# Patient Record
Sex: FEMALE | Race: WHITE | NOT HISPANIC OR LATINO | Employment: FULL TIME | URBAN - METROPOLITAN AREA
[De-identification: names, ages, dates, MRNs, and addresses within clinical notes are randomized per-mention and may not be internally consistent; named-entity substitution may affect disease eponyms.]

---

## 2023-03-08 PROBLEM — J01.90 ACUTE SINUSITIS: Status: ACTIVE | Noted: 2023-03-08

## 2023-03-08 PROBLEM — K59.00 CONSTIPATION, ACUTE: Status: ACTIVE | Noted: 2023-03-08

## 2023-03-08 PROBLEM — R60.0 BILATERAL EDEMA OF LOWER EXTREMITY: Status: ACTIVE | Noted: 2023-03-08

## 2023-03-08 PROBLEM — I25.10 CAD (CORONARY ARTERY DISEASE): Status: ACTIVE | Noted: 2023-03-08

## 2023-03-08 PROBLEM — R06.02 SOB (SHORTNESS OF BREATH) ON EXERTION: Status: ACTIVE | Noted: 2023-03-08

## 2023-03-08 PROBLEM — R60.9 EDEMA: Status: ACTIVE | Noted: 2023-03-08

## 2023-03-08 PROBLEM — R10.84 ABDOMINAL PAIN, COLICKY: Status: ACTIVE | Noted: 2023-03-08

## 2023-03-08 PROBLEM — Z95.1 S/P CABG (CORONARY ARTERY BYPASS GRAFT): Status: ACTIVE | Noted: 2023-03-08

## 2023-03-08 PROBLEM — R00.0 TACHYCARDIA: Status: ACTIVE | Noted: 2023-03-08

## 2023-03-08 PROBLEM — J30.2 SEASONAL ALLERGIC RHINITIS: Status: ACTIVE | Noted: 2023-03-08

## 2023-03-08 PROBLEM — E78.00 HYPERCHOLESTEROLEMIA: Status: ACTIVE | Noted: 2023-03-08

## 2023-03-08 PROBLEM — R16.0 HEPATOMEGALY: Status: ACTIVE | Noted: 2023-03-08

## 2023-03-08 PROBLEM — I27.20 PULMONARY HTN (MULTI): Status: ACTIVE | Noted: 2023-03-08

## 2023-03-08 PROBLEM — Z95.2 S/P MITRAL VALVE REPLACEMENT: Status: ACTIVE | Noted: 2023-03-08

## 2023-03-08 PROBLEM — I42.9 CARDIOMYOPATHY (MULTI): Status: ACTIVE | Noted: 2023-03-08

## 2023-03-08 PROBLEM — I34.0 MITRAL REGURGITATION: Status: ACTIVE | Noted: 2023-03-08

## 2023-03-08 PROBLEM — I48.92 ATRIAL FLUTTER (MULTI): Status: ACTIVE | Noted: 2023-03-08

## 2023-03-08 PROBLEM — J90 PLEURAL EFFUSION: Status: ACTIVE | Noted: 2023-03-08

## 2023-03-08 PROBLEM — F51.01 PRIMARY INSOMNIA: Status: ACTIVE | Noted: 2023-03-08

## 2023-03-08 RX ORDER — LISINOPRIL 20 MG/1
1 TABLET ORAL DAILY
COMMUNITY
Start: 2020-10-01 | End: 2023-11-07 | Stop reason: SDUPTHER

## 2023-03-08 RX ORDER — CARVEDILOL 25 MG/1
1 TABLET ORAL 2 TIMES DAILY
COMMUNITY
Start: 2020-12-02 | End: 2024-05-07 | Stop reason: SDUPTHER

## 2023-03-08 RX ORDER — ATORVASTATIN CALCIUM 80 MG/1
1 TABLET, FILM COATED ORAL DAILY
COMMUNITY
Start: 2020-10-01 | End: 2024-04-09 | Stop reason: SDUPTHER

## 2023-03-08 RX ORDER — WARFARIN 2 MG/1
TABLET ORAL
COMMUNITY
End: 2023-10-05 | Stop reason: SDUPTHER

## 2023-03-08 RX ORDER — ASPIRIN 81 MG/1
1 TABLET ORAL DAILY
COMMUNITY
Start: 2020-08-06

## 2023-03-13 ENCOUNTER — OFFICE VISIT (OUTPATIENT)
Dept: PRIMARY CARE | Facility: CLINIC | Age: 53
End: 2023-03-13
Payer: COMMERCIAL

## 2023-03-13 VITALS
OXYGEN SATURATION: 99 % | HEIGHT: 63 IN | DIASTOLIC BLOOD PRESSURE: 78 MMHG | HEART RATE: 60 BPM | SYSTOLIC BLOOD PRESSURE: 120 MMHG | TEMPERATURE: 96 F | BODY MASS INDEX: 24.45 KG/M2 | WEIGHT: 138 LBS

## 2023-03-13 DIAGNOSIS — F32.0 CURRENT MILD EPISODE OF MAJOR DEPRESSIVE DISORDER WITHOUT PRIOR EPISODE (CMS-HCC): ICD-10-CM

## 2023-03-13 DIAGNOSIS — I48.3 TYPICAL ATRIAL FLUTTER (MULTI): ICD-10-CM

## 2023-03-13 DIAGNOSIS — Z00.00 ROUTINE GENERAL MEDICAL EXAMINATION AT A HEALTH CARE FACILITY: Primary | ICD-10-CM

## 2023-03-13 DIAGNOSIS — Z23 ENCOUNTER FOR IMMUNIZATION: ICD-10-CM

## 2023-03-13 DIAGNOSIS — I25.10 CORONARY ARTERY DISEASE INVOLVING NATIVE HEART WITHOUT ANGINA PECTORIS, UNSPECIFIED VESSEL OR LESION TYPE: ICD-10-CM

## 2023-03-13 DIAGNOSIS — Z12.11 ENCOUNTER FOR SCREENING FOR MALIGNANT NEOPLASM OF COLON: ICD-10-CM

## 2023-03-13 PROCEDURE — 90471 IMMUNIZATION ADMIN: CPT | Performed by: FAMILY MEDICINE

## 2023-03-13 PROCEDURE — 90677 PCV20 VACCINE IM: CPT | Performed by: FAMILY MEDICINE

## 2023-03-13 PROCEDURE — 90750 HZV VACC RECOMBINANT IM: CPT | Performed by: FAMILY MEDICINE

## 2023-03-13 PROCEDURE — 99396 PREV VISIT EST AGE 40-64: CPT | Performed by: FAMILY MEDICINE

## 2023-03-13 RX ORDER — PNEUMOCOCCAL 20-VALENT CONJUGATE VACCINE 2.2; 2.2; 2.2; 2.2; 2.2; 2.2; 2.2; 2.2; 2.2; 2.2; 2.2; 2.2; 2.2; 2.2; 2.2; 2.2; 4.4; 2.2; 2.2; 2.2 UG/.5ML; UG/.5ML; UG/.5ML; UG/.5ML; UG/.5ML; UG/.5ML; UG/.5ML; UG/.5ML; UG/.5ML; UG/.5ML; UG/.5ML; UG/.5ML; UG/.5ML; UG/.5ML; UG/.5ML; UG/.5ML; UG/.5ML; UG/.5ML; UG/.5ML; UG/.5ML
0.5 INJECTION, SUSPENSION INTRAMUSCULAR ONCE
Qty: 0.5 ML | Refills: 0 | Status: SHIPPED | OUTPATIENT
Start: 2023-03-13 | End: 2023-03-13

## 2023-03-13 RX ORDER — CEPHALEXIN 500 MG/1
CAPSULE ORAL
COMMUNITY
Start: 2023-03-09 | End: 2024-03-22 | Stop reason: ALTCHOICE

## 2023-03-13 RX ORDER — ESCITALOPRAM OXALATE 10 MG/1
10 TABLET ORAL DAILY
Qty: 90 TABLET | Refills: 3 | Status: SHIPPED | OUTPATIENT
Start: 2023-03-13 | End: 2023-05-08 | Stop reason: SDUPTHER

## 2023-03-13 ASSESSMENT — PAIN SCALES - GENERAL: PAINLEVEL: 0-NO PAIN

## 2023-03-13 NOTE — PROGRESS NOTES
Subjective   Patient ID: Kayley Lynch is a 52 y.o. female who presents for Annual Exam (Patient is here for CPE, Patient is not fasting. Patient would to discuss some depression. ).  HPI  52-year-old female presents for complete physical exam.  She would like to start medication for depression.  No homicidal or suicidal ideation.  Review of Systems  Constitutional: no chills, no fever and no night sweats.   Eyes: no blurred vision and no eyesight problems.   ENT: no hearing loss, no nasal congestion, no nasal discharge, no hoarseness and no sore throat.   Cardiovascular: no chest pain, no intermittent leg claudication, no lower extremity edema, no palpitations and no syncope.   Respiratory: no cough, no shortness of breath during exertion, no shortness of breath at rest and no wheezing.   Gastrointestinal: no abdominal pain, no blood in stools, no constipation, no diarrhea, no melena, no nausea, no rectal pain and no vomiting.   Genitourinary: no dysuria, no change in urinary frequency, no urinary hesitancy and no feelings of urinary urgency.   Neurological: no difficulty walking, no headache, no limb weakness, no numbness and no tingling.   Psychiatric: no anxiety,  depression, no anhedonia and no substance use disorders.   Endocrine: no recent weight gain and no recent weight loss.   Hematologic/Lymphatic: no tendency for easy bruising and no swollen glands.  Visit Vitals  /78 (BP Location: Left arm, Patient Position: Sitting, BP Cuff Size: Adult)   Pulse 60   Temp 35.6 °C (96 °F)        Objective   Physical Exam  Constitutional: Alert and in no acute distress. Well developed, well nourished.   Eyes: Pupils were equal in size, round, reactive to light (PERRL) with normal accommodation and extraocular movements intact (EOMI). Ophthalmoscopic examination: Normal.   Ears, Nose, Mouth, and Throat: External inspection of ears and nose: Normal. Otoscopic examination: Normal. Lips, teeth, and gums: Normal.  Oropharynx: Normal.   Neck: No neck mass was observed. Supple. Thyroid not enlarged and there were no palpable thyroid nodules.   Cardiovascular: Heart rate and rhythm were normal, normal S1 and S2, no gallops, no murmurs and no pericardial rub. Carotid pulses: Normal with no bruits. Abdominal aorta: Normal. Pedal pulses: Normal. No peripheral edema.   Pulmonary: No respiratory distress. Clear bilateral breath sounds.   Abdomen: Soft nontender; no abdominal mass palpated. Normal bowel sounds. No organomegaly.   Skin: Normal skin color and pigmentation, normal skin turgor, and no rash.   Psychiatric: Judgment and insight: Intact. Mood and affect: Normal.  Assessment/Plan   Problem List Items Addressed This Visit          Circulatory    Atrial flutter (CMS/HCC)    CAD (coronary artery disease)       Other    Routine general medical examination at a health care facility - Primary    Relevant Orders    Lipid Panel    Comprehensive Metabolic Panel    CBC and Auto Differential    Urinalysis with Reflex Microscopic    BI mammo bilateral screening tomosynthesis    Encounter for screening for malignant neoplasm of colon    Relevant Orders    Colonoscopy    Encounter for immunization     Other Visit Diagnoses       Current mild episode of major depressive disorder without prior episode (CMS/HCC)        Relevant Medications    escitalopram (Lexapro) 10 mg tablet

## 2023-03-20 ENCOUNTER — APPOINTMENT (OUTPATIENT)
Dept: PRIMARY CARE | Facility: CLINIC | Age: 53
End: 2023-03-20
Payer: COMMERCIAL

## 2023-05-08 ENCOUNTER — OFFICE VISIT (OUTPATIENT)
Dept: PRIMARY CARE | Facility: CLINIC | Age: 53
End: 2023-05-08
Payer: COMMERCIAL

## 2023-05-08 VITALS
BODY MASS INDEX: 24.09 KG/M2 | HEART RATE: 76 BPM | OXYGEN SATURATION: 96 % | SYSTOLIC BLOOD PRESSURE: 132 MMHG | WEIGHT: 136 LBS | DIASTOLIC BLOOD PRESSURE: 78 MMHG

## 2023-05-08 DIAGNOSIS — F32.0 CURRENT MILD EPISODE OF MAJOR DEPRESSIVE DISORDER WITHOUT PRIOR EPISODE (CMS-HCC): Primary | ICD-10-CM

## 2023-05-08 DIAGNOSIS — Z23 NEED FOR ZOSTER VACCINE: ICD-10-CM

## 2023-05-08 DIAGNOSIS — I27.20 PULMONARY HTN (MULTI): ICD-10-CM

## 2023-05-08 PROBLEM — R60.0 EDEMA, LOWER EXTREMITY: Status: ACTIVE | Noted: 2023-05-08

## 2023-05-08 PROCEDURE — 99214 OFFICE O/P EST MOD 30 MIN: CPT | Performed by: FAMILY MEDICINE

## 2023-05-08 PROCEDURE — 90471 IMMUNIZATION ADMIN: CPT | Performed by: FAMILY MEDICINE

## 2023-05-08 PROCEDURE — 90750 HZV VACC RECOMBINANT IM: CPT | Performed by: FAMILY MEDICINE

## 2023-05-08 RX ORDER — ESCITALOPRAM OXALATE 20 MG/1
20 TABLET ORAL DAILY
Qty: 90 TABLET | Refills: 3 | Status: SHIPPED | OUTPATIENT
Start: 2023-05-08 | End: 2024-05-13

## 2023-05-08 NOTE — PROGRESS NOTES
Subjective   Patient ID: Kayley Lynch is a 52 y.o. female who presents for discuss medication change (Patient would like to increase Escitalopram ).  HPI  52-year-old female with history of pulmonary hypertension presents for follow-up on depression.  She like to increase the dose of her medication if possible.  She is still having some breakthrough symptoms.  No side effects to medication.  Pulmonary hypertension is stable.  She is due for her second shingles shot.  Review of Systems  Constitutional: no chills, no fever and no night sweats.   Eyes: no blurred vision and no eyesight problems.   ENT: no hearing loss, no nasal congestion, no nasal discharge, no hoarseness and no sore throat.   Cardiovascular: no chest pain, no intermittent leg claudication, no lower extremity edema, no palpitations and no syncope.   Respiratory: no cough, no shortness of breath during exertion, no shortness of breath at rest and no wheezing.   Gastrointestinal: no abdominal pain, no blood in stools, no constipation, no diarrhea, no melena, no nausea, no rectal pain and no vomiting.   Genitourinary: no dysuria, no change in urinary frequency, no urinary hesitancy and no feelings of urinary urgency.   Neurological: no difficulty walking, no headache, no limb weakness, no numbness and no tingling.   Psychiatric: no anxiety, depression as per HPI, no anhedonia and no substance use disorders.   Endocrine: no recent weight gain and no recent weight loss.   Hematologic/Lymphatic: no tendency for easy bruising and no swollen glands.  Visit Vitals  /78 (BP Location: Left arm, Patient Position: Sitting)   Pulse 76        Objective   Physical Exam  Vitals reviewed.   Constitutional:       Appearance: Normal appearance.   HENT:      Right Ear: Tympanic membrane normal.      Left Ear: Tympanic membrane normal.   Cardiovascular:      Rate and Rhythm: Normal rate and regular rhythm.      Heart sounds: Normal heart sounds.   Pulmonary:       Breath sounds: Normal breath sounds.   Neurological:      Mental Status: She is alert.   Psychiatric:         Mood and Affect: Mood normal.         Behavior: Behavior normal.         Assessment/Plan   Problem List Items Addressed This Visit          Circulatory    Pulmonary HTN (CMS/HCC)       Other    Current mild episode of major depressive disorder without prior episode (CMS/HCC) - Primary    Relevant Medications    escitalopram (Lexapro) 20 mg tablet    Need for zoster vaccine

## 2023-08-30 RX ORDER — ACETAMINOPHEN AND CODEINE PHOSPHATE 300; 30 MG/1; MG/1
1 TABLET ORAL EVERY 6 HOURS
COMMUNITY
Start: 2023-02-22

## 2023-09-20 LAB
ALANINE AMINOTRANSFERASE (SGPT) (U/L) IN SER/PLAS: 11 U/L (ref 7–45)
ALBUMIN (G/DL) IN SER/PLAS: 4.6 G/DL (ref 3.4–5)
ALKALINE PHOSPHATASE (U/L) IN SER/PLAS: 167 U/L (ref 33–110)
ANION GAP IN SER/PLAS: 10 MMOL/L (ref 10–20)
ASPARTATE AMINOTRANSFERASE (SGOT) (U/L) IN SER/PLAS: 14 U/L (ref 9–39)
BILIRUBIN TOTAL (MG/DL) IN SER/PLAS: 0.5 MG/DL (ref 0–1.2)
CALCIUM (MG/DL) IN SER/PLAS: 9.5 MG/DL (ref 8.6–10.3)
CARBON DIOXIDE, TOTAL (MMOL/L) IN SER/PLAS: 31 MMOL/L (ref 21–32)
CHLORIDE (MMOL/L) IN SER/PLAS: 102 MMOL/L (ref 98–107)
CHOLESTEROL (MG/DL) IN SER/PLAS: 143 MG/DL (ref 0–199)
CHOLESTEROL IN HDL (MG/DL) IN SER/PLAS: 36.4 MG/DL
CHOLESTEROL/HDL RATIO: 3.9
CREATININE (MG/DL) IN SER/PLAS: 0.7 MG/DL (ref 0.5–1.05)
GFR FEMALE: >90 ML/MIN/1.73M2
GLUCOSE (MG/DL) IN SER/PLAS: 98 MG/DL (ref 74–99)
LDL: 82 MG/DL (ref 0–99)
POTASSIUM (MMOL/L) IN SER/PLAS: 4 MMOL/L (ref 3.5–5.3)
PROTEIN TOTAL: 7.5 G/DL (ref 6.4–8.2)
SODIUM (MMOL/L) IN SER/PLAS: 139 MMOL/L (ref 136–145)
TRIGLYCERIDE (MG/DL) IN SER/PLAS: 124 MG/DL (ref 0–149)
UREA NITROGEN (MG/DL) IN SER/PLAS: 12 MG/DL (ref 6–23)
VLDL: 25 MG/DL (ref 0–40)

## 2023-10-04 ENCOUNTER — ANTICOAGULATION - WARFARIN VISIT (OUTPATIENT)
Dept: PHARMACY | Facility: CLINIC | Age: 53
End: 2023-10-04
Payer: COMMERCIAL

## 2023-10-04 DIAGNOSIS — Z95.2 S/P MITRAL VALVE REPLACEMENT: ICD-10-CM

## 2023-10-04 DIAGNOSIS — I48.92 ATRIAL FLUTTER, UNSPECIFIED TYPE (MULTI): Primary | ICD-10-CM

## 2023-10-04 LAB
POC INR: 3.3
POC INR: 3.3
POC PROTHROMBIN TIME: NORMAL
POC PROTHROMBIN TIME: NORMAL

## 2023-10-04 PROCEDURE — 99212 OFFICE O/P EST SF 10 MIN: CPT

## 2023-10-04 PROCEDURE — 85610 PROTHROMBIN TIME: CPT

## 2023-10-04 NOTE — PROGRESS NOTES
Coumadin Clinic Visit Note    Patient verified warfarin dose  No missed doses  No unusual bruising or bleeding  No changes to medications - verified med list in epic  Consistent dietary green intake  No anticipated procedures at this time - may have some teeth pulled in future but only 2 at a time  INR Therapeutic today at 3.3  No changes to warfarin dose today  Next appointment 4 weeks    Deena Church, Pharm D

## 2023-11-01 ENCOUNTER — APPOINTMENT (OUTPATIENT)
Dept: PHARMACY | Facility: CLINIC | Age: 53
End: 2023-11-01
Payer: COMMERCIAL

## 2023-11-03 DIAGNOSIS — E78.00 HYPERCHOLESTEROLEMIA: Primary | ICD-10-CM

## 2023-11-03 DIAGNOSIS — E78.00 PURE HYPERCHOLESTEROLEMIA, UNSPECIFIED: ICD-10-CM

## 2023-11-04 RX ORDER — LISINOPRIL 20 MG/1
20 TABLET ORAL DAILY
Qty: 90 TABLET | Refills: 2 | OUTPATIENT
Start: 2023-11-04

## 2023-11-06 ENCOUNTER — ANTICOAGULATION - WARFARIN VISIT (OUTPATIENT)
Dept: PHARMACY | Facility: CLINIC | Age: 53
End: 2023-11-06
Payer: COMMERCIAL

## 2023-11-06 DIAGNOSIS — I48.92 ATRIAL FLUTTER, UNSPECIFIED TYPE (MULTI): Primary | ICD-10-CM

## 2023-11-06 DIAGNOSIS — Z95.2 S/P MITRAL VALVE REPLACEMENT: ICD-10-CM

## 2023-11-06 LAB
POC INR: 2.9
POC PROTHROMBIN TIME: NORMAL

## 2023-11-06 PROCEDURE — 99212 OFFICE O/P EST SF 10 MIN: CPT

## 2023-11-06 PROCEDURE — 85610 PROTHROMBIN TIME: CPT

## 2023-11-06 NOTE — PROGRESS NOTES
No bleeding or unusual bruising.  Medications and doses verified.  No scheduled procedures at this time.  INR=2.9   Plan: Continue same weekly dose.  Follow up INR check in 4 weeks.

## 2023-11-07 RX ORDER — LISINOPRIL 20 MG/1
20 TABLET ORAL DAILY
Qty: 90 TABLET | Refills: 3 | Status: SHIPPED | OUTPATIENT
Start: 2023-11-07

## 2023-12-04 ENCOUNTER — ANTICOAGULATION - WARFARIN VISIT (OUTPATIENT)
Dept: PHARMACY | Facility: CLINIC | Age: 53
End: 2023-12-04
Payer: COMMERCIAL

## 2023-12-04 DIAGNOSIS — Z95.2 S/P MITRAL VALVE REPLACEMENT: Primary | ICD-10-CM

## 2023-12-04 LAB
POC INR: 1.8
POC PROTHROMBIN TIME: NORMAL

## 2023-12-04 PROCEDURE — 99212 OFFICE O/P EST SF 10 MIN: CPT | Performed by: PHARMACIST

## 2023-12-04 PROCEDURE — 85610 PROTHROMBIN TIME: CPT | Mod: QW | Performed by: PHARMACIST

## 2023-12-04 NOTE — PROGRESS NOTES
Verified current dose with pt.    No new meds or med changes since last visit.  Pt denies unusual bleed/bruise.  No upcoming procedures.  Inr = 1.8 - pt says she thinks she missed 1 dose.    Pt made stuffed cabbage - so she had more greens than usual.   Boost 6 mg today (mon)  Continue same dose and check again in 4 weeks.

## 2024-01-05 ENCOUNTER — CLINICAL SUPPORT (OUTPATIENT)
Dept: PHARMACY | Facility: CLINIC | Age: 54
End: 2024-01-05
Payer: COMMERCIAL

## 2024-01-05 ENCOUNTER — ANTICOAGULATION - WARFARIN VISIT (OUTPATIENT)
Dept: PHARMACY | Facility: CLINIC | Age: 54
End: 2024-01-05
Payer: COMMERCIAL

## 2024-01-05 DIAGNOSIS — Z95.2 S/P MITRAL VALVE REPLACEMENT: ICD-10-CM

## 2024-01-05 DIAGNOSIS — I48.92 ATRIAL FLUTTER, UNSPECIFIED TYPE (MULTI): Primary | ICD-10-CM

## 2024-01-05 DIAGNOSIS — Z95.2 S/P MITRAL VALVE REPLACEMENT: Primary | ICD-10-CM

## 2024-01-05 DIAGNOSIS — I48.92 ATRIAL FLUTTER, UNSPECIFIED TYPE (MULTI): ICD-10-CM

## 2024-01-05 LAB
POC INR: 1.7
POC PROTHROMBIN TIME: NORMAL

## 2024-01-05 PROCEDURE — 85610 PROTHROMBIN TIME: CPT | Performed by: PHARMACIST

## 2024-01-05 PROCEDURE — 99212 OFFICE O/P EST SF 10 MIN: CPT | Performed by: PHARMACIST

## 2024-01-05 NOTE — PROGRESS NOTES
Kayley Lynch is a 53 y.o. female with history of atrial flutter & status post mitral valve replacement who presents today for anticoagulation monitoring and adjustment.  INR 1.7 is therapeutic for this patient (goal range 2.5-3.5) and is reflective of 14mg TWD -- per patient she only took 4mg x 1 dose date of last appt on 12/4/23 - our documentation instructed patient to take 4mg Mondays and 2mg all other days of the week for 16 mg TWD  Patient verifies current dosing regimen, patient able to verbally recall dose  Patient reports 0 missed doses since last INR  Last INR 1.8 on 12/4/23 (4 week interval)  Patient denies s/sx clotting and/or stroke  Patient denies hematuria, epistaxis, rectal bleeding  Patient denies changes in diet, alcohol, or tobacco use  Vegetable intake consistent from week to week  Reviewed medication list and drug allergies with patient, updated any medication additions or modifications accordingly  Acetaminophen intake: no changes   Patient also denies any pending medical or dental procedures scheduled at this time  Patient was instructed to boost with warfarin 6mg today only, then increase TWD to 18mg and RTC 2 weeks    Gaviota Cosme, LaurynD, BCPS   1/5/2024 2:14 PM

## 2024-01-19 ENCOUNTER — ANTICOAGULATION - WARFARIN VISIT (OUTPATIENT)
Dept: PHARMACY | Facility: CLINIC | Age: 54
End: 2024-01-19
Payer: COMMERCIAL

## 2024-01-19 ENCOUNTER — APPOINTMENT (OUTPATIENT)
Dept: PHARMACY | Facility: CLINIC | Age: 54
End: 2024-01-19
Payer: COMMERCIAL

## 2024-01-19 DIAGNOSIS — Z95.2 S/P MITRAL VALVE REPLACEMENT: ICD-10-CM

## 2024-01-19 DIAGNOSIS — I48.92 ATRIAL FLUTTER, UNSPECIFIED TYPE (MULTI): Primary | ICD-10-CM

## 2024-01-19 NOTE — PROGRESS NOTES
Anticoagulation Clinic appointment updated to 1/26/24 (from 1/19/24) -- updated POC INR tracker to reflect next appt date     Gaviota Cosme PharmD, BCPS   1/19/2024 3:33 PM

## 2024-01-26 ENCOUNTER — APPOINTMENT (OUTPATIENT)
Dept: PHARMACY | Facility: CLINIC | Age: 54
End: 2024-01-26
Payer: COMMERCIAL

## 2024-01-26 ENCOUNTER — ANTICOAGULATION - WARFARIN VISIT (OUTPATIENT)
Dept: PHARMACY | Facility: CLINIC | Age: 54
End: 2024-01-26
Payer: COMMERCIAL

## 2024-01-26 DIAGNOSIS — Z95.2 S/P MITRAL VALVE REPLACEMENT: ICD-10-CM

## 2024-01-26 DIAGNOSIS — I48.92 ATRIAL FLUTTER, UNSPECIFIED TYPE (MULTI): Primary | ICD-10-CM

## 2024-01-26 LAB
POC INR: 4.1
POC PROTHROMBIN TIME: NORMAL

## 2024-01-26 PROCEDURE — 85610 PROTHROMBIN TIME: CPT | Mod: QW | Performed by: PHARMACIST

## 2024-01-26 PROCEDURE — 99212 OFFICE O/P EST SF 10 MIN: CPT | Performed by: PHARMACIST

## 2024-01-26 NOTE — PROGRESS NOTES
Coumadin Clinic Visit Note    Patient verified warfarin dose  No missed doses  No unusual bruising or bleeding  No changes to medications  Barely ate any greens this month  compared to few broccoli every week , no sickeness , No MTV , no boost , no ensure ,no protein shakes ,no ylenol or pain pills , no supplements   No anticipated procedures at this time  INR Supratherapeutic today at 4.1  No changes to warfarin dose today  Next appointment  3weeks       Carmela Vail, PharmD

## 2024-02-06 DIAGNOSIS — Z95.2 S/P MITRAL VALVE REPLACEMENT: Primary | ICD-10-CM

## 2024-02-16 ENCOUNTER — ANTICOAGULATION - WARFARIN VISIT (OUTPATIENT)
Dept: PHARMACY | Facility: CLINIC | Age: 54
End: 2024-02-16
Payer: COMMERCIAL

## 2024-02-16 ENCOUNTER — CLINICAL SUPPORT (OUTPATIENT)
Dept: PHARMACY | Facility: CLINIC | Age: 54
End: 2024-02-16
Payer: COMMERCIAL

## 2024-02-16 DIAGNOSIS — I48.92 ATRIAL FLUTTER, UNSPECIFIED TYPE (MULTI): Primary | ICD-10-CM

## 2024-02-16 DIAGNOSIS — Z95.2 S/P MITRAL VALVE REPLACEMENT: ICD-10-CM

## 2024-02-16 LAB
POC INR: 2.8
POC PROTHROMBIN TIME: NORMAL

## 2024-02-16 PROCEDURE — 85610 PROTHROMBIN TIME: CPT | Mod: QW | Performed by: PHARMACIST

## 2024-02-16 PROCEDURE — 99212 OFFICE O/P EST SF 10 MIN: CPT | Performed by: PHARMACIST

## 2024-02-16 NOTE — PROGRESS NOTES
Kayley Lynch is a 53 y.o. female with history of atrial flutter and status post mitral valve replacement who presents today for anticoagulation monitoring and adjustment.  INR 2.8 is therapeutic for this patient (goal range 2.5-3.5) and is reflective of 18 mg TWD  Patient verifies current dosing regimen, patient able to verbally recall dose  Patient reports 0  missed doses since last INR- other than the 1 dose patient was instructed to hold at last visit for supra-therapeutic INR   Last INR 4.1 on 1/26/24 (3 week interval)  Patient denies s/sx clotting and/or stroke  Patient denies hematuria, epistaxis, rectal bleeding  Patient denies changes in diet, alcohol, or tobacco use  Vegetable intake consistent from week to week  Reviewed medication list and drug allergies with patient, updated any medication additions or modifications accordingly  Acetaminophen intake: no changes   Patient also denies any pending medical or dental procedures scheduled at this time  Patient was instructed to continue with current therapy of warfarin 18mg and RTC 4 weeks    Lauryn DelaneyD, BCPS   2/16/2024 1:01 PM

## 2024-03-15 ENCOUNTER — ANTICOAGULATION - WARFARIN VISIT (OUTPATIENT)
Dept: PHARMACY | Facility: CLINIC | Age: 54
End: 2024-03-15
Payer: COMMERCIAL

## 2024-03-15 DIAGNOSIS — I48.92 ATRIAL FLUTTER, UNSPECIFIED TYPE (MULTI): Primary | ICD-10-CM

## 2024-03-15 DIAGNOSIS — Z95.2 S/P MITRAL VALVE REPLACEMENT: ICD-10-CM

## 2024-03-15 LAB
POC INR: 5
POC PROTHROMBIN TIME: NORMAL

## 2024-03-15 PROCEDURE — 85610 PROTHROMBIN TIME: CPT | Mod: QW | Performed by: PHARMACIST

## 2024-03-15 PROCEDURE — 99212 OFFICE O/P EST SF 10 MIN: CPT | Performed by: PHARMACIST

## 2024-03-15 NOTE — PROGRESS NOTES
Kayley Lynch is a 53 y.o. female with history of atrial flutter/status post mitral valve replacement who presents today for anticoagulation monitoring and adjustment.  INR 5 is supra-therapeutic for this patient (goal range 2.5-3.5) and is reflective of 18 mg TWD  Patient verifies current dosing regimen, patient able to verbally recall dose  Patient reports 0 missed doses since last INR  Last INR 2.8 on 2/16/24 (4 week interval)  Patient denies s/sx clotting and/or stroke  Patient denies hematuria, epistaxis, rectal bleeding  Patient denies changes in diet, alcohol, or tobacco use  Vegetable intake has been inconsistent  from week to week- patient plans to eat [employer] over the weekend for the holiday   Reviewed medication list and drug allergies with patient, updated any medication additions or modifications accordingly  Acetaminophen intake: no changes   Patient also denies any pending medical or dental procedures scheduled at this time  Patient was instructed to HOLD warfarin today only, then decrease to 2mg tomorrow only, then continue with previous maintenance dose of warfarin 18mg TWD and RTC 1 weeks    Gaviota Cosme, LaurynD, BCPS   3/15/2024 1:01 PM

## 2024-03-18 PROBLEM — I27.20 PULMONARY HYPERTENSION (MULTI): Chronic | Status: ACTIVE | Noted: 2023-03-08

## 2024-03-18 PROBLEM — R06.09 DYSPNEA ON EXERTION: Status: ACTIVE | Noted: 2023-03-08

## 2024-03-18 PROBLEM — Z95.2 HISTORY OF MITRAL VALVE REPLACEMENT: Status: RESOLVED | Noted: 2023-08-14 | Resolved: 2024-03-18

## 2024-03-18 PROBLEM — Z95.1 S/P CABG (CORONARY ARTERY BYPASS GRAFT): Chronic | Status: ACTIVE | Noted: 2023-03-08

## 2024-03-18 PROBLEM — Z95.2 S/P MITRAL VALVE REPLACEMENT: Chronic | Status: ACTIVE | Noted: 2023-03-08

## 2024-03-18 PROBLEM — Z95.2 HISTORY OF MITRAL VALVE REPLACEMENT: Status: ACTIVE | Noted: 2023-08-14

## 2024-03-18 PROBLEM — R60.0 EDEMA OF LOWER EXTREMITY: Status: RESOLVED | Noted: 2023-05-08 | Resolved: 2024-03-18

## 2024-03-18 PROBLEM — I48.92 ATRIAL FLUTTER (MULTI): Chronic | Status: ACTIVE | Noted: 2023-03-08

## 2024-03-18 PROBLEM — E78.00 HYPERCHOLESTEROLEMIA: Chronic | Status: ACTIVE | Noted: 2023-03-08

## 2024-03-18 PROBLEM — I42.9 CARDIOMYOPATHY (MULTI): Chronic | Status: ACTIVE | Noted: 2023-03-08

## 2024-03-18 PROBLEM — R60.0 BILATERAL EDEMA OF LOWER EXTREMITY: Chronic | Status: ACTIVE | Noted: 2023-03-08

## 2024-03-18 PROBLEM — I25.10 ARTERIOSCLEROSIS OF CORONARY ARTERY: Status: ACTIVE | Noted: 2024-03-18

## 2024-03-18 PROBLEM — I34.0 MITRAL REGURGITATION: Chronic | Status: ACTIVE | Noted: 2023-03-08

## 2024-03-18 PROBLEM — R60.9 EDEMA: Status: RESOLVED | Noted: 2023-03-08 | Resolved: 2024-03-18

## 2024-03-18 PROBLEM — I25.10 CAD (CORONARY ARTERY DISEASE): Chronic | Status: ACTIVE | Noted: 2023-03-08

## 2024-03-21 ENCOUNTER — ANTICOAGULATION - WARFARIN VISIT (OUTPATIENT)
Dept: PHARMACY | Facility: CLINIC | Age: 54
End: 2024-03-21
Payer: COMMERCIAL

## 2024-03-21 DIAGNOSIS — Z95.2 S/P MITRAL VALVE REPLACEMENT: Primary | Chronic | ICD-10-CM

## 2024-03-21 PROBLEM — Z23 NEED FOR ZOSTER VACCINE: Status: RESOLVED | Noted: 2023-05-08 | Resolved: 2024-03-21

## 2024-03-21 PROBLEM — K59.00 CONSTIPATION, ACUTE: Status: RESOLVED | Noted: 2023-03-08 | Resolved: 2024-03-21

## 2024-03-21 PROBLEM — J01.90 ACUTE SINUSITIS: Status: RESOLVED | Noted: 2023-03-08 | Resolved: 2024-03-21

## 2024-03-21 PROBLEM — F51.01 PRIMARY INSOMNIA: Status: RESOLVED | Noted: 2023-03-08 | Resolved: 2024-03-21

## 2024-03-21 PROBLEM — I25.10 ARTERIOSCLEROSIS OF CORONARY ARTERY: Status: RESOLVED | Noted: 2024-03-18 | Resolved: 2024-03-21

## 2024-03-21 PROBLEM — Z00.00 ROUTINE GENERAL MEDICAL EXAMINATION AT A HEALTH CARE FACILITY: Status: RESOLVED | Noted: 2023-03-13 | Resolved: 2024-03-21

## 2024-03-21 PROBLEM — R10.84 ABDOMINAL PAIN, COLICKY: Status: RESOLVED | Noted: 2023-03-08 | Resolved: 2024-03-21

## 2024-03-21 PROBLEM — J30.2 SEASONAL ALLERGIC RHINITIS: Status: RESOLVED | Noted: 2023-03-08 | Resolved: 2024-03-21

## 2024-03-21 PROBLEM — Z12.11 ENCOUNTER FOR SCREENING FOR MALIGNANT NEOPLASM OF COLON: Status: RESOLVED | Noted: 2023-03-13 | Resolved: 2024-03-21

## 2024-03-21 PROBLEM — R16.0 HEPATOMEGALY: Status: RESOLVED | Noted: 2023-03-08 | Resolved: 2024-03-21

## 2024-03-21 PROBLEM — Z23 ENCOUNTER FOR IMMUNIZATION: Status: RESOLVED | Noted: 2023-03-13 | Resolved: 2024-03-21

## 2024-03-21 LAB
POC INR: 2.4
POC PROTHROMBIN TIME: NORMAL

## 2024-03-21 PROCEDURE — 85610 PROTHROMBIN TIME: CPT | Mod: QW | Performed by: PHARMACIST

## 2024-03-21 PROCEDURE — 99212 OFFICE O/P EST SF 10 MIN: CPT | Performed by: PHARMACIST

## 2024-03-21 NOTE — PROGRESS NOTES
Verified that pt held 1 dose at last visit and took only 2 mg on Sat, then verified her usual dose.    Inr = 2.4 - a touch under range  No new meds or med changes since last visit  Pt denies unusual bleed/bruise  No upcoming procedures.  Pt planning on having 1-2 teeth pulled at a time  Will keep dose at 18 mg/week  Check in 2 weeks.

## 2024-03-22 ENCOUNTER — OFFICE VISIT (OUTPATIENT)
Dept: CARDIOLOGY | Facility: CLINIC | Age: 54
End: 2024-03-22
Payer: COMMERCIAL

## 2024-03-22 VITALS
OXYGEN SATURATION: 98 % | SYSTOLIC BLOOD PRESSURE: 138 MMHG | HEART RATE: 76 BPM | DIASTOLIC BLOOD PRESSURE: 84 MMHG | BODY MASS INDEX: 24.8 KG/M2 | WEIGHT: 140 LBS

## 2024-03-22 DIAGNOSIS — E78.00 HYPERCHOLESTEROLEMIA: Chronic | ICD-10-CM

## 2024-03-22 DIAGNOSIS — R07.89 CHEST PRESSURE: Chronic | ICD-10-CM

## 2024-03-22 DIAGNOSIS — I25.5 ISCHEMIC CARDIOMYOPATHY: Chronic | ICD-10-CM

## 2024-03-22 DIAGNOSIS — I25.10 CORONARY ARTERY DISEASE INVOLVING NATIVE HEART WITHOUT ANGINA PECTORIS, UNSPECIFIED VESSEL OR LESION TYPE: Chronic | ICD-10-CM

## 2024-03-22 DIAGNOSIS — Z95.2 S/P MITRAL VALVE REPLACEMENT: Primary | Chronic | ICD-10-CM

## 2024-03-22 DIAGNOSIS — I48.4 ATYPICAL ATRIAL FLUTTER (MULTI): Chronic | ICD-10-CM

## 2024-03-22 DIAGNOSIS — I34.0 NONRHEUMATIC MITRAL VALVE REGURGITATION: Chronic | ICD-10-CM

## 2024-03-22 DIAGNOSIS — R06.09 DYSPNEA ON EXERTION: ICD-10-CM

## 2024-03-22 PROCEDURE — 93000 ELECTROCARDIOGRAM COMPLETE: CPT | Performed by: INTERNAL MEDICINE

## 2024-03-22 PROCEDURE — 99215 OFFICE O/P EST HI 40 MIN: CPT | Performed by: INTERNAL MEDICINE

## 2024-03-22 NOTE — PROGRESS NOTES
Referred by No ref. provider found    HPI Kayley is here for 6-month follow-up.  Over the course the last 6 months she has been noticing progressively more exertional chest discomfort.  Over the last month she has been having more shortness of breath.  Never at rest no lower extremity edema.  She is smoking and still smoking more now to half a pack a day.  Has not been very active.    Past Medical History:  Problem List Items Addressed This Visit    None       Past Medical History:   Diagnosis Date    Atrial flutter (CMS/[facility]) 03/08/2023    s/p flutter ablation October 2020    Bilateral edema of lower extremity 03/08/2023    New July 2020 ... Better on Lasix    CAD (coronary artery disease) 03/08/2023    50% LAD, 60% diag, T.O. Cx, subtotal RCA, elevated RH pressure  CABG 10/2020: LIMA to LAD, FRA to OM, SVG to RCA      Cardiomyopathy (CMS/[facility]) 03/08/2023    EF 35-45%, now 50%    Hypercholesterolemia 03/08/2023    Dr. Lomeli follows    Mitral regurgitation 03/08/2023    MVT, mild MS, severe MR   s/p MVR = 25/33 Kevin mechanical valve     Other conditions influencing health status     Patient denies significant medical history    Pulmonary hypertension (CMS/[facility]) 03/08/2023    Mod to severe    S/P CABG (coronary artery bypass graft) 03/08/2023    10/2020: LIMA to LAD, FRA to OM, SVG to RCA      S/P mitral valve replacement 03/08/2023    MVT, Mild MS, severe MR ... s/p MVR 25/33 Calhan Mechanical valve             Past Surgical History:  She has a past surgical history that includes Other surgical history (08/17/2021) and Other surgical history (08/06/2020).      Social History:  She reports that she has been smoking cigarettes. She has never used smokeless tobacco. She reports that she does not drink alcohol and does not use drugs.    Family History:  Family History   Problem Relation Name Age of Onset    Atrial fibrillation Father      Heart attack Father          Allergies:  Sulfa (sulfonamide antibiotics)    Outpatient  "Medications:  Current Outpatient Medications   Medication Instructions    acetaminophen-codeine (Tylenol w/ Codeine #3) 300-30 mg tablet 1 tablet, oral, Every 6 hours    aspirin 81 mg EC tablet 1 tablet, oral, Daily    atorvastatin (Lipitor) 80 mg tablet 1 tablet, oral, Daily    carvedilol (Coreg) 25 mg tablet 1 tablet, oral, 2 times daily    cephalexin (Keflex) 500 mg capsule take 1 capsule every 6 hours    escitalopram (LEXAPRO) 20 mg, oral, Daily    lisinopril 20 mg, oral, Daily    warfarin (Coumadin) 2 mg tablet Take 2 mg ( 1 tablet ) once daily except 4 mg ( 2 tablets ) on Monday        Last Recorded Vitals:  There were no vitals filed for this visit.    Physical Exam    Physical  Patient is alert and oriented x3.  HEENT is unremarkable mucous members are moist  Neck no JVP no bruits upstrokes are full no thyromegaly  Lungs are clear bilaterally.  No wheezing crackles or rales  Heart regular rhythm normal S1-S2 there is no S3 no murmurs are heard.  I do not hear crisp mitral valve click  Abdomen is soft vessels are positive nontender nondistended no organomegaly no pulsatile masses  Extremities have no edema.  Distal pulses present palpable.  Neuro is grossly nonfocal  Skin has no rashes     Last Labs:  CBC -  Lab Results   Component Value Date    WBC 9.1 10/12/2022    HGB 13.4 10/12/2022    HCT 41.8 10/12/2022    MCV 87 10/12/2022     10/12/2022       CMP -  Lab Results   Component Value Date    CALCIUM 9.5 09/20/2023    PHOS 4.3 10/26/2020    PROT 7.5 09/20/2023    ALBUMIN 4.6 09/20/2023    AST 14 09/20/2023    ALT 11 09/20/2023    ALKPHOS 167 (H) 09/20/2023    BILITOT 0.5 09/20/2023       LIPID PANEL -   Lab Results   Component Value Date    CHOL 143 09/20/2023    HDL 36.4 (A) 09/20/2023    CHHDL 3.9 09/20/2023    VLDL 25 09/20/2023    TRIG 124 09/20/2023       RENAL FUNCTION PANEL -   Lab Results   Component Value Date    K 4.0 09/20/2023    PHOS 4.3 10/26/2020       No results found for: \"BNP\", " "\"HGBA1C\"       Procedure  ECHO [09/21/2022]: Est EF 50%. Mild-mod hypok of posterolateral walls. Well seated but poorly vis'd mechanical MVR w/acceptable mean gradient of 4 mmHg.     ECHO [01/05/2021]: Est EF 40%. Well-seated [facility] MVR prosthesis w/mild transvalvular regurg and slightly elevated mean transmitral gradient of 10 mmHg. Global hypok.      ECHO [10/19/2020]: EF 40%. Poorly vis'd anatomical structures (subopt image quality). Abnormal septal motion c/w postop status. Pt appears to be in A-flutter w/some variation in block which may affect est of LVF and transvalvular flows. Global hypok.      CATH [09/17/2020, Dr. Brittney Lomeli]: Elevated right heart filling pressures moderately. Normal cardiac output. Severe two-vessel coronary disease with a totally occluded circumflex filling via left to left collaterals, and a totally occluded right coronary artery in the midportion. Moderate disease in the mid LAD which is a 40 to 50% stenosis. RECOMMENDATIONS: The patient is now a candidate for surgical correction of her mild to moderate mitral stenosis with severe mitral regurgitation. Revascularization of the circumflex will be required. Possible revascularization of the right coronary artery if it is deemed to be a suitable candidate intraoperatively.      EX NST [08/18/2020]: 4 min 38 sec (6.5 METs) = Abnormal â€“ ev/for ischemia involving the lateral wall, EF 37%     CT / SCORING [08/14/2020] = 150 (LM 0.6, LAD 32.2, LCx 97.8, RCA 19.5)     Assessment/Plan   1. Severe mitral regurgitation status post mitral valve replacement with a #25/33 On-X mechanical mitral valve October 2020. Not well-visualized on the echo. Transmitral gradient was down to 4 from a high of 10.  She remains on Coumadin and a baby aspirin.  I am concerned about the shortness of breath.  I also do not hear crisp mitral valve click.  An echocardiogram will be done to assess the valve     2. CAD. She had three-vessel bypass in 2020 with a LIMA to " the LAD free radial artery graft to the marginal and SVG to the RCA. Continue aspirin.  And carvedilol.  She is now having exertional chest discomfort which is progressively more prominent.  I will have her have an exercise nuclear stress test.  She will hold the carvedilol the morning of the stress test.  An EKG will be done today.     3. Atrial flutter. She had an ablation in October 2020. On exam she is regular. A repeat EKG will be done today.     4. Cardiomyopathy. Postoperatively her ejection fraction was down to 40% but on her echo in September 2022 her ejection fraction was 50%.  Reassess on the stress test and the echo     5. Hyperlipidemia. August 2023 LDL 82 HDL 34. Liver enzymes normal. Alk phos slightly elevated 167.  Recheck in the next 1 to 2 months     6. Hypertension. Blood pressures are currently controlled.     7. Tobacco abuse.  Smoking is increased to half a pack a day.  Lots of stress in her life.  She wants to try laser acupuncture in [place].  We discussed the importance of this.  I do not know some of her shortness of breath is just because of her smoking.- > 5 min spent    EKG today.  Exercise nuclear stress test holding her carvedilol along with an echo to assess the prosthetic mitral valve.  Return to see me 8 to 10 weeks.  She will call 1 week afterwards to discuss the results of the test.  Brittney Lomeli MD     Instructions and follow up

## 2024-03-22 NOTE — PATIENT INSTRUCTIONS
1. Severe mitral regurgitation status post mitral valve replacement with a #25/33 On-X mechanical mitral valve October 2020. Not well-visualized on the echo. Transmitral gradient was down to 4 from a high of 10.  She remains on Coumadin and a baby aspirin.  I am concerned about the shortness of breath.  I also do not hear crisp mitral valve click.  An echocardiogram will be done to assess the valve     2. CAD. She had three-vessel bypass in 2020 with a LIMA to the LAD free radial artery graft to the marginal and SVG to the RCA. Continue aspirin.  And carvedilol.  She is now having exertional chest discomfort which is progressively more prominent.  I will have her have an exercise nuclear stress test.  She will hold the carvedilol the morning of the stress test.  An EKG will be done today.     3. Atrial flutter. She had an ablation in October 2020. On exam she is regular. A repeat EKG will be done today.     4. Cardiomyopathy. Postoperatively her ejection fraction was down to 40% but on her echo in September 2022 her ejection fraction was 50%.  Reassess on the stress test and the echo     5. Hyperlipidemia. August 2023 LDL 82 HDL 34. Liver enzymes normal. Alk phos slightly elevated 167.  Recheck in the next 1 to 2 months     6. Hypertension. Blood pressures are currently controlled.     7. Tobacco abuse.  Smoking is increased to half a pack a day.  Lots of stress in her life.  She wants to try laser acupuncture in [place].  We discussed the importance of this.  I do not know some of her shortness of breath is just because of her smoking.- > 5 min spent    EKG today.  Exercise nuclear stress test holding her carvedilol along with an echo to assess the prosthetic mitral valve.  Return to see me 8 to 10 weeks.  She will call 1 week afterwards to discuss the results of the test.

## 2024-04-04 ENCOUNTER — ANTICOAGULATION - WARFARIN VISIT (OUTPATIENT)
Dept: PHARMACY | Facility: CLINIC | Age: 54
End: 2024-04-04
Payer: COMMERCIAL

## 2024-04-04 DIAGNOSIS — Z95.2 S/P MITRAL VALVE REPLACEMENT: Chronic | ICD-10-CM

## 2024-04-04 DIAGNOSIS — I48.4 ATYPICAL ATRIAL FLUTTER (MULTI): Primary | Chronic | ICD-10-CM

## 2024-04-04 LAB
POC INR: 3.3
POC PROTHROMBIN TIME: NORMAL

## 2024-04-04 PROCEDURE — 85610 PROTHROMBIN TIME: CPT | Mod: QW

## 2024-04-04 PROCEDURE — 99212 OFFICE O/P EST SF 10 MIN: CPT

## 2024-04-04 NOTE — PROGRESS NOTES
Coumadin Clinic Visit Note    Patient verified warfarin dose of 4 mg on Mon and  Sat, 2 mg all other days   No missed doses  No unusual bruising or bleeding  No changes to medications  No recent otc/pain medication use  Consistent dietary green intake although patient is not eating as much as in the past  No anticipated procedures at this time  INR range is 2.5 to 3.5  INR Therapeutic today at 3.3  No changes to warfarin dose today  Next appointment 4 weeks    Deena Church, Pharm D

## 2024-04-09 DIAGNOSIS — E78.00 HYPERCHOLESTEROLEMIA: ICD-10-CM

## 2024-04-09 RX ORDER — ATORVASTATIN CALCIUM 80 MG/1
80 TABLET, FILM COATED ORAL DAILY
Qty: 90 TABLET | Refills: 3 | Status: SHIPPED | OUTPATIENT
Start: 2024-04-09 | End: 2025-04-09

## 2024-04-09 NOTE — TELEPHONE ENCOUNTER
Kayley contacted our office asking for us to please send her atorvstatin 80mg every day to he local [facility] in [place].

## 2024-04-15 DIAGNOSIS — Z95.2 S/P MITRAL VALVE REPLACEMENT: ICD-10-CM

## 2024-04-16 ENCOUNTER — HOSPITAL ENCOUNTER (OUTPATIENT)
Dept: RADIOLOGY | Facility: CLINIC | Age: 54
Discharge: HOME | End: 2024-04-16
Payer: COMMERCIAL

## 2024-04-16 ENCOUNTER — HOSPITAL ENCOUNTER (OUTPATIENT)
Dept: CARDIOLOGY | Facility: CLINIC | Age: 54
Discharge: HOME | End: 2024-04-16
Payer: COMMERCIAL

## 2024-04-16 DIAGNOSIS — I25.10 CORONARY ARTERY DISEASE INVOLVING NATIVE HEART WITHOUT ANGINA PECTORIS, UNSPECIFIED VESSEL OR LESION TYPE: Chronic | ICD-10-CM

## 2024-04-16 DIAGNOSIS — I25.5 ISCHEMIC CARDIOMYOPATHY: Chronic | ICD-10-CM

## 2024-04-16 DIAGNOSIS — F32.0 CURRENT MILD EPISODE OF MAJOR DEPRESSIVE DISORDER WITHOUT PRIOR EPISODE (CMS-HCC): ICD-10-CM

## 2024-04-16 DIAGNOSIS — Z95.2 S/P MITRAL VALVE REPLACEMENT: Chronic | ICD-10-CM

## 2024-04-16 DIAGNOSIS — R06.09 DYSPNEA ON EXERTION: ICD-10-CM

## 2024-04-16 DIAGNOSIS — R07.89 CHEST PRESSURE: Chronic | ICD-10-CM

## 2024-04-16 DIAGNOSIS — R07.89 CHEST PRESSURE: Primary | Chronic | ICD-10-CM

## 2024-04-16 DIAGNOSIS — Z95.2 S/P MITRAL VALVE REPLACEMENT: ICD-10-CM

## 2024-04-16 PROCEDURE — 93306 TTE W/DOPPLER COMPLETE: CPT

## 2024-04-16 PROCEDURE — 78452 HT MUSCLE IMAGE SPECT MULT: CPT | Performed by: INTERNAL MEDICINE

## 2024-04-16 PROCEDURE — 93306 TTE W/DOPPLER COMPLETE: CPT | Performed by: INTERNAL MEDICINE

## 2024-04-16 PROCEDURE — 93017 CV STRESS TEST TRACING ONLY: CPT

## 2024-04-16 PROCEDURE — 78452 HT MUSCLE IMAGE SPECT MULT: CPT

## 2024-04-16 RX ORDER — WARFARIN 2 MG/1
TABLET ORAL
Qty: 120 TABLET | Refills: 1 | Status: SHIPPED | OUTPATIENT
Start: 2024-04-16 | End: 2024-04-16

## 2024-04-16 RX ORDER — WARFARIN 2 MG/1
TABLET ORAL
Qty: 120 TABLET | Refills: 1 | Status: SHIPPED | OUTPATIENT
Start: 2024-04-16

## 2024-04-19 LAB
AORTIC VALVE MEAN GRADIENT: 2.9 MMHG
AORTIC VALVE PEAK VELOCITY: 1.28 M/S
AV PEAK GRADIENT: 6.5 MMHG
AVA (PEAK VEL): 2.29 CM2
AVA (VTI): 2.09 CM2
EJECTION FRACTION APICAL 4 CHAMBER: 55.7
LEFT VENTRICLE INTERNAL DIMENSION DIASTOLE: 4.76 CM (ref 3.5–6)
LEFT VENTRICULAR OUTFLOW TRACT DIAMETER: 1.95 CM
LV EJECTION FRACTION BIPLANE: 59 %
RIGHT VENTRICLE FREE WALL PEAK S': 0.05 CM/S
RIGHT VENTRICLE PEAK SYSTOLIC PRESSURE: 35.6 MMHG
TRICUSPID ANNULAR PLANE SYSTOLIC EXCURSION: 1.5 CM

## 2024-05-02 ENCOUNTER — ANTICOAGULATION - WARFARIN VISIT (OUTPATIENT)
Dept: PHARMACY | Facility: CLINIC | Age: 54
End: 2024-05-02
Payer: COMMERCIAL

## 2024-05-02 DIAGNOSIS — I48.4 ATYPICAL ATRIAL FLUTTER (MULTI): Primary | Chronic | ICD-10-CM

## 2024-05-02 DIAGNOSIS — Z95.2 S/P MITRAL VALVE REPLACEMENT: Chronic | ICD-10-CM

## 2024-05-02 LAB
POC INR: 3.5
POC PROTHROMBIN TIME: NORMAL

## 2024-05-02 PROCEDURE — 85610 PROTHROMBIN TIME: CPT | Mod: QW

## 2024-05-02 PROCEDURE — 99212 OFFICE O/P EST SF 10 MIN: CPT

## 2024-05-02 NOTE — PROGRESS NOTES
No bleeding or unusual bruising.  Medications and doses verified.  No scheduled procedures at this time.  INR=3.5   Plan: Continue same weekly dose.  Follow up INR check in 4 weeks.

## 2024-05-07 DIAGNOSIS — I27.20 PULMONARY HYPERTENSION (MULTI): Chronic | ICD-10-CM

## 2024-05-08 RX ORDER — CARVEDILOL 25 MG/1
25 TABLET ORAL 2 TIMES DAILY
Qty: 180 TABLET | Refills: 3 | Status: SHIPPED | OUTPATIENT
Start: 2024-05-08 | End: 2025-05-08

## 2024-05-22 ENCOUNTER — LAB (OUTPATIENT)
Dept: LAB | Facility: LAB | Age: 54
End: 2024-05-22
Payer: COMMERCIAL

## 2024-05-22 DIAGNOSIS — R06.09 DYSPNEA ON EXERTION: ICD-10-CM

## 2024-05-22 DIAGNOSIS — Z95.2 S/P MITRAL VALVE REPLACEMENT: Chronic | ICD-10-CM

## 2024-05-22 DIAGNOSIS — R07.89 CHEST PRESSURE: Chronic | ICD-10-CM

## 2024-05-22 DIAGNOSIS — I25.5 ISCHEMIC CARDIOMYOPATHY: Chronic | ICD-10-CM

## 2024-05-22 DIAGNOSIS — I25.10 CORONARY ARTERY DISEASE INVOLVING NATIVE HEART WITHOUT ANGINA PECTORIS, UNSPECIFIED VESSEL OR LESION TYPE: Chronic | ICD-10-CM

## 2024-05-22 LAB
ALBUMIN SERPL BCP-MCNC: 4 G/DL (ref 3.4–5)
ALP SERPL-CCNC: 158 U/L (ref 33–110)
ALT SERPL W P-5'-P-CCNC: 9 U/L (ref 7–45)
ANION GAP SERPL CALC-SCNC: 10 MMOL/L (ref 10–20)
AST SERPL W P-5'-P-CCNC: 13 U/L (ref 9–39)
BILIRUB SERPL-MCNC: 0.8 MG/DL (ref 0–1.2)
BUN SERPL-MCNC: 6 MG/DL (ref 6–23)
CALCIUM SERPL-MCNC: 8.9 MG/DL (ref 8.6–10.3)
CHLORIDE SERPL-SCNC: 104 MMOL/L (ref 98–107)
CHOLEST SERPL-MCNC: 113 MG/DL (ref 0–199)
CHOLESTEROL/HDL RATIO: 3.3
CO2 SERPL-SCNC: 29 MMOL/L (ref 21–32)
CREAT SERPL-MCNC: 0.62 MG/DL (ref 0.5–1.05)
EGFRCR SERPLBLD CKD-EPI 2021: >90 ML/MIN/1.73M*2
GLUCOSE SERPL-MCNC: 112 MG/DL (ref 74–99)
HDLC SERPL-MCNC: 33.8 MG/DL
LDLC SERPL CALC-MCNC: 66 MG/DL
NON HDL CHOLESTEROL: 79 MG/DL (ref 0–149)
POTASSIUM SERPL-SCNC: 3.4 MMOL/L (ref 3.5–5.3)
PROT SERPL-MCNC: 6.4 G/DL (ref 6.4–8.2)
SODIUM SERPL-SCNC: 140 MMOL/L (ref 136–145)
TRIGL SERPL-MCNC: 66 MG/DL (ref 0–149)
VLDL: 13 MG/DL (ref 0–40)

## 2024-05-22 PROCEDURE — 80053 COMPREHEN METABOLIC PANEL: CPT

## 2024-05-22 PROCEDURE — 80061 LIPID PANEL: CPT

## 2024-05-22 PROCEDURE — 36415 COLL VENOUS BLD VENIPUNCTURE: CPT

## 2024-05-23 ENCOUNTER — OFFICE VISIT (OUTPATIENT)
Dept: CARDIOLOGY | Facility: CLINIC | Age: 54
End: 2024-05-23
Payer: COMMERCIAL

## 2024-05-23 VITALS
OXYGEN SATURATION: 98 % | DIASTOLIC BLOOD PRESSURE: 76 MMHG | WEIGHT: 139 LBS | BODY MASS INDEX: 24.62 KG/M2 | HEART RATE: 98 BPM | SYSTOLIC BLOOD PRESSURE: 124 MMHG

## 2024-05-23 DIAGNOSIS — I48.4 ATYPICAL ATRIAL FLUTTER (MULTI): Primary | Chronic | ICD-10-CM

## 2024-05-23 DIAGNOSIS — I25.5 ISCHEMIC CARDIOMYOPATHY: Chronic | ICD-10-CM

## 2024-05-23 DIAGNOSIS — Z95.2 S/P MITRAL VALVE REPLACEMENT: Chronic | ICD-10-CM

## 2024-05-23 DIAGNOSIS — E78.00 HYPERCHOLESTEROLEMIA: Chronic | ICD-10-CM

## 2024-05-23 DIAGNOSIS — I25.10 CORONARY ARTERY DISEASE INVOLVING NATIVE HEART WITHOUT ANGINA PECTORIS, UNSPECIFIED VESSEL OR LESION TYPE: Chronic | ICD-10-CM

## 2024-05-23 DIAGNOSIS — I34.0 NONRHEUMATIC MITRAL VALVE REGURGITATION: Chronic | ICD-10-CM

## 2024-05-23 DIAGNOSIS — R06.09 DYSPNEA ON EXERTION: ICD-10-CM

## 2024-05-23 DIAGNOSIS — I27.20 PULMONARY HYPERTENSION (MULTI): Chronic | ICD-10-CM

## 2024-05-23 PROCEDURE — 99214 OFFICE O/P EST MOD 30 MIN: CPT | Performed by: INTERNAL MEDICINE

## 2024-05-23 RX ORDER — ALBUTEROL SULFATE 90 UG/1
2 AEROSOL, METERED RESPIRATORY (INHALATION) ONCE
Status: SHIPPED | OUTPATIENT
Start: 2024-05-23

## 2024-05-23 NOTE — PATIENT INSTRUCTIONS
1. Severe mitral regurgitation status post mitral valve replacement with a #25/33 On-X mechanical mitral valve October 2020.  A repeat echo 4/2024 wit NL MVR fxn.  Transmitral valve gradient 3 mmHg.  On exam I still do not hear sharp click.  There is no evidence of valve dysfunction.    2. CAD. She had three-vessel bypass in 2020 with a LIMA to the LAD free radial artery graft to the marginal and SVG to the RCA. Continue aspirin.  And carvedilol.  She is now having exertional chest discomfort which is progressively more prominent. TST below avg cap, chrono incompetence, NL perfusion EF 51%.  I still suspect a lot of her symptoms are due to COPD.  She is after smoking more.  If needed we can do a coronary CTA to evaluate patency of the bypass grafts.  I will give her an albuterol inhaler to see if this helps.  I will refer her to pulmonary medicine.  Her lungs are diminished.  There is no wheezing.     3. Atrial flutter. She had an ablation in October 2020. On exam she is regular. A repeat EKG will be done today.     4. Cardiomyopathy. Postoperatively her ejection fraction was down to 40% but on her echo in September 2022 her ejection fraction was 50%.  Remains low NL on echo and TST     5. Hyperlipidemia. 5/22/24 LDL 66, HDL 34, Trig 66.  These results are excellent     6. Hypertension. Blood pressures are currently controlled.     7. Tobacco abuse.  She is actually smoking more now than she was.  She was going to try [facility] laser puncture in [place] but has not yet.  She herself feels that her shortness of breath her symptoms are coming from smoking.    Referral to pulmonary medicine.  I will give her an albuterol inhaler to see if she has improvement in her symptoms return to see me 4 months.  If symptoms are persistent proceed with a coronary CTA

## 2024-05-23 NOTE — PROGRESS NOTES
Referred by No ref. provider found    HPI    Patient is here for follow-up.  She continues to be short of breath.  This is with exertion.  Especially going up a hill.  She is actually smoking more than she was smoking before.  Occasional episodes of chest tightness.  No edema.    Past Medical History:  Problem List Items Addressed This Visit    None       Past Medical History:   Diagnosis Date    Atrial flutter (Multi) 03/08/2023    s/p flutter ablation October 2020    Bilateral edema of lower extremity 03/08/2023    New July 2020 ... Better on Lasix    CAD (coronary artery disease) 03/08/2023    50% LAD, 60% diag, T.O. Cx, subtotal RCA, elevated RH pressure  CABG 10/2020: LIMA to LAD, FRA to OM, SVG to RCA      Cardiomyopathy (Multi) 03/08/2023    EF 35-45%, now 50%    Chest pressure 03/22/2024    Hypercholesterolemia 03/08/2023    Dr. Lomeli follows    Mitral regurgitation 03/08/2023    MVT, mild MS, severe MR   s/p MVR = 25/33 [place] mechanical valve     Other conditions influencing health status     Patient denies significant medical history    Pulmonary hypertension (Multi) 03/08/2023    Mod to severe    S/P CABG (coronary artery bypass graft) 03/08/2023    10/2020: LIMA to LAD, FRA to OM, SVG to RCA      S/P mitral valve replacement 03/08/2023    MVT, Mild MS, severe MR ... s/p MVR 25/33 [place] Mechanical valve             Past Surgical History:  She has a past surgical history that includes Other surgical history (08/17/2021) and Other surgical history (08/06/2020).      Social History:  She reports that she has been smoking cigarettes. She has never used smokeless tobacco. She reports that she does not drink alcohol and does not use drugs.    Family History:  Family History   Problem Relation Name Age of Onset    Atrial fibrillation Father      Heart attack Father          Allergies:  Sulfa (sulfonamide antibiotics)    Outpatient Medications:  Current Outpatient Medications   Medication Instructions     "acetaminophen-codeine (Tylenol w/ Codeine #3) 300-30 mg tablet 1 tablet, oral, Every 6 hours    aspirin 81 mg EC tablet 1 tablet, oral, Daily    atorvastatin (LIPITOR) 80 mg, oral, Daily    carvedilol (COREG) 25 mg, oral, 2 times daily    escitalopram (LEXAPRO) 20 mg, oral, Daily    lisinopril 20 mg, oral, Daily    warfarin (Coumadin) 2 mg tablet Take 4 mg (2 tablets) on Mondays and Saturdays, 2 mg (1 tablet) all other days of week or as directed by anticoagulation clinic        Last Recorded Vitals:  There were no vitals filed for this visit.    Physical Exam    Physical  Patient is alert and oriented x3.  HEENT is unremarkable mucous members are moist  Neck no JVP no bruits upstrokes are full no thyromegaly  Lungs are reduced.  Heart regular rhythm normal S1-S2 there is no S3 no murmurs are heard.  I do not hear crisp mitral valve click  Abdomen is soft vessels are positive nontender nondistended no organomegaly no pulsatile masses  Extremities have no edema.  Distal pulses present palpable.  Neuro is grossly nonfocal  Skin has no rashes     Last Labs:  CBC -  Lab Results   Component Value Date    WBC 9.1 10/12/2022    HGB 13.4 10/12/2022    HCT 41.8 10/12/2022    MCV 87 10/12/2022     10/12/2022       CMP -  Lab Results   Component Value Date    CALCIUM 8.9 05/22/2024    PHOS 4.3 10/26/2020    PROT 6.4 05/22/2024    ALBUMIN 4.0 05/22/2024    AST 13 05/22/2024    ALT 9 05/22/2024    ALKPHOS 158 (H) 05/22/2024    BILITOT 0.8 05/22/2024       LIPID PANEL -   Lab Results   Component Value Date    CHOL 113 05/22/2024    HDL 33.8 05/22/2024    CHHDL 3.3 05/22/2024    VLDL 13 05/22/2024    TRIG 66 05/22/2024    NHDL 79 05/22/2024       RENAL FUNCTION PANEL -   Lab Results   Component Value Date    K 3.4 (L) 05/22/2024    PHOS 4.3 10/26/2020       No results found for: \"BNP\", \"HGBA1C\"       Procedure    EXT 4/16/24 below avg, chrono incomp, severe SOB, NL perfusion EF 51%    TTE 3/22/24 EF 50-55%, PWHK, Mech MVR " with mean gradient 3mmHg    ECHO [09/21/2022]: Est EF 50%. Mild-mod hypok of posterolateral walls. Well seated but poorly vis'd mechanical MVR w/acceptable mean gradient of 4 mmHg.     ECHO [01/05/2021]: Est EF 40%. Well-seated [facility] MVR prosthesis w/mild transvalvular regurg and slightly elevated mean transmitral gradient of 10 mmHg. Global hypok.      ECHO [10/19/2020]: EF 40%. Poorly vis'd anatomical structures (subopt image quality). Abnormal septal motion c/w postop status. Pt appears to be in A-flutter w/some variation in block which may affect est of LVF and transvalvular flows. Global hypok.      CATH [09/17/2020, Dr. Brittney Lomeli]: Elevated right heart filling pressures moderately. Normal cardiac output. Severe two-vessel coronary disease with a totally occluded circumflex filling via left to left collaterals, and a totally occluded right coronary artery in the midportion. Moderate disease in the mid LAD which is a 40 to 50% stenosis. RECOMMENDATIONS: The patient is now a candidate for surgical correction of her mild to moderate mitral stenosis with severe mitral regurgitation. Revascularization of the circumflex will be required. Possible revascularization of the right coronary artery if it is deemed to be a suitable candidate intraoperatively.      EX NST [08/18/2020]: 4 min 38 sec (6.5 METs) = Abnormal â€“ ev/for ischemia involving the lateral wall, EF 37%     CT / SCORING [08/14/2020] = 150 (LM 0.6, LAD 32.2, LCx 97.8, RCA 19.5)     Assessment/Plan   1. Severe mitral regurgitation status post mitral valve replacement with a #25/33 On-X mechanical mitral valve October 2020.  A repeat echo 4/2024 wit NL MVR fxn.  Transmitral valve gradient 3 mmHg.  On exam I still do not hear sharp click.  There is no evidence of valve dysfunction.    2. CAD. She had three-vessel bypass in 2020 with a LIMA to the LAD free radial artery graft to the marginal and SVG to the RCA. Continue aspirin.  And carvedilol.  She is now  having exertional chest discomfort which is progressively more prominent. TST below avg cap, chrono incompetence, NL perfusion EF 51%.  I still suspect a lot of her symptoms are due to COPD.  She is after smoking more.  If needed we can do a coronary CTA to evaluate patency of the bypass grafts.  I will give her an albuterol inhaler to see if this helps.  I will refer her to pulmonary medicine.  Her lungs are diminished.  There is no wheezing.     3. Atrial flutter. She had an ablation in October 2020. On exam she is regular. A repeat EKG will be done today.     4. Cardiomyopathy. Postoperatively her ejection fraction was down to 40% but on her echo in September 2022 her ejection fraction was 50%.  Remains low NL on echo and TST     5. Hyperlipidemia. 5/22/24 LDL 66, HDL 34, Trig 66.  These results are excellent     6. Hypertension. Blood pressures are currently controlled.     7. Tobacco abuse.  She is actually smoking more now than she was.  She was going to try Accu laser puncture in [place] but has not yet.  She herself feels that her shortness of breath her symptoms are coming from smoking.    Referral to pulmonary medicine.  I will give her an albuterol inhaler to see if she has improvement in her symptoms return to see me 4 months.  If symptoms are persistent proceed with a coronary CTA  Brittney Lomeli MD     Instructions and follow up

## 2024-05-30 ENCOUNTER — ANTICOAGULATION - WARFARIN VISIT (OUTPATIENT)
Dept: PHARMACY | Facility: CLINIC | Age: 54
End: 2024-05-30
Payer: COMMERCIAL

## 2024-05-30 DIAGNOSIS — I48.4 ATYPICAL ATRIAL FLUTTER (MULTI): Primary | Chronic | ICD-10-CM

## 2024-05-30 DIAGNOSIS — Z95.2 S/P MITRAL VALVE REPLACEMENT: Chronic | ICD-10-CM

## 2024-05-30 LAB
POC INR: 3.4
POC PROTHROMBIN TIME: NORMAL

## 2024-05-30 PROCEDURE — 85610 PROTHROMBIN TIME: CPT | Mod: QW | Performed by: PHARMACIST

## 2024-05-30 PROCEDURE — 99212 OFFICE O/P EST SF 10 MIN: CPT | Performed by: PHARMACIST

## 2024-05-30 NOTE — PROGRESS NOTES
Coumadin Clinic Visit Note    Patient verified warfarin dose  No missed doses  No unusual bruising or bleeding  No changes to medications  Consistent dietary green intake  No anticipated procedures at this time  INR Therapeutic today at 3.4  No changes to warfarin dose today  Next appointment 4 weeks       Carmela Vail, PharmD

## 2024-05-31 ENCOUNTER — OFFICE VISIT (OUTPATIENT)
Dept: PRIMARY CARE | Facility: CLINIC | Age: 54
End: 2024-05-31
Payer: COMMERCIAL

## 2024-05-31 VITALS
HEIGHT: 64 IN | WEIGHT: 136 LBS | HEART RATE: 67 BPM | OXYGEN SATURATION: 97 % | DIASTOLIC BLOOD PRESSURE: 86 MMHG | BODY MASS INDEX: 23.22 KG/M2 | TEMPERATURE: 97.3 F | SYSTOLIC BLOOD PRESSURE: 146 MMHG

## 2024-05-31 DIAGNOSIS — I48.4 ATYPICAL ATRIAL FLUTTER (MULTI): Chronic | ICD-10-CM

## 2024-05-31 DIAGNOSIS — F32.0 CURRENT MILD EPISODE OF MAJOR DEPRESSIVE DISORDER WITHOUT PRIOR EPISODE (CMS-HCC): Primary | ICD-10-CM

## 2024-05-31 DIAGNOSIS — E78.00 HYPERCHOLESTEROLEMIA: Chronic | ICD-10-CM

## 2024-05-31 PROCEDURE — 99214 OFFICE O/P EST MOD 30 MIN: CPT | Performed by: FAMILY MEDICINE

## 2024-05-31 RX ORDER — BUPROPION HYDROCHLORIDE 150 MG/1
150 TABLET ORAL EVERY MORNING
Qty: 90 TABLET | Refills: 3 | Status: SHIPPED | OUTPATIENT
Start: 2024-05-31 | End: 2025-05-31

## 2024-05-31 ASSESSMENT — ENCOUNTER SYMPTOMS
DECREASED CONCENTRATION: 1
NERVOUS/ANXIOUS: 1
DYSPHORIC MOOD: 1

## 2024-05-31 ASSESSMENT — PAIN SCALES - GENERAL: PAINLEVEL: 0-NO PAIN

## 2024-05-31 NOTE — PROGRESS NOTES
Subjective   Patient ID: Kayley Lynch is a 53 y.o. female who presents for medication discussion (Patient is present today to discuss medication.).  HPI  53-year-old female would like to add medication to her regimen for depression.  Lexapro is not helping as much.  No homicidal or suicidal ideation.  Blood pressures been stable.  She is compliant with medication.  She is compliant with follow-up with cardiology.  Compliant with her hyperlipidemia medication.  Review of Systems   Psychiatric/Behavioral:  Positive for decreased concentration and dysphoric mood. Negative for suicidal ideas. The patient is nervous/anxious.    All other systems reviewed and are negative.      Visit Vitals  /86 (BP Location: Left arm, Patient Position: Sitting, BP Cuff Size: Adult)   Pulse 67   Temp 36.3 °C (97.3 °F) (Temporal)        Objective   Physical Exam  Vitals reviewed.   Constitutional:       Appearance: Normal appearance.   Cardiovascular:      Rate and Rhythm: Normal rate and regular rhythm.      Heart sounds: Normal heart sounds.   Pulmonary:      Breath sounds: Normal breath sounds.   Neurological:      Mental Status: She is alert.   Psychiatric:         Mood and Affect: Mood normal.         Behavior: Behavior normal.         Assessment/Plan   Problem List Items Addressed This Visit             ICD-10-CM    Atrial flutter (Multi) (Chronic) I48.92    Hypercholesterolemia (Chronic) E78.00    Current mild episode of major depressive disorder without prior episode (CMS-HCC) - Primary F32.0    Relevant Medications    buPROPion XL (Wellbutrin XL) 150 mg 24 hr tablet

## 2024-06-27 ENCOUNTER — ANTICOAGULATION - WARFARIN VISIT (OUTPATIENT)
Dept: PHARMACY | Facility: CLINIC | Age: 54
End: 2024-06-27
Payer: COMMERCIAL

## 2024-06-27 DIAGNOSIS — Z95.2 S/P MITRAL VALVE REPLACEMENT: Chronic | ICD-10-CM

## 2024-06-27 DIAGNOSIS — I48.4 ATYPICAL ATRIAL FLUTTER (MULTI): Primary | Chronic | ICD-10-CM

## 2024-06-27 LAB
POC INR: 3.4
POC PROTHROMBIN TIME: NORMAL

## 2024-06-27 PROCEDURE — 85610 PROTHROMBIN TIME: CPT | Mod: QW

## 2024-06-27 PROCEDURE — 99212 OFFICE O/P EST SF 10 MIN: CPT

## 2024-06-27 NOTE — PROGRESS NOTES
Coumadin Clinic Visit Note    Patient verified warfarin dose of 4 mg on Mon and Sat, 2 mg all other days of week  No missed doses  No unusual bruising or bleeding  No changes to medications  No recent otc/pain meds  Consistent dietary green intake  No anticipated procedures at this time  INR Therapeutic today at 3.4  No changes to warfarin dose today  Next appointment 5 weeks    Deena Church, Pharm D

## 2024-07-24 ENCOUNTER — APPOINTMENT (OUTPATIENT)
Dept: PRIMARY CARE | Facility: CLINIC | Age: 54
End: 2024-07-24
Payer: COMMERCIAL

## 2024-07-24 ENCOUNTER — LAB (OUTPATIENT)
Dept: LAB | Facility: LAB | Age: 54
End: 2024-07-24
Payer: COMMERCIAL

## 2024-07-24 VITALS
HEIGHT: 64 IN | OXYGEN SATURATION: 97 % | DIASTOLIC BLOOD PRESSURE: 80 MMHG | HEART RATE: 69 BPM | SYSTOLIC BLOOD PRESSURE: 130 MMHG | WEIGHT: 134.4 LBS | BODY MASS INDEX: 22.94 KG/M2 | TEMPERATURE: 97.3 F

## 2024-07-24 DIAGNOSIS — I48.4 ATYPICAL ATRIAL FLUTTER (MULTI): Chronic | ICD-10-CM

## 2024-07-24 DIAGNOSIS — Z00.00 ROUTINE GENERAL MEDICAL EXAMINATION AT A HEALTH CARE FACILITY: Primary | ICD-10-CM

## 2024-07-24 DIAGNOSIS — I27.20 PULMONARY HYPERTENSION (MULTI): Chronic | ICD-10-CM

## 2024-07-24 DIAGNOSIS — Z12.11 ENCOUNTER FOR SCREENING FOR MALIGNANT NEOPLASM OF COLON: ICD-10-CM

## 2024-07-24 DIAGNOSIS — Z00.00 ROUTINE GENERAL MEDICAL EXAMINATION AT A HEALTH CARE FACILITY: ICD-10-CM

## 2024-07-24 DIAGNOSIS — E78.00 HYPERCHOLESTEROLEMIA: Chronic | ICD-10-CM

## 2024-07-24 DIAGNOSIS — I25.5 ISCHEMIC CARDIOMYOPATHY: Chronic | ICD-10-CM

## 2024-07-24 DIAGNOSIS — F32.0 CURRENT MILD EPISODE OF MAJOR DEPRESSIVE DISORDER WITHOUT PRIOR EPISODE (CMS-HCC): ICD-10-CM

## 2024-07-24 LAB
ALBUMIN SERPL BCP-MCNC: 4.1 G/DL (ref 3.4–5)
ALP SERPL-CCNC: 175 U/L (ref 33–110)
ALT SERPL W P-5'-P-CCNC: 10 U/L (ref 7–45)
ANION GAP SERPL CALC-SCNC: 14 MMOL/L (ref 10–20)
APPEARANCE UR: CLEAR
AST SERPL W P-5'-P-CCNC: 14 U/L (ref 9–39)
BASOPHILS # BLD AUTO: 0.03 X10*3/UL (ref 0–0.1)
BASOPHILS NFR BLD AUTO: 0.4 %
BILIRUB SERPL-MCNC: 0.8 MG/DL (ref 0–1.2)
BILIRUB UR STRIP.AUTO-MCNC: NEGATIVE MG/DL
BUN SERPL-MCNC: 9 MG/DL (ref 6–23)
CALCIUM SERPL-MCNC: 9.6 MG/DL (ref 8.6–10.6)
CHLORIDE SERPL-SCNC: 101 MMOL/L (ref 98–107)
CO2 SERPL-SCNC: 30 MMOL/L (ref 21–32)
COLOR UR: YELLOW
CREAT SERPL-MCNC: 0.57 MG/DL (ref 0.5–1.05)
EGFRCR SERPLBLD CKD-EPI 2021: >90 ML/MIN/1.73M*2
EOSINOPHIL # BLD AUTO: 0.1 X10*3/UL (ref 0–0.7)
EOSINOPHIL NFR BLD AUTO: 1.3 %
ERYTHROCYTE [DISTWIDTH] IN BLOOD BY AUTOMATED COUNT: 17 % (ref 11.5–14.5)
GLUCOSE SERPL-MCNC: 90 MG/DL (ref 74–99)
GLUCOSE UR STRIP.AUTO-MCNC: NORMAL MG/DL
HCT VFR BLD AUTO: 41.7 % (ref 36–46)
HGB BLD-MCNC: 13.2 G/DL (ref 12–16)
IMM GRANULOCYTES # BLD AUTO: 0.01 X10*3/UL (ref 0–0.7)
IMM GRANULOCYTES NFR BLD AUTO: 0.1 % (ref 0–0.9)
KETONES UR STRIP.AUTO-MCNC: NEGATIVE MG/DL
LEUKOCYTE ESTERASE UR QL STRIP.AUTO: NEGATIVE
LYMPHOCYTES # BLD AUTO: 1.88 X10*3/UL (ref 1.2–4.8)
LYMPHOCYTES NFR BLD AUTO: 25.4 %
MCH RBC QN AUTO: 27.2 PG (ref 26–34)
MCHC RBC AUTO-ENTMCNC: 31.7 G/DL (ref 32–36)
MCV RBC AUTO: 86 FL (ref 80–100)
MONOCYTES # BLD AUTO: 0.54 X10*3/UL (ref 0.1–1)
MONOCYTES NFR BLD AUTO: 7.3 %
MUCOUS THREADS #/AREA URNS AUTO: ABNORMAL /LPF
NEUTROPHILS # BLD AUTO: 4.85 X10*3/UL (ref 1.2–7.7)
NEUTROPHILS NFR BLD AUTO: 65.5 %
NITRITE UR QL STRIP.AUTO: NEGATIVE
NRBC BLD-RTO: 0 /100 WBCS (ref 0–0)
PH UR STRIP.AUTO: 6.5 [PH]
PLATELET # BLD AUTO: 251 X10*3/UL (ref 150–450)
POTASSIUM SERPL-SCNC: 3.8 MMOL/L (ref 3.5–5.3)
PROT SERPL-MCNC: 7.2 G/DL (ref 6.4–8.2)
PROT UR STRIP.AUTO-MCNC: ABNORMAL MG/DL
RBC # BLD AUTO: 4.85 X10*6/UL (ref 4–5.2)
RBC # UR STRIP.AUTO: ABNORMAL /UL
RBC #/AREA URNS AUTO: ABNORMAL /HPF
SODIUM SERPL-SCNC: 141 MMOL/L (ref 136–145)
SP GR UR STRIP.AUTO: 1.01
SQUAMOUS #/AREA URNS AUTO: ABNORMAL /HPF
UROBILINOGEN UR STRIP.AUTO-MCNC: NORMAL MG/DL
WBC # BLD AUTO: 7.4 X10*3/UL (ref 4.4–11.3)
WBC #/AREA URNS AUTO: ABNORMAL /HPF

## 2024-07-24 PROCEDURE — 81001 URINALYSIS AUTO W/SCOPE: CPT

## 2024-07-24 PROCEDURE — 85025 COMPLETE CBC W/AUTO DIFF WBC: CPT

## 2024-07-24 PROCEDURE — 3008F BODY MASS INDEX DOCD: CPT | Performed by: FAMILY MEDICINE

## 2024-07-24 PROCEDURE — 99396 PREV VISIT EST AGE 40-64: CPT | Performed by: FAMILY MEDICINE

## 2024-07-24 PROCEDURE — 36415 COLL VENOUS BLD VENIPUNCTURE: CPT

## 2024-07-24 PROCEDURE — 80053 COMPREHEN METABOLIC PANEL: CPT

## 2024-07-24 ASSESSMENT — PAIN SCALES - GENERAL: PAINLEVEL: 0-NO PAIN

## 2024-07-24 NOTE — PROGRESS NOTES
Subjective   Patient ID: Kayley Lynch is a 53 y.o. female who presents for Annual Exam (Patient is present today for annual physical exam, patient is fasting.).  HPI  53-year-old female for complete physical exam.  Active with cardiology.  Recent blood work was stable.  Slightly low potassium.  Will recheck today.  She is due for mammogram and colon cancer screening.  No other acute complaints today.  Depression is controlled on a combination of Wellbutrin and Lexapro.  No breakthrough symptoms.  She is tolerating medication well.  Review of Systems  Constitutional: no chills, no fever and no night sweats.   Eyes: no blurred vision and no eyesight problems.   ENT: no hearing loss, no nasal congestion, no nasal discharge, no hoarseness and no sore throat.   Cardiovascular: no chest pain, no intermittent leg claudication, no lower extremity edema, no palpitations and no syncope.   Respiratory: no cough, no shortness of breath during exertion, no shortness of breath at rest and no wheezing.   Gastrointestinal: no abdominal pain, no blood in stools, no constipation, no diarrhea, no melena, no nausea, no rectal pain and no vomiting.   Genitourinary: no dysuria, no change in urinary frequency, no urinary hesitancy and no feelings of urinary urgency.   Neurological: no difficulty walking, no headache, no limb weakness, no numbness and no tingling.   Psychiatric: no anxiety, no depression, no anhedonia and no substance use disorders.   Endocrine: no recent weight gain and no recent weight loss.   Hematologic/Lymphatic: no tendency for easy bruising and no swollen glands.  Visit Vitals  /80 (BP Location: Left arm, Patient Position: Sitting, BP Cuff Size: Adult)   Pulse 69   Temp 36.3 °C (97.3 °F) (Temporal)        Objective   Physical Exam  Constitutional: Alert and in no acute distress. Well developed, well nourished.   Eyes: Pupils were equal in size, round, reactive to light (PERRL) with normal accommodation and  extraocular movements intact (EOMI). Ophthalmoscopic examination: Normal.   Ears, Nose, Mouth, and Throat: External inspection of ears and nose: Normal. Otoscopic examination: Normal. Lips, teeth, and gums: Normal. Oropharynx: Normal.   Neck: No neck mass was observed. Supple. Thyroid not enlarged and there were no palpable thyroid nodules.   Cardiovascular: Heart rate and rhythm were normal, normal S1 and S2, no gallops, no murmurs and no pericardial rub. Carotid pulses: Normal with no bruits. Abdominal aorta: Normal. Pedal pulses: Normal. No peripheral edema.   Pulmonary: No respiratory distress. Clear bilateral breath sounds.   Abdomen: Soft nontender; no abdominal mass palpated. Normal bowel sounds. No organomegaly.   Skin: Normal skin color and pigmentation, normal skin turgor, and no rash.   Psychiatric: Judgment and insight: Intact. Mood and affect: Normal.  Assessment/Plan   Problem List Items Addressed This Visit             ICD-10-CM    Atrial flutter (Multi) (Chronic) I48.92    Cardiomyopathy (Multi) (Chronic) I42.9    Hypercholesterolemia (Chronic) E78.00    Pulmonary hypertension (Multi) (Chronic) I27.20    Routine general medical examination at a health care facility - Primary Z00.00    Relevant Orders    BI mammo bilateral screening tomosynthesis    Comprehensive metabolic panel    CBC and Auto Differential    Urinalysis with Reflex Microscopic    Encounter for screening for malignant neoplasm of colon Z12.11    Relevant Orders    Cologuard® colon cancer screening    Current mild episode of major depressive disorder without prior episode (CMS-HCC) F32.0

## 2024-08-01 ENCOUNTER — ANTICOAGULATION - WARFARIN VISIT (OUTPATIENT)
Dept: PHARMACY | Facility: CLINIC | Age: 54
End: 2024-08-01
Payer: COMMERCIAL

## 2024-08-01 DIAGNOSIS — I48.4 ATYPICAL ATRIAL FLUTTER (MULTI): Primary | Chronic | ICD-10-CM

## 2024-08-01 DIAGNOSIS — Z95.2 S/P MITRAL VALVE REPLACEMENT: Chronic | ICD-10-CM

## 2024-08-01 LAB
POC INR: 3.3
POC PROTHROMBIN TIME: NORMAL

## 2024-08-01 PROCEDURE — 85610 PROTHROMBIN TIME: CPT | Mod: QW

## 2024-08-01 PROCEDURE — 99212 OFFICE O/P EST SF 10 MIN: CPT

## 2024-08-01 NOTE — PROGRESS NOTES
Coumadin Clinic Visit Note    Patient verified warfarin dose  No missed doses  No unusual bruising or bleeding  No changes to medications  Consistent dietary green intake  No anticipated procedures at this time  INR Therapeutic today at 3.3  No changes to warfarin dose today  Next appointment 5 weeks      Gabriel Escalera, PharmD

## 2024-08-12 ENCOUNTER — HOSPITAL ENCOUNTER (OUTPATIENT)
Dept: RADIOLOGY | Facility: CLINIC | Age: 54
Discharge: HOME | End: 2024-08-12
Payer: COMMERCIAL

## 2024-08-12 VITALS — HEIGHT: 64 IN | WEIGHT: 130 LBS | BODY MASS INDEX: 22.2 KG/M2

## 2024-08-12 DIAGNOSIS — Z00.00 ROUTINE GENERAL MEDICAL EXAMINATION AT A HEALTH CARE FACILITY: ICD-10-CM

## 2024-08-12 PROCEDURE — 77067 SCR MAMMO BI INCL CAD: CPT

## 2024-08-20 ENCOUNTER — HOSPITAL ENCOUNTER (OUTPATIENT)
Dept: RADIOLOGY | Facility: EXTERNAL LOCATION | Age: 54
Discharge: HOME | End: 2024-08-20

## 2024-09-05 ENCOUNTER — ANTICOAGULATION - WARFARIN VISIT (OUTPATIENT)
Dept: PHARMACY | Facility: CLINIC | Age: 54
End: 2024-09-05
Payer: COMMERCIAL

## 2024-09-05 DIAGNOSIS — Z95.2 S/P MITRAL VALVE REPLACEMENT: Chronic | ICD-10-CM

## 2024-09-05 DIAGNOSIS — I48.4 ATYPICAL ATRIAL FLUTTER (MULTI): Primary | Chronic | ICD-10-CM

## 2024-09-05 LAB
POC INR: 3
POC PROTHROMBIN TIME: NORMAL

## 2024-09-05 PROCEDURE — 85610 PROTHROMBIN TIME: CPT | Mod: QW | Performed by: PHARMACIST

## 2024-09-05 PROCEDURE — 99212 OFFICE O/P EST SF 10 MIN: CPT | Performed by: PHARMACIST

## 2024-09-05 NOTE — PROGRESS NOTES
Coumadin Clinic Visit Note    Patient verified warfarin dose  No missed doses  No unusual bruising or bleeding  No changes to medications  Consistent dietary green intake  No anticipated procedures at this time, might need to pull 1 tooth or 2 , told her  to let us know when and how many days they needto stop if they are nervous and let us know ,   INR Therapeutic today at 3  No changes to warfarin dose today  Next appointment 5 weeks      Carmela Vail, PharmD

## 2024-09-27 ENCOUNTER — APPOINTMENT (OUTPATIENT)
Dept: CARDIOLOGY | Facility: CLINIC | Age: 54
End: 2024-09-27
Payer: COMMERCIAL

## 2024-09-27 VITALS
SYSTOLIC BLOOD PRESSURE: 134 MMHG | DIASTOLIC BLOOD PRESSURE: 82 MMHG | HEART RATE: 89 BPM | BODY MASS INDEX: 23 KG/M2 | WEIGHT: 134 LBS | OXYGEN SATURATION: 99 %

## 2024-09-27 DIAGNOSIS — R60.0 BILATERAL EDEMA OF LOWER EXTREMITY: Chronic | ICD-10-CM

## 2024-09-27 DIAGNOSIS — I48.4 ATYPICAL ATRIAL FLUTTER (MULTI): Primary | Chronic | ICD-10-CM

## 2024-09-27 DIAGNOSIS — Z95.2 S/P MITRAL VALVE REPLACEMENT: Chronic | ICD-10-CM

## 2024-09-27 DIAGNOSIS — E78.00 HYPERCHOLESTEROLEMIA: Chronic | ICD-10-CM

## 2024-09-27 DIAGNOSIS — I25.5 ISCHEMIC CARDIOMYOPATHY: Chronic | ICD-10-CM

## 2024-09-27 DIAGNOSIS — I25.10 CORONARY ARTERY DISEASE INVOLVING NATIVE HEART WITHOUT ANGINA PECTORIS, UNSPECIFIED VESSEL OR LESION TYPE: Chronic | ICD-10-CM

## 2024-09-27 DIAGNOSIS — R06.09 DYSPNEA ON EXERTION: ICD-10-CM

## 2024-09-27 PROBLEM — Z00.00 ROUTINE GENERAL MEDICAL EXAMINATION AT A HEALTH CARE FACILITY: Status: RESOLVED | Noted: 2023-03-13 | Resolved: 2024-09-27

## 2024-09-27 PROBLEM — Z12.11 ENCOUNTER FOR SCREENING FOR MALIGNANT NEOPLASM OF COLON: Status: RESOLVED | Noted: 2023-03-13 | Resolved: 2024-09-27

## 2024-09-27 PROCEDURE — 99214 OFFICE O/P EST MOD 30 MIN: CPT | Performed by: INTERNAL MEDICINE

## 2024-09-27 PROCEDURE — 99406 BEHAV CHNG SMOKING 3-10 MIN: CPT | Performed by: INTERNAL MEDICINE

## 2024-09-27 RX ORDER — VARENICLINE TARTRATE 1 MG/1
1 TABLET, FILM COATED ORAL 2 TIMES DAILY
Qty: 60 TABLET | Refills: 3 | Status: SHIPPED | OUTPATIENT
Start: 2024-09-27 | End: 2025-09-27

## 2024-09-27 RX ORDER — ALBUTEROL SULFATE 90 UG/1
2 INHALANT RESPIRATORY (INHALATION) EVERY 6 HOURS PRN
Qty: 18 G | Refills: 11 | Status: SHIPPED | OUTPATIENT
Start: 2024-09-27 | End: 2025-09-27

## 2024-09-27 NOTE — PATIENT INSTRUCTIONS
1. Severe mitral regurgitation status post mitral valve replacement with a #25/33 On-X mechanical mitral valve October 2020.  A repeat echo 4/2024 wit NL MVR fxn.  Transmitral valve gradient 3 mmHg.  On exam I still do not hear sharp click.  There is no evidence of valve dysfunction.    2. CAD. She had three-vessel bypass in 2020 with a LIMA to the LAD free radial artery graft to the marginal and SVG to the RCA. Continue aspirin.  And carvedilol.  Chest pain have resolved. TST below avg cap, chrono incompetence, NL perfusion EF 51%.  I still suspect a lot of her symptoms are due to COPD.  She is still smoking but less.     3. Atrial flutter. She had an ablation in October 2020. On exam she is regular. A repeat EKG will be done today.     4. Cardiomyopathy. Postoperatively her ejection fraction was down to 40% but on her echo in September 2022 her ejection fraction was 50%.  Remains low NL on echo and TST March 2024     5. Hyperlipidemia. 5/22/24 LDL 66, HDL 34, Trig 66.  These results are excellent     6. Hypertension. Blood pressures are currently controlled.     7. Tobacco abuse.  Fortunately she is smoking less than what she was    I will once again give her an albuterol inhaler to see if that helps with any residual shortness of breath that she might be having.  She will return to see me 6 months.  Sooner if any issues arise.

## 2024-09-27 NOTE — PROGRESS NOTES
Referred by No ref. provider found    HPI    Patient is here for follow-up.  Has been feeling fairly well.  Her breathing is better.  She is smoking less.  Down to half a pack.  Currently she has a cold.  No chest pains.    Past Medical History:  Problem List Items Addressed This Visit    None     Past Medical History:   Diagnosis Date    Atrial flutter (Multi) 03/08/2023    s/p flutter ablation October 2020    Bilateral edema of lower extremity 03/08/2023    New July 2020 ... Better on Lasix    CAD (coronary artery disease) 03/08/2023    50% LAD, 60% diag, T.O. Cx, subtotal RCA, elevated RH pressure  CABG 10/2020: LIMA to LAD, FRA to OM, SVG to RCA      Cardiomyopathy (Multi) 03/08/2023    EF 35-45%, now 50%    Chest pressure 03/22/2024    Hypercholesterolemia 03/08/2023    Dr. Lomeli follows    Mitral regurgitation 03/08/2023    MVT, mild MS, severe MR   s/p MVR = 25/33 [place] mechanical valve     Other conditions influencing health status     Patient denies significant medical history    Pulmonary hypertension (Multi) 03/08/2023    Mod to severe    S/P CABG (coronary artery bypass graft) 03/08/2023    10/2020: LIMA to LAD, FRA to OM, SVG to RCA      S/P mitral valve replacement 03/08/2023    MVT, Mild MS, severe MR ... s/p MVR 25/33 [place] Mechanical valve      Past Surgical History:  She has a past surgical history that includes Other surgical history (08/17/2021) and Other surgical history (08/06/2020).      Social History:  She reports that she has been smoking cigarettes. She has never used smokeless tobacco. She reports that she does not drink alcohol and does not use drugs.    Family History:  Family History   Problem Relation Name Age of Onset    Atrial fibrillation Father      Heart attack Father       Allergies:  Sulfa (sulfonamide antibiotics)    Outpatient Medications:  Current Outpatient Medications   Medication Instructions    acetaminophen-codeine (Tylenol w/ Codeine #3) 300-30 mg tablet 1 tablet, oral,  "Every 6 hours    aspirin 81 mg EC tablet 1 tablet, oral, Daily    atorvastatin (LIPITOR) 80 mg, oral, Daily    buPROPion XL (WELLBUTRIN XL) 150 mg, oral, Every morning, Do not crush, chew, or split.    carvedilol (COREG) 25 mg, oral, 2 times daily    escitalopram (LEXAPRO) 20 mg, oral, Daily    lisinopril 20 mg, oral, Daily    warfarin (Coumadin) 2 mg tablet Take 4 mg (2 tablets) on Mondays and Saturdays, 2 mg (1 tablet) all other days of week or as directed by anticoagulation clinic     Last Recorded Vitals:  There were no vitals filed for this visit.    Physical Exam  Patient is alert and oriented x3.  HEENT is unremarkable mucous members are moist  Neck no JVP no bruits upstrokes are full no thyromegaly  Lungs are reduced.  Heart regular rhythm normal S1-S2 there is no S3 no murmurs are heard.  I do not hear crisp mitral valve click  Abdomen is soft bs are positive nontender nondistended no organomegaly no pulsatile masses  Extremities have no edema.  Distal pulses present palpable.  Neuro is grossly nonfocal  Skin has no rashes     Last Labs:  CBC -  Lab Results   Component Value Date    WBC 7.4 07/24/2024    HGB 13.2 07/24/2024    HCT 41.7 07/24/2024    MCV 86 07/24/2024     07/24/2024     CMP -  Lab Results   Component Value Date    CALCIUM 9.6 07/24/2024    PHOS 4.3 10/26/2020    PROT 7.2 07/24/2024    ALBUMIN 4.1 07/24/2024    AST 14 07/24/2024    ALT 10 07/24/2024    ALKPHOS 175 (H) 07/24/2024    BILITOT 0.8 07/24/2024     LIPID PANEL -   Lab Results   Component Value Date    CHOL 113 05/22/2024    HDL 33.8 05/22/2024    CHHDL 3.3 05/22/2024    VLDL 13 05/22/2024    TRIG 66 05/22/2024    NHDL 79 05/22/2024     RENAL FUNCTION PANEL -   Lab Results   Component Value Date    K 3.8 07/24/2024    PHOS 4.3 10/26/2020     No results found for: \"BNP\", \"HGBA1C\"       Procedure    EXT 4/16/24 below avg, chrono incomp, severe SOB, NL perfusion EF 51%    TTE 3/22/24 EF 50-55%, PWHK, Mech MVR with mean gradient " 3mmHg    ECHO [09/21/2022]: Est EF 50%. Mild-mod hypok of posterolateral walls. Well seated but poorly vis'd mechanical MVR w/acceptable mean gradient of 4 mmHg.     ECHO [01/05/2021]: Est EF 40%. [facility] w/mild transvalvular regurg and slightly elevated mean transmitral gradient of 10 mmHg. Global hypok.      ECHO [10/19/2020]: EF 40%. Poorly vis'd anatomical structures (subopt image quality). Abnormal septal motion c/w postop status. Pt appears to be in A-flutter w/some variation in block which may affect est of LVF and transvalvular flows. Global hypok.      CATH [09/17/2020, Dr. Brittney Lomeli]: Elevated right heart filling pressures moderately. Normal cardiac output. Severe two-vessel coronary disease with a totally occluded circumflex filling via left to left collaterals, and a totally occluded right coronary artery in the midportion. Moderate disease in the mid LAD which is a 40 to 50% stenosis. RECOMMENDATIONS: The patient is now a candidate for surgical correction of her mild to moderate mitral stenosis with severe mitral regurgitation. Revascularization of the circumflex will be required. Possible revascularization of the right coronary artery if it is deemed to be a suitable candidate intraoperatively.      EX NST [08/18/2020]: 4 min 38 sec (6.5 METs) = Abnormal â€“ ev/for ischemia involving the lateral wall, EF 37%     CT / SCORING [08/14/2020] = 150 (LM 0.6, LAD 32.2, LCx 97.8, RCA 19.5)     Assessment/Plan   1. Severe mitral regurgitation status post mitral valve replacement with a #25/33 On-X mechanical mitral valve October 2020.  A repeat echo 4/2024 wit NL MVR fxn.  Transmitral valve gradient 3 mmHg.  On exam I still do not hear sharp click.  There is no evidence of valve dysfunction.    2. CAD. She had three-vessel bypass in 2020 with a LIMA to the LAD free radial artery graft to the marginal and SVG to the RCA. Continue aspirin.  And carvedilol.  Chest pain have resolved. TST  below avg cap, chrono incompetence, NL perfusion EF 51%.  I still suspect a lot of her symptoms are due to COPD.  She is still smoking but less.     3. Atrial flutter. She had an ablation in October 2020. On exam she is regular. A repeat EKG will be done today.     4. Cardiomyopathy. Postoperatively her ejection fraction was down to 40% but on her echo in September 2022 her ejection fraction was 50%.  Remains low NL on echo and TST March 2024     5. Hyperlipidemia. 5/22/24 LDL 66, HDL 34, Trig 66.  These results are excellent     6. Hypertension. Blood pressures are currently controlled.     7. Tobacco abuse.  Fortunately she is smoking less than what she was.  She is interested in trying Chantix because her sister has been using it.  I will write for this.  Greater than 5 minutes spent    I will once again give her an albuterol inhaler to see if that helps with any residual shortness of breath that she might be having.  She will return to see me 6 months.  Sooner if any issues arise.  Brittney Lomeli MD     Instructions and follow up

## 2024-10-03 ENCOUNTER — ANTICOAGULATION - WARFARIN VISIT (OUTPATIENT)
Dept: PHARMACY | Facility: CLINIC | Age: 54
End: 2024-10-03
Payer: COMMERCIAL

## 2024-10-03 DIAGNOSIS — I48.4 ATYPICAL ATRIAL FLUTTER (MULTI): Primary | Chronic | ICD-10-CM

## 2024-10-03 DIAGNOSIS — Z95.2 S/P MITRAL VALVE REPLACEMENT: Chronic | ICD-10-CM

## 2024-10-03 LAB
POC INR: 2.9
POC PROTHROMBIN TIME: NORMAL

## 2024-10-03 PROCEDURE — 99212 OFFICE O/P EST SF 10 MIN: CPT

## 2024-10-03 PROCEDURE — 85610 PROTHROMBIN TIME: CPT | Mod: QW

## 2024-10-03 NOTE — PROGRESS NOTES
Coumadin Clinic Visit Note    Patient verified warfarin dose  No missed doses  No unusual bruising or bleeding  No changes to medications  Consistent dietary green intake  No anticipated procedures at this time. Dentist will be calling to discuss teeth pulling, and potentially stopping warfarin.  INR Therapeutic today at 2.9  No changes to warfarin dose today  Next appointment 5 weeks      Gabriel Escalera, PharmD

## 2024-10-11 DIAGNOSIS — Z95.2 S/P MITRAL VALVE REPLACEMENT: ICD-10-CM

## 2024-10-11 RX ORDER — WARFARIN 2 MG/1
TABLET ORAL
Qty: 120 TABLET | Refills: 1 | Status: SHIPPED | OUTPATIENT
Start: 2024-10-11

## 2024-10-29 ENCOUNTER — TELEPHONE (OUTPATIENT)
Dept: PHARMACY | Facility: CLINIC | Age: 54
End: 2024-10-29
Payer: COMMERCIAL

## 2024-11-07 ENCOUNTER — ANTICOAGULATION - WARFARIN VISIT (OUTPATIENT)
Dept: PHARMACY | Facility: CLINIC | Age: 54
End: 2024-11-07
Payer: COMMERCIAL

## 2024-11-07 DIAGNOSIS — I48.4 ATYPICAL ATRIAL FLUTTER (MULTI): Primary | Chronic | ICD-10-CM

## 2024-11-07 DIAGNOSIS — Z95.2 S/P MITRAL VALVE REPLACEMENT: Chronic | ICD-10-CM

## 2024-11-07 LAB
POC INR: 3.2
POC PROTHROMBIN TIME: NORMAL

## 2024-11-07 PROCEDURE — 99212 OFFICE O/P EST SF 10 MIN: CPT | Performed by: PHARMACIST

## 2024-11-07 PROCEDURE — 85610 PROTHROMBIN TIME: CPT | Mod: QW | Performed by: PHARMACIST

## 2024-11-07 NOTE — PROGRESS NOTES
Coumadin Clinic Visit Note    Patient presents today for anticoagulation monitoring and adjustment.  Patient verified warfarin dosing schedule  Patient denies missing any doses  Patient denies unusual bruising or bleeding  Patient denies changes to medications, alcohol or tobacco use.  Consistent dietary green intake  There are no anticipated procedures at this time  INR Therapeutic today at 3.2  No changes to warfarin dose today  Next appointment 5 weeks      Sheila Pandey, PharmD

## 2024-11-21 DIAGNOSIS — E78.00 HYPERCHOLESTEROLEMIA: ICD-10-CM

## 2024-11-25 RX ORDER — LISINOPRIL 20 MG/1
20 TABLET ORAL DAILY
Qty: 90 TABLET | Refills: 3 | Status: SHIPPED | OUTPATIENT
Start: 2024-11-25

## 2024-12-19 ENCOUNTER — ANTICOAGULATION - WARFARIN VISIT (OUTPATIENT)
Dept: PHARMACY | Facility: CLINIC | Age: 54
End: 2024-12-19
Payer: COMMERCIAL

## 2024-12-19 DIAGNOSIS — Z95.2 S/P MITRAL VALVE REPLACEMENT: Primary | Chronic | ICD-10-CM

## 2024-12-19 LAB
POC INR: 3.1
POC PROTHROMBIN TIME: NORMAL

## 2024-12-19 PROCEDURE — 99212 OFFICE O/P EST SF 10 MIN: CPT | Performed by: PHARMACIST

## 2024-12-19 PROCEDURE — 85610 PROTHROMBIN TIME: CPT | Mod: QW | Performed by: PHARMACIST

## 2024-12-19 NOTE — PROGRESS NOTES
Verified current dose with pt.    No new meds or med changes since last visit.  Pt denies unusual bleed/bruise.  No upcoming procedures.  No missed doses  No diet changes  Inr = 3.1  Continue same dose and check again in 5 weeks.

## 2025-01-23 ENCOUNTER — ANTICOAGULATION - WARFARIN VISIT (OUTPATIENT)
Dept: PHARMACY | Facility: CLINIC | Age: 55
End: 2025-01-23
Payer: COMMERCIAL

## 2025-01-23 DIAGNOSIS — Z95.2 S/P MITRAL VALVE REPLACEMENT: Chronic | ICD-10-CM

## 2025-01-23 DIAGNOSIS — I48.4 ATYPICAL ATRIAL FLUTTER (MULTI): Primary | Chronic | ICD-10-CM

## 2025-01-23 LAB
POC INR: 2.1
POC PROTHROMBIN TIME: NORMAL

## 2025-01-23 PROCEDURE — 85610 PROTHROMBIN TIME: CPT | Mod: QW | Performed by: PHARMACIST

## 2025-01-23 PROCEDURE — 99212 OFFICE O/P EST SF 10 MIN: CPT | Performed by: PHARMACIST

## 2025-01-23 NOTE — PROGRESS NOTES
Coumadin Clinic Visit Note    Patient presents today for anticoagulation monitoring and adjustment.  Patient verified warfarin dosing schedule  Patient denies missing any doses  Patient denies unusual bruising or bleeding  Patient denies changes to medications, alcohol or tobacco use.  Pt had craving fo brocolli and had extra portions this week.  There are no anticipated procedures at this time  INR Subtherapeutic today at 2.1  Booster 3mg today  Next appointment 4 weeks      Sheila Pandey, PharmD

## 2025-02-20 ENCOUNTER — ANTICOAGULATION - WARFARIN VISIT (OUTPATIENT)
Dept: PHARMACY | Facility: CLINIC | Age: 55
End: 2025-02-20
Payer: COMMERCIAL

## 2025-02-20 DIAGNOSIS — I48.4 ATYPICAL ATRIAL FLUTTER (MULTI): Primary | Chronic | ICD-10-CM

## 2025-02-20 DIAGNOSIS — Z95.2 S/P MITRAL VALVE REPLACEMENT: Chronic | ICD-10-CM

## 2025-02-20 LAB
POC INR: 2.5
POC PROTHROMBIN TIME: NORMAL

## 2025-02-20 PROCEDURE — 85610 PROTHROMBIN TIME: CPT | Mod: QW

## 2025-02-20 PROCEDURE — 99212 OFFICE O/P EST SF 10 MIN: CPT

## 2025-02-20 NOTE — PROGRESS NOTES
Coumadin Clinic Visit Note    Patient verified warfarin dose  No missed doses  No unusual bruising or bleeding  No changes to medications  Consistent dietary green intake  She is going next week for pulling 2 teeth at a time , the dentist is ok with pulling them without stopping the coumadin , told her rto let us know if any change., I expianed that the risk of bldding is higher at a higher INR and also if she bleeds eaily or not and it depends on the dentist if they are comfortable or onto or if they need an INR before or not , she will call dentsist and check ans let us know   INR Therapeutic today at 2.5  No changes to warfarin dose today  Next appointment 5 weeks      Carmela Vail, LaurynD

## 2025-02-25 ENCOUNTER — HOSPITAL ENCOUNTER (INPATIENT)
Facility: HOSPITAL | Age: 55
End: 2025-02-25
Attending: EMERGENCY MEDICINE | Admitting: STUDENT IN AN ORGANIZED HEALTH CARE EDUCATION/TRAINING PROGRAM
Payer: COMMERCIAL

## 2025-02-25 DIAGNOSIS — R19.7 NAUSEA VOMITING AND DIARRHEA: ICD-10-CM

## 2025-02-25 DIAGNOSIS — N17.9 ACUTE KIDNEY INJURY (CMS-HCC): ICD-10-CM

## 2025-02-25 DIAGNOSIS — E86.0 DEHYDRATION: ICD-10-CM

## 2025-02-25 DIAGNOSIS — R57.9 SHOCK (MULTI): ICD-10-CM

## 2025-02-25 DIAGNOSIS — J09.X1 INFLUENZA A WITH PNEUMONIA: Primary | ICD-10-CM

## 2025-02-25 DIAGNOSIS — R11.2 NAUSEA VOMITING AND DIARRHEA: ICD-10-CM

## 2025-02-25 DIAGNOSIS — M79.A12 NONTRAUMATIC COMPARTMENT SYNDROME OF LEFT UPPER EXTREMITY: ICD-10-CM

## 2025-02-25 DIAGNOSIS — J10.1 INFLUENZA A: ICD-10-CM

## 2025-02-25 PROCEDURE — 99285 EMERGENCY DEPT VISIT HI MDM: CPT | Performed by: EMERGENCY MEDICINE

## 2025-02-26 ENCOUNTER — APPOINTMENT (OUTPATIENT)
Dept: RADIOLOGY | Facility: HOSPITAL | Age: 55
DRG: 853 | End: 2025-02-26
Payer: COMMERCIAL

## 2025-02-26 ENCOUNTER — APPOINTMENT (OUTPATIENT)
Dept: CARDIOLOGY | Facility: HOSPITAL | Age: 55
DRG: 853 | End: 2025-02-26
Payer: COMMERCIAL

## 2025-02-26 PROBLEM — R11.2 NAUSEA VOMITING AND DIARRHEA: Status: ACTIVE | Noted: 2025-02-26

## 2025-02-26 PROBLEM — J09.X1 INFLUENZA A WITH PNEUMONIA: Status: ACTIVE | Noted: 2025-02-26

## 2025-02-26 PROBLEM — R19.7 NAUSEA VOMITING AND DIARRHEA: Status: ACTIVE | Noted: 2025-02-26

## 2025-02-26 LAB
ALBUMIN SERPL BCP-MCNC: 3.2 G/DL (ref 3.4–5)
ALBUMIN SERPL BCP-MCNC: 3.5 G/DL (ref 3.4–5)
ALP SERPL-CCNC: 72 U/L (ref 33–110)
ALP SERPL-CCNC: 72 U/L (ref 33–110)
ALT SERPL W P-5'-P-CCNC: 21 U/L (ref 7–45)
ALT SERPL W P-5'-P-CCNC: 28 U/L (ref 7–45)
ANION GAP BLDA CALCULATED.4IONS-SCNC: 10 MMO/L (ref 10–25)
ANION GAP SERPL CALC-SCNC: 11 MMOL/L (ref 10–20)
ANION GAP SERPL CALC-SCNC: 14 MMOL/L (ref 10–20)
ANION GAP SERPL CALC-SCNC: 15 MMOL/L (ref 10–20)
APPARATUS: ABNORMAL
APPARATUS: ABNORMAL
APPEARANCE UR: CLEAR
ARTERIAL PATENCY WRIST A: POSITIVE
ARTERIAL PATENCY WRIST A: POSITIVE
AST SERPL W P-5'-P-CCNC: 45 U/L (ref 9–39)
AST SERPL W P-5'-P-CCNC: 65 U/L (ref 9–39)
BASE EXCESS BLDA CALC-SCNC: -10 MMOL/L (ref -2–3)
BASE EXCESS BLDA CALC-SCNC: -7.7 MMOL/L (ref -2–3)
BASE EXCESS BLDV CALC-SCNC: -4.5 MMOL/L (ref -2–3)
BASOPHILS # BLD AUTO: 0.01 X10*3/UL (ref 0–0.1)
BASOPHILS NFR BLD AUTO: 0.1 %
BILIRUB SERPL-MCNC: 0.3 MG/DL (ref 0–1.2)
BILIRUB SERPL-MCNC: 0.4 MG/DL (ref 0–1.2)
BILIRUB UR STRIP.AUTO-MCNC: NEGATIVE MG/DL
BODY TEMPERATURE: 37 DEGREES CELSIUS
BODY TEMPERATURE: 37 DEGREES CELSIUS
BODY TEMPERATURE: ABNORMAL
BUN SERPL-MCNC: 39 MG/DL (ref 6–23)
BUN SERPL-MCNC: 45 MG/DL (ref 6–23)
BUN SERPL-MCNC: 68 MG/DL (ref 6–23)
CA-I BLDA-SCNC: 1.11 MMOL/L (ref 1.1–1.33)
CALCIUM SERPL-MCNC: 4.1 MG/DL (ref 8.6–10.3)
CALCIUM SERPL-MCNC: 7.4 MG/DL (ref 8.6–10.3)
CALCIUM SERPL-MCNC: 7.8 MG/DL (ref 8.6–10.3)
CARDIAC TROPONIN I PNL SERPL HS: 22 NG/L (ref 0–13)
CHLORIDE BLDA-SCNC: 108 MMOL/L (ref 98–107)
CHLORIDE SERPL-SCNC: 107 MMOL/L (ref 98–107)
CHLORIDE SERPL-SCNC: 120 MMOL/L (ref 98–107)
CHLORIDE SERPL-SCNC: 98 MMOL/L (ref 98–107)
CHLORIDE UR-SCNC: 77 MMOL/L
CHLORIDE/CREATININE (MMOL/G) IN URINE: 154 MMOL/G CREAT (ref 38–318)
CO2 SERPL-SCNC: 12 MMOL/L (ref 21–32)
CO2 SERPL-SCNC: 21 MMOL/L (ref 21–32)
CO2 SERPL-SCNC: 23 MMOL/L (ref 21–32)
COLOR UR: ABNORMAL
CORTIS SERPL-MCNC: 29.1 UG/DL (ref 2.5–20)
CREAT SERPL-MCNC: 1.19 MG/DL (ref 0.5–1.05)
CREAT SERPL-MCNC: 1.4 MG/DL (ref 0.5–1.05)
CREAT SERPL-MCNC: 3.87 MG/DL (ref 0.5–1.05)
CREAT UR-MCNC: 50.1 MG/DL (ref 20–320)
CRITICAL CALL TIME: 1036
CRITICAL CALL TIME: 1407
CRITICAL CALL TIME: 1718
CRITICAL CALLED BY: ABNORMAL
CRITICAL CALLED TO: ABNORMAL
CRITICAL READ BACK: ABNORMAL
CRITICAL READ BACK: ABNORMAL
EGFRCR SERPLBLD CKD-EPI 2021: 13 ML/MIN/1.73M*2
EGFRCR SERPLBLD CKD-EPI 2021: 45 ML/MIN/1.73M*2
EGFRCR SERPLBLD CKD-EPI 2021: 54 ML/MIN/1.73M*2
EOSINOPHIL # BLD AUTO: 0 X10*3/UL (ref 0–0.7)
EOSINOPHIL NFR BLD AUTO: 0 %
EPAP CMH2O: 6 CM H2O
ERYTHROCYTE [DISTWIDTH] IN BLOOD BY AUTOMATED COUNT: 16.4 % (ref 11.5–14.5)
ERYTHROCYTE [DISTWIDTH] IN BLOOD BY AUTOMATED COUNT: 16.8 % (ref 11.5–14.5)
FLOW: 60 LPM
FLUAV RNA RESP QL NAA+PROBE: DETECTED
FLUBV RNA RESP QL NAA+PROBE: NOT DETECTED
FREQUENCY (BPM): 22 BPM
GLUCOSE BLD MANUAL STRIP-MCNC: 107 MG/DL (ref 74–99)
GLUCOSE BLD MANUAL STRIP-MCNC: 114 MG/DL (ref 74–99)
GLUCOSE BLD MANUAL STRIP-MCNC: 116 MG/DL (ref 74–99)
GLUCOSE BLD MANUAL STRIP-MCNC: 153 MG/DL (ref 74–99)
GLUCOSE BLDA-MCNC: 107 MG/DL (ref 74–99)
GLUCOSE SERPL-MCNC: 107 MG/DL (ref 74–99)
GLUCOSE SERPL-MCNC: 107 MG/DL (ref 74–99)
GLUCOSE SERPL-MCNC: 89 MG/DL (ref 74–99)
GLUCOSE UR STRIP.AUTO-MCNC: NORMAL MG/DL
HCO3 BLDA-SCNC: 19.3 MMOL/L (ref 22–26)
HCO3 BLDA-SCNC: 21.2 MMOL/L (ref 22–26)
HCO3 BLDV-SCNC: 23.9 MMOL/L (ref 22–26)
HCT VFR BLD AUTO: 29.6 % (ref 36–46)
HCT VFR BLD AUTO: 37.7 % (ref 36–46)
HCT VFR BLD EST: 34 % (ref 36–46)
HGB BLD-MCNC: 12.1 G/DL (ref 12–16)
HGB BLD-MCNC: 9 G/DL (ref 12–16)
HGB BLDA-MCNC: 11.2 G/DL (ref 12–16)
HOLD SPECIMEN: NORMAL
IMM GRANULOCYTES # BLD AUTO: 0.03 X10*3/UL (ref 0–0.7)
IMM GRANULOCYTES NFR BLD AUTO: 0.4 % (ref 0–0.9)
INHALED O2 CONCENTRATION: 36 %
INHALED O2 CONCENTRATION: 60 %
INHALED O2 CONCENTRATION: 60 %
INR PPP: 3.9 (ref 0.9–1.1)
IPAP CMH2O: 16 CM H2O
KETONES UR STRIP.AUTO-MCNC: NEGATIVE MG/DL
LACTATE BLDA-SCNC: 0.5 MMOL/L (ref 0.4–2)
LACTATE SERPL-SCNC: 0.5 MMOL/L (ref 0.4–2)
LACTATE SERPL-SCNC: 1.5 MMOL/L (ref 0.4–2)
LEGIONELLA AG UR QL: NEGATIVE
LEUKOCYTE ESTERASE UR QL STRIP.AUTO: NEGATIVE
LIPASE SERPL-CCNC: 50 U/L (ref 9–82)
LYMPHOCYTES # BLD AUTO: 1.22 X10*3/UL (ref 1.2–4.8)
LYMPHOCYTES NFR BLD AUTO: 18.2 %
MAGNESIUM SERPL-MCNC: 1.91 MG/DL (ref 1.6–2.4)
MCH RBC QN AUTO: 28.1 PG (ref 26–34)
MCH RBC QN AUTO: 28.4 PG (ref 26–34)
MCHC RBC AUTO-ENTMCNC: 30.4 G/DL (ref 32–36)
MCHC RBC AUTO-ENTMCNC: 32.1 G/DL (ref 32–36)
MCV RBC AUTO: 88 FL (ref 80–100)
MCV RBC AUTO: 93 FL (ref 80–100)
MONOCYTES # BLD AUTO: 0.77 X10*3/UL (ref 0.1–1)
MONOCYTES NFR BLD AUTO: 11.5 %
MUCOUS THREADS #/AREA URNS AUTO: NORMAL /LPF
NEUTROPHILS # BLD AUTO: 4.69 X10*3/UL (ref 1.2–7.7)
NEUTROPHILS NFR BLD AUTO: 69.8 %
NITRITE UR QL STRIP.AUTO: NEGATIVE
NRBC BLD-RTO: 0 /100 WBCS (ref 0–0)
NRBC BLD-RTO: 0 /100 WBCS (ref 0–0)
OXYHGB MFR BLDA: 83.3 % (ref 94–98)
OXYHGB MFR BLDA: 85.4 % (ref 94–98)
OXYHGB MFR BLDV: 92.5 % (ref 45–75)
PCO2 BLDA: 58 MM HG (ref 38–42)
PCO2 BLDA: 58 MM HG (ref 38–42)
PCO2 BLDV: 57 MM HG (ref 41–51)
PH BLDA: 7.13 PH (ref 7.38–7.42)
PH BLDA: 7.17 PH (ref 7.38–7.42)
PH BLDV: 7.23 PH (ref 7.33–7.43)
PH UR STRIP.AUTO: 5.5 [PH]
PLATELET # BLD AUTO: 132 X10*3/UL (ref 150–450)
PLATELET # BLD AUTO: 86 X10*3/UL (ref 150–450)
PO2 BLDA: 61 MM HG (ref 85–95)
PO2 BLDA: 63 MM HG (ref 85–95)
PO2 BLDV: 72 MM HG (ref 35–45)
POTASSIUM BLDA-SCNC: 4.4 MMOL/L (ref 3.5–5.3)
POTASSIUM SERPL-SCNC: 2.9 MMOL/L (ref 3.5–5.3)
POTASSIUM SERPL-SCNC: 4.4 MMOL/L (ref 3.5–5.3)
POTASSIUM SERPL-SCNC: 4.7 MMOL/L (ref 3.5–5.3)
POTASSIUM UR-SCNC: 13 MMOL/L
POTASSIUM/CREAT UR-RTO: 26 MMOL/G CREAT
PROCALCITONIN SERPL-MCNC: 0.5 NG/ML
PROT SERPL-MCNC: 5.7 G/DL (ref 6.4–8.2)
PROT SERPL-MCNC: 5.8 G/DL (ref 6.4–8.2)
PROT UR STRIP.AUTO-MCNC: ABNORMAL MG/DL
PROTHROMBIN TIME: 43.7 SECONDS (ref 9.8–12.4)
RBC # BLD AUTO: 3.17 X10*6/UL (ref 4–5.2)
RBC # BLD AUTO: 4.3 X10*6/UL (ref 4–5.2)
RBC # UR STRIP.AUTO: ABNORMAL MG/DL
RBC #/AREA URNS AUTO: NORMAL /HPF
RSV RNA RESP QL NAA+PROBE: NOT DETECTED
S PNEUM AG UR QL: NEGATIVE
SAO2 % BLDA: 85 % (ref 94–100)
SAO2 % BLDA: 88 % (ref 94–100)
SAO2 % BLDV: 95 % (ref 45–75)
SARS-COV-2 RNA RESP QL NAA+PROBE: NOT DETECTED
SODIUM BLDA-SCNC: 133 MMOL/L (ref 136–145)
SODIUM SERPL-SCNC: 132 MMOL/L (ref 136–145)
SODIUM SERPL-SCNC: 137 MMOL/L (ref 136–145)
SODIUM SERPL-SCNC: 140 MMOL/L (ref 136–145)
SODIUM UR-SCNC: 97 MMOL/L
SODIUM/CREAT UR-RTO: 194 MMOL/G CREAT
SP GR UR STRIP.AUTO: 1.01
SPECIMEN DRAWN FROM PATIENT: ABNORMAL
SPECIMEN DRAWN FROM PATIENT: ABNORMAL
SPONTANEOUS TIDAL VOLUME: 412 ML
SQUAMOUS #/AREA URNS AUTO: NORMAL /HPF
TOTAL MINUTE VOLUME: 9 LITER
TSH SERPL-ACNC: 0.4 MIU/L (ref 0.44–3.98)
UROBILINOGEN UR STRIP.AUTO-MCNC: NORMAL MG/DL
VENTILATOR MODE: ABNORMAL
VENTILATOR RATE: 18 BPM
WBC # BLD AUTO: 4.5 X10*3/UL (ref 4.4–11.3)
WBC # BLD AUTO: 6.7 X10*3/UL (ref 4.4–11.3)
WBC #/AREA URNS AUTO: NORMAL /HPF

## 2025-02-26 PROCEDURE — 85610 PROTHROMBIN TIME: CPT | Performed by: PHYSICIAN ASSISTANT

## 2025-02-26 PROCEDURE — 51798 US URINE CAPACITY MEASURE: CPT

## 2025-02-26 PROCEDURE — 2500000001 HC RX 250 WO HCPCS SELF ADMINISTERED DRUGS (ALT 637 FOR MEDICARE OP)

## 2025-02-26 PROCEDURE — 2500000004 HC RX 250 GENERAL PHARMACY W/ HCPCS (ALT 636 FOR OP/ED): Mod: JZ | Performed by: EMERGENCY MEDICINE

## 2025-02-26 PROCEDURE — 2500000005 HC RX 250 GENERAL PHARMACY W/O HCPCS

## 2025-02-26 PROCEDURE — 84484 ASSAY OF TROPONIN QUANT: CPT

## 2025-02-26 PROCEDURE — 80053 COMPREHEN METABOLIC PANEL: CPT | Performed by: STUDENT IN AN ORGANIZED HEALTH CARE EDUCATION/TRAINING PROGRAM

## 2025-02-26 PROCEDURE — 96375 TX/PRO/DX INJ NEW DRUG ADDON: CPT | Mod: 59

## 2025-02-26 PROCEDURE — 74176 CT ABD & PELVIS W/O CONTRAST: CPT | Performed by: RADIOLOGY

## 2025-02-26 PROCEDURE — 87040 BLOOD CULTURE FOR BACTERIA: CPT | Mod: PARLAB | Performed by: STUDENT IN AN ORGANIZED HEALTH CARE EDUCATION/TRAINING PROGRAM

## 2025-02-26 PROCEDURE — 2500000005 HC RX 250 GENERAL PHARMACY W/O HCPCS: Performed by: STUDENT IN AN ORGANIZED HEALTH CARE EDUCATION/TRAINING PROGRAM

## 2025-02-26 PROCEDURE — 2500000004 HC RX 250 GENERAL PHARMACY W/ HCPCS (ALT 636 FOR OP/ED)

## 2025-02-26 PROCEDURE — 71045 X-RAY EXAM CHEST 1 VIEW: CPT | Performed by: RADIOLOGY

## 2025-02-26 PROCEDURE — 87637 SARSCOV2&INF A&B&RSV AMP PRB: CPT | Performed by: PHYSICIAN ASSISTANT

## 2025-02-26 PROCEDURE — 84443 ASSAY THYROID STIM HORMONE: CPT

## 2025-02-26 PROCEDURE — 82810 BLOOD GASES O2 SAT ONLY: CPT

## 2025-02-26 PROCEDURE — 83735 ASSAY OF MAGNESIUM: CPT | Performed by: PHYSICIAN ASSISTANT

## 2025-02-26 PROCEDURE — 81001 URINALYSIS AUTO W/SCOPE: CPT | Performed by: PHYSICIAN ASSISTANT

## 2025-02-26 PROCEDURE — 51702 INSERT TEMP BLADDER CATH: CPT

## 2025-02-26 PROCEDURE — 2500000005 HC RX 250 GENERAL PHARMACY W/O HCPCS: Performed by: EMERGENCY MEDICINE

## 2025-02-26 PROCEDURE — 2500000004 HC RX 250 GENERAL PHARMACY W/ HCPCS (ALT 636 FOR OP/ED): Mod: JZ | Performed by: STUDENT IN AN ORGANIZED HEALTH CARE EDUCATION/TRAINING PROGRAM

## 2025-02-26 PROCEDURE — 71045 X-RAY EXAM CHEST 1 VIEW: CPT

## 2025-02-26 PROCEDURE — 36415 COLL VENOUS BLD VENIPUNCTURE: CPT | Performed by: PHYSICIAN ASSISTANT

## 2025-02-26 PROCEDURE — 83690 ASSAY OF LIPASE: CPT | Performed by: PHYSICIAN ASSISTANT

## 2025-02-26 PROCEDURE — 82533 TOTAL CORTISOL: CPT | Mod: PARLAB

## 2025-02-26 PROCEDURE — 87081 CULTURE SCREEN ONLY: CPT | Mod: PARLAB

## 2025-02-26 PROCEDURE — 76770 US EXAM ABDO BACK WALL COMP: CPT | Performed by: RADIOLOGY

## 2025-02-26 PROCEDURE — 80053 COMPREHEN METABOLIC PANEL: CPT | Performed by: PHYSICIAN ASSISTANT

## 2025-02-26 PROCEDURE — 85027 COMPLETE CBC AUTOMATED: CPT | Performed by: STUDENT IN AN ORGANIZED HEALTH CARE EDUCATION/TRAINING PROGRAM

## 2025-02-26 PROCEDURE — 2500000005 HC RX 250 GENERAL PHARMACY W/O HCPCS: Performed by: INTERNAL MEDICINE

## 2025-02-26 PROCEDURE — 96374 THER/PROPH/DIAG INJ IV PUSH: CPT | Mod: 59

## 2025-02-26 PROCEDURE — 93005 ELECTROCARDIOGRAM TRACING: CPT

## 2025-02-26 PROCEDURE — 2020000001 HC ICU ROOM DAILY

## 2025-02-26 PROCEDURE — 36600 WITHDRAWAL OF ARTERIAL BLOOD: CPT

## 2025-02-26 PROCEDURE — 84133 ASSAY OF URINE POTASSIUM: CPT

## 2025-02-26 PROCEDURE — 84132 ASSAY OF SERUM POTASSIUM: CPT

## 2025-02-26 PROCEDURE — 96361 HYDRATE IV INFUSION ADD-ON: CPT | Mod: 59

## 2025-02-26 PROCEDURE — 2500000001 HC RX 250 WO HCPCS SELF ADMINISTERED DRUGS (ALT 637 FOR MEDICARE OP): Performed by: STUDENT IN AN ORGANIZED HEALTH CARE EDUCATION/TRAINING PROGRAM

## 2025-02-26 PROCEDURE — 2500000004 HC RX 250 GENERAL PHARMACY W/ HCPCS (ALT 636 FOR OP/ED): Performed by: STUDENT IN AN ORGANIZED HEALTH CARE EDUCATION/TRAINING PROGRAM

## 2025-02-26 PROCEDURE — 74176 CT ABD & PELVIS W/O CONTRAST: CPT

## 2025-02-26 PROCEDURE — 2500000002 HC RX 250 W HCPCS SELF ADMINISTERED DRUGS (ALT 637 FOR MEDICARE OP, ALT 636 FOR OP/ED)

## 2025-02-26 PROCEDURE — 2500000004 HC RX 250 GENERAL PHARMACY W/ HCPCS (ALT 636 FOR OP/ED): Performed by: PHYSICIAN ASSISTANT

## 2025-02-26 PROCEDURE — 94640 AIRWAY INHALATION TREATMENT: CPT

## 2025-02-26 PROCEDURE — 82947 ASSAY GLUCOSE BLOOD QUANT: CPT

## 2025-02-26 PROCEDURE — 94660 CPAP INITIATION&MGMT: CPT

## 2025-02-26 PROCEDURE — 87324 CLOSTRIDIUM AG IA: CPT | Mod: PARLAB | Performed by: STUDENT IN AN ORGANIZED HEALTH CARE EDUCATION/TRAINING PROGRAM

## 2025-02-26 PROCEDURE — 36415 COLL VENOUS BLD VENIPUNCTURE: CPT | Performed by: STUDENT IN AN ORGANIZED HEALTH CARE EDUCATION/TRAINING PROGRAM

## 2025-02-26 PROCEDURE — 3E033XZ INTRODUCTION OF VASOPRESSOR INTO PERIPHERAL VEIN, PERCUTANEOUS APPROACH: ICD-10-PCS

## 2025-02-26 PROCEDURE — 86738 MYCOPLASMA ANTIBODY: CPT | Performed by: STUDENT IN AN ORGANIZED HEALTH CARE EDUCATION/TRAINING PROGRAM

## 2025-02-26 PROCEDURE — 84145 PROCALCITONIN (PCT): CPT | Mod: PARLAB | Performed by: STUDENT IN AN ORGANIZED HEALTH CARE EDUCATION/TRAINING PROGRAM

## 2025-02-26 PROCEDURE — 83605 ASSAY OF LACTIC ACID: CPT | Performed by: STUDENT IN AN ORGANIZED HEALTH CARE EDUCATION/TRAINING PROGRAM

## 2025-02-26 PROCEDURE — 99233 SBSQ HOSP IP/OBS HIGH 50: CPT | Performed by: STUDENT IN AN ORGANIZED HEALTH CARE EDUCATION/TRAINING PROGRAM

## 2025-02-26 PROCEDURE — 85025 COMPLETE CBC W/AUTO DIFF WBC: CPT | Performed by: PHYSICIAN ASSISTANT

## 2025-02-26 PROCEDURE — 99233 SBSQ HOSP IP/OBS HIGH 50: CPT

## 2025-02-26 PROCEDURE — 83605 ASSAY OF LACTIC ACID: CPT | Performed by: PHYSICIAN ASSISTANT

## 2025-02-26 PROCEDURE — 99291 CRITICAL CARE FIRST HOUR: CPT

## 2025-02-26 PROCEDURE — 82805 BLOOD GASES W/O2 SATURATION: CPT

## 2025-02-26 PROCEDURE — 5A0935A ASSISTANCE WITH RESPIRATORY VENTILATION, LESS THAN 24 CONSECUTIVE HOURS, HIGH NASAL FLOW/VELOCITY: ICD-10-PCS

## 2025-02-26 PROCEDURE — 76770 US EXAM ABDO BACK WALL COMP: CPT

## 2025-02-26 PROCEDURE — 87493 C DIFF AMPLIFIED PROBE: CPT | Mod: PARLAB | Performed by: STUDENT IN AN ORGANIZED HEALTH CARE EDUCATION/TRAINING PROGRAM

## 2025-02-26 PROCEDURE — 5A09357 ASSISTANCE WITH RESPIRATORY VENTILATION, LESS THAN 24 CONSECUTIVE HOURS, CONTINUOUS POSITIVE AIRWAY PRESSURE: ICD-10-PCS

## 2025-02-26 PROCEDURE — 87899 AGENT NOS ASSAY W/OPTIC: CPT | Mod: PARLAB | Performed by: STUDENT IN AN ORGANIZED HEALTH CARE EDUCATION/TRAINING PROGRAM

## 2025-02-26 PROCEDURE — P9045 ALBUMIN (HUMAN), 5%, 250 ML: HCPCS | Mod: JZ | Performed by: STUDENT IN AN ORGANIZED HEALTH CARE EDUCATION/TRAINING PROGRAM

## 2025-02-26 PROCEDURE — 84075 ASSAY ALKALINE PHOSPHATASE: CPT

## 2025-02-26 PROCEDURE — 2500000002 HC RX 250 W HCPCS SELF ADMINISTERED DRUGS (ALT 637 FOR MEDICARE OP, ALT 636 FOR OP/ED): Performed by: STUDENT IN AN ORGANIZED HEALTH CARE EDUCATION/TRAINING PROGRAM

## 2025-02-26 PROCEDURE — 87449 NOS EACH ORGANISM AG IA: CPT | Mod: PARLAB | Performed by: STUDENT IN AN ORGANIZED HEALTH CARE EDUCATION/TRAINING PROGRAM

## 2025-02-26 PROCEDURE — 2500000002 HC RX 250 W HCPCS SELF ADMINISTERED DRUGS (ALT 637 FOR MEDICARE OP, ALT 636 FOR OP/ED): Performed by: PHYSICIAN ASSISTANT

## 2025-02-26 RX ORDER — POTASSIUM CHLORIDE 14.9 MG/ML
20 INJECTION INTRAVENOUS
Status: DISCONTINUED | OUTPATIENT
Start: 2025-02-26 | End: 2025-02-26

## 2025-02-26 RX ORDER — METRONIDAZOLE 500 MG/100ML
500 INJECTION, SOLUTION INTRAVENOUS EVERY 8 HOURS
Status: DISCONTINUED | OUTPATIENT
Start: 2025-02-26 | End: 2025-02-28

## 2025-02-26 RX ORDER — PHENYLEPHRINE HYDROCHLORIDE 10 MG/ML
INJECTION INTRAVENOUS
Status: DISPENSED
Start: 2025-02-26 | End: 2025-02-26

## 2025-02-26 RX ORDER — DEXMEDETOMIDINE HYDROCHLORIDE 4 UG/ML
.1-1.5 INJECTION, SOLUTION INTRAVENOUS CONTINUOUS
Status: DISCONTINUED | OUTPATIENT
Start: 2025-02-26 | End: 2025-03-02

## 2025-02-26 RX ORDER — SODIUM BICARBONATE 1 MEQ/ML
50 SYRINGE (ML) INTRAVENOUS ONCE
Status: COMPLETED | OUTPATIENT
Start: 2025-02-26 | End: 2025-02-26

## 2025-02-26 RX ORDER — IPRATROPIUM BROMIDE AND ALBUTEROL SULFATE 2.5; .5 MG/3ML; MG/3ML
3 SOLUTION RESPIRATORY (INHALATION) EVERY 2 HOUR PRN
Status: DISCONTINUED | OUTPATIENT
Start: 2025-02-26 | End: 2025-02-26

## 2025-02-26 RX ORDER — VANCOMYCIN HCL 50 MG/ML
125 SOLUTION, RECONSTITUTED, ORAL ORAL 4 TIMES DAILY
Status: DISCONTINUED | OUTPATIENT
Start: 2025-02-26 | End: 2025-02-28

## 2025-02-26 RX ORDER — DEXMEDETOMIDINE HYDROCHLORIDE 4 UG/ML
INJECTION, SOLUTION INTRAVENOUS
Status: COMPLETED
Start: 2025-02-26 | End: 2025-02-26

## 2025-02-26 RX ORDER — PANTOPRAZOLE SODIUM 40 MG/10ML
40 INJECTION, POWDER, LYOPHILIZED, FOR SOLUTION INTRAVENOUS DAILY
Status: DISCONTINUED | OUTPATIENT
Start: 2025-02-27 | End: 2025-03-05

## 2025-02-26 RX ORDER — PHENYLEPHRINE HCL IN 0.9% NACL 1 MG/10 ML
200 SYRINGE (ML) INTRAVENOUS ONCE
Status: COMPLETED | OUTPATIENT
Start: 2025-02-26 | End: 2025-02-26

## 2025-02-26 RX ORDER — BUPROPION HYDROCHLORIDE 150 MG/1
150 TABLET ORAL EVERY MORNING
Status: DISCONTINUED | OUTPATIENT
Start: 2025-02-26 | End: 2025-03-18 | Stop reason: HOSPADM

## 2025-02-26 RX ORDER — PANTOPRAZOLE SODIUM 40 MG/1
40 TABLET, DELAYED RELEASE ORAL
Status: DISCONTINUED | OUTPATIENT
Start: 2025-02-26 | End: 2025-02-26

## 2025-02-26 RX ORDER — CIPROFLOXACIN 2 MG/ML
400 INJECTION, SOLUTION INTRAVENOUS EVERY 8 HOURS
Status: DISCONTINUED | OUTPATIENT
Start: 2025-02-26 | End: 2025-02-27

## 2025-02-26 RX ORDER — OSELTAMIVIR PHOSPHATE 30 MG/1
30 CAPSULE ORAL DAILY
Status: DISCONTINUED | OUTPATIENT
Start: 2025-02-26 | End: 2025-02-27

## 2025-02-26 RX ORDER — TALC
3 POWDER (GRAM) TOPICAL NIGHTLY PRN
Status: DISCONTINUED | OUTPATIENT
Start: 2025-02-26 | End: 2025-03-18 | Stop reason: HOSPADM

## 2025-02-26 RX ORDER — ALBUMIN HUMAN 50 G/1000ML
25 SOLUTION INTRAVENOUS ONCE
Status: COMPLETED | OUTPATIENT
Start: 2025-02-26 | End: 2025-02-26

## 2025-02-26 RX ORDER — NOREPINEPHRINE BITARTRATE 0.03 MG/ML
0-.5 INJECTION, SOLUTION INTRAVENOUS CONTINUOUS
Status: DISCONTINUED | OUTPATIENT
Start: 2025-02-26 | End: 2025-02-27

## 2025-02-26 RX ORDER — ACETAMINOPHEN 325 MG/1
650 TABLET ORAL EVERY 4 HOURS PRN
Status: DISCONTINUED | OUTPATIENT
Start: 2025-02-26 | End: 2025-03-18 | Stop reason: HOSPADM

## 2025-02-26 RX ORDER — ONDANSETRON HYDROCHLORIDE 2 MG/ML
4 INJECTION, SOLUTION INTRAVENOUS EVERY 6 HOURS PRN
Status: DISCONTINUED | OUTPATIENT
Start: 2025-02-26 | End: 2025-03-03

## 2025-02-26 RX ORDER — SODIUM BICARBONATE 1 MEQ/ML
SYRINGE (ML) INTRAVENOUS
Status: COMPLETED
Start: 2025-02-26 | End: 2025-02-26

## 2025-02-26 RX ORDER — ESCITALOPRAM OXALATE 10 MG/1
20 TABLET ORAL DAILY
Status: DISCONTINUED | OUTPATIENT
Start: 2025-02-26 | End: 2025-03-18 | Stop reason: HOSPADM

## 2025-02-26 RX ORDER — IPRATROPIUM BROMIDE AND ALBUTEROL SULFATE 2.5; .5 MG/3ML; MG/3ML
3 SOLUTION RESPIRATORY (INHALATION) EVERY 2 HOUR PRN
Status: DISCONTINUED | OUTPATIENT
Start: 2025-02-26 | End: 2025-03-18 | Stop reason: HOSPADM

## 2025-02-26 RX ORDER — SODIUM CHLORIDE 9 MG/ML
100 INJECTION, SOLUTION INTRAVENOUS CONTINUOUS
Status: DISCONTINUED | OUTPATIENT
Start: 2025-02-26 | End: 2025-02-26

## 2025-02-26 RX ORDER — ALBUTEROL SULFATE 0.83 MG/ML
2.5 SOLUTION RESPIRATORY (INHALATION) ONCE
Status: COMPLETED | OUTPATIENT
Start: 2025-02-26 | End: 2025-02-26

## 2025-02-26 RX ORDER — IPRATROPIUM BROMIDE AND ALBUTEROL SULFATE 2.5; .5 MG/3ML; MG/3ML
3 SOLUTION RESPIRATORY (INHALATION)
Status: DISCONTINUED | OUTPATIENT
Start: 2025-02-26 | End: 2025-03-08

## 2025-02-26 RX ORDER — ALBUTEROL SULFATE 0.83 MG/ML
2.5 SOLUTION RESPIRATORY (INHALATION) EVERY 2 HOUR PRN
Status: DISCONTINUED | OUTPATIENT
Start: 2025-02-26 | End: 2025-02-26

## 2025-02-26 RX ORDER — INSULIN LISPRO 100 [IU]/ML
0-5 INJECTION, SOLUTION INTRAVENOUS; SUBCUTANEOUS
Status: DISCONTINUED | OUTPATIENT
Start: 2025-02-26 | End: 2025-03-18 | Stop reason: HOSPADM

## 2025-02-26 RX ORDER — ATORVASTATIN CALCIUM 80 MG/1
80 TABLET, FILM COATED ORAL NIGHTLY
Status: DISCONTINUED | OUTPATIENT
Start: 2025-02-26 | End: 2025-03-18 | Stop reason: HOSPADM

## 2025-02-26 RX ORDER — NOREPINEPHRINE BITARTRATE 0.03 MG/ML
0-.2 INJECTION, SOLUTION INTRAVENOUS CONTINUOUS
Status: DISCONTINUED | OUTPATIENT
Start: 2025-02-26 | End: 2025-02-26

## 2025-02-26 RX ORDER — ONDANSETRON HYDROCHLORIDE 2 MG/ML
4 INJECTION, SOLUTION INTRAVENOUS ONCE
Status: COMPLETED | OUTPATIENT
Start: 2025-02-26 | End: 2025-02-26

## 2025-02-26 RX ORDER — ASPIRIN 81 MG/1
81 TABLET ORAL DAILY
Status: DISCONTINUED | OUTPATIENT
Start: 2025-02-26 | End: 2025-03-18 | Stop reason: HOSPADM

## 2025-02-26 RX ORDER — CEFTRIAXONE 1 G/50ML
1 INJECTION, SOLUTION INTRAVENOUS ONCE
Status: COMPLETED | OUTPATIENT
Start: 2025-02-26 | End: 2025-02-26

## 2025-02-26 RX ADMIN — IPRATROPIUM BROMIDE AND ALBUTEROL SULFATE 3 ML: .5; 3 SOLUTION RESPIRATORY (INHALATION) at 07:01

## 2025-02-26 RX ADMIN — ONDANSETRON 4 MG: 2 INJECTION INTRAMUSCULAR; INTRAVENOUS at 02:21

## 2025-02-26 RX ADMIN — CIPROFLOXACIN 400 MG: 400 INJECTION, SOLUTION INTRAVENOUS at 14:48

## 2025-02-26 RX ADMIN — ALBUMIN HUMAN 25 G: 0.05 INJECTION, SOLUTION INTRAVENOUS at 02:56

## 2025-02-26 RX ADMIN — VANCOMYCIN HYDROCHLORIDE 125 MG: KIT at 21:56

## 2025-02-26 RX ADMIN — INSULIN LISPRO 1 UNITS: 100 INJECTION, SOLUTION INTRAVENOUS; SUBCUTANEOUS at 16:52

## 2025-02-26 RX ADMIN — Medication 60 PERCENT: at 11:02

## 2025-02-26 RX ADMIN — IPRATROPIUM BROMIDE AND ALBUTEROL SULFATE 3 ML: .5; 3 SOLUTION RESPIRATORY (INHALATION) at 18:38

## 2025-02-26 RX ADMIN — ONDANSETRON 4 MG: 2 INJECTION INTRAMUSCULAR; INTRAVENOUS at 11:27

## 2025-02-26 RX ADMIN — DEXMEDETOMIDINE HYDROCHLORIDE 0.2 MCG/KG/HR: 400 INJECTION INTRAVENOUS at 14:28

## 2025-02-26 RX ADMIN — OSELTAMAVIR PHOSPHATE 30 MG: 30 CAPSULE ORAL at 08:35

## 2025-02-26 RX ADMIN — ASPIRIN 81 MG: 81 TABLET, COATED ORAL at 08:35

## 2025-02-26 RX ADMIN — NOREPINEPHRINE BITARTRATE 0.2 MCG/KG/MIN: 32 SOLUTION INTRAVENOUS at 08:08

## 2025-02-26 RX ADMIN — DEXMEDETOMIDINE HYDROCHLORIDE 0.2 MCG/KG/HR: 4 INJECTION, SOLUTION INTRAVENOUS at 14:28

## 2025-02-26 RX ADMIN — CIPROFLOXACIN 400 MG: 400 INJECTION, SOLUTION INTRAVENOUS at 21:19

## 2025-02-26 RX ADMIN — NOREPINEPHRINE BITARTRATE 0.28 MCG/KG/MIN: 32 SOLUTION INTRAVENOUS at 15:49

## 2025-02-26 RX ADMIN — ALBUTEROL SULFATE 2.5 MG: 2.5 SOLUTION RESPIRATORY (INHALATION) at 03:48

## 2025-02-26 RX ADMIN — HYDROCORTISONE SODIUM SUCCINATE 100 MG: 100 INJECTION, POWDER, FOR SOLUTION INTRAMUSCULAR; INTRAVENOUS at 13:15

## 2025-02-26 RX ADMIN — VANCOMYCIN HYDROCHLORIDE 125 MG: KIT at 12:45

## 2025-02-26 RX ADMIN — Medication 45 L/MIN: at 04:01

## 2025-02-26 RX ADMIN — ESCITALOPRAM OXALATE 20 MG: 10 TABLET ORAL at 08:35

## 2025-02-26 RX ADMIN — Medication 200 MCG: at 04:11

## 2025-02-26 RX ADMIN — Medication 50 PERCENT: at 20:00

## 2025-02-26 RX ADMIN — Medication 50 MEQ: at 14:25

## 2025-02-26 RX ADMIN — SODIUM CHLORIDE 2000 ML: 9 INJECTION, SOLUTION INTRAVENOUS at 02:39

## 2025-02-26 RX ADMIN — VANCOMYCIN HYDROCHLORIDE 125 MG: KIT at 18:11

## 2025-02-26 RX ADMIN — SODIUM CHLORIDE 1000 ML: 9 INJECTION, SOLUTION INTRAVENOUS at 09:09

## 2025-02-26 RX ADMIN — NOREPINEPHRINE BITARTRATE 0.1 MCG/KG/MIN: 32 SOLUTION INTRAVENOUS at 04:10

## 2025-02-26 RX ADMIN — GLUCAGON 5 MG: KIT at 02:27

## 2025-02-26 RX ADMIN — HYDROCORTISONE SODIUM SUCCINATE 50 MG: 100 INJECTION, POWDER, FOR SOLUTION INTRAMUSCULAR; INTRAVENOUS at 20:07

## 2025-02-26 RX ADMIN — IPRATROPIUM BROMIDE AND ALBUTEROL SULFATE 3 ML: .5; 3 SOLUTION RESPIRATORY (INHALATION) at 11:36

## 2025-02-26 RX ADMIN — DOXYCYCLINE 100 MG: 100 INJECTION, POWDER, LYOPHILIZED, FOR SOLUTION INTRAVENOUS at 06:00

## 2025-02-26 RX ADMIN — SODIUM BICARBONATE 50 MEQ: 84 INJECTION INTRAVENOUS at 12:43

## 2025-02-26 RX ADMIN — METRONIDAZOLE 500 MG: 500 INJECTION, SOLUTION INTRAVENOUS at 19:49

## 2025-02-26 RX ADMIN — SODIUM CHLORIDE 1000 ML: 9 INJECTION, SOLUTION INTRAVENOUS at 00:21

## 2025-02-26 RX ADMIN — Medication 45 L/MIN: at 08:43

## 2025-02-26 RX ADMIN — METRONIDAZOLE 500 MG: 500 INJECTION, SOLUTION INTRAVENOUS at 12:43

## 2025-02-26 RX ADMIN — PANTOPRAZOLE SODIUM 40 MG: 40 TABLET, DELAYED RELEASE ORAL at 08:35

## 2025-02-26 RX ADMIN — SODIUM BICARBONATE 50 MEQ: 84 INJECTION INTRAVENOUS at 14:25

## 2025-02-26 RX ADMIN — CEFTRIAXONE SODIUM 1 G: 1 INJECTION, SOLUTION INTRAVENOUS at 07:09

## 2025-02-26 RX ADMIN — BUPROPION HYDROCHLORIDE 150 MG: 150 TABLET, EXTENDED RELEASE ORAL at 08:35

## 2025-02-26 SDOH — SOCIAL STABILITY: SOCIAL INSECURITY: HAVE YOU HAD ANY THOUGHTS OF HARMING ANYONE ELSE?: UNABLE TO ASSESS

## 2025-02-26 SDOH — SOCIAL STABILITY: SOCIAL INSECURITY: HAS ANYONE EVER THREATENED TO HURT YOUR FAMILY OR YOUR PETS?: UNABLE TO ASSESS

## 2025-02-26 SDOH — SOCIAL STABILITY: SOCIAL INSECURITY: WERE YOU ABLE TO COMPLETE ALL THE BEHAVIORAL HEALTH SCREENINGS?: NO

## 2025-02-26 SDOH — SOCIAL STABILITY: SOCIAL INSECURITY: ARE YOU OR HAVE YOU BEEN THREATENED OR ABUSED PHYSICALLY, EMOTIONALLY, OR SEXUALLY BY ANYONE?: UNABLE TO ASSESS

## 2025-02-26 SDOH — ECONOMIC STABILITY: FOOD INSECURITY: HOW HARD IS IT FOR YOU TO PAY FOR THE VERY BASICS LIKE FOOD, HOUSING, MEDICAL CARE, AND HEATING?: PATIENT DECLINED

## 2025-02-26 SDOH — ECONOMIC STABILITY: FOOD INSECURITY: WITHIN THE PAST 12 MONTHS, THE FOOD YOU BOUGHT JUST DIDN'T LAST AND YOU DIDN'T HAVE MONEY TO GET MORE.: PATIENT DECLINED

## 2025-02-26 SDOH — ECONOMIC STABILITY: HOUSING INSECURITY: AT ANY TIME IN THE PAST 12 MONTHS, WERE YOU HOMELESS OR LIVING IN A SHELTER (INCLUDING NOW)?: PATIENT DECLINED

## 2025-02-26 SDOH — SOCIAL STABILITY: SOCIAL INSECURITY: HAVE YOU HAD THOUGHTS OF HARMING ANYONE ELSE?: NO

## 2025-02-26 SDOH — ECONOMIC STABILITY: TRANSPORTATION INSECURITY
IN THE PAST 12 MONTHS, HAS LACK OF TRANSPORTATION KEPT YOU FROM MEDICAL APPOINTMENTS OR FROM GETTING MEDICATIONS?: PATIENT DECLINED

## 2025-02-26 SDOH — SOCIAL STABILITY: SOCIAL INSECURITY: DO YOU FEEL ANYONE HAS EXPLOITED OR TAKEN ADVANTAGE OF YOU FINANCIALLY OR OF YOUR PERSONAL PROPERTY?: UNABLE TO ASSESS

## 2025-02-26 SDOH — ECONOMIC STABILITY: FOOD INSECURITY
WITHIN THE PAST 12 MONTHS, YOU WORRIED THAT YOUR FOOD WOULD RUN OUT BEFORE YOU GOT THE MONEY TO BUY MORE.: PATIENT DECLINED

## 2025-02-26 SDOH — SOCIAL STABILITY: SOCIAL INSECURITY: DO YOU FEEL UNSAFE GOING BACK TO THE PLACE WHERE YOU ARE LIVING?: UNABLE TO ASSESS

## 2025-02-26 SDOH — SOCIAL STABILITY: SOCIAL INSECURITY: ARE THERE ANY APPARENT SIGNS OF INJURIES/BEHAVIORS THAT COULD BE RELATED TO ABUSE/NEGLECT?: UNABLE TO ASSESS

## 2025-02-26 SDOH — SOCIAL STABILITY: SOCIAL INSECURITY: DOES ANYONE TRY TO KEEP YOU FROM HAVING/CONTACTING OTHER FRIENDS OR DOING THINGS OUTSIDE YOUR HOME?: UNABLE TO ASSESS

## 2025-02-26 SDOH — ECONOMIC STABILITY: HOUSING INSECURITY: IN THE LAST 12 MONTHS, WAS THERE A TIME WHEN YOU WERE NOT ABLE TO PAY THE MORTGAGE OR RENT ON TIME?: PATIENT DECLINED

## 2025-02-26 SDOH — SOCIAL STABILITY: SOCIAL INSECURITY: ABUSE: ADULT

## 2025-02-26 ASSESSMENT — COGNITIVE AND FUNCTIONAL STATUS - GENERAL
DRESSING REGULAR UPPER BODY CLOTHING: A LOT
WALKING IN HOSPITAL ROOM: A LOT
MOBILITY SCORE: 13
WALKING IN HOSPITAL ROOM: A LOT
MOBILITY SCORE: 13
EATING MEALS: A LITTLE
CLIMB 3 TO 5 STEPS WITH RAILING: A LOT
HELP NEEDED FOR BATHING: A LOT
EATING MEALS: A LOT
MOVING TO AND FROM BED TO CHAIR: A LOT
DAILY ACTIVITIY SCORE: 10
TURNING FROM BACK TO SIDE WHILE IN FLAT BAD: A LOT
MOVING FROM LYING ON BACK TO SITTING ON SIDE OF FLAT BED WITH BEDRAILS: A LITTLE
TOILETING: TOTAL
MOVING TO AND FROM BED TO CHAIR: A LOT
MOVING TO AND FROM BED TO CHAIR: A LOT
STANDING UP FROM CHAIR USING ARMS: A LOT
WALKING IN HOSPITAL ROOM: A LOT
CLIMB 3 TO 5 STEPS WITH RAILING: A LOT
DRESSING REGULAR LOWER BODY CLOTHING: A LOT
PERSONAL GROOMING: A LITTLE
EATING MEALS: TOTAL
DRESSING REGULAR LOWER BODY CLOTHING: A LOT
STANDING UP FROM CHAIR USING ARMS: A LOT
MOVING FROM LYING ON BACK TO SITTING ON SIDE OF FLAT BED WITH BEDRAILS: A LOT
PERSONAL GROOMING: A LOT
DRESSING REGULAR LOWER BODY CLOTHING: A LOT
MOVING FROM LYING ON BACK TO SITTING ON SIDE OF FLAT BED WITH BEDRAILS: A LITTLE
TURNING FROM BACK TO SIDE WHILE IN FLAT BAD: A LOT
HELP NEEDED FOR BATHING: A LOT
TURNING FROM BACK TO SIDE WHILE IN FLAT BAD: A LOT
DRESSING REGULAR UPPER BODY CLOTHING: A LOT
PERSONAL GROOMING: A LOT
DAILY ACTIVITIY SCORE: 14
TOILETING: A LOT
HELP NEEDED FOR BATHING: A LOT
CLIMB 3 TO 5 STEPS WITH RAILING: A LOT
TOILETING: A LOT
DAILY ACTIVITIY SCORE: 12
DRESSING REGULAR UPPER BODY CLOTHING: A LOT
STANDING UP FROM CHAIR USING ARMS: A LOT
MOBILITY SCORE: 12
PATIENT BASELINE BEDBOUND: NO

## 2025-02-26 ASSESSMENT — ENCOUNTER SYMPTOMS
PALPITATIONS: 0
ABDOMINAL PAIN: 1
DYSURIA: 0
FEVER: 1
HEMATURIA: 0
DIARRHEA: 1
VOMITING: 0
FREQUENCY: 0
COUGH: 1
NAUSEA: 1
CHILLS: 0
SHORTNESS OF BREATH: 1

## 2025-02-26 ASSESSMENT — ACTIVITIES OF DAILY LIVING (ADL)
FEEDING YOURSELF: INDEPENDENT
HEARING - RIGHT EAR: FUNCTIONAL
WALKS IN HOME: UNABLE TO ASSESS
HEARING - LEFT EAR: FUNCTIONAL
DRESSING YOURSELF: NEEDS ASSISTANCE
LACK_OF_TRANSPORTATION: PATIENT UNABLE TO ANSWER
GROOMING: NEEDS ASSISTANCE
BATHING: NEEDS ASSISTANCE
JUDGMENT_ADEQUATE_SAFELY_COMPLETE_DAILY_ACTIVITIES: YES
TOILETING: NEEDS ASSISTANCE
LACK_OF_TRANSPORTATION: PATIENT DECLINED
ADEQUATE_TO_COMPLETE_ADL: YES
PATIENT'S MEMORY ADEQUATE TO SAFELY COMPLETE DAILY ACTIVITIES?: YES

## 2025-02-26 ASSESSMENT — PATIENT HEALTH QUESTIONNAIRE - PHQ9
1. LITTLE INTEREST OR PLEASURE IN DOING THINGS: NOT AT ALL
2. FEELING DOWN, DEPRESSED OR HOPELESS: NOT AT ALL
SUM OF ALL RESPONSES TO PHQ9 QUESTIONS 1 & 2: 0

## 2025-02-26 ASSESSMENT — LIFESTYLE VARIABLES
HOW OFTEN DO YOU HAVE 6 OR MORE DRINKS ON ONE OCCASION: NEVER
HAVE YOU EVER FELT YOU SHOULD CUT DOWN ON YOUR DRINKING: NO
HAVE PEOPLE ANNOYED YOU BY CRITICIZING YOUR DRINKING: NO
EVER FELT BAD OR GUILTY ABOUT YOUR DRINKING: NO
EVER HAD A DRINK FIRST THING IN THE MORNING TO STEADY YOUR NERVES TO GET RID OF A HANGOVER: NO
SKIP TO QUESTIONS 9-10: 1
HOW OFTEN DO YOU HAVE A DRINK CONTAINING ALCOHOL: NEVER
AUDIT-C TOTAL SCORE: 0
AUDIT-C TOTAL SCORE: 0
TOTAL SCORE: 0
HOW MANY STANDARD DRINKS CONTAINING ALCOHOL DO YOU HAVE ON A TYPICAL DAY: PATIENT DOES NOT DRINK

## 2025-02-26 ASSESSMENT — PAIN SCALES - GENERAL
PAINLEVEL_OUTOF10: 0 - NO PAIN
PAINLEVEL_OUTOF10: 0 - NO PAIN

## 2025-02-26 ASSESSMENT — PAIN - FUNCTIONAL ASSESSMENT
PAIN_FUNCTIONAL_ASSESSMENT: 0-10
PAIN_FUNCTIONAL_ASSESSMENT: CPOT (CRITICAL CARE PAIN OBSERVATION TOOL)
PAIN_FUNCTIONAL_ASSESSMENT: 0-10

## 2025-02-26 NOTE — PROGRESS NOTES
This is a 54-year-old female who presented to [facility] on 2/25/25 with generalized weakness and poor PO intake x2 days.  She has had nausea without emesis and 1 episode of diarrhea.  She smokes 10 cigarettes/day (down from 1 PPD over the last few years) and has had increased productive cough from her baseline.  She otherwise denies any chest pain, SOB, or abdominal pain.  Upon arrival, she was found to be hypotensive with BP 56/30.  This is in the setting of influenza A with L mid-lung opacity noted on CXR.  She has an HILLARY with SCr 3.87 (vs baseline ~0.6).  Initial lactate negative.  She received 3L IVF boluses with improvement of her BPs up to 90/60s.  She had been evaluated by ICU and deemed stable for SDU admission.    Physical Exam:   Constitutional: awake, alert, no acute distress  ENMT: mucous membranes dry, conjunctivae clear  Head/Neck: normocephalic, atraumatic; supple, trachea midline  Respiratory/Thorax: diminished breath sounds, scattered expiratory wheezes and rales  Cardiovascular: RRR, no murmur  Gastrointestinal: soft, nondistended, non-tender, bowel sounds appreciated  Extremities: palpable peripheral pulses, no edema  Neurological: AO x3, no focal deficits  Psychological: appropriate mood and behavior  Skin: warm and dry    Assessment/Plan:  Concern for septic shock  Influenza A with concern for superimposed bacterial PNA  Acute COPD exacerbation, suspected (no prior PFTs)  Acute hypoxic respiratory failure  HILLARY, suspect ischemic ATN  Supratherapeutic INR  Nicotine use disorder    Aflutter s/p ablation in 10/2020  CAD s/p CABG x3 in 2020  Severe MR s/p mechanical MV replacement in 10/2020, on Coumadin  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 Her O2 requirements have since escalated from 4L NC to Airvo 45/50. She was thus given 200mcg Cristobal and then started on Levophed  ggt. Case discussed again with on-call intensivist who has accepted the pt for higher level of care.  In the interim, start renally dosed Tamiflu. Will empirically cover for concomitant bacterial infection given L midlung opacity and respiratory failure. Repeat lactate pending. Check BCx (has not been administered abx yet), renal US. Check UAg, Mycoplasma IgM, procal. Did consider steroids for CAP and AECOPD; however, will defer to ICU as they will be taking over her care.  Smokes 10 cigarettes/day x40+ yrs, down from 1 PPD since her CABG in 2020. Does have chronic cough, no prior PFTs.  On Coumadin for mechanical MV. Hold home Coumadin given supratherapeutic INR. Trend INR.

## 2025-02-26 NOTE — ED PROVIDER NOTES
HPI   No chief complaint on file.      54-year-old female with a significant past medical/surgical history of atrial flutter s/p cardiac ablation currently anticoagulated on Coumadin, CAD s/p CABG, pulmonary hypertension, hypercholesterolemia and s/p mitral valve replacement presents via EMS for abdominal pain along with nausea and vomiting.  Patient states symptoms for the past 2 days.  She states no known sick contacts to her knowledge.  Denies any recent travel outside the [place].  No reports of recent antibiotic use.  No reports of chest pain or shortness of breath. She denies fever. Denies UTI like symptoms.  Patient denies any melanotic stool.  No reports of hematochezia              Patient History   Past Medical History:   Diagnosis Date    Atrial flutter (Multi) 03/08/2023    s/p flutter ablation October 2020    Bilateral edema of lower extremity 03/08/2023    New July 2020 ... Better on Lasix    CAD (coronary artery disease) 03/08/2023    50% LAD, 60% diag, T.O. Cx, subtotal RCA, elevated RH pressure  CABG 10/2020: LIMA to LAD, FRA to OM, SVG to RCA      Cardiomyopathy (Multi) 03/08/2023    EF 35-45%, now 50%    Chest pressure 03/22/2024    Hypercholesterolemia 03/08/2023    Dr. Lomeli follows    Mitral regurgitation 03/08/2023    MVT, mild MS, severe MR   s/p MVR = 25/33 [place] mechanical valve     Other conditions influencing health status     Patient denies significant medical history    Pulmonary hypertension (Multi) 03/08/2023    Mod to severe    S/P CABG (coronary artery bypass graft) 03/08/2023    10/2020: LIMA to LAD, FRA to OM, SVG to RCA      S/P mitral valve replacement 03/08/2023    MVT, Mild MS, severe MR ... s/p MVR 25/33 [place] Mechanical valve     Past Surgical History:   Procedure Laterality Date    OTHER SURGICAL HISTORY  08/17/2021    Cardiac catheterization    OTHER SURGICAL HISTORY  08/06/2020    Tonsillectomy with adenoidectomy     Family History   Problem Relation Name Age of Onset    Atrial  fibrillation Father      Heart attack Father       Social History     Tobacco Use    Smoking status: Every Day     Current packs/day: 0.50     Types: Cigarettes    Smokeless tobacco: Never   Vaping Use    Vaping status: Every Day   Substance Use Topics    Alcohol use: Never    Drug use: Never       Physical Exam   ED Triage Vitals [02/26/25 0000]   Temp Heart Rate Resp BP   -- 64 -- (!) 56/30      Pulse Ox Temp src Heart Rate Source Patient Position   (!) 78 % -- -- --      BP Location FiO2 (%)     -- --       Physical Exam  Vitals and nursing note reviewed.   Constitutional:       General: She is not in acute distress.     Appearance: Normal appearance. She is normal weight. She is not ill-appearing, toxic-appearing or diaphoretic.   HENT:      Head: Normocephalic.      Nose: Nose normal.      Mouth/Throat:      Mouth: Mucous membranes are dry.   Eyes:      Extraocular Movements: Extraocular movements intact.      Conjunctiva/sclera: Conjunctivae normal.   Cardiovascular:      Rate and Rhythm: Normal rate and regular rhythm.      Pulses: Normal pulses.   Pulmonary:      Effort: Pulmonary effort is normal. No respiratory distress.      Breath sounds: Normal breath sounds.   Abdominal:      General: Abdomen is flat. There is no distension.      Palpations: Abdomen is soft.      Tenderness: There is no abdominal tenderness. There is no guarding or rebound.   Musculoskeletal:         General: Normal range of motion.      Cervical back: Normal range of motion and neck supple.   Skin:     General: Skin is warm and dry.      Capillary Refill: Capillary refill takes less than 2 seconds.   Neurological:      General: No focal deficit present.      Mental Status: She is alert and oriented to person, place, and time.   Psychiatric:         Mood and Affect: Mood normal.         Behavior: Behavior normal.         Thought Content: Thought content normal.         Judgment: Judgment normal.           ED Course & MDM   ED Course as  of 25 0301   Tue  ECHO 24  CONCLUSIONS:   1. Left ventricular systolic function is normal with a 50-55% estimated ejection fraction.   2. Moderate hypokinesis of the mid posterior wall.   3. The left atrium is moderately dilated.   4. There is a well-seated mechanical bileaflet valve in the mitral position with normal closing volumes and a mean transvalvular gradient of 3 mmHg.   5. Mildly elevated RVSP.   [DS]   2356 Nuclear stress test 2024  IMPRESSION:  1. Normal stress myocardial perfusion imaging.  2. Well-maintained left ventricular function.  51%     [DS]   Wed 8 WBC: 6.7 [DS]   0048 HEMOGLOBIN: 12.1 [DS]   0048 MAGNESIUM: 1.91 [DS]   0048 GLUCOSE(!): 107 [DS]   0048 SODIUM(!): 132 [DS]   0048 POTASSIUM: 4.4 [DS]   0048 Creatinine(!): 3.87 [DS]   0048 Lactate: 1.5 [DS]   0048 LIPASE: 50 [DS]   0100 IMPRESSION:  Mild opacity in the left mid lung may correspond to atelectasis or  infiltrate.   [DS]      ED Course User Index  [DS] Kimo Reyes PA-C         Diagnoses as of 25   Nausea vomiting and diarrhea   Acute kidney injury (CMS-HCC)   Dehydration   Influenza A                 No data recorded                                 Medical Decision Making    Medical Decision Making & ED Course  Medical Decision Makin-year-old female seen and evaluated for nausea vomiting and diarrhea.  I spoke with the patient along with the patient's  who reported symptoms for the past few days.  On arrival the patient reported no abdominal discomfort.  She does report continued nausea.  There were no reports of melanotic stool or medic easier.  Patient is currently on Coumadin.  On arrival the patient was found to be hypotensive.  2 IVs were established and the patient was given IV hydration.  Extensive lab work was obtained.  The patient was found to have no evidence of leukocytosis or concerning anemia.  Lactate 1.5.  Creatinine markedly elevated at  3.87 representing an acute kidney injury.  Magnesium and potassium within normal limits.  Patient's viral testing was found to be positive for influenza A.  Urinalysis was ordered pending result.  Chest x-ray was found to be nonspecific.  During the patient's stay her blood pressure continued to improve.  I did speak with Dr. Saucedo ICU attending who personally evaluated the patient.  At this time the patient has a MAP of 75 and is not tachycardic.  Patient will be started on albumin.  All parties agree that the patient is overtly dry which is likely causing the hypotension.  At this point the patient will be admitted to stepdown.  Case was discussed with the hospitalist who accepted the patient for admission.  --  Scoring Tools Utilized: None    Differential diagnoses considered include but are not limited to: Dehydration, GI bleed, sepsis      Social Determinants of Health which Significantly Impact Care: None identified The following actions were taken to address these social determinants: None    Independent Result Review and Interpretation: Relevant laboratory and radiographic results were reviewed and independently interpreted by myself.  As necessary, they are commented on in the ED Course.    Chronic conditions affecting the patient's care: As documented above in MDM    The patient was discussed with the following consultants/services: Dr. Olivera ICU attending     Care Considerations: Considered the following dispositions for the patient: Considered ICU admission however the patient has a normal lactate level is afebrile and has a MAP of 75 currently    ED Course:  ED Course as of 02/26/25 0106  ------------------------------------------------------------  Time: 02/25 8883  Comment: ECHO 4/16/24CONCLUSIONS: 1. Left ventricular systolic function is normal with a 50-55% estimated ejection fraction. 2. Moderate hypokinesis of the mid posterior wall. 3. The left atrium is moderately dilated. 4. There is a  well-seated mechanical bileaflet valve in the mitral position with normal closing volumes and a mean transvalvular gradient of 3 mmHg. 5. Mildly elevated RVSP.  By: Kimo Reyes PA-C  ------------------------------------------------------------  Time: 02/25 2356  Comment: Nuclear stress test 4/16/2024IMPRESSION:1. Normal stress myocardial perfusion imaging.2. Well-maintained left ventricular function.  51%  By: Kimo Reyes PA-C  ------------------------------------------------------------  Time: 02/26 0048  Value: WBC: 6.7  Comment: (Reviewed)  By: Kimo Reyes PA-C  ------------------------------------------------------------  Time: 02/26 0048  Value: HEMOGLOBIN: 12.1  Comment: (Reviewed)  By: Kimo Reyes PA-C  ------------------------------------------------------------  Time: 02/26 0048  Value: MAGNESIUM: 1.91  Comment: (Reviewed)  By: Kimo Reyes PA-C  ------------------------------------------------------------  Time: 02/26 0048  Value: GLUCOSE(!): 107  Comment: (Reviewed)  By: Kimo Reyes, PA-C  ------------------------------------------------------------  Time: 02/26 0048  Value: SODIUM(!): 132  Comment: (Reviewed)  By: GRAHAM Vaca-C  ------------------------------------------------------------  Time: 02/26 0048  Value: POTASSIUM: 4.4  Comment: (Reviewed)  By: GRAHAM Vaca-C  ------------------------------------------------------------  Time: 02/26 0048  Value: Creatinine(!): 3.87  Comment: (Reviewed)  By: GRAHAM Vaca-C  ------------------------------------------------------------  Time: 02/26 0048  Value: Lactate: 1.5  Comment: (Reviewed)  By: GRAHAM Vaca-C  ------------------------------------------------------------  Time: 02/26 0048  Value: LIPASE: 50  Comment: (Reviewed)  By: GRAHAM Vaca-C  ------------------------------------------------------------  Time: 02/26 0100  Comment: IMPRESSION:Mild opacity in the left mid lung may correspond to atelectasis  orinfiltrate.  By: Kimo Reyes PA-C    ------------------------------------------------------------  Diagnoses as of 02/26/25 0106  Nausea vomiting and diarrhea  Acute kidney injury (CMS-HCC)  Dehydration     Disposition  As a result of their workup, the patient will require admission to the hospital.  The patient was informed of her diagnosis.  The patient was given the opportunity to ask questions and I answered them. The patient agreed to be admitted to the hospital.      This was a shared visit with an ED attending.  The patient was seen and discussed with the ED attending    Kimo Reyes PA-C  Emergency Medicine            Procedure  Procedures     Kimo Reyes PA-C  02/26/25 0305

## 2025-02-26 NOTE — PROGRESS NOTES
Subjective:  Patient seen lying in bed. She is weak-appearing and endorses nausea.    Physical Exam:  GENERAL: Alert, ill-appearing.  HEAD:  Normocephalic, atraumatic.  EYES:  Round and reactive.  ENT:  No nasal discharge, mucous membranes moist and pink.  NECK:  Atraumatic, no meningismus.  CARDIOVASCULAR:  Regular rate and rhythm, no murmur.  RESPIRATORY: Bilateral wheezing on auscultation  ABDOMEN:  Soft, no tenderness, nondistended.  EXTREMITIES:  No peripheral edema, no calf tenderness.  SKIN:  Warm and dry.  NEUROLOGICAL:  Nonfocal grossly.    Remainder of objective data including imaging and laboratory findings were all reviewed.    Admission history:  2/26: Patient remains on levophed, maximum infusion rate increased. ABG shows hyperchloremic metabolic acidosis, bicarbonate administered. Attempting BiPAP as tolerated, patient remains on Airvo at this time. Continuing treatment with solu-cortef, antibiotics switched to ciprofloxacin, metronidazole, and PO vancomycin pending C.diff PCR.    Assessment and plan:  Kayley Lynch is a 54-year-old female who presented on 2/26/2025 for feelings of malaise. She is being treated for shock in the setting of Influenza A with suspected superimposed bacterial pneumonia, COPD exacerbation, and suspected infectious colitis.       Neurological System:  #Suspected metabolic encephalopathy  -Patient is confused and not oriented to place and time  -Baseline mentation AO x3, will reassess daily  -Avoid excessive caths/ lines, monitor for ICU delirium    Cardiovascular System:  #Septic shock  #Pulmonary hypertension  #Aflutter s/p cardiac ablation  #CAD s/p CABG  #HLD  - 1 L NS bolus ordered  - Continues to require Levophed at this time, maximum infusion rate increased to 0.5 mcg/kg/min  -Solu-cortef 100mg one time, continue 50mg q6h.  -Last echo 4/2024, LVEF 50-55%, moderate mid posterior hypokinesis.  Repeat echo pending.  -Monitor hemodynamics closely to maintain MAP  >65  -Continue atorvastatin    Respiratory System:  #COPD exacerbation  #Acute hypoxic hypercapnic respiratory failure  #Influenza A positive, c/f superimposed bacterial pneumonia  -Oxygen as needed, currently on Airvo 60L/60% FiO2  - BiPAP attempted, poorly tolerated at this time.   -Ceftriaxone and doxycycline discontinued, switched to ciprofloxacin and metronidazole in the setting of suspected colitis  -Continue Duo-neb q6h  -Repeat ABG shows hypercapnia, BiPAP started and bicarbonate administered. Precedex drip started for agitation.    Gastrointestinal System:  #Diarrhea, c/f colitis  -C.diff PCR ordered, PO vancomycin started preemptively   -Continuing ciprofloxacin and metronidazole  -Continue Protonix, switched from PO to IV    Endocrine System:  -No acute issues, SSI 1 lispro ordered  -Accu-checks     Renal System:  #HILLARY, resolving  #Hypokalemia  #Hyperchloremic metabolic acidosis  -Cl 120, ABG shows pH 7.13. 1 ampoule bicarb 50mEq administered, repeat ABG ordered.  -Cr 1.19 down from 3.87  -Trend BMP, monitor UOP, optimize electrolytes     Hematological System:  #Acute anemia  #Thrombocytopenia  -Hgb 9.0 down from 12.1, PLT 86 down from 132  -Holding coumadin at this time, recheck INR  -monitor CBC    Infectious Disease:  #Influenza A  #Suspected superimposed bacterial pneumonia  #C/f infectious colitis  -Ceftriaxone and doxycycline discontinued, ciprofloxacin and metronidazole ordered  -C diff PCR pending, PO vancomycin ordered    Skin/MSK:  -No acute issues    Psych:  -No acute issues    ICU CHECK LIST:   Antimicrobials: Ciprofloxacin, metronidazole, PO vancomycin  Oxygen: Airvo 60L/60%  Drips: Levophed, precedex  Fluids: 1L NS bolus  Feeding: Cardiac  Sedation/Analgesia: -  Prophylaxis: Protonix  Glycemic control: SSI 1 lispro  Bowel care: PRN   Lines/Tubes: PIV, Jasso  Restraints: -  Dispo: ICU     Code Status: Full       Neto Clark MD  PGY-1

## 2025-02-26 NOTE — ED TRIAGE NOTES
Arrived via EMS. Pt c/o NVD x 2 days. Pt denies any SOB, CP, abdominal pain. No blood in the diarrhea. Pt has had no oral intake over last 2 days. BP was 74/45 en route, 56/30 upon arrival. 2nd IV line placed and NS bolus initiated. BP set to cycle every 5 minutes.

## 2025-02-26 NOTE — PROGRESS NOTES
MSW attempts bedside visit with the pt who is having blood drawn, presents very ill and not engaging. MSW to return at later time.

## 2025-02-26 NOTE — CARE PLAN
The patient's goals for the shift include      The clinical goals for the shift include stable hemodynamic status and resp status    Over the shift, the patient did not make progress toward the following goals. Barriers to progression include acuity of illness. Recommendations to address these barriers include follow plan of care

## 2025-02-26 NOTE — H&P
ICU HPI Note          PATIENT NAME: Kayley Lynch  MRN: [id number]    SERVICE DATE:  2/26/2025  SERVICE TIME:  4:29 AM    Kayley Lynch is a 54 y.o. female on day 0 of admission presenting with Nausea vomiting and diarrhea.      HPI  Patient is a 53 yo F with PMHx significant for COPD, nicotine use disorder, CAD s/p CABG, pulmonary HTN, hypercholesterolemia, cardiomyopathy, atrial flutter s/p ablation, and s/p mitral valve replacement, presenting to the ED for 3 days of malaise. Patient was complaining of cough without sputum production, some mild SOB, subjective fevers, abdominal pain, nausea, and an episode of diarrhea that occurred prior to her arrival to the ED. No chest pain, palpitations, dysuria, hematuria, or urinary frequency.     ED Course:   Influenza A positive, was placed on HFNC. Initially was going to be admitted to a [facility], however, she was fluid resuscitated with 3L crystalloids and albumin and continues to be hypotensive. Is on levophed.     She is admitted to the ICU for undifferentiated shock and acute hypoxic respiratory failure.     Past Medical History: as above per HPI  Surgical History: CABG, aflutter s/p ablation, and mechanical mitral valve replacement   Social History: current cigarette smoker, estimates 10 cigarettes/day, began at age 11; no cigars, chewing tobacco, vaping; no EtOH use; no illicit drug use  Family History: noncontributory     Review of Systems   Constitutional:  Positive for fever. Negative for chills.   Respiratory:  Positive for cough and shortness of breath.    Cardiovascular:  Negative for chest pain and palpitations.   Gastrointestinal:  Positive for abdominal pain, diarrhea (1 episode prior to arrival to ed) and nausea. Negative for vomiting.        (+) Retching   Genitourinary:  Negative for dysuria, frequency and hematuria.        OBJECTIVE    Vitals:    02/26/25 0401 02/26/25 0405 02/26/25 0410 02/26/25  0415   BP:       Pulse:  63 62 61   Resp:  (!) 22 18 17   Temp:       SpO2: 96% 94% 94% 97%   Weight:       Height:          Results from last 7 days   Lab Units 02/26/25  0018   WBC AUTO x10*3/uL 6.7   HEMOGLOBIN g/dL 12.1   HEMATOCRIT % 37.7   PLATELETS AUTO x10*3/uL 132*   NEUTROS PCT AUTO % 69.8   LYMPHS PCT AUTO % 18.2   MONOS PCT AUTO % 11.5   EOS PCT AUTO % 0.0     Results from last 7 days   Lab Units 02/26/25  0018   SODIUM mmol/L 132*   POTASSIUM mmol/L 4.4   CHLORIDE mmol/L 98   CO2 mmol/L 23   BUN mg/dL 68*   CREATININE mg/dL 3.87*   CALCIUM mg/dL 7.8*   PROTEIN TOTAL g/dL 5.7*   BILIRUBIN TOTAL mg/dL 0.4   ALK PHOS U/L 72   ALT U/L 21   AST U/L 45*   GLUCOSE mg/dL 107*       Scheduled Medications  aspirin, 81 mg, oral, Daily  atorvastatin, 80 mg, oral, Nightly  buPROPion XL, 150 mg, oral, q AM  cefTRIAXone, 1 g, intravenous, Once  [START ON 2/27/2025] cefTRIAXone, 1 g, intravenous, q24h  doxycycline, 100 mg, intravenous, q12h  escitalopram, 20 mg, oral, Daily  ipratropium-albuteroL, 3 mL, nebulization, q6h  oseltamivir, 30 mg, oral, Daily  oxygen, , inhalation, Continuous - Inhalation  phenylephrine, , ,   phenylephrine, , ,   phenylephrine, , ,            Physical Exam  Constitutional:       General: She is not in acute distress.     Appearance: She is not diaphoretic.   HENT:      Head: Normocephalic and atraumatic.      Mouth/Throat:      Mouth: Mucous membranes are moist.   Cardiovascular:      Rate and Rhythm: Normal rate and regular rhythm.      Comments: Radial and DP pulses 1+ bilaterally   Pulmonary:      Comments: On HFNC   Scattered wheezing and rhonchi  Abdominal:      General: Bowel sounds are normal. There is no distension.      Palpations: Abdomen is soft.      Tenderness: There is no abdominal tenderness.   Musculoskeletal:      Right lower leg: No edema.      Left lower leg: No edema.      Comments: No hip edema    Skin:     General: Skin is warm and dry.      Capillary Refill: Capillary  refill takes less than 2 seconds.   Neurological:      General: No focal deficit present.      Mental Status: She is alert and oriented to person, place, and time. Mental status is at baseline.      Cranial Nerves: No cranial nerve deficit.          ASSESSMENT & PLAN  Kayley Lynch is a 54 y.o. year old female patient with PMHx significant for COPD, nicotine use disorder, CAD s/p CABG, pulmonary HTN, hypercholesterolemia, cardiomyopathy, atrial flutter s/p ablation, and s/p mitral valve replacement presenting to the ICU for undifferentiated shock (septic vs cardiogenic), acute hypoxic respiratory failure (on HFNC), and requiring levophed.     Daily Progress:  2/26: admitted to ICU for undifferentiated shock and acute hypoxic respiratory failure     Neuro:  No acute concerns  -A&Ox3  -ICU delirium precautions     CV:  #Shock, undifferentiated - septic vs cardiogenic   #c/f myocarditis   #Pulmonary HTN   #Hypercholesterolemia  -BP cuff is adequately sized for patient at time of evaluation. BP on both arms range from systolic BP high 80s-low 90s and 50s diastolic   -levophed at 0.5  -last echo on 4/19/24: EF50-55%, moderate hypokinesis of mid posterior wall, left atrium moderately dilated  -repeat echo ordered  -positive for influenza A and work up for myocarditis, obtain troponin and EKG       Respiratory:  FiO2 (%):  [50 %] 50 %  #Acute hypoxic respiratory failure   #COPD - not on O2 at home  #PNA   #Nicotine Use Disorder  -initial CXR showed opacity in the L middle lung field, that could be atelectasis vs infiltrate   -obtain repeat CXR   -currently on HFNC, 45L/min, 50%  -duonebs Q6H   -antibiotics below in ID section     GI:  No acute concerns  -no abdominal pain, nausea, vomiting, or diarrhea   -diet: cardiac   -ppx: protonix PO    Endo:  #Adrenal insufficiency, suspected   -will obtain random cortisol level   -after which, solucortef 100 mg will be given followed by solucortef 50 mg Q6H  -SSI     Renal:  #HILLARY  "  -baseline creatinine 0.4-0.7  -creatinine today 3.87  -davidson placement ordered   -measure I&Os   -urine electrolytes ordered    -pending renal US     Hematological:  #s/p mechanical mitral valve requiring chronic anticoagulation   -Hold one dose of coumadin for supratherapeutic INR at 3.9;; target inr 2.5   -WBC 6.7  -Hgb 12.1   -daily cbc     MSK:  No acute concerns     Vascular:  No acute concerns     ID:  #Influenza A  #Suspected superimposed CAP   -tamiflu for influenza  -empiric abx for CAP, vancomycin, zosyn   -MRSA nares ordered  -procalcitonin ordered  -blood cultures ordered  -urinalysis with reflex culture ordered     Ethics/Code Status:  -DNR/DNI   -Code status discussion was complete with patient, , and son at bedside. Patient was agreeable to discussion of her health with her family present. Initially, she hesitated when asked about chest compressions. Details of CPR/ACLS were discussed with her in detail and she states she has been \"brought back 3 times prior\" and that should her heart stop this time that she would not like to be brought back. She also does not want to be intubated and dependent on a machine in the long term.       ICU Checklist:  Vent/O2: HFNC, 45L/min, FiO2 50%  Lines/Devices: peripheral IVs  Indwelling Catheters: - davidson order placed   Drips: levophed   ATBx: vancomycin, zosyn   Fluids: none, was fluid resuscitated with 3L crystalloids and albumin  Diet: cardiac  Glycemic Control: SSI  PPX: protonix   DVT PPX: none - currently supra therapeutic on coumadin   Restraints: no   Code status: DNR/DNI   Dispo: ICU    Critical Care Time: 60 minutes spent in preparing to see patient (I.e. review of medical records), evaluation of diagnostics (I.e. labs, imaging, etc.), documentation, discussing plan of care with patient/ family/ caregiver, and/ or coordination of care with multidisciplinary team. Time does not include completion of procedure time.     Discussed plan with attending " physician, Dr. Jarod Torres PA-C  February 26, 2025 4:29 AM

## 2025-02-27 ENCOUNTER — APPOINTMENT (OUTPATIENT)
Dept: CARDIOLOGY | Facility: HOSPITAL | Age: 55
End: 2025-02-27
Payer: COMMERCIAL

## 2025-02-27 LAB
ALBUMIN SERPL BCP-MCNC: 3.4 G/DL (ref 3.4–5)
ALP SERPL-CCNC: 69 U/L (ref 33–110)
ALT SERPL W P-5'-P-CCNC: 24 U/L (ref 7–45)
AMMONIA PLAS-SCNC: 35 UMOL/L (ref 16–53)
AMPHETAMINES UR QL SCN: ABNORMAL
ANION GAP SERPL CALC-SCNC: 12 MMOL/L (ref 10–20)
AORTIC VALVE MEAN GRADIENT: 4 MMHG
AORTIC VALVE PEAK VELOCITY: 1.4 M/S
AST SERPL W P-5'-P-CCNC: 59 U/L (ref 9–39)
ATRIAL RATE: 60 BPM
AV PEAK GRADIENT: 8 MMHG
AVA (PEAK VEL): 2.03 CM2
AVA (VTI): 2.02 CM2
BARBITURATES UR QL SCN: ABNORMAL
BASE EXCESS BLDA CALC-SCNC: 1.4 MMOL/L (ref -2–3)
BENZODIAZ UR QL SCN: ABNORMAL
BILIRUB SERPL-MCNC: 0.3 MG/DL (ref 0–1.2)
BODY TEMPERATURE: 37 DEGREES CELSIUS
BUN SERPL-MCNC: 26 MG/DL (ref 6–23)
BZE UR QL SCN: ABNORMAL
C DIF TOX TCDA+TCDB STL QL NAA+PROBE: DETECTED
C DIFF TOX A+B STL QL IA: NEGATIVE
CALCIUM SERPL-MCNC: 8 MG/DL (ref 8.6–10.3)
CANNABINOIDS UR QL SCN: ABNORMAL
CHLORIDE SERPL-SCNC: 106 MMOL/L (ref 98–107)
CO2 SERPL-SCNC: 25 MMOL/L (ref 21–32)
CREAT SERPL-MCNC: 0.87 MG/DL (ref 0.5–1.05)
EGFRCR SERPLBLD CKD-EPI 2021: 79 ML/MIN/1.73M*2
EJECTION FRACTION APICAL 4 CHAMBER: 60
EJECTION FRACTION: 58 %
EPAP CMH2O: ABNORMAL
ERYTHROCYTE [DISTWIDTH] IN BLOOD BY AUTOMATED COUNT: 17.1 % (ref 11.5–14.5)
FENTANYL+NORFENTANYL UR QL SCN: ABNORMAL
FREQUENCY (BPM): 19 BPM
GLUCOSE BLD MANUAL STRIP-MCNC: 106 MG/DL (ref 74–99)
GLUCOSE BLD MANUAL STRIP-MCNC: 125 MG/DL (ref 74–99)
GLUCOSE SERPL-MCNC: 152 MG/DL (ref 74–99)
HCO3 BLDA-SCNC: 28.1 MMOL/L (ref 22–26)
HCT VFR BLD AUTO: 37 % (ref 36–46)
HGB BLD-MCNC: 11.5 G/DL (ref 12–16)
INHALED O2 CONCENTRATION: 40 %
INR PPP: ABNORMAL
INR PPP: ABNORMAL
INSPIRATORY TIME: 1
IPAP CMH2O: ABNORMAL
LEFT VENTRICLE INTERNAL DIMENSION DIASTOLE: 4.7 CM (ref 3.5–6)
LEFT VENTRICULAR OUTFLOW TRACT DIAMETER: 2.1 CM
M PNEUMO IGM SER IA-ACNC: 0.04 U/L
MAGNESIUM SERPL-MCNC: 1.65 MG/DL (ref 1.6–2.4)
MCH RBC QN AUTO: 27.7 PG (ref 26–34)
MCHC RBC AUTO-ENTMCNC: 31.1 G/DL (ref 32–36)
MCV RBC AUTO: 89 FL (ref 80–100)
METHADONE UR QL SCN: ABNORMAL
MITRAL VALVE E/A RATIO: 1.43
NRBC BLD-RTO: 0 /100 WBCS (ref 0–0)
OPIATES UR QL SCN: ABNORMAL
OXYCODONE+OXYMORPHONE UR QL SCN: ABNORMAL
OXYHGB MFR BLDA: 93.3 % (ref 94–98)
P AXIS: 69 DEGREES
PCO2 BLDA: 52 MM HG (ref 38–42)
PCP UR QL SCN: ABNORMAL
PEEP CMH2O: 9 CM H2O
PH BLDA: 7.34 PH (ref 7.38–7.42)
PLATELET # BLD AUTO: 112 X10*3/UL (ref 150–450)
PO2 BLDA: 73 MM HG (ref 85–95)
POTASSIUM SERPL-SCNC: 4.1 MMOL/L (ref 3.5–5.3)
PR INTERVAL: 249 MS
PROT SERPL-MCNC: 5.6 G/DL (ref 6.4–8.2)
PROTHROMBIN TIME: >100 SECONDS (ref 9.8–12.4)
PROTHROMBIN TIME: >100 SECONDS (ref 9.8–12.4)
Q ONSET: 249 MS
QRS COUNT: 9 BEATS
QRS DURATION: 95 MS
QT INTERVAL: 459 MS
QTC CALCULATION(BAZETT): 459 MS
QTC FREDERICIA: 459 MS
R AXIS: 82 DEGREES
RBC # BLD AUTO: 4.15 X10*6/UL (ref 4–5.2)
SAO2 % BLDA: 96 % (ref 94–100)
SODIUM SERPL-SCNC: 139 MMOL/L (ref 136–145)
SPECIMEN DRAWN FROM PATIENT: ABNORMAL
SPONTANEOUS TIDAL VOLUME: 556 ML
STAPHYLOCOCCUS SPEC CULT: NORMAL
T AXIS: 8 DEGREES
T OFFSET: 479 MS
TOTAL MINUTE VOLUME: 7.1 LITER
VENTILATOR MODE: ABNORMAL
VENTILATOR RATE: 18 BPM
VENTRICULAR RATE: 60 BPM
WBC # BLD AUTO: 4.5 X10*3/UL (ref 4.4–11.3)

## 2025-02-27 PROCEDURE — 2500000005 HC RX 250 GENERAL PHARMACY W/O HCPCS

## 2025-02-27 PROCEDURE — 2500000002 HC RX 250 W HCPCS SELF ADMINISTERED DRUGS (ALT 637 FOR MEDICARE OP, ALT 636 FOR OP/ED)

## 2025-02-27 PROCEDURE — 80053 COMPREHEN METABOLIC PANEL: CPT

## 2025-02-27 PROCEDURE — 82140 ASSAY OF AMMONIA: CPT

## 2025-02-27 PROCEDURE — 82805 BLOOD GASES W/O2 SATURATION: CPT

## 2025-02-27 PROCEDURE — 36600 WITHDRAWAL OF ARTERIAL BLOOD: CPT

## 2025-02-27 PROCEDURE — 85610 PROTHROMBIN TIME: CPT

## 2025-02-27 PROCEDURE — 2500000002 HC RX 250 W HCPCS SELF ADMINISTERED DRUGS (ALT 637 FOR MEDICARE OP, ALT 636 FOR OP/ED): Performed by: INTERNAL MEDICINE

## 2025-02-27 PROCEDURE — 99291 CRITICAL CARE FIRST HOUR: CPT | Performed by: INTERNAL MEDICINE

## 2025-02-27 PROCEDURE — 2500000004 HC RX 250 GENERAL PHARMACY W/ HCPCS (ALT 636 FOR OP/ED)

## 2025-02-27 PROCEDURE — 80307 DRUG TEST PRSMV CHEM ANLYZR: CPT

## 2025-02-27 PROCEDURE — 82947 ASSAY GLUCOSE BLOOD QUANT: CPT

## 2025-02-27 PROCEDURE — 94660 CPAP INITIATION&MGMT: CPT

## 2025-02-27 PROCEDURE — 93306 TTE W/DOPPLER COMPLETE: CPT

## 2025-02-27 PROCEDURE — 2500000004 HC RX 250 GENERAL PHARMACY W/ HCPCS (ALT 636 FOR OP/ED): Mod: JZ | Performed by: INTERNAL MEDICINE

## 2025-02-27 PROCEDURE — 36415 COLL VENOUS BLD VENIPUNCTURE: CPT

## 2025-02-27 PROCEDURE — 83735 ASSAY OF MAGNESIUM: CPT

## 2025-02-27 PROCEDURE — 2500000005 HC RX 250 GENERAL PHARMACY W/O HCPCS: Performed by: INTERNAL MEDICINE

## 2025-02-27 PROCEDURE — 2020000001 HC ICU ROOM DAILY

## 2025-02-27 PROCEDURE — 85027 COMPLETE CBC AUTOMATED: CPT

## 2025-02-27 PROCEDURE — 93306 TTE W/DOPPLER COMPLETE: CPT | Performed by: STUDENT IN AN ORGANIZED HEALTH CARE EDUCATION/TRAINING PROGRAM

## 2025-02-27 PROCEDURE — 2500000001 HC RX 250 WO HCPCS SELF ADMINISTERED DRUGS (ALT 637 FOR MEDICARE OP): Performed by: STUDENT IN AN ORGANIZED HEALTH CARE EDUCATION/TRAINING PROGRAM

## 2025-02-27 PROCEDURE — 94640 AIRWAY INHALATION TREATMENT: CPT

## 2025-02-27 RX ORDER — PHYTONADIONE 5 MG/1
2.5 TABLET ORAL ONCE
Status: COMPLETED | OUTPATIENT
Start: 2025-02-27 | End: 2025-02-27

## 2025-02-27 RX ORDER — OSELTAMIVIR PHOSPHATE 75 MG/1
75 CAPSULE ORAL 2 TIMES DAILY
Status: DISCONTINUED | OUTPATIENT
Start: 2025-02-27 | End: 2025-03-02

## 2025-02-27 RX ORDER — CEFEPIME 1 G/50ML
2 INJECTION, SOLUTION INTRAVENOUS EVERY 8 HOURS
Status: DISCONTINUED | OUTPATIENT
Start: 2025-02-27 | End: 2025-02-28

## 2025-02-27 RX ADMIN — IPRATROPIUM BROMIDE AND ALBUTEROL SULFATE 3 ML: .5; 3 SOLUTION RESPIRATORY (INHALATION) at 01:55

## 2025-02-27 RX ADMIN — VANCOMYCIN HYDROCHLORIDE 125 MG: KIT at 12:24

## 2025-02-27 RX ADMIN — HYDROCORTISONE SODIUM SUCCINATE 50 MG: 100 INJECTION, POWDER, FOR SOLUTION INTRAMUSCULAR; INTRAVENOUS at 09:30

## 2025-02-27 RX ADMIN — ASPIRIN 81 MG: 81 TABLET, COATED ORAL at 09:30

## 2025-02-27 RX ADMIN — IPRATROPIUM BROMIDE AND ALBUTEROL SULFATE 3 ML: .5; 3 SOLUTION RESPIRATORY (INHALATION) at 12:16

## 2025-02-27 RX ADMIN — VANCOMYCIN HYDROCHLORIDE 125 MG: KIT at 06:30

## 2025-02-27 RX ADMIN — OSELTAMIVIR PHOSPHATE 75 MG: 75 CAPSULE ORAL at 12:23

## 2025-02-27 RX ADMIN — METRONIDAZOLE 500 MG: 500 INJECTION, SOLUTION INTRAVENOUS at 12:22

## 2025-02-27 RX ADMIN — Medication 40 PERCENT: at 20:00

## 2025-02-27 RX ADMIN — IPRATROPIUM BROMIDE AND ALBUTEROL SULFATE 3 ML: .5; 3 SOLUTION RESPIRATORY (INHALATION) at 08:33

## 2025-02-27 RX ADMIN — METHYLPREDNISOLONE SODIUM SUCCINATE 40 MG: 40 INJECTION, POWDER, FOR SOLUTION INTRAMUSCULAR; INTRAVENOUS at 18:11

## 2025-02-27 RX ADMIN — PHYTONADIONE 2.5 MG: 5 TABLET ORAL at 15:24

## 2025-02-27 RX ADMIN — HYDROCORTISONE SODIUM SUCCINATE 50 MG: 100 INJECTION, POWDER, FOR SOLUTION INTRAMUSCULAR; INTRAVENOUS at 02:00

## 2025-02-27 RX ADMIN — DOXYCYCLINE 100 MG: 100 INJECTION, POWDER, LYOPHILIZED, FOR SOLUTION INTRAVENOUS at 11:01

## 2025-02-27 RX ADMIN — Medication 40 PERCENT: at 08:00

## 2025-02-27 RX ADMIN — DOXYCYCLINE 100 MG: 100 INJECTION, POWDER, LYOPHILIZED, FOR SOLUTION INTRAVENOUS at 23:09

## 2025-02-27 RX ADMIN — BUPROPION HYDROCHLORIDE 150 MG: 150 TABLET, EXTENDED RELEASE ORAL at 09:30

## 2025-02-27 RX ADMIN — ATORVASTATIN CALCIUM 80 MG: 80 TABLET, FILM COATED ORAL at 20:24

## 2025-02-27 RX ADMIN — IPRATROPIUM BROMIDE AND ALBUTEROL SULFATE 3 ML: .5; 3 SOLUTION RESPIRATORY (INHALATION) at 18:49

## 2025-02-27 RX ADMIN — METRONIDAZOLE 500 MG: 500 INJECTION, SOLUTION INTRAVENOUS at 20:17

## 2025-02-27 RX ADMIN — METRONIDAZOLE 500 MG: 500 INJECTION, SOLUTION INTRAVENOUS at 03:53

## 2025-02-27 RX ADMIN — CIPROFLOXACIN 400 MG: 400 INJECTION, SOLUTION INTRAVENOUS at 05:33

## 2025-02-27 RX ADMIN — VANCOMYCIN HYDROCHLORIDE 125 MG: KIT at 21:55

## 2025-02-27 RX ADMIN — ESCITALOPRAM OXALATE 20 MG: 10 TABLET ORAL at 09:30

## 2025-02-27 RX ADMIN — OSELTAMIVIR PHOSPHATE 75 MG: 75 CAPSULE ORAL at 20:25

## 2025-02-27 RX ADMIN — CEFEPIME 2 G: 1 INJECTION, SOLUTION INTRAVENOUS at 12:22

## 2025-02-27 RX ADMIN — CEFEPIME 2 G: 1 INJECTION, SOLUTION INTRAVENOUS at 18:12

## 2025-02-27 RX ADMIN — NOREPINEPHRINE BITARTRATE 0.11 MCG/KG/MIN: 32 SOLUTION INTRAVENOUS at 02:00

## 2025-02-27 RX ADMIN — PANTOPRAZOLE SODIUM 40 MG: 40 INJECTION, POWDER, FOR SOLUTION INTRAVENOUS at 09:30

## 2025-02-27 ASSESSMENT — PAIN - FUNCTIONAL ASSESSMENT
PAIN_FUNCTIONAL_ASSESSMENT: 0-10

## 2025-02-27 ASSESSMENT — PAIN SCALES - GENERAL
PAINLEVEL_OUTOF10: 0 - NO PAIN

## 2025-02-27 ASSESSMENT — COGNITIVE AND FUNCTIONAL STATUS - GENERAL
MOBILITY SCORE: 13
MOVING TO AND FROM BED TO CHAIR: A LOT
TURNING FROM BACK TO SIDE WHILE IN FLAT BAD: A LOT
HELP NEEDED FOR BATHING: A LOT
DRESSING REGULAR LOWER BODY CLOTHING: A LOT
MOVING FROM LYING ON BACK TO SITTING ON SIDE OF FLAT BED WITH BEDRAILS: A LITTLE
EATING MEALS: A LOT
DAILY ACTIVITIY SCORE: 12
PERSONAL GROOMING: A LOT
STANDING UP FROM CHAIR USING ARMS: A LOT
WALKING IN HOSPITAL ROOM: A LOT
TOILETING: A LOT
DRESSING REGULAR UPPER BODY CLOTHING: A LOT
CLIMB 3 TO 5 STEPS WITH RAILING: A LOT

## 2025-02-27 NOTE — CARE PLAN
The clinical goals for the shift include Pt will maintain MAP greater than 65 and spO2 greater than 90% throughout the shift    Problem: Respiratory  Goal: No signs of respiratory distress (eg. Use of accessory muscles. Peds grunting)  Outcome: Not Progressing    Problem: Pain - Adult  Goal: Verbalizes/displays adequate comfort level or baseline comfort level  2/27/2025 0752 by Gia Hayden RN  Outcome: Progressing  2/27/2025 0615 by Gia Hayden RN  Flowsheets (Taken 2/27/2025 0556)  Verbalizes/displays adequate comfort level or baseline comfort level:   Encourage patient to monitor pain and request assistance   Assess pain using appropriate pain scale     Problem: Safety - Adult  Goal: Free from fall injury  Outcome: Progressing     Problem: Discharge Planning  Goal: Discharge to home or other facility with appropriate resources  Outcome: Progressing     Problem: Chronic Conditions and Co-morbidities  Goal: Patient's chronic conditions and co-morbidity symptoms are monitored and maintained or improved  2/27/2025 0752 by Gia Hayden RN  Outcome: Progressing  2/27/2025 0615 by Gia Hayden RN  Flowsheets (Taken 2/27/2025 0556)  Care Plan - Patient's Chronic Conditions and Co-Morbidity Symptoms are Monitored and Maintained or Improved: Monitor and assess patient's chronic conditions and comorbid symptoms for stability, deterioration, or improvement     Problem: Nutrition  Goal: Nutrient intake appropriate for maintaining nutritional needs  Outcome: Progressing     Problem: Fall/Injury  Goal: Not fall by end of shift  Outcome: Progressing  Goal: Be free from injury by end of the shift  Outcome: Progressing  Goal: Verbalize understanding of personal risk factors for fall in the hospital  Outcome: Progressing  Goal: Verbalize understanding of risk factor reduction measures to prevent injury from fall in the home  Outcome: Progressing  Goal: Use assistive devices by end of the  shift  Outcome: Progressing  Goal: Pace activities to prevent fatigue by end of the shift  Outcome: Progressing     Problem: Respiratory  Goal: Clear secretions with interventions this shift  Outcome: Progressing     Problem: Respiratory  Goal: Minimize anxiety/maximize coping throughout shift  2/27/2025 0752 by Gia Hayden RN  Outcome: Progressing  2/27/2025 0615 by Gia Hayden RN  Flowsheets (Taken 2/27/2025 0556)  Minimize anxiety/maximize coping throughout shift:   Med administration/monitoring of effect   Monitor pain/anxiety level     Problem: Respiratory  Goal: Minimal/no exertional discomfort or dyspnea this shift  2/27/2025 0752 by Gia Hayden RN  Outcome: Progressing  2/27/2025 0615 by Gia Hayden RN  Flowsheets (Taken 2/27/2025 0556)  Minimal/no exertional discomfort or dyspnea this shift: Positioning to promote ventilation/comfort     Problem: Respiratory  Goal: Patent airway maintained this shift  Outcome: Progressing     Problem: Respiratory  Goal: Verbalize decreased shortness of breath this shift  Outcome: Progressing     Problem: Respiratory  Goal: Wean oxygen to maintain O2 saturation per order/standard this shift  Outcome: Progressing     Problem: Respiratory  Goal: Increase self care and/or family involvement in next 24 hours  Outcome: Progressing     Problem: Skin  Goal: Participates in plan/prevention/treatment measures  2/27/2025 0752 by Gia Hayden RN  Outcome: Progressing  2/27/2025 0615 by Gia Hayden RN  Flowsheets (Taken 2/27/2025 0556)  Participates in plan/prevention/treatment measures: Elevate heels  Goal: Prevent/manage excess moisture  2/27/2025 0752 by Gai Hayden RN  Outcome: Progressing  2/27/2025 0615 by Gia Hayden RN  Flowsheets (Taken 2/27/2025 0556)  Prevent/manage excess moisture:   Cleanse incontinence/protect with barrier cream   Moisturize dry skin   Monitor for/manage infection if present  Goal:  Prevent/minimize sheer/friction injuries  2/27/2025 0752 by Gia Hayden RN  Outcome: Progressing  2/27/2025 0615 by Gia Hayden RN  Flowsheets (Taken 2/27/2025 0556)  Prevent/minimize sheer/friction injuries:   Use pull sheet   Turn/reposition every 2 hours/use positioning/transfer devices   HOB 30 degrees or less  Goal: Promote/optimize nutrition  2/27/2025 0752 by Gia Hayden RN  Outcome: Progressing  2/27/2025 0615 by Gia Hayden RN  Flowsheets (Taken 2/27/2025 0556)  Promote/optimize nutrition:   Assist with feeding   Monitor/record intake including meals   Offer water/supplements/favorite foods  Goal: Promote skin healing  2/27/2025 0752 by Gia Hayden RN  Outcome: Progressing  2/27/2025 0615 by Gia Hayden RN  Flowsheets (Taken 2/27/2025 0556)  Promote skin healing:   Assess skin/pad under line(s)/device(s)   Turn/reposition every 2 hours/use positioning/transfer devices   Ensure correct size (line/device) and apply per  instructions   Rotate device position/do not position patient on device

## 2025-02-27 NOTE — PROGRESS NOTES
02/27/25 1116   Discharge Planning   Living Arrangements Spouse/significant other   Support Systems Spouse/significant other   Type of Residence Private residence     Care transitions assessment completed via bedside with Spouse and Son both named Ethan. Pt sleeping with bi-pap. Demographics verified. Introductions of self and role. Pt from Home with Spouse. Was independent with dressing and bathing, does not use walker, WC or cane. Drives to her appointments. Denies SW needs. Plan is to return home when medically cleared. Son states she was speaking to him earlier this am. Insurance; [employer]. PCP: Ambrose Carrasco DO. Dispo: home pending hospital course, No Therapies ordered at this time. Care transitions will continue to follow.

## 2025-02-27 NOTE — PROGRESS NOTES
Subjective:  Patient seen lying in bed. She is weak-appearing and endorses nausea.  --She remains hypoxemic and hypercapnic and requires BPAP  --This morning she was awake and alert    Physical Exam:  GENERAL: Awake/alert, cooperative.  HEAD:  Normocephalic, atraumatic.  EYES:  Round and reactive.  ENT:  No nasal discharge, mucous membranes moist and pink.  NECK:  Atraumatic, no meningismus.  CARDIOVASCULAR:  Regular rate and rhythm, no murmur.  RESPIRATORY: Bilateral wheezing on auscultation  ABDOMEN:  Soft, no tenderness, nondistended.  EXTREMITIES:  No peripheral edema, no calf tenderness.  SKIN:  Warm and dry.  NEUROLOGICAL:  Nonfocal grossly.    Remainder of objective data including imaging and laboratory findings were all reviewed.    Admission history:  2/26: Patient remains on levophed, maximum infusion rate increased. ABG shows hyperchloremic metabolic acidosis, bicarbonate administered. Attempting BiPAP as tolerated, patient remains on Airvo at this time. Continuing treatment with solu-cortef, antibiotics switched to ciprofloxacin, metronidazole, and PO vancomycin pending C.diff PCR.    2/27: Acidosis improving, continuing BiPAP today and reassessing. Monitoring protime-INR in the setting of holding home coumadin. C.diff PCR pending, continuing PO vancomycin. Ciprofloxacin discontinued, switched to cefepime and doxycycline, continuing metronidazole.     Assessment and plan:  Kayley Lynch is a 54-year-old female who presented on 2/26/2025 for feelings of malaise. She is being treated for shock in the setting of Influenza A with suspected superimposed bacterial pneumonia, COPD exacerbation, and suspected infectious colitis.       Neurological System:  #Acute metabolic encephalopathy, resolving  -Patient is more oriented following BiPAP, continuing and weaning precedex as tolerated  -Baseline mentation AO x3, will reassess daily  -Avoid excessive caths/ lines, monitor for ICU delirium    Cardiovascular  System:  #Septic shock  #Pulmonary hypertension  #Aflutter s/p cardiac ablation  #MR s/p valve replacement  #CAD s/p CABG  #HLD  - Patient not requiring pressors at this time. Solu-cortef discontinued, solu-medrol 40mg q8h ordered in the setting of COPD  -TTE from 2/27/2025 shows LVEF 55-60%, grade I LV diastolic filling, normal RV systolic function, and well-seatead mechanical mitral valve  -Monitor hemodynamics closely to maintain MAP >65  -Continue aspirin and atorvastatin    Respiratory System:  #COPD exacerbation  #Acute hypoxic and hypercapnic respiratory failure  #Influenza A positive, c/f superimposed bacterial pneumonia  -Continuing BiPAP at this time  -Ceftriaxone and doxycycline discontinued, switched to ciprofloxacin and metronidazole in the setting of suspected colitis  -Continue Duo-neb q6h  -Solu-medrol 40mg q8h ordered  -Repeat ABG shows pH 7.34, pCO2 52, pO2 73, HCO3 28.1.    Gastrointestinal System:  #Diarrhea, c/f colitis  -C.diff PCR pending, continuing PO vancomycin at this time  -Continuing metronidazole, cefepime and doxycycline ordered, ciprofloxacin discontinued  -Continue Protonix, switched from PO to IV    Endocrine System:  -No acute issues, continue SSI 1 lispro  -Accu-checks     Renal System:  #HILLARY, resolving  #Hyperchloremic metabolic acidosis, resolving  -Cl 106, pH 7.34 on most recent ABG. Total 2 doses sodium bicarbonate 50mEq administered  -Cr 0.87 down from 1.40  -Trend BMP, monitor UOP, optimize electrolytes     Hematological System:  #Acute anemia  #Thrombocytopenia, improving  -Hgb 11.5 up from 9.0,  up from 86  -Holding coumadin at this time, repeat INR pending  -monitor CBC    Infectious Disease:  #Influenza A  #Suspected superimposed bacterial pneumonia  #C/f infectious colitis  -Ciprofloxacin discontinued, cefepime and doxycycline ordered. Continuing metronidazole.  -C diff PCR pending, continuing PO vancomycin.  -Continuing Tamiflu, increased to standard dose  following HILLARY improvement    Skin/MSK:  -No acute issues    Psych:  -No acute issues    ICU CHECK LIST:   Antimicrobials: Cefepime, doxycycline, metronidazole, PO vancomycin  Oxygen: BiPAP/AVAPS   Drips: -  Fluids: -  Feeding: Cardiac  Sedation/Analgesia: Precedex  Prophylaxis: Protonix  Glycemic control: SSI 1 lispro  Bowel care: PRN   Lines/Tubes: PIV, Jasso  Restraints: -  Dispo: ICU     Code Status: Full       Neto Clark MD  PGY-1      CRITICAL CARE MEDICINE ATTENDING NOTE    I saw and evaluated the patient. I personally obtained the key and critical portions of the history and physical examination. I reviewed the resident's documentation and discussed the patient with the resident. I agree with the resident's medical decision making as documented in the resident's note.    The patient has high probability of sudden, clinically significant deterioration, which requires urgent interventions. I managed/supervised life supporting interventions that required frequent physician assessment. I spent 40 critical care minutes of my full attention on this patient's management and direct patient care. Time I spent with family or surrogate(s) is included if the patient was incapable of providing information or participating in medical decision making. Time devoted to teaching and to any procedures I billed separately is not included. Medical issues requiring critical care management include:    Gabby العلي MD  Pulmonary and Critical Care Medicine

## 2025-02-27 NOTE — CARE PLAN
Problem: Pain - Adult  Goal: Verbalizes/displays adequate comfort level or baseline comfort level  Outcome: Progressing     Problem: Safety - Adult  Goal: Free from fall injury  Outcome: Progressing     Problem: Discharge Planning  Goal: Discharge to home or other facility with appropriate resources  Outcome: Progressing     Problem: Chronic Conditions and Co-morbidities  Goal: Patient's chronic conditions and co-morbidity symptoms are monitored and maintained or improved  Outcome: Progressing     Problem: Nutrition  Goal: Nutrient intake appropriate for maintaining nutritional needs  Outcome: Progressing     Problem: Nutrition  Goal: Nutrient intake appropriate for maintaining nutritional needs  Outcome: Progressing     Problem: Fall/Injury  Goal: Not fall by end of shift  Outcome: Progressing  Goal: Be free from injury by end of the shift  Outcome: Progressing  Goal: Verbalize understanding of personal risk factors for fall in the hospital  Outcome: Progressing  Goal: Verbalize understanding of risk factor reduction measures to prevent injury from fall in the home  Outcome: Progressing  Goal: Use assistive devices by end of the shift  Outcome: Progressing  Goal: Pace activities to prevent fatigue by end of the shift  Outcome: Progressing     Problem: Fall/Injury  Goal: Not fall by end of shift  Outcome: Progressing  Goal: Be free from injury by end of the shift  Outcome: Progressing  Goal: Verbalize understanding of personal risk factors for fall in the hospital  Outcome: Progressing  Goal: Verbalize understanding of risk factor reduction measures to prevent injury from fall in the home  Outcome: Progressing  Goal: Use assistive devices by end of the shift  Outcome: Progressing  Goal: Pace activities to prevent fatigue by end of the shift  Outcome: Progressing     Problem: Respiratory  Goal: Clear secretions with interventions this shift  Outcome: Progressing  Goal: Minimize anxiety/maximize coping throughout  shift  Outcome: Progressing  Goal: Minimal/no exertional discomfort or dyspnea this shift  Outcome: Progressing  Goal: No signs of respiratory distress (eg. Use of accessory muscles. Peds grunting)  Outcome: Progressing  Goal: Patent airway maintained this shift  Outcome: Progressing  Goal: Verbalize decreased shortness of breath this shift  Outcome: Progressing  Goal: Wean oxygen to maintain O2 saturation per order/standard this shift  Outcome: Progressing  Goal: Increase self care and/or family involvement in next 24 hours  Outcome: Progressing     Problem: Skin  Goal: Participates in plan/prevention/treatment measures  Outcome: Progressing  Goal: Prevent/manage excess moisture  Outcome: Progressing  Goal: Prevent/minimize sheer/friction injuries  Outcome: Progressing  Goal: Promote/optimize nutrition  Outcome: Progressing  Goal: Promote skin healing  Outcome: Progressing     Problem: Skin  Goal: Participates in plan/prevention/treatment measures  Outcome: Progressing  Goal: Prevent/manage excess moisture  Outcome: Progressing  Goal: Prevent/minimize sheer/friction injuries  Outcome: Progressing  Goal: Promote/optimize nutrition  Outcome: Progressing  Goal: Promote skin healing  Outcome: Progressing   The patient's goals for the shift include      The clinical goals for the shift include Pt will rmeain hemodynamically stable during thsi shift    Over the shift, the patient did not make progress toward the following goals. Barriers to progression include acuity of illness. Recommendations to address these barriers include follow plan of care.

## 2025-02-28 ENCOUNTER — APPOINTMENT (OUTPATIENT)
Dept: RADIOLOGY | Facility: HOSPITAL | Age: 55
End: 2025-02-28
Payer: COMMERCIAL

## 2025-02-28 LAB
ALBUMIN SERPL BCP-MCNC: 3.5 G/DL (ref 3.4–5)
ALP SERPL-CCNC: 64 U/L (ref 33–110)
ALT SERPL W P-5'-P-CCNC: 21 U/L (ref 7–45)
ANION GAP BLDA CALCULATED.4IONS-SCNC: 9 MMO/L (ref 10–25)
ANION GAP BLDA CALCULATED.4IONS-SCNC: 9 MMO/L (ref 10–25)
ANION GAP SERPL CALC-SCNC: 9 MMOL/L (ref 10–20)
APPARATUS: ABNORMAL
APPARATUS: ABNORMAL
AST SERPL W P-5'-P-CCNC: 53 U/L (ref 9–39)
ATRIAL RATE: 71 BPM
BASE EXCESS BLDA CALC-SCNC: 3 MMOL/L (ref -2–3)
BASE EXCESS BLDA CALC-SCNC: 3.1 MMOL/L (ref -2–3)
BASE EXCESS BLDA CALC-SCNC: 3.7 MMOL/L (ref -2–3)
BILIRUB SERPL-MCNC: 0.4 MG/DL (ref 0–1.2)
BODY TEMPERATURE: 37 DEGREES CELSIUS
BUN SERPL-MCNC: 21 MG/DL (ref 6–23)
CA-I BLDA-SCNC: 1.26 MMOL/L (ref 1.1–1.33)
CA-I BLDA-SCNC: 1.28 MMOL/L (ref 1.1–1.33)
CALCIUM SERPL-MCNC: 8.7 MG/DL (ref 8.6–10.3)
CHLORIDE BLDA-SCNC: 104 MMOL/L (ref 98–107)
CHLORIDE BLDA-SCNC: 104 MMOL/L (ref 98–107)
CHLORIDE SERPL-SCNC: 105 MMOL/L (ref 98–107)
CO2 SERPL-SCNC: 29 MMOL/L (ref 21–32)
CREAT SERPL-MCNC: 0.8 MG/DL (ref 0.5–1.05)
EGFRCR SERPLBLD CKD-EPI 2021: 87 ML/MIN/1.73M*2
EPAP CMH2O: 9 CM H2O
ERYTHROCYTE [DISTWIDTH] IN BLOOD BY AUTOMATED COUNT: 17 % (ref 11.5–14.5)
FREQUENCY (BPM): 20 BPM
GLUCOSE BLD MANUAL STRIP-MCNC: 136 MG/DL (ref 74–99)
GLUCOSE BLD MANUAL STRIP-MCNC: 140 MG/DL (ref 74–99)
GLUCOSE BLD MANUAL STRIP-MCNC: 154 MG/DL (ref 74–99)
GLUCOSE BLD MANUAL STRIP-MCNC: 187 MG/DL (ref 74–99)
GLUCOSE BLDA-MCNC: 127 MG/DL (ref 74–99)
GLUCOSE BLDA-MCNC: 154 MG/DL (ref 74–99)
GLUCOSE SERPL-MCNC: 135 MG/DL (ref 74–99)
HCO3 BLDA-SCNC: 29.4 MMOL/L (ref 22–26)
HCO3 BLDA-SCNC: 29.5 MMOL/L (ref 22–26)
HCO3 BLDA-SCNC: 30.4 MMOL/L (ref 22–26)
HCT VFR BLD AUTO: 35 % (ref 36–46)
HCT VFR BLD EST: 35 % (ref 36–46)
HCT VFR BLD EST: 37 % (ref 36–46)
HGB BLD-MCNC: 11 G/DL (ref 12–16)
HGB BLDA-MCNC: 11.5 G/DL (ref 12–16)
HGB BLDA-MCNC: 12.2 G/DL (ref 12–16)
HOLD SPECIMEN: NORMAL
INHALED O2 CONCENTRATION: 36 %
INHALED O2 CONCENTRATION: 36 %
INHALED O2 CONCENTRATION: 40 %
INR PPP: 1.9 (ref 0.9–1.1)
IPAP CMH2O: ABNORMAL
LACTATE BLDA-SCNC: 0.7 MMOL/L (ref 0.4–2)
LACTATE BLDA-SCNC: 0.9 MMOL/L (ref 0.4–2)
MAGNESIUM SERPL-MCNC: 1.89 MG/DL (ref 1.6–2.4)
MCH RBC QN AUTO: 27.7 PG (ref 26–34)
MCHC RBC AUTO-ENTMCNC: 31.4 G/DL (ref 32–36)
MCV RBC AUTO: 88 FL (ref 80–100)
NRBC BLD-RTO: 0 /100 WBCS (ref 0–0)
OXYHGB MFR BLDA: 91 % (ref 94–98)
OXYHGB MFR BLDA: 93.2 % (ref 94–98)
OXYHGB MFR BLDA: 96.2 % (ref 94–98)
P AXIS: 103 DEGREES
PCO2 BLDA: 51 MM HG (ref 38–42)
PCO2 BLDA: 52 MM HG (ref 38–42)
PCO2 BLDA: 55 MM HG (ref 38–42)
PEAK PRESSURE: 10.2 CM H2O
PH BLDA: 7.35 PH (ref 7.38–7.42)
PH BLDA: 7.36 PH (ref 7.38–7.42)
PH BLDA: 7.37 PH (ref 7.38–7.42)
PLATELET # BLD AUTO: 115 X10*3/UL (ref 150–450)
PO2 BLDA: 68 MM HG (ref 85–95)
PO2 BLDA: 77 MM HG (ref 85–95)
PO2 BLDA: 97 MM HG (ref 85–95)
POTASSIUM BLDA-SCNC: 3.9 MMOL/L (ref 3.5–5.3)
POTASSIUM BLDA-SCNC: 4 MMOL/L (ref 3.5–5.3)
POTASSIUM SERPL-SCNC: 4.1 MMOL/L (ref 3.5–5.3)
PR INTERVAL: 238 MS
PROT SERPL-MCNC: 5.8 G/DL (ref 6.4–8.2)
PROTHROMBIN TIME: 21.2 SECONDS (ref 9.8–12.4)
Q ONSET: 249 MS
QRS COUNT: 11 BEATS
QRS DURATION: 100 MS
QT INTERVAL: 399 MS
QTC CALCULATION(BAZETT): 434 MS
QTC FREDERICIA: 422 MS
R AXIS: 102 DEGREES
RBC # BLD AUTO: 3.97 X10*6/UL (ref 4–5.2)
SAO2 % BLDA: 94 % (ref 94–100)
SAO2 % BLDA: 96 % (ref 94–100)
SAO2 % BLDA: 98 % (ref 94–100)
SODIUM BLDA-SCNC: 138 MMOL/L (ref 136–145)
SODIUM BLDA-SCNC: 139 MMOL/L (ref 136–145)
SODIUM SERPL-SCNC: 139 MMOL/L (ref 136–145)
SPECIMEN DRAWN FROM PATIENT: ABNORMAL
SPONTANEOUS TIDAL VOLUME: 349 ML
T AXIS: 175 DEGREES
T OFFSET: 449 MS
TIDAL VOLUME: 400 ML
TOTAL MINUTE VOLUME: 6.3 LITER
VENTILATOR MODE: ABNORMAL
VENTILATOR RATE: 18 BPM
VENTRICULAR RATE: 71 BPM
WBC # BLD AUTO: 4 X10*3/UL (ref 4.4–11.3)

## 2025-02-28 PROCEDURE — 2500000005 HC RX 250 GENERAL PHARMACY W/O HCPCS: Performed by: INTERNAL MEDICINE

## 2025-02-28 PROCEDURE — 70450 CT HEAD/BRAIN W/O DYE: CPT | Performed by: RADIOLOGY

## 2025-02-28 PROCEDURE — 2500000002 HC RX 250 W HCPCS SELF ADMINISTERED DRUGS (ALT 637 FOR MEDICARE OP, ALT 636 FOR OP/ED)

## 2025-02-28 PROCEDURE — 2500000001 HC RX 250 WO HCPCS SELF ADMINISTERED DRUGS (ALT 637 FOR MEDICARE OP): Performed by: INTERNAL MEDICINE

## 2025-02-28 PROCEDURE — 5A09457 ASSISTANCE WITH RESPIRATORY VENTILATION, 24-96 CONSECUTIVE HOURS, CONTINUOUS POSITIVE AIRWAY PRESSURE: ICD-10-PCS

## 2025-02-28 PROCEDURE — 2500000004 HC RX 250 GENERAL PHARMACY W/ HCPCS (ALT 636 FOR OP/ED)

## 2025-02-28 PROCEDURE — 99291 CRITICAL CARE FIRST HOUR: CPT | Performed by: INTERNAL MEDICINE

## 2025-02-28 PROCEDURE — 36600 WITHDRAWAL OF ARTERIAL BLOOD: CPT

## 2025-02-28 PROCEDURE — 80053 COMPREHEN METABOLIC PANEL: CPT

## 2025-02-28 PROCEDURE — 94640 AIRWAY INHALATION TREATMENT: CPT

## 2025-02-28 PROCEDURE — 84132 ASSAY OF SERUM POTASSIUM: CPT | Performed by: INTERNAL MEDICINE

## 2025-02-28 PROCEDURE — 82947 ASSAY GLUCOSE BLOOD QUANT: CPT

## 2025-02-28 PROCEDURE — 36415 COLL VENOUS BLD VENIPUNCTURE: CPT | Performed by: INTERNAL MEDICINE

## 2025-02-28 PROCEDURE — 85027 COMPLETE CBC AUTOMATED: CPT

## 2025-02-28 PROCEDURE — 2060000001 HC INTERMEDIATE ICU ROOM DAILY

## 2025-02-28 PROCEDURE — 70450 CT HEAD/BRAIN W/O DYE: CPT

## 2025-02-28 PROCEDURE — 2500000004 HC RX 250 GENERAL PHARMACY W/ HCPCS (ALT 636 FOR OP/ED): Performed by: INTERNAL MEDICINE

## 2025-02-28 PROCEDURE — 94660 CPAP INITIATION&MGMT: CPT

## 2025-02-28 PROCEDURE — 82805 BLOOD GASES W/O2 SATURATION: CPT

## 2025-02-28 PROCEDURE — 85610 PROTHROMBIN TIME: CPT | Performed by: INTERNAL MEDICINE

## 2025-02-28 PROCEDURE — 2500000005 HC RX 250 GENERAL PHARMACY W/O HCPCS

## 2025-02-28 PROCEDURE — 36415 COLL VENOUS BLD VENIPUNCTURE: CPT

## 2025-02-28 PROCEDURE — 83735 ASSAY OF MAGNESIUM: CPT

## 2025-02-28 PROCEDURE — 2500000001 HC RX 250 WO HCPCS SELF ADMINISTERED DRUGS (ALT 637 FOR MEDICARE OP): Performed by: STUDENT IN AN ORGANIZED HEALTH CARE EDUCATION/TRAINING PROGRAM

## 2025-02-28 RX ORDER — WARFARIN 2 MG/1
2 TABLET ORAL DAILY
Status: DISCONTINUED | OUTPATIENT
Start: 2025-02-28 | End: 2025-03-10

## 2025-02-28 RX ORDER — VANCOMYCIN HYDROCHLORIDE 125 MG/1
125 CAPSULE ORAL 4 TIMES DAILY
Status: DISCONTINUED | OUTPATIENT
Start: 2025-02-28 | End: 2025-03-02

## 2025-02-28 RX ORDER — CEFTRIAXONE 1 G/50ML
1 INJECTION, SOLUTION INTRAVENOUS EVERY 24 HOURS
Status: COMPLETED | OUTPATIENT
Start: 2025-02-28 | End: 2025-03-01

## 2025-02-28 RX ORDER — ENOXAPARIN SODIUM 100 MG/ML
1 INJECTION SUBCUTANEOUS EVERY 12 HOURS
Status: DISCONTINUED | OUTPATIENT
Start: 2025-02-28 | End: 2025-03-02

## 2025-02-28 RX ADMIN — CEFTRIAXONE SODIUM 1 G: 1 INJECTION, SOLUTION INTRAVENOUS at 12:35

## 2025-02-28 RX ADMIN — WARFARIN SODIUM 2 MG: 2 TABLET ORAL at 17:04

## 2025-02-28 RX ADMIN — PANTOPRAZOLE SODIUM 40 MG: 40 INJECTION, POWDER, FOR SOLUTION INTRAVENOUS at 08:18

## 2025-02-28 RX ADMIN — DOXYCYCLINE 100 MG: 100 INJECTION, POWDER, LYOPHILIZED, FOR SOLUTION INTRAVENOUS at 13:35

## 2025-02-28 RX ADMIN — ENOXAPARIN SODIUM 70 MG: 80 INJECTION SUBCUTANEOUS at 22:55

## 2025-02-28 RX ADMIN — METHYLPREDNISOLONE SODIUM SUCCINATE 40 MG: 40 INJECTION, POWDER, FOR SOLUTION INTRAMUSCULAR; INTRAVENOUS at 02:15

## 2025-02-28 RX ADMIN — ATORVASTATIN CALCIUM 80 MG: 80 TABLET, FILM COATED ORAL at 20:49

## 2025-02-28 RX ADMIN — ESCITALOPRAM OXALATE 20 MG: 10 TABLET ORAL at 08:18

## 2025-02-28 RX ADMIN — VANCOMYCIN HYDROCHLORIDE 125 MG: 125 CAPSULE ORAL at 17:04

## 2025-02-28 RX ADMIN — METRONIDAZOLE 500 MG: 500 INJECTION, SOLUTION INTRAVENOUS at 04:13

## 2025-02-28 RX ADMIN — OSELTAMIVIR PHOSPHATE 75 MG: 75 CAPSULE ORAL at 21:18

## 2025-02-28 RX ADMIN — CEFEPIME 2 G: 1 INJECTION, SOLUTION INTRAVENOUS at 02:15

## 2025-02-28 RX ADMIN — IPRATROPIUM BROMIDE AND ALBUTEROL SULFATE 3 ML: .5; 3 SOLUTION RESPIRATORY (INHALATION) at 13:02

## 2025-02-28 RX ADMIN — Medication 40 PERCENT: at 20:23

## 2025-02-28 RX ADMIN — IPRATROPIUM BROMIDE AND ALBUTEROL SULFATE 3 ML: .5; 3 SOLUTION RESPIRATORY (INHALATION) at 20:23

## 2025-02-28 RX ADMIN — METHYLPREDNISOLONE SODIUM SUCCINATE 40 MG: 40 INJECTION, POWDER, FOR SOLUTION INTRAMUSCULAR; INTRAVENOUS at 18:39

## 2025-02-28 RX ADMIN — VANCOMYCIN HYDROCHLORIDE 125 MG: 125 CAPSULE ORAL at 20:49

## 2025-02-28 RX ADMIN — IPRATROPIUM BROMIDE AND ALBUTEROL SULFATE 3 ML: .5; 3 SOLUTION RESPIRATORY (INHALATION) at 04:08

## 2025-02-28 RX ADMIN — OSELTAMIVIR PHOSPHATE 75 MG: 75 CAPSULE ORAL at 08:18

## 2025-02-28 RX ADMIN — BUPROPION HYDROCHLORIDE 150 MG: 150 TABLET, EXTENDED RELEASE ORAL at 09:34

## 2025-02-28 RX ADMIN — VANCOMYCIN HYDROCHLORIDE 125 MG: KIT at 06:58

## 2025-02-28 RX ADMIN — DOXYCYCLINE 100 MG: 100 INJECTION, POWDER, LYOPHILIZED, FOR SOLUTION INTRAVENOUS at 22:55

## 2025-02-28 RX ADMIN — ENOXAPARIN SODIUM 70 MG: 80 INJECTION SUBCUTANEOUS at 11:30

## 2025-02-28 RX ADMIN — Medication 4 L/MIN: at 13:02

## 2025-02-28 RX ADMIN — Medication 4 L/MIN: at 08:00

## 2025-02-28 RX ADMIN — ASPIRIN 81 MG: 81 TABLET, COATED ORAL at 08:18

## 2025-02-28 RX ADMIN — VANCOMYCIN HYDROCHLORIDE 125 MG: 125 CAPSULE ORAL at 13:05

## 2025-02-28 RX ADMIN — Medication 3 MG: at 20:49

## 2025-02-28 RX ADMIN — METHYLPREDNISOLONE SODIUM SUCCINATE 40 MG: 40 INJECTION, POWDER, FOR SOLUTION INTRAMUSCULAR; INTRAVENOUS at 11:29

## 2025-02-28 RX ADMIN — IPRATROPIUM BROMIDE AND ALBUTEROL SULFATE 3 ML: .5; 3 SOLUTION RESPIRATORY (INHALATION) at 06:19

## 2025-02-28 SDOH — ECONOMIC STABILITY: INCOME INSECURITY: IN THE PAST 12 MONTHS HAS THE ELECTRIC, GAS, OIL, OR WATER COMPANY THREATENED TO SHUT OFF SERVICES IN YOUR HOME?: NO

## 2025-02-28 SDOH — SOCIAL STABILITY: SOCIAL INSECURITY
WITHIN THE LAST YEAR, HAVE YOU BEEN KICKED, HIT, SLAPPED, OR OTHERWISE PHYSICALLY HURT BY YOUR PARTNER OR EX-PARTNER?: NO

## 2025-02-28 SDOH — SOCIAL STABILITY: SOCIAL INSECURITY: WITHIN THE LAST YEAR, HAVE YOU BEEN AFRAID OF YOUR PARTNER OR EX-PARTNER?: NO

## 2025-02-28 SDOH — SOCIAL STABILITY: SOCIAL INSECURITY: WITHIN THE LAST YEAR, HAVE YOU BEEN HUMILIATED OR EMOTIONALLY ABUSED IN OTHER WAYS BY YOUR PARTNER OR EX-PARTNER?: NO

## 2025-02-28 SDOH — ECONOMIC STABILITY: HOUSING INSECURITY: IN THE PAST 12 MONTHS, HOW MANY TIMES HAVE YOU MOVED WHERE YOU WERE LIVING?: 0

## 2025-02-28 SDOH — SOCIAL STABILITY: SOCIAL INSECURITY
WITHIN THE LAST YEAR, HAVE YOU BEEN RAPED OR FORCED TO HAVE ANY KIND OF SEXUAL ACTIVITY BY YOUR PARTNER OR EX-PARTNER?: NO

## 2025-02-28 ASSESSMENT — PAIN - FUNCTIONAL ASSESSMENT
PAIN_FUNCTIONAL_ASSESSMENT: 0-10
PAIN_FUNCTIONAL_ASSESSMENT: CPOT (CRITICAL CARE PAIN OBSERVATION TOOL)
PAIN_FUNCTIONAL_ASSESSMENT: CPOT (CRITICAL CARE PAIN OBSERVATION TOOL)

## 2025-02-28 ASSESSMENT — PAIN SCALES - GENERAL
PAINLEVEL_OUTOF10: 0 - NO PAIN
PAINLEVEL_OUTOF10: 0 - NO PAIN

## 2025-02-28 NOTE — CARE PLAN
The clinical goals for the shift include Pt will maintain VS WNL and no signs of respiratory distress throughout the shift    Problem: Pain - Adult  Goal: Verbalizes/displays adequate comfort level or baseline comfort level  Outcome: Progressing     Problem: Safety - Adult  Goal: Free from fall injury  Outcome: Progressing     Problem: Discharge Planning  Goal: Discharge to home or other facility with appropriate resources  Outcome: Progressing     Problem: Chronic Conditions and Co-morbidities  Goal: Patient's chronic conditions and co-morbidity symptoms are monitored and maintained or improved  Outcome: Progressing     Problem: Nutrition  Goal: Nutrient intake appropriate for maintaining nutritional needs  Outcome: Progressing     Problem: Fall/Injury  Goal: Not fall by end of shift  Outcome: Progressing  Goal: Be free from injury by end of the shift  Outcome: Progressing  Goal: Verbalize understanding of personal risk factors for fall in the hospital  Outcome: Progressing  Goal: Verbalize understanding of risk factor reduction measures to prevent injury from fall in the home  Outcome: Progressing  Goal: Use assistive devices by end of the shift  Outcome: Progressing  Goal: Pace activities to prevent fatigue by end of the shift  Outcome: Progressing     Problem: Respiratory  Goal: Clear secretions with interventions this shift  Outcome: Progressing  Goal: Minimize anxiety/maximize coping throughout shift  Outcome: Progressing  Goal: Minimal/no exertional discomfort or dyspnea this shift  Outcome: Progressing  Goal: No signs of respiratory distress (eg. Use of accessory muscles. Peds grunting)  Outcome: Progressing  Goal: Patent airway maintained this shift  Outcome: Progressing  Goal: Verbalize decreased shortness of breath this shift  Outcome: Progressing  Goal: Wean oxygen to maintain O2 saturation per order/standard this shift  Outcome: Progressing  Goal: Increase self care and/or family involvement in next 24  hours  Outcome: Progressing     Problem: Skin  Goal: Participates in plan/prevention/treatment measures  2/28/2025 0738 by Gia Hayden RN  Outcome: Progressing  2/28/2025 0213 by Gia Hayden RN  Flowsheets (Taken 2/27/2025 2226)  Participates in plan/prevention/treatment measures: Elevate heels  Goal: Prevent/manage excess moisture  2/28/2025 0738 by Gia Hayden RN  Outcome: Progressing  2/28/2025 0213 by Gia Hayden RN  Flowsheets (Taken 2/27/2025 2226)  Prevent/manage excess moisture:   Cleanse incontinence/protect with barrier cream   Monitor for/manage infection if present   Moisturize dry skin  Goal: Prevent/minimize sheer/friction injuries  2/28/2025 0738 by Gia Hayden RN  Outcome: Progressing  2/28/2025 0213 by Gia Hayden RN  Flowsheets (Taken 2/27/2025 2226)  Prevent/minimize sheer/friction injuries:   Use pull sheet   HOB 30 degrees or less   Turn/reposition every 2 hours/use positioning/transfer devices  Goal: Promote/optimize nutrition  2/28/2025 0738 by Gia Hayden RN  Outcome: Progressing  2/28/2025 0213 by Gia Hayden RN  Flowsheets (Taken 2/27/2025 2226)  Promote/optimize nutrition:   Monitor/record intake including meals   Assist with feeding   Offer water/supplements/favorite foods  Goal: Promote skin healing  2/28/2025 0738 by Gia Hayden RN  Outcome: Progressing  2/28/2025 0213 by Gia Hayden RN  Flowsheets (Taken 2/27/2025 2226)  Promote skin healing:   Assess skin/pad under line(s)/device(s)   Turn/reposition every 2 hours/use positioning/transfer devices   Ensure correct size (line/device) and apply per  instructions   Rotate device position/do not position patient on device

## 2025-02-28 NOTE — PROGRESS NOTES
Subjective:  Patient seen lying in bed. She is alert and oriented in the morning. On examination in the afternoon, she reports hallucinations.      Physical Exam:  GENERAL: Awake/alert, cooperative.  HEAD:  Normocephalic, atraumatic.  EYES:  Round and reactive.  ENT:  No nasal discharge, mucous membranes moist and pink.  NECK:  Atraumatic, no meningismus.  CARDIOVASCULAR:  Regular rate and rhythm, no murmur.  RESPIRATORY: Bilateral wheezing on auscultation  ABDOMEN:  Soft, no tenderness, nondistended.  EXTREMITIES:  No peripheral edema, no calf tenderness.  SKIN:  Warm and dry.  NEUROLOGICAL:  Nonfocal grossly.    Remainder of objective data including imaging and laboratory findings were all reviewed.    Admission history:  2/26: Patient remains on levophed, maximum infusion rate increased. ABG shows hyperchloremic metabolic acidosis, bicarbonate administered. Attempting BiPAP as tolerated, patient remains on Airvo at this time. Continuing treatment with solu-cortef, antibiotics switched to ciprofloxacin, metronidazole, and PO vancomycin pending C.diff PCR.    2/27: Acidosis improving, continuing BiPAP today and reassessing. Monitoring protime-INR in the setting of holding home coumadin. C.diff PCR pending, continuing PO vancomycin. Ciprofloxacin discontinued, switched to cefepime and doxycycline, continuing metronidazole.     2/28: Patient's acidosis and mentation are notably improved. BiPAP removed, restarted on coumadin. Continuing doxycycline, metronidazole discontinued, cefepime de-escalated to ceftriaxone. C.diff PCR positive, continuing oral vancomycin. Family notes hallucinations in the afternoon, prompting CT head wo contrast and repeat ABG.    Assessment and plan:  Kayley Lynch is a 54-year-old female who presented on 2/26/2025 for feelings of malaise. She is being treated for shock in the setting of Influenza A with suspected superimposed bacterial pneumonia, COPD exacerbation, and suspected infectious  colitis.       Neurological System:  #Acute metabolic encephalopathy  #Hallucinations  -CT head wo contrast ordered in the setting of hallucinations after restarting anticoagulation, repeat ABG ordered  -Patient's mentation is improved, she is alert and oriented on exam.   -Baseline mentation AO x3, will reassess daily  -Avoid excessive caths/ lines, monitor for ICU delirium    Cardiovascular System:  #Septic shock  #Pulmonary hypertension  #Aflutter s/p cardiac ablation  #MR s/p valve replacement  #CAD s/p CABG  #HLD  - Patient not requiring pressors at this time. Continuing solu-medrol 40mg q8h  -INR 1.9, warfarin resumed. Lovenox 1 mg/kg ordered to bridge until therapeutic INR is reached  -Monitor hemodynamics closely to maintain MAP >65  -Continue aspirin and atorvastatin    Respiratory System:  #COPD exacerbation  #Acute hypoxic and hypercapnic respiratory failure  #Influenza A positive, c/f superimposed bacterial pneumonia  -BiPap removed 2/28 morning, repeat ABG pH 7.36, pCO2 52, pO2 68, HCO3 29.4, lactate 0.7  -Ceftriaxone and doxycycline discontinued, switched to ciprofloxacin and metronidazole in the setting of suspected colitis  -Continue Duo-neb q6h  -Solu-medrol 40mg q8h ordered  -Currently on 4L NC, resume BiPAP as needed in the event of worsening hypercapnia    Gastrointestinal System:  #Diarrhea, c/f colitis  -C.diff PCR positive, continuing PO vancomycin  -Metronidazole discontinued, cefepime de-escalated to ceftriaxone, continuing doxycycline  -Continue Protonix    Endocrine System:  -No acute issues, continue SSI 1 lispro  -Accu-checks     Renal System:  #HILLARY, resolved  #Hyperchloremic metabolic acidosis, resolved  -Cl 105, pH 7.36 on most recent ABG.   -Cr 0.80, stable  -Trend BMP, monitor UOP, optimize electrolytes     Hematological System:  #Acute anemia  #Thrombocytopenia, improving  -Hgb 11.0, , stable  -INR 1.9, Coumadin resumed with Lovenox bridge  -monitor CBC    Infectious  Disease:  #Influenza A  #Suspected superimposed bacterial pneumonia  #C/f infectious colitis  -Continuing ceftriaxone and doxycycline  -C diff PCR positive, continuing PO vancomycin.  -Continuing Tamiflu    Skin/MSK:  -No acute issues    Psych:  -No acute issues    ICU CHECK LIST:   Antimicrobials: Ceftriaxone, doxycycline, PO vancomycin  Oxygen: 4L NC, BiPap as needed  Drips: -  Fluids: -  Feeding: Cardiac  Sedation/Analgesia: Precedex  Prophylaxis: Protonix  Glycemic control: SSI 1 lispro  Bowel care: PRN   Lines/Tubes: PIV, Jasso  Restraints: -  Dispo: ICU     Code Status: Full       Neto Clark MD  PGY-1    CRITICAL CARE MEDICINE ATTENDING NOTE    I saw and evaluated the patient. I personally obtained the key and critical portions of the history and physical examination. I reviewed the resident's documentation and discussed the patient with the resident. I agree with the resident's medical decision making as documented in the resident's note.    The patient has high probability of sudden, clinically significant deterioration, which requires urgent interventions. I managed/supervised life supporting interventions that required frequent physician assessment. I spent 40 critical care minutes of my full attention on this patient's management and direct patient care. Time I spent with family or surrogate(s) is included if the patient was incapable of providing information or participating in medical decision making. Time devoted to teaching and to any procedures I billed separately is not included. Medical issues requiring critical care management include:    Gabby اللعي MD  Pulmonary and Critical Care Medicine

## 2025-02-28 NOTE — H&P (VIEW-ONLY)
Subjective:  Patient seen lying in bed. She is alert and oriented in the morning. On examination in the afternoon, she reports hallucinations.      Physical Exam:  GENERAL: Awake/alert, cooperative.  HEAD:  Normocephalic, atraumatic.  EYES:  Round and reactive.  ENT:  No nasal discharge, mucous membranes moist and pink.  NECK:  Atraumatic, no meningismus.  CARDIOVASCULAR:  Regular rate and rhythm, no murmur.  RESPIRATORY: Bilateral wheezing on auscultation  ABDOMEN:  Soft, no tenderness, nondistended.  EXTREMITIES:  No peripheral edema, no calf tenderness.  SKIN:  Warm and dry.  NEUROLOGICAL:  Nonfocal grossly.    Remainder of objective data including imaging and laboratory findings were all reviewed.    Admission history:  2/26: Patient remains on levophed, maximum infusion rate increased. ABG shows hyperchloremic metabolic acidosis, bicarbonate administered. Attempting BiPAP as tolerated, patient remains on Airvo at this time. Continuing treatment with solu-cortef, antibiotics switched to ciprofloxacin, metronidazole, and PO vancomycin pending C.diff PCR.    2/27: Acidosis improving, continuing BiPAP today and reassessing. Monitoring protime-INR in the setting of holding home coumadin. C.diff PCR pending, continuing PO vancomycin. Ciprofloxacin discontinued, switched to cefepime and doxycycline, continuing metronidazole.     2/28: Patient's acidosis and mentation are notably improved. BiPAP removed, restarted on coumadin. Continuing doxycycline, metronidazole discontinued, cefepime de-escalated to ceftriaxone. C.diff PCR positive, continuing oral vancomycin. Family notes hallucinations in the afternoon, prompting CT head wo contrast and repeat ABG.    Assessment and plan:  Kayley Lynhc is a 54-year-old female who presented on 2/26/2025 for feelings of malaise. She is being treated for shock in the setting of Influenza A with suspected superimposed bacterial pneumonia, COPD exacerbation, and suspected infectious  colitis.       Neurological System:  #Acute metabolic encephalopathy  #Hallucinations  -CT head wo contrast ordered in the setting of hallucinations after restarting anticoagulation, repeat ABG ordered  -Patient's mentation is improved, she is alert and oriented on exam.   -Baseline mentation AO x3, will reassess daily  -Avoid excessive caths/ lines, monitor for ICU delirium    Cardiovascular System:  #Septic shock  #Pulmonary hypertension  #Aflutter s/p cardiac ablation  #MR s/p valve replacement  #CAD s/p CABG  #HLD  - Patient not requiring pressors at this time. Continuing solu-medrol 40mg q8h  -INR 1.9, warfarin resumed. Lovenox 1 mg/kg ordered to bridge until therapeutic INR is reached  -Monitor hemodynamics closely to maintain MAP >65  -Continue aspirin and atorvastatin    Respiratory System:  #COPD exacerbation  #Acute hypoxic and hypercapnic respiratory failure  #Influenza A positive, c/f superimposed bacterial pneumonia  -BiPap removed 2/28 morning, repeat ABG pH 7.36, pCO2 52, pO2 68, HCO3 29.4, lactate 0.7  -Ceftriaxone and doxycycline discontinued, switched to ciprofloxacin and metronidazole in the setting of suspected colitis  -Continue Duo-neb q6h  -Solu-medrol 40mg q8h ordered  -Currently on 4L NC, resume BiPAP as needed in the event of worsening hypercapnia    Gastrointestinal System:  #Diarrhea, c/f colitis  -C.diff PCR positive, continuing PO vancomycin  -Metronidazole discontinued, cefepime de-escalated to ceftriaxone, continuing doxycycline  -Continue Protonix    Endocrine System:  -No acute issues, continue SSI 1 lispro  -Accu-checks     Renal System:  #HILLARY, resolved  #Hyperchloremic metabolic acidosis, resolved  -Cl 105, pH 7.36 on most recent ABG.   -Cr 0.80, stable  -Trend BMP, monitor UOP, optimize electrolytes     Hematological System:  #Acute anemia  #Thrombocytopenia, improving  -Hgb 11.0, , stable  -INR 1.9, Coumadin resumed with Lovenox bridge  -monitor CBC    Infectious  Disease:  #Influenza A  #Suspected superimposed bacterial pneumonia  #C/f infectious colitis  -Continuing ceftriaxone and doxycycline  -C diff PCR positive, continuing PO vancomycin.  -Continuing Tamiflu    Skin/MSK:  -No acute issues    Psych:  -No acute issues    ICU CHECK LIST:   Antimicrobials: Ceftriaxone, doxycycline, PO vancomycin  Oxygen: 4L NC, BiPap as needed  Drips: -  Fluids: -  Feeding: Cardiac  Sedation/Analgesia: Precedex  Prophylaxis: Protonix  Glycemic control: SSI 1 lispro  Bowel care: PRN   Lines/Tubes: PIV, Jasso  Restraints: -  Dispo: ICU     Code Status: Full       Neto Clark MD  PGY-1    CRITICAL CARE MEDICINE ATTENDING NOTE    I saw and evaluated the patient. I personally obtained the key and critical portions of the history and physical examination. I reviewed the resident's documentation and discussed the patient with the resident. I agree with the resident's medical decision making as documented in the resident's note.    The patient has high probability of sudden, clinically significant deterioration, which requires urgent interventions. I managed/supervised life supporting interventions that required frequent physician assessment. I spent 40 critical care minutes of my full attention on this patient's management and direct patient care. Time I spent with family or surrogate(s) is included if the patient was incapable of providing information or participating in medical decision making. Time devoted to teaching and to any procedures I billed separately is not included. Medical issues requiring critical care management include:    Gabby العلي MD  Pulmonary and Critical Care Medicine

## 2025-02-28 NOTE — CARE PLAN
The patient's goals for the shift include      The clinical goals for the shift include Pt will maintain VS WNL and no signs of respiratory distress throughout the shift    Over the shift, the patient did not make progress toward the following goals. Barriers to progression include . Recommendations to address these barriers include .

## 2025-02-28 NOTE — PROGRESS NOTES
Kayley Lynch is a 54 y.o. female on day 2 of admission presenting with Nausea vomiting and diarrhea.      Subjective   Patient fully evaluated on February 28.  Patient came in February 26 with pneumonia with septic shock and positive for C. difficile.  At that time patient was found to have a hypercapnic respiratory failure which is significantly improved and patient presently on 5 L nasal cannula.       Objective     Last Recorded Vitals  /79   Pulse 71   Temp 36.8 °C (98.2 °F) (Temporal)   Resp 18   Wt 66.6 kg (146 lb 13.2 oz)   SpO2 96%   Intake/Output last 3 Shifts:    Intake/Output Summary (Last 24 hours) at 2/28/2025 1728  Last data filed at 2/28/2025 1400  Gross per 24 hour   Intake 550 ml   Output 1122 ml   Net -572 ml       Admission Weight  Weight: 59 kg (130 lb) (02/26/25 0159)    Daily Weight  02/28/25 : 66.6 kg (146 lb 13.2 oz)    Image Results  CT head wo IV contrast  Narrative: Interpreted By:  Estevan Abarca,   STUDY:  CT HEAD WO IV CONTRAST;  2/28/2025 3:52 pm      INDICATION:  Signs/Symptoms:Confusion and hallucinations following resuming  anticoagulation.      COMPARISON:  None.      ACCESSION NUMBER(S):  [id number]      ORDERING CLINICIAN:  SHAW ALCALA      TECHNIQUE:  Routine axial images were obtained from the skull base through the  vertex. Sagittal and coronal reconstruction images were generated.  Brain, subdural, and bone windows were reviewed. N/A   N/A      FINDINGS:  INTRACRANIAL:  The ventricles, sulci and basal cisterns were within normal limits.  No acute intracranial bleed, midline shift, or focal mass effect.  No destructive bone lesion. No depressed skull fracture.  Skullbase arterial calcifications in the carotid siphons .      EXTRACRANIAL:  Visualized paranasal sinuses were clear.  Visualized mastoid air cells were clear.      Impression: Bilateral carotid siphon skull base arterial calcifications.      No acute intracranial bleed or focal mass effect.       MACRO:  None      Signed by: Estevan Abarca 2/28/2025 4:43 PM  Dictation workstation:   [id number]  ECG 12 lead  Right and left arm electrode reversal, interpretation assumes no reversal  Sinus rhythm  Prolonged NY interval  Probable lateral infarct, age indeterminate    See ED provider note for full interpretation and clinical correlation  Confirmed by Gege Mclean ([id number]) on 2/28/2025 11:53:41 AM      Physical Exam  Vitals and nursing note reviewed.   Constitutional:       Appearance: Normal appearance.   HENT:      Head: Normocephalic and atraumatic.      Right Ear: Tympanic membrane, ear canal and external ear normal.      Left Ear: Tympanic membrane, ear canal and external ear normal.      Nose: Nose normal.      Mouth/Throat:      Mouth: Mucous membranes are moist.      Pharynx: Oropharynx is clear.   Eyes:      Extraocular Movements: Extraocular movements intact.      Conjunctiva/sclera: Conjunctivae normal.      Pupils: Pupils are equal, round, and reactive to light.   Cardiovascular:      Rate and Rhythm: Regular rhythm. Tachycardia present.      Pulses: Normal pulses.      Heart sounds: Normal heart sounds.   Pulmonary:      Effort: Pulmonary effort is normal.      Breath sounds: Rales present.   Abdominal:      General: Abdomen is flat. Bowel sounds are normal.      Palpations: Abdomen is soft.   Genitourinary:     General: Normal vulva.      Rectum: Normal.   Musculoskeletal:         General: Normal range of motion.   Skin:     General: Skin is warm and dry.      Capillary Refill: Capillary refill takes less than 2 seconds.   Neurological:      General: No focal deficit present.      Mental Status: She is alert and oriented to person, place, and time. Mental status is at baseline.   Psychiatric:         Mood and Affect: Mood normal.         Behavior: Behavior normal.         Thought Content: Thought content normal.         Judgment: Judgment normal.         Relevant Results                Assessment/Plan   This patient currently has cardiac telemetry ordered; if you would like to modify or discontinue the telemetry order, click here to go to the orders activity to modify/discontinue the order.      This patient has a urinary catheter   Reason for the urinary catheter remaining today?           Assessment & Plan  Nausea vomiting and diarrhea    Influenza A with pneumonia      Septic shock, dehydration, hypercapnic respiratory failure, and dehydration.    Patient fully evaluated on February 28.  Pulmonary consultation obtained.  Aggressive pulmonary hygiene.  Recheck labs in AM.            Maurizio Read MD

## 2025-02-28 NOTE — CONSULTS
"Nutrition Initial Assessment:   Nutrition Assessment    Reason for Assessment: Admission nursing screening    Patient is a 54 y.o. female presenting with nausea; vomiting; diarrhea; abd pain; SOB      Nutrition History:  Food and Nutrient History: Pt is on clear liquids only.  Breathing has been an issue and she has been on bi pap most of the day.       Anthropometrics:  Height: 160 cm (5' 2.99\")   Weight: 59 kg (130 lb 1.1 oz)   BMI (Calculated): 23.05  IBW/kg (Dietitian Calculated): 52 kg  Percent of IBW: 113 %       Weight History:   Wt Readings from Last 10 Encounters:   02/26/25 59 kg (130 lb 1.1 oz)   09/27/24 60.8 kg (134 lb)   08/12/24 59 kg (130 lb)   07/24/24 61 kg (134 lb 6.4 oz)   05/31/24 61.7 kg (136 lb)   05/23/24 63 kg (139 lb)   03/22/24 63.5 kg (140 lb)   09/22/23 62.1 kg (137 lb)   05/08/23 61.7 kg (136 lb)   03/13/23 62.6 kg (138 lb)         Weight Change %:  Weight History / % Weight Change: Pt's wt has been erratic but she was the same wt 6 months ago  Significant Weight Loss: No    Nutrition Focused Physical Exam Findings:    Subcutaneous Fat Loss:   Defer Subcutaneous Fat Loss Assessment: Defer all  Defer All Reason: not a good time  Muscle Wasting:  Defer Muscle Wasting Assessment: Defer all  Defer All Reason: not a good time  Edema:  Edema Location: some edema  Physical Findings:       Nutrition Significant Labs:  BMP Trend:   Results from last 7 days   Lab Units 02/27/25  0618 02/26/25  1426 02/26/25  0826 02/26/25  0018   GLUCOSE mg/dL 152* 107* 89 107*   CALCIUM mg/dL 8.0* 7.4* 4.1* 7.8*   SODIUM mmol/L 139 137 140 132*   POTASSIUM mmol/L 4.1 4.7 2.9* 4.4   CO2 mmol/L 25 21 12* 23   CHLORIDE mmol/L 106 107 120* 98   BUN mg/dL 26* 45* 39* 68*   CREATININE mg/dL 0.87 1.40* 1.19* 3.87*        Nutrition Specific Medications:  Lipitor; solu cortef; protonix; zofran    I/O:   Last BM Date: 02/26/25; Stool Appearance: Unable to assess (02/27/25 0400)    Dietary Orders (From admission, onward) "       Start     Ordered    02/27/25 1030  Adult diet Clear Liquid  Diet effective now        Question:  Diet type  Answer:  Clear Liquid    02/27/25 1029    02/26/25 1213  May Participate in Room Service  ( ROOM SERVICE MAY PARTICIPATE)  Once        Question:  .  Answer:  Yes    02/26/25 1212                     Estimated Needs:      Method for Estimating Needs: 1479-3143  26-30 génesis kg of IBW     Method for Estimating 24 Hour Protein Needs: 52-68  1-1.3 gm kg of IBW     Method for Estimating 24 Hour Fluid Needs: 5230-6664  20-30 ml kg of IBW as medically indicated  Patient on Order Fluid Restriction: No        Nutrition Diagnosis   Malnutrition Diagnosis  Patient has Malnutrition Diagnosis: No    Nutrition Diagnosis  Patient has Nutrition Diagnosis: Yes  Diagnosis Status (1): New  Nutrition Diagnosis 1: Inadequate oral intake  Related to (1): decreased ability to consume sufficient energy  As Evidenced by (1): pt is on bi pap and she has a clear liquid diet order.  If she was able to come off of her bi pap she could take liquids       Nutrition Interventions/Recommendations   Nutrition prescription for oral nutrition    Nutrition Recommendations:  Individualized Nutrition Prescription Provided for : will follow clinical course for any nutrition intervention needs   sending ensure clear to help meet nutritional needs    Nutrition Interventions/Goals:   Interventions: Meals and snacks, Medical food supplement  Goal: to come off bi pap so she  can take PO  Medical Food Supplement: Commercial beverage medical food supplement therapy  Goal: >75% of supplement  Coordination of Care with Providers: Nursing, Provider      Education Documentation  No documentation found.     Not at this time       Nutrition Monitoring and Evaluation   Food/Nutrient Related History Monitoring  Monitoring and Evaluation Plan: Fluid intake  Fluid Intake:  (adequate fluid intake without fluid overload)    Anthropometric  Measurements  Monitoring and Evaluation Plan: Body weight    Biochemical Data, Medical Tests and Procedures  Monitoring and Evaluation Plan: Electrolyte/renal panel, Glucose/endocrine profile  Criteria: stable BMP  Criteria: glucose within desired range              Time Spent (min): 45 minutes

## 2025-03-01 LAB
ALBUMIN SERPL BCP-MCNC: 3.4 G/DL (ref 3.4–5)
ALP SERPL-CCNC: 60 U/L (ref 33–110)
ALT SERPL W P-5'-P-CCNC: 19 U/L (ref 7–45)
ANION GAP SERPL CALC-SCNC: 11 MMOL/L (ref 10–20)
AST SERPL W P-5'-P-CCNC: 43 U/L (ref 9–39)
BILIRUB SERPL-MCNC: 0.4 MG/DL (ref 0–1.2)
BUN SERPL-MCNC: 21 MG/DL (ref 6–23)
CALCIUM SERPL-MCNC: 8.6 MG/DL (ref 8.6–10.3)
CHLORIDE SERPL-SCNC: 105 MMOL/L (ref 98–107)
CO2 SERPL-SCNC: 30 MMOL/L (ref 21–32)
CREAT SERPL-MCNC: 0.73 MG/DL (ref 0.5–1.05)
EGFRCR SERPLBLD CKD-EPI 2021: >90 ML/MIN/1.73M*2
ERYTHROCYTE [DISTWIDTH] IN BLOOD BY AUTOMATED COUNT: 17.2 % (ref 11.5–14.5)
GLUCOSE BLD MANUAL STRIP-MCNC: 127 MG/DL (ref 74–99)
GLUCOSE BLD MANUAL STRIP-MCNC: 138 MG/DL (ref 74–99)
GLUCOSE BLD MANUAL STRIP-MCNC: 233 MG/DL (ref 74–99)
GLUCOSE SERPL-MCNC: 122 MG/DL (ref 74–99)
HCT VFR BLD AUTO: 33.4 % (ref 36–46)
HGB BLD-MCNC: 10.6 G/DL (ref 12–16)
INR PPP: 2.2 (ref 0.9–1.1)
MAGNESIUM SERPL-MCNC: 1.9 MG/DL (ref 1.6–2.4)
MCH RBC QN AUTO: 27.8 PG (ref 26–34)
MCHC RBC AUTO-ENTMCNC: 31.7 G/DL (ref 32–36)
MCV RBC AUTO: 88 FL (ref 80–100)
NRBC BLD-RTO: 0 /100 WBCS (ref 0–0)
PLATELET # BLD AUTO: 110 X10*3/UL (ref 150–450)
POTASSIUM SERPL-SCNC: 3.6 MMOL/L (ref 3.5–5.3)
PROT SERPL-MCNC: 5.5 G/DL (ref 6.4–8.2)
PROTHROMBIN TIME: 24 SECONDS (ref 9.8–12.4)
RBC # BLD AUTO: 3.81 X10*6/UL (ref 4–5.2)
SODIUM SERPL-SCNC: 142 MMOL/L (ref 136–145)
WBC # BLD AUTO: 4.5 X10*3/UL (ref 4.4–11.3)

## 2025-03-01 PROCEDURE — 85610 PROTHROMBIN TIME: CPT | Performed by: INTERNAL MEDICINE

## 2025-03-01 PROCEDURE — 2500000002 HC RX 250 W HCPCS SELF ADMINISTERED DRUGS (ALT 637 FOR MEDICARE OP, ALT 636 FOR OP/ED)

## 2025-03-01 PROCEDURE — 94640 AIRWAY INHALATION TREATMENT: CPT

## 2025-03-01 PROCEDURE — 83735 ASSAY OF MAGNESIUM: CPT

## 2025-03-01 PROCEDURE — 80053 COMPREHEN METABOLIC PANEL: CPT

## 2025-03-01 PROCEDURE — 2500000005 HC RX 250 GENERAL PHARMACY W/O HCPCS: Performed by: INTERNAL MEDICINE

## 2025-03-01 PROCEDURE — 2500000004 HC RX 250 GENERAL PHARMACY W/ HCPCS (ALT 636 FOR OP/ED): Performed by: INTERNAL MEDICINE

## 2025-03-01 PROCEDURE — 2500000001 HC RX 250 WO HCPCS SELF ADMINISTERED DRUGS (ALT 637 FOR MEDICARE OP): Performed by: INTERNAL MEDICINE

## 2025-03-01 PROCEDURE — 2500000004 HC RX 250 GENERAL PHARMACY W/ HCPCS (ALT 636 FOR OP/ED)

## 2025-03-01 PROCEDURE — 82947 ASSAY GLUCOSE BLOOD QUANT: CPT

## 2025-03-01 PROCEDURE — 85027 COMPLETE CBC AUTOMATED: CPT

## 2025-03-01 PROCEDURE — 2500000001 HC RX 250 WO HCPCS SELF ADMINISTERED DRUGS (ALT 637 FOR MEDICARE OP): Performed by: STUDENT IN AN ORGANIZED HEALTH CARE EDUCATION/TRAINING PROGRAM

## 2025-03-01 PROCEDURE — 36415 COLL VENOUS BLD VENIPUNCTURE: CPT

## 2025-03-01 PROCEDURE — 2060000001 HC INTERMEDIATE ICU ROOM DAILY

## 2025-03-01 PROCEDURE — 94660 CPAP INITIATION&MGMT: CPT

## 2025-03-01 RX ADMIN — OSELTAMIVIR PHOSPHATE 75 MG: 75 CAPSULE ORAL at 20:46

## 2025-03-01 RX ADMIN — INSULIN LISPRO 2 UNITS: 100 INJECTION, SOLUTION INTRAVENOUS; SUBCUTANEOUS at 11:50

## 2025-03-01 RX ADMIN — ENOXAPARIN SODIUM 70 MG: 80 INJECTION SUBCUTANEOUS at 10:03

## 2025-03-01 RX ADMIN — IPRATROPIUM BROMIDE AND ALBUTEROL SULFATE 3 ML: .5; 3 SOLUTION RESPIRATORY (INHALATION) at 02:08

## 2025-03-01 RX ADMIN — Medication 4 L/MIN: at 20:46

## 2025-03-01 RX ADMIN — VANCOMYCIN HYDROCHLORIDE 125 MG: 125 CAPSULE ORAL at 08:16

## 2025-03-01 RX ADMIN — Medication 4 L/MIN: at 20:45

## 2025-03-01 RX ADMIN — PANTOPRAZOLE SODIUM 40 MG: 40 INJECTION, POWDER, FOR SOLUTION INTRAVENOUS at 08:16

## 2025-03-01 RX ADMIN — Medication 3 L/MIN: at 08:20

## 2025-03-01 RX ADMIN — ESCITALOPRAM OXALATE 20 MG: 10 TABLET ORAL at 08:16

## 2025-03-01 RX ADMIN — VANCOMYCIN HYDROCHLORIDE 125 MG: 125 CAPSULE ORAL at 12:55

## 2025-03-01 RX ADMIN — VANCOMYCIN HYDROCHLORIDE 125 MG: 125 CAPSULE ORAL at 20:46

## 2025-03-01 RX ADMIN — WARFARIN SODIUM 2 MG: 2 TABLET ORAL at 17:32

## 2025-03-01 RX ADMIN — OSELTAMIVIR PHOSPHATE 75 MG: 75 CAPSULE ORAL at 10:03

## 2025-03-01 RX ADMIN — ATORVASTATIN CALCIUM 80 MG: 80 TABLET, FILM COATED ORAL at 20:46

## 2025-03-01 RX ADMIN — ASPIRIN 81 MG: 81 TABLET, COATED ORAL at 08:16

## 2025-03-01 RX ADMIN — ENOXAPARIN SODIUM 70 MG: 80 INJECTION SUBCUTANEOUS at 22:23

## 2025-03-01 RX ADMIN — IPRATROPIUM BROMIDE AND ALBUTEROL SULFATE 3 ML: .5; 3 SOLUTION RESPIRATORY (INHALATION) at 07:39

## 2025-03-01 RX ADMIN — CEFTRIAXONE SODIUM 1 G: 1 INJECTION, SOLUTION INTRAVENOUS at 10:04

## 2025-03-01 RX ADMIN — METHYLPREDNISOLONE SODIUM SUCCINATE 40 MG: 40 INJECTION, POWDER, FOR SOLUTION INTRAMUSCULAR; INTRAVENOUS at 11:25

## 2025-03-01 RX ADMIN — DOXYCYCLINE 100 MG: 100 INJECTION, POWDER, LYOPHILIZED, FOR SOLUTION INTRAVENOUS at 22:23

## 2025-03-01 RX ADMIN — BUPROPION HYDROCHLORIDE 150 MG: 150 TABLET, EXTENDED RELEASE ORAL at 10:03

## 2025-03-01 RX ADMIN — DOXYCYCLINE 100 MG: 100 INJECTION, POWDER, LYOPHILIZED, FOR SOLUTION INTRAVENOUS at 11:26

## 2025-03-01 RX ADMIN — IPRATROPIUM BROMIDE AND ALBUTEROL SULFATE 3 ML: .5; 3 SOLUTION RESPIRATORY (INHALATION) at 13:35

## 2025-03-01 RX ADMIN — Medication 3 L/MIN: at 08:34

## 2025-03-01 RX ADMIN — METHYLPREDNISOLONE SODIUM SUCCINATE 40 MG: 40 INJECTION, POWDER, FOR SOLUTION INTRAMUSCULAR; INTRAVENOUS at 03:07

## 2025-03-01 RX ADMIN — METHYLPREDNISOLONE SODIUM SUCCINATE 40 MG: 40 INJECTION, POWDER, FOR SOLUTION INTRAMUSCULAR; INTRAVENOUS at 18:20

## 2025-03-01 RX ADMIN — VANCOMYCIN HYDROCHLORIDE 125 MG: 125 CAPSULE ORAL at 17:32

## 2025-03-01 RX ADMIN — Medication 40 PERCENT: at 23:24

## 2025-03-01 RX ADMIN — IPRATROPIUM BROMIDE AND ALBUTEROL SULFATE 3 ML: .5; 3 SOLUTION RESPIRATORY (INHALATION) at 18:30

## 2025-03-01 ASSESSMENT — PAIN - FUNCTIONAL ASSESSMENT: PAIN_FUNCTIONAL_ASSESSMENT: 0-10

## 2025-03-01 ASSESSMENT — PAIN SCALES - GENERAL
PAINLEVEL_OUTOF10: 0 - NO PAIN
PAINLEVEL_OUTOF10: 0 - NO PAIN

## 2025-03-01 NOTE — CARE PLAN
The patient's goals for the shift include      The clinical goals for the shift include safety and comfort      Problem: Pain - Adult  Goal: Verbalizes/displays adequate comfort level or baseline comfort level  3/1/2025 0928 by Jenifer Daly RN  Outcome: Progressing  3/1/2025 0927 by Jenifer Daly RN  Outcome: Progressing     Problem: Safety - Adult  Goal: Free from fall injury  3/1/2025 0928 by Jenifer Daly RN  Outcome: Progressing  3/1/2025 0927 by Jenifer Daly RN  Outcome: Progressing     Problem: Discharge Planning  Goal: Discharge to home or other facility with appropriate resources  3/1/2025 0928 by Jenifer Daly RN  Outcome: Progressing  3/1/2025 0927 by Jenifer Daly RN  Outcome: Progressing     Problem: Chronic Conditions and Co-morbidities  Goal: Patient's chronic conditions and co-morbidity symptoms are monitored and maintained or improved  3/1/2025 0928 by Jenifer Daly RN  Outcome: Progressing  3/1/2025 0927 by Jenifer Daly RN  Outcome: Progressing     Problem: Nutrition  Goal: Nutrient intake appropriate for maintaining nutritional needs  3/1/2025 0928 by Jenifer Daly RN  Outcome: Progressing  3/1/2025 0927 by Jenifer Daly RN  Outcome: Progressing     Problem: Fall/Injury  Goal: Not fall by end of shift  3/1/2025 0928 by Jenifer Daly RN  Outcome: Progressing  3/1/2025 0927 by Jenifer Daly RN  Outcome: Progressing  Goal: Be free from injury by end of the shift  3/1/2025 0928 by Jenifer Daly RN  Outcome: Progressing  3/1/2025 0927 by Jenifer Daly RN  Outcome: Progressing  Goal: Verbalize understanding of personal risk factors for fall in the hospital  3/1/2025 0928 by Jenifer Daly RN  Outcome: Progressing  3/1/2025 0927 by Jenifer Daly RN  Outcome: Progressing  Goal: Verbalize understanding of risk factor reduction measures to prevent injury from fall in the home  3/1/2025 0928 by Jenifer Daly RN  Outcome: Progressing  3/1/2025 0927 by Jenifer Daly RN  Outcome:  Progressing  Goal: Use assistive devices by end of the shift  3/1/2025 0928 by Jenifer Daly RN  Outcome: Progressing  3/1/2025 0927 by Jenifer Daly RN  Outcome: Progressing  Goal: Pace activities to prevent fatigue by end of the shift  3/1/2025 0928 by Jenifer Daly RN  Outcome: Progressing  3/1/2025 0927 by Jenifer Daly RN  Outcome: Progressing     Problem: Respiratory  Goal: Clear secretions with interventions this shift  3/1/2025 0928 by Jenifer Daly RN  Outcome: Progressing  3/1/2025 0927 by Jenifer Daly RN  Outcome: Progressing  Goal: Minimize anxiety/maximize coping throughout shift  3/1/2025 0928 by Jenifer Daly RN  Outcome: Progressing  3/1/2025 0927 by Jenifer Daly RN  Outcome: Progressing  Goal: Minimal/no exertional discomfort or dyspnea this shift  3/1/2025 0928 by Jenifer Daly RN  Outcome: Progressing  3/1/2025 0927 by Jenifer Daly RN  Outcome: Progressing  Goal: No signs of respiratory distress (eg. Use of accessory muscles. Peds grunting)  3/1/2025 0928 by Jenifer Daly RN  Outcome: Progressing  3/1/2025 0927 by Jenifer Daly RN  Outcome: Progressing  Goal: Patent airway maintained this shift  3/1/2025 0928 by Jenifer Daly RN  Outcome: Progressing  3/1/2025 0927 by Jenifer Daly RN  Outcome: Progressing  Goal: Verbalize decreased shortness of breath this shift  3/1/2025 0928 by Jenifer Daly RN  Outcome: Progressing  3/1/2025 0927 by Jenifer Daly RN  Outcome: Progressing  Goal: Wean oxygen to maintain O2 saturation per order/standard this shift  3/1/2025 0928 by Jenifer Daly RN  Outcome: Progressing  3/1/2025 0927 by Jenifer Daly RN  Outcome: Progressing  Goal: Increase self care and/or family involvement in next 24 hours  3/1/2025 0928 by Jenifer Daly RN  Outcome: Progressing  3/1/2025 0927 by Jenifer Daly RN  Outcome: Progressing     Problem: Skin  Goal: Participates in plan/prevention/treatment measures  3/1/2025 0928 by Jenifer Daly RN  Outcome:  Progressing  3/1/2025 0927 by Jenifer Daly RN  Outcome: Progressing  Goal: Prevent/manage excess moisture  3/1/2025 0928 by Jenifer Daly RN  Outcome: Progressing  3/1/2025 0927 by Jenifer Daly RN  Outcome: Progressing  Goal: Prevent/minimize sheer/friction injuries  3/1/2025 0928 by Jenifer Daly RN  Outcome: Progressing  3/1/2025 0927 by Jenifer Daly RN  Outcome: Progressing  Goal: Promote/optimize nutrition  3/1/2025 0928 by Jenifer Daly RN  Outcome: Progressing  3/1/2025 0927 by Jeinfer Daly RN  Outcome: Progressing  Goal: Promote skin healing  3/1/2025 0928 by Jenifer Daly RN  Outcome: Progressing  3/1/2025 0927 by Jenifer Daly RN  Outcome: Progressing

## 2025-03-01 NOTE — PROGRESS NOTES
Kayley Lynch is a 54 y.o. female on day 3 of admission presenting with Nausea vomiting and diarrhea.      Subjective   No events overnight.  Patient seen and examined at bedside with son present.  Son inquires about patient's INR today, information given.  Patient herself with no complaints.       Objective     Last Recorded Vitals  /72 (Patient Position: Lying)   Pulse 75   Temp 36 °C (96.8 °F) (Temporal)   Resp 21   Wt 66.6 kg (146 lb 13.2 oz)   SpO2 93%   Intake/Output last 3 Shifts:    Intake/Output Summary (Last 24 hours) at 3/1/2025 1435  Last data filed at 3/1/2025 1200  Gross per 24 hour   Intake 150 ml   Output 925 ml   Net -775 ml       Admission Weight  Weight: 59 kg (130 lb) (02/26/25 0159)    Daily Weight  02/28/25 : 66.6 kg (146 lb 13.2 oz)    Image Results  CT head wo IV contrast  Narrative: Interpreted By:  Estevan Abarca,   STUDY:  CT HEAD WO IV CONTRAST;  2/28/2025 3:52 pm      INDICATION:  Signs/Symptoms:Confusion and hallucinations following resuming  anticoagulation.      COMPARISON:  None.      ACCESSION NUMBER(S):  [id number]      ORDERING CLINICIAN:  SHAW ALCALA      TECHNIQUE:  Routine axial images were obtained from the skull base through the  vertex. Sagittal and coronal reconstruction images were generated.  Brain, subdural, and bone windows were reviewed. N/A   N/A      FINDINGS:  INTRACRANIAL:  The ventricles, sulci and basal cisterns were within normal limits.  No acute intracranial bleed, midline shift, or focal mass effect.  No destructive bone lesion. No depressed skull fracture.  Skullbase arterial calcifications in the carotid siphons .      EXTRACRANIAL:  Visualized paranasal sinuses were clear.  Visualized mastoid air cells were clear.      Impression: Bilateral carotid siphon skull base arterial calcifications.      No acute intracranial bleed or focal mass effect.      MACRO:  None      Signed by: Estevan Abarca 2/28/2025 4:43 PM  Dictation workstation:    [id number]  ECG 12 lead  Right and left arm electrode reversal, interpretation assumes no reversal  Sinus rhythm  Prolonged VT interval  Probable lateral infarct, age indeterminate    See ED provider note for full interpretation and clinical correlation  Confirmed by Gege Mclean ([id number]) on 2/28/2025 11:53:41 AM      Physical Exam  HENT:      Head: Normocephalic and atraumatic.      Nose: Nose normal.      Mouth/Throat:      Mouth: Mucous membranes are moist.      Pharynx: Oropharynx is clear.   Eyes:      Extraocular Movements: Extraocular movements intact.      Pupils: Pupils are equal, round, and reactive to light.   Cardiovascular:      Rate and Rhythm: Normal rate and regular rhythm.   Pulmonary:      Effort: No respiratory distress.      Breath sounds: Wheezing present. No rhonchi or rales.   Abdominal:      General: Bowel sounds are normal. There is no distension.      Palpations: Abdomen is soft.      Tenderness: There is no abdominal tenderness. There is no guarding.   Musculoskeletal:      Right lower leg: No edema.      Left lower leg: No edema.   Skin:     General: Skin is warm and dry.   Neurological:      Mental Status: She is alert. Mental status is at baseline.         Relevant Results               Assessment/Plan   This patient currently has cardiac telemetry ordered; if you would like to modify or discontinue the telemetry order, click here to go to the orders activity to modify/discontinue the order.      This patient has a urinary catheter   Reason for the urinary catheter remaining today? critically ill patient who need accurate urinary output measurements          Assessment & Plan  Nausea vomiting and diarrhea    Influenza A with pneumonia      Septic shock, dehydration, hypercapnic respiratory failure, and dehydration.    Patient is a 54-year-old female with past medical history of COPD, CAD status post CABG, pulmonary hypertension, hyperlipidemia, cardiomyopathy, a flutter status post  ablation, and mitral valve replacement who presented with malaise.  She was found to be in septic shock and admitted to the ICU.    Septic shock  Hypercapnic respiratory failure  COPD exacerbation  Influenza A  Pneumonia  Acute metabolic encephalopathy  C. difficile colitis  Dehydration  HILLARY    -Continue supplemental oxygen  -Continue antibiotics, currently on doxycycline  -Continue nebulizers  -Continue steroids  -Continue Tamiflu  -Continue p.o. vancomycin  -Continue home meds  -Monitor INR            Modesto Steven DO

## 2025-03-02 ENCOUNTER — PREP FOR PROCEDURE (OUTPATIENT)
Dept: PREOP | Facility: HOSPITAL | Age: 55
End: 2025-03-02

## 2025-03-02 ENCOUNTER — APPOINTMENT (OUTPATIENT)
Dept: RADIOLOGY | Facility: HOSPITAL | Age: 55
DRG: 853 | End: 2025-03-02
Payer: COMMERCIAL

## 2025-03-02 ENCOUNTER — ANESTHESIA EVENT (OUTPATIENT)
Dept: OPERATING ROOM | Facility: HOSPITAL | Age: 55
End: 2025-03-02
Payer: COMMERCIAL

## 2025-03-02 ENCOUNTER — SURGERY (OUTPATIENT)
Age: 55
End: 2025-03-02
Payer: COMMERCIAL

## 2025-03-02 ENCOUNTER — APPOINTMENT (OUTPATIENT)
Dept: CARDIOLOGY | Facility: HOSPITAL | Age: 55
DRG: 853 | End: 2025-03-02
Payer: COMMERCIAL

## 2025-03-02 ENCOUNTER — ANESTHESIA (OUTPATIENT)
Dept: OPERATING ROOM | Facility: HOSPITAL | Age: 55
End: 2025-03-02
Payer: COMMERCIAL

## 2025-03-02 PROBLEM — M79.A12 NONTRAUMATIC COMPARTMENT SYNDROME OF LEFT UPPER EXTREMITY: Status: ACTIVE | Noted: 2025-02-25

## 2025-03-02 LAB
ABO GROUP (TYPE) IN BLOOD: NORMAL
ABO GROUP (TYPE) IN BLOOD: NORMAL
ALBUMIN SERPL BCP-MCNC: 3 G/DL (ref 3.4–5)
ALP SERPL-CCNC: 47 U/L (ref 33–110)
ALT SERPL W P-5'-P-CCNC: 17 U/L (ref 7–45)
ANION GAP BLDA CALCULATED.4IONS-SCNC: 9 MMO/L (ref 10–25)
ANION GAP SERPL CALC-SCNC: 15 MMOL/L (ref 10–20)
ANION GAP SERPL CALC-SCNC: 18 MMOL/L (ref 10–20)
ANTIBODY SCREEN: NORMAL
APPARATUS: ABNORMAL
ARTERIAL PATENCY WRIST A: NEGATIVE
AST SERPL W P-5'-P-CCNC: 40 U/L (ref 9–39)
BACTERIA BLD CULT: NORMAL
BACTERIA BLD CULT: NORMAL
BASE EXCESS BLDA CALC-SCNC: 2.4 MMOL/L (ref -2–3)
BASOPHILS # BLD MANUAL: 0 X10*3/UL (ref 0–0.1)
BASOPHILS NFR BLD MANUAL: 0 %
BILIRUB SERPL-MCNC: 0.6 MG/DL (ref 0–1.2)
BLOOD EXPIRATION DATE: NORMAL
BODY TEMPERATURE: 37 DEGREES CELSIUS
BUN SERPL-MCNC: 35 MG/DL (ref 6–23)
BUN SERPL-MCNC: 40 MG/DL (ref 6–23)
BURR CELLS BLD QL SMEAR: ABNORMAL
CA-I BLDA-SCNC: 1.17 MMOL/L (ref 1.1–1.33)
CALCIUM SERPL-MCNC: 8 MG/DL (ref 8.6–10.3)
CALCIUM SERPL-MCNC: 8.3 MG/DL (ref 8.6–10.3)
CARDIAC TROPONIN I PNL SERPL HS: 123 NG/L (ref 0–13)
CHLORIDE BLDA-SCNC: 103 MMOL/L (ref 98–107)
CHLORIDE SERPL-SCNC: 100 MMOL/L (ref 98–107)
CHLORIDE SERPL-SCNC: 100 MMOL/L (ref 98–107)
CK SERPL-CCNC: 535 U/L (ref 0–215)
CK SERPL-CCNC: 736 U/L (ref 0–215)
CK SERPL-CCNC: 739 U/L (ref 0–215)
CO2 SERPL-SCNC: 24 MMOL/L (ref 21–32)
CO2 SERPL-SCNC: 27 MMOL/L (ref 21–32)
CREAT SERPL-MCNC: 1.51 MG/DL (ref 0.5–1.05)
CREAT SERPL-MCNC: 1.63 MG/DL (ref 0.5–1.05)
DISPENSE STATUS: NORMAL
EGFRCR SERPLBLD CKD-EPI 2021: 37 ML/MIN/1.73M*2
EGFRCR SERPLBLD CKD-EPI 2021: 41 ML/MIN/1.73M*2
EOSINOPHIL # BLD MANUAL: 0.45 X10*3/UL (ref 0–0.7)
EOSINOPHIL NFR BLD MANUAL: 3 %
ERYTHROCYTE [DISTWIDTH] IN BLOOD BY AUTOMATED COUNT: 14.8 % (ref 11.5–14.5)
ERYTHROCYTE [DISTWIDTH] IN BLOOD BY AUTOMATED COUNT: 16.9 % (ref 11.5–14.5)
ERYTHROCYTE [DISTWIDTH] IN BLOOD BY AUTOMATED COUNT: 17.3 % (ref 11.5–14.5)
GLUCOSE BLD MANUAL STRIP-MCNC: 133 MG/DL (ref 74–99)
GLUCOSE BLD MANUAL STRIP-MCNC: 138 MG/DL (ref 74–99)
GLUCOSE BLD MANUAL STRIP-MCNC: 160 MG/DL (ref 74–99)
GLUCOSE BLDA-MCNC: 165 MG/DL (ref 74–99)
GLUCOSE SERPL-MCNC: 157 MG/DL (ref 74–99)
GLUCOSE SERPL-MCNC: 181 MG/DL (ref 74–99)
HCO3 BLDA-SCNC: 26.2 MMOL/L (ref 22–26)
HCT VFR BLD AUTO: 24.3 % (ref 36–46)
HCT VFR BLD AUTO: 26.5 % (ref 36–46)
HCT VFR BLD AUTO: 26.9 % (ref 36–46)
HCT VFR BLD AUTO: 27.7 % (ref 36–46)
HCT VFR BLD EST: 25 % (ref 36–46)
HGB BLD-MCNC: 7.4 G/DL (ref 12–16)
HGB BLD-MCNC: 8.1 G/DL (ref 12–16)
HGB BLD-MCNC: 8.7 G/DL (ref 12–16)
HGB BLD-MCNC: 8.9 G/DL (ref 12–16)
HGB BLDA-MCNC: 8.2 G/DL (ref 12–16)
HOLD SPECIMEN: NORMAL
HYPOCHROMIA BLD QL SMEAR: ABNORMAL
IMM GRANULOCYTES # BLD AUTO: 0.18 X10*3/UL (ref 0–0.7)
IMM GRANULOCYTES NFR BLD AUTO: 1.2 % (ref 0–0.9)
INHALED O2 CONCENTRATION: 36 %
INR PPP: 7.9 (ref 0.9–1.1)
INR PPP: 8.7 (ref 0.9–1.1)
INR PPP: ABNORMAL
LACTATE BLDA-SCNC: 2.5 MMOL/L (ref 0.4–2)
LACTATE SERPL-SCNC: 2.8 MMOL/L (ref 0.4–2)
LACTATE SERPL-SCNC: 3.3 MMOL/L (ref 0.4–2)
LACTATE SERPL-SCNC: 5.9 MMOL/L (ref 0.4–2)
LYMPHOCYTES # BLD MANUAL: 1.21 X10*3/UL (ref 1.2–4.8)
LYMPHOCYTES NFR BLD MANUAL: 8 %
MCH RBC QN AUTO: 27.6 PG (ref 26–34)
MCH RBC QN AUTO: 28.4 PG (ref 26–34)
MCH RBC QN AUTO: 28.8 PG (ref 26–34)
MCHC RBC AUTO-ENTMCNC: 30.5 G/DL (ref 32–36)
MCHC RBC AUTO-ENTMCNC: 30.6 G/DL (ref 32–36)
MCHC RBC AUTO-ENTMCNC: 32.3 G/DL (ref 32–36)
MCV RBC AUTO: 89 FL (ref 80–100)
MCV RBC AUTO: 90 FL (ref 80–100)
MCV RBC AUTO: 93 FL (ref 80–100)
MONOCYTES # BLD MANUAL: 0.45 X10*3/UL (ref 0.1–1)
MONOCYTES NFR BLD MANUAL: 3 %
MYELOCYTES # BLD MANUAL: 0.3 X10*3/UL
MYELOCYTES NFR BLD MANUAL: 2 %
NEUTROPHILS # BLD MANUAL: 12.39 X10*3/UL (ref 1.2–7.7)
NEUTS BAND # BLD MANUAL: 2.42 X10*3/UL (ref 0–0.7)
NEUTS BAND NFR BLD MANUAL: 16 %
NEUTS SEG # BLD MANUAL: 9.97 X10*3/UL (ref 1.2–7)
NEUTS SEG NFR BLD MANUAL: 66 %
NRBC BLD-RTO: 0.2 /100 WBCS (ref 0–0)
NRBC BLD-RTO: 0.2 /100 WBCS (ref 0–0)
NRBC BLD-RTO: 0.4 /100 WBCS (ref 0–0)
OVALOCYTES BLD QL SMEAR: ABNORMAL
OXYHGB MFR BLDA: 96.3 % (ref 94–98)
PCO2 BLDA: 36 MM HG (ref 38–42)
PH BLDA: 7.47 PH (ref 7.38–7.42)
PLATELET # BLD AUTO: 111 X10*3/UL (ref 150–450)
PLATELET # BLD AUTO: 116 X10*3/UL (ref 150–450)
PLATELET # BLD AUTO: 125 X10*3/UL (ref 150–450)
PO2 BLDA: 83 MM HG (ref 85–95)
POTASSIUM BLDA-SCNC: 3.9 MMOL/L (ref 3.5–5.3)
POTASSIUM SERPL-SCNC: 3.6 MMOL/L (ref 3.5–5.3)
POTASSIUM SERPL-SCNC: 3.9 MMOL/L (ref 3.5–5.3)
PRODUCT BLOOD TYPE: 6200
PRODUCT CODE: NORMAL
PROT SERPL-MCNC: 4.8 G/DL (ref 6.4–8.2)
PROTHROMBIN TIME: 87.7 SECONDS (ref 9.8–12.4)
PROTHROMBIN TIME: 96.9 SECONDS (ref 9.8–12.4)
PROTHROMBIN TIME: >100 SECONDS (ref 9.8–12.4)
RBC # BLD AUTO: 2.61 X10*6/UL (ref 4–5.2)
RBC # BLD AUTO: 2.94 X10*6/UL (ref 4–5.2)
RBC # BLD AUTO: 3.02 X10*6/UL (ref 4–5.2)
RBC MORPH BLD: ABNORMAL
RH FACTOR (ANTIGEN D): NORMAL
RH FACTOR (ANTIGEN D): NORMAL
SAO2 % BLDA: 98 % (ref 94–100)
SCHISTOCYTES BLD QL SMEAR: ABNORMAL
SODIUM BLDA-SCNC: 134 MMOL/L (ref 136–145)
SODIUM SERPL-SCNC: 138 MMOL/L (ref 136–145)
SODIUM SERPL-SCNC: 138 MMOL/L (ref 136–145)
SPECIMEN DRAWN FROM PATIENT: ABNORMAL
TOTAL CELLS COUNTED BLD: 100
UNIT ABO: NORMAL
UNIT NUMBER: NORMAL
UNIT RH: NORMAL
UNIT VOLUME: 350
VARIANT LYMPHS # BLD MANUAL: 0.3 X10*3/UL (ref 0–0.5)
VARIANT LYMPHS NFR BLD: 2 %
WBC # BLD AUTO: 12.8 X10*3/UL (ref 4.4–11.3)
WBC # BLD AUTO: 13.2 X10*3/UL (ref 4.4–11.3)
WBC # BLD AUTO: 15.1 X10*3/UL (ref 4.4–11.3)
XM INTEP: NORMAL

## 2025-03-02 PROCEDURE — 2500000004 HC RX 250 GENERAL PHARMACY W/ HCPCS (ALT 636 FOR OP/ED)

## 2025-03-02 PROCEDURE — 83605 ASSAY OF LACTIC ACID: CPT | Performed by: INTERNAL MEDICINE

## 2025-03-02 PROCEDURE — 2500000005 HC RX 250 GENERAL PHARMACY W/O HCPCS: Performed by: INTERNAL MEDICINE

## 2025-03-02 PROCEDURE — A25023 PR DECOMPRESS FOREARM,EXCIS MUSC/NERV: Performed by: ANESTHESIOLOGIST ASSISTANT

## 2025-03-02 PROCEDURE — 2500000004 HC RX 250 GENERAL PHARMACY W/ HCPCS (ALT 636 FOR OP/ED): Performed by: ANESTHESIOLOGIST ASSISTANT

## 2025-03-02 PROCEDURE — 2500000002 HC RX 250 W HCPCS SELF ADMINISTERED DRUGS (ALT 637 FOR MEDICARE OP, ALT 636 FOR OP/ED): Performed by: INTERNAL MEDICINE

## 2025-03-02 PROCEDURE — 82040 ASSAY OF SERUM ALBUMIN: CPT

## 2025-03-02 PROCEDURE — P9016 RBC LEUKOCYTES REDUCED: HCPCS

## 2025-03-02 PROCEDURE — 84132 ASSAY OF SERUM POTASSIUM: CPT | Performed by: INTERNAL MEDICINE

## 2025-03-02 PROCEDURE — 2500000002 HC RX 250 W HCPCS SELF ADMINISTERED DRUGS (ALT 637 FOR MEDICARE OP, ALT 636 FOR OP/ED)

## 2025-03-02 PROCEDURE — 2500000004 HC RX 250 GENERAL PHARMACY W/ HCPCS (ALT 636 FOR OP/ED): Performed by: INTERNAL MEDICINE

## 2025-03-02 PROCEDURE — 99140 ANES COMP EMERGENCY COND: CPT | Performed by: ANESTHESIOLOGY

## 2025-03-02 PROCEDURE — 2720000007 HC OR 272 NO HCPCS: Performed by: ORTHOPAEDIC SURGERY

## 2025-03-02 PROCEDURE — 99223 1ST HOSP IP/OBS HIGH 75: CPT | Performed by: INTERNAL MEDICINE

## 2025-03-02 PROCEDURE — 73206 CT ANGIO UPR EXTRM W/O&W/DYE: CPT | Mod: LEFT SIDE | Performed by: RADIOLOGY

## 2025-03-02 PROCEDURE — 85027 COMPLETE CBC AUTOMATED: CPT | Performed by: INTERNAL MEDICINE

## 2025-03-02 PROCEDURE — 85610 PROTHROMBIN TIME: CPT | Performed by: INTERNAL MEDICINE

## 2025-03-02 PROCEDURE — 2500000001 HC RX 250 WO HCPCS SELF ADMINISTERED DRUGS (ALT 637 FOR MEDICARE OP): Performed by: STUDENT IN AN ORGANIZED HEALTH CARE EDUCATION/TRAINING PROGRAM

## 2025-03-02 PROCEDURE — 99291 CRITICAL CARE FIRST HOUR: CPT

## 2025-03-02 PROCEDURE — 93971 EXTREMITY STUDY: CPT | Performed by: RADIOLOGY

## 2025-03-02 PROCEDURE — 93971 EXTREMITY STUDY: CPT

## 2025-03-02 PROCEDURE — 85007 BL SMEAR W/DIFF WBC COUNT: CPT

## 2025-03-02 PROCEDURE — 36415 COLL VENOUS BLD VENIPUNCTURE: CPT

## 2025-03-02 PROCEDURE — 73206 CT ANGIO UPR EXTRM W/O&W/DYE: CPT | Mod: LT

## 2025-03-02 PROCEDURE — 3600000007 HC OR TIME - EACH INCREMENTAL 1 MINUTE - PROCEDURE LEVEL TWO: Performed by: ORTHOPAEDIC SURGERY

## 2025-03-02 PROCEDURE — 86923 COMPATIBILITY TEST ELECTRIC: CPT

## 2025-03-02 PROCEDURE — 2500000005 HC RX 250 GENERAL PHARMACY W/O HCPCS: Performed by: ANESTHESIOLOGIST ASSISTANT

## 2025-03-02 PROCEDURE — 2020000001 HC ICU ROOM DAILY

## 2025-03-02 PROCEDURE — 0KN80ZZ RELEASE LEFT UPPER ARM MUSCLE, OPEN APPROACH: ICD-10-PCS | Performed by: ORTHOPAEDIC SURGERY

## 2025-03-02 PROCEDURE — 70450 CT HEAD/BRAIN W/O DYE: CPT | Performed by: RADIOLOGY

## 2025-03-02 PROCEDURE — 36430 TRANSFUSION BLD/BLD COMPNT: CPT | Performed by: ANESTHESIOLOGIST ASSISTANT

## 2025-03-02 PROCEDURE — 82550 ASSAY OF CK (CPK): CPT

## 2025-03-02 PROCEDURE — 84484 ASSAY OF TROPONIN QUANT: CPT | Performed by: INTERNAL MEDICINE

## 2025-03-02 PROCEDURE — 3700000001 HC GENERAL ANESTHESIA TIME - INITIAL BASE CHARGE: Performed by: ORTHOPAEDIC SURGERY

## 2025-03-02 PROCEDURE — 83605 ASSAY OF LACTIC ACID: CPT

## 2025-03-02 PROCEDURE — 94660 CPAP INITIATION&MGMT: CPT

## 2025-03-02 PROCEDURE — 3600000002 HC OR TIME - INITIAL BASE CHARGE - PROCEDURE LEVEL TWO: Performed by: ORTHOPAEDIC SURGERY

## 2025-03-02 PROCEDURE — 94640 AIRWAY INHALATION TREATMENT: CPT

## 2025-03-02 PROCEDURE — 70450 CT HEAD/BRAIN W/O DYE: CPT

## 2025-03-02 PROCEDURE — 3700000002 HC GENERAL ANESTHESIA TIME - EACH INCREMENTAL 1 MINUTE: Performed by: ORTHOPAEDIC SURGERY

## 2025-03-02 PROCEDURE — 85027 COMPLETE CBC AUTOMATED: CPT | Performed by: NURSE PRACTITIONER

## 2025-03-02 PROCEDURE — 85610 PROTHROMBIN TIME: CPT

## 2025-03-02 PROCEDURE — 82947 ASSAY GLUCOSE BLOOD QUANT: CPT

## 2025-03-02 PROCEDURE — 99222 1ST HOSP IP/OBS MODERATE 55: CPT | Performed by: SURGERY

## 2025-03-02 PROCEDURE — 93010 ELECTROCARDIOGRAM REPORT: CPT | Performed by: STUDENT IN AN ORGANIZED HEALTH CARE EDUCATION/TRAINING PROGRAM

## 2025-03-02 PROCEDURE — 2500000005 HC RX 250 GENERAL PHARMACY W/O HCPCS

## 2025-03-02 PROCEDURE — 36600 WITHDRAWAL OF ARTERIAL BLOOD: CPT

## 2025-03-02 PROCEDURE — 85014 HEMATOCRIT: CPT

## 2025-03-02 PROCEDURE — 82550 ASSAY OF CK (CPK): CPT | Performed by: INTERNAL MEDICINE

## 2025-03-02 PROCEDURE — 93005 ELECTROCARDIOGRAM TRACING: CPT

## 2025-03-02 PROCEDURE — A25023 PR DECOMPRESS FOREARM,EXCIS MUSC/NERV: Performed by: ANESTHESIOLOGY

## 2025-03-02 PROCEDURE — 36415 COLL VENOUS BLD VENIPUNCTURE: CPT | Performed by: NURSE PRACTITIONER

## 2025-03-02 PROCEDURE — 2550000001 HC RX 255 CONTRASTS: Performed by: INTERNAL MEDICINE

## 2025-03-02 PROCEDURE — 86901 BLOOD TYPING SEROLOGIC RH(D): CPT

## 2025-03-02 PROCEDURE — 2500000004 HC RX 250 GENERAL PHARMACY W/ HCPCS (ALT 636 FOR OP/ED): Performed by: STUDENT IN AN ORGANIZED HEALTH CARE EDUCATION/TRAINING PROGRAM

## 2025-03-02 PROCEDURE — 85027 COMPLETE CBC AUTOMATED: CPT

## 2025-03-02 RX ORDER — CEFAZOLIN 1 G/1
INJECTION, POWDER, FOR SOLUTION INTRAVENOUS AS NEEDED
Status: DISCONTINUED | OUTPATIENT
Start: 2025-03-02 | End: 2025-03-02

## 2025-03-02 RX ORDER — PHYTONADIONE 5 MG/1
5 TABLET ORAL ONCE
Status: COMPLETED | OUTPATIENT
Start: 2025-03-02 | End: 2025-03-02

## 2025-03-02 RX ORDER — ETOMIDATE 2 MG/ML
INJECTION INTRAVENOUS AS NEEDED
Status: DISCONTINUED | OUTPATIENT
Start: 2025-03-02 | End: 2025-03-02

## 2025-03-02 RX ORDER — SODIUM CHLORIDE, SODIUM LACTATE, POTASSIUM CHLORIDE, CALCIUM CHLORIDE 600; 310; 30; 20 MG/100ML; MG/100ML; MG/100ML; MG/100ML
100 INJECTION, SOLUTION INTRAVENOUS CONTINUOUS
Status: DISCONTINUED | OUTPATIENT
Start: 2025-03-02 | End: 2025-03-05

## 2025-03-02 RX ORDER — VANCOMYCIN HYDROCHLORIDE 250 MG/1
250 CAPSULE ORAL 4 TIMES DAILY
Status: DISCONTINUED | OUTPATIENT
Start: 2025-03-02 | End: 2025-03-03

## 2025-03-02 RX ORDER — PROPOFOL 10 MG/ML
INJECTION, EMULSION INTRAVENOUS CONTINUOUS PRN
Status: DISCONTINUED | OUTPATIENT
Start: 2025-03-02 | End: 2025-03-02

## 2025-03-02 RX ORDER — DIGOXIN 0.25 MG/ML
500 INJECTION INTRAMUSCULAR; INTRAVENOUS ONCE
Status: COMPLETED | OUTPATIENT
Start: 2025-03-02 | End: 2025-03-02

## 2025-03-02 RX ORDER — ONDANSETRON HYDROCHLORIDE 2 MG/ML
INJECTION, SOLUTION INTRAVENOUS AS NEEDED
Status: DISCONTINUED | OUTPATIENT
Start: 2025-03-02 | End: 2025-03-02

## 2025-03-02 RX ORDER — ESMOLOL HYDROCHLORIDE 10 MG/ML
INJECTION INTRAVENOUS AS NEEDED
Status: DISCONTINUED | OUTPATIENT
Start: 2025-03-02 | End: 2025-03-02

## 2025-03-02 RX ORDER — METOPROLOL TARTRATE 1 MG/ML
5 INJECTION, SOLUTION INTRAVENOUS EVERY 6 HOURS PRN
Status: DISCONTINUED | OUTPATIENT
Start: 2025-03-02 | End: 2025-03-06

## 2025-03-02 RX ORDER — PHENYLEPHRINE HCL IN 0.9% NACL 1 MG/10 ML
SYRINGE (ML) INTRAVENOUS AS NEEDED
Status: DISCONTINUED | OUTPATIENT
Start: 2025-03-02 | End: 2025-03-02

## 2025-03-02 RX ORDER — FENTANYL CITRATE 50 UG/ML
INJECTION, SOLUTION INTRAMUSCULAR; INTRAVENOUS AS NEEDED
Status: DISCONTINUED | OUTPATIENT
Start: 2025-03-02 | End: 2025-03-02

## 2025-03-02 RX ORDER — MIDAZOLAM HYDROCHLORIDE 1 MG/ML
INJECTION, SOLUTION INTRAMUSCULAR; INTRAVENOUS CONTINUOUS PRN
Status: DISCONTINUED | OUTPATIENT
Start: 2025-03-02 | End: 2025-03-02

## 2025-03-02 RX ORDER — BUDESONIDE 0.5 MG/2ML
0.5 INHALANT ORAL
Status: DISCONTINUED | OUTPATIENT
Start: 2025-03-02 | End: 2025-03-18 | Stop reason: HOSPADM

## 2025-03-02 RX ORDER — LIDOCAINE HCL/PF 100 MG/5ML
SYRINGE (ML) INTRAVENOUS AS NEEDED
Status: DISCONTINUED | OUTPATIENT
Start: 2025-03-02 | End: 2025-03-02

## 2025-03-02 RX ORDER — ROCURONIUM BROMIDE 10 MG/ML
INJECTION, SOLUTION INTRAVENOUS AS NEEDED
Status: DISCONTINUED | OUTPATIENT
Start: 2025-03-02 | End: 2025-03-02

## 2025-03-02 RX ORDER — SUCCINYLCHOLINE/SOD CL,ISO/PF 100 MG/5ML
SYRINGE (ML) INTRAVENOUS AS NEEDED
Status: DISCONTINUED | OUTPATIENT
Start: 2025-03-02 | End: 2025-03-02

## 2025-03-02 RX ORDER — SODIUM CHLORIDE 9 MG/ML
INJECTION, SOLUTION INTRAVENOUS CONTINUOUS PRN
Status: DISCONTINUED | OUTPATIENT
Start: 2025-03-02 | End: 2025-03-02

## 2025-03-02 RX ADMIN — Medication 5 L/MIN: at 08:50

## 2025-03-02 RX ADMIN — Medication 40 PERCENT: at 19:13

## 2025-03-02 RX ADMIN — BUDESONIDE 0.5 MG: 0.5 INHALANT ORAL at 19:12

## 2025-03-02 RX ADMIN — SODIUM CHLORIDE, POTASSIUM CHLORIDE, SODIUM LACTATE AND CALCIUM CHLORIDE 100 ML/HR: 600; 310; 30; 20 INJECTION, SOLUTION INTRAVENOUS at 16:48

## 2025-03-02 RX ADMIN — VANCOMYCIN HYDROCHLORIDE 250 MG: 250 CAPSULE ORAL at 21:46

## 2025-03-02 RX ADMIN — ROCURONIUM BROMIDE 40 MG: 50 INJECTION, SOLUTION INTRAVENOUS at 12:18

## 2025-03-02 RX ADMIN — DOXYCYCLINE 100 MG: 100 INJECTION, POWDER, LYOPHILIZED, FOR SOLUTION INTRAVENOUS at 11:06

## 2025-03-02 RX ADMIN — PHYTONADIONE 5 MG: 10 INJECTION, EMULSION INTRAMUSCULAR; INTRAVENOUS; SUBCUTANEOUS at 22:04

## 2025-03-02 RX ADMIN — DOXYCYCLINE 100 MG: 100 INJECTION, POWDER, LYOPHILIZED, FOR SOLUTION INTRAVENOUS at 23:19

## 2025-03-02 RX ADMIN — ROCURONIUM BROMIDE 10 MG: 50 INJECTION, SOLUTION INTRAVENOUS at 12:04

## 2025-03-02 RX ADMIN — CEFAZOLIN 2 G: 1 INJECTION, POWDER, FOR SOLUTION INTRAMUSCULAR; INTRAVENOUS at 12:10

## 2025-03-02 RX ADMIN — SODIUM CHLORIDE: 900 INJECTION, SOLUTION INTRAVENOUS at 12:47

## 2025-03-02 RX ADMIN — Medication 200 MG: at 12:04

## 2025-03-02 RX ADMIN — IPRATROPIUM BROMIDE AND ALBUTEROL SULFATE 3 ML: .5; 3 SOLUTION RESPIRATORY (INHALATION) at 06:48

## 2025-03-02 RX ADMIN — IPRATROPIUM BROMIDE AND ALBUTEROL SULFATE 3 ML: .5; 3 SOLUTION RESPIRATORY (INHALATION) at 19:12

## 2025-03-02 RX ADMIN — SODIUM CHLORIDE, POTASSIUM CHLORIDE, SODIUM LACTATE AND CALCIUM CHLORIDE 100 ML/HR: 600; 310; 30; 20 INJECTION, SOLUTION INTRAVENOUS at 10:53

## 2025-03-02 RX ADMIN — ACETAMINOPHEN 650 MG: 325 TABLET, FILM COATED ORAL at 11:05

## 2025-03-02 RX ADMIN — IOHEXOL 75 ML: 350 INJECTION, SOLUTION INTRAVENOUS at 15:12

## 2025-03-02 RX ADMIN — ASPIRIN 81 MG: 81 TABLET, COATED ORAL at 08:47

## 2025-03-02 RX ADMIN — VANCOMYCIN HYDROCHLORIDE 125 MG: 125 CAPSULE ORAL at 06:06

## 2025-03-02 RX ADMIN — ONDANSETRON 4 MG: 2 INJECTION INTRAMUSCULAR; INTRAVENOUS at 13:24

## 2025-03-02 RX ADMIN — Medication 5 L/MIN: at 08:00

## 2025-03-02 RX ADMIN — DIGOXIN 500 MCG: 0.25 INJECTION INTRAMUSCULAR; INTRAVENOUS at 10:01

## 2025-03-02 RX ADMIN — VANCOMYCIN HYDROCHLORIDE 250 MG: 250 CAPSULE ORAL at 16:41

## 2025-03-02 RX ADMIN — ESMOLOL HYDROCHLORIDE 50 MG: 100 INJECTION, SOLUTION INTRAVENOUS at 12:01

## 2025-03-02 RX ADMIN — Medication 200 MCG: at 12:21

## 2025-03-02 RX ADMIN — Medication 100 MCG: at 12:29

## 2025-03-02 RX ADMIN — ONDANSETRON 4 MG: 2 INJECTION INTRAMUSCULAR; INTRAVENOUS at 02:37

## 2025-03-02 RX ADMIN — Medication 200 MCG: at 12:01

## 2025-03-02 RX ADMIN — BUPROPION HYDROCHLORIDE 150 MG: 150 TABLET, EXTENDED RELEASE ORAL at 08:47

## 2025-03-02 RX ADMIN — ONDANSETRON 4 MG: 2 INJECTION INTRAMUSCULAR; INTRAVENOUS at 09:02

## 2025-03-02 RX ADMIN — ETOMIDATE 14 MG: 2 INJECTION INTRAVENOUS at 12:04

## 2025-03-02 RX ADMIN — INSULIN LISPRO 2 UNITS: 100 INJECTION, SOLUTION INTRAVENOUS; SUBCUTANEOUS at 08:47

## 2025-03-02 RX ADMIN — LIDOCAINE HYDROCHLORIDE 60 MG: 20 INJECTION, SOLUTION INTRAVENOUS at 12:04

## 2025-03-02 RX ADMIN — FENTANYL CITRATE 25 MCG: 50 INJECTION, SOLUTION INTRAMUSCULAR; INTRAVENOUS at 12:20

## 2025-03-02 RX ADMIN — METOPROLOL TARTRATE 5 MG: 5 INJECTION INTRAVENOUS at 19:58

## 2025-03-02 RX ADMIN — ESCITALOPRAM OXALATE 20 MG: 10 TABLET ORAL at 08:47

## 2025-03-02 RX ADMIN — ONDANSETRON 4 MG: 2 INJECTION INTRAMUSCULAR; INTRAVENOUS at 20:18

## 2025-03-02 RX ADMIN — FENTANYL CITRATE 25 MCG: 50 INJECTION, SOLUTION INTRAMUSCULAR; INTRAVENOUS at 13:14

## 2025-03-02 RX ADMIN — PHYTONADIONE 5 MG: 5 TABLET ORAL at 17:10

## 2025-03-02 RX ADMIN — SUGAMMADEX 200 MG: 100 INJECTION, SOLUTION INTRAVENOUS at 13:27

## 2025-03-02 RX ADMIN — OSELTAMIVIR PHOSPHATE 75 MG: 75 CAPSULE ORAL at 08:46

## 2025-03-02 RX ADMIN — PANTOPRAZOLE SODIUM 40 MG: 40 INJECTION, POWDER, FOR SOLUTION INTRAVENOUS at 08:50

## 2025-03-02 RX ADMIN — DEXAMETHASONE SODIUM PHOSPHATE 4 MG: 4 INJECTION, SOLUTION INTRAMUSCULAR; INTRAVENOUS at 13:24

## 2025-03-02 RX ADMIN — IPRATROPIUM BROMIDE AND ALBUTEROL SULFATE 3 ML: .5; 3 SOLUTION RESPIRATORY (INHALATION) at 00:52

## 2025-03-02 RX ADMIN — METHYLPREDNISOLONE SODIUM SUCCINATE 40 MG: 40 INJECTION, POWDER, FOR SOLUTION INTRAMUSCULAR; INTRAVENOUS at 11:07

## 2025-03-02 RX ADMIN — Medication 200 MCG: at 12:09

## 2025-03-02 RX ADMIN — METHYLPREDNISOLONE SODIUM SUCCINATE 40 MG: 40 INJECTION, POWDER, FOR SOLUTION INTRAMUSCULAR; INTRAVENOUS at 02:14

## 2025-03-02 SDOH — HEALTH STABILITY: MENTAL HEALTH: CURRENT SMOKER: 0

## 2025-03-02 ASSESSMENT — PAIN SCALES - GENERAL
PAINLEVEL_OUTOF10: 0 - NO PAIN
PAIN_LEVEL: 0
PAINLEVEL_OUTOF10: 0 - NO PAIN
PAINLEVEL_OUTOF10: 0 - NO PAIN
PAINLEVEL_OUTOF10: 6

## 2025-03-02 ASSESSMENT — ENCOUNTER SYMPTOMS: COLOR CHANGE: 1

## 2025-03-02 ASSESSMENT — PAIN - FUNCTIONAL ASSESSMENT
PAIN_FUNCTIONAL_ASSESSMENT: 0-10
PAIN_FUNCTIONAL_ASSESSMENT: 0-10

## 2025-03-02 NOTE — CONSULTS
Inpatient consult to Vascular Surgery  Consult performed by: Rosalie Clark MD  Consult ordered by: NADYA Duff-CNP  Reason for consult: left upper extremity injury 2/2 infiltrated IV with Levophed          Reason For Consult  left upper extremity injury 2/2 infiltrated IV with Levophed     History Of Present Illness  Kayley Lynch is a 54 y.o. female admitted to ICU for hypotension.  She had a peripheral IV and an arterial line in the left upper extremity.  Both of these have been removed.  However she was noted last night to have new onset of left arm swelling and bruising.  Per nursing staff the onset was sudden and has progressed significantly in a short amount of time.  Patient denies any injury to the arm.     Past Medical History  She has a past medical history of Atrial flutter (Multi) (03/08/2023), Bilateral edema of lower extremity (03/08/2023), CAD (coronary artery disease) (03/08/2023), Cardiomyopathy (Multi) (03/08/2023), Chest pressure (03/22/2024), Hypercholesterolemia (03/08/2023), Mitral regurgitation (03/08/2023), Other conditions influencing health status, Pulmonary hypertension (Multi) (03/08/2023), S/P CABG (coronary artery bypass graft) (03/08/2023), and S/P mitral valve replacement (03/08/2023).    Surgical History  She has a past surgical history that includes Other surgical history (08/17/2021) and Other surgical history (08/06/2020).     Social History  She reports that she has been smoking cigarettes. She has never used smokeless tobacco. She reports that she does not drink alcohol and does not use drugs.    Family History  Family History   Problem Relation Name Age of Onset    Atrial fibrillation Father      Heart attack Father          Allergies  Sulfa (sulfonamide antibiotics)    Review of Systems   Musculoskeletal:         Arm swelling  Arm pain   Skin:  Positive for color change.        Physical Exam  Constitutional:       General: She is not in acute distress.      "Appearance: Normal appearance. She is normal weight.   HENT:      Head: Normocephalic and atraumatic.   Eyes:      Extraocular Movements: Extraocular movements intact.      Conjunctiva/sclera: Conjunctivae normal.   Cardiovascular:      Rate and Rhythm: Normal rate and regular rhythm.      Pulses: Normal pulses.           Radial pulses are 2+ on the right side.        Femoral pulses are 2+ on the right side and 2+ on the left side.     Heart sounds: Normal heart sounds.      Comments: Multiphasic left ulnar and palmar arch doppler signals; difficult to obtain left radial doppler signal  Pulmonary:      Effort: Pulmonary effort is normal.      Breath sounds: Normal breath sounds.   Abdominal:      Palpations: Abdomen is soft.   Musculoskeletal:      Cervical back: Normal range of motion.      Comments: Edema of left arm and forearm with significant ecchymosis.  Mild edema of left hand and wrist.  Patient is able to bend left elbow and squeeze fingers with the left hand.   Skin:     General: Skin is warm and dry.   Neurological:      General: No focal deficit present.      Mental Status: She is alert and oriented to person, place, and time.      Cranial Nerves: No cranial nerve deficit.      Sensory: No sensory deficit.      Motor: No weakness.   Psychiatric:         Mood and Affect: Mood normal.         Behavior: Behavior normal.          Last Recorded Vitals  Blood pressure 118/57, pulse (!) 128, temperature 35.6 °C (96.1 °F), temperature source Temporal, resp. rate 20, height 1.6 m (5' 2.99\"), weight 66.6 kg (146 lb 13.2 oz), SpO2 90%.    Relevant Results    Current Facility-Administered Medications:     acetaminophen (Tylenol) tablet 650 mg, 650 mg, oral, q4h PRN, Aniket Yoder DO, 650 mg at 03/02/25 1105    aspirin EC tablet 81 mg, 81 mg, oral, Daily, Aniket Yoder DO, 81 mg at 03/02/25 0847    atorvastatin (Lipitor) tablet 80 mg, 80 mg, oral, Nightly, Aniket Yoder, DO, 80 mg at 03/01/25 2046    buPROPion XL " (Wellbutrin XL) 24 hr tablet 150 mg, 150 mg, oral, q AM, Aniket Yoder DO, 150 mg at 03/02/25 0847    doxycycline (Vibramycin) 100 mg in dextrose 5%  mL, 100 mg, intravenous, q12h, Gabby Perez MD, Last Rate: 100 mL/hr at 03/02/25 1106, 100 mg at 03/02/25 1106    [Held by provider] enoxaparin (Lovenox) syringe 70 mg, 1 mg/kg, subcutaneous, q12h, Gabby Perez MD, 70 mg at 03/01/25 2223    escitalopram (Lexapro) tablet 20 mg, 20 mg, oral, Daily, Aniket Yoder DO, 20 mg at 03/02/25 0847    insulin lispro injection 0-5 Units, 0-5 Units, subcutaneous, TID AC, Neto Clark MD, 2 Units at 03/02/25 0847    ipratropium-albuteroL (Duo-Neb) 0.5-2.5 mg/3 mL nebulizer solution 3 mL, 3 mL, nebulization, q6h, Neto Clark MD, 3 mL at 03/02/25 0648    ipratropium-albuteroL (Duo-Neb) 0.5-2.5 mg/3 mL nebulizer solution 3 mL, 3 mL, nebulization, q2h PRN, Neto Clark MD    lactated Ringer's infusion, 100 mL/hr, intravenous, Continuous, Melina Ritter MD, Last Rate: 100 mL/hr at 03/02/25 1053, 100 mL/hr at 03/02/25 1053    melatonin tablet 3 mg, 3 mg, oral, Nightly PRN, Neto Clark MD, 3 mg at 02/28/25 2049    methylPREDNISolone sod succinate (SOLU-Medrol) 40 mg/mL injection 40 mg, 40 mg, intravenous, q8h, Gabby Perez MD, 40 mg at 03/02/25 1107    metoprolol tartrate (Lopressor) injection 5 mg, 5 mg, intravenous, q6h PRN, Hung Brady DO    ondansetron (Zofran) injection 4 mg, 4 mg, intravenous, q6h PRN, Aniket oYder DO, 4 mg at 03/02/25 0902    oseltamivir (Tamiflu) capsule 75 mg, 75 mg, oral, BID, Neto Clark MD, 75 mg at 03/02/25 0846    oxygen (O2) therapy, , inhalation, Continuous - Inhalation, Alejandro Goss MD, Last Rate: 300,000 mL/hr at 03/02/25 0800, 5 L/min at 03/02/25 0800    oxygen (O2) therapy, , inhalation, Continuous - Inhalation, Alejandro Goss MD, Last Rate: 300,000 mL/hr at 03/02/25 0850, 5 L/min at 03/02/25 0850    pantoprazole (ProtoNix)  injection 40 mg, 40 mg, intravenous, Daily, Neto Clark MD, 40 mg at 03/02/25 0850    perflutren lipid microspheres (Definity) injection 0.5-10 mL of dilution, 0.5-10 mL of dilution, intravenous, Once in imaging, Angelica Torres PA-C    perflutren protein A microsphere (Optison) injection 0.5 mL, 0.5 mL, intravenous, Once in imaging, Angelica Torres PA-C    sulfur hexafluoride microsphr (Lumason) injection 24.28 mg, 2 mL, intravenous, Once in imaging, Angelica Torres PA-C    vancomycin (Vancocin) capsule 125 mg, 125 mg, oral, 4x daily, Ora Choudhary DO, 125 mg at 03/02/25 0606    [Held by provider] warfarin (Coumadin) tablet 2 mg, 2 mg, oral, Daily, Gabby Perez MD, 2 mg at 03/01/25 1732       Results for orders placed or performed during the hospital encounter of 02/25/25 (from the past 24 hours)   POCT GLUCOSE   Result Value Ref Range    POCT Glucose 233 (H) 74 - 99 mg/dL   POCT GLUCOSE   Result Value Ref Range    POCT Glucose 138 (H) 74 - 99 mg/dL   CBC   Result Value Ref Range    WBC 12.8 (H) 4.4 - 11.3 x10*3/uL    nRBC 0.2 (H) 0.0 - 0.0 /100 WBCs    RBC 2.94 (L) 4.00 - 5.20 x10*6/uL    Hemoglobin 8.1 (L) 12.0 - 16.0 g/dL    Hematocrit 26.5 (L) 36.0 - 46.0 %    MCV 90 80 - 100 fL    MCH 27.6 26.0 - 34.0 pg    MCHC 30.6 (L) 32.0 - 36.0 g/dL    RDW 16.9 (H) 11.5 - 14.5 %    Platelets 116 (L) 150 - 450 x10*3/uL   POCT GLUCOSE   Result Value Ref Range    POCT Glucose 160 (H) 74 - 99 mg/dL   Creatine Kinase   Result Value Ref Range    Creatine Kinase 535 (H) 0 - 215 U/L   Comprehensive Metabolic Panel   Result Value Ref Range    Glucose 181 (H) 74 - 99 mg/dL    Sodium 138 136 - 145 mmol/L    Potassium 3.6 3.5 - 5.3 mmol/L    Chloride 100 98 - 107 mmol/L    Bicarbonate 24 21 - 32 mmol/L    Anion Gap 18 10 - 20 mmol/L    Urea Nitrogen 35 (H) 6 - 23 mg/dL    Creatinine 1.63 (H) 0.50 - 1.05 mg/dL    eGFR 37 (L) >60 mL/min/1.73m*2    Calcium 8.3 (L) 8.6 - 10.3 mg/dL    Albumin 3.0 (L) 3.4 - 5.0 g/dL     Alkaline Phosphatase 47 33 - 110 U/L    Total Protein 4.8 (L) 6.4 - 8.2 g/dL    AST 40 (H) 9 - 39 U/L    Bilirubin, Total 0.6 0.0 - 1.2 mg/dL    ALT 17 7 - 45 U/L   CBC and Auto Differential   Result Value Ref Range    WBC 15.1 (H) 4.4 - 11.3 x10*3/uL    nRBC 0.2 (H) 0.0 - 0.0 /100 WBCs    RBC 2.61 (L) 4.00 - 5.20 x10*6/uL    Hemoglobin 7.4 (L) 12.0 - 16.0 g/dL    Hematocrit 24.3 (L) 36.0 - 46.0 %    MCV 93 80 - 100 fL    MCH 28.4 26.0 - 34.0 pg    MCHC 30.5 (L) 32.0 - 36.0 g/dL    RDW 17.3 (H) 11.5 - 14.5 %    Platelets 111 (L) 150 - 450 x10*3/uL    Neutrophils %      Immature Granulocytes %, Automated      Lymphocytes %      Monocytes %      Eosinophils %      Basophils %      Neutrophils Absolute      Lymphocytes Absolute      Monocytes Absolute      Eosinophils Absolute      Basophils Absolute     Type and screen   Result Value Ref Range    ABO TYPE A     Rh TYPE POS     ANTIBODY SCREEN NEG    Lactate   Result Value Ref Range    Lactate 5.9 (HH) 0.4 - 2.0 mmol/L   VERIFY ABO/Rh Group Test   Result Value Ref Range    ABO TYPE A     Rh TYPE POS         Vascular US Upper Extremity Venous Duplex Left    Result Date: 3/2/2025  Interpreted By:  Christopher Casey, STUDY: [facility] US UPPER EXTREMITY VENOUS DUPLEX LEFT  3/2/2025 2:50 am   INDICATION: 53 y/o   F with  Signs/Symptoms:IV infiltrated yesterday with Levophed; r/o DVT.     COMPARISON: None.   ACCESSION NUMBER(S): [id number]   ORDERING CLINICIAN: BINH CRESPO   TECHNIQUE: Routine ultrasound of the  left upper extremity was performed with duplex Doppler (color and spectral) evaluation.   Static images were obtained for remote interpretation.   FINDINGS: The examination is limited secondary to significant swelling and edema in the soft tissues. The left internal jugular vein, innominate vein and subclavian vein are visualized and patent. The left axillary, brachial, cephalic and basilic veins are however not visualized secondary to the extensive soft tissue edema.        The left internal jugular vein, innominate vein and subclavian vein are visualized and patent. The left axillary, brachial, cephalic and basilic veins are however not visualized secondary to the extensive soft tissue edema and patency cannot be assessed.   MACRO: None   Signed by: Christopher Casey 3/2/2025 3:22 AM Dictation workstation:   [id number]    CT head wo IV contrast    Result Date: 2/28/2025  Interpreted By:  Estevan Abarca, STUDY: CT HEAD WO IV CONTRAST;  2/28/2025 3:52 pm   INDICATION: Signs/Symptoms:Confusion and hallucinations following resuming anticoagulation.   COMPARISON: None.   ACCESSION NUMBER(S): [id number]   ORDERING CLINICIAN: SHAW ALCALA   TECHNIQUE: Routine axial images were obtained from the skull base through the vertex. Sagittal and coronal reconstruction images were generated. Brain, subdural, and bone windows were reviewed. N/A   N/A   FINDINGS: INTRACRANIAL: The ventricles, sulci and basal cisterns were within normal limits. No acute intracranial bleed, midline shift, or focal mass effect. No destructive bone lesion. No depressed skull fracture. Skullbase arterial calcifications in the carotid siphons .   EXTRACRANIAL: Visualized paranasal sinuses were clear. Visualized mastoid air cells were clear.       Bilateral carotid siphon skull base arterial calcifications.   No acute intracranial bleed or focal mass effect.   MACRO: None   Signed by: Estevan Abarca 2/28/2025 4:43 PM Dictation workstation:   [id number]         Assessment/Plan     55yo female with extensive left upper extremity edema and ecchymosis.  Vascular exam appears to be intact, therefore compartment syndrome unlikely at this time.  There appears to be a spontaneous hematoma.  She did have a supratherapeutic INR secondary to Coumadin.  This has improved, but most recent INR from yesterday morning is still 2.2.  Recommend to normalize INR to reduce the risk of further bleeding.  Also keep the extremity elevated to  help reduce swelling.  Continue frequent monitoring of pulse exam.  Will follow.    I spent 65 minutes in the professional and overall care of this patient.

## 2025-03-02 NOTE — NURSING NOTE
Nurse practitioner ordered vascular consult Dr. Clark.  Dr. Clark received pictures of patient's arm and thought it was an IV infiltrate but would see the patient in the a.m.   Patient's arm was elevated and has no IV in it.

## 2025-03-02 NOTE — NURSING NOTE
Patient's left arm is very swollen, bruised and hard.  Patient is complaining of pain when she moves it or it is touched.  A stat ultra sound was ordered and completed.  Waiting on results.

## 2025-03-02 NOTE — CONSULTS
Assessment and Plan  Patient is 54 y.o. female  with the following medical Problems:  Acute on chronic hypoxic hypercapnic respiratory failure  COPD with acute exacerbation  Secondary pulmonary hypertension  Severe sepsis with septic shock requiring Levophed (resolved)  S/P Left upper extremity fasciotomy February 2, 2025 for nontraumatic compartment syndrome of left upper extremity  Influenza A.  Coronary artery disease (S/P CABG)  Valvular heart disease with mitral regurgitation (S/P mitral valve replacement)  Chronic atrial flutter on Coumadin (S/P ablation)  Dyslipidemia  C. difficile colitis  Acute kidney injury  Lactic acidosis  Hemorrhagic shock    Plan of Care:  Supplemental oxygen to keep sat 88 to 94%.  Continue with Solu-Medrol, DuoNeb, Pulmicort  Patient is off anticoagulation due to left upper extremity compartment syndrome suspected bleeding.  Continue with p.o. vancomycin for C. difficile colitis.  Increase p.o. dose to 250 mg every 6 hours.  Incentive spirometer.  BiPAP while sleeping, napping, and as needed.  Serum lactate.  Patient completed treatment with Tamiflu.  Continue with maintenance IV fluid for acute kidney injury.  CK level.      History of Present Illness:   Patient is a 54 year old female with CAD (s/p CABG), pHTN, COPD, nicotine use disorder, HLD, atrial flutter (s/p ablation), s/p mitral valve replacement who was initially admitted to [facility] MICU on February 26, 2025 for septic shock requiring vasopressors, influenza A, and Cdiff colitis. She received antibiotic therapy for suspected superimposed bacterial pneumonia, and PO vancomycin for Cdiff. She was transferred out of ICU on 2/28. Overnight 3/1 - 3/2 patient was noted to have increasing swelling of the LUE. Vascular Surgery reportedly evaluated the patient and was able to doppler pulses.      Rapid response was called at 0915 to evaluate the patient in bed 138 who was tachycardic and hypotensive. Nursing staff reported patient  became tachycardic in 160s with BP in 80s/50s. Patient denied any symptoms upon arrival and was mentating at her baseline. Nursing staff reported at least four watery bowel movements since shift change. LUE swelling also noted by nursing to have increased, stated that on evaluation this morning the arm, while swollen, was still soft to palpation. It has now become firm and began to extend into the forearm and hand.   Patient underwent Left upper extremity fasciotomy February 2, 2025 for nontraumatic compartment syndrome of left upper extremity.  Postoperatively patient's hemodynamic improved and she continues to be on supplemental oxygen.    Past Medical/Surgical History:   Past Medical History:   Diagnosis Date    Atrial flutter (Multi) 03/08/2023    s/p flutter ablation October 2020    Bilateral edema of lower extremity 03/08/2023    New July 2020 ... Better on Lasix    CAD (coronary artery disease) 03/08/2023    50% LAD, 60% diag, T.O. Cx, subtotal RCA, elevated RH pressure  CABG 10/2020: LIMA to LAD, FRA to OM, SVG to RCA      Cardiomyopathy 03/08/2023    EF 35-45%, now 50%    Chest pressure 03/22/2024    Hypercholesterolemia 03/08/2023    Dr. Lomeli follows    Mitral regurgitation 03/08/2023    MVT, mild MS, severe MR   s/p MVR = 25/33 [place] mechanical valve     Other conditions influencing health status     Patient denies significant medical history    Pulmonary hypertension (Multi) 03/08/2023    Mod to severe    S/P CABG (coronary artery bypass graft) 03/08/2023    10/2020: LIMA to LAD, FRA to OM, SVG to RCA      S/P mitral valve replacement 03/08/2023    MVT, Mild MS, severe MR ... s/p MVR 25/33 [place] Mechanical valve     Past Surgical History:   Procedure Laterality Date    OTHER SURGICAL HISTORY  08/17/2021    Cardiac catheterization    OTHER SURGICAL HISTORY  08/06/2020    Tonsillectomy with adenoidectomy       Family History:   No relevant family history has been documented for this patient.    Allergies:      Allergies   Allergen Reactions    Sulfa (Sulfonamide Antibiotics) Rash         Social history:   reports that she has been smoking cigarettes. She has never used smokeless tobacco. She reports that she does not drink alcohol and does not use drugs.    Current Medications:   No recently discontinued medications to reconcile    Review of Systems:   General: denies weight gain, denies loss of appetite, fever, chills, night sweats.  HEENT: denies headaches, dizziness, head trauma, visual changes, eye pain, tinnitus, nosebleeds, hoarseness or throat pain    Respiratory: denies chest pain, dyspnea, cough and hemoptysis  Cardiovascular: denies orthopnea, paroxysmal nocturnal dyspnea, leg swelling, and previous heart attack.    Gastrointestinal: denies pain, nausea vomiting, diarrhea, constipation, melena or bleeding.  Genitourinary: denies hematuria, frequency, urgency or dysuria  Neurology: denies syncope, seizures, paralysis, paraesthesia   Endocrine: denies polyuria, polydipsia, skin or hair changes, and heat or cold intolerance  Musculoskeletal: +ve lower extremities pain.  Hematologic: denies bleeding, adenopathy and easy bruising  Skin: denies rashes and skin discoloration  Psychiatry: denies depression    Physical Exam:   Vital Signs:   Vitals:    03/02/25 1600   BP: 144/58   Pulse: 105   Resp: 26   Temp:    SpO2:        Input/Output:    Intake/Output Summary (Last 24 hours) at 3/2/2025 1619  Last data filed at 3/2/2025 1326  Gross per 24 hour   Intake 2650 ml   Output 570 ml   Net 2080 ml       Oxygen requirements:    Ventilator Information:  FiO2 (%):  [22 %-40 %] 36 %  S RR:  [18] 18  MAP (cm H2O):  [10] 10          General appearance: ill Looking, not in pain or distress, in no respiratory distress    HEENT: Atraumatic/normocephalic, EOMI, ELENA, pharynx clear, moist mucosa, redness of the uvula appreciated,   Neck: Supple, no jugular venous distension, lymphadenopathy, thyromegaly or carotid bruits  Chest:  Decreased breath sounds, +ve wheezing, no crackles and no tenderness over ribs   Cardiovascular: Normal S1, S2, regular rate and rhythm, no murmur, rub or gallop  Abdomen: Normal sounds present, soft, lax with no tenderness, no hepatosplenomegaly, and no masses  Extremities: No edema.  Surgically wrapped left upper extremity  Skin: intact, no rashes   Neurologic: Alert and oriented x 3, No focal deficit     Investigations:  Labs, radiological imaging and cardiac work up were personally reviewed      ICU STAFF PHYSICIAN NOTE OF PERSONAL INVOLVEMENT IN CARE  As the attending physician, I certify that I personally reviewed the patient's history and personally examined the patient to confirm the physical findings described above, and that I reviewed the relevant imaging studies and available reports.  I also discussed the differential diagnosis and all of the proposed management plans with the patient and individuals accompanying the patient to this visit.  They had the opportunity to ask questions about the proposed management plans and to have those questions answered.    This patient has a high probability of sudden, clinically significant deterioration, which requires the highest level of physician preparedness to intervene urgently.  I managed/supervised life or organ supporting interventions that required frequent physician assessment.  I devoted my full attention to the direct care of this patient for the amount of time indicated below.  Time I spent with family or surrogate(s) is included only if the patient was incapable of providing the necessary information or participating in medical decision-making.  Time devoted to teaching and to any procedures I billed separately is not included.  Critical Care Time: 31 minutes

## 2025-03-02 NOTE — CONSULTS
Reason For Consult  Left upper extremity pain, swelling, ecchymosis with possible compartment syndrome    History Of Present Illness  Kayley Lynch is a 54 y.o. female admitted on February 26 with 2-day history of nausea vomiting and diarrhea.  Since admission the patient has been diagnosed with influenza and C. difficile.  She had a period where she was hypotensive and was on Levophed via an arterial line the left upper extremity.  The patient developed worsening left upper arm pain, swelling and ecchymosis on February 28.  The patient reports of worsening upper arm pain with difficulty flexing and extending the elbow.  She denies any numbness or tingling of the hand.  I did speak to Dr. Clark from vascular surgery who states that there were dopplerable radial and ulnar pulses earlier this morning.  The patient is scheduled to have a CT angiogram.     Past Medical History  She has a past medical history of Atrial flutter (Multi) (03/08/2023), Bilateral edema of lower extremity (03/08/2023), CAD (coronary artery disease) (03/08/2023), Cardiomyopathy (Multi) (03/08/2023), Chest pressure (03/22/2024), Hypercholesterolemia (03/08/2023), Mitral regurgitation (03/08/2023), Other conditions influencing health status, Pulmonary hypertension (Multi) (03/08/2023), S/P CABG (coronary artery bypass graft) (03/08/2023), and S/P mitral valve replacement (03/08/2023).    Surgical History  She has a past surgical history that includes Other surgical history (08/17/2021) and Other surgical history (08/06/2020).     Social History  She reports that she has been smoking cigarettes. She has never used smokeless tobacco. She reports that she does not drink alcohol and does not use drugs.    Family History  Family History   Problem Relation Name Age of Onset    Atrial fibrillation Father      Heart attack Father          Allergies  Sulfa (sulfonamide antibiotics)    Review of Systems  Negative except for the above complaint    "  Physical Exam  The patient is alert and oriented.  Family is present bedside.  Inspection left upper extremity reveals diffuse swelling of the entire arm.  There is more significant swelling and ecchymosis in the in the antecubital area and volar proximal forearm.  The upper arm is firm with tenderness to palpation.  There is incomplete elbow range of motion with pain with passive elbow extension.  The distal forearm is soft and compressible.  The hand is soft and compressible.  She is able to fully flex and extend the fingers and thumb.  Sensation to light touch is grossly intact along the median, radial and ulnar distributions.  The hand is cool to touch.  There are no palpable pulses.  The radial and ulnar arteries are not dopplerable with ultrasound.  Last Recorded Vitals  Blood pressure 118/57, pulse (!) 128, temperature 35.6 °C (96.1 °F), temperature source Temporal, resp. rate 20, height 1.6 m (5' 2.99\"), weight 66.6 kg (146 lb 13.2 oz), SpO2 90%.    Relevant Results  Results for orders placed or performed during the hospital encounter of 02/25/25 (from the past 24 hours)   POCT GLUCOSE   Result Value Ref Range    POCT Glucose 233 (H) 74 - 99 mg/dL   POCT GLUCOSE   Result Value Ref Range    POCT Glucose 138 (H) 74 - 99 mg/dL   CBC   Result Value Ref Range    WBC 12.8 (H) 4.4 - 11.3 x10*3/uL    nRBC 0.2 (H) 0.0 - 0.0 /100 WBCs    RBC 2.94 (L) 4.00 - 5.20 x10*6/uL    Hemoglobin 8.1 (L) 12.0 - 16.0 g/dL    Hematocrit 26.5 (L) 36.0 - 46.0 %    MCV 90 80 - 100 fL    MCH 27.6 26.0 - 34.0 pg    MCHC 30.6 (L) 32.0 - 36.0 g/dL    RDW 16.9 (H) 11.5 - 14.5 %    Platelets 116 (L) 150 - 450 x10*3/uL   POCT GLUCOSE   Result Value Ref Range    POCT Glucose 160 (H) 74 - 99 mg/dL   Creatine Kinase   Result Value Ref Range    Creatine Kinase 535 (H) 0 - 215 U/L   Comprehensive Metabolic Panel   Result Value Ref Range    Glucose 181 (H) 74 - 99 mg/dL    Sodium 138 136 - 145 mmol/L    Potassium 3.6 3.5 - 5.3 mmol/L    Chloride " 100 98 - 107 mmol/L    Bicarbonate 24 21 - 32 mmol/L    Anion Gap 18 10 - 20 mmol/L    Urea Nitrogen 35 (H) 6 - 23 mg/dL    Creatinine 1.63 (H) 0.50 - 1.05 mg/dL    eGFR 37 (L) >60 mL/min/1.73m*2    Calcium 8.3 (L) 8.6 - 10.3 mg/dL    Albumin 3.0 (L) 3.4 - 5.0 g/dL    Alkaline Phosphatase 47 33 - 110 U/L    Total Protein 4.8 (L) 6.4 - 8.2 g/dL    AST 40 (H) 9 - 39 U/L    Bilirubin, Total 0.6 0.0 - 1.2 mg/dL    ALT 17 7 - 45 U/L   CBC and Auto Differential   Result Value Ref Range    WBC 15.1 (H) 4.4 - 11.3 x10*3/uL    nRBC 0.2 (H) 0.0 - 0.0 /100 WBCs    RBC 2.61 (L) 4.00 - 5.20 x10*6/uL    Hemoglobin 7.4 (L) 12.0 - 16.0 g/dL    Hematocrit 24.3 (L) 36.0 - 46.0 %    MCV 93 80 - 100 fL    MCH 28.4 26.0 - 34.0 pg    MCHC 30.5 (L) 32.0 - 36.0 g/dL    RDW 17.3 (H) 11.5 - 14.5 %    Platelets 111 (L) 150 - 450 x10*3/uL    Neutrophils %      Immature Granulocytes %, Automated      Lymphocytes %      Monocytes %      Eosinophils %      Basophils %      Neutrophils Absolute      Lymphocytes Absolute      Monocytes Absolute      Eosinophils Absolute      Basophils Absolute     Type and screen   Result Value Ref Range    ABO TYPE A     Rh TYPE POS    Lactate   Result Value Ref Range    Lactate 5.9 (HH) 0.4 - 2.0 mmol/L   VERIFY ABO/Rh Group Test   Result Value Ref Range    ABO TYPE A     Rh TYPE POS          Assessment/Plan   The patient has a likely large hematoma with developing compartment syndrome of the left upper arm and forearm.  I have recommended urgent fasciotomies.  We discussed the risks and benefits of the surgery.  The risks include but not limited to: Blood loss, infection, damage to surrounding structures, incomplete relief of symptoms, loss, of limb, the need for repeat operative interventions and the risks of anesthesia.  The patient and family understands these risks.  Plan for urgent fasciotomies of the upper extremity and forearm within the next hour.          Elijah Licea MD

## 2025-03-02 NOTE — CONSULTS
Critical Care Consult    Patient Name: Kayley Lynch   YOB: 1970    Subjective:  Kayley Lynch is a 54 y.o. female with past medical history of CAD (s/p CABG), pHTN, COPD, nicotine use disorder, HLD, atrial flutter (s/p ablation), s/p mitral valve replacement who was initially admitted to [facility] MICU for septic shock requiring vasopressors, influenza A, and Cdiff colitis. She received antibiotic therapy for suspected superimposed bacterial pneumonia, and PO vancomycin for Cdiff. She was transferred out of ICU on 2/28. Overnight 3/1 - 3/2 patient was noted to have increasing swelling of the LUE. Vascular Surgery reportedly evaluated the patient and was able to doppler pulses.    Rapid response was called at 0915 to evaluate the patient in bed 138 who was tachycardic and hypotensive. Nursing staff reported patient became tachycardic in 160s with BP in 80s/50s. Patient denied any symptoms upon arrival and was mentating at her baseline. Nursing staff reported at least four watery bowel movements since shift change. LUE swelling also noted by nursing to have increased, stated that on evaluation this morning the arm, while swollen, was still soft to palpation. It has now become firm and began to extend into the forearm and hand.     Patient was evaluated by Orthopedic surgery who assessed that the patient likely has a large hematoma with developing compartment syndrome of the LUE. Patient was taken for urgent fasciotomy 3/2/25. CTA following procedure revealed new left retroperitoneal hemorrhage with numerous foci of contrast staining indicative of underlying coagulopathy. PT/INR was also noted to be elevated to point of unmeasured. Critical care team was consulted for management of compartment syndrome, new retroperitoneal bleed in setting of likely coagulapathy.    On evaluation today, vitals post-op notable for Afib with , -140s, SpO2 98% on NC. Patient has been having adequate urine output  "for the past couple day prior. Labs notable for lactic acidosis to 5.9, Hgb 8.9 (around baseline), WBC 15, Cr 1.63 (baseline 0.8). Patient is currently receiving PO vancomycin for Cdif colitis and doxy for CAP treatment. Patient is also on Solu-medrol for possible COPD exacerbation.     A 10 point ROS was completed and is negative expect as stated in HPI.     Past Medical History: As stated above,     Past Surgical History: As stated above,     Social History: Current cigarette smoker, estimates 10 cigarettes/day, began at age 11; no cigars, chewing tobacco, vaping; no EtOH use; no illicit drug use     Family History: Noncontributory    Objective:    /68   Pulse 110   Temp 35.6 °C (96.1 °F) (Temporal)   Resp 17   Ht 1.6 m (5' 2.99\")   Wt 66.6 kg (146 lb 13.2 oz)   SpO2 98%   BMI 26.02 kg/m²     Physical Exam:  GEN: conversant, lethargic  HEENT: PERRL, EOMI, MMM OP clear, TMs clear bilaterally  NECK: supple, no cervical LAD, no carotid bruits  CV: afib, tachy  PULM: CTAB  ABD: soft, NT, ND, BS+  EXT: LUE bandaged  NEURO: AOx2  PSYCH: appropriate affect       Assessment/Plan:  Kayley Lynch is a 54 y.o. female with past medical history of CAD (s/p CABG), pHTN, COPD, nicotine use disorder, HLD, atrial flutter (s/p ablation), s/p mitral valve replacement was initially admitted to [facility] MICU for septic shock requiring vasopressors, influenza A, and Cdiff colitis.     Neuro:  #Acute metabolic encephalopathy following fasciotomy 3/2  - Repeat CTH showed no acute bleed  - BIPAP HS, will check ABG  - Continue home wellbutrin 150mg daily    Cardiovascular  #Afib with intermittent RVR  #Elevated Trop, demand ischemia vs 2/2 compartment syndrome/rhabdo  #Hx of Mechanical Mitral Valve Replacement  #CAD s/p CABG  - Maintain MAPs>65  - Given digoxen load 3/2, currently mildly tachy to 110  - Anticoagulation on hold given compartment syndrome and supratherapeutic INR  - PRN IV lopressor  - Will consult Cardiology for " further recommendations on anticoagulation. Patient sees Dr. Lomeli outpatient.    Pulmonary:  #Acute on chronic hypoxic hypercapnic respiratory failure  #Influenza A  #COPD exacerbation  #Secondary PHTN  - Continue with Doxy for concurrent CAP coverage  - Discontinue tamiflu in setting of possible INR prolonging effects  - Continue Pulmicort and Duonebs  - Solu-medrol 40mg daily    GI:  #Diarrhea, c/f colitis  - Continue PO Vanc  - Continue PPI    Renal:   #Lactic Acidosis  #HILLARY, suspect pre-renal in setting of shock vs pigment nephropathy from compartment syndrome  - Continue with IVF  - Monitor urine output  - Trend lactate    Endocrine:  - No acute issues    Heme/Onc:  #Supratherapeutic INR  #Suspect Coagulopathy  #Retroperitoneal Hemorrhage  #Leukocytosis in setting of steroid use  - Hematology consulted, appreciate recommendations of anticoagulation. Patient will need long-term anticoagulation as she has mechanical mitral valve replacement  - Trend CBCs and INR Q4  - Patient was notably on many different antibiotics during course including flagyl with possible INR prolonging properties  - Consider Surgery consult if signs of worsening hemorrhage for retroperitoneal bleed     ID:  #CAP  #Cdif colitis  #Influenza A  - Continue Doxy  - Continue PO vanc    Skin/MSK:  #Left Upper Extremity Compartment Syndrome 2/p fasciotomy 3/2  - Vascular and Orthopedic surgery following    ICU CHECK LIST:   Antimicrobials: PO vanc, Doxy  Oxygen:  4L NC  Feeding: NPO  Fluids:  ml/hr  Analgesia: Tylenol  Sedation: None  Thromboprophylaxis: Held in setting of hemorrhage   Ulcer prophylaxis: PPI  Glycemic control: None  Bowel care: PRN  Indwelling catheters: Jasso  Lines: 2xPIV    Code Status: Full Code      Jaydon Bustillo MD  Critical Care         CRITICAL CARE MEDICINE ATTENDING NOTE    I saw and evaluated the patient. I personally obtained the key and critical portions of the history and physical examination. I reviewed the  resident's documentation and discussed the patient with the resident. I agree with the resident's medical decision making as documented in the resident's note.    The patient has high probability of sudden, clinically significant deterioration, which requires urgent interventions. I managed/supervised life supporting interventions that required frequent physician assessment. I spent 40 critical care minutes of my full attention on this patient's management and direct patient care. Time I spent with family or surrogate(s) is included if the patient was incapable of providing information or participating in medical decision making. Time devoted to teaching and to any procedures I billed separately is not included. Medical issues requiring critical care management include:    Gabby العلي MD  Pulmonary and Critical Care Medicine

## 2025-03-02 NOTE — ANESTHESIA PROCEDURE NOTES
Airway  Date/Time: 3/2/2025 12:06 PM  Urgency: elective    Airway not difficult    Staffing  Performed: SANIYA   Authorized by: Liat Vogt MD    Performed by: SANIYA Del Rosario  Patient location during procedure: OR    Indications and Patient Condition  Indications for airway management: anesthesia  Spontaneous Ventilation: absent  Sedation level: deep  Preoxygenated: yes  Patient position: sniffing  MILS maintained throughout  Mask difficulty assessment: 0 - not attempted  Planned trial extubation    Final Airway Details  Final airway type: endotracheal airway      Successful airway: ETT  Cuffed: yes   Successful intubation technique: video laryngoscopy  Facilitating devices/methods: intubating stylet and cricoid pressure  Endotracheal tube insertion site: oral  Blade: Tito  ETT size (mm): 7.0  Cormack-Lehane Classification: grade I - full view of glottis  Placement verified by: chest auscultation and capnometry   Cuff volume (mL): 10  Measured from: teeth  ETT to teeth (cm): 20  Number of attempts at approach: 1  Ventilation between attempts: none  Number of other approaches attempted: 0    Additional Comments  RSI with cricoid pressure

## 2025-03-02 NOTE — CONSULTS
CRITICAL CARE CONSULT    Reason For Consult  Compartment syndrome s/p fasciotomy of left arm, retroperitoneal hematoma, supratherapeutic anticoagulation, valvular heart disease with mechanical mitral valve.      History Of Present Illness  Kayley Lynch is a 54 y.o. female with PMH of COPD, pulmonary hypertension, atrial fibrillation/flutter, hyperlipidemia, mitral valve regurgitation s/p MV replacement, CAD, initially admitted to ICU on 2/26/2025 for management of acute on chronic hypoxemic and hypercapnic respiratory failure, influenza A pneumonia, C. Diff colitis, septic shock, lactic acidosis.   The patient was managed with BPAP, norepinephrine infusion for septic shock support, ciprofloxacin, metronidazole, oral vancomycin, and oseltamivir for management of both pneumonia and colitis.   On 2/27 the patient had PT that was >100 and unmeasurable INR on two occasions, her warfarin was on hold and she received 2.5 mg of oral vitamin K as there was no evidence of bleeding.   The reason for this abnormality was thought to be due to interaction with ciprofloxacin and metronidazole, therefore ciprofloxacin was stopped, started on ceftriaxone and doxycycline.    After confirmation of C. Diff and due to presence of bowel thickening she was continued only with oral vancomycin.     Then on 2/28 her INR was 1.9 therefore she was started on warfarin and therapeutic enoxaparin as bridge until INR became therapeutic.   The patient was transitioned to the Medicine team as her critical care needs were resolved.   Yesterday 3/1 her INR was 2.2, she continued with enoxaparin and warfarin.    Today the patient's nurse alerted the clinical team of left arm edema and hardness with loss of peripheral pulses.   There was a rapid response called due to atrial fibrillation with RVR and hypotension that improved with administration of IV fluids.      The patient was evaluated by vascular surgery and orthopedics service, she was diagnosed  with compartment syndrome and taken to OR for fasciotomy.     Of note, the patient had a CT angiogram scan of the arm that could not identify the left radial artery, with diffuse arterial atherosclerosis and incidentally demonstrated a left retroperitoneal hematoma with extension to the left psoas and iliacus muscle, there are several foci of contrast staining suggestive of coagulopathy.   The patient has a prolonged PT of >100 and has been treated with 5 mg of oral vitamin K.     We were consulted today for management of supratherapeutic anticoagulation in the setting of retroperitoneal hemorrhage and the presence of mechanical mitral valve.   Currently we are waiting for repeat PT/INR.     Due to altered mentation I ordered CT head to ensure there is no intracerebral hemorrhage, per my personal interpretation there is no hemorrhage.  Pending radiologist reading.      Past Medical History  She has a past medical history of Atrial flutter (Multi) (03/08/2023), Bilateral edema of lower extremity (03/08/2023), CAD (coronary artery disease) (03/08/2023), Cardiomyopathy (03/08/2023), Chest pressure (03/22/2024), Hypercholesterolemia (03/08/2023), Mitral regurgitation (03/08/2023), Other conditions influencing health status, Pulmonary hypertension (Multi) (03/08/2023), S/P CABG (coronary artery bypass graft) (03/08/2023), and S/P mitral valve replacement (03/08/2023).    Surgical History  She has a past surgical history that includes Other surgical history (08/17/2021) and Other surgical history (08/06/2020).     Social History  She reports that she has been smoking cigarettes. She has never used smokeless tobacco. She reports that she does not drink alcohol and does not use drugs.    Family History  Family History   Problem Relation Name Age of Onset    Atrial fibrillation Father      Heart attack Father          Allergies  Sulfa (sulfonamide antibiotics)    Review of Systems  The patient states she feels well but is  "confused, does not have jose e pain, dyspnea, abdominal pain, nausea or vomiting, her left arm is non tender.      Physical Exam  /59   Pulse (!) 120   Temp 35.6 °C (96.1 °F) (Temporal)   Resp 24   Ht 1.6 m (5' 2.99\")   Wt 66.6 kg (146 lb 13.2 oz)   SpO2 98%   BMI 26.02 kg/m²   General: Appears chronically ill, disoriented to place (I am in [place]) and time   HEENT: ELENA, EOMI, poor dentition, there is crusted blood around her lips but I do not see blood inside her mouth  Neuro: Alert, no CN abnormality, muscular strength 5/5 in 4 limbs, gait not evaluated  Lungs: Decreased breath sounds bilaterally  CV: Tachycardic and regular, I do not appreciate a murmur  Abdomen is non tender, no rebound.  Left arm is wrapped, the fingers are pink and with capillary refill of less than 2 seconds; right arm and legs with good pulses  Skin: Multiple echymosis on right arm       Assessment/Plan   1) Acute on chronic hypoxemic and hypercapnic respiratory failure due to influenza A pneumonia and COPD exacerbation  --Continue respiratory support with  BPAP   --The patient has completed 5 days of oseltamivir   --Continue ceftriaxone and doxycycline for total 7 days    2) Supratherapeutic INR and retroperitoneal hematoma in a patient with mitral valve regurgitation s/p MV replacement  --Will keep monitoring hemoglobin q4h and transfuse as needed  --Pending INR, will continue reversal and if hemoglobin remains stable, then will consider heparin infusion     3) Left arm compartment syndrome s/p fasciotomy  --Continue neurovascular checks   --Vascular and orthopedic surgery consulting    4) Atrial fibrillation/flutter, hyperlipidemia  --Management with metoprolol and statin    5) C. Diff colitis  --Continue oral vancomycin     6) Acute kidney injury   --Avoid nephrotoxins and dose medications according GFR  --CK is at 739 which is not at the threshold to cause HILLARY    7) Lactic acidosis due to compartment syndrome  --Improving    8) " Resolved septic shock     I have transferred care to Dr. Hall to follow up on pending INR and Hb and final head CT interpretation.     Gabby العلي MD  Pulmonary and Critical Care Medicine

## 2025-03-02 NOTE — PROGRESS NOTES
Subjective:    Briefly, patient is a 54 year old female with PMH significant for CAD (s/p CABG), pHTN, COPD, nicotine use disorder, HLD, atrial flutter (s/p ablation), s/p mitral valve replacement who was initially admitted to [facility] MICU for septic shock requiring vasopressors, influenza A, and Cdiff colitis. She received antibiotic therapy for suspected superimposed bacterial pneumonia, and PO vancomycin for Cdiff. She was transferred out of ICU on 2/28. Overnight 3/1 - 3/2 patient was noted to have increasing swelling of the LUE. Vascular Surgery reportedly evaluated the patient and was able to doppler pulses.     Rapid response was called at 0915 to evaluate the patient in bed 138 who was tachycardic and hypotensive. Nursing staff reported patient became tachycardic in 160s with BP in 80s/50s. Patient denied any symptoms upon arrival and was mentating at her baseline. Nursing staff reported at least four watery bowel movements since shift change. LUE swelling also noted by nursing to have increased, stated that on evaluation this morning the arm, while swollen, was still soft to palpation. It has now become firm and began to extend into the forearm and hand.     Objective:    Initial vitals: BP 86/53, , RR 22, SpO2 95% on room air, temp 96.1 deg F    Constitutional: Well developed, A&Ox3, no acute distress, alert and cooperative  Eyes: EOMI, clear sclera  Respiratory/Thorax: CTAB, good chest expansion  Cardiovascular: irregularly irregular rhythm, tachycardic  Extremities: Significant edema and ecchymosis of LUE which per nursing has increased in size. Firm to palpation. No sensory loss of LUE noted. Unable to palpate radial pulse on LUE. Increasing cyanosis of the hands noted.   Skin: Warm and dry    Assessment & Plan:    #Atrial fibrillation with rapid ventricular response  -Likely triggered by hypovolemia given diarrhea and LUE hematoma formation  -BP soft on arrival on right arm, checked manual and  unable to auscultate pressure. With cuff switched to left leg BP was in 120s/80s.   PLAN:  -Patient was given 1L bolus with decrease in HR into 110s. Will continue maintenance fluids 100cc/hr for 10 hours  -Also provided 500mcg digoxin for further rate control  -Ordered metoprolol tartrate 5mg q6hrs PRN for sustained HR >140, hold for SBP <100.     #LUE hematoma, concern for developing compartment syndrome  #Acute blood loss anemia secondary to above  #Elevated lactate  #Elevated PT  -Per nursing, swelling has been rapidly increasing. Increased firmness of LUE, extension into the forearm, and cyanosis of the hands noted  PLAN:  -Obtained repeat labs, which showed increase in Cr to 1.63 from BL 0.7; CK elevated at 535; lactate 5.9; increase in leukocytosis to 15.1; drop in hemoglobin to 7.4 (10.6 yesterday); PT >100  -Obtained type and screen  -Repeat hemoglobin ordered for 1400  -Holding warfarin and therepeutic LMWH given expanding hematoma  -CT angio LUE previously ordered changed to life/limb threatening  -Discussed with Orthopedic Surgery who agreed to evaluate patient. Consult placed.     Hung Brady DO, PGY-1, Internal Medicine

## 2025-03-02 NOTE — OP NOTE
FASCIOTOMY, UPPER EXTREMITY (L) Operative Note     Date: 3/2/2025  OR Location: [facility] OR    Name: Kayley Lycnh, : 1970, Age: 54 y.o., MRN: [id number], Sex: female    Diagnosis  Pre-op Diagnosis      * Nontraumatic compartment syndrome of left upper extremity [M79.A12] Post-op Diagnosis     * Nontraumatic compartment syndrome of left upper extremity [M79.A12]     Procedures  FASCIOTOMY, UPPER EXTREMITY  15561 - ND DCMPRN FASCT F/ARM&/WRST FLXR/XTNSR W/DBRDMT      Surgeons      * Elijah Licea - Primary    Resident/Fellow/Other Assistant:  Surgeons and Role:  * No surgeons found with a matching role *    Staff:   Circulator: Carmela Sousa Person: Milly  Surgical Assistant: Becca Muse Scrub: Jayla    Anesthesia Staff: Anesthesiologist: Liat Vogt MD  C-AA: SANIYA Del Rosario    Procedure Summary  Anesthesia: General  ASA: III  Estimated Blood Loss: 50 mL  Intra-op Medications: * Intraprocedure medication information is unavailable because the case start and end events have not been set *           Anesthesia Record               Intraprocedure I/O Totals          Intake    PRBC 350.00 mL    NaCl 0.9 % infusion 500.00 mL    lactated Ringer's infusion 600.00 mL    Total Intake 1450 mL       Output    Urine 20 mL    Est. Blood Loss 50 mL    Total Output 70 mL       Net    Net Volume 1380 mL          Specimen: No specimens collected              Drains and/or Catheters:   Fecal Management Rectal tube with balloon (Active)   Cuff/Balloon Volume 35 mL 25 1106       Urethral Catheter Double-lumen 16 Fr. (Active)   Site Assessment Clean;Skin intact 25 0800   Collection Container Standard drainage bag 25 0800   Securement Method Securing device (Describe) 25 0800   Reason for Continuing Urinary Catheterization acute urinary retention 25 0800   Output (mL) 250 mL 25 0400       Tourniquet Times:         Implants:     Findings: Intramuscular hematoma of the biceps and  brachialis and antecubital area    Indications: Kayley Lynch is an 54 y.o. female who is having surgery for Nontraumatic compartment syndrome of left upper extremity [M79.A12].  The patient developed a left antecubital hematoma.  There is no history of trauma, but the patient did have an arterial line in left upper extremity and was received pressors for hypotension.  She had worsening left upper extremity pain and swelling starting on February 28.  I was consulted this morning for concerns for compartment syndrome.  On exam left the anterior upper arm and proximal volar forearm were tense concerning for compartment syndrome.  In addition, there was no palpable or dopplerable distal pulses which per report were present earlier in the morning.  Therefore, decision made to take her to the operating room urgently for a fasciotomy.    The patient was seen in the preoperative area. The risks, benefits, complications, treatment options, non-operative alternatives, expected recovery and outcomes were discussed with the patient. The possibilities of reaction to medication, pulmonary aspiration, injury to surrounding structures, bleeding, recurrent infection, the need for additional procedures, failure to diagnose a condition, and creating a complication requiring transfusion or operation were discussed with the patient. The patient concurred with the proposed plan, giving informed consent.  The site of surgery was properly noted/marked if necessary per policy. The patient has been actively warmed in preoperative area. Preoperative antibiotics have been ordered and given within 1 hours of incision. Venous thrombosis prophylaxis have been ordered including bilateral sequential compression devices    Procedure Details: The patient was seen in the ICU where the consent was reviewed and the operative site marked.  The risks and benefits were reviewed with the family and patient.  The risks include but limited to: Blood loss,  damage to surrounding structures including blood vessels nerves, incomplete relief of symptoms, the need for repeat operative mentions, loss of limb, and the risks of anesthesia.  The patient and family understood and accept his risk.  Patient was then taken back to surgical suite and transferred onto the OR table right all bony prominences well-padded.  The left upper extremity was placed in the hand table.  General anesthesia was then induced via endotracheal tube.  The left upper extremity was then prepped and draped sterile fashion.  Once draping is completed surgical pause was performed reviewing patient's name, indicate procedure and correct operative site.  Patient received 2 g of Ancef prior to make incision.  Using a 15 blade scalpel a curvilinear incision was made from the volar wrist extending obliquely across the antecubital area into the biceps region.  Dissection was then carried down to level of fascia.  The patient had significant hematoma within the subcutaneous tissues and intramuscular hematoma of the biceps and brachialis muscles.  Nonviable muscle and hematoma was then debrided.  The dissection was carried distally into the forearm.  The volar forearm musculature was viable.  The volar superficial and deep compartments were released.  The mobile wad dorsal compartment were also decompressed through the same incision.    During the case I was able to get a Doppler signal on the brachial artery, but not distally along the ulnar artery or palmar arch.  The patient is scheduled for a CT angiogram and will discuss with vascular surgery.  The wound was copiously irrigated with sterile saline.  The wound was then covered with saline soaked 4 x 4s then covered with Kerlix, Webril and a posterior Ortho-Glass splint was applied.  The drapes removed.  The patient was extubated taken to the PACU in stable condition.  Complications:   Disposition: PACU - hemodynamically stable.  Condition: stable          Task Performed by RNFA or Surgical Assistant:  Prepping, draping and splint application          Additional Details:     Attending Attestation: I was present and scrubbed for the entire procedure.    Elijah Licea  Phone Number: [phone]

## 2025-03-02 NOTE — ANESTHESIA POSTPROCEDURE EVALUATION
Patient: Kayley Lynch    Procedure Summary       Date: 03/02/25 Room / Location: [facility] OR 02 / [facility] OR    Anesthesia Start: 1155 Anesthesia Stop: 1348    Procedure: FASCIOTOMY, UPPER EXTREMITY (Left: Hand) Diagnosis:       Nontraumatic compartment syndrome of left upper extremity      (Nontraumatic compartment syndrome of left upper extremity [M79.A12])    Surgeons: Elijah Licea MD Responsible Provider: Liat Vogt MD    Anesthesia Type: general ASA Status: 3 - Emergent            Anesthesia Type: general    Vitals Value Taken Time   /62 03/02/25 1347   Temp 36 °C (96.8 °F) 03/02/25 1347   Pulse 90 03/02/25 1347   Resp 18 03/02/25 1347   SpO2 98 % 03/02/25 1347       Anesthesia Post Evaluation    Patient location during evaluation: ICU  Patient participation: complete - patient participated  Level of consciousness: awake and alert  Pain score: 0  Pain management: adequate  Airway patency: patent  Cardiovascular status: acceptable and hemodynamically stable  Respiratory status: acceptable, spontaneous ventilation and nasal cannula  Hydration status: acceptable  Postoperative Nausea and Vomiting: none        No notable events documented.     Statement Selected

## 2025-03-02 NOTE — SIGNIFICANT EVENT
"Was contacted by RN Jeannine Saint Clair in the ICU to come take a look at the patient Kayley Lynch in bed 138.  Patient reportedly had an IV in the left antecubital and was receiving Levophed 2 days ago when it infiltrated.  Patient has severe ecchymosis and edema to the left upper extremity.  Patient states 10 out of 10 pain and tenderness with palpation.  Patient with good radial pulse.  Patient is unable to lift her arm off the bed without pain.  Concern for a DVT.  Stat ultrasound of the left upper extremity ordered.    NADYA Cárdenas, CNP    /59 (BP Location: Right arm, Patient Position: Lying)   Pulse 75   Temp 36.2 °C (97.2 °F) (Temporal)   Resp 22   Ht 1.6 m (5' 2.99\")   Wt 66.6 kg (146 lb 13.2 oz)   SpO2 94%   BMI 26.02 kg/m²     "

## 2025-03-02 NOTE — ANESTHESIA PREPROCEDURE EVALUATION
Patient: Kayley Lynch    Procedure Information       Date/Time: 03/02/25 1140    Procedure: FASCIOTOMY, UPPER EXTREMITY (Left: Hand)    Location: [facility] OR 02 / [facility]    Surgeons: Elijah Licea MD            Relevant Problems   Anesthesia (within normal limits)      Cardiac   (+) Atrial flutter (Multi)   (+) CAD (coronary artery disease)   (+) Hypercholesterolemia   (+) Mitral regurgitation      Pulmonary   (+) Dyspnea on exertion   (+) Pulmonary hypertension (Multi)      Neuro   (+) Current mild episode of major depressive disorder without prior episode ([facility])      ID   (+) Influenza A with pneumonia       Clinical information reviewed:    Allergies  Meds  Problems              NPO Detail:  No data recorded     Physical Exam    Airway  Mallampati: II  TM distance: >3 FB  Neck ROM: full     Cardiovascular - normal exam     Dental - normal exam     Pulmonary - normal exam     Abdominal - normal exam             Anesthesia Plan    History of general anesthesia?: yes  History of complications of general anesthesia?: no    ASA 3 - emergent     general     The patient is not a current smoker.    intravenous induction   Postoperative administration of opioids is intended.  Trial extubation is planned.  Anesthetic plan and risks discussed with patient.  Use of blood products discussed with patient who consented to blood products.    Plan discussed with CAA.

## 2025-03-02 NOTE — CARE PLAN
The patient's goals for the shift include      The clinical goals for the shift include patient will remain safe and free from injury for entire shift.      Problem: Pain - Adult  Goal: Verbalizes/displays adequate comfort level or baseline comfort level  Outcome: Progressing     Problem: Safety - Adult  Goal: Free from fall injury  Outcome: Progressing     Problem: Discharge Planning  Goal: Discharge to home or other facility with appropriate resources  Outcome: Progressing     Problem: Chronic Conditions and Co-morbidities  Goal: Patient's chronic conditions and co-morbidity symptoms are monitored and maintained or improved  Outcome: Progressing     Problem: Nutrition  Goal: Nutrient intake appropriate for maintaining nutritional needs  Outcome: Progressing     Problem: Fall/Injury  Goal: Not fall by end of shift  Outcome: Progressing  Goal: Be free from injury by end of the shift  Outcome: Progressing  Goal: Verbalize understanding of personal risk factors for fall in the hospital  Outcome: Progressing  Goal: Verbalize understanding of risk factor reduction measures to prevent injury from fall in the home  Outcome: Progressing  Goal: Use assistive devices by end of the shift  Outcome: Progressing  Goal: Pace activities to prevent fatigue by end of the shift  Outcome: Progressing     Problem: Respiratory  Goal: Clear secretions with interventions this shift  Outcome: Progressing  Goal: Minimize anxiety/maximize coping throughout shift  Outcome: Progressing  Goal: Minimal/no exertional discomfort or dyspnea this shift  Outcome: Progressing  Goal: No signs of respiratory distress (eg. Use of accessory muscles. Peds grunting)  Outcome: Progressing  Goal: Patent airway maintained this shift  Outcome: Progressing  Goal: Verbalize decreased shortness of breath this shift  Outcome: Progressing  Goal: Wean oxygen to maintain O2 saturation per order/standard this shift  Outcome: Progressing  Goal: Increase self care and/or  family involvement in next 24 hours  Outcome: Progressing     Problem: Skin  Goal: Participates in plan/prevention/treatment measures  Outcome: Progressing  Goal: Prevent/manage excess moisture  Outcome: Progressing  Goal: Prevent/minimize sheer/friction injuries  Outcome: Progressing  Goal: Promote/optimize nutrition  Outcome: Progressing  Goal: Promote skin healing  Outcome: Progressing

## 2025-03-03 ENCOUNTER — APPOINTMENT (OUTPATIENT)
Dept: RADIOLOGY | Facility: HOSPITAL | Age: 55
End: 2025-03-03
Payer: COMMERCIAL

## 2025-03-03 VITALS
TEMPERATURE: 96.1 F | HEART RATE: 110 BPM | DIASTOLIC BLOOD PRESSURE: 69 MMHG | SYSTOLIC BLOOD PRESSURE: 112 MMHG | BODY MASS INDEX: 26.02 KG/M2 | OXYGEN SATURATION: 95 % | RESPIRATION RATE: 27 BRPM | WEIGHT: 146.83 LBS | HEIGHT: 63 IN

## 2025-03-03 LAB
ALBUMIN SERPL BCP-MCNC: 2.6 G/DL (ref 3.4–5)
ANION GAP BLDV CALCULATED.4IONS-SCNC: 17 MMOL/L (ref 10–25)
ANION GAP BLDV CALCULATED.4IONS-SCNC: 7 MMOL/L (ref 10–25)
ANION GAP SERPL CALC-SCNC: 8 MMOL/L (ref 10–20)
APTT PPP: 31 SECONDS (ref 26–36)
BASE EXCESS BLDV CALC-SCNC: -9.5 MMOL/L (ref -2–3)
BASE EXCESS BLDV CALC-SCNC: 3.1 MMOL/L (ref -2–3)
BODY TEMPERATURE: 37 DEGREES CELSIUS
BODY TEMPERATURE: 37 DEGREES CELSIUS
BUN SERPL-MCNC: 47 MG/DL (ref 6–23)
CA-I BLDV-SCNC: 0.67 MMOL/L (ref 1.1–1.33)
CA-I BLDV-SCNC: 1.15 MMOL/L (ref 1.1–1.33)
CALCIUM SERPL-MCNC: 7.8 MG/DL (ref 8.6–10.3)
CHLORIDE BLDV-SCNC: 105 MMOL/L (ref 98–107)
CHLORIDE BLDV-SCNC: 97 MMOL/L (ref 98–107)
CHLORIDE SERPL-SCNC: 102 MMOL/L (ref 98–107)
CK SERPL-CCNC: 545 U/L (ref 0–215)
CK SERPL-CCNC: 737 U/L (ref 0–215)
CO2 SERPL-SCNC: 31 MMOL/L (ref 21–32)
CREAT SERPL-MCNC: 1.65 MG/DL (ref 0.5–1.05)
CRITICAL CALL TIME: 1600
CRITICAL CALLED BY: ABNORMAL
CRITICAL CALLED TO: ABNORMAL
CRITICAL READ BACK: ABNORMAL
EGFRCR SERPLBLD CKD-EPI 2021: 37 ML/MIN/1.73M*2
ERYTHROCYTE [DISTWIDTH] IN BLOOD BY AUTOMATED COUNT: 14.9 % (ref 11.5–14.5)
ERYTHROCYTE [DISTWIDTH] IN BLOOD BY AUTOMATED COUNT: 16.8 % (ref 11.5–14.5)
ERYTHROCYTE [DISTWIDTH] IN BLOOD BY AUTOMATED COUNT: 17.1 % (ref 11.5–14.5)
FIBRINOGEN PPP-MCNC: 174 MG/DL (ref 200–400)
FIBRINOGEN PPP-MCNC: 84 MG/DL (ref 200–400)
GLUCOSE BLD MANUAL STRIP-MCNC: 110 MG/DL (ref 74–99)
GLUCOSE BLD MANUAL STRIP-MCNC: 129 MG/DL (ref 74–99)
GLUCOSE BLD MANUAL STRIP-MCNC: 135 MG/DL (ref 74–99)
GLUCOSE BLD MANUAL STRIP-MCNC: 148 MG/DL (ref 74–99)
GLUCOSE BLDV-MCNC: 135 MG/DL (ref 74–99)
GLUCOSE BLDV-MCNC: 426 MG/DL (ref 74–99)
GLUCOSE SERPL-MCNC: 134 MG/DL (ref 74–99)
HCO3 BLDV-SCNC: 16.4 MMOL/L (ref 22–26)
HCO3 BLDV-SCNC: 28.5 MMOL/L (ref 22–26)
HCT VFR BLD AUTO: 23 % (ref 36–46)
HCT VFR BLD AUTO: 23.3 % (ref 36–46)
HCT VFR BLD AUTO: 25.5 % (ref 36–46)
HCT VFR BLD EST: 14 % (ref 36–46)
HCT VFR BLD EST: 23 % (ref 36–46)
HGB BLD-MCNC: 7 G/DL (ref 12–16)
HGB BLD-MCNC: 7.9 G/DL (ref 12–16)
HGB BLD-MCNC: 8.5 G/DL (ref 12–16)
HGB BLDV-MCNC: 4.8 G/DL (ref 12–16)
HGB BLDV-MCNC: 7.8 G/DL (ref 12–16)
INHALED O2 CONCENTRATION: 40 %
INHALED O2 CONCENTRATION: 40 %
INR PPP: 1.1 (ref 0.9–1.1)
INR PPP: 1.2 (ref 0.9–1.1)
LACTATE BLDV-SCNC: 1.6 MMOL/L (ref 0.4–2)
LACTATE BLDV-SCNC: 9 MMOL/L (ref 0.4–2)
MCH RBC QN AUTO: 28.4 PG (ref 26–34)
MCH RBC QN AUTO: 28.5 PG (ref 26–34)
MCH RBC QN AUTO: 29 PG (ref 26–34)
MCHC RBC AUTO-ENTMCNC: 30.4 G/DL (ref 32–36)
MCHC RBC AUTO-ENTMCNC: 33.3 G/DL (ref 32–36)
MCHC RBC AUTO-ENTMCNC: 33.9 G/DL (ref 32–36)
MCV RBC AUTO: 84 FL (ref 80–100)
MCV RBC AUTO: 85 FL (ref 80–100)
MCV RBC AUTO: 95 FL (ref 80–100)
NRBC BLD-RTO: 0.5 /100 WBCS (ref 0–0)
NRBC BLD-RTO: 1.2 /100 WBCS (ref 0–0)
NRBC BLD-RTO: 2.1 /100 WBCS (ref 0–0)
OXYHGB MFR BLDV: 68.1 % (ref 45–75)
OXYHGB MFR BLDV: 70.6 % (ref 45–75)
PCO2 BLDV: 35 MM HG (ref 41–51)
PCO2 BLDV: 47 MM HG (ref 41–51)
PH BLDV: 7.28 PH (ref 7.33–7.43)
PH BLDV: 7.39 PH (ref 7.33–7.43)
PHOSPHATE SERPL-MCNC: 3.7 MG/DL (ref 2.5–4.9)
PLATELET # BLD AUTO: 126 X10*3/UL (ref 150–450)
PLATELET # BLD AUTO: 135 X10*3/UL (ref 150–450)
PLATELET # BLD AUTO: 157 X10*3/UL (ref 150–450)
PO2 BLDV: 44 MM HG (ref 35–45)
PO2 BLDV: 45 MM HG (ref 35–45)
POTASSIUM BLDV-SCNC: 3.5 MMOL/L (ref 3.5–5.3)
POTASSIUM BLDV-SCNC: 3.8 MMOL/L (ref 3.5–5.3)
POTASSIUM SERPL-SCNC: 4.3 MMOL/L (ref 3.5–5.3)
PROTHROMBIN TIME: 12 SECONDS (ref 9.8–12.4)
PROTHROMBIN TIME: 13.5 SECONDS (ref 9.8–12.4)
RBC # BLD AUTO: 2.41 X10*6/UL (ref 4–5.2)
RBC # BLD AUTO: 2.77 X10*6/UL (ref 4–5.2)
RBC # BLD AUTO: 2.99 X10*6/UL (ref 4–5.2)
SAO2 % BLDV: 70 % (ref 45–75)
SAO2 % BLDV: 72 % (ref 45–75)
SODIUM BLDV-SCNC: 127 MMOL/L (ref 136–145)
SODIUM BLDV-SCNC: 137 MMOL/L (ref 136–145)
SODIUM SERPL-SCNC: 137 MMOL/L (ref 136–145)
WBC # BLD AUTO: 15.3 X10*3/UL (ref 4.4–11.3)
WBC # BLD AUTO: 15.4 X10*3/UL (ref 4.4–11.3)
WBC # BLD AUTO: 15.9 X10*3/UL (ref 4.4–11.3)

## 2025-03-03 PROCEDURE — 2500000004 HC RX 250 GENERAL PHARMACY W/ HCPCS (ALT 636 FOR OP/ED): Mod: JZ

## 2025-03-03 PROCEDURE — 2500000004 HC RX 250 GENERAL PHARMACY W/ HCPCS (ALT 636 FOR OP/ED): Performed by: INTERNAL MEDICINE

## 2025-03-03 PROCEDURE — 36415 COLL VENOUS BLD VENIPUNCTURE: CPT | Performed by: INTERNAL MEDICINE

## 2025-03-03 PROCEDURE — 85027 COMPLETE CBC AUTOMATED: CPT | Performed by: INTERNAL MEDICINE

## 2025-03-03 PROCEDURE — 99291 CRITICAL CARE FIRST HOUR: CPT

## 2025-03-03 PROCEDURE — 71250 CT THORAX DX C-: CPT | Performed by: RADIOLOGY

## 2025-03-03 PROCEDURE — 85027 COMPLETE CBC AUTOMATED: CPT

## 2025-03-03 PROCEDURE — 84132 ASSAY OF SERUM POTASSIUM: CPT

## 2025-03-03 PROCEDURE — 2500000002 HC RX 250 W HCPCS SELF ADMINISTERED DRUGS (ALT 637 FOR MEDICARE OP, ALT 636 FOR OP/ED)

## 2025-03-03 PROCEDURE — 82550 ASSAY OF CK (CPK): CPT | Performed by: INTERNAL MEDICINE

## 2025-03-03 PROCEDURE — 99223 1ST HOSP IP/OBS HIGH 75: CPT

## 2025-03-03 PROCEDURE — 2020000001 HC ICU ROOM DAILY

## 2025-03-03 PROCEDURE — 85730 THROMBOPLASTIN TIME PARTIAL: CPT

## 2025-03-03 PROCEDURE — 99232 SBSQ HOSP IP/OBS MODERATE 35: CPT | Performed by: SURGERY

## 2025-03-03 PROCEDURE — 94640 AIRWAY INHALATION TREATMENT: CPT

## 2025-03-03 PROCEDURE — 2500000001 HC RX 250 WO HCPCS SELF ADMINISTERED DRUGS (ALT 637 FOR MEDICARE OP)

## 2025-03-03 PROCEDURE — 85384 FIBRINOGEN ACTIVITY: CPT

## 2025-03-03 PROCEDURE — 2500000005 HC RX 250 GENERAL PHARMACY W/O HCPCS

## 2025-03-03 PROCEDURE — 2500000004 HC RX 250 GENERAL PHARMACY W/ HCPCS (ALT 636 FOR OP/ED)

## 2025-03-03 PROCEDURE — 82947 ASSAY GLUCOSE BLOOD QUANT: CPT

## 2025-03-03 PROCEDURE — 36430 TRANSFUSION BLD/BLD COMPNT: CPT

## 2025-03-03 PROCEDURE — 85384 FIBRINOGEN ACTIVITY: CPT | Performed by: INTERNAL MEDICINE

## 2025-03-03 PROCEDURE — 94660 CPAP INITIATION&MGMT: CPT

## 2025-03-03 PROCEDURE — 74176 CT ABD & PELVIS W/O CONTRAST: CPT | Performed by: RADIOLOGY

## 2025-03-03 PROCEDURE — 2500000004 HC RX 250 GENERAL PHARMACY W/ HCPCS (ALT 636 FOR OP/ED): Performed by: HOSPITALIST

## 2025-03-03 PROCEDURE — P9016 RBC LEUKOCYTES REDUCED: HCPCS

## 2025-03-03 PROCEDURE — 85610 PROTHROMBIN TIME: CPT

## 2025-03-03 PROCEDURE — 76937 US GUIDE VASCULAR ACCESS: CPT

## 2025-03-03 PROCEDURE — 74176 CT ABD & PELVIS W/O CONTRAST: CPT

## 2025-03-03 RX ORDER — MORPHINE SULFATE 2 MG/ML
2 INJECTION, SOLUTION INTRAMUSCULAR; INTRAVENOUS EVERY 2 HOUR PRN
Status: DISCONTINUED | OUTPATIENT
Start: 2025-03-03 | End: 2025-03-18 | Stop reason: HOSPADM

## 2025-03-03 RX ORDER — VANCOMYCIN HCL 50 MG/ML
250 SOLUTION, RECONSTITUTED, ORAL ORAL 4 TIMES DAILY
Status: DISPENSED | OUTPATIENT
Start: 2025-03-03 | End: 2025-03-08

## 2025-03-03 RX ORDER — HEPARIN SODIUM 10000 [USP'U]/100ML
0-4000 INJECTION, SOLUTION INTRAVENOUS CONTINUOUS
Status: DISCONTINUED | OUTPATIENT
Start: 2025-03-03 | End: 2025-03-03

## 2025-03-03 RX ORDER — CLINDAMYCIN PHOSPHATE 900 MG/50ML
900 INJECTION, SOLUTION INTRAVENOUS EVERY 8 HOURS
Status: DISCONTINUED | OUTPATIENT
Start: 2025-03-03 | End: 2025-03-04

## 2025-03-03 RX ORDER — METOCLOPRAMIDE HYDROCHLORIDE 5 MG/ML
10 INJECTION INTRAMUSCULAR; INTRAVENOUS ONCE
Status: COMPLETED | OUTPATIENT
Start: 2025-03-03 | End: 2025-03-03

## 2025-03-03 RX ORDER — MORPHINE SULFATE 2 MG/ML
2 INJECTION, SOLUTION INTRAMUSCULAR; INTRAVENOUS ONCE
Status: COMPLETED | OUTPATIENT
Start: 2025-03-03 | End: 2025-03-03

## 2025-03-03 RX ORDER — MORPHINE SULFATE 2 MG/ML
2 INJECTION, SOLUTION INTRAMUSCULAR; INTRAVENOUS EVERY 4 HOURS PRN
Status: DISCONTINUED | OUTPATIENT
Start: 2025-03-03 | End: 2025-03-03

## 2025-03-03 RX ORDER — ONDANSETRON HYDROCHLORIDE 2 MG/ML
4 INJECTION, SOLUTION INTRAVENOUS EVERY 4 HOURS PRN
Status: DISCONTINUED | OUTPATIENT
Start: 2025-03-03 | End: 2025-03-18 | Stop reason: HOSPADM

## 2025-03-03 RX ORDER — METOPROLOL TARTRATE 25 MG/1
25 TABLET, FILM COATED ORAL 2 TIMES DAILY
Status: DISCONTINUED | OUTPATIENT
Start: 2025-03-03 | End: 2025-03-04

## 2025-03-03 RX ADMIN — SODIUM CHLORIDE, POTASSIUM CHLORIDE, SODIUM LACTATE AND CALCIUM CHLORIDE 100 ML/HR: 600; 310; 30; 20 INJECTION, SOLUTION INTRAVENOUS at 03:25

## 2025-03-03 RX ADMIN — VANCOMYCIN HYDROCHLORIDE 250 MG: 250 CAPSULE ORAL at 18:00

## 2025-03-03 RX ADMIN — SODIUM CHLORIDE, POTASSIUM CHLORIDE, SODIUM LACTATE AND CALCIUM CHLORIDE 100 ML/HR: 600; 310; 30; 20 INJECTION, SOLUTION INTRAVENOUS at 15:34

## 2025-03-03 RX ADMIN — BUDESONIDE 0.5 MG: 0.5 INHALANT ORAL at 07:47

## 2025-03-03 RX ADMIN — IPRATROPIUM BROMIDE AND ALBUTEROL SULFATE 3 ML: .5; 3 SOLUTION RESPIRATORY (INHALATION) at 01:41

## 2025-03-03 RX ADMIN — PROMETHAZINE HYDROCHLORIDE 25 MG: 25 INJECTION INTRAMUSCULAR; INTRAVENOUS at 02:30

## 2025-03-03 RX ADMIN — DOXYCYCLINE 100 MG: 100 INJECTION, POWDER, LYOPHILIZED, FOR SOLUTION INTRAVENOUS at 22:56

## 2025-03-03 RX ADMIN — VANCOMYCIN HYDROCHLORIDE 250 MG: 250 CAPSULE ORAL at 14:32

## 2025-03-03 RX ADMIN — METOPROLOL TARTRATE 25 MG: 25 TABLET, FILM COATED ORAL at 14:32

## 2025-03-03 RX ADMIN — DOXYCYCLINE 100 MG: 100 INJECTION, POWDER, LYOPHILIZED, FOR SOLUTION INTRAVENOUS at 11:41

## 2025-03-03 RX ADMIN — IPRATROPIUM BROMIDE AND ALBUTEROL SULFATE 3 ML: .5; 3 SOLUTION RESPIRATORY (INHALATION) at 19:41

## 2025-03-03 RX ADMIN — Medication 40 PERCENT: at 07:48

## 2025-03-03 RX ADMIN — BUDESONIDE 0.5 MG: 0.5 INHALANT ORAL at 19:41

## 2025-03-03 RX ADMIN — METOPROLOL TARTRATE 25 MG: 25 TABLET, FILM COATED ORAL at 21:18

## 2025-03-03 RX ADMIN — MORPHINE SULFATE 2 MG: 2 INJECTION, SOLUTION INTRAMUSCULAR; INTRAVENOUS at 00:51

## 2025-03-03 RX ADMIN — ATORVASTATIN CALCIUM 80 MG: 80 TABLET, FILM COATED ORAL at 21:18

## 2025-03-03 RX ADMIN — METOCLOPRAMIDE HYDROCHLORIDE 10 MG: 5 INJECTION INTRAMUSCULAR; INTRAVENOUS at 02:43

## 2025-03-03 RX ADMIN — MORPHINE SULFATE 2 MG: 2 INJECTION, SOLUTION INTRAMUSCULAR; INTRAVENOUS at 02:34

## 2025-03-03 RX ADMIN — Medication 40 PERCENT: at 19:41

## 2025-03-03 RX ADMIN — IPRATROPIUM BROMIDE AND ALBUTEROL SULFATE 3 ML: .5; 3 SOLUTION RESPIRATORY (INHALATION) at 07:47

## 2025-03-03 RX ADMIN — HEPARIN SODIUM 800 UNITS/HR: 10000 INJECTION, SOLUTION INTRAVENOUS at 13:50

## 2025-03-03 RX ADMIN — IPRATROPIUM BROMIDE AND ALBUTEROL SULFATE 3 ML: .5; 3 SOLUTION RESPIRATORY (INHALATION) at 12:49

## 2025-03-03 RX ADMIN — CLINDAMYCIN PHOSPHATE 900 MG: 900 INJECTION, SOLUTION INTRAVENOUS at 18:00

## 2025-03-03 RX ADMIN — METHYLPREDNISOLONE SODIUM SUCCINATE 40 MG: 40 INJECTION, POWDER, FOR SOLUTION INTRAMUSCULAR; INTRAVENOUS at 11:40

## 2025-03-03 RX ADMIN — ONDANSETRON 4 MG: 2 INJECTION, SOLUTION INTRAMUSCULAR; INTRAVENOUS at 00:48

## 2025-03-03 RX ADMIN — SODIUM CHLORIDE, POTASSIUM CHLORIDE, SODIUM LACTATE AND CALCIUM CHLORIDE 1000 ML: 600; 310; 30; 20 INJECTION, SOLUTION INTRAVENOUS at 04:31

## 2025-03-03 RX ADMIN — VANCOMYCIN HYDROCHLORIDE 250 MG: KIT at 22:56

## 2025-03-03 RX ADMIN — PANTOPRAZOLE SODIUM 40 MG: 40 INJECTION, POWDER, FOR SOLUTION INTRAVENOUS at 09:16

## 2025-03-03 ASSESSMENT — PAIN - FUNCTIONAL ASSESSMENT
PAIN_FUNCTIONAL_ASSESSMENT: 0-10

## 2025-03-03 ASSESSMENT — PAIN SCALES - GENERAL
PAINLEVEL_OUTOF10: 0 - NO PAIN
PAINLEVEL_OUTOF10: 7
PAINLEVEL_OUTOF10: 0 - NO PAIN
PAINLEVEL_OUTOF10: 9
PAINLEVEL_OUTOF10: 0 - NO PAIN
PAINLEVEL_OUTOF10: 2
PAINLEVEL_OUTOF10: 3

## 2025-03-03 NOTE — NURSING NOTE
ACE bandage on underside of LUE saturated in blood and moderate amount noted on green pad that was placed under LUE. Dr. Garcia assessed at bedside. No intervention at this time. Replaced green pad under LUE to monitor bleeding.

## 2025-03-03 NOTE — PROGRESS NOTES
"Kayley Lynch is a 54 y.o. female on day 5 of admission presenting with Nausea vomiting and diarrhea.    Subjective   Patient hematoma continue to expand yesterday causing compartment syndrome.  She was taken to the OR for fasciotomy by hand surgery (Dr. Licea).  Postoperative CT angiogram revealed a patent ulnar artery down into the palmar arch.       Objective     Physical Exam  Vitals reviewed.   Constitutional:       General: She is sleeping. She is not in acute distress.     Appearance: Normal appearance. She is normal weight.   HENT:      Head: Normocephalic and atraumatic.   Eyes:      Extraocular Movements: Extraocular movements intact.      Conjunctiva/sclera: Conjunctivae normal.   Cardiovascular:      Rate and Rhythm: Normal rate and regular rhythm.      Pulses: Normal pulses.      Heart sounds: Normal heart sounds.      Comments: Left palmar arch doppler signal presnt  Pulmonary:      Effort: Pulmonary effort is normal.      Breath sounds: Normal breath sounds.   Abdominal:      Palpations: Abdomen is soft.   Musculoskeletal:      Comments: Left hand warm and pink  Left upper extremity wrapped in surgical dressing status post fasciotomy   Skin:     General: Skin is warm and dry.   Neurological:      General: No focal deficit present.      Mental Status: She is easily aroused.   Psychiatric:         Mood and Affect: Mood normal.         Behavior: Behavior normal.         Last Recorded Vitals  Blood pressure 110/59, pulse 110, temperature 36.2 °C (97.2 °F), temperature source Temporal, resp. rate 14, height 1.6 m (5' 2.99\"), weight 66.6 kg (146 lb 13.2 oz), SpO2 100%.  Intake/Output last 3 Shifts:  I/O last 3 completed shifts:  In: 6148.9 (92.3 mL/kg) [I.V.:4500 (67.6 mL/kg); Blood:350; IV Piggyback:1298.9]  Out: 798 (12 mL/kg) [Urine:748 (0.3 mL/kg/hr); Blood:50]  Weight: 66.6 kg     Relevant Results      Current Facility-Administered Medications:     acetaminophen (Tylenol) tablet 650 mg, 650 mg, oral, " q4h PRN, Jaydon Bustillo MD, 650 mg at 03/02/25 1105    [Held by provider] aspirin EC tablet 81 mg, 81 mg, oral, Daily, Neto Clark MD, 81 mg at 03/02/25 0847    atorvastatin (Lipitor) tablet 80 mg, 80 mg, oral, Nightly, Jaydon Bustillo MD, 80 mg at 03/01/25 2046    budesonide (Pulmicort) 0.5 mg/2 mL nebulizer solution 0.5 mg, 0.5 mg, nebulization, BID, Jaydon Bustillo MD, 0.5 mg at 03/03/25 0747    buPROPion XL (Wellbutrin XL) 24 hr tablet 150 mg, 150 mg, oral, q AM, Jaydon Bustillo MD, 150 mg at 03/02/25 0847    doxycycline (Vibramycin) 100 mg in dextrose 5%  mL, 100 mg, intravenous, q12h, Jaydon Bustillo MD, Stopped at 03/03/25 0022    escitalopram (Lexapro) tablet 20 mg, 20 mg, oral, Daily, Jaydon Bustillo MD, 20 mg at 03/02/25 0847    insulin lispro injection 0-5 Units, 0-5 Units, subcutaneous, TID AC, Jaydon Bustillo MD, 2 Units at 03/02/25 0847    ipratropium-albuteroL (Duo-Neb) 0.5-2.5 mg/3 mL nebulizer solution 3 mL, 3 mL, nebulization, q6h, Jaydon Bustillo MD, 3 mL at 03/03/25 0747    ipratropium-albuteroL (Duo-Neb) 0.5-2.5 mg/3 mL nebulizer solution 3 mL, 3 mL, nebulization, q2h PRN, Jaydon Bustillo MD    lactated Ringer's infusion, 100 mL/hr, intravenous, Continuous, Jaydon Bustillo MD, Last Rate: 100 mL/hr at 03/03/25 0325, 100 mL/hr at 03/03/25 0325    melatonin tablet 3 mg, 3 mg, oral, Nightly PRN, Jaydon Bustillo MD, 3 mg at 02/28/25 2049    methylPREDNISolone sod succinate (SOLU-Medrol) 40 mg/mL injection 40 mg, 40 mg, intravenous, q24h, Jaydon Bustillo MD    metoprolol tartrate (Lopressor) injection 5 mg, 5 mg, intravenous, q6h PRN, Jaydon Bustillo MD, 5 mg at 03/02/25 1958    morphine injection 2 mg, 2 mg, intravenous, q2h PRN, Escobar Hall MD    ondansetron (Zofran) injection 4 mg, 4 mg, intravenous, q4h PRN, Escobar Hall MD, 4 mg at 03/03/25 0048    oxygen (O2) therapy, , inhalation, Continuous - Inhalation, Jaydon Bustillo MD, Last Rate: 300,000 mL/hr at 03/02/25 0850, 40 percent at 03/03/25 0748    pantoprazole  (ProtoNix) injection 40 mg, 40 mg, intravenous, Daily, Jaydon Bustillo MD, 40 mg at 03/03/25 0916    perflutren lipid microspheres (Definity) injection 0.5-10 mL of dilution, 0.5-10 mL of dilution, intravenous, Once in imaging, Jaydon Bustillo MD    perflutren protein A microsphere (Optison) injection 0.5 mL, 0.5 mL, intravenous, Once in imaging, Jaydon Bustillo MD    promethazine (Phenergan) 25 mg in sodium chloride 0.9% 50 mL IV, 25 mg, intravenous, q4h PRN, Escobar Hall MD, Stopped at 03/03/25 0245    sulfur hexafluoride microsphr (Lumason) injection 24.28 mg, 2 mL, intravenous, Once in imaging, Jaydon Bustillo MD    vancomycin (Vancocin) capsule 250 mg, 250 mg, oral, 4x daily, Jaydon Bustillo MD, 250 mg at 03/02/25 2146    [Held by provider] warfarin (Coumadin) tablet 2 mg, 2 mg, oral, Daily, Gabby Perez MD, 2 mg at 03/01/25 1732       Results for orders placed or performed during the hospital encounter of 02/25/25 (from the past 24 hours)   SST TOP   Result Value Ref Range    Extra Tube Hold for add-ons.    Creatine Kinase   Result Value Ref Range    Creatine Kinase 535 (H) 0 - 215 U/L   Comprehensive Metabolic Panel   Result Value Ref Range    Glucose 181 (H) 74 - 99 mg/dL    Sodium 138 136 - 145 mmol/L    Potassium 3.6 3.5 - 5.3 mmol/L    Chloride 100 98 - 107 mmol/L    Bicarbonate 24 21 - 32 mmol/L    Anion Gap 18 10 - 20 mmol/L    Urea Nitrogen 35 (H) 6 - 23 mg/dL    Creatinine 1.63 (H) 0.50 - 1.05 mg/dL    eGFR 37 (L) >60 mL/min/1.73m*2    Calcium 8.3 (L) 8.6 - 10.3 mg/dL    Albumin 3.0 (L) 3.4 - 5.0 g/dL    Alkaline Phosphatase 47 33 - 110 U/L    Total Protein 4.8 (L) 6.4 - 8.2 g/dL    AST 40 (H) 9 - 39 U/L    Bilirubin, Total 0.6 0.0 - 1.2 mg/dL    ALT 17 7 - 45 U/L   CBC and Auto Differential   Result Value Ref Range    WBC 15.1 (H) 4.4 - 11.3 x10*3/uL    nRBC 0.2 (H) 0.0 - 0.0 /100 WBCs    RBC 2.61 (L) 4.00 - 5.20 x10*6/uL    Hemoglobin 7.4 (L) 12.0 - 16.0 g/dL    Hematocrit 24.3 (L) 36.0 - 46.0 %     MCV 93 80 - 100 fL    MCH 28.4 26.0 - 34.0 pg    MCHC 30.5 (L) 32.0 - 36.0 g/dL    RDW 17.3 (H) 11.5 - 14.5 %    Platelets 111 (L) 150 - 450 x10*3/uL    Immature Granulocytes %, Automated 1.2 (H) 0.0 - 0.9 %    Immature Granulocytes Absolute, Automated 0.18 0.00 - 0.70 x10*3/uL   Manual Differential   Result Value Ref Range    Neutrophils %, Manual 66.0 40.0 - 80.0 %    Bands %, Manual 16.0 0.0 - 5.0 %    Lymphocytes %, Manual 8.0 13.0 - 44.0 %    Monocytes %, Manual 3.0 2.0 - 10.0 %    Eosinophils %, Manual 3.0 0.0 - 6.0 %    Basophils %, Manual 0.0 0.0 - 2.0 %    Atypical Lymphocytes %, Manual 2.0 0.0 - 2.0 %    Myelocytes %, Manual 2.0 0.0 - 0.0 %    Seg Neutrophils Absolute, Manual 9.97 (H) 1.20 - 7.00 x10*3/uL    Bands Absolute, Manual 2.42 (H) 0.00 - 0.70 x10*3/uL    Lymphocytes Absolute, Manual 1.21 1.20 - 4.80 x10*3/uL    Monocytes Absolute, Manual 0.45 0.10 - 1.00 x10*3/uL    Eosinophils Absolute, Manual 0.45 0.00 - 0.70 x10*3/uL    Basophils Absolute, Manual 0.00 0.00 - 0.10 x10*3/uL    Atypical Lymphs Absolute, Manual 0.30 0.00 - 0.50 x10*3/uL    Myelocytes Absolute, Manual 0.30 0.00 - 0.00 x10*3/uL    Total Cells Counted 100     Neutrophils Absolute, Manual 12.39 (H) 1.20 - 7.70 x10*3/uL    RBC Morphology See Below     Hypochromia Mild     RBC Fragments Few     Ovalocytes Few     Koki Cells Few    Type and screen   Result Value Ref Range    ABO TYPE A     Rh TYPE POS     ANTIBODY SCREEN NEG    Lactate   Result Value Ref Range    Lactate 5.9 (HH) 0.4 - 2.0 mmol/L   VERIFY ABO/Rh Group Test   Result Value Ref Range    ABO TYPE A     Rh TYPE POS    POCT GLUCOSE   Result Value Ref Range    POCT Glucose 138 (H) 74 - 99 mg/dL   Protime-INR   Result Value Ref Range    Protime >100.0 (HH) 9.8 - 12.4 seconds    INR     Prepare RBC: 1 Units   Result Value Ref Range    PRODUCT CODE S6479J36     Unit Number [id number]     Unit ABO A     Unit RH POS     XM INTEP COMP     Dispense Status TR     Blood Expiration  Date 3/24/2025 11:59:00 PM EDT     PRODUCT BLOOD TYPE 6200     UNIT VOLUME 350    POCT GLUCOSE   Result Value Ref Range    POCT Glucose 133 (H) 74 - 99 mg/dL   Hemoglobin and hematocrit, blood   Result Value Ref Range    Hemoglobin 8.9 (L) 12.0 - 16.0 g/dL    Hematocrit 27.7 (L) 36.0 - 46.0 %   SST TOP   Result Value Ref Range    Extra Tube Hold for add-ons.    Creatine Kinase   Result Value Ref Range    Creatine Kinase 739 (H) 0 - 215 U/L   Lactate   Result Value Ref Range    Lactate 2.8 (H) 0.4 - 2.0 mmol/L   Troponin I, High Sensitivity   Result Value Ref Range    Troponin I, High Sensitivity 123 (HH) 0 - 13 ng/L   Protime-INR   Result Value Ref Range    Protime 87.7 (HH) 9.8 - 12.4 seconds    INR 7.9 (HH) 0.9 - 1.1   SST TOP   Result Value Ref Range    Extra Tube Hold for add-ons.    Lactate   Result Value Ref Range    Lactate 3.3 (H) 0.4 - 2.0 mmol/L   CBC   Result Value Ref Range    WBC 13.2 (H) 4.4 - 11.3 x10*3/uL    nRBC 0.4 (H) 0.0 - 0.0 /100 WBCs    RBC 3.02 (L) 4.00 - 5.20 x10*6/uL    Hemoglobin 8.7 (L) 12.0 - 16.0 g/dL    Hematocrit 26.9 (L) 36.0 - 46.0 %    MCV 89 80 - 100 fL    MCH 28.8 26.0 - 34.0 pg    MCHC 32.3 32.0 - 36.0 g/dL    RDW 14.8 (H) 11.5 - 14.5 %    Platelets 125 (L) 150 - 450 x10*3/uL   Protime-INR   Result Value Ref Range    Protime 96.9 (HH) 9.8 - 12.4 seconds    INR 8.7 (HH) 0.9 - 1.1   Creatine Kinase   Result Value Ref Range    Creatine Kinase 736 (H) 0 - 215 U/L   Basic Metabolic Panel   Result Value Ref Range    Glucose 157 (H) 74 - 99 mg/dL    Sodium 138 136 - 145 mmol/L    Potassium 3.9 3.5 - 5.3 mmol/L    Chloride 100 98 - 107 mmol/L    Bicarbonate 27 21 - 32 mmol/L    Anion Gap 15 10 - 20 mmol/L    Urea Nitrogen 40 (H) 6 - 23 mg/dL    Creatinine 1.51 (H) 0.50 - 1.05 mg/dL    eGFR 41 (L) >60 mL/min/1.73m*2    Calcium 8.0 (L) 8.6 - 10.3 mg/dL   Blood Gas Arterial Full Panel   Result Value Ref Range    POCT pH, Arterial 7.47 (H) 7.38 - 7.42 pH    POCT pCO2, Arterial 36 (L) 38 -  42 mm Hg    POCT pO2, Arterial 83 (L) 85 - 95 mm Hg    POCT SO2, Arterial 98 94 - 100 %    POCT Oxy Hemoglobin, Arterial 96.3 94.0 - 98.0 %    POCT Hematocrit Calculated, Arterial 25.0 (L) 36.0 - 46.0 %    POCT Sodium, Arterial 134 (L) 136 - 145 mmol/L    POCT Potassium, Arterial 3.9 3.5 - 5.3 mmol/L    POCT Chloride, Arterial 103 98 - 107 mmol/L    POCT Ionized Calcium, Arterial 1.17 1.10 - 1.33 mmol/L    POCT Glucose, Arterial 165 (H) 74 - 99 mg/dL    POCT Lactate, Arterial 2.5 (H) 0.4 - 2.0 mmol/L    POCT Base Excess, Arterial 2.4 -2.0 - 3.0 mmol/L    POCT HCO3 Calculated, Arterial 26.2 (H) 22.0 - 26.0 mmol/L    POCT Hemoglobin, Arterial 8.2 (L) 12.0 - 16.0 g/dL    POCT Anion Gap, Arterial 9 (L) 10 - 25 mmo/L    Patient Temperature 37.0 degrees Celsius    FiO2 36 %    Apparatus CANNULA     Site of Arterial Puncture Brachial Right     Alexei's Test Negative    SST TOP   Result Value Ref Range    Extra Tube Hold for add-ons.    CBC   Result Value Ref Range    WBC 15.4 (H) 4.4 - 11.3 x10*3/uL    nRBC 0.5 (H) 0.0 - 0.0 /100 WBCs    RBC 2.41 (L) 4.00 - 5.20 x10*6/uL    Hemoglobin 7.0 (L) 12.0 - 16.0 g/dL    Hematocrit 23.0 (L) 36.0 - 46.0 %    MCV 95 80 - 100 fL    MCH 29.0 26.0 - 34.0 pg    MCHC 30.4 (L) 32.0 - 36.0 g/dL    RDW 14.9 (H) 11.5 - 14.5 %    Platelets 135 (L) 150 - 450 x10*3/uL   Prepare RBC: 1 Units   Result Value Ref Range    PRODUCT CODE K4816U51     Unit Number [id number]     Unit ABO A     Unit RH POS     XM INTEP COMP     Dispense Status IS     Blood Expiration Date 3/24/2025 11:59:00 PM EDT     PRODUCT BLOOD TYPE 6200     UNIT VOLUME 350    Creatine Kinase   Result Value Ref Range    Creatine Kinase 737 (H) 0 - 215 U/L   CBC   Result Value Ref Range    WBC 15.3 (H) 4.4 - 11.3 x10*3/uL    nRBC 1.2 (H) 0.0 - 0.0 /100 WBCs    RBC 2.99 (L) 4.00 - 5.20 x10*6/uL    Hemoglobin 8.5 (L) 12.0 - 16.0 g/dL    Hematocrit 25.5 (L) 36.0 - 46.0 %    MCV 85 80 - 100 fL    MCH 28.4 26.0 - 34.0 pg    MCHC 33.3  32.0 - 36.0 g/dL    RDW 16.8 (H) 11.5 - 14.5 %    Platelets 126 (L) 150 - 450 x10*3/uL   Coagulation Screen   Result Value Ref Range    Protime 13.5 (H) 9.8 - 12.4 seconds    INR 1.2 (H) 0.9 - 1.1    aPTT 31 26 - 36 seconds   Renal function panel   Result Value Ref Range    Glucose 134 (H) 74 - 99 mg/dL    Sodium 137 136 - 145 mmol/L    Potassium 4.3 3.5 - 5.3 mmol/L    Chloride 102 98 - 107 mmol/L    Bicarbonate 31 21 - 32 mmol/L    Anion Gap 8 (L) 10 - 20 mmol/L    Urea Nitrogen 47 (H) 6 - 23 mg/dL    Creatinine 1.65 (H) 0.50 - 1.05 mg/dL    eGFR 37 (L) >60 mL/min/1.73m*2    Calcium 7.8 (L) 8.6 - 10.3 mg/dL    Phosphorus 3.7 2.5 - 4.9 mg/dL    Albumin 2.6 (L) 3.4 - 5.0 g/dL     *Note: Due to a large number of results and/or encounters for the requested time period, some results have not been displayed. A complete set of results can be found in Results Review.        CT head wo IV contrast    Result Date: 3/2/2025  Interpreted By:  Christopher Casey, STUDY: CT HEAD WO IV CONTRAST;  3/2/2025 6:33 pm   INDICATION: Signs/Symptoms:Decreased mentation, prolonged INR, evaluate for head bleed.   COMPARISON: 2/28/2025   ACCESSION NUMBER(S): [id number]   ORDERING CLINICIAN: ASA TORRES   TECHNIQUE: Contiguous axial images of the head were obtained without intravenous contrast.   FINDINGS: BRAIN PARENCHYMA:   The gray white matter differentiation is preserved.  No mass effect or midline shift.   HEMORRHAGE:  No evidence of acute intracranial hemorrhage. VENTRICLES AND EXTRA-AXIAL SPACES:  The ventricles are within normal limits in size for brain volume.  No evidence of abnormal extraaxial fluid collection. EXTRACRANIAL SOFT TISSUES:  Within normal limits. PARANASAL SINUSES/MASTOIDS:  The visualized paranasal sinuses and mastoid air cells are clear and well pneumatized. CALVARIUM:  No evidence of depressed calvarial fracture.   OTHER FINDINGS:  None       No evidence of acute intracranial hemorrhage or mass effect.        MACRO: None   Signed by: Christopher Casey 3/2/2025 6:56 PM Dictation workstation:   [id number]    CT angio upper extremity left w and or wo IV contrast    Result Date: 3/2/2025  Interpreted By:  Jasiel Darden, STUDY: CT ANGIO UPPER EXTREMITY LEFT W AND OR WO IV CONTRAST;  3/2/2025 3:12 pm   INDICATION: Signs/Symptoms:large  hematoma decreased pulses.   COMPARISON: 02/26/2025.   ACCESSION NUMBER(S): [id number]   ORDERING CLINICIAN: GRACIE BYRNES   TECHNIQUE: High-resolution contrast-enhanced helical CT of the left upper extremity was performed, without contrast, timed to arterial phase, and timed to venous phase (three phases).  3-D processing was performed on an independent work station, with MIP and volume-rendering techniques. Total of 75 ML of Omnipaque 350 was injected intravenously during the examination.  The study was performed without oral contrast. The patient tolerated the injection without complications.   FINDINGS: VASCULAR:   Thoracic Aorta: Non-contrast images show no evidence of acute intramural hematoma. No thoracic aortic aneurysm or dissection. No significant atherosclerosis. Anatomic pattern of great vessels off the arch is normal.   Left upper extremity: Normal origin of the left subclavian artery from the aortic arch. The left subclavian and axillary arteries are widely patent. The left brachial artery is small in caliber but patent throughout its course. The left radial artery is not identified, except for possibly its origin (series 501, image 331 the ulnar artery is identified and patent throughout its course into the palmar arch. Due to the inherent limitations of the exam entirety of the palmar is not well evaluated, however, portions of the deep and superficial palmar arch are seen. The interosseous artery is patent.   Images of the abdominal aorta demonstrate diffuse atherosclerotic change without significant focal stenosis or aneurysm.   Celiac artery demonstrates no significant  focal stenosis.   Superior mesenteric artery demonstrates no significant focal stenosis.   Inferior mesenteric artery demonstrates no significant focal stenosis.   There is a single right renal artery.  Right renal artery demonstrates  no significant focal stenosis.   There  is a single left renal artery.  Left renal artery demonstrates no significant focal stenosis.   Left common iliac artery  is widely patent with no significant stenosis. Left external iliac artery  is widely patent with no significant stenosis. Left internal iliac artery  is widely patent with no significant stenosis.   NONVASCULAR FINDINGS: This study is an arterial directed study of the left arm. Incidentally, a portion of the left body is included in the exam. This portion of the exam is limited as the field-of-view is incomplete, there is significant motion and active breathing. Below is with respect to the portions of the organs which are included in the study.   LUNG/PLEURA/LARGE AIRWAYS: There is severe apical predominant emphysema. Airspace opacities are seen in the left lower lobe with a small left pleural effusion. These likely represent atelectasis   HEART: Normal size. No pericardial effusion   LIVER: Not sufficiently evaluated for comment   PANCREAS: No focal mass lesion or ductal dilation in the occluded segments.   SPLEEN: Unremarkable   ADRENAL GLANDS: The left adrenal gland is unremarkable.   KIDNEYS: Normal size and cortical thickness of the left kidney. The right kidney is not seen.   BLADDER: Decompressed by Jasso catheter.   REPRODUCTIVE ORGANS: Nondiagnostic   BOWEL: Incomplete evaluation.   PERITONEUM/RETROPERITONEUM/LYMPH NODES: New from the 02/26/2025 CT of the abdomen, a left retroperitoneal hematoma with expansion of the left psoas and iliacus muscles. There are numerous foci of contrast staining on both the arterial and venous phase images, a finding associated with coagulopathy.   LEFT ARM: There is no fracture of the  bones of the left arm. There is a large soft tissue defect of the left arm centered on the antecubital fossa extending from the mid biceps to the volar wrist. This is consistent with a fasciotomy performed earlier of the same day. Expected changes of subcutaneous emphysema and soft tissue stranding are identified. There is no large hematoma or fluid collection. There is no contrast extravasation       1. The ascending aorta, left subclavian and axillary arteries are widely patent. The brachial artery patent throughout its course, although slightly small in caliber. The left radial artery is not identified. The left interosseous artery is patent to the wrist. The left ulnar artery is patent into the palmar arch. Due to the inherent limitations of the exam entirety of the palmar arch is not well evaluated, however, portions of the deep and superficial palmar arch are opacified. 2. Large soft tissue defect of the left arm as described above consistent with same day fasciotomy. No contrast extravasation, large hematoma, or discrete fluid collection is identified. 3. New left retroperitoneal hemorrhage with numerous foci of contrast staining indicative of underlying coagulopathy. 4. Limited evaluation of the left chest and abdomen with the limitations and findings described above. This includes left lung opacification most likely atelectasis although a superimposed infiltrate can not be excluded by imaging. There is a small pleural effusion.   Jasiel Darden discussed the significance and urgency of this critical finding by EPIC CHAT with  GRACIE BYRNES on 3/2/2025 at 4:48 pm.  (**-RCF-**) Findings:  See findings.   Signed by: Jasiel Darden 3/2/2025 4:49 PM Dictation workstation:   [id number]    Vascular US Upper Extremity Venous Duplex Left    Result Date: 3/2/2025  Interpreted By:  Christopher Casey, STUDY: [facility] US UPPER EXTREMITY VENOUS DUPLEX LEFT  3/2/2025 2:50 am   INDICATION: 53 y/o   F with  Signs/Symptoms:IV  infiltrated yesterday with Levophed; r/o DVT.     COMPARISON: None.   ACCESSION NUMBER(S): [id number]   ORDERING CLINICIAN: BINH CRESPO   TECHNIQUE: Routine ultrasound of the  left upper extremity was performed with duplex Doppler (color and spectral) evaluation.   Static images were obtained for remote interpretation.   FINDINGS: The examination is limited secondary to significant swelling and edema in the soft tissues. The left internal jugular vein, innominate vein and subclavian vein are visualized and patent. The left axillary, brachial, cephalic and basilic veins are however not visualized secondary to the extensive soft tissue edema.       The left internal jugular vein, innominate vein and subclavian vein are visualized and patent. The left axillary, brachial, cephalic and basilic veins are however not visualized secondary to the extensive soft tissue edema and patency cannot be assessed.   MACRO: None   Signed by: Christopher Casey 3/2/2025 3:22 AM Dictation workstation:   [id number]               Assessment/Plan   Assessment & Plan  Nausea vomiting and diarrhea    Influenza A with pneumonia    Nontraumatic compartment syndrome of left upper extremity    53yo female who developed an expanding hematoma of the left upper extremity resulting in compartment syndrome.  She underwent fasciotomy of the hand surgery yesterday.  Currently on exam her hand is warm and pink.  She does have a Doppler signal in the palmar arch.  CT angiogram does reveal a patent ulnar artery with flow into the palmar arch.  No vascular intervention at this time.  But will continue to monitor vascular exam.     (This note was generated with voice recognition software and may contain errors including spelling, grammar, syntax and missed recognition of what was dictated, of which may not have been fully corrected)      Rosalie Clark MD

## 2025-03-03 NOTE — PROGRESS NOTES
Subjective:  Patient lying in bed on exam. She is more awake and responsive today.    Physical Exam:  GENERAL: Awake/ alert, cooperative.   HEAD:  Normocephalic, atraumatic.  EYES:  Round and reactive.  ENT:  No nasal discharge, mucous membranes moist and pink.  NECK:  Atraumatic, no meningismus.  CARDIOVASCULAR:  Regular rate and rhythm, no murmur.  RESPIRATORY:  CTAB, no active work of breathing.   ABDOMEN:  Soft, no tenderness, nondistended.  EXTREMITIES:  No peripheral edema, no calf tenderness.  SKIN:  Warm and dry.  NEUROLOGICAL:  Nonfocal grossly.    Remainder of objective data including imaging and laboratory findings were all reviewed.    Admission history:  3/3: Patient is awake and responsive on exam. Trial CPAP removal, CT chest abdomen pelvis ordered. Heme-onc following, fibrinogen level ordered. Heparin drip and lopressor 25mg BID ordered.    Assessment and plan:  Kayley Lynch is a 54-year-old female who presented on 2/26/2025 for feelings of malaise. She was treated for shock in the setting of Influenza A with suspected superimposed bacterial pneumonia, COPD exacerbation, and suspected infectious colitis. She was transferred out of the ICU on 2/28. LUE swelling was noted overnight 3/1-3/2, patient later had rapid response called for tachycardia and hypotension. Orthopedic surgery was consulted and she underwent emergent fasciotomy for compartment syndrome of the LUE. Patient is now under ICU care, vascular and orthopedics following.      Neurological System:  #Acute metabolic encephalopathy following fasciotomy 3/2  - Repeat CTH showed no acute bleed  - Trial removal of CPAP  - Continue home wellbutrin 150mg daily    Cardiovascular System:  #Afib with intermittent RVR  #Elevated Trop, demand ischemia vs 2/2 compartment syndrome/rhabdo  #Hx of Mechanical Mitral Valve Replacement  #CAD s/p CABG  - Maintain MAPs>65  - Given digoxin load 3/2, lopressor 25mg BID ordered  - Warfarin held, low intensity  heparin drip ordered in the setting of Afib  - PRN IV lopressor 5mg      Respiratory System:  #Acute on chronic hypoxic hypercapnic respiratory failure  #Influenza A  #COPD exacerbation  #Secondary PHTN  - Continue with Doxy for concurrent CAP coverage, total of 7 days  - tamiflu discontinued in setting of possible INR prolonging effects  - CT chest abdomen pelvis wo contrast ordered  - Continue Pulmicort and Duonebs  - Solu-medrol 40mg daily    Gastrointestinal System:  #Diarrhea, c/f colitis  - Continue PO vancomycin, day 6  - Continue PPI    Endocrine System:  -Accu-checks     Renal System:  #HILLARY, suspect pre-renal in setting of shock vs pigment nephropathy from compartment syndrome  -Cr 1.65 up from 1.51  - Continue with IVF  - Monitor urine output  - Trend lactate    Hematological System:  #Supratherapeutic INR  #Suspected coagulopathy  #Retroperitoneal Hemorrhage   #Anemia  #Leukocytosis in setting of steroid use  - Per hematology, fibrinogen and peripheral smear ordered  -Low intensity heparin drip started  -Hgb 8.5 up from 7.0, monitor CBC  - Consider Surgery consult if signs of worsening hemorrhage for retroperitoneal bleed     Infectious Disease:  #CAP  #C. diff colitis  #Influenza A  - Continue Doxy  - Continue PO vanc    Skin/MSK:  #Left Upper Extremity Compartment Syndrome 2/p fasciotomy 3/2  - Vascular and Orthopedic surgery following. Per ortho, planning for wound irrigation and debridement in 24-48 hours    Psych:  -No acute issues    ICU CHECK LIST:   Antimicrobials: PO vanc, doxy  Oxygen: 4L NC  Drips: -  Fluids: -  Feeding: NPO  Sedation/Analgesia: Tylenol  Prophylaxis: Protonix  Glycemic control: -  Bowel care: PRN   Lines/Tubes: PIV, Jasso  Restraints: -  Dispo: ICU     Code Status: Full       Neto Clark MD  PGY-1

## 2025-03-03 NOTE — CONSULTS
Consults    Reason For Consult  Coagulopathy    History Of Present Illness  Kayley Lynch is a 54 y.o. female with past medical history of COPD, pulmonary hypertension, A-fib, AF hyperlipidemia, mitral valve agitation s/p MV replacement, CAD, was initially in ICU on 2/26/2025 for management of acute on chronic hypoxemic hypercapnic respiratory failure and influenza A pneumonia along with C. difficile colitis and development of septic shock.  She is initially managed with BiPAP and was on vasopressors for septic shock support.  She is an IV antibiotics as well.  Patient had CT angiogram scan of the arm done recently that showed a left retroperitoneal hematoma and extension of the left psoas iliac us muscle.  There are several foci of contrast staining suggestive coagulopathy.  She had a prolonged PTT greater than 100 and was treated with 5 mg oral vitamin K.  She underwent fasciotomy for left arm compartment syndrome as well.     Past Medical History  She has a past medical history of Atrial flutter (Multi) (03/08/2023), Bilateral edema of lower extremity (03/08/2023), CAD (coronary artery disease) (03/08/2023), Cardiomyopathy (03/08/2023), Chest pressure (03/22/2024), Hypercholesterolemia (03/08/2023), Mitral regurgitation (03/08/2023), Other conditions influencing health status, Pulmonary hypertension (Multi) (03/08/2023), S/P CABG (coronary artery bypass graft) (03/08/2023), and S/P mitral valve replacement (03/08/2023).    Surgical History  She has a past surgical history that includes Other surgical history (08/17/2021) and Other surgical history (08/06/2020).     Social History  She reports that she has been smoking cigarettes. She has never used smokeless tobacco. She reports that she does not drink alcohol and does not use drugs.    Family History  Family History   Problem Relation Name Age of Onset    Atrial fibrillation Father      Heart attack Father          Allergies  Sulfa (sulfonamide  antibiotics)    Review of Systems  A 12 point review of systems was performed and otherwise negative except as stated in the HPI.      Physical Exam  Physical Exam:  General:  Pleasant and cooperative. No apparent distress.  HEENT:  Normocephalic, atraumatic, mucus membranes moist.   Neck:  Trachea midline.  No JVD.    Chest:  Clear to auscultation bilaterally. No wheezes, rales, or rhonchi.  CV:  Regular rate and rhythm.  Positive S1/S2.   Abdomen: Bowel sounds present in all four quadrants, abdomen is soft, non-tender, non-distended.  Extremities:  No lower extremity edema or cyanosis.   Neurological:  AAOx3. No focal deficits.  Skin:  Warm and dry.        Last Recorded Vitals  /60   Pulse (!) 122   Temp 36.2 °C (97.2 °F) (Temporal)   Resp 14   Wt 66.6 kg (146 lb 13.2 oz)   SpO2 95%     Relevant Results  All labs and imaging were reviewed by myself.     Assessment/Plan     # Supratherapeutic INR  # Influenza A pneumonia  # Retroperitoneal hematoma  - Yesterday the INR was 8.7 with a PT of 97.  This is significantly elevated.  After fasciotomy procedure and rechecking PT/INR he has come down to 1.2.  PT is now 13.5.  Could be suspicion of DIC as well.  Will obtain fibrinogen.  Will also obtain peripheral smear.  We advised to continue treatment of infection.     Kathy Guardado MD  PGY 2 hematology/oncology

## 2025-03-03 NOTE — PROGRESS NOTES
Kayley Lynch is a 54 y.o. female on day 4 of admission presenting with Nausea vomiting and diarrhea.      Subjective   Patient developed ecchymosis and edema of the left upper extremity overnight. Patient reportedly had an IV in the left antecubital and was receiving Levophed 2 days ago when it infiltrated.  A duplex ultrasound was ordered that was negative for DVT. She was evaluated by Vascular Surgery and Orthopedic Surgery. She was gound to have a larger hematoma and developing compartment syndrome. She was taken for urgent fasciotomies.        Objective     Last Recorded Vitals  /68   Pulse 102   Temp 35.6 °C (96.1 °F) (Temporal)   Resp 18   Wt 66.6 kg (146 lb 13.2 oz)   SpO2 98%   Intake/Output last 3 Shifts:    Intake/Output Summary (Last 24 hours) at 3/2/2025 2211  Last data filed at 3/2/2025 2000  Gross per 24 hour   Intake 2650 ml   Output 588 ml   Net 2062 ml       Admission Weight  Weight: 59 kg (130 lb) (02/26/25 0159)    Daily Weight  03/01/25 : 66.6 kg (146 lb 13.2 oz)    Image Results  CT head wo IV contrast  Narrative: Interpreted By:  Christopher Casey,   STUDY:  CT HEAD WO IV CONTRAST;  3/2/2025 6:33 pm      INDICATION:  Signs/Symptoms:Decreased mentation, prolonged INR, evaluate for head  bleed.      COMPARISON:  2/28/2025      ACCESSION NUMBER(S):  [id number]      ORDERING CLINICIAN:  ASA TORRES      TECHNIQUE:  Contiguous axial images of the head were obtained without intravenous  contrast.      FINDINGS:  BRAIN PARENCHYMA:   The gray white matter differentiation is  preserved.  No mass effect or midline shift.      HEMORRHAGE:  No evidence of acute intracranial hemorrhage.  VENTRICLES AND EXTRA-AXIAL SPACES:  The ventricles are within normal  limits in size for brain volume.  No evidence of abnormal extraaxial  fluid collection. EXTRACRANIAL SOFT TISSUES:  Within normal limits.  PARANASAL SINUSES/MASTOIDS:  The visualized paranasal sinuses and  mastoid air cells are clear  and well pneumatized. CALVARIUM:  No  evidence of depressed calvarial fracture.      OTHER FINDINGS:  None      Impression: No evidence of acute intracranial hemorrhage or mass effect.              MACRO:  None      Signed by: Christopher Casey 3/2/2025 6:56 PM  Dictation workstation:   [id number]  CT angio upper extremity left w and or wo IV contrast  Narrative: Interpreted By:  Jasiel Darden,   STUDY:  CT ANGIO UPPER EXTREMITY LEFT W AND OR WO IV CONTRAST;  3/2/2025 3:12  pm      INDICATION:  Signs/Symptoms:large  hematoma decreased pulses.      COMPARISON:  02/26/2025.      ACCESSION NUMBER(S):  [id number]      ORDERING CLINICIAN:  GRACIE BYRNES      TECHNIQUE:  High-resolution contrast-enhanced helical CT of the left upper  extremity was performed, without contrast, timed to arterial phase,  and timed to venous phase (three phases).  3-D processing was  performed on an independent work station, with MIP and  volume-rendering techniques. Total of 75 ML of Omnipaque 350 was  injected intravenously during the examination.  The study was  performed without oral contrast. The patient tolerated the injection  without complications.      FINDINGS:  VASCULAR:      Thoracic Aorta: Non-contrast images show no evidence of acute  intramural hematoma. No thoracic aortic aneurysm or dissection. No  significant atherosclerosis. Anatomic pattern of great vessels off  the arch is normal.      Left upper extremity: Normal origin of the left subclavian artery  from the aortic arch. The left subclavian and axillary arteries are  widely patent. The left brachial artery is small in caliber but  patent throughout its course. The left radial artery is not  identified, except for possibly its origin (series 501, image 331 the  ulnar artery is identified and patent throughout its course into the  palmar arch. Due to the inherent limitations of the exam entirety of  the palmar is not well evaluated, however, portions of the deep  and  superficial palmar arch are seen. The interosseous artery is patent.      Images of the abdominal aorta demonstrate diffuse atherosclerotic  change without significant focal stenosis or aneurysm.      Celiac artery demonstrates no significant focal stenosis.      Superior mesenteric artery demonstrates no significant focal stenosis.      Inferior mesenteric artery demonstrates no significant focal stenosis.      There is a single right renal artery.  Right renal artery  demonstrates  no significant focal stenosis.      There  is a single left renal artery.  Left renal artery demonstrates  no significant focal stenosis.      Left common iliac artery  is widely patent with no significant  stenosis. Left external iliac artery  is widely patent with no  significant stenosis. Left internal iliac artery  is widely patent  with no significant stenosis.      NONVASCULAR FINDINGS:  This study is an arterial directed study of the left arm.  Incidentally, a portion of the left body is included in the exam.  This portion of the exam is limited as the field-of-view is  incomplete, there is significant motion and active breathing. Below  is with respect to the portions of the organs which are included in  the study.      LUNG/PLEURA/LARGE AIRWAYS:  There is severe apical predominant emphysema. Airspace opacities are  seen in the left lower lobe with a small left pleural effusion. These  likely represent atelectasis      HEART:  Normal size. No pericardial effusion      LIVER:  Not sufficiently evaluated for comment      PANCREAS:  No focal mass lesion or ductal dilation in the occluded segments.      SPLEEN:  Unremarkable      ADRENAL GLANDS:  The left adrenal gland is unremarkable.      KIDNEYS:  Normal size and cortical thickness of the left kidney. The right  kidney is not seen.      BLADDER:  Decompressed by Jasso catheter.      REPRODUCTIVE ORGANS:  Nondiagnostic      BOWEL:  Incomplete evaluation.       PERITONEUM/RETROPERITONEUM/LYMPH NODES:  New from the 02/26/2025 CT of the abdomen, a left retroperitoneal  hematoma with expansion of the left psoas and iliacus muscles. There  are numerous foci of contrast staining on both the arterial and  venous phase images, a finding associated with coagulopathy.      LEFT ARM:  There is no fracture of the bones of the left arm. There is a large  soft tissue defect of the left arm centered on the antecubital fossa  extending from the mid biceps to the volar wrist. This is consistent  with a fasciotomy performed earlier of the same day. Expected changes  of subcutaneous emphysema and soft tissue stranding are identified.  There is no large hematoma or fluid collection. There is no contrast  extravasation      Impression: 1. The ascending aorta, left subclavian and axillary arteries are  widely patent. The brachial artery patent throughout its course,  although slightly small in caliber. The left radial artery is not  identified. The left interosseous artery is patent to the wrist. The  left ulnar artery is patent into the palmar arch. Due to the inherent  limitations of the exam entirety of the palmar arch is not well  evaluated, however, portions of the deep and superficial palmar arch  are opacified.  2. Large soft tissue defect of the left arm as described above  consistent with same day fasciotomy. No contrast extravasation, large  hematoma, or discrete fluid collection is identified.  3. New left retroperitoneal hemorrhage with numerous foci of contrast  staining indicative of underlying coagulopathy.  4. Limited evaluation of the left chest and abdomen with the  limitations and findings described above. This includes left lung  opacification most likely atelectasis although a superimposed  infiltrate can not be excluded by imaging. There is a small pleural  effusion.      Jasiel Darden discussed the significance and urgency of this  critical finding by EPIC CHAT with   GRACIE BYRNES on 3/2/2025 at 4:48  pm.  (**-RCF-**) Findings:  See findings.      Signed by: Jasiel Darden 3/2/2025 4:49 PM  Dictation workstation:   [id number]  Vascular US Upper Extremity Venous Duplex Left  Narrative: Interpreted By:  Christopher Casey,   STUDY:  [facility] US UPPER EXTREMITY VENOUS DUPLEX LEFT  3/2/2025 2:50 am      INDICATION:  53 y/o   F with  Signs/Symptoms:IV infiltrated yesterday with  Levophed; r/o DVT.          COMPARISON:  None.      ACCESSION NUMBER(S):  [id number]      ORDERING CLINICIAN:  BINH CRESPO      TECHNIQUE:  Routine ultrasound of the  left upper extremity was performed with  duplex Doppler (color and spectral) evaluation.   Static images were  obtained for remote interpretation.      FINDINGS:  The examination is limited secondary to significant swelling and  edema in the soft tissues. The left internal jugular vein, innominate  vein and subclavian vein are visualized and patent. The left  axillary, brachial, cephalic and basilic veins are however not  visualized secondary to the extensive soft tissue edema.      Impression: The left internal jugular vein, innominate vein and subclavian vein  are visualized and patent. The left axillary, brachial, cephalic and  basilic veins are however not visualized secondary to the extensive  soft tissue edema and patency cannot be assessed.      MACRO:  None      Signed by: Christopher Casey 3/2/2025 3:22 AM  Dictation workstation:   [id number]      Physical Exam  HENT:      Head: Normocephalic and atraumatic.      Nose: Nose normal.      Mouth/Throat:      Mouth: Mucous membranes are moist.      Pharynx: Oropharynx is clear.   Eyes:      Extraocular Movements: Extraocular movements intact.      Pupils: Pupils are equal, round, and reactive to light.   Cardiovascular:      Rate and Rhythm: Normal rate and regular rhythm.   Pulmonary:      Effort: No respiratory distress.      Breath sounds: Wheezing present. No rhonchi or rales.    Abdominal:      General: Bowel sounds are normal. There is no distension.      Palpations: Abdomen is soft.      Tenderness: There is no abdominal tenderness. There is no guarding.   Musculoskeletal:      Right lower leg: No edema.      Left lower leg: No edema.   Skin:     General: Skin is warm and dry.   Neurological:      Mental Status: She is alert. Mental status is at baseline.       Relevant Results               Assessment/Plan   This patient currently has cardiac telemetry ordered; if you would like to modify or discontinue the telemetry order, click here to go to the orders activity to modify/discontinue the order.      This patient has a urinary catheter   Reason for the urinary catheter remaining today? critically ill patient who need accurate urinary output measurements          Assessment & Plan  Nausea vomiting and diarrhea    Influenza A with pneumonia      Septic shock, dehydration, hypercapnic respiratory failure, and dehydration.  Nontraumatic compartment syndrome of left upper extremity      Patient is a 54-year-old female with past medical history of COPD, CAD status post CABG, pulmonary hypertension, hyperlipidemia, cardiomyopathy, a flutter status post ablation, and mitral valve replacement who presented with malaise.  She was found to be in septic shock and admitted to the ICU.    Septic shock  Hypercapnic respiratory failure  COPD exacerbation  Influenza A  Pneumonia  Acute metabolic encephalopathy  C. difficile colitis  Dehydration  HILLARY    -Continue supplemental oxygen  -Continue antibiotics, currently on doxycycline  -Continue nebulizers  -Continue steroids  -Continue Tamiflu  -Continue p.o. vancomycin  -Continue home meds  -Monitor INR    3/2: Patient developed a large hematoma in the left arm leading to compartment syndrome. She was taken for urgent fasciotomies with Orthopedic Surgery. She is also being followed by Vascular Surgery. Vitamin K ordered for supratherapeutic INR. Her INR  was 2.2 yesterday with goal being 2.5-3.5 for her A-flutter s/p mitral valve replacement. Critical Care reconsulted. Hematology consulted, appreciate recs. Hold coumadin and lovenox.              [place] Steven, DO

## 2025-03-03 NOTE — PROGRESS NOTES
"Kayley Lynch is a 54 y.o. female on day 5 of admission presenting with Nausea vomiting and diarrhea.    Subjective   The patient was sleeping and awakened.  Pain controlled.       Objective     Physical Exam  There is bloody drainage along the medial aspect of the left upper extremity splint.  The hand appears to be well-perfused.  She is able to flex and extend the fingers and thumb.  Sensation to light touch is intact along the radial, median and ulnar nerve distributions.  Last Recorded Vitals  Blood pressure 105/56, pulse 98, temperature 35.3 °C (95.5 °F), temperature source Temporal, resp. rate 13, height 1.6 m (5' 2.99\"), weight 66.6 kg (146 lb 13.2 oz), SpO2 94%.  Intake/Output last 3 Shifts:  I/O last 3 completed shifts:  In: 6148.9 (92.3 mL/kg) [I.V.:4500 (67.6 mL/kg); Blood:350; IV Piggyback:1298.9]  Out: 798 (12 mL/kg) [Urine:748 (0.3 mL/kg/hr); Blood:50]  Weight: 66.6 kg     Relevant Results       CT head wo IV contrast    Result Date: 3/2/2025  Interpreted By:  Christopher Casey, STUDY: CT HEAD WO IV CONTRAST;  3/2/2025 6:33 pm   INDICATION: Signs/Symptoms:Decreased mentation, prolonged INR, evaluate for head bleed.   COMPARISON: 2/28/2025   ACCESSION NUMBER(S): [id number]   ORDERING CLINICIAN: ASA TORRES   TECHNIQUE: Contiguous axial images of the head were obtained without intravenous contrast.   FINDINGS: BRAIN PARENCHYMA:   The gray white matter differentiation is preserved.  No mass effect or midline shift.   HEMORRHAGE:  No evidence of acute intracranial hemorrhage. VENTRICLES AND EXTRA-AXIAL SPACES:  The ventricles are within normal limits in size for brain volume.  No evidence of abnormal extraaxial fluid collection. EXTRACRANIAL SOFT TISSUES:  Within normal limits. PARANASAL SINUSES/MASTOIDS:  The visualized paranasal sinuses and mastoid air cells are clear and well pneumatized. CALVARIUM:  No evidence of depressed calvarial fracture.   OTHER FINDINGS:  None       No evidence of acute " intracranial hemorrhage or mass effect.       MACRO: None   Signed by: Christopher Casey 3/2/2025 6:56 PM Dictation workstation:   [id number]    CT angio upper extremity left w and or wo IV contrast    Result Date: 3/2/2025  Interpreted By:  Jasiel Darden, STUDY: CT ANGIO UPPER EXTREMITY LEFT W AND OR WO IV CONTRAST;  3/2/2025 3:12 pm   INDICATION: Signs/Symptoms:large  hematoma decreased pulses.   COMPARISON: 02/26/2025.   ACCESSION NUMBER(S): [id number]   ORDERING CLINICIAN: GRACIE BYRNES   TECHNIQUE: High-resolution contrast-enhanced helical CT of the left upper extremity was performed, without contrast, timed to arterial phase, and timed to venous phase (three phases).  3-D processing was performed on an independent work station, with MIP and volume-rendering techniques. Total of 75 ML of Omnipaque 350 was injected intravenously during the examination.  The study was performed without oral contrast. The patient tolerated the injection without complications.   FINDINGS: VASCULAR:   Thoracic Aorta: Non-contrast images show no evidence of acute intramural hematoma. No thoracic aortic aneurysm or dissection. No significant atherosclerosis. Anatomic pattern of great vessels off the arch is normal.   Left upper extremity: Normal origin of the left subclavian artery from the aortic arch. The left subclavian and axillary arteries are widely patent. The left brachial artery is small in caliber but patent throughout its course. The left radial artery is not identified, except for possibly its origin (series 501, image 331 the ulnar artery is identified and patent throughout its course into the palmar arch. Due to the inherent limitations of the exam entirety of the palmar is not well evaluated, however, portions of the deep and superficial palmar arch are seen. The interosseous artery is patent.   Images of the abdominal aorta demonstrate diffuse atherosclerotic change without significant focal stenosis or aneurysm.    Celiac artery demonstrates no significant focal stenosis.   Superior mesenteric artery demonstrates no significant focal stenosis.   Inferior mesenteric artery demonstrates no significant focal stenosis.   There is a single right renal artery.  Right renal artery demonstrates  no significant focal stenosis.   There  is a single left renal artery.  Left renal artery demonstrates no significant focal stenosis.   Left common iliac artery  is widely patent with no significant stenosis. Left external iliac artery  is widely patent with no significant stenosis. Left internal iliac artery  is widely patent with no significant stenosis.   NONVASCULAR FINDINGS: This study is an arterial directed study of the left arm. Incidentally, a portion of the left body is included in the exam. This portion of the exam is limited as the field-of-view is incomplete, there is significant motion and active breathing. Below is with respect to the portions of the organs which are included in the study.   LUNG/PLEURA/LARGE AIRWAYS: There is severe apical predominant emphysema. Airspace opacities are seen in the left lower lobe with a small left pleural effusion. These likely represent atelectasis   HEART: Normal size. No pericardial effusion   LIVER: Not sufficiently evaluated for comment   PANCREAS: No focal mass lesion or ductal dilation in the occluded segments.   SPLEEN: Unremarkable   ADRENAL GLANDS: The left adrenal gland is unremarkable.   KIDNEYS: Normal size and cortical thickness of the left kidney. The right kidney is not seen.   BLADDER: Decompressed by Jasso catheter.   REPRODUCTIVE ORGANS: Nondiagnostic   BOWEL: Incomplete evaluation.   PERITONEUM/RETROPERITONEUM/LYMPH NODES: New from the 02/26/2025 CT of the abdomen, a left retroperitoneal hematoma with expansion of the left psoas and iliacus muscles. There are numerous foci of contrast staining on both the arterial and venous phase images, a finding associated with  coagulopathy.   LEFT ARM: There is no fracture of the bones of the left arm. There is a large soft tissue defect of the left arm centered on the antecubital fossa extending from the mid biceps to the volar wrist. This is consistent with a fasciotomy performed earlier of the same day. Expected changes of subcutaneous emphysema and soft tissue stranding are identified. There is no large hematoma or fluid collection. There is no contrast extravasation       1. The ascending aorta, left subclavian and axillary arteries are widely patent. The brachial artery patent throughout its course, although slightly small in caliber. The left radial artery is not identified. The left interosseous artery is patent to the wrist. The left ulnar artery is patent into the palmar arch. Due to the inherent limitations of the exam entirety of the palmar arch is not well evaluated, however, portions of the deep and superficial palmar arch are opacified. 2. Large soft tissue defect of the left arm as described above consistent with same day fasciotomy. No contrast extravasation, large hematoma, or discrete fluid collection is identified. 3. New left retroperitoneal hemorrhage with numerous foci of contrast staining indicative of underlying coagulopathy. 4. Limited evaluation of the left chest and abdomen with the limitations and findings described above. This includes left lung opacification most likely atelectasis although a superimposed infiltrate can not be excluded by imaging. There is a small pleural effusion.   Jasiel Darden discussed the significance and urgency of this critical finding by EPIC CHAT with  GRACIE BYRNES on 3/2/2025 at 4:48 pm.  (**-RCF-**) Findings:  See findings.   Signed by: Jasiel Darden 3/2/2025 4:49 PM Dictation workstation:   [id number]    Vascular US Upper Extremity Venous Duplex Left    Result Date: 3/2/2025  Interpreted By:  Christopher Casey, STUDY: VASC US UPPER EXTREMITY VENOUS DUPLEX LEFT  3/2/2025  2:50 am   INDICATION: 55 y/o   F with  Signs/Symptoms:IV infiltrated yesterday with Levophed; r/o DVT.     COMPARISON: None.   ACCESSION NUMBER(S): [id number]   ORDERING CLINICIAN: BINH CRESPO   TECHNIQUE: Routine ultrasound of the  left upper extremity was performed with duplex Doppler (color and spectral) evaluation.   Static images were obtained for remote interpretation.   FINDINGS: The examination is limited secondary to significant swelling and edema in the soft tissues. The left internal jugular vein, innominate vein and subclavian vein are visualized and patent. The left axillary, brachial, cephalic and basilic veins are however not visualized secondary to the extensive soft tissue edema.       The left internal jugular vein, innominate vein and subclavian vein are visualized and patent. The left axillary, brachial, cephalic and basilic veins are however not visualized secondary to the extensive soft tissue edema and patency cannot be assessed.   MACRO: None   Signed by: Christopher Casey 3/2/2025 3:22 AM Dictation workstation:   [id number]       This patient currently has cardiac telemetry ordered; if you would like to modify or discontinue the telemetry order, click here to go to the orders activity to modify/discontinue the order.             Results for orders placed or performed during the hospital encounter of 02/25/25 (from the past 24 hours)   POCT GLUCOSE   Result Value Ref Range    POCT Glucose 160 (H) 74 - 99 mg/dL   SST TOP   Result Value Ref Range    Extra Tube Hold for add-ons.    Creatine Kinase   Result Value Ref Range    Creatine Kinase 535 (H) 0 - 215 U/L   Comprehensive Metabolic Panel   Result Value Ref Range    Glucose 181 (H) 74 - 99 mg/dL    Sodium 138 136 - 145 mmol/L    Potassium 3.6 3.5 - 5.3 mmol/L    Chloride 100 98 - 107 mmol/L    Bicarbonate 24 21 - 32 mmol/L    Anion Gap 18 10 - 20 mmol/L    Urea Nitrogen 35 (H) 6 - 23 mg/dL    Creatinine 1.63 (H) 0.50 - 1.05 mg/dL    eGFR  37 (L) >60 mL/min/1.73m*2    Calcium 8.3 (L) 8.6 - 10.3 mg/dL    Albumin 3.0 (L) 3.4 - 5.0 g/dL    Alkaline Phosphatase 47 33 - 110 U/L    Total Protein 4.8 (L) 6.4 - 8.2 g/dL    AST 40 (H) 9 - 39 U/L    Bilirubin, Total 0.6 0.0 - 1.2 mg/dL    ALT 17 7 - 45 U/L   CBC and Auto Differential   Result Value Ref Range    WBC 15.1 (H) 4.4 - 11.3 x10*3/uL    nRBC 0.2 (H) 0.0 - 0.0 /100 WBCs    RBC 2.61 (L) 4.00 - 5.20 x10*6/uL    Hemoglobin 7.4 (L) 12.0 - 16.0 g/dL    Hematocrit 24.3 (L) 36.0 - 46.0 %    MCV 93 80 - 100 fL    MCH 28.4 26.0 - 34.0 pg    MCHC 30.5 (L) 32.0 - 36.0 g/dL    RDW 17.3 (H) 11.5 - 14.5 %    Platelets 111 (L) 150 - 450 x10*3/uL    Immature Granulocytes %, Automated 1.2 (H) 0.0 - 0.9 %    Immature Granulocytes Absolute, Automated 0.18 0.00 - 0.70 x10*3/uL   Manual Differential   Result Value Ref Range    Neutrophils %, Manual 66.0 40.0 - 80.0 %    Bands %, Manual 16.0 0.0 - 5.0 %    Lymphocytes %, Manual 8.0 13.0 - 44.0 %    Monocytes %, Manual 3.0 2.0 - 10.0 %    Eosinophils %, Manual 3.0 0.0 - 6.0 %    Basophils %, Manual 0.0 0.0 - 2.0 %    Atypical Lymphocytes %, Manual 2.0 0.0 - 2.0 %    Myelocytes %, Manual 2.0 0.0 - 0.0 %    Seg Neutrophils Absolute, Manual 9.97 (H) 1.20 - 7.00 x10*3/uL    Bands Absolute, Manual 2.42 (H) 0.00 - 0.70 x10*3/uL    Lymphocytes Absolute, Manual 1.21 1.20 - 4.80 x10*3/uL    Monocytes Absolute, Manual 0.45 0.10 - 1.00 x10*3/uL    Eosinophils Absolute, Manual 0.45 0.00 - 0.70 x10*3/uL    Basophils Absolute, Manual 0.00 0.00 - 0.10 x10*3/uL    Atypical Lymphs Absolute, Manual 0.30 0.00 - 0.50 x10*3/uL    Myelocytes Absolute, Manual 0.30 0.00 - 0.00 x10*3/uL    Total Cells Counted 100     Neutrophils Absolute, Manual 12.39 (H) 1.20 - 7.70 x10*3/uL    RBC Morphology See Below     Hypochromia Mild     RBC Fragments Few     Ovalocytes Few     Stanton Cells Few    Type and screen   Result Value Ref Range    ABO TYPE A     Rh TYPE POS     ANTIBODY SCREEN NEG    Lactate   Result  Value Ref Range    Lactate 5.9 (HH) 0.4 - 2.0 mmol/L   VERIFY ABO/Rh Group Test   Result Value Ref Range    ABO TYPE A     Rh TYPE POS    POCT GLUCOSE   Result Value Ref Range    POCT Glucose 138 (H) 74 - 99 mg/dL   Protime-INR   Result Value Ref Range    Protime >100.0 (HH) 9.8 - 12.4 seconds    INR     Prepare RBC: 1 Units   Result Value Ref Range    PRODUCT CODE S4674E21     Unit Number [id number]     Unit ABO A     Unit RH POS     XM INTEP COMP     Dispense Status TR     Blood Expiration Date 3/24/2025 11:59:00 PM EDT     PRODUCT BLOOD TYPE 6200     UNIT VOLUME 350    POCT GLUCOSE   Result Value Ref Range    POCT Glucose 133 (H) 74 - 99 mg/dL   Hemoglobin and hematocrit, blood   Result Value Ref Range    Hemoglobin 8.9 (L) 12.0 - 16.0 g/dL    Hematocrit 27.7 (L) 36.0 - 46.0 %   SST TOP   Result Value Ref Range    Extra Tube Hold for add-ons.    Creatine Kinase   Result Value Ref Range    Creatine Kinase 739 (H) 0 - 215 U/L   Lactate   Result Value Ref Range    Lactate 2.8 (H) 0.4 - 2.0 mmol/L   Troponin I, High Sensitivity   Result Value Ref Range    Troponin I, High Sensitivity 123 (HH) 0 - 13 ng/L   Protime-INR   Result Value Ref Range    Protime 87.7 (HH) 9.8 - 12.4 seconds    INR 7.9 (HH) 0.9 - 1.1   SST TOP   Result Value Ref Range    Extra Tube Hold for add-ons.    Lactate   Result Value Ref Range    Lactate 3.3 (H) 0.4 - 2.0 mmol/L   CBC   Result Value Ref Range    WBC 13.2 (H) 4.4 - 11.3 x10*3/uL    nRBC 0.4 (H) 0.0 - 0.0 /100 WBCs    RBC 3.02 (L) 4.00 - 5.20 x10*6/uL    Hemoglobin 8.7 (L) 12.0 - 16.0 g/dL    Hematocrit 26.9 (L) 36.0 - 46.0 %    MCV 89 80 - 100 fL    MCH 28.8 26.0 - 34.0 pg    MCHC 32.3 32.0 - 36.0 g/dL    RDW 14.8 (H) 11.5 - 14.5 %    Platelets 125 (L) 150 - 450 x10*3/uL   Protime-INR   Result Value Ref Range    Protime 96.9 (HH) 9.8 - 12.4 seconds    INR 8.7 (HH) 0.9 - 1.1   Creatine Kinase   Result Value Ref Range    Creatine Kinase 736 (H) 0 - 215 U/L   Basic Metabolic Panel    Result Value Ref Range    Glucose 157 (H) 74 - 99 mg/dL    Sodium 138 136 - 145 mmol/L    Potassium 3.9 3.5 - 5.3 mmol/L    Chloride 100 98 - 107 mmol/L    Bicarbonate 27 21 - 32 mmol/L    Anion Gap 15 10 - 20 mmol/L    Urea Nitrogen 40 (H) 6 - 23 mg/dL    Creatinine 1.51 (H) 0.50 - 1.05 mg/dL    eGFR 41 (L) >60 mL/min/1.73m*2    Calcium 8.0 (L) 8.6 - 10.3 mg/dL   Blood Gas Arterial Full Panel   Result Value Ref Range    POCT pH, Arterial 7.47 (H) 7.38 - 7.42 pH    POCT pCO2, Arterial 36 (L) 38 - 42 mm Hg    POCT pO2, Arterial 83 (L) 85 - 95 mm Hg    POCT SO2, Arterial 98 94 - 100 %    POCT Oxy Hemoglobin, Arterial 96.3 94.0 - 98.0 %    POCT Hematocrit Calculated, Arterial 25.0 (L) 36.0 - 46.0 %    POCT Sodium, Arterial 134 (L) 136 - 145 mmol/L    POCT Potassium, Arterial 3.9 3.5 - 5.3 mmol/L    POCT Chloride, Arterial 103 98 - 107 mmol/L    POCT Ionized Calcium, Arterial 1.17 1.10 - 1.33 mmol/L    POCT Glucose, Arterial 165 (H) 74 - 99 mg/dL    POCT Lactate, Arterial 2.5 (H) 0.4 - 2.0 mmol/L    POCT Base Excess, Arterial 2.4 -2.0 - 3.0 mmol/L    POCT HCO3 Calculated, Arterial 26.2 (H) 22.0 - 26.0 mmol/L    POCT Hemoglobin, Arterial 8.2 (L) 12.0 - 16.0 g/dL    POCT Anion Gap, Arterial 9 (L) 10 - 25 mmo/L    Patient Temperature 37.0 degrees Celsius    FiO2 36 %    Apparatus CANNULA     Site of Arterial Puncture Brachial Right     Alexei's Test Negative    SST TOP   Result Value Ref Range    Extra Tube Hold for add-ons.    CBC   Result Value Ref Range    WBC 15.4 (H) 4.4 - 11.3 x10*3/uL    nRBC 0.5 (H) 0.0 - 0.0 /100 WBCs    RBC 2.41 (L) 4.00 - 5.20 x10*6/uL    Hemoglobin 7.0 (L) 12.0 - 16.0 g/dL    Hematocrit 23.0 (L) 36.0 - 46.0 %    MCV 95 80 - 100 fL    MCH 29.0 26.0 - 34.0 pg    MCHC 30.4 (L) 32.0 - 36.0 g/dL    RDW 14.9 (H) 11.5 - 14.5 %    Platelets 135 (L) 150 - 450 x10*3/uL   Prepare RBC: 1 Units   Result Value Ref Range    PRODUCT CODE I0778J59     Unit Number [id number]     Unit ABO A     Unit RH POS      XM INTEP COMP     Dispense Status IS     Blood Expiration Date 3/24/2025 11:59:00 PM EDT     PRODUCT BLOOD TYPE 6200     UNIT VOLUME 350    Creatine Kinase   Result Value Ref Range    Creatine Kinase 737 (H) 0 - 215 U/L   Coagulation Screen   Result Value Ref Range    Protime 13.5 (H) 9.8 - 12.4 seconds    INR 1.2 (H) 0.9 - 1.1    aPTT 31 26 - 36 seconds   Renal function panel   Result Value Ref Range    Glucose 134 (H) 74 - 99 mg/dL    Sodium 137 136 - 145 mmol/L    Potassium 4.3 3.5 - 5.3 mmol/L    Chloride 102 98 - 107 mmol/L    Bicarbonate 31 21 - 32 mmol/L    Anion Gap 8 (L) 10 - 20 mmol/L    Urea Nitrogen 47 (H) 6 - 23 mg/dL    Creatinine 1.65 (H) 0.50 - 1.05 mg/dL    eGFR 37 (L) >60 mL/min/1.73m*2    Calcium 7.8 (L) 8.6 - 10.3 mg/dL    Phosphorus 3.7 2.5 - 4.9 mg/dL    Albumin 2.6 (L) 3.4 - 5.0 g/dL          Assessment/Plan   Assessment & Plan  Nausea vomiting and diarrhea    Influenza A with pneumonia    Nontraumatic compartment syndrome of left upper extremity    The patient is postoperative day 1 status post left upper extremity fasciotomy and hematoma debridement.  Overall, the arm appears to be stable.  There is within normal limits bloody drainage on the splint for the size of the fasciotomy.  The CTA shows patent brachial and ulnar arteries extending to the palmar arch.  The patient is anemic and would benefit from a transfusion of packed red blood cells.  Plan for wound irrigation and debridement within the next 24 to 48 hours.  Please reinforce the dressing.             Elijah Licea MD

## 2025-03-04 ENCOUNTER — APPOINTMENT (OUTPATIENT)
Dept: RADIOLOGY | Facility: HOSPITAL | Age: 55
DRG: 853 | End: 2025-03-04
Payer: COMMERCIAL

## 2025-03-04 ENCOUNTER — ANESTHESIA EVENT (OUTPATIENT)
Dept: OPERATING ROOM | Facility: HOSPITAL | Age: 55
End: 2025-03-04
Payer: COMMERCIAL

## 2025-03-04 ENCOUNTER — ANESTHESIA (OUTPATIENT)
Dept: OPERATING ROOM | Facility: HOSPITAL | Age: 55
End: 2025-03-04
Payer: COMMERCIAL

## 2025-03-04 LAB
ACANTHOCYTES BLD QL SMEAR: NORMAL
ALBUMIN SERPL BCP-MCNC: 2.7 G/DL (ref 3.4–5)
ALP SERPL-CCNC: 33 U/L (ref 33–110)
ALT SERPL W P-5'-P-CCNC: 18 U/L (ref 7–45)
ANION GAP BLDV CALCULATED.4IONS-SCNC: 21 MMOL/L (ref 10–25)
ANION GAP BLDV CALCULATED.4IONS-SCNC: ABNORMAL MMOL/L
ANION GAP SERPL CALC-SCNC: 9 MMOL/L (ref 10–20)
APPARATUS: ABNORMAL
ARTERIAL PATENCY WRIST A: POSITIVE
AST SERPL W P-5'-P-CCNC: 39 U/L (ref 9–39)
BASE EXCESS BLDA CALC-SCNC: 3.6 MMOL/L (ref -2–3)
BASE EXCESS BLDV CALC-SCNC: -16 MMOL/L (ref -2–3)
BASE EXCESS BLDV CALC-SCNC: ABNORMAL MMOL/L
BASOPHILS # BLD AUTO: NORMAL 10*3/UL
BASOPHILS # BLD MANUAL: 0 X10*3/UL (ref 0–0.1)
BASOPHILS NFR BLD AUTO: NORMAL %
BASOPHILS NFR BLD MANUAL: 0 %
BILIRUB SERPL-MCNC: 0.6 MG/DL (ref 0–1.2)
BLOOD EXPIRATION DATE: NORMAL
BODY TEMPERATURE: 37 DEGREES CELSIUS
BODY TEMPERATURE: 37 DEGREES CELSIUS
BODY TEMPERATURE: ABNORMAL
BUN SERPL-MCNC: 46 MG/DL (ref 6–23)
BURR CELLS BLD QL SMEAR: ABNORMAL
BURR CELLS BLD QL SMEAR: NORMAL
CA-I BLDV-SCNC: 0.51 MMOL/L (ref 1.1–1.33)
CA-I BLDV-SCNC: ABNORMAL MMOL/L
CALCIUM SERPL-MCNC: 7.8 MG/DL (ref 8.6–10.3)
CHLORIDE BLDV-SCNC: 94 MMOL/L (ref 98–107)
CHLORIDE BLDV-SCNC: ABNORMAL MMOL/L
CHLORIDE SERPL-SCNC: 106 MMOL/L (ref 98–107)
CO2 SERPL-SCNC: 30 MMOL/L (ref 21–32)
CREAT SERPL-MCNC: 1.02 MG/DL (ref 0.5–1.05)
CRITICAL CALL TIME: 1542
CRITICAL CALLED BY: ABNORMAL
CRITICAL CALLED TO: ABNORMAL
CRITICAL READ BACK: ABNORMAL
DISPENSE STATUS: NORMAL
EGFRCR SERPLBLD CKD-EPI 2021: 65 ML/MIN/1.73M*2
EOSINOPHIL # BLD AUTO: NORMAL 10*3/UL
EOSINOPHIL # BLD MANUAL: 0 X10*3/UL (ref 0–0.7)
EOSINOPHIL NFR BLD AUTO: NORMAL %
EOSINOPHIL NFR BLD MANUAL: 0 %
ERYTHROCYTE [DISTWIDTH] IN BLOOD BY AUTOMATED COUNT: 17.3 % (ref 11.5–14.5)
ERYTHROCYTE [DISTWIDTH] IN BLOOD BY AUTOMATED COUNT: 17.7 % (ref 11.5–14.5)
GIANT PLATELETS BLD QL SMEAR: NORMAL
GLUCOSE BLD MANUAL STRIP-MCNC: 111 MG/DL (ref 74–99)
GLUCOSE BLD MANUAL STRIP-MCNC: 112 MG/DL (ref 74–99)
GLUCOSE BLD MANUAL STRIP-MCNC: 132 MG/DL (ref 74–99)
GLUCOSE BLD MANUAL STRIP-MCNC: 146 MG/DL (ref 74–99)
GLUCOSE BLDV-MCNC: 513 MG/DL (ref 74–99)
GLUCOSE BLDV-MCNC: ABNORMAL MG/DL
GLUCOSE SERPL-MCNC: 116 MG/DL (ref 74–99)
HCO3 BLDA-SCNC: 31.1 MMOL/L (ref 22–26)
HCO3 BLDV-SCNC: 10.4 MMOL/L (ref 22–26)
HCO3 BLDV-SCNC: 28.5 MMOL/L (ref 22–26)
HCT VFR BLD AUTO: 19.9 % (ref 36–46)
HCT VFR BLD AUTO: 21.7 % (ref 36–46)
HCT VFR BLD EST: 12 % (ref 36–46)
HCT VFR BLD EST: ABNORMAL %
HGB BLD-MCNC: 6.4 G/DL (ref 12–16)
HGB BLD-MCNC: 7.1 G/DL (ref 12–16)
HGB BLDV-MCNC: 4.1 G/DL (ref 12–16)
HGB BLDV-MCNC: ABNORMAL G/DL
HOLD SPECIMEN: NORMAL
HOLD SPECIMEN: NORMAL
IMM GRANULOCYTES # BLD AUTO: NORMAL 10*3/UL
IMM GRANULOCYTES NFR BLD AUTO: NORMAL %
INHALED O2 CONCENTRATION: 100 %
INHALED O2 CONCENTRATION: 40 %
INHALED O2 CONCENTRATION: ABNORMAL %
INR PPP: 1 (ref 0.9–1.1)
LACTATE BLDV-SCNC: 1.6 MMOL/L (ref 0.4–2)
LACTATE BLDV-SCNC: 12.7 MMOL/L (ref 0.4–2)
LYMPHOCYTES # BLD AUTO: NORMAL 10*3/UL
LYMPHOCYTES # BLD MANUAL: 1.1 X10*3/UL (ref 1.2–4.8)
LYMPHOCYTES NFR BLD AUTO: NORMAL %
LYMPHOCYTES NFR BLD MANUAL: 8 %
MAGNESIUM SERPL-MCNC: 1.93 MG/DL (ref 1.6–2.4)
MCH RBC QN AUTO: 27.8 PG (ref 26–34)
MCH RBC QN AUTO: 28 PG (ref 26–34)
MCHC RBC AUTO-ENTMCNC: 32.2 G/DL (ref 32–36)
MCHC RBC AUTO-ENTMCNC: 32.7 G/DL (ref 32–36)
MCV RBC AUTO: 85 FL (ref 80–100)
MCV RBC AUTO: 87 FL (ref 80–100)
MONOCYTES # BLD AUTO: NORMAL 10*3/UL
MONOCYTES # BLD MANUAL: 0.97 X10*3/UL (ref 0.1–1)
MONOCYTES NFR BLD AUTO: NORMAL %
MONOCYTES NFR BLD MANUAL: 7 %
NEUTROPHILS # BLD AUTO: NORMAL 10*3/UL
NEUTROPHILS NFR BLD AUTO: NORMAL %
NEUTS SEG # BLD MANUAL: 11.73 X10*3/UL (ref 1.2–7)
NEUTS SEG NFR BLD MANUAL: 85 %
NRBC BLD MANUAL-RTO: 4 % (ref 0–0)
NRBC BLD-RTO: 3.4 /100 WBCS (ref 0–0)
NRBC BLD-RTO: 3.5 /100 WBCS (ref 0–0)
OVALOCYTES BLD QL SMEAR: ABNORMAL
OVALOCYTES BLD QL SMEAR: NORMAL
OXYHGB MFR BLDA: 96.4 % (ref 94–98)
OXYHGB MFR BLDV: 88 % (ref 45–75)
OXYHGB MFR BLDV: ABNORMAL %
PATH REVIEW-CBC DIFFERENTIAL: NORMAL
PCO2 BLDA: 59 MM HG (ref 38–42)
PCO2 BLDV: 26 MM HG (ref 41–51)
PCO2 BLDV: ABNORMAL MM[HG]
PH BLDA: 7.33 PH (ref 7.38–7.42)
PH BLDV: 7.21 PH (ref 7.33–7.43)
PH BLDV: ABNORMAL [PH]
PLATELET # BLD AUTO: 161 X10*3/UL (ref 150–450)
PLATELET # BLD AUTO: 175 X10*3/UL (ref 150–450)
PO2 BLDA: 100 MM HG (ref 85–95)
PO2 BLDV: 61 MM HG (ref 35–45)
PO2 BLDV: ABNORMAL MM[HG]
POLYCHROMASIA BLD QL SMEAR: ABNORMAL
POLYCHROMASIA BLD QL SMEAR: NORMAL
POTASSIUM BLDV-SCNC: 3.4 MMOL/L (ref 3.5–5.3)
POTASSIUM BLDV-SCNC: 3.8 MMOL/L (ref 3.5–5.3)
POTASSIUM SERPL-SCNC: 4 MMOL/L (ref 3.5–5.3)
PRODUCT BLOOD TYPE: 6200
PRODUCT CODE: NORMAL
PROT SERPL-MCNC: 4.4 G/DL (ref 6.4–8.2)
PROTHROMBIN TIME: 11.4 SECONDS (ref 9.8–12.4)
RBC # BLD AUTO: 2.3 X10*6/UL (ref 4–5.2)
RBC # BLD AUTO: 2.54 X10*6/UL (ref 4–5.2)
RBC MORPH BLD: ABNORMAL
RBC MORPH BLD: NORMAL
SAO2 % BLDA: 100 % (ref 94–100)
SAO2 % BLDV: 90 % (ref 45–75)
SAO2 % BLDV: ABNORMAL %
SODIUM BLDV-SCNC: 122 MMOL/L (ref 136–145)
SODIUM BLDV-SCNC: ABNORMAL MMOL/L
SODIUM SERPL-SCNC: 141 MMOL/L (ref 136–145)
SPECIMEN DRAWN FROM PATIENT: ABNORMAL
TEST COMMENT: ABNORMAL
TOTAL CELLS COUNTED BLD: 100
UNIT ABO: NORMAL
UNIT NUMBER: NORMAL
UNIT RH: NORMAL
UNIT VOLUME: 350
WBC # BLD AUTO: 13.5 X10*3/UL (ref 4.4–11.3)
WBC # BLD AUTO: 13.8 X10*3/UL (ref 4.4–11.3)
XM INTEP: NORMAL

## 2025-03-04 PROCEDURE — 2500000005 HC RX 250 GENERAL PHARMACY W/O HCPCS

## 2025-03-04 PROCEDURE — 85007 BL SMEAR W/DIFF WBC COUNT: CPT

## 2025-03-04 PROCEDURE — 2500000004 HC RX 250 GENERAL PHARMACY W/ HCPCS (ALT 636 FOR OP/ED)

## 2025-03-04 PROCEDURE — 3700000002 HC GENERAL ANESTHESIA TIME - EACH INCREMENTAL 1 MINUTE: Performed by: ORTHOPAEDIC SURGERY

## 2025-03-04 PROCEDURE — 0J9F0ZZ DRAINAGE OF LEFT UPPER ARM SUBCUTANEOUS TISSUE AND FASCIA, OPEN APPROACH: ICD-10-PCS | Performed by: ORTHOPAEDIC SURGERY

## 2025-03-04 PROCEDURE — 2500000004 HC RX 250 GENERAL PHARMACY W/ HCPCS (ALT 636 FOR OP/ED): Performed by: ORTHOPAEDIC SURGERY

## 2025-03-04 PROCEDURE — 36415 COLL VENOUS BLD VENIPUNCTURE: CPT

## 2025-03-04 PROCEDURE — 85027 COMPLETE CBC AUTOMATED: CPT

## 2025-03-04 PROCEDURE — 2500000002 HC RX 250 W HCPCS SELF ADMINISTERED DRUGS (ALT 637 FOR MEDICARE OP, ALT 636 FOR OP/ED)

## 2025-03-04 PROCEDURE — 74174 CTA ABD&PLVS W/CONTRAST: CPT

## 2025-03-04 PROCEDURE — 99223 1ST HOSP IP/OBS HIGH 75: CPT | Performed by: INTERNAL MEDICINE

## 2025-03-04 PROCEDURE — 85610 PROTHROMBIN TIME: CPT

## 2025-03-04 PROCEDURE — 2500000004 HC RX 250 GENERAL PHARMACY W/ HCPCS (ALT 636 FOR OP/ED): Performed by: INTERNAL MEDICINE

## 2025-03-04 PROCEDURE — 36556 INSERT NON-TUNNEL CV CATH: CPT | Performed by: ANESTHESIOLOGY

## 2025-03-04 PROCEDURE — A25023 PR DECOMPRESS FOREARM,EXCIS MUSC/NERV: Performed by: ANESTHESIOLOGY

## 2025-03-04 PROCEDURE — 3600000007 HC OR TIME - EACH INCREMENTAL 1 MINUTE - PROCEDURE LEVEL TWO: Performed by: ORTHOPAEDIC SURGERY

## 2025-03-04 PROCEDURE — A25023 PR DECOMPRESS FOREARM,EXCIS MUSC/NERV: Performed by: NURSE ANESTHETIST, CERTIFIED REGISTERED

## 2025-03-04 PROCEDURE — 94640 AIRWAY INHALATION TREATMENT: CPT

## 2025-03-04 PROCEDURE — 71275 CT ANGIOGRAPHY CHEST: CPT | Performed by: RADIOLOGY

## 2025-03-04 PROCEDURE — 82810 BLOOD GASES O2 SAT ONLY: CPT

## 2025-03-04 PROCEDURE — 2500000004 HC RX 250 GENERAL PHARMACY W/ HCPCS (ALT 636 FOR OP/ED): Mod: JZ

## 2025-03-04 PROCEDURE — 2500000004 HC RX 250 GENERAL PHARMACY W/ HCPCS (ALT 636 FOR OP/ED): Mod: JZ | Performed by: NURSE ANESTHETIST, CERTIFIED REGISTERED

## 2025-03-04 PROCEDURE — 74174 CTA ABD&PLVS W/CONTRAST: CPT | Performed by: RADIOLOGY

## 2025-03-04 PROCEDURE — 2020000001 HC ICU ROOM DAILY

## 2025-03-04 PROCEDURE — 36600 WITHDRAWAL OF ARTERIAL BLOOD: CPT

## 2025-03-04 PROCEDURE — 71045 X-RAY EXAM CHEST 1 VIEW: CPT | Performed by: STUDENT IN AN ORGANIZED HEALTH CARE EDUCATION/TRAINING PROGRAM

## 2025-03-04 PROCEDURE — 2500000001 HC RX 250 WO HCPCS SELF ADMINISTERED DRUGS (ALT 637 FOR MEDICARE OP)

## 2025-03-04 PROCEDURE — 36430 TRANSFUSION BLD/BLD COMPNT: CPT

## 2025-03-04 PROCEDURE — 2720000007 HC OR 272 NO HCPCS: Performed by: ORTHOPAEDIC SURGERY

## 2025-03-04 PROCEDURE — 3600000002 HC OR TIME - INITIAL BASE CHARGE - PROCEDURE LEVEL TWO: Performed by: ORTHOPAEDIC SURGERY

## 2025-03-04 PROCEDURE — 2500000005 HC RX 250 GENERAL PHARMACY W/O HCPCS: Performed by: ORTHOPAEDIC SURGERY

## 2025-03-04 PROCEDURE — 83735 ASSAY OF MAGNESIUM: CPT

## 2025-03-04 PROCEDURE — 82947 ASSAY GLUCOSE BLOOD QUANT: CPT

## 2025-03-04 PROCEDURE — 85060 BLOOD SMEAR INTERPRETATION: CPT | Performed by: PATHOLOGY

## 2025-03-04 PROCEDURE — 80053 COMPREHEN METABOLIC PANEL: CPT

## 2025-03-04 PROCEDURE — 2500000004 HC RX 250 GENERAL PHARMACY W/ HCPCS (ALT 636 FOR OP/ED): Performed by: HOSPITALIST

## 2025-03-04 PROCEDURE — 2500000004 HC RX 250 GENERAL PHARMACY W/ HCPCS (ALT 636 FOR OP/ED): Performed by: NURSE ANESTHETIST, CERTIFIED REGISTERED

## 2025-03-04 PROCEDURE — 3700000001 HC GENERAL ANESTHESIA TIME - INITIAL BASE CHARGE: Performed by: ORTHOPAEDIC SURGERY

## 2025-03-04 PROCEDURE — 99232 SBSQ HOSP IP/OBS MODERATE 35: CPT | Performed by: NURSE PRACTITIONER

## 2025-03-04 PROCEDURE — 99291 CRITICAL CARE FIRST HOUR: CPT

## 2025-03-04 PROCEDURE — 2500000005 HC RX 250 GENERAL PHARMACY W/O HCPCS: Performed by: NURSE ANESTHETIST, CERTIFIED REGISTERED

## 2025-03-04 PROCEDURE — P9016 RBC LEUKOCYTES REDUCED: HCPCS

## 2025-03-04 PROCEDURE — 71045 X-RAY EXAM CHEST 1 VIEW: CPT

## 2025-03-04 PROCEDURE — 2550000001 HC RX 255 CONTRASTS: Performed by: HOSPITALIST

## 2025-03-04 RX ORDER — LABETALOL HYDROCHLORIDE 5 MG/ML
10 INJECTION, SOLUTION INTRAVENOUS ONCE
Status: COMPLETED | OUTPATIENT
Start: 2025-03-04 | End: 2025-03-04

## 2025-03-04 RX ORDER — METOPROLOL TARTRATE 1 MG/ML
10 INJECTION, SOLUTION INTRAVENOUS ONCE
Status: DISCONTINUED | OUTPATIENT
Start: 2025-03-04 | End: 2025-03-05

## 2025-03-04 RX ORDER — SODIUM CHLORIDE 0.9 G/100ML
INJECTION, SOLUTION IRRIGATION AS NEEDED
Status: DISCONTINUED | OUTPATIENT
Start: 2025-03-04 | End: 2025-03-04 | Stop reason: HOSPADM

## 2025-03-04 RX ORDER — LIDOCAINE HCL/PF 100 MG/5ML
SYRINGE (ML) INTRAVENOUS AS NEEDED
Status: DISCONTINUED | OUTPATIENT
Start: 2025-03-04 | End: 2025-03-04

## 2025-03-04 RX ORDER — CEFAZOLIN 1 G/1
INJECTION, POWDER, FOR SOLUTION INTRAVENOUS AS NEEDED
Status: DISCONTINUED | OUTPATIENT
Start: 2025-03-04 | End: 2025-03-04

## 2025-03-04 RX ORDER — VANCOMYCIN HYDROCHLORIDE 1 G/20ML
INJECTION, POWDER, LYOPHILIZED, FOR SOLUTION INTRAVENOUS AS NEEDED
Status: DISCONTINUED | OUTPATIENT
Start: 2025-03-04 | End: 2025-03-04 | Stop reason: HOSPADM

## 2025-03-04 RX ORDER — LABETALOL HYDROCHLORIDE 5 MG/ML
10 INJECTION, SOLUTION INTRAVENOUS ONCE
Status: DISCONTINUED | OUTPATIENT
Start: 2025-03-04 | End: 2025-03-04

## 2025-03-04 RX ORDER — METOPROLOL TARTRATE 25 MG/1
25 TABLET, FILM COATED ORAL 3 TIMES DAILY
Status: DISCONTINUED | OUTPATIENT
Start: 2025-03-04 | End: 2025-03-06

## 2025-03-04 RX ORDER — HYDROMORPHONE HYDROCHLORIDE 1 MG/ML
INJECTION, SOLUTION INTRAMUSCULAR; INTRAVENOUS; SUBCUTANEOUS AS NEEDED
Status: DISCONTINUED | OUTPATIENT
Start: 2025-03-04 | End: 2025-03-04

## 2025-03-04 RX ORDER — ONDANSETRON HYDROCHLORIDE 2 MG/ML
INJECTION, SOLUTION INTRAVENOUS AS NEEDED
Status: DISCONTINUED | OUTPATIENT
Start: 2025-03-04 | End: 2025-03-04

## 2025-03-04 RX ORDER — FENTANYL CITRATE 50 UG/ML
INJECTION, SOLUTION INTRAMUSCULAR; INTRAVENOUS AS NEEDED
Status: DISCONTINUED | OUTPATIENT
Start: 2025-03-04 | End: 2025-03-04

## 2025-03-04 RX ORDER — ETOMIDATE 2 MG/ML
INJECTION INTRAVENOUS AS NEEDED
Status: DISCONTINUED | OUTPATIENT
Start: 2025-03-04 | End: 2025-03-04

## 2025-03-04 RX ORDER — LABETALOL HYDROCHLORIDE 5 MG/ML
INJECTION, SOLUTION INTRAVENOUS
Status: DISPENSED
Start: 2025-03-04 | End: 2025-03-05

## 2025-03-04 RX ORDER — ROCURONIUM BROMIDE 10 MG/ML
INJECTION, SOLUTION INTRAVENOUS AS NEEDED
Status: DISCONTINUED | OUTPATIENT
Start: 2025-03-04 | End: 2025-03-04

## 2025-03-04 RX ADMIN — ETOMIDATE 10 MG: 20 INJECTION, SOLUTION INTRAVENOUS at 13:38

## 2025-03-04 RX ADMIN — IOHEXOL 100 ML: 350 INJECTION, SOLUTION INTRAVENOUS at 13:23

## 2025-03-04 RX ADMIN — HYDROMORPHONE HYDROCHLORIDE 1 MG: 1 INJECTION, SOLUTION INTRAMUSCULAR; INTRAVENOUS; SUBCUTANEOUS at 15:01

## 2025-03-04 RX ADMIN — SODIUM CHLORIDE, POTASSIUM CHLORIDE, SODIUM LACTATE AND CALCIUM CHLORIDE 100 ML/HR: 600; 310; 30; 20 INJECTION, SOLUTION INTRAVENOUS at 21:52

## 2025-03-04 RX ADMIN — CLINDAMYCIN PHOSPHATE 900 MG: 900 INJECTION, SOLUTION INTRAVENOUS at 09:22

## 2025-03-04 RX ADMIN — ROCURONIUM BROMIDE 50 MG: 10 INJECTION, SOLUTION INTRAVENOUS at 13:39

## 2025-03-04 RX ADMIN — DOXYCYCLINE 100 MG: 100 INJECTION, POWDER, LYOPHILIZED, FOR SOLUTION INTRAVENOUS at 22:45

## 2025-03-04 RX ADMIN — IPRATROPIUM BROMIDE AND ALBUTEROL SULFATE 3 ML: .5; 3 SOLUTION RESPIRATORY (INHALATION) at 12:23

## 2025-03-04 RX ADMIN — CEFAZOLIN 2 G: 1 INJECTION, POWDER, FOR SOLUTION INTRAMUSCULAR; INTRAVENOUS at 13:47

## 2025-03-04 RX ADMIN — ONDANSETRON 4 MG: 2 INJECTION INTRAMUSCULAR; INTRAVENOUS at 14:54

## 2025-03-04 RX ADMIN — VANCOMYCIN HYDROCHLORIDE 250 MG: KIT at 21:02

## 2025-03-04 RX ADMIN — BUDESONIDE 0.5 MG: 0.5 INHALANT ORAL at 07:59

## 2025-03-04 RX ADMIN — BUDESONIDE 0.5 MG: 0.5 INHALANT ORAL at 18:52

## 2025-03-04 RX ADMIN — SUGAMMADEX 100 MG: 100 INJECTION, SOLUTION INTRAVENOUS at 15:00

## 2025-03-04 RX ADMIN — IPRATROPIUM BROMIDE AND ALBUTEROL SULFATE 3 ML: .5; 3 SOLUTION RESPIRATORY (INHALATION) at 18:51

## 2025-03-04 RX ADMIN — ESCITALOPRAM OXALATE 20 MG: 10 TABLET ORAL at 09:23

## 2025-03-04 RX ADMIN — LABETALOL HYDROCHLORIDE 10 MG: 5 INJECTION, SOLUTION INTRAVENOUS at 18:53

## 2025-03-04 RX ADMIN — SUGAMMADEX 100 MG: 100 INJECTION, SOLUTION INTRAVENOUS at 14:53

## 2025-03-04 RX ADMIN — Medication 60 PERCENT: at 20:00

## 2025-03-04 RX ADMIN — DOXYCYCLINE 100 MG: 100 INJECTION, POWDER, LYOPHILIZED, FOR SOLUTION INTRAVENOUS at 13:43

## 2025-03-04 RX ADMIN — CLINDAMYCIN PHOSPHATE 900 MG: 900 INJECTION, SOLUTION INTRAVENOUS at 01:29

## 2025-03-04 RX ADMIN — LIDOCAINE HYDROCHLORIDE 60 MG: 20 INJECTION INTRAVENOUS at 13:38

## 2025-03-04 RX ADMIN — IPRATROPIUM BROMIDE AND ALBUTEROL SULFATE 3 ML: .5; 3 SOLUTION RESPIRATORY (INHALATION) at 07:59

## 2025-03-04 RX ADMIN — DOXYCYCLINE 100 MG: 100 INJECTION, POWDER, LYOPHILIZED, FOR SOLUTION INTRAVENOUS at 12:49

## 2025-03-04 RX ADMIN — METOPROLOL TARTRATE 25 MG: 25 TABLET, FILM COATED ORAL at 09:23

## 2025-03-04 RX ADMIN — SODIUM CHLORIDE, SODIUM LACTATE, POTASSIUM CHLORIDE, AND CALCIUM CHLORIDE: .6; .31; .03; .02 INJECTION, SOLUTION INTRAVENOUS at 13:29

## 2025-03-04 RX ADMIN — BUPROPION HYDROCHLORIDE 150 MG: 150 TABLET, EXTENDED RELEASE ORAL at 09:23

## 2025-03-04 RX ADMIN — VANCOMYCIN HYDROCHLORIDE 250 MG: KIT at 09:23

## 2025-03-04 RX ADMIN — METHYLPREDNISOLONE SODIUM SUCCINATE 40 MG: 40 INJECTION, POWDER, FOR SOLUTION INTRAMUSCULAR; INTRAVENOUS at 12:48

## 2025-03-04 RX ADMIN — IPRATROPIUM BROMIDE AND ALBUTEROL SULFATE 3 ML: .5; 3 SOLUTION RESPIRATORY (INHALATION) at 01:15

## 2025-03-04 RX ADMIN — PANTOPRAZOLE SODIUM 40 MG: 40 INJECTION, POWDER, FOR SOLUTION INTRAVENOUS at 09:23

## 2025-03-04 RX ADMIN — SODIUM CHLORIDE, POTASSIUM CHLORIDE, SODIUM LACTATE AND CALCIUM CHLORIDE 100 ML/HR: 600; 310; 30; 20 INJECTION, SOLUTION INTRAVENOUS at 05:59

## 2025-03-04 RX ADMIN — MORPHINE SULFATE 2 MG: 2 INJECTION, SOLUTION INTRAMUSCULAR; INTRAVENOUS at 01:38

## 2025-03-04 RX ADMIN — FENTANYL CITRATE 100 MCG: 50 INJECTION, SOLUTION INTRAMUSCULAR; INTRAVENOUS at 13:38

## 2025-03-04 SDOH — HEALTH STABILITY: MENTAL HEALTH: CURRENT SMOKER: 1

## 2025-03-04 ASSESSMENT — COGNITIVE AND FUNCTIONAL STATUS - GENERAL
DRESSING REGULAR UPPER BODY CLOTHING: A LOT
CLIMB 3 TO 5 STEPS WITH RAILING: A LOT
MOVING TO AND FROM BED TO CHAIR: A LOT
PERSONAL GROOMING: A LOT
DAILY ACTIVITIY SCORE: 13
HELP NEEDED FOR BATHING: A LOT
MOVING FROM LYING ON BACK TO SITTING ON SIDE OF FLAT BED WITH BEDRAILS: A LOT
EATING MEALS: A LITTLE
STANDING UP FROM CHAIR USING ARMS: A LOT
TURNING FROM BACK TO SIDE WHILE IN FLAT BAD: A LOT
TOILETING: A LOT
WALKING IN HOSPITAL ROOM: A LOT
MOBILITY SCORE: 12
DRESSING REGULAR LOWER BODY CLOTHING: A LOT

## 2025-03-04 ASSESSMENT — PAIN SCALES - GENERAL
PAINLEVEL_OUTOF10: 0 - NO PAIN
PAINLEVEL_OUTOF10: 0 - NO PAIN
PAINLEVEL_OUTOF10: 9
PAINLEVEL_OUTOF10: 0 - NO PAIN
PAIN_LEVEL: 0

## 2025-03-04 ASSESSMENT — PAIN - FUNCTIONAL ASSESSMENT
PAIN_FUNCTIONAL_ASSESSMENT: 0-10
PAIN_FUNCTIONAL_ASSESSMENT: 0-10
PAIN_FUNCTIONAL_ASSESSMENT: CPOT (CRITICAL CARE PAIN OBSERVATION TOOL)
PAIN_FUNCTIONAL_ASSESSMENT: 0-10
PAIN_FUNCTIONAL_ASSESSMENT: CPOT (CRITICAL CARE PAIN OBSERVATION TOOL)
PAIN_FUNCTIONAL_ASSESSMENT: 0-10
PAIN_FUNCTIONAL_ASSESSMENT: 0-10

## 2025-03-04 NOTE — PROGRESS NOTES
Kayley Lynch is a 54 y.o. female on day 6 of admission presenting with Nausea vomiting and diarrhea.  Influenza A, r/o C diff.  Record reviewed.  Hematology, Cardiology consults.  Vascular and Orthopedic surgery following.  Patient is 2 days post Lt UE fasciotomy for compartment syndrome.  Rounded with ICU team.  Patient is planned for return to OR this afternoon, for lavage, wound debridement and possible closure.  Per prior Care Transitions notes, patient is from home with spouse/sig other, was independent and driving PTA.  Dispo is home, pending hospital course.  Anticipate will need PT/OT to assist with discharge planning.  Provider updated for orders.  Care Transitions will continue to follow.

## 2025-03-04 NOTE — ANESTHESIA POSTPROCEDURE EVALUATION
Patient: Kayley Lynch    Procedure Summary       Date: 03/04/25 Room / Location: [facility] OR 06 / [facility] OR    Anesthesia Start: 1329 Anesthesia Stop: 1543    Procedure: LEFT UPPER ARM FASCIOTOMY, DEBRIDEMENT AND CLOSURE (Left: Arm Upper) Diagnosis:       Nontraumatic compartment syndrome of left upper extremity      (Nontraumatic compartment syndrome of left upper extremity [M79.A12])    Surgeons: Elijah Licea MD Responsible Provider: Alex Cool MD    Anesthesia Type: general ASA Status: 4            Anesthesia Type: general    Vitals Value Taken Time   /72 03/04/25 1542   Temp 36.5 °C (97.7 °F) 03/04/25 1542   Pulse 93 03/04/25 1551   Resp 10 03/04/25 1551   SpO2 99 % 03/04/25 1551   Vitals shown include unfiled device data.    Anesthesia Post Evaluation    Patient location during evaluation: ICU  Patient participation: complete - patient participated  Level of consciousness: awake  Pain score: 0  Pain management: adequate  Airway patency: patent  Cardiovascular status: acceptable and hemodynamically stable  Respiratory status: face mask  Hydration status: acceptable  Postoperative Nausea and Vomiting: none        No notable events documented.

## 2025-03-04 NOTE — PROGRESS NOTES
Subjective:  Patient lying in bed on exam.  She is oriented but somewhat lethargic.  On reexamination in the afternoon, she is increasingly lethargic hypertensive.  She is placed back on BiPAP and her mentation slowly improves.    Physical Exam:  GENERAL: Awake, lethargic.  HEAD:  Normocephalic, atraumatic.  EYES:  Round and reactive.  ENT:  No nasal discharge, mucous membranes moist and pink.  NECK:  Atraumatic, no meningismus.  CARDIOVASCULAR:  Regular rate and rhythm, no murmur.  RESPIRATORY:  Stridor, diminished breath sounds bilaterally  ABDOMEN:  Soft, no tenderness, nondistended.  EXTREMITIES:  LUE covered in dressing, RUE swelling following IV infiltration.  SKIN:  Warm and dry.  NEUROLOGICAL:  Nonfocal grossly.    Remainder of objective data including imaging and laboratory findings were all reviewed.    Admission history:  3/3: Patient is awake and responsive on exam. Trial CPAP removal, CT chest abdomen pelvis ordered. Heme-onc following, fibrinogen level ordered. Heparin drip and lopressor 25mg BID ordered.    3/4: Patient is awake and responsive in the morning.  Hemoglobin 6.4 on morning labs, 1 unit RBC ordered. Underwent irrigation and debridement with with LUE wound closing. CT angio chest abdomen pelvis shows no active extravasation, monitoring Hgb and assessing for appropriate increase following RBC.  Patient seen to be lethargic and hypertensive in the evening, ABG shows CO2 retention.  Placed on BiPAP, one-time labetalol dose administered, Lopressor restarted.    Assessment and plan:  Kayley Lynch is a 54-year-old female who presented on 2/26/2025 for feelings of malaise. She was treated for shock in the setting of Influenza A with suspected superimposed bacterial pneumonia, COPD exacerbation, and suspected infectious colitis. She was transferred out of the ICU on 2/28. LUE swelling was noted overnight 3/1-3/2, patient later had rapid response called for tachycardia and hypotension. Orthopedic  surgery was consulted and she underwent emergent fasciotomy for compartment syndrome of the LUE. Patient is now under ICU care, vascular and orthopedics following.      Neurological System:  #Acute metabolic encephalopathy following LUE wound closure 3/4  -Patient is alert shortly after exiting surgery, roughly 1 hour later becomes lethargic and minimally responsive.  Repeat ABG shows CO2 retention, BiPAP placed. Reassess mentation and repeat ABG as needed.  - Continue home wellbutrin 150mg daily    Cardiovascular System:  #Afib with intermittent RVR  #Elevated Trop, demand ischemia vs 2/2 compartment syndrome/rhabdo  #Hx of Mechanical Mitral Valve Replacement  #CAD s/p CABG  #Hypertensive urgency  - /87 noted following procedure, one-time labetalol 10mg administered.  -Lopressor held temporarily, resumed following I&D.  - Warfarin held, low intensity heparin drip ordered in the setting of Afib  - PRN IV lopressor 5mg      Respiratory System:  #Acute on chronic hypoxic hypercapnic respiratory failure  #Influenza A  #COPD exacerbation  #Secondary PHTN  - Continue with Doxy for concurrent CAP coverage, total of 7 days  - Continue Pulmicort and Duonebs  -CO2 retention noted on ABG 3/4, BiPAP restarted    Gastrointestinal System:  #Diarrhea, c/f colitis  - Continue PO vancomycin, day 7  - Continue PPI    Endocrine System:  -Accu-checks     Renal System:  #HILLARY, resolved  -Cr 1.02 down from 1.65  - Monitor urine output  - Trend lactate    Hematological System:  #Supratherapeutic INR  #Suspected coagulopathy  #Retroperitoneal Hemorrhage   #Anemia  #Leukocytosis in setting of steroid use  - Per hematology, fibrinogen and peripheral smear ordered  -Low intensity heparin drip discontinued at this time  -Hgb 6.4, 1 unit RBC administered  -CT angio chest abdomen pelvis ordered, per IR no active extravasation is seen.  Trend hemoglobin and monitor for appropriate increase following RBC administration    Infectious  Disease:  #CAP  #C. diff colitis  #Influenza A  - Continue Doxy, total of 7 days  - Continue PO vanc    Skin/MSK:  #Left Upper Extremity Compartment Syndrome s/p fasciotomy 3/2  -s/p irrigation and debridement  -per vascular, recommend elevation of LUE    Psych:  -No acute issues    ICU CHECK LIST:   Antimicrobials: PO vanc, doxy  Oxygen: 4L NC  Drips: -  Fluids: -  Feeding: NPO  Sedation/Analgesia: Tylenol  Prophylaxis: Protonix  Glycemic control: -  Bowel care: PRN   Lines/Tubes: PIV, Jasso  Restraints: -  Dispo: ICU     Code Status: Full       Neto Clark MD  PGY-1

## 2025-03-04 NOTE — PROGRESS NOTES
Kayley Lynch is a 54 y.o. female on day 5 of admission presenting with Nausea vomiting and diarrhea.      Subjective   Patient is status post fasciotomies. Patient seen and examined at bedside with son present. No new complaints. Stable on nasal cannula.       Objective     Last Recorded Vitals  /89   Pulse (!) 118   Temp 37 °C (98.6 °F) (Temporal)   Resp 19   Wt 66.6 kg (146 lb 13.2 oz)   SpO2 93%   Intake/Output last 3 Shifts:    Intake/Output Summary (Last 24 hours) at 3/3/2025 2201  Last data filed at 3/3/2025 2130  Gross per 24 hour   Intake 4929.97 ml   Output 655 ml   Net 4274.97 ml       Admission Weight  Weight: 59 kg (130 lb) (02/26/25 0159)    Daily Weight  03/01/25 : 66.6 kg (146 lb 13.2 oz)    Image Results  CT chest abdomen pelvis wo IV contrast  Narrative: Interpreted By:  Kimo Martinez,   STUDY:  CT CHEST ABDOMEN PELVIS WO CONTRAST; 3/3/2025 1:24 pm      INDICATION:  Signs/Symptoms:characterize potential hematoma, diminished breathing  sounds. Patient had an episode of left upper extremity swelling with  compartment syndrome requiring fasciotomy on 03/02/2025.      COMPARISON:  Abdominal CT 02/26/2025, calcium scoring CT 08/14/2020      ACCESSION NUMBER(S):  [id number]      ORDERING CLINICIAN:  ASA TORRES      TECHNIQUE:  COMMENTS: This exam is performed without oral or intravenous contrast  as was requested. Please note that the absence of oral and  intravenous contrast material does limit the sensitivity and  specificity of this examination. In particular solid organ assessment  for neoplasm is significantly limited. Assessment of the GI tract is  also limited without the aid of oral contrast administration.  Vascular abnormalities such as dissection, occlusions, infarcts and  thrombus may also go undetected.          One or more of the following dose reduction techniques were used:  Automated exposure control Adjustment of the mA and/or kV according  to patient size,  and/or use of iterative reconstruction technique.      FINDINGS:  CHEST:      *There is no hilar or mediastinal lymphadenopathy present.  *There is extensive emphysematous change of the pulmonary parenchyma.  *There is alveolar infiltrate within the posterior aspect of the  right upper lobe just anterior to the major fissure. *There is a  small left pleural effusion which extends into the upper portion of  the major fissure on the left. *There is a peripheral somewhat  rounded appearing masslike infiltrate/atelectasis with a differential  to include pulmonary infiltrate from pneumonia, malignancy, as well  as rounded atelectasis. This is new compared to the study from  08/14/2020. *Somewhat diffuse patchy infiltrate is suspected but  difficult to assess definitively due to motion artifact.          Chest Wall: There is extensive ill-defined soft tissue density likely  secondary to infection or hemorrhage based upon indications clinical  history with involvement of the left pectoralis major and minor  muscles, left axillary soft tissues, infra scapular muscle on the  left, as well as all of the visualized muscles of the left upper  extremity adjacent to the humerus with some soft tissue air  identified anteriorly likely related to the patient's surgical  intervention within infectious etiology not entirely excluded. There  appears to be some tissue plane loss of the musculature of the left  forearm suggesting there may be involvement of the forearm as well.      Skeletal System: No fractures or destructive lesions are identified.  Sternal sutures are identified.      _________________________________________________________      CT SCAN OF THE ABDOMEN AND PELVIS FINDINGS:  ABDOMEN CT:  SOLID ORGAN ASSESSMENT:  The liver, spleen, pancreas, kidneys and adrenal glands are normal in  appearance. There is residual intravenous conscious within the  abdominal solid organs from the CTA from yesterday. The gallbladder  is  somewhat distended with a transverse diameter 4.3 cm. There is no  gallbladder wall thickening or pericholecystic fluid.      RETROPERITONEUM:  AORTA:  There is no evidence for abdominal aortic aneurysm.      LYMPH NODES: There is no evidence for retroperitoneal lymphadenopathy.      There is enlargement of the left psoas muscle likely secondary to  hemorrhage along with enlargement of the left iliac muscle. There is  thickening and increased attenuation of the lateral conal fascia on  the left with some extension into the perinephric fat on the left as  well as involvement of the fat lateral to the lateral conal fascia.  There is also some increased attenuation consistent with edema or  hemorrhage within the subcutaneous fat lateral to the abdominal wall  musculature on the left. This left-sided abdominal and pelvic  hemorrhage extends into the presacral space as well as along the  perirectal soft tissues bilaterally, more marked on the left.      BOWEL ASSESSMENT:  No intraperitoneal free air is identified. There is a nonspecific  appearance of the stomach.  The small bowel is unremarkable in  appearance. Scattered diverticular present within the sigmoid colon  the descending colon without CT evidence for diverticulitis.      OSSEOUS STRUCTURES: No lytic or blastic lesions are identified.      PELVIC CT:  There is a small amount of free fluid within the dependent portion of  the pelvis. There is a Jasso catheter within a collapsed urinary  bladder with air and contrast within the urinary bladder. There is a  rectal tube.      Impression: CHEST IMPRESSION      1. Extensive emphysematous change of the pulmonary parenchyma.  2. Small bilateral effusions, greater on the left.  3. Infiltrate within the right upper lobe, left upper lobe, along  with infiltrate versus malignant mass versus rounded atelectasis left  lower lobe.  4. Abnormal thickening of the musculature with haziness of the  adjacent fat involving the  left chest wall, left axilla, posterior  scapula musculature on the left, as well as the upper arm and forearm  of the left upper extremity with some soft tissue air within the soft  tissue of the forearm and left upper arm. This appearance is similar  compared to the CT of the left upper extremity from 03/02/2025.          ABDOMEN AND PELVIC IMPRESSION  1. Extensive hemorrhage within the left side of the retroperitoneum,  extending into the left side of the pelvis with some involvement of  the perirectal and presacral soft tissues as above. There is also a  small amount of free fluid within the dependent portion the pelvis  which may represent blood products or reactive fluid.. There is  increased attenuation of the abdominal wall fat on the left which may  be secondary to hemorrhage or edema.  2. Distended appearing gallbladder but without gallbladder wall  thickening or pericholecystic fluid.  3. Rectal and urinary bladder catheters as above.          MACRO:  none      Signed by: Kimo Martinez 3/3/2025 3:03 PM  Dictation workstation:   [id number]      Physical Exam  HENT:      Head: Normocephalic and atraumatic.      Nose: Nose normal.      Mouth/Throat:      Mouth: Mucous membranes are moist.      Pharynx: Oropharynx is clear.   Eyes:      Extraocular Movements: Extraocular movements intact.      Pupils: Pupils are equal, round, and reactive to light.   Cardiovascular:      Rate and Rhythm: Normal rate and regular rhythm.   Pulmonary:      Effort: No respiratory distress.      Breath sounds: Wheezing present. No rhonchi or rales.   Abdominal:      General: Bowel sounds are normal. There is no distension.      Palpations: Abdomen is soft.      Tenderness: There is no abdominal tenderness. There is no guarding.   Musculoskeletal:      Right lower leg: No edema.      Left lower leg: No edema.   Skin:     General: Skin is warm and dry.   Neurological:      Mental Status: She is alert. Mental status is at baseline.        Relevant Results               Assessment/Plan   This patient currently has cardiac telemetry ordered; if you would like to modify or discontinue the telemetry order, click here to go to the orders activity to modify/discontinue the order.      This patient has a urinary catheter   Reason for the urinary catheter remaining today? critically ill patient who need accurate urinary output measurements          Assessment & Plan  Nausea vomiting and diarrhea    Influenza A with pneumonia      Septic shock, dehydration, hypercapnic respiratory failure, and dehydration.  Nontraumatic compartment syndrome of left upper extremity      Patient is a 54-year-old female with past medical history of COPD, CAD status post CABG, pulmonary hypertension, hyperlipidemia, cardiomyopathy, a flutter status post ablation, and mitral valve replacement who presented with malaise.  She was found to be in septic shock and admitted to the ICU.    Septic shock  Hypercapnic respiratory failure  COPD exacerbation  Influenza A  Pneumonia  Acute metabolic encephalopathy  C. difficile colitis  Dehydration  HILLARY    -Continue supplemental oxygen  -Continue antibiotics, currently on doxycycline  -Continue nebulizers  -Continue steroids  -Continue Tamiflu  -Continue p.o. vancomycin  -Continue home meds  -Monitor INR    3/2: Patient developed a large hematoma in the left arm leading to compartment syndrome. She was taken for urgent fasciotomies with Orthopedic Surgery. She is also being followed by Vascular Surgery. Vitamin K ordered for supratherapeutic INR. Her INR was 2.2 yesterday with goal being 2.5-3.5 for her A-flutter s/p mitral valve replacement. Critical Care reconsulted. Hematology consulted, appreciate recs. Hold coumadin and lovenox.    3/3: INR reversed. Patient is on low intensity heparin drip. She was transfused 1 unit pRBCs. Monitor hemoglobin closely. Hematology has ordered a work up including DIC panel. Patient will need another  debridement and irrigation in 24-48 hours per Orthopedics. She has also developed a retroperitoneal bleed.            [place] Steven, DO

## 2025-03-04 NOTE — OP NOTE
LEFT UPPER ARM FASCIOTOMY, DEBRIDEMENT AND CLOSURE (L) Operative Note     Date: 2025 - 3/4/2025  OR Location: [facility]    Name: Kayley Lynch : 1970, Age: 54 y.o., MRN: [id number], Sex: female    Diagnosis  Pre-op Diagnosis      * Nontraumatic compartment syndrome of left upper extremity [M79.A12] Post-op Diagnosis     * Nontraumatic compartment syndrome of left upper extremity [M79.A12]     Procedures  LEFT UPPER ARM FASCIOTOMY, DEBRIDEMENT AND CLOSURE  96922 - OR DEBRIDEMENT OPEN WOUND FIRST 20 SQ CM/<      Surgeons      * Elijah Licea - Primary    Resident/Fellow/Other Assistant:  Surgeons and Role:  * No surgeons found with a matching role *    Staff:   Circulator: Milly Sousa Person: Jc  Surgical Assistant: Harshad    Anesthesia Staff: Anesthesiologist: Alex Cool MD  CRNA: NADYA Beaulieu-CRNA; NADYA Rodriguez-TRE  C-AA: SANIYA Del Rosario    Procedure Summary  Anesthesia: Anesthesia type not filed in the log.  ASA: IV  Estimated Blood Loss: 25 mL  Intra-op Medications:   Administrations occurring from 1330 to 1500 on 25:   Medication Name Total Dose   sodium chloride 0.9 % irrigation solution 4,000 mL   vancomycin (Vancocin) vial for injection 1 g   ceFAZolin (Ancef) vial 1 g 2 g   doxycycline (Vibramycin) 100 mg in dextrose 5%  mL 100 mL   etomidate (Amidate) injection 10 mg   fentaNYL (Sublimaze) injection 50 mcg/mL 100 mcg   insulin lispro injection 0-5 Units Cannot be calculated   lidocaine (cardiac) injection 2% prefilled syringe 60 mg   metoprolol tartrate (Lopressor) tablet 25 mg Cannot be calculated   ondansetron (Zofran) 2 mg/mL injection 4 mg   rocuronium (ZeMuron) 50 mg/5 mL injection 50 mg   sugammadex (Bridion) 200 mg/2 mL injection 200 mg              Anesthesia Record               Intraprocedure I/O Totals          Intake    doxycycline (Vibramycin) 100 mg in dextrose 5%  mL 100.00 mL    Total Intake 100 mL       Output    Urine 800 mL     Est. Blood Loss 20 mL    Total Output 820 mL       Net    Net Volume -720 mL          Specimen: No specimens collected              Drains and/or Catheters:   Fecal Management Rectal tube with balloon (Active)   Site Assessment Clean;Skin intact 03/04/25 1200   Patency Intact 03/04/25 1200   Tube Management Reposition patient 03/04/25 1200   Cuff/Balloon Volume 35 mL 03/04/25 0400       Urethral Catheter Double-lumen 16 Fr. (Active)   Site Assessment Clean;Skin intact 03/04/25 1200   Collection Container Standard drainage bag 03/04/25 1200   Securement Method Securing device (Describe) 03/04/25 1200   Reason for Continuing Urinary Catheterization accurate hourly measurement of urine volume in a critically ill patient that cannot be assessed by other volumes and urine collection strategies 03/04/25 0800   Output (mL) 60 mL 03/04/25 1300       Tourniquet Times:         Implants:     Findings: No significant muscle necrosis.    Indications: Kayley Lynch is an 54 y.o. female who is having surgery for Nontraumatic compartment syndrome of left upper extremity [M79.A12].  The patient underwent a fasciotomy of the left upper extremity for the hematoma and compartment syndrome secondary to his super therapeutic INR on 3/2/2025.  She presents today for irrigation, debridement and closure of the wound    The patient was seen in the preoperative area. The risks, benefits, complications, treatment options, non-operative alternatives, expected recovery and outcomes were discussed with the patient. The possibilities of reaction to medication, pulmonary aspiration, injury to surrounding structures, bleeding, recurrent infection, the need for additional procedures, failure to diagnose a condition, and creating a complication requiring transfusion or operation were discussed with the patient. The patient concurred with the proposed plan, giving informed consent.  The site of surgery was properly noted/marked if necessary per policy.  The patient has been actively warmed in preoperative area. Preoperative antibiotics have been ordered and given within 2 hours of incision. Venous thrombosis prophylaxis have been ordered including bilateral sequential compression devices    Procedure Details: The patient was seen in the ICU with family present.  The consent was reviewed and the operative site was marked.  The patient was taken directly from the ICU to the operating room.  A surgical huddle was performed reviewing patient's name, date of birth, allergies, indicate procedure and correct operative site.  The patient was then transferred onto the hospital bed and intubated.  The left upper extremity was placed on a hand table.  The left upper extremities and prepped and draped in sterile fashion.  Once the draping is completed a surgical pause was performed reviewing patient's name, indicated procedure and correct operative site.  First the wound was inspected.  The fasciotomy site was open.  The underlying musculature was intact with no evidence of any significant necrosis.  There was some ecchymosis along the upper arm skin flaps but the skin was viable.  The wound was copiously irrigated with 3 L of sterile saline with vancomycin.  The wound was then sprinkled with 1 g of vancomycin powder.  The subcutaneous tissues were closed with 3-0 Vicryl in inverted fashion.  The skin was closed with 3-0 nylon in horizontal mattress fashion.  The extremity was cleaned and dried.  A dry sterile dressing including Adaptic with bacitracin, 4 x 4's, ABDs, Kerlix and Ace bandages were applied.  The drapes removed.  The patient was extubated and taken to the ICU in stable condition.  Complications:  None; patient tolerated the procedure well.    Disposition: PACU - hemodynamically stable.  Condition: stable         Task Performed by RNFA or Surgical Assistant:  Prepping, draping, retraction and suture cutting          Additional Details:     Attending Attestation: I  was present and scrubbed for the entire procedure.    Elijah Licea  Phone Number: [phone]

## 2025-03-04 NOTE — NURSING NOTE
IR consult note:   Repeat CTA reviewed with Dr. Jones, no intervention warranted at this time, per Dr. YANIQUE Jones continue to trend hgb and assess for proper incrementation after transfusion

## 2025-03-04 NOTE — ANESTHESIA PROCEDURE NOTES
Central Venous Line:    Date/Time: 3/4/2025 3:30 PM    A central venous line was placed in the OR for the following indication(s): central venous access.  Staffing  Performed: attending   Authorized by: Alex Cool MD    Performed by: Alex Cool MD    Sterility preparation included the following: provider hand hygiene performed prior to central venous catheter insertion, all 5 sterile barriers used (gloves, gown, cap, mask, large sterile drape) during central venous catheter insertion, antiseptic used during central venous catheter insertion and skin prep agent completely dried prior to procedure.  Medical reason for not performing maximal sterile barrier technique: no  The patient was placed in Trendelenburg position.    Right internal jugular vein was prepped.    The site was prepped with Chlorhexidine.  Size: 9 Fr (8 Fr)   Length: 11.5  Catheter type: introducer   Number of Lumens: double lumen    This catheter was not an oximetric catheter.    During the procedure, the following specific steps were taken: target vein identified, needle advanced into vein and blood aspirated and guidewire advanced into vein.  Seldinger technique used.  Procedure performed using surface landmarks.    Intravenous verification was obtained by venous blood return.      Post insertion care included: all ports aspirated, all ports flushed easily, guidewire removed intact, Biopatch applied, line sutured in place and dressing applied.    During the procedure the patient experienced: patient tolerated procedure well with no complications.

## 2025-03-04 NOTE — PROGRESS NOTES
"Kayley Lynch is a 54 y.o. female on day 6 of admission presenting with Nausea vomiting and diarrhea.    Subjective   No new reports       Objective     Physical Exam  The left upper extremity splint has bloody drainage along the volar surface.  The hand is warm and well-perfused.  She is able to flex and extend the fingers and thumb.  Sensation to light touch is grossly intact.  Last Recorded Vitals  Blood pressure 140/69, pulse 95, temperature 36.2 °C (97.2 °F), temperature source Temporal, resp. rate 15, height 1.6 m (5' 2.99\"), weight 66.6 kg (146 lb 13.2 oz), SpO2 94%.  Intake/Output last 3 Shifts:  I/O last 3 completed shifts:  In: 5175 (77.7 mL/kg) [I.V.:3726.1 (55.9 mL/kg); IV Piggyback:1448.9]  Out: 1198 (18 mL/kg) [Urine:1198 (0.5 mL/kg/hr)]  Weight: 66.6 kg     Relevant Results            This patient currently has cardiac telemetry ordered; if you would like to modify or discontinue the telemetry order, click here to go to the orders activity to modify/discontinue the order.             Results for orders placed or performed during the hospital encounter of 02/25/25 (from the past 24 hours)   Creatine Kinase   Result Value Ref Range    Creatine Kinase 545 (H) 0 - 215 U/L   POCT GLUCOSE   Result Value Ref Range    POCT Glucose 110 (H) 74 - 99 mg/dL   Fibrinogen   Result Value Ref Range    Fibrinogen 84 (LL) 200 - 400 mg/dL   POCT GLUCOSE   Result Value Ref Range    POCT Glucose 135 (H) 74 - 99 mg/dL   Blood Gas Venous Full Panel Unsolicited   Result Value Ref Range    POCT pH, Venous 7.21 (LL) 7.33 - 7.43 pH    POCT pCO2, Venous 26 (L) 41 - 51 mm Hg    POCT pO2, Venous 61 (H) 35 - 45 mm Hg    POCT SO2, Venous 90 (H) 45 - 75 %    POCT Oxy Hemoglobin, Venous 88.0 (H) 45.0 - 75.0 %    POCT Hematocrit Calculated, Venous 12.0 (L) 36.0 - 46.0 %    POCT Sodium, Venous 122 (L) 136 - 145 mmol/L    POCT Potassium, Venous 3.4 (L) 3.5 - 5.3 mmol/L    POCT Chloride, Venous 94 (L) 98 - 107 mmol/L    POCT Ionized " Calicum, Venous 0.51 (LL) 1.10 - 1.33 mmol/L    POCT Glucose, Venous 513 (HH) 74 - 99 mg/dL    POCT Lactate, Venous 12.7 (HH) 0.4 - 2.0 mmol/L    POCT Base Excess, Venous -16.0 (L) -2.0 - 3.0 mmol/L    POCT HCO3 Calculated, Venous 10.4 (L) 22.0 - 26.0 mmol/L    POCT Hemoglobin, Venous 4.1 (LL) 12.0 - 16.0 g/dL    POCT Anion Gap, Venous 21.0 10.0 - 25.0 mmol/L    Patient Temperature 37.0 degrees Celsius    FiO2 40 %    Test Comment ANALYZED X2     Critical Called By Breann Brice RRT     Critical Called To Pavel Jenkins RN     Critical Call Time 1542     Critical Read Back Y    BLOOD GAS VENOUS FULL PANEL   Result Value Ref Range    POCT pH, Venous 7.28 (L) 7.33 - 7.43 pH    POCT pCO2, Venous 35 (L) 41 - 51 mm Hg    POCT pO2, Venous 44 35 - 45 mm Hg    POCT SO2, Venous 72 45 - 75 %    POCT Oxy Hemoglobin, Venous 70.6 45.0 - 75.0 %    POCT Hematocrit Calculated, Venous 14.0 (L) 36.0 - 46.0 %    POCT Sodium, Venous 127 (L) 136 - 145 mmol/L    POCT Potassium, Venous 3.5 3.5 - 5.3 mmol/L    POCT Chloride, Venous 97 (L) 98 - 107 mmol/L    POCT Ionized Calicum, Venous 0.67 (LL) 1.10 - 1.33 mmol/L    POCT Glucose, Venous 426 (H) 74 - 99 mg/dL    POCT Lactate, Venous 9.0 (HH) 0.4 - 2.0 mmol/L    POCT Base Excess, Venous -9.5 (L) -2.0 - 3.0 mmol/L    POCT HCO3 Calculated, Venous 16.4 (L) 22.0 - 26.0 mmol/L    POCT Hemoglobin, Venous 4.8 (LL) 12.0 - 16.0 g/dL    POCT Anion Gap, Venous 17.0 10.0 - 25.0 mmol/L    Patient Temperature 37.0 degrees Celsius    FiO2 40 %    Critical Called By Breann Brice RRT     Critical Called To Pavel Dinh     Critical Call Time 1600     Critical Read Back Y    POCT GLUCOSE   Result Value Ref Range    POCT Glucose 148 (H) 74 - 99 mg/dL   BLOOD GAS VENOUS FULL PANEL   Result Value Ref Range    POCT pH, Venous 7.39 7.33 - 7.43 pH    POCT pCO2, Venous 47 41 - 51 mm Hg    POCT pO2, Venous 45 35 - 45 mm Hg    POCT SO2, Venous 70 45 - 75 %    POCT Oxy Hemoglobin, Venous 68.1 45.0 - 75.0 %    POCT  Hematocrit Calculated, Venous 23.0 (L) 36.0 - 46.0 %    POCT Sodium, Venous 137 136 - 145 mmol/L    POCT Potassium, Venous 3.8 3.5 - 5.3 mmol/L    POCT Chloride, Venous 105 98 - 107 mmol/L    POCT Ionized Calicum, Venous 1.15 1.10 - 1.33 mmol/L    POCT Glucose, Venous 135 (H) 74 - 99 mg/dL    POCT Lactate, Venous 1.6 0.4 - 2.0 mmol/L    POCT Base Excess, Venous 3.1 (H) -2.0 - 3.0 mmol/L    POCT HCO3 Calculated, Venous 28.5 (H) 22.0 - 26.0 mmol/L    POCT Hemoglobin, Venous 7.8 (L) 12.0 - 16.0 g/dL    POCT Anion Gap, Venous 7.0 (L) 10.0 - 25.0 mmol/L    Patient Temperature 37.0 degrees Celsius    FiO2 40 %   Blood Gas Venous Full Panel Unsolicited   Result Value Ref Range    POCT pH, Venous      POCT pCO2, Venous      POCT pO2, Venous      POCT SO2, Venous      POCT Oxy Hemoglobin, Venous      POCT Hematocrit Calculated, Venous      POCT Sodium, Venous      POCT Potassium, Venous 3.8 3.5 - 5.3 mmol/L    POCT Chloride, Venous      POCT Ionized Calicum, Venous      POCT Glucose, Venous      POCT Lactate, Venous 1.6 0.4 - 2.0 mmol/L    POCT Base Excess, Venous      POCT HCO3 Calculated, Venous 28.5 (H) 22.0 - 26.0 mmol/L    POCT Hemoglobin, Venous      POCT Anion Gap, Venous      Patient Temperature      FiO2     Prepare RBC: 2 Units   Result Value Ref Range    PRODUCT CODE Z1922A29     Unit Number [id number]     Unit ABO A     Unit RH POS     XM INTEP COMP     Dispense Status XM     Blood Expiration Date 3/24/2025 11:59:00 PM EDT     PRODUCT BLOOD TYPE 6200     UNIT VOLUME 350     PRODUCT CODE G5668N42     Unit Number [id number]     Unit ABO A     Unit RH POS     XM INTEP COMP     Dispense Status XM     Blood Expiration Date 4/1/2025 11:59:00 PM EDT     PRODUCT BLOOD TYPE 6200     UNIT VOLUME 350    CBC   Result Value Ref Range    WBC 15.9 (H) 4.4 - 11.3 x10*3/uL    nRBC 2.1 (H) 0.0 - 0.0 /100 WBCs    RBC 2.77 (L) 4.00 - 5.20 x10*6/uL    Hemoglobin 7.9 (L) 12.0 - 16.0 g/dL    Hematocrit 23.3 (L) 36.0 - 46.0 %     MCV 84 80 - 100 fL    MCH 28.5 26.0 - 34.0 pg    MCHC 33.9 32.0 - 36.0 g/dL    RDW 17.1 (H) 11.5 - 14.5 %    Platelets 157 150 - 450 x10*3/uL   Protime-INR   Result Value Ref Range    Protime 12.0 9.8 - 12.4 seconds    INR 1.1 0.9 - 1.1   Fibrinogen   Result Value Ref Range    Fibrinogen 174 (L) 200 - 400 mg/dL   POCT GLUCOSE   Result Value Ref Range    POCT Glucose 129 (H) 74 - 99 mg/dL   CBC   Result Value Ref Range    WBC 13.5 (H) 4.4 - 11.3 x10*3/uL    nRBC 3.5 (H) 0.0 - 0.0 /100 WBCs    RBC 2.54 (L) 4.00 - 5.20 x10*6/uL    Hemoglobin 7.1 (L) 12.0 - 16.0 g/dL    Hematocrit 21.7 (L) 36.0 - 46.0 %    MCV 85 80 - 100 fL    MCH 28.0 26.0 - 34.0 pg    MCHC 32.7 32.0 - 36.0 g/dL    RDW 17.3 (H) 11.5 - 14.5 %    Platelets 161 150 - 450 x10*3/uL   Protime-INR   Result Value Ref Range    Protime 11.4 9.8 - 12.4 seconds    INR 1.0 0.9 - 1.1   Magnesium   Result Value Ref Range    Magnesium 1.93 1.60 - 2.40 mg/dL   Comprehensive Metabolic Panel   Result Value Ref Range    Glucose 116 (H) 74 - 99 mg/dL    Sodium 141 136 - 145 mmol/L    Potassium 4.0 3.5 - 5.3 mmol/L    Chloride 106 98 - 107 mmol/L    Bicarbonate 30 21 - 32 mmol/L    Anion Gap 9 (L) 10 - 20 mmol/L    Urea Nitrogen 46 (H) 6 - 23 mg/dL    Creatinine 1.02 0.50 - 1.05 mg/dL    eGFR 65 >60 mL/min/1.73m*2    Calcium 7.8 (L) 8.6 - 10.3 mg/dL    Albumin 2.7 (L) 3.4 - 5.0 g/dL    Alkaline Phosphatase 33 33 - 110 U/L    Total Protein 4.4 (L) 6.4 - 8.2 g/dL    AST 39 9 - 39 U/L    Bilirubin, Total 0.6 0.0 - 1.2 mg/dL    ALT 18 7 - 45 U/L   POCT GLUCOSE   Result Value Ref Range    POCT Glucose 111 (H) 74 - 99 mg/dL          Assessment/Plan   Assessment & Plan  Nausea vomiting and diarrhea    Influenza A with pneumonia    Nontraumatic compartment syndrome of left upper extremity    The patient is now 2 days status post her left upper extremity fasciotomy.  Overall the extremity appears to be stable neurovascularly.  Plan for lavage, wound debridement and possible  closure later today.  Please keep the patient NPO.           Elijah Licea MD

## 2025-03-04 NOTE — PROGRESS NOTES
"Kayley Lynch is a 54 y.o. female on day 6 of admission presenting with Nausea vomiting and diarrhea.    Subjective   Patient was evaluated bedside with this practitioner and Dr. Clark.  Patient's hands are warm bilaterally, capillary refill brisk bilaterally.  Patient going back to surgery with Dr. Licea.    Objective     Vitals:    03/04/25 1300   BP:    Pulse: 87   Resp: 16   Temp:    SpO2: 94%      Physical Exam  Constitutional: Well developed , awake/alert/oriented x3, in no distress,  Eyes: Clear sclera  ENMT: mucous membranes are moist, no apparent injury, no lesions seen,   Head/neck: Neck supple, trachea  is midline, no apparent injury, no bruits, no mass, no stridor  Respiratory/thorax: Breath sounds clear and equal diminished bilaterally throughout, thorax symmetric  Cardiac/Vascular: Regular, rate and rhythm, no murmurs,  right radial multiphasic dopplerable signals, multiphasic dopplerable ulnar signal, radial artery harvested for CABG capillary refill brisk hands equally warm  Gastrointestinal: Nondistended soft nontender, positive bowel sounds, no bruits.  Musculoskeletal: Moves all extremities, limited range of motion , no joint swelling,   Extremities: No cyanosis, large bulky dressing of left upper extremity from wrist to upper arm  Neurological: Alert and oriented x3,   Lymphatic: No significant lymphadenopathy  Skin: Warm and dry, no lesions, no rashes  Psychological: Appropriate mood and behavior  Blood pressure 130/65, pulse 87, temperature 36.2 °C (97.2 °F), temperature source Temporal, resp. rate 16, height 1.6 m (5' 2.99\"), weight 66.6 kg (146 lb 13.2 oz), SpO2 94%.        Intake/Output last 3 Shifts:  I/O last 3 completed shifts:  In: 5175 (77.7 mL/kg) [I.V.:3726.1 (55.9 mL/kg); IV Piggyback:1448.9]  Out: 1198 (18 mL/kg) [Urine:1198 (0.5 mL/kg/hr)]  Weight: 66.6 kg     Patient Active Problem List    Diagnosis Date Noted    Nausea vomiting and diarrhea 02/26/2025    Influenza A with " pneumonia 02/26/2025    Chest pressure 03/22/2024    Current mild episode of major depressive disorder without prior episode (CMS-[facility]) 05/08/2023    Atrial flutter (Multi) 03/08/2023    Bilateral edema of lower extremity 03/08/2023    CAD (coronary artery disease) 03/08/2023    Cardiomyopathy 03/08/2023    Hypercholesterolemia 03/08/2023    Mitral regurgitation 03/08/2023    Pleural effusion 03/08/2023    Pulmonary hypertension (Multi) 03/08/2023    S/P CABG (coronary artery bypass graft) 03/08/2023    S/P mitral valve replacement 03/08/2023    Dyspnea on exertion 03/08/2023    Tachycardia 03/08/2023    Nontraumatic compartment syndrome of left upper extremity 02/25/2025          Current Facility-Administered Medications:     [Transfer Hold] acetaminophen (Tylenol) tablet 650 mg, 650 mg, oral, q4h PRN, Jaydon Bustillo MD, 650 mg at 03/02/25 1105    [Held by provider] aspirin EC tablet 81 mg, 81 mg, oral, Daily, Neto Clark MD, 81 mg at 03/02/25 0847    [Transfer Hold] atorvastatin (Lipitor) tablet 80 mg, 80 mg, oral, Nightly, Jaydon Bustillo MD, 80 mg at 03/03/25 2118    [Transfer Hold] budesonide (Pulmicort) 0.5 mg/2 mL nebulizer solution 0.5 mg, 0.5 mg, nebulization, BID, Jaydon Bustillo MD, 0.5 mg at 03/04/25 0759    [Transfer Hold] buPROPion XL (Wellbutrin XL) 24 hr tablet 150 mg, 150 mg, oral, q AM, Jaydon Bustillo MD, 150 mg at 03/04/25 0923    [Transfer Hold] doxycycline (Vibramycin) 100 mg in dextrose 5%  mL, 100 mg, intravenous, q12h, Jaydon Bustillo MD, Last Rate: 100 mL/hr at 03/04/25 1249, 100 mg at 03/04/25 1343    [Transfer Hold] escitalopram (Lexapro) tablet 20 mg, 20 mg, oral, Daily, Jaydon Bustillo MD, 20 mg at 03/04/25 0923    [Transfer Hold] insulin lispro injection 0-5 Units, 0-5 Units, subcutaneous, TID AC, Jaydon Bustillo MD, 2 Units at 03/02/25 0847    [Transfer Hold] ipratropium-albuteroL (Duo-Neb) 0.5-2.5 mg/3 mL nebulizer solution 3 mL, 3 mL, nebulization, q6h, Jaydon Bustillo MD, 3 mL at 03/04/25  1223    [Transfer Hold] ipratropium-albuteroL (Duo-Neb) 0.5-2.5 mg/3 mL nebulizer solution 3 mL, 3 mL, nebulization, q2h PRN, Jaydon Bustillo MD    lactated Ringer's infusion, 100 mL/hr, intravenous, Continuous, Jaydon Bustillo MD, Last Rate: 100 mL/hr at 03/04/25 0559, 100 mL/hr at 03/04/25 0559    [Transfer Hold] melatonin tablet 3 mg, 3 mg, oral, Nightly PRN, Jaydon Bustillo MD, 3 mg at 02/28/25 2049    [Transfer Hold] metoprolol tartrate (Lopressor) injection 5 mg, 5 mg, intravenous, q6h PRN, Jaydon Bustillo MD, 5 mg at 03/02/25 1958    [Held by provider] metoprolol tartrate (Lopressor) tablet 25 mg, 25 mg, oral, TID, Brittney Lomeli MD    [Transfer Hold] morphine injection 2 mg, 2 mg, intravenous, q2h PRN, Escobar Hall MD, 2 mg at 03/04/25 0138    [Transfer Hold] ondansetron (Zofran) injection 4 mg, 4 mg, intravenous, q4h PRN, Escobar Hall MD, 4 mg at 03/03/25 0048    [Transfer Hold] oxygen (O2) therapy, , inhalation, Continuous - Inhalation, Jaydon Bustillo MD, Last Rate: 300,000 mL/hr at 03/03/25 1251, 40 percent at 03/03/25 1941    [Transfer Hold] pantoprazole (ProtoNix) injection 40 mg, 40 mg, intravenous, Daily, Jadyon Bustillo MD, 40 mg at 03/04/25 0923    [Transfer Hold] perflutren lipid microspheres (Definity) injection 0.5-10 mL of dilution, 0.5-10 mL of dilution, intravenous, Once in imaging, Jaydon Bustillo MD    [Transfer Hold] perflutren protein A microsphere (Optison) injection 0.5 mL, 0.5 mL, intravenous, Once in imaging, Jaydon Bustillo MD    [Transfer Hold] promethazine (Phenergan) 25 mg in sodium chloride 0.9% 50 mL IV, 25 mg, intravenous, q4h PRN, Escobar Hall MD, Stopped at 03/03/25 0245    sodium chloride 0.9 % irrigation solution, , , PRN, Elijah Licea MD, 1,000 mL at 03/04/25 1416    [Transfer Hold] sulfur hexafluoride microsphr (Lumason) injection 24.28 mg, 2 mL, intravenous, Once in imaging, Jaydon Bustillo MD    [Transfer Hold] vancomycin (Firvanq) solution 250 mg, 250 mg, oral, 4x daily, Jaydon Bustillo,  MD, 250 mg at 03/04/25 0923    [Held by provider] warfarin (Coumadin) tablet 2 mg, 2 mg, oral, Daily, Gabby Perez MD, 2 mg at 03/01/25 1732    Facility-Administered Medications Ordered in Other Encounters:     ceFAZolin (Ancef) injection, , intravenous, PRN, Josiah L Patricia, APRN-CRNA, 2 g at 03/04/25 1347    etomidate (Amidate) injection, , intravenous, PRN, Josiah L Patricia, APRN-CRNA, 10 mg at 03/04/25 1338    fentaNYL PF (Sublimaze) injection, , intravenous, PRN, Josiah L Patricia, APRN-CRNA, 100 mcg at 03/04/25 1338    lactated Ringer's bolus, , intravenous, Continuous PRN, Josiah L Patricia, APRN-CRNA, New Bag at 03/04/25 1329    lidocaine (cardiac) (Xylocaine) injection, , intravenous, PRN, Josiah L Patricia, APRN-CRNA, 60 mg at 03/04/25 1338    rocuronium (ZeMuron) injection, , intravenous, PRN, Josiah L Patricia, APRN-CRNA, 50 mg at 03/04/25 1339     Lab Results   Component Value Date    WBC 13.8 (H) 03/04/2025    HGB 6.4 (LL) 03/04/2025    HCT 19.9 (L) 03/04/2025     03/04/2025    CHOL 113 05/22/2024    TRIG 66 05/22/2024    HDL 33.8 05/22/2024    ALT 18 03/04/2025    AST 39 03/04/2025     03/04/2025    K 4.0 03/04/2025     03/04/2025    CREATININE 1.02 03/04/2025    BUN 46 (H) 03/04/2025    CO2 30 03/04/2025    TSH 0.40 (L) 02/26/2025    INR 1.0 03/04/2025    BLOODCULT No growth at 4 days -  FINAL REPORT 02/26/2025    BLOODCULT No growth at 4 days -  FINAL REPORT 02/26/2025       ECG 12 lead    Result Date: 2/28/2025  Right and left arm electrode reversal, interpretation assumes no reversal Sinus rhythm Prolonged AL interval Probable lateral infarct, age indeterminate See ED provider note for full interpretation and clinical correlation Confirmed by Gege Mclean ([id number]) on 2/28/2025 11:53:41 AM    ECG 12 lead    Result Date: 2/27/2025  Sinus rhythm Prolonged AL interval Borderline T wave abnormalities See ED provider note for full interpretation and clinical correlation Confirmed by  Castro Ovalle ([id number]) on 2/27/2025 11:44:11 PM    XR chest 1 view    Result Date: 2/26/2025  Interpreted By:  Emilia Sargent, STUDY: XR CHEST 1 VIEW;  2/26/2025 5:29 am   INDICATION: Signs/Symptoms:increased oxygen requirement, influenza A positive.   COMPARISON: 02/26/2025   ACCESSION NUMBER(S): [id number]   ORDERING CLINICIAN: JOSELITO DE LA ROSA   FINDINGS: CARDIOMEDIASTINAL SILHOUETTE: The heart is enlarged. There are sternotomy wires from prior cardiac surgery. There is a prosthetic aortic valve.   LUNGS: There are bibasilar reticulonodular interstitial opacities greater at the left lung base consistent with pneumonia/pneumonitis. Patient is influenza A positive. This could represent viral pneumonitis. There is no focal dense area of consolidation or pleural fluid. This is unchanged.   ABDOMEN: No remarkable upper abdominal findings.     BONES: No acute osseous changes.       Stable chest. Unchanged bibasilar reticulonodular interstitial opacities consistent with pneumonia/viral pneumonitis.   MACRO: None   Signed by: Emilia Sargent 2/26/2025 10:13 AM Dictation workstation:   [id number]    US renal complete    Result Date: 2/26/2025  Interpreted By:  Karthikeyan Cooper, STUDY: US RENAL COMPLETE;  2/26/2025 5:21 am   INDICATION: Signs/Symptoms:HILLARY.   COMPARISON: None.   ACCESSION NUMBER(S): [id number]   ORDERING CLINICIAN: TIFFANIE MONK   TECHNIQUE: Multiple images of the kidneys were obtained  , with color Doppler for blood flow.   FINDINGS: RIGHT KIDNEY: The right kidney measures 11.7 cm in length.  The renal cortex is within normal limits.   No hydronephrosis or evidence of nephrolithiasis. Color Doppler demonstrates renal blood flow.   LEFT KIDNEY: The left kidney measures 11.1 cm in length. The renal cortex is within normal limits.    No hydronephrosis or evidence of nephrolithiasis.. Color Doppler demonstrates renal blood flow.   BLADDER: The urinary bladder is unremarkable in appearance.   OTHER FINDINGS: None  significant.       Unremarkable renal ultrasound.   Signed by: Karthikeyan Cooper 2/26/2025 8:29 AM Dictation workstation:   [id number]    CT abdomen pelvis wo IV contrast    Result Date: 2/26/2025  Interpreted By:  Ha Chawla, STUDY: CT ABDOMEN PELVIS WO IV CONTRAST; ;  2/26/2025 7:49 am   INDICATION: Signs/Symptoms:abd pain.   COMPARISON: None.   ACCESSION NUMBER(S): [id number]   ORDERING CLINICIAN: TIFFANIE MONK   TECHNIQUE: Contiguous axial CT sections are performed from the lung bases to the lesser trochanters without enteric or intravenous contrast. The study is supplemented with coronal and sagittal reformatted images.   FINDINGS: There scattered areas of interstitial and airspace infiltrate in the lung bases with linear areas of atelectasis. There is a tiny left pleural effusion in the left posterior costophrenic angle. The subpleural density is identified abutting the right anterolateral pleural surface right lower lobe measuring 5 mm in diameter.   The gallbladder is distended. There is no wall thickening or surrounding fluid.   The liver, spleen, pancreas, and adrenal glands are of unremarkable unenhanced CT appearance.   The kidneys are symmetric in size. There is no renal calculus or hydronephrosis. The perinephric fat is preserved. There is mild prominence of both ureters though no obstructing ureteral calculus. The urinary bladder is significantly distended. There is no wall thickening or surrounding infiltration. No bladder calculus is detected.   There are diffuse atherosclerotic arterial calcifications of the abdominal aorta. The aorta is of normal caliber. There is no periaortic mass or fluid collection.   There is no bowel distension or infiltration of the bowel mesentery. Minimal bowel wall thickening of the colon may be present. There is no free air free fluid collection in the abdomen or pelvis.   The visualized pelvic viscera are unremarkable. The visualized osseous structures of the  abdomen and pelvis are intact.       Faint nodular interstitial density in ground-glass infiltrates are identified in the lung bases with tiny left pleural effusion. This appearance is nonspecific.   Mild bowel wall thickening of the colon can not be excluded. Correlate with clinical findings colitis. There is no bowel obstruction.   The remainder of the abdomen and pelvis is unremarkable allowing for limitations of an unenhanced study.     MACRO: None   Signed by: Ha Chawla 2/26/2025 8:00 AM Dictation workstation:   [id number]    XR chest 1 view    Result Date: 2/26/2025  Interpreted By:  Christopher Casey, STUDY: XR CHEST 1 VIEW;  2/26/2025 12:26 am   INDICATION: Signs/Symptoms:weak.   COMPARISON: 12/2/2020   ACCESSION NUMBER(S): [id number]   ORDERING CLINICIAN: LYNSEY MILLER   FINDINGS: Postsurgical change of sternotomy and cardiac valve prosthesis. The cardiac silhouette is stable in size. Mild opacity in the left mid lung may correspond to atelectasis or infiltrate. No significant pleural effusion. No pneumothorax.       Mild opacity in the left mid lung may correspond to atelectasis or infiltrate.   MACRO: None   Signed by: Christopher Casey 2/26/2025 12:58 AM Dictation workstation:   [id number]    Assessment/Plan   Assessment & Plan  Nausea vomiting and diarrhea    Influenza A with pneumonia    Nontraumatic compartment syndrome of left upper extremity    Patient evaluated at bedside.  Patient case discussed with Dr. Clark after evaluation.  Patient has multiphasic dopplerable upper extremity left ulnar signals.  Hands are warm.  Continue elevation of left upper extremity to reduce edema.  Continue to keep hands warm.  Continue neurovascular checks left upper extremity.  Monitor for signs of bleeding.  Avoid supratherapeutic INR.  No surgical intervention at this time by vascular surgery.  Will evaluate tomorrow.    Thank you very much for allowing Vascular Surgery to be involved in the care of your  patient sincerely José Luis Tadeo APRCHANDRIKA-CNP .  (This note was generated with voice recognition software and may contain errors including spelling, grammar, syntax and missed recognition of what was dictated, of which may not have been fully corrected)      José Luis Tadeo, APRN-CNP

## 2025-03-04 NOTE — CARE PLAN
The patient's goals for the shift include      The clinical goals for the shift include safety and comfort    Problem: Skin  Goal: Participates in plan/prevention/treatment measures  Outcome: Progressing  Flowsheets (Taken 3/4/2025 0551)  Participates in plan/prevention/treatment measures:   Discuss with provider PT/OT consult   Elevate heels   Increase activity/out of bed for meals  Goal: Prevent/manage excess moisture  Outcome: Progressing  Flowsheets (Taken 3/4/2025 0551)  Prevent/manage excess moisture:   Monitor for/manage infection if present   Cleanse incontinence/protect with barrier cream   Follow provider orders for dressing changes   Use wicking fabric (obtain order)   Moisturize dry skin  Goal: Prevent/minimize sheer/friction injuries  Outcome: Progressing  Flowsheets (Taken 3/4/2025 0551)  Prevent/minimize sheer/friction injuries:   Complete micro-shifts as needed if patient unable. Adjust patient position to relieve pressure points, not a full turn   Increase activity/out of bed for meals   Use pull sheet   Utilize specialty bed per algorithm   Turn/reposition every 2 hours/use positioning/transfer devices  Goal: Promote/optimize nutrition  Outcome: Progressing  Flowsheets (Taken 3/4/2025 0551)  Promote/optimize nutrition:   Assist with feeding   Monitor/record intake including meals   Offer water/supplements/favorite foods   Consume > 50% meals/supplements   Discuss with provider if NPO > 2 days   Reassess MST if dietician not consulted  Goal: Promote skin healing  Outcome: Progressing  Flowsheets (Taken 3/4/2025 0551)  Promote skin healing:   Assess skin/pad under line(s)/device(s)   Protective dressings over bony prominences   Turn/reposition every 2 hours/use positioning/transfer devices   Rotate device position/do not position patient on device   Ensure correct size (line/device) and apply per  instructions

## 2025-03-04 NOTE — CONSULTS
Cardiology Consult Note    Inpatient consult to Cardiology  Consult performed by: Brittney Lomeli MD  Consult ordered by: Dimas Mensah MD         Kayley Lynch is a 54 y.o. female on day 6 of admission presenting with Nausea vomiting and diarrhea.      Subjective   This is a 54-year-old female well-known to myself.  I am asked to see her for atrial fibrillation and mechanical prosthetic mitral valve replacement.  She was admitted late in February with influenza and resulting pneumonia.  Transferred to the floor.  She developed hypotension and hematoma with compartment syndrome of her left arm.  Also developed an iliopsoas hematoma.  This appeared to be spontaneous.  She was anemic.  Coumadin stopped because she was supratherapeutic.  Started on low-dose heparin.  Surgery was consulted and she had deep compression for compartment syndrome of the left arm.  Yesterday she went into atrial fibrillation rapid response.  She is converted to sinus tachycardia.  She denies chest pain.  Breathing easier.  No abdominal pain.  Renal function stayed stable.  She is hemodynamically stable.     ROS:  10 systems reviewed other than what is mentioned above.     PMH  1.  Mitral regurgitation status post mitral valve replacement with a #25/33 [place] mechanical mitral valve October 2020  2.  Paroxysmal atrial flutter status post ablation October 2020  3.  CAD.  Bypass x 3 vessels LIMA to the LAD free radial artery graft to the marginal SVG to the RCA in 2020.  Last stress test 4/16/2024 no evidence of ischemia EF 51%  4.  COPD she continues to smoke  5.  Cardiomyopathy postoperative ejection fraction was 40%.  This was up to 50% on her last stress test  6.  Hyperlipidemia  7.  Hypertension  8.  Tobacco abuse  9.  Moderate to severe pulmonary hypertension in the past    Past Medical History:  Past Medical History:   Diagnosis Date    Atrial flutter (Multi) 03/08/2023    s/p flutter ablation October 2020    Bilateral edema of lower  extremity 03/08/2023    New July 2020 ... Better on Lasix    CAD (coronary artery disease) 03/08/2023    50% LAD, 60% diag, T.O. Cx, subtotal RCA, elevated RH pressure  CABG 10/2020: LIMA to LAD, FRA to OM, SVG to RCA      Cardiomyopathy 03/08/2023    EF 35-45%, now 50%    Chest pressure 03/22/2024    Hypercholesterolemia 03/08/2023    Dr. Lomeli follows    Mitral regurgitation 03/08/2023    MVT, mild MS, severe MR   s/p MVR = 25/33 [place] mechanical valve     Other conditions influencing health status     Patient denies significant medical history    Pulmonary hypertension (Multi) 03/08/2023    Mod to severe    S/P CABG (coronary artery bypass graft) 03/08/2023    10/2020: LIMA to LAD, FRA to OM, SVG to RCA      S/P mitral valve replacement 03/08/2023    MVT, Mild MS, severe MR ... s/p MVR 25/33 Kevin Mechanical valve       Problem List Items Addressed This Visit       * (Principal) Nausea vomiting and diarrhea    Influenza A with pneumonia - Primary    Nontraumatic compartment syndrome of left upper extremity    Relevant Orders    Case Request Operating Room: FASCIOTOMY, UPPER EXTREMITY (Completed)    Case Request Operating Room: DEBRIDEMENT, WOUND, UPPER EXTREMITY (Completed)     Other Visit Diagnoses       Acute kidney injury (CMS-HCC)        Dehydration        Influenza A        Shock (Multi)        Relevant Orders    Transthoracic Echo (TTE) Complete (Completed)            Family History:  No relevant family history has been documented for this patient.    Medications:  Prior to Admission medications    Medication Sig Start Date End Date Taking? Authorizing Provider   acetaminophen-codeine (Tylenol w/ Codeine #3) 300-30 mg tablet Take 1 tablet by mouth every 6 hours if needed.   Yes Historical Provider, MD   albuterol 90 mcg/actuation inhaler Inhale 2 puffs every 6 hours if needed for wheezing. 9/27/24 9/27/25 Yes Brittney Lomeli MD   aspirin 81 mg EC tablet Take 1 tablet (81 mg) by mouth once daily.   Yes Historical  Provider, MD   atorvastatin (Lipitor) 80 mg tablet Take 1 tablet (80 mg) by mouth once daily. 4/9/24 4/9/25 Yes Brittney Lomeli MD   buPROPion XL (Wellbutrin XL) 150 mg 24 hr tablet Take 1 tablet (150 mg) by mouth once daily in the morning. Do not crush, chew, or split. 5/31/24 5/31/25 Yes Ambrose Sustersmassimo V, DO   carvedilol (Coreg) 25 mg tablet Take 1 tablet (25 mg) by mouth 2 times a day. 5/8/24 5/8/25 Yes Brittney Lomeli MD   escitalopram (Lexapro) 20 mg tablet Take 1 tablet (20 mg) by mouth once daily. 2/10/25 5/11/25 Yes Ambrose Carrasco V,    lisinopril 20 mg tablet Take 1 tablet (20 mg) by mouth once daily. 11/25/24  Yes Brittney Lomeli MD   warfarin (Coumadin) 2 mg tablet Take 2 tablets (4 mg) by mouth on Mondays and Saturdays, and take 1 tablet (2 mg) on all other days of week or as directed by anticoagulation clinic 10/11/24  Yes Brittney Lomeli MD   varenicline (Chantix) 1 mg tablet Take 1 tablet (1 mg) by mouth 2 times a day. Take with full glass of water.  Patient not taking: Reported on 2/26/2025 9/27/24 9/27/25  Brittney Lomeli MD     Current Facility-Administered Medications   Medication Dose Route Frequency Provider Last Rate Last Admin    acetaminophen (Tylenol) tablet 650 mg  650 mg oral q4h PRN Jaydon Bustillo MD   650 mg at 03/02/25 1105    [Held by provider] aspirin EC tablet 81 mg  81 mg oral Daily Neto Clark MD   81 mg at 03/02/25 0847    atorvastatin (Lipitor) tablet 80 mg  80 mg oral Nightly Jaydon Bustillo MD   80 mg at 03/03/25 2118    budesonide (Pulmicort) 0.5 mg/2 mL nebulizer solution 0.5 mg  0.5 mg nebulization BID Jaydon Bustillo MD   0.5 mg at 03/04/25 0759    buPROPion XL (Wellbutrin XL) 24 hr tablet 150 mg  150 mg oral q AM Jaydon Bustillo MD   150 mg at 03/04/25 0923    clindamycin (Cleocin) 900 mg in dextrose 5% IV 50 mL  900 mg intravenous q8h Dimas Mensah MD 50 mL/hr at 03/04/25 0922 900 mg at 03/04/25 0922    doxycycline (Vibramycin) 100 mg in dextrose 5%  mL  100 mg intravenous q12h Jaydon  MD Keny   Stopped at 03/03/25 2349    escitalopram (Lexapro) tablet 20 mg  20 mg oral Daily Jaydon Bustillo MD   20 mg at 03/04/25 0923    insulin lispro injection 0-5 Units  0-5 Units subcutaneous TID AC Jaydon Bustillo MD   2 Units at 03/02/25 0847    ipratropium-albuteroL (Duo-Neb) 0.5-2.5 mg/3 mL nebulizer solution 3 mL  3 mL nebulization q6h Jaydon Bustillo MD   3 mL at 03/04/25 0759    ipratropium-albuteroL (Duo-Neb) 0.5-2.5 mg/3 mL nebulizer solution 3 mL  3 mL nebulization q2h PRN Jaydon Bustillo MD        lactated Ringer's infusion  100 mL/hr intravenous Continuous Jaydon Bustillo  mL/hr at 03/04/25 0559 100 mL/hr at 03/04/25 0559    melatonin tablet 3 mg  3 mg oral Nightly PRN Jaydon Bustillo MD   3 mg at 02/28/25 2049    methylPREDNISolone sod succinate (SOLU-Medrol) 40 mg/mL injection 40 mg  40 mg intravenous q24h Jaydon Bustillo MD   40 mg at 03/03/25 1140    metoprolol tartrate (Lopressor) injection 5 mg  5 mg intravenous q6h PRN Jaydon Bustillo MD   5 mg at 03/02/25 1958    metoprolol tartrate (Lopressor) tablet 25 mg  25 mg oral BID Netomaria teresa Clark MD   25 mg at 03/04/25 0923    morphine injection 2 mg  2 mg intravenous q2h PRN Escobar Hall MD   2 mg at 03/04/25 0138    ondansetron (Zofran) injection 4 mg  4 mg intravenous q4h PRN Escobar Hall MD   4 mg at 03/03/25 0048    oxygen (O2) therapy   inhalation Continuous - Inhalation Jaydon Bustillo ,000 mL/hr at 03/03/25 1251 40 percent at 03/03/25 1941    pantoprazole (ProtoNix) injection 40 mg  40 mg intravenous Daily Jaydon Bustillo MD   40 mg at 03/04/25 0923    perflutren lipid microspheres (Definity) injection 0.5-10 mL of dilution  0.5-10 mL of dilution intravenous Once in imaging Jaydon Bustillo MD        perflutren protein A microsphere (Optison) injection 0.5 mL  0.5 mL intravenous Once in imaging Jaydon Bustillo MD        promethazine (Phenergan) 25 mg in sodium chloride 0.9% 50 mL IV  25 mg intravenous q4h PRN Escobar Hall MD   Stopped at 03/03/25  0245    sulfur hexafluoride microsphr (Lumason) injection 24.28 mg  2 mL intravenous Once in imaging Jaydon Bustillo MD        vancomycin (Firvanq) solution 250 mg  250 mg oral 4x daily Jaydon Bustillo MD   250 mg at 03/04/25 0923    [Held by provider] warfarin (Coumadin) tablet 2 mg  2 mg oral Daily Gabby Perez MD   2 mg at 03/01/25 1732     Allergies:  Allergies   Allergen Reactions    Sulfa (Sulfonamide Antibiotics) Rash       Social History:  Social History     Tobacco Use    Smoking status: Every Day     Current packs/day: 0.50     Types: Cigarettes    Smokeless tobacco: Never   Substance Use Topics    Alcohol use: Never     Physical Exam:  Last Recorded Vitals  /68   Pulse 102   Temp 36.6 °C (97.9 °F) (Temporal)   Resp 15   Wt 66.6 kg (146 lb 13.2 oz)   SpO2 94%   Intake/Output last 3 Shifts:    Intake/Output Summary (Last 24 hours) at 3/4/2025 0925  Last data filed at 3/4/2025 0600  Gross per 24 hour   Intake 1676.07 ml   Output 870 ml   Net 806.07 ml       Admission Weight  Weight: 59 kg (130 lb) (02/26/25 0159)    Daily Weight  03/01/25 : 66.6 kg (146 lb 13.2 oz)  Patient is alert and oriented x3.  HEENT is unremarkable mucous members are moist  Neck no JVP no bruits upstrokes are full no thyromegaly  Lungs are diminished with bibasilar crackles  Heart regular rhythm normal S1-S2 there is no S3 crisp mitral valve click abdomen is soft bs are positive nontender nondistended no organomegaly no pulsatile masses  Extremities have no edema.  Left arm is fully bandaged hand is warm distal pulses present palpable.  Neuro is grossly nonfocal  Skin has no rashes     Image Results  CT chest abdomen pelvis wo IV contrast  Narrative: Interpreted By:  Kimo Martinez,   STUDY:  CT CHEST ABDOMEN PELVIS WO CONTRAST; 3/3/2025 1:24 pm      INDICATION:  Signs/Symptoms:characterize potential hematoma, diminished breathing  sounds. Patient had an episode of left upper extremity swelling with  compartment  syndrome requiring fasciotomy on 03/02/2025.      COMPARISON:  Abdominal CT 02/26/2025, calcium scoring CT 08/14/2020      ACCESSION NUMBER(S):  [id number]      ORDERING CLINICIAN:  ASA TORRES      TECHNIQUE:  COMMENTS: This exam is performed without oral or intravenous contrast  as was requested. Please note that the absence of oral and  intravenous contrast material does limit the sensitivity and  specificity of this examination. In particular solid organ assessment  for neoplasm is significantly limited. Assessment of the GI tract is  also limited without the aid of oral contrast administration.  Vascular abnormalities such as dissection, occlusions, infarcts and  thrombus may also go undetected.          One or more of the following dose reduction techniques were used:  Automated exposure control Adjustment of the mA and/or kV according  to patient size, and/or use of iterative reconstruction technique.      FINDINGS:  CHEST:      *There is no hilar or mediastinal lymphadenopathy present.  *There is extensive emphysematous change of the pulmonary parenchyma.  *There is alveolar infiltrate within the posterior aspect of the  right upper lobe just anterior to the major fissure. *There is a  small left pleural effusion which extends into the upper portion of  the major fissure on the left. *There is a peripheral somewhat  rounded appearing masslike infiltrate/atelectasis with a differential  to include pulmonary infiltrate from pneumonia, malignancy, as well  as rounded atelectasis. This is new compared to the study from  08/14/2020. *Somewhat diffuse patchy infiltrate is suspected but  difficult to assess definitively due to motion artifact.          Chest Wall: There is extensive ill-defined soft tissue density likely  secondary to infection or hemorrhage based upon indications clinical  history with involvement of the left pectoralis major and minor  muscles, left axillary soft tissues, infra  scapular muscle on the  left, as well as all of the visualized muscles of the left upper  extremity adjacent to the humerus with some soft tissue air  identified anteriorly likely related to the patient's surgical  intervention within infectious etiology not entirely excluded. There  appears to be some tissue plane loss of the musculature of the left  forearm suggesting there may be involvement of the forearm as well.      Skeletal System: No fractures or destructive lesions are identified.  Sternal sutures are identified.      _________________________________________________________      CT SCAN OF THE ABDOMEN AND PELVIS FINDINGS:  ABDOMEN CT:  SOLID ORGAN ASSESSMENT:  The liver, spleen, pancreas, kidneys and adrenal glands are normal in  appearance. There is residual intravenous conscious within the  abdominal solid organs from the CTA from yesterday. The gallbladder  is somewhat distended with a transverse diameter 4.3 cm. There is no  gallbladder wall thickening or pericholecystic fluid.      RETROPERITONEUM:  AORTA:  There is no evidence for abdominal aortic aneurysm.      LYMPH NODES: There is no evidence for retroperitoneal lymphadenopathy.      There is enlargement of the left psoas muscle likely secondary to  hemorrhage along with enlargement of the left iliac muscle. There is  thickening and increased attenuation of the lateral conal fascia on  the left with some extension into the perinephric fat on the left as  well as involvement of the fat lateral to the lateral conal fascia.  There is also some increased attenuation consistent with edema or  hemorrhage within the subcutaneous fat lateral to the abdominal wall  musculature on the left. This left-sided abdominal and pelvic  hemorrhage extends into the presacral space as well as along the  perirectal soft tissues bilaterally, more marked on the left.      BOWEL ASSESSMENT:  No intraperitoneal free air is identified. There is a nonspecific  appearance of  the stomach.  The small bowel is unremarkable in  appearance. Scattered diverticular present within the sigmoid colon  the descending colon without CT evidence for diverticulitis.      OSSEOUS STRUCTURES: No lytic or blastic lesions are identified.      PELVIC CT:  There is a small amount of free fluid within the dependent portion of  the pelvis. There is a Jasso catheter within a collapsed urinary  bladder with air and contrast within the urinary bladder. There is a  rectal tube.      Impression: CHEST IMPRESSION      1. Extensive emphysematous change of the pulmonary parenchyma.  2. Small bilateral effusions, greater on the left.  3. Infiltrate within the right upper lobe, left upper lobe, along  with infiltrate versus malignant mass versus rounded atelectasis left  lower lobe.  4. Abnormal thickening of the musculature with haziness of the  adjacent fat involving the left chest wall, left axilla, posterior  scapula musculature on the left, as well as the upper arm and forearm  of the left upper extremity with some soft tissue air within the soft  tissue of the forearm and left upper arm. This appearance is similar  compared to the CT of the left upper extremity from 03/02/2025.          ABDOMEN AND PELVIC IMPRESSION  1. Extensive hemorrhage within the left side of the retroperitoneum,  extending into the left side of the pelvis with some involvement of  the perirectal and presacral soft tissues as above. There is also a  small amount of free fluid within the dependent portion the pelvis  which may represent blood products or reactive fluid.. There is  increased attenuation of the abdominal wall fat on the left which may  be secondary to hemorrhage or edema.  2. Distended appearing gallbladder but without gallbladder wall  thickening or pericholecystic fluid.  3. Rectal and urinary bladder catheters as above.          MACRO:  none      Signed by: Kimo Martinez 3/3/2025 3:03 PM  Dictation workstation:    [id number]        Relevant Results:  Results for orders placed or performed during the hospital encounter of 02/25/25 (from the past 24 hours)   Creatine Kinase   Result Value Ref Range    Creatine Kinase 545 (H) 0 - 215 U/L   POCT GLUCOSE   Result Value Ref Range    POCT Glucose 110 (H) 74 - 99 mg/dL   Fibrinogen   Result Value Ref Range    Fibrinogen 84 (LL) 200 - 400 mg/dL   POCT GLUCOSE   Result Value Ref Range    POCT Glucose 135 (H) 74 - 99 mg/dL   Blood Gas Venous Full Panel Unsolicited   Result Value Ref Range    POCT pH, Venous 7.21 (LL) 7.33 - 7.43 pH    POCT pCO2, Venous 26 (L) 41 - 51 mm Hg    POCT pO2, Venous 61 (H) 35 - 45 mm Hg    POCT SO2, Venous 90 (H) 45 - 75 %    POCT Oxy Hemoglobin, Venous 88.0 (H) 45.0 - 75.0 %    POCT Hematocrit Calculated, Venous 12.0 (L) 36.0 - 46.0 %    POCT Sodium, Venous 122 (L) 136 - 145 mmol/L    POCT Potassium, Venous 3.4 (L) 3.5 - 5.3 mmol/L    POCT Chloride, Venous 94 (L) 98 - 107 mmol/L    POCT Ionized Calicum, Venous 0.51 (LL) 1.10 - 1.33 mmol/L    POCT Glucose, Venous 513 (HH) 74 - 99 mg/dL    POCT Lactate, Venous 12.7 (HH) 0.4 - 2.0 mmol/L    POCT Base Excess, Venous -16.0 (L) -2.0 - 3.0 mmol/L    POCT HCO3 Calculated, Venous 10.4 (L) 22.0 - 26.0 mmol/L    POCT Hemoglobin, Venous 4.1 (LL) 12.0 - 16.0 g/dL    POCT Anion Gap, Venous 21.0 10.0 - 25.0 mmol/L    Patient Temperature 37.0 degrees Celsius    FiO2 40 %    Test Comment ANALYZED X2     Critical Called By Breann Brice RRT     Critical Called To Pavel Jenkins RN     Critical Call Time 1542     Critical Read Back Y    BLOOD GAS VENOUS FULL PANEL   Result Value Ref Range    POCT pH, Venous 7.28 (L) 7.33 - 7.43 pH    POCT pCO2, Venous 35 (L) 41 - 51 mm Hg    POCT pO2, Venous 44 35 - 45 mm Hg    POCT SO2, Venous 72 45 - 75 %    POCT Oxy Hemoglobin, Venous 70.6 45.0 - 75.0 %    POCT Hematocrit Calculated, Venous 14.0 (L) 36.0 - 46.0 %    POCT Sodium, Venous 127 (L) 136 - 145 mmol/L    POCT Potassium, Venous 3.5  3.5 - 5.3 mmol/L    POCT Chloride, Venous 97 (L) 98 - 107 mmol/L    POCT Ionized Calicum, Venous 0.67 (LL) 1.10 - 1.33 mmol/L    POCT Glucose, Venous 426 (H) 74 - 99 mg/dL    POCT Lactate, Venous 9.0 (HH) 0.4 - 2.0 mmol/L    POCT Base Excess, Venous -9.5 (L) -2.0 - 3.0 mmol/L    POCT HCO3 Calculated, Venous 16.4 (L) 22.0 - 26.0 mmol/L    POCT Hemoglobin, Venous 4.8 (LL) 12.0 - 16.0 g/dL    POCT Anion Gap, Venous 17.0 10.0 - 25.0 mmol/L    Patient Temperature 37.0 degrees Celsius    FiO2 40 %    Critical Called By Breann Brice RRT     Critical Called To Pavel Dinh     Critical Call Time 1600     Critical Read Back Y    POCT GLUCOSE   Result Value Ref Range    POCT Glucose 148 (H) 74 - 99 mg/dL   BLOOD GAS VENOUS FULL PANEL   Result Value Ref Range    POCT pH, Venous 7.39 7.33 - 7.43 pH    POCT pCO2, Venous 47 41 - 51 mm Hg    POCT pO2, Venous 45 35 - 45 mm Hg    POCT SO2, Venous 70 45 - 75 %    POCT Oxy Hemoglobin, Venous 68.1 45.0 - 75.0 %    POCT Hematocrit Calculated, Venous 23.0 (L) 36.0 - 46.0 %    POCT Sodium, Venous 137 136 - 145 mmol/L    POCT Potassium, Venous 3.8 3.5 - 5.3 mmol/L    POCT Chloride, Venous 105 98 - 107 mmol/L    POCT Ionized Calicum, Venous 1.15 1.10 - 1.33 mmol/L    POCT Glucose, Venous 135 (H) 74 - 99 mg/dL    POCT Lactate, Venous 1.6 0.4 - 2.0 mmol/L    POCT Base Excess, Venous 3.1 (H) -2.0 - 3.0 mmol/L    POCT HCO3 Calculated, Venous 28.5 (H) 22.0 - 26.0 mmol/L    POCT Hemoglobin, Venous 7.8 (L) 12.0 - 16.0 g/dL    POCT Anion Gap, Venous 7.0 (L) 10.0 - 25.0 mmol/L    Patient Temperature 37.0 degrees Celsius    FiO2 40 %   Blood Gas Venous Full Panel Unsolicited   Result Value Ref Range    POCT pH, Venous      POCT pCO2, Venous      POCT pO2, Venous      POCT SO2, Venous      POCT Oxy Hemoglobin, Venous      POCT Hematocrit Calculated, Venous      POCT Sodium, Venous      POCT Potassium, Venous 3.8 3.5 - 5.3 mmol/L    POCT Chloride, Venous      POCT Ionized Calicum, Venous      POCT  Glucose, Venous      POCT Lactate, Venous 1.6 0.4 - 2.0 mmol/L    POCT Base Excess, Venous      POCT HCO3 Calculated, Venous 28.5 (H) 22.0 - 26.0 mmol/L    POCT Hemoglobin, Venous      POCT Anion Gap, Venous      Patient Temperature      FiO2     Prepare RBC: 2 Units   Result Value Ref Range    PRODUCT CODE N2526H52     Unit Number [id number]     Unit ABO A     Unit RH POS     XM INTEP COMP     Dispense Status XM     Blood Expiration Date 3/24/2025 11:59:00 PM EDT     PRODUCT BLOOD TYPE 6200     UNIT VOLUME 350     PRODUCT CODE J7226M59     Unit Number [id number]     Unit ABO A     Unit RH POS     XM INTEP COMP     Dispense Status XM     Blood Expiration Date 4/1/2025 11:59:00 PM EDT     PRODUCT BLOOD TYPE 6200     UNIT VOLUME 350    CBC   Result Value Ref Range    WBC 15.9 (H) 4.4 - 11.3 x10*3/uL    nRBC 2.1 (H) 0.0 - 0.0 /100 WBCs    RBC 2.77 (L) 4.00 - 5.20 x10*6/uL    Hemoglobin 7.9 (L) 12.0 - 16.0 g/dL    Hematocrit 23.3 (L) 36.0 - 46.0 %    MCV 84 80 - 100 fL    MCH 28.5 26.0 - 34.0 pg    MCHC 33.9 32.0 - 36.0 g/dL    RDW 17.1 (H) 11.5 - 14.5 %    Platelets 157 150 - 450 x10*3/uL   Protime-INR   Result Value Ref Range    Protime 12.0 9.8 - 12.4 seconds    INR 1.1 0.9 - 1.1   Fibrinogen   Result Value Ref Range    Fibrinogen 174 (L) 200 - 400 mg/dL   POCT GLUCOSE   Result Value Ref Range    POCT Glucose 129 (H) 74 - 99 mg/dL   CBC   Result Value Ref Range    WBC 13.5 (H) 4.4 - 11.3 x10*3/uL    nRBC 3.5 (H) 0.0 - 0.0 /100 WBCs    RBC 2.54 (L) 4.00 - 5.20 x10*6/uL    Hemoglobin 7.1 (L) 12.0 - 16.0 g/dL    Hematocrit 21.7 (L) 36.0 - 46.0 %    MCV 85 80 - 100 fL    MCH 28.0 26.0 - 34.0 pg    MCHC 32.7 32.0 - 36.0 g/dL    RDW 17.3 (H) 11.5 - 14.5 %    Platelets 161 150 - 450 x10*3/uL   Protime-INR   Result Value Ref Range    Protime 11.4 9.8 - 12.4 seconds    INR 1.0 0.9 - 1.1   Lavender Top   Result Value Ref Range    Extra Tube Hold for add-ons.    SST TOP   Result Value Ref Range    Extra Tube Hold for  add-ons.    Magnesium   Result Value Ref Range    Magnesium 1.93 1.60 - 2.40 mg/dL   Comprehensive Metabolic Panel   Result Value Ref Range    Glucose 116 (H) 74 - 99 mg/dL    Sodium 141 136 - 145 mmol/L    Potassium 4.0 3.5 - 5.3 mmol/L    Chloride 106 98 - 107 mmol/L    Bicarbonate 30 21 - 32 mmol/L    Anion Gap 9 (L) 10 - 20 mmol/L    Urea Nitrogen 46 (H) 6 - 23 mg/dL    Creatinine 1.02 0.50 - 1.05 mg/dL    eGFR 65 >60 mL/min/1.73m*2    Calcium 7.8 (L) 8.6 - 10.3 mg/dL    Albumin 2.7 (L) 3.4 - 5.0 g/dL    Alkaline Phosphatase 33 33 - 110 U/L    Total Protein 4.4 (L) 6.4 - 8.2 g/dL    AST 39 9 - 39 U/L    Bilirubin, Total 0.6 0.0 - 1.2 mg/dL    ALT 18 7 - 45 U/L   POCT GLUCOSE   Result Value Ref Range    POCT Glucose 111 (H) 74 - 99 mg/dL   CBC   Result Value Ref Range    WBC 13.8 (H) 4.4 - 11.3 x10*3/uL    nRBC 3.4 (H) 0.0 - 0.0 /100 WBCs    RBC 2.30 (L) 4.00 - 5.20 x10*6/uL    Hemoglobin 6.4 (LL) 12.0 - 16.0 g/dL    Hematocrit 19.9 (L) 36.0 - 46.0 %    MCV 87 80 - 100 fL    MCH 27.8 26.0 - 34.0 pg    MCHC 32.2 32.0 - 36.0 g/dL    RDW 17.7 (H) 11.5 - 14.5 %    Platelets 175 150 - 450 x10*3/uL       Results from last 7 days   Lab Units 03/04/25  0610 02/27/25  0618 02/26/25  1426   PROTEIN TOTAL g/dL 4.4*   < > 5.8*   BILIRUBIN TOTAL mg/dL 0.6   < > 0.3   ALK PHOS U/L 33   < > 72   ALT U/L 18   < > 28   AST U/L 39   < > 65*   GLUCOSE mg/dL 116*   < > 107*   TSH mIU/L  --   --  0.40*    < > = values in this interval not displayed.       Assessment/Plan    PMH  1.  Mitral regurgitation status post mitral valve replacement with a #25/33 [place] mechanical mitral valve October 2020  2.  Paroxysmal atrial flutter status post ablation October 2020  3.  CAD.  Bypass x 3 vessels LIMA to the LAD free radial artery graft to the marginal SVG to the RCA in 2020.  Last stress test 4/16/2024 no evidence of ischemia EF 51%  4.  COPD she continues to smoke  5.  Cardiomyopathy postoperative ejection fraction was 40%.  This was up to  50% on her last stress test  6.  Hyperlipidemia  7.  Hypertension  8.  Tobacco abuse  9.  Moderate to severe pulmonary hypertension in the past    3/4/2025  1.  Paroxysmal atrial fibrillation.  Previous history of atrial flutter status post ablation in 2020.  Currently sinus tachycardia.  Increase metoprolol to tartrate to 3 times daily for better rate control.  This was likely physiologically driven because of the stress of the situation.  Anticoagulation will need to be given regardless because of her mechanical mitral valve replacement.  2.  CAD.  Stable.  No symptoms of angina.  3.  Mechanical mitral valve replacement.  This is a difficult situation given her spontaneous left arm hematoma leading to compartment syndrome and iliopsoas spontaneous hematoma on the left side.  Agree with low-dose heparin.  Aspirin currently on hold.  Coumadin will need to be restarted along with an aspirin when stable.  4.  Respiratory failure.  Slow improvement.  Unfortunately she continues to smoke and has underlying lung disease.  5.  Anemia.  She will require transfusion.    Thank you for this consult.  I will follow along with you      Brittney Lomeli MD

## 2025-03-04 NOTE — ANESTHESIA PROCEDURE NOTES
Airway  Date/Time: 3/4/2025 1:40 PM  Urgency: elective    Airway not difficult    Staffing  Performed: SRNA   Authorized by: Alex Cool MD    Performed by: NADYA Beaulieu-TRE  Patient location during procedure: OR    Indications and Patient Condition  Indications for airway management: anesthesia  Spontaneous Ventilation: absent  Sedation level: deep  Preoxygenated: yes  Patient position: sniffing  MILS maintained throughout  Mask difficulty assessment: 1 - vent by mask  Planned trial extubation    Final Airway Details  Final airway type: endotracheal airway      Successful airway: ETT  Cuffed: yes   Successful intubation technique: video laryngoscopy (Elective Jacobo)  Facilitating devices/methods: intubating stylet  Endotracheal tube insertion site: oral  Blade: Tito  Blade size: #4  ETT size (mm): 7.5  Cormack-Lehane Classification: grade I - full view of glottis  Placement verified by: chest auscultation and capnometry   Cuff volume (mL): 8  Measured from: lips  ETT to lips (cm): 20  Number of attempts at approach: 1  Ventilation between attempts: none  Number of other approaches attempted: 0

## 2025-03-04 NOTE — CONSULTS
"Nutrition Follow Up Assessment:   Nutrition Assessment         Patient is a 54 y.o. female presenting with nausea; vomiting      Nutrition History:  Food and Nutrient History: Pt developed compartment syndrome and had a faciotomy on her hand.  She was still very sleepy today       Anthropometrics:  Height: 160 cm (5' 2.99\")   Weight: 66.6 kg (146 lb 13.2 oz)   BMI (Calculated): 26.02  IBW/kg (Dietitian Calculated): 52 kg  Percent of IBW: 113 %       Weight History:   Wt Readings from Last 10 Encounters:   03/01/25 66.6 kg (146 lb 13.2 oz)   09/27/24 60.8 kg (134 lb)   08/12/24 59 kg (130 lb)   07/24/24 61 kg (134 lb 6.4 oz)   05/31/24 61.7 kg (136 lb)   05/23/24 63 kg (139 lb)   03/22/24 63.5 kg (140 lb)   09/22/23 62.1 kg (137 lb)   05/08/23 61.7 kg (136 lb)   03/13/23 62.6 kg (138 lb)         Weight Change %:  Weight History / % Weight Change: pt is up 16 lbs since admit  Significant Weight Loss: Yes  Significant Weight Gain: Fluid related    Nutrition Focused Physical Exam Findings:    Subcutaneous Fat Loss:   Defer Subcutaneous Fat Loss Assessment: Defer all  Defer All Reason: not a good time  Muscle Wasting:  Defer Muscle Wasting Assessment: Defer all  Defer All Reason: not a good time  Edema:  Edema Location: some edema  Physical Findings:  Skin: Positive (surgery wound)    Nutrition Significant Labs:  BMP Trend:   Results from last 7 days   Lab Units 03/03/25  0658 03/02/25  2044 03/02/25  0959 03/01/25  0400   GLUCOSE mg/dL 134* 157* 181* 122*   CALCIUM mg/dL 7.8* 8.0* 8.3* 8.6   SODIUM mmol/L 137 138 138 142   POTASSIUM mmol/L 4.3 3.9 3.6 3.6   CO2 mmol/L 31 27 24 30   CHLORIDE mmol/L 102 100 100 105   BUN mg/dL 47* 40* 35* 21   CREATININE mg/dL 1.65* 1.51* 1.63* 0.73        Nutrition Specific Medications:  Lipitor; solu cortef; protonix; zofran    I/O:   Last BM Date: 03/02/25; Stool Appearance: Loose, Liquid (03/02/25 2000)    Dietary Orders (From admission, onward)       Start     Ordered    03/04/25 " 0001  NPO Diet Except: Other (specify); Additional Details: Clear liquids until 0500. May have clears up to 2 hours prior to procedure if exact time is known.; Effective midnight  Diet effective midnight        Question Answer Comment   Except: Other (specify)    Additional Details Clear liquids until 0500. May have clears up to 2 hours prior to procedure if exact time is known.        03/03/25 1347    03/02/25 0948  NPO Diet; Effective now  Diet effective now         03/02/25 0949    02/27/25 2032  Oral nutritional supplements  Until discontinued        Question Answer Comment   Deliver with All meals    Select supplement: Ensure Clear        02/27/25 2031 02/26/25 1213  May Participate in Room Service  ( ROOM SERVICE MAY PARTICIPATE)  Once        Question:  .  Answer:  Yes    02/26/25 1212                     Estimated Needs:      Method for Estimating Needs: 4365-3646  26-30 génesis kg of IBW     Method for Estimating 24 Hour Protein Needs: 52-68  1-1.3 gm kg of IBW     Method for Estimating 24 Hour Fluid Needs: 8743-2915  20-30 ml kg of IBW as medically indicated  Patient on Order Fluid Restriction: No        Nutrition Diagnosis   Malnutrition Diagnosis  Patient has Malnutrition Diagnosis: No    Nutrition Diagnosis  Patient has Nutrition Diagnosis: Yes  Diagnosis Status (1): Active  Nutrition Diagnosis 1: Inadequate oral intake  Related to (1): decreased ability to consume sufficient energy  As Evidenced by (1): Pt was still sleeping post op faciotomy  Additional Nutrition Diagnosis: Diagnosis 2  Diagnosis Status (2): New  Nutrition Diagnosis 2: Increased nutrient needs  Related to (2): physiological causes  As Evidenced by (2): surgery wound healing needs       Nutrition Interventions/Recommendations        Nutrition Recommendations:  Individualized Nutrition Prescription Provided for : will follow clinical course for any nutrition intervention needs    Nutrition Interventions/Goals:   Interventions: No  interventions at this time  Goal: wake up post op and take PO  Coordination of Care with Providers: Nursing, Provider      Education Documentation  No documentation found.     Not at this time       Nutrition Monitoring and Evaluation   Food/Nutrient Related History Monitoring  Monitoring and Evaluation Plan: Fluid intake  Fluid Intake:  (adequate fluid intake without fluid overload)    Anthropometric Measurements  Monitoring and Evaluation Plan: Body weight    Biochemical Data, Medical Tests and Procedures  Monitoring and Evaluation Plan: Electrolyte/renal panel, Glucose/endocrine profile  Criteria: stable BMP  Criteria: glucose within desired range              Time Spent (min): 30 minutes

## 2025-03-04 NOTE — ANESTHESIA PREPROCEDURE EVALUATION
Patient: Kayley Lynch    Procedure Information       Date/Time: 03/04/25 8570    Procedure: LEFT UPPER ARM FASCIOTOMY, DEBRIDEMENT AND POSSIBLE CLOSURE (Left: Arm Upper)    Location: [facility] / [facility]    Surgeons: Elijah Licea MD            Relevant Problems   Cardiac   (+) Atrial flutter (Multi)   (+) CAD (coronary artery disease)   (+) Hypercholesterolemia   (+) Mitral regurgitation      Pulmonary   (+) Dyspnea on exertion   (+) Pulmonary hypertension (Multi)      Neuro   (+) Current mild episode of major depressive disorder without prior episode ([facility])      ID   (+) Influenza A with pneumonia       Clinical information reviewed:   Tobacco  Allergies  Meds  Problems  Med Hx  Surg Hx   Fam Hx          NPO Detail:  No data recorded     Physical Exam    Airway  Mallampati: II  TM distance: >3 FB  Neck ROM: full     Cardiovascular - normal exam  Rhythm: regular  Rate: normal     Dental - normal exam     Pulmonary   Breath sounds clear to auscultation  (+) decreased breath sounds     Abdominal      Other findings: Patient on 3L nc          Anesthesia Plan    History of general anesthesia?: yes  History of complications of general anesthesia?: no    ASA 4     general   (CVP placement. Possible postop ventilation and A-line placement.)  The patient is a current smoker.  Patient was previously instructed to abstain from smoking on day of procedure.  Patient did not smoke on day of procedure.    intravenous induction   Postoperative administration of opioids is intended.  Trial extubation is planned.  Anesthetic plan and risks discussed with patient.  Use of blood products discussed with patient who consented to blood products.    Plan discussed with CAA.

## 2025-03-05 ENCOUNTER — APPOINTMENT (OUTPATIENT)
Dept: RADIOLOGY | Facility: HOSPITAL | Age: 55
DRG: 853 | End: 2025-03-05
Payer: COMMERCIAL

## 2025-03-05 LAB
ABO GROUP (TYPE) IN BLOOD: NORMAL
ALBUMIN SERPL BCP-MCNC: 2.9 G/DL (ref 3.4–5)
ALP SERPL-CCNC: 37 U/L (ref 33–110)
ALT SERPL W P-5'-P-CCNC: 18 U/L (ref 7–45)
ANION GAP SERPL CALC-SCNC: 8 MMOL/L (ref 10–20)
ANTIBODY SCREEN: NORMAL
APTT PPP: 25 SECONDS (ref 26–36)
AST SERPL W P-5'-P-CCNC: 31 U/L (ref 9–39)
BILIRUB SERPL-MCNC: 0.6 MG/DL (ref 0–1.2)
BUN SERPL-MCNC: 27 MG/DL (ref 6–23)
CALCIUM SERPL-MCNC: 8.3 MG/DL (ref 8.6–10.3)
CHLORIDE SERPL-SCNC: 109 MMOL/L (ref 98–107)
CO2 SERPL-SCNC: 33 MMOL/L (ref 21–32)
CREAT SERPL-MCNC: 0.63 MG/DL (ref 0.5–1.05)
EGFRCR SERPLBLD CKD-EPI 2021: >90 ML/MIN/1.73M*2
ERYTHROCYTE [DISTWIDTH] IN BLOOD BY AUTOMATED COUNT: 19.2 % (ref 11.5–14.5)
ERYTHROCYTE [DISTWIDTH] IN BLOOD BY AUTOMATED COUNT: 19.5 % (ref 11.5–14.5)
ERYTHROCYTE [DISTWIDTH] IN BLOOD BY AUTOMATED COUNT: 19.6 % (ref 11.5–14.5)
FIBRINOGEN PPP-MCNC: 232 MG/DL (ref 200–400)
GLUCOSE BLD MANUAL STRIP-MCNC: 107 MG/DL (ref 74–99)
GLUCOSE BLD MANUAL STRIP-MCNC: 122 MG/DL (ref 74–99)
GLUCOSE SERPL-MCNC: 112 MG/DL (ref 74–99)
HCT VFR BLD AUTO: 20.3 % (ref 36–46)
HCT VFR BLD AUTO: 23 % (ref 36–46)
HCT VFR BLD AUTO: 23.4 % (ref 36–46)
HGB BLD-MCNC: 6.6 G/DL (ref 12–16)
HGB BLD-MCNC: 7.3 G/DL (ref 12–16)
HGB BLD-MCNC: 7.6 G/DL (ref 12–16)
INR PPP: 1.3 (ref 0.9–1.1)
INR PPP: 1.3 (ref 0.9–1.1)
MAGNESIUM SERPL-MCNC: 2.07 MG/DL (ref 1.6–2.4)
MCH RBC QN AUTO: 26.9 PG (ref 26–34)
MCH RBC QN AUTO: 27.3 PG (ref 26–34)
MCH RBC QN AUTO: 27.7 PG (ref 26–34)
MCHC RBC AUTO-ENTMCNC: 31.7 G/DL (ref 32–36)
MCHC RBC AUTO-ENTMCNC: 32.5 G/DL (ref 32–36)
MCHC RBC AUTO-ENTMCNC: 32.5 G/DL (ref 32–36)
MCV RBC AUTO: 84 FL (ref 80–100)
MCV RBC AUTO: 85 FL (ref 80–100)
MCV RBC AUTO: 85 FL (ref 80–100)
NRBC BLD-RTO: 2.4 /100 WBCS (ref 0–0)
NRBC BLD-RTO: 2.7 /100 WBCS (ref 0–0)
NRBC BLD-RTO: 3.1 /100 WBCS (ref 0–0)
PLATELET # BLD AUTO: 152 X10*3/UL (ref 150–450)
PLATELET # BLD AUTO: 154 X10*3/UL (ref 150–450)
PLATELET # BLD AUTO: 157 X10*3/UL (ref 150–450)
POTASSIUM SERPL-SCNC: 4 MMOL/L (ref 3.5–5.3)
PROT SERPL-MCNC: 4.9 G/DL (ref 6.4–8.2)
PROTHROMBIN TIME: 14 SECONDS (ref 9.8–12.4)
PROTHROMBIN TIME: 14.4 SECONDS (ref 9.8–12.4)
RBC # BLD AUTO: 2.38 X10*6/UL (ref 4–5.2)
RBC # BLD AUTO: 2.71 X10*6/UL (ref 4–5.2)
RBC # BLD AUTO: 2.78 X10*6/UL (ref 4–5.2)
RH FACTOR (ANTIGEN D): NORMAL
SODIUM SERPL-SCNC: 146 MMOL/L (ref 136–145)
UFH PPP CHRO-ACNC: 0.4 IU/ML
WBC # BLD AUTO: 12.7 X10*3/UL (ref 4.4–11.3)
WBC # BLD AUTO: 12.9 X10*3/UL (ref 4.4–11.3)
WBC # BLD AUTO: 14.4 X10*3/UL (ref 4.4–11.3)

## 2025-03-05 PROCEDURE — P9016 RBC LEUKOCYTES REDUCED: HCPCS

## 2025-03-05 PROCEDURE — 82947 ASSAY GLUCOSE BLOOD QUANT: CPT

## 2025-03-05 PROCEDURE — 83735 ASSAY OF MAGNESIUM: CPT

## 2025-03-05 PROCEDURE — 92610 EVALUATE SWALLOWING FUNCTION: CPT | Mod: GN | Performed by: SPEECH-LANGUAGE PATHOLOGIST

## 2025-03-05 PROCEDURE — 94640 AIRWAY INHALATION TREATMENT: CPT

## 2025-03-05 PROCEDURE — 99291 CRITICAL CARE FIRST HOUR: CPT

## 2025-03-05 PROCEDURE — 99233 SBSQ HOSP IP/OBS HIGH 50: CPT

## 2025-03-05 PROCEDURE — 2500000002 HC RX 250 W HCPCS SELF ADMINISTERED DRUGS (ALT 637 FOR MEDICARE OP, ALT 636 FOR OP/ED)

## 2025-03-05 PROCEDURE — 97165 OT EVAL LOW COMPLEX 30 MIN: CPT | Mod: GO

## 2025-03-05 PROCEDURE — 80053 COMPREHEN METABOLIC PANEL: CPT

## 2025-03-05 PROCEDURE — 85610 PROTHROMBIN TIME: CPT | Performed by: HOSPITALIST

## 2025-03-05 PROCEDURE — 2500000004 HC RX 250 GENERAL PHARMACY W/ HCPCS (ALT 636 FOR OP/ED)

## 2025-03-05 PROCEDURE — 37799 UNLISTED PX VASCULAR SURGERY: CPT

## 2025-03-05 PROCEDURE — 2500000005 HC RX 250 GENERAL PHARMACY W/O HCPCS

## 2025-03-05 PROCEDURE — 85018 HEMOGLOBIN: CPT

## 2025-03-05 PROCEDURE — 85730 THROMBOPLASTIN TIME PARTIAL: CPT | Performed by: HOSPITALIST

## 2025-03-05 PROCEDURE — 85610 PROTHROMBIN TIME: CPT

## 2025-03-05 PROCEDURE — 2550000001 HC RX 255 CONTRASTS: Performed by: HOSPITALIST

## 2025-03-05 PROCEDURE — 74177 CT ABD & PELVIS W/CONTRAST: CPT

## 2025-03-05 PROCEDURE — 99233 SBSQ HOSP IP/OBS HIGH 50: CPT | Performed by: INTERNAL MEDICINE

## 2025-03-05 PROCEDURE — 36430 TRANSFUSION BLD/BLD COMPNT: CPT

## 2025-03-05 PROCEDURE — 86901 BLOOD TYPING SEROLOGIC RH(D): CPT

## 2025-03-05 PROCEDURE — 74177 CT ABD & PELVIS W/CONTRAST: CPT | Performed by: RADIOLOGY

## 2025-03-05 PROCEDURE — 2500000004 HC RX 250 GENERAL PHARMACY W/ HCPCS (ALT 636 FOR OP/ED): Performed by: HOSPITALIST

## 2025-03-05 PROCEDURE — 2500000001 HC RX 250 WO HCPCS SELF ADMINISTERED DRUGS (ALT 637 FOR MEDICARE OP)

## 2025-03-05 PROCEDURE — 94660 CPAP INITIATION&MGMT: CPT

## 2025-03-05 PROCEDURE — 85027 COMPLETE CBC AUTOMATED: CPT

## 2025-03-05 PROCEDURE — 97161 PT EVAL LOW COMPLEX 20 MIN: CPT | Mod: GP

## 2025-03-05 PROCEDURE — 2020000001 HC ICU ROOM DAILY

## 2025-03-05 PROCEDURE — 85520 HEPARIN ASSAY: CPT | Performed by: HOSPITALIST

## 2025-03-05 PROCEDURE — 85384 FIBRINOGEN ACTIVITY: CPT

## 2025-03-05 RX ORDER — METOPROLOL TARTRATE 1 MG/ML
5 INJECTION, SOLUTION INTRAVENOUS ONCE
Status: COMPLETED | OUTPATIENT
Start: 2025-03-05 | End: 2025-03-05

## 2025-03-05 RX ORDER — PANTOPRAZOLE SODIUM 40 MG/1
40 TABLET, DELAYED RELEASE ORAL DAILY
Status: DISCONTINUED | OUTPATIENT
Start: 2025-03-06 | End: 2025-03-18 | Stop reason: HOSPADM

## 2025-03-05 RX ORDER — HEPARIN SODIUM 10000 [USP'U]/100ML
0-4000 INJECTION, SOLUTION INTRAVENOUS CONTINUOUS
Status: DISCONTINUED | OUTPATIENT
Start: 2025-03-05 | End: 2025-03-18 | Stop reason: HOSPADM

## 2025-03-05 RX ORDER — DEXTROSE MONOHYDRATE 50 MG/ML
83 INJECTION, SOLUTION INTRAVENOUS CONTINUOUS
Status: ACTIVE | OUTPATIENT
Start: 2025-03-05 | End: 2025-03-05

## 2025-03-05 RX ADMIN — DEXTROSE MONOHYDRATE 83 ML/HR: 50 INJECTION, SOLUTION INTRAVENOUS at 18:34

## 2025-03-05 RX ADMIN — BUDESONIDE 0.5 MG: 0.5 INHALANT ORAL at 18:29

## 2025-03-05 RX ADMIN — ACETAMINOPHEN 650 MG: 325 TABLET, FILM COATED ORAL at 12:25

## 2025-03-05 RX ADMIN — METOPROLOL TARTRATE 25 MG: 25 TABLET, FILM COATED ORAL at 08:34

## 2025-03-05 RX ADMIN — BUDESONIDE 0.5 MG: 0.5 INHALANT ORAL at 06:32

## 2025-03-05 RX ADMIN — ATORVASTATIN CALCIUM 80 MG: 80 TABLET, FILM COATED ORAL at 20:25

## 2025-03-05 RX ADMIN — IPRATROPIUM BROMIDE AND ALBUTEROL SULFATE 3 ML: .5; 3 SOLUTION RESPIRATORY (INHALATION) at 18:29

## 2025-03-05 RX ADMIN — IPRATROPIUM BROMIDE AND ALBUTEROL SULFATE 3 ML: .5; 3 SOLUTION RESPIRATORY (INHALATION) at 06:32

## 2025-03-05 RX ADMIN — DOXYCYCLINE 100 MG: 100 INJECTION, POWDER, LYOPHILIZED, FOR SOLUTION INTRAVENOUS at 10:56

## 2025-03-05 RX ADMIN — VANCOMYCIN HYDROCHLORIDE 250 MG: KIT at 20:25

## 2025-03-05 RX ADMIN — METOPROLOL TARTRATE 25 MG: 25 TABLET, FILM COATED ORAL at 20:25

## 2025-03-05 RX ADMIN — Medication 4 L/MIN: at 07:30

## 2025-03-05 RX ADMIN — METOPROLOL TARTRATE 25 MG: 25 TABLET, FILM COATED ORAL at 15:20

## 2025-03-05 RX ADMIN — VANCOMYCIN HYDROCHLORIDE 250 MG: KIT at 17:31

## 2025-03-05 RX ADMIN — Medication 5 L/MIN: at 20:00

## 2025-03-05 RX ADMIN — IPRATROPIUM BROMIDE AND ALBUTEROL SULFATE 3 ML: .5; 3 SOLUTION RESPIRATORY (INHALATION) at 00:52

## 2025-03-05 RX ADMIN — METOPROLOL TARTRATE 5 MG: 5 INJECTION INTRAVENOUS at 03:20

## 2025-03-05 RX ADMIN — IOHEXOL 75 ML: 350 INJECTION, SOLUTION INTRAVENOUS at 23:31

## 2025-03-05 RX ADMIN — HEPARIN SODIUM 800 UNITS/HR: 10000 INJECTION, SOLUTION INTRAVENOUS at 08:36

## 2025-03-05 RX ADMIN — IPRATROPIUM BROMIDE AND ALBUTEROL SULFATE 3 ML: .5; 3 SOLUTION RESPIRATORY (INHALATION) at 13:30

## 2025-03-05 RX ADMIN — PANTOPRAZOLE SODIUM 40 MG: 40 INJECTION, POWDER, FOR SOLUTION INTRAVENOUS at 08:33

## 2025-03-05 RX ADMIN — VANCOMYCIN HYDROCHLORIDE 250 MG: KIT at 08:33

## 2025-03-05 RX ADMIN — DEXTROSE MONOHYDRATE 83 ML/HR: 50 INJECTION, SOLUTION INTRAVENOUS at 11:30

## 2025-03-05 RX ADMIN — VANCOMYCIN HYDROCHLORIDE 250 MG: KIT at 13:55

## 2025-03-05 ASSESSMENT — PAIN SCALES - GENERAL
PAINLEVEL_OUTOF10: 0 - NO PAIN
PAINLEVEL_OUTOF10: 9
PAINLEVEL_OUTOF10: 0 - NO PAIN
PAINLEVEL_OUTOF10: 0 - NO PAIN

## 2025-03-05 ASSESSMENT — COGNITIVE AND FUNCTIONAL STATUS - GENERAL
WALKING IN HOSPITAL ROOM: TOTAL
CLIMB 3 TO 5 STEPS WITH RAILING: TOTAL
HELP NEEDED FOR BATHING: TOTAL
STANDING UP FROM CHAIR USING ARMS: TOTAL
MOVING FROM LYING ON BACK TO SITTING ON SIDE OF FLAT BED WITH BEDRAILS: A LOT
MOVING TO AND FROM BED TO CHAIR: TOTAL
DRESSING REGULAR LOWER BODY CLOTHING: TOTAL
TURNING FROM BACK TO SIDE WHILE IN FLAT BAD: TOTAL
EATING MEALS: TOTAL
MOBILITY SCORE: 7
DAILY ACTIVITIY SCORE: 6
TOILETING: TOTAL
PERSONAL GROOMING: TOTAL
DRESSING REGULAR UPPER BODY CLOTHING: TOTAL

## 2025-03-05 ASSESSMENT — ACTIVITIES OF DAILY LIVING (ADL): ADL_ASSISTANCE: INDEPENDENT

## 2025-03-05 NOTE — PROGRESS NOTES
Cardiology Progress Note      Kayley Lynch is a 54 y.o. female on day 7 of admission presenting with Nausea vomiting and diarrhea.      Subjective   Patient was seen at the bedside.  The case was discussed with her son who is a pacemaker representative.  She is feeling better.  Less shortness of breath.  On nasal cannula.  She was taken back to the OR for debridement of her arm.     ROS:  10 systems reviewed other than what is mentioned above.     MEDICATION:    Scheduled medications  [Held by provider] aspirin, 81 mg, oral, Daily  atorvastatin, 80 mg, oral, Nightly  budesonide, 0.5 mg, nebulization, BID  buPROPion XL, 150 mg, oral, q AM  doxycycline, 100 mg, intravenous, q12h  escitalopram, 20 mg, oral, Daily  insulin lispro, 0-5 Units, subcutaneous, TID AC  ipratropium-albuteroL, 3 mL, nebulization, q6h  metoprolol tartrate, 25 mg, oral, TID  oxygen, , inhalation, Continuous - Inhalation  pantoprazole, 40 mg, intravenous, Daily  perflutren lipid microspheres, 0.5-10 mL of dilution, intravenous, Once in imaging  perflutren protein A microsphere, 0.5 mL, intravenous, Once in imaging  sulfur hexafluoride microsphr, 2 mL, intravenous, Once in imaging  vancomycin, 250 mg, oral, 4x daily  [Held by provider] warfarin, 2 mg, oral, Daily      Continuous medications  heparin, 0-4,000 Units/hr, Last Rate: 800 Units/hr (03/05/25 0836)  lactated Ringer's, 100 mL/hr, Last Rate: 100 mL/hr (03/04/25 2302)      PRN medications  PRN medications: acetaminophen, ipratropium-albuteroL, melatonin, metoprolol, morphine, ondansetron, promethazine     Assessment & Plan  Nausea vomiting and diarrhea    Influenza A with pneumonia    Nontraumatic compartment syndrome of left upper extremity      OBJECTIVE:    Visit Vitals  /73   Pulse 104   Temp 37.1 °C (98.8 °F) (Temporal)   Resp 15          Intake/Output Summary (Last 24 hours) at 3/5/2025 0904  Last data filed at 3/5/2025 0600  Gross per 24 hour   Intake 2731 ml   Output 2585 ml    Net 146 ml      Patient is alert and oriented x3.  HEENT is unremarkable mucous members are moist  Neck no JVP no bruits upstrokes are full no thyromegaly  Lungs are clear bilaterally.  No wheezing crackles or rales  Heart regular rhythm tachycardic normal S1-S2 there is no S3 crisp prosthetic mitral valve click  Abdomen is soft bs are positive nontender nondistended no organomegaly no pulsatile masses  Extremities have no edema.  Left arm is bandaged distal pulses present palpable.  Neuro is grossly nonfocal  Skin has no rashes     Image Results  XR chest 1 view  Narrative: Interpreted By:  Danny Coffman,   STUDY:  XR CHEST 1 VIEW; 3/4/2025 4:21 pm      INDICATION:  Signs/Symptoms:post central line placement.      COMPARISON:  CT pulmonary angiography 03/04/2025.      ACCESSION NUMBER(S):  [id number]      ORDERING CLINICIAN:  NIKKI YATES      TECHNIQUE:  1 view of the chest was performed.      FINDINGS:  Left-greater-than-right pleural effusion with surrounding  atelectasis. Stable right midlung ill-defined infiltrate.. No  pneumothorax.  The cardiomediastinal silhouette is stable. Previous  median sternotomy. Right central venous catheter terminating in the  lower SVC.      Impression: 1. Right central venous catheter terminating in the lower SVC with no  sizable pneumothorax.  2. Stable left-greater-than-right small pleural effusion with  surrounding atelectasis as well as right midlung ill-defined  infiltrate.      Signed by: Danny Coffman 3/4/2025 5:38 PM  Dictation workstation:   [id number]  CT angio chest abdomen pelvis  Narrative: Interpreted By:  Donald Garcia,   STUDY:  CT ANGIO CHEST ABDOMEN PELVIS;  3/4/2025 1:22 pm      INDICATION:  Signs/Symptoms:Retroperitoneal bleed.      COMPARISON:  March 3, 2025      ACCESSION NUMBER(S):  [id number]      ORDERING CLINICIAN:  NIKKI YATES      TECHNIQUE:  Axial non-contrast images of the chest, abdomen, and pelvis with  coronal and sagittal reformatted  images. Axial CT images of the  chest, abdomen and pelvis after the intravenous administration of 100  mL of Omnipaque 350 using angiographic technique with coronal and  sagittal reformatted images.  MIP images were provided and reviewed.  3D reconstructions were created on a separate independent workstation  and reviewed.      FINDINGS:  VASCULATURE:      PULMONARY ARTERIES: Limited, there is no large central filling defect  to suggest a pulmonary embolus.      THORACIC AORTA: Non-contrast images show no evidence of acute  intramural hematoma. No thoracic aortic aneurysm or dissection. No  significant thoracic aortic atherosclerosis.      ABDOMINAL AORTA: No abdominal aortic aneurysm or dissection. Minor  partially calcified abdominal aortic atherosclerosis.      ABDOMINAL AND PELVIC ARTERIES: Celiac artery, SMA, bilateral renal  arteries, and KENISHA are patent. Pelvic vascularity is tortuous and  contains diffuse partially calcified atherosclerotic disease, this  results in mild stenosis of the right common iliac artery and  moderate stenosis of the mid left common iliac artery. Bilateral  internal iliac artery demonstrates mild-to-moderate stenosis. The  external and common femoral arteries are patent.          CT CHEST:      MEDIASTINUM AND LYMPH NODES: No enlarged intrathoracic or axillary  lymph nodes. No pneumomediastinum.      HEART: The heart is enlarged. 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 There  is prosthetic mitral valve. Coronary artery calcifications are  evident. No significant pericardial effusion.      LUNG, PLEURA, LARGE AIRWAYS: Limited by respiratory motion artifact.  Stable appearance of severe emphysema and patchy densities in the  posterior aspect of both upper lobes and left lower lobe, with a  trace left pleural effusion. Central airways are patent.      OSSEOUS STRUCTURES/CHEST WALL: There are median sternotomy wires.  There are degenerative changes of the spine. No acute osseous  abnormality.           CT ABDOMEN/PELVIS:      LIVER: No significant parenchymal abnormality.      BILE DUCTS: No significant intrahepatic or extrahepatic dilatation.      GALLBLADDER: Stable mild distention of the gallbladder.      PANCREAS: No significant abnormality.      SPLEEN: No significant abnormality.      ADRENALS: No significant abnormality.      KIDNEYS, URETERS, BLADDER: No urolithiasis or hydroureteronephrosis.  Jasso catheter balloon within a collapsed urinary bladder containing  gas.      REPRODUCTIVE ORGANS: No significant abnormality.      VESSELS: (See above). No additional significant abnormality.      PERITONEUM/RETROPERITONEUM/LYMPH NODES: Stranding again seen within  the left retroperitoneum similar in appearance to the prior  examination, consistent with hemorrhage. There is heterogeneity and  prominence of the left psoas and iliacus muscles similar to the prior  exam. Blood products are seen to course along the left hemipelvis and  extending along the posterior aspect of the rectum. There is also  mild heterogeneity of the right iliacus muscle. No lymphadenopathy.  There is no free air. Stable trace amount of free fluid in the pelvis.      BOWEL/MESENTERY/PERITONEUM: Rectal tube in place. There is colonic  diverticulosis without diverticulitis. There is no evidence of  obstruction. Normal appendix.      ABDOMINAL WALL: Stable appearance of subcutaneous stranding along the  left lateral flank with overlying skin thickening.      OSSEOUS STRUCTURES: No acute osseous abnormality.      Impression: No thoracic or abdominal aortic aneurysm or acute aortic pathology.      Stable asymmetric enlargement and heterogeneity of the left psoas and  both iliacus muscles. There is also stable amount of left  retroperitoneal hemorrhage that extends down the left hemipelvis and  surrounding the rectum. There is no evidence of active contrast  extravasation.      Severe emphysema with stable infiltrates and/or atelectasis in  the  posterior aspect of the upper lobes and left lower lobe.      Unchanged position of the Jasso catheter and rectal tube.      Signed by: Donald Garcia 3/4/2025 3:24 PM  Dictation workstation:   [id number]        Relevant Results:  RESULTS:    Lab Results   Component Value Date    WBC 14.4 (H) 03/05/2025    HGB 7.3 (L) 03/05/2025    HCT 23.0 (L) 03/05/2025    MCV 85 03/05/2025     03/05/2025        Lab Results   Component Value Date    CREATININE 0.63 03/05/2025    BUN 27 (H) 03/05/2025     (H) 03/05/2025    K 4.0 03/05/2025     (H) 03/05/2025    CO2 33 (H) 03/05/2025        Results from last 7 days   Lab Units 03/05/25  0543 02/27/25  0618 02/26/25  1426   PROTEIN TOTAL g/dL 4.9*   < > 5.8*   BILIRUBIN TOTAL mg/dL 0.6   < > 0.3   ALK PHOS U/L 37   < > 72   ALT U/L 18   < > 28   AST U/L 31   < > 65*   GLUCOSE mg/dL 112*   < > 107*   TSH mIU/L  --   --  0.40*    < > = values in this interval not displayed.       Assessment/Plan      PMH  1.  Mitral regurgitation status post mitral valve replacement with a #25/33 [employer] mechanical mitral valve October 2020  2.  Paroxysmal atrial flutter status post ablation October 2020  3.  CAD.  Bypass x 3 vessels LIMA to the LAD free radial artery graft to the marginal SVG to the RCA in 2020.  Last stress test 4/16/2024 no evidence of ischemia EF 51%  4.  COPD she continues to smoke  5.  Cardiomyopathy postoperative ejection fraction was 40%.  This was up to 50% on her last stress test  6.  Hyperlipidemia  7.  Hypertension  8.  Tobacco abuse  9.  Moderate to severe pulmonary hypertension in the past     3/4/2025  1.  Paroxysmal atrial fibrillation.  Previous history of atrial flutter status post ablation in 2020.  Currently sinus tachycardia.  Increase metoprolol to tartrate to 3 times daily for better rate control.  This was likely physiologically driven because of the stress of the situation.  Anticoagulation will need to be given regardless because of her  mechanical mitral valve replacement.  2.  CAD.  Stable.  No symptoms of angina.  3.  Mechanical mitral valve replacement.  This is a difficult situation given her spontaneous left arm hematoma leading to compartment syndrome and iliopsoas spontaneous hematoma on the left side.  Agree with low-dose heparin.  Aspirin currently on hold.  Coumadin will need to be restarted along with an aspirin when stable.  4.  Respiratory failure.  Slow improvement.  Unfortunately she continues to smoke and has underlying lung disease.  5.  Anemia.  She will require transfusion.    3/5/2025    1.  Paroxysmal atrial fibrillation.  She is in a sinus tachycardia today.  She did have an atrial flutter ablation in 2020.  Continue metoprolol for rate control.  IV heparin to be restarted now as she went to the OR yesterday.  Transition to oral anticoagulation once her H&H is stable.  2.  CAD.  Status post CABG x 3 vessels.  Stable.  No symptoms of angina.  3.  Mechanical mitral valve replacement.  Heparin to be restarted today.  Hopefully we can restart Coumadin and baby aspirin if her H&H remained stable.  These were held because of a spontaneous iliopsoas hematoma and bleed in the arm related to compartment syndrome.  4.  Respiratory failure due to influenza and underlying COPD.  This is improving.    Brittney Lomeli MD

## 2025-03-05 NOTE — PROGRESS NOTES
Kayley Lynch is a 54 y.o. female on day 7 of admission presenting with Nausea vomiting and diarrhea.      Subjective   Patient seen examined bedside.  No acute events overnight.       Objective     Last Recorded Vitals  /67   Pulse 101   Temp 36.5 °C (97.7 °F) (Temporal)   Resp 21   Wt 66.6 kg (146 lb 13.2 oz)   SpO2 94%   Intake/Output last 3 Shifts:    Intake/Output Summary (Last 24 hours) at 3/5/2025 1312  Last data filed at 3/5/2025 1000  Gross per 24 hour   Intake 2792.2 ml   Output 2375 ml   Net 417.2 ml       Admission Weight  Weight: 59 kg (130 lb) (02/26/25 0159)    Daily Weight  03/01/25 : 66.6 kg (146 lb 13.2 oz)    Image Results  XR chest 1 view  Narrative: Interpreted By:  Danny Coffman,   STUDY:  XR CHEST 1 VIEW; 3/4/2025 4:21 pm      INDICATION:  Signs/Symptoms:post central line placement.      COMPARISON:  CT pulmonary angiography 03/04/2025.      ACCESSION NUMBER(S):  [id number]      ORDERING CLINICIAN:  NIKKI YATES      TECHNIQUE:  1 view of the chest was performed.      FINDINGS:  Left-greater-than-right pleural effusion with surrounding  atelectasis. Stable right midlung ill-defined infiltrate.. No  pneumothorax.  The cardiomediastinal silhouette is stable. Previous  median sternotomy. Right central venous catheter terminating in the  lower SVC.      Impression: 1. Right central venous catheter terminating in the lower SVC with no  sizable pneumothorax.  2. Stable left-greater-than-right small pleural effusion with  surrounding atelectasis as well as right midlung ill-defined  infiltrate.      Signed by: Danny Coffman 3/4/2025 5:38 PM  Dictation workstation:   [id number]  CT angio chest abdomen pelvis  Narrative: Interpreted By:  Donald Garcia,   STUDY:  CT ANGIO CHEST ABDOMEN PELVIS;  3/4/2025 1:22 pm      INDICATION:  Signs/Symptoms:Retroperitoneal bleed.      COMPARISON:  March 3, 2025      ACCESSION NUMBER(S):  [id number]      ORDERING CLINICIAN:  NIKKI YATES       TECHNIQUE:  Axial non-contrast images of the chest, abdomen, and pelvis with  coronal and sagittal reformatted images. Axial CT images of the  chest, abdomen and pelvis after the intravenous administration of 100  mL of Omnipaque 350 using angiographic technique with coronal and  sagittal reformatted images.  MIP images were provided and reviewed.  3D reconstructions were created on a separate independent workstation  and reviewed.      FINDINGS:  VASCULATURE:      PULMONARY ARTERIES: Limited, there is no large central filling defect  to suggest a pulmonary embolus.      THORACIC AORTA: Non-contrast images show no evidence of acute  intramural hematoma. No thoracic aortic aneurysm or dissection. No  significant thoracic aortic atherosclerosis.      ABDOMINAL AORTA: No abdominal aortic aneurysm or dissection. Minor  partially calcified abdominal aortic atherosclerosis.      ABDOMINAL AND PELVIC ARTERIES: Celiac artery, SMA, bilateral renal  arteries, and KENISHA are patent. Pelvic vascularity is tortuous and  contains diffuse partially calcified atherosclerotic disease, this  results in mild stenosis of the right common iliac artery and  moderate stenosis of the mid left common iliac artery. Bilateral  internal iliac artery demonstrates mild-to-moderate stenosis. The  external and common femoral arteries are patent.          CT CHEST:      MEDIASTINUM AND LYMPH NODES: No enlarged intrathoracic or axillary  lymph nodes. No pneumomediastinum.      HEART: The heart is enlarged. 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 There  is prosthetic mitral valve. Coronary artery calcifications are  evident. No significant pericardial effusion.      LUNG, PLEURA, LARGE AIRWAYS: Limited by respiratory motion artifact.  Stable appearance of severe emphysema and patchy densities in the  posterior aspect of both upper lobes and left lower lobe, with a  trace left pleural effusion. Central airways are patent.      OSSEOUS STRUCTURES/CHEST WALL:  There are median sternotomy wires.  There are degenerative changes of the spine. No acute osseous  abnormality.          CT ABDOMEN/PELVIS:      LIVER: No significant parenchymal abnormality.      BILE DUCTS: No significant intrahepatic or extrahepatic dilatation.      GALLBLADDER: Stable mild distention of the gallbladder.      PANCREAS: No significant abnormality.      SPLEEN: No significant abnormality.      ADRENALS: No significant abnormality.      KIDNEYS, URETERS, BLADDER: No urolithiasis or hydroureteronephrosis.  Jasso catheter balloon within a collapsed urinary bladder containing  gas.      REPRODUCTIVE ORGANS: No significant abnormality.      VESSELS: (See above). No additional significant abnormality.      PERITONEUM/RETROPERITONEUM/LYMPH NODES: Stranding again seen within  the left retroperitoneum similar in appearance to the prior  examination, consistent with hemorrhage. There is heterogeneity and  prominence of the left psoas and iliacus muscles similar to the prior  exam. Blood products are seen to course along the left hemipelvis and  extending along the posterior aspect of the rectum. There is also  mild heterogeneity of the right iliacus muscle. No lymphadenopathy.  There is no free air. Stable trace amount of free fluid in the pelvis.      BOWEL/MESENTERY/PERITONEUM: Rectal tube in place. There is colonic  diverticulosis without diverticulitis. There is no evidence of  obstruction. Normal appendix.      ABDOMINAL WALL: Stable appearance of subcutaneous stranding along the  left lateral flank with overlying skin thickening.      OSSEOUS STRUCTURES: No acute osseous abnormality.      Impression: No thoracic or abdominal aortic aneurysm or acute aortic pathology.      Stable asymmetric enlargement and heterogeneity of the left psoas and  both iliacus muscles. There is also stable amount of left  retroperitoneal hemorrhage that extends down the left hemipelvis and  surrounding the rectum. There is  no evidence of active contrast  extravasation.      Severe emphysema with stable infiltrates and/or atelectasis in the  posterior aspect of the upper lobes and left lower lobe.      Unchanged position of the Jasso catheter and rectal tube.      Signed by: Donald Garcia 3/4/2025 3:24 PM  Dictation workstation:   [id number]      Physical Exam  General:  Pleasant and cooperative. No apparent distress.  HEENT:  Normocephalic, atraumatic, mucus membranes moist.   Neck:  Trachea midline.  No JVD.    Chest:  Clear to auscultation bilaterally. No wheezes, rales, or rhonchi.  CV:  Regular rate and rhythm.  Positive S1/S2.   Abdomen: Bowel sounds present in all four quadrants, abdomen is soft, non-tender, non-distended.  Extremities:  No lower extremity edema or cyanosis.   Neurological:  AAOx3. No focal deficits.  Skin:  Warm and dry.    Relevant Results  All labs and imaging reviewed by myself    Assessment/Plan     # Supratherapeutic INR-resolved  # Influenza A pneumonia  # Retroperitoneal hematoma  # Acute anemia  - INR PT is stable.  Hemoglobin was 6.4 yesterday and patient was given 1 unit PRC transfusion.  There are no other acute events from heme-onc perspective.  Continue plan per primary team.    Kathy Guardado MD  PGY 2 hematology/oncology

## 2025-03-05 NOTE — PROGRESS NOTES
Subjective:  Patient lying in bed on exam.  She is drowsy but oriented.     Physical Exam:  GENERAL: Awake, lethargic.  HEAD:  Normocephalic, atraumatic.  EYES:  Round and reactive.  ENT:  No nasal discharge, mucous membranes moist and pink.  NECK:  Atraumatic, no meningismus.  CARDIOVASCULAR:  Regular rate and rhythm, no murmur.  RESPIRATORY:  Stridor, diminished breath sounds bilaterally  ABDOMEN:  Soft, no tenderness, nondistended.  EXTREMITIES:  LUE covered in dressing, RUE swelling following IV infiltration.  SKIN:  Warm and dry.  NEUROLOGICAL:  Nonfocal grossly.    Remainder of objective data including imaging and laboratory findings were all reviewed.    Admission history:  3/3: Patient is awake and responsive on exam. Trial CPAP removal, CT chest abdomen pelvis ordered. Heme-onc following, fibrinogen level ordered. Heparin drip and lopressor 25mg BID ordered.    3/4: Patient is awake and responsive in the morning.  Hemoglobin 6.4 on morning labs, 1 unit RBC ordered. Underwent irrigation and debridement with with LUE wound closing. CT angio chest abdomen pelvis shows no active extravasation, monitoring Hgb and assessing for appropriate increase following RBC.  Patient seen to be lethargic and hypertensive in the evening, ABG shows CO2 retention.  Placed on BiPAP, one-time labetalol dose administered, Lopressor restarted.    3/5: Heparin drip restarted in the morning after Hgb remained stable. Recheck in the afternoon shows drop in hemoglobin, heparin drip stopped and one unit RBC administered. Administering FFP as needed. Patient's HR and BP are more controlled, cardiology following.    Assessment and plan:  Kayley Lynch is a 54-year-old female who presented on 2/26/2025 for feelings of malaise. She was treated for shock in the setting of Influenza A with suspected superimposed bacterial pneumonia, COPD exacerbation, and suspected infectious colitis. She was transferred out of the ICU on 2/28. LUE swelling  was noted overnight 3/1-3/2, patient later had rapid response called for tachycardia and hypotension. Orthopedic surgery was consulted and she underwent emergent fasciotomy for compartment syndrome of the LUE. Patient is now under ICU care, vascular and orthopedics following.      Neurological System:  #Acute metabolic encephalopathy following LUE wound closure 3/4  -Patient is alert shortly after exiting surgery, roughly 1 hour later becomes lethargic and minimally responsive.  Repeat ABG shows CO2 retention, BiPAP placed. Reassess mentation and repeat ABG as needed.  - Continue home wellbutrin 150mg daily    Cardiovascular System:  #Afib with intermittent RVR  #Elevated Trop, demand ischemia vs 2/2 compartment syndrome/rhabdo  #Hx of Mechanical Mitral Valve Replacement  #CAD s/p CABG  #Hypertensive urgency, resolved  - BP more adequately controlled following one-time labetalol  - Continuing scheduled labetalol, cardiology following  - Heparin drip paused after Hgb decreased on afternoon labs  - PRN IV lopressor 5mg      Respiratory System:  #Acute on chronic hypoxic hypercapnic respiratory failure  #Influenza A  #COPD exacerbation  #Secondary PHTN  - Continue with Doxy for concurrent CAP coverage, total of 7 days  - Continue Pulmicort and Duonebs  -No longer requiring BiPAP, patient is oriented and responds to questioning    Gastrointestinal System:  #Diarrhea, c/f colitis  - Continue PO vancomycin, day 8  - Continue PPI    Endocrine System:  -Accu-checks     Renal System:  #HILLARY, resolved  -Continue to monitor renal function  - Monitor urine output  - Trend lactate    Hematological System:  #Supratherapeutic INR  #Suspected coagulopathy  #Retroperitoneal Hemorrhage   #Anemia  #Leukocytosis in setting of steroid use  -Low intensity heparin drip paused at this time  -Hgb 6.6 on afternoon labs, 1 unit RBC administered. PT/INR and fibrinogen redrawn.  -Continue to monitor CBC    Infectious Disease:  #CAP  #C. diff  colitis  #Influenza A  - Doxycycline complete  - Continue PO vanc    Skin/MSK:  #Left Upper Extremity Compartment Syndrome s/p fasciotomy 3/2  -s/p irrigation and debridement  -per vascular, recommend elevation of LUE  -pain management with PRN tylenol and morphine    Psych:  -No acute issues    ICU CHECK LIST:   Antimicrobials: PO vanc  Oxygen: 4L NC  Drips: -  Fluids: -  Feeding: NPO  Sedation/Analgesia: Tylenol, PRN morphine  Prophylaxis: Protonix  Glycemic control: -  Bowel care: PRN   Lines/Tubes: PIV, Jasso  Restraints: -  Dispo: ICU     Code Status: Full       Neto Clark MD  PGY-1

## 2025-03-05 NOTE — PROGRESS NOTES
Speech-Language Pathology    SLP Adult Inpatient Speech-Language Pathology Clinical Swallow Evaluation    Patient Name: Kayley Lynch  MRN: [id number]  Today's Date: 3/5/2025    Time Calculation   Start Time: 1235  Stop Time: 1255  Time Calculation (min): 20 min       Current Problem:   1. Influenza A with pneumonia        2. Nausea vomiting and diarrhea        3. Acute kidney injury (CMS-HCC)        4. Dehydration        5. Influenza A        6. Shock (Multi)  Transthoracic Echo (TTE) Complete    Transthoracic Echo (TTE) Complete      7. Nontraumatic compartment syndrome of left upper extremity  Case Request Operating Room: FASCIOTOMY, UPPER EXTREMITY    Case Request Operating Room: FASCIOTOMY, UPPER EXTREMITY    Case Request Operating Room: DEBRIDEMENT, WOUND, UPPER EXTREMITY    Case Request Operating Room: DEBRIDEMENT, WOUND, UPPER EXTREMITY        Recommendations:  *Solid Diet Recommendations: Soft & bite sized/chopped (IDDSI Level 6)  *Liquid Diet Recommendations: Thin (IDDSI Level 0)    Compensatory Swallowing Strategies: Remain upright for 20-30 minutes after meals, Upright 90 degrees as possible for all oral intake    Medication Administration Recommendations: Crushed, With Pureed    Assessment:  Pt seen at bedside.  and son present during session. Pt reports difficulty swallowing pills at this time. Recommend pills crushed in puree.     Pt presents with intact oral phase of the swallow, and suspected functional pharyngeal swallow given clinical bedside indicators. Pt is managing secretions. Oral motor ROM is WFL;   PO trials thin liquids, puree, soft solid, and solid given.   ORAL PHASE: labial seal is intact, there is no labial loss of the oral bolus. Extended time with mastication on solid PO trial d/t missing dentition. Oral bolus formation and manipulation WFL. No significant oral residue after the swallow. No pocketing.   PHARYNGEAL PHASE: no overt s/s aspiration, no change in vocal quality or  respirations. SpO2 remains 95-97%.   Will suggest pt maintain an oral diet of soft & bite sized and thin liquids. One time visit to follow up and assess diet tolerance and possible diet upgrade.      Prognosis: Good  Medical Staff Made Aware: Yes  Strengths: Family/Caregiver Support  Barriers: Comorbidities    Plan:  Inpatient/Swing Bed or Outpatient: Inpatient  SLP Frequency: One time visit  Discussed POC: Patient, Caregiver/family, Nursing  Discussed Risks/Benefits: Yes  Patient/Caregiver Agreeable: Yes    Dysphagia Goals: 3/05/2025  Patient will consume prescribed diet without clinical or overt s/s aspiration.    Patient will trial upgraded textures with SLP only for possible diet advancement as indicated given bedside and clinical indicators.   Pt/caregiver education.      Subjective   Pt seen at bedside, upright and on oxygen (4L/min). Pt pleasant and cooperative throughout session and agreeable to swallow assessment. Pt had recent surgery d/t large hematoma in L arm.     Per H&P:   54-year-old female with a significant past medical/surgical history of atrial flutter s/p cardiac ablation currently anticoagulated on Coumadin, CAD s/p CABG, pulmonary hypertension, hypercholesterolemia and s/p mitral valve replacement presents via EMS for abdominal pain along with nausea and vomiting. Patient states symptoms for the past 2 days.     Per CXR: 3/4/2025  IMPRESSION:  1. Right central venous catheter terminating in the lower SVC with no  sizable pneumothorax.  2. Stable left-greater-than-right small pleural effusion with  surrounding atelectasis as well as right midlung ill-defined  infiltrate.    General Visit Information:  Living Environment: Home (with )  Reason for Referral: assess oropharyngeal swallow  Prior to Session Communication: Bedside nurse (RNs Anthony and Pavel)  [employer] Diet: regular textures, thin liquids  Current Diet : full liquid diet  Dysphagia Diagnosis: Within functional limits    Baseline  Assessment:  Respiratory Status: Oxygen via nasal cannula (4L/min)  Behavior/Cognition: Alert, Cooperative, Pleasant mood  Vision: Functional for self-feeding  Patient Positioning: Upright in Bed  Baseline Vocal Quality: Normal    Pain:  Pain Assessment  Pain Assessment: 0-10  0-10 (Numeric) Pain Score: 0 - No pain    Oral/Motor Assessment:  Dentition: Some Missing Teeth (missing top teeth)  Oral Motor: Within Functional Limits  Labial ROM: Within Functional Limits  Lingual ROM: Within Functional Limits  Intelligibility: Intelligible    Inpatient:  Education Documentation  SLP has provided pt and caregiver/RN Norma with the results and recommendations of this session.      100% supervision throughout session: Marisela Minor - SLP

## 2025-03-05 NOTE — CARE PLAN
The patient's goals for the shift include      The clinical goals for the shift include safety and comfort      Problem: Skin  Goal: Participates in plan/prevention/treatment measures  Outcome: Progressing  Flowsheets (Taken 3/5/2025 0506)  Participates in plan/prevention/treatment measures:   Discuss with provider PT/OT consult   Elevate heels   Increase activity/out of bed for meals  Goal: Prevent/manage excess moisture  Outcome: Progressing  Flowsheets (Taken 3/5/2025 0506)  Prevent/manage excess moisture:   Cleanse incontinence/protect with barrier cream   Monitor for/manage infection if present   Use wicking fabric (obtain order)   Follow provider orders for dressing changes   Moisturize dry skin  Goal: Prevent/minimize sheer/friction injuries  Outcome: Progressing  Flowsheets (Taken 3/5/2025 0506)  Prevent/minimize sheer/friction injuries:   Complete micro-shifts as needed if patient unable. Adjust patient position to relieve pressure points, not a full turn   Increase activity/out of bed for meals   Use pull sheet   Utilize specialty bed per algorithm   Turn/reposition every 2 hours/use positioning/transfer devices  Goal: Promote/optimize nutrition  Outcome: Progressing  Flowsheets (Taken 3/5/2025 0506)  Promote/optimize nutrition:   Assist with feeding   Monitor/record intake including meals   Offer water/supplements/favorite foods   Consume > 50% meals/supplements   Discuss with provider if NPO > 2 days   Reassess MST if dietician not consulted  Goal: Promote skin healing  Outcome: Progressing  Flowsheets (Taken 3/5/2025 0506)  Promote skin healing:   Assess skin/pad under line(s)/device(s)   Protective dressings over bony prominences   Turn/reposition every 2 hours/use positioning/transfer devices   Rotate device position/do not position patient on device   Ensure correct size (line/device) and apply per  instructions

## 2025-03-05 NOTE — PROGRESS NOTES
Occupational Therapy    Evaluation    Patient Name: Kayley Lynch  MRN: [id number]  Department: [facility] ICU  Room: [facility]  Today's Date: 3/5/2025  Time Calculation  Start Time: 1040  Stop Time: 1106  Time Calculation (min): 26 min    Assessment  IP OT Assessment  OT Assessment: Pt. presents with a decline in self-care, mobility and safety and would benefit from skilled OT services to maximize independence and promote return to prior level of function.  Prognosis: Good  Barriers to Discharge Home: Physical needs  End of Session Communication: Bedside nurse  End of Session Patient Position: Bed, 3 rail up, Alarm off, not on at start of session  Plan:  Treatment Interventions: ADL retraining, Functional transfer training, Endurance training, Compensatory technique education  OT Frequency: 3 times per week  OT Discharge Recommendations: Moderate intensity level of continued care  OT - OK to Discharge: Yes (to next level of care when cleared by medical team)    Subjective   Current Problem:  1. Influenza A with pneumonia        2. Nausea vomiting and diarrhea        3. Acute kidney injury (CMS-HCC)        4. Dehydration        5. Influenza A        6. Shock (Multi)  Transthoracic Echo (TTE) Complete    Transthoracic Echo (TTE) Complete      7. Nontraumatic compartment syndrome of left upper extremity  Case Request Operating Room: FASCIOTOMY, UPPER EXTREMITY    Case Request Operating Room: FASCIOTOMY, UPPER EXTREMITY    Case Request Operating Room: DEBRIDEMENT, WOUND, UPPER EXTREMITY    Case Request Operating Room: DEBRIDEMENT, WOUND, UPPER EXTREMITY        General:  General  Reason for Referral: impaired adl; pt. admitted with nausea/vomiting/diarrhea-septic shock, copd exac, large hematoma L arm, compartment syndrome s/p urgent fasciotomy with closure on 3/4/25, iliopsoas spontaneous hematoma on L  Referred By: Makenna  Past Medical History Relevant to Rehab: copd, cad s/p cabg, pulm htn, cardiomyopathy, a flutter s/p  ablation, MVR  Family/Caregiver Present: Yes  Caregiver Feedback: son  Co-Treatment: PT  Co-Treatment Reason: to maximize patient safety/abilities  Prior to Session Communication: Bedside nurse  General Comment: pt. agreeable to therapy intervention  Precautions:  Precautions Comment: LUE wrapped/elevated on pillows, LUE rom as tolerated per ortho note 3/5/25, heparin drip, o2 4 L/min, FMS, davidson, tele, VSS, contact (+), droplet precautions (flu A, c-diff)     Date/Time Vitals Session Patient Position Pulse Resp SpO2 BP MAP (mmHg)    03/05/25 1200 --  --  101  20  95 %  156/72  104     03/05/25 1300 --  --  101  21  94 %  141/67  96                Pain:  Pain Assessment  Pain Assessment:  (no pain complaints)    Objective   Cognition:  Overall Cognitive Status:  (lethargic)  Orientation Level:  (oriented x 3)           Home Living:  Home Living Comments: pt. lives with spouse, 1 floor home, 1st floor laundry. stall shower with hhs, tub/shower, standard toilets   Prior Function:  Prior Function Comments: pt. drives, independent with adl/iadl  IADL History:     ADL:  ADL Comments: dependent for all adl due to weakness  Activity Tolerance:  Endurance: Decreased tolerance for upright activites  Early Mobility/Exercise Safety Screen: Proceed with mobilization - No exclusion criteria met  Bed Mobility/Transfers: Bed Mobility  Bed Mobility:  (bed positioned in modified chair, then pt. able to long sit with max assist x 2 with use of draw sheet, pt. unable to  bed rail with R hand, did not attempt with L hand)       Sensation:  Sensation Comment: sensation intact  Strength:  Strength Comments: R hand 2/5, proximally 1/5, L hand 1/5, able to tolerate prom 0-90 deg shoulder flexion, wrist/hand, unable to perform elbow flexion due to restriction of wrap/bandage  Outcome Measures: [facility] Daily Activity  Putting on and taking off regular lower body clothing: Total  Bathing (including washing, rinsing, drying): Total  Putting  on and taking off regular upper body clothing: Total  Toileting, which includes using toilet, bedpan or urinal: Total  Taking care of personal grooming such as brushing teeth: Total  Eating Meals: Total  Daily Activity - Total Score: 6         and Early Mobility/Exercise Safety Screen: Proceed with mobilization - No exclusion criteria met  ICU Mobility Scale: Sitting in bed, exercises in bed [1]  Education Documentation  Precautions, taught by Ginny Case OT at 3/5/2025  1:29 PM.  Learner: Patient  Readiness: Acceptance  Method: Explanation  Response: Verbalizes Understanding, Needs Reinforcement  positioning        Goals:   Encounter Problems       Encounter Problems (Active)       OT Goals       Increase functional mobility and  functional transfers to min assist x 1 for bed/chair/toilet with dme prn   (Progressing)       Start:  03/05/25    Expected End:  03/19/25            increase bue ther ex/activity x 5-7 minutes and increase standing tolerance x 3-5 minutes with min assist x 1 to promote greater activity tolerance for assist with adl.   (Progressing)       Start:  03/05/25    Expected End:  03/19/25            Increase grooming/feeding to set up with dme prn  (Progressing)       Start:  03/05/25    Expected End:  03/19/25            Increase ub/lb dressing to mod assist x 1 with dme prn  (Progressing)       Start:  03/05/25    Expected End:  03/19/25

## 2025-03-05 NOTE — PROGRESS NOTES
"Kayley Lynch is a 54 y.o. female on day 7 of admission presenting with Nausea vomiting and diarrhea.    Subjective   Seen and evaluated at bedside, resting comfortably.  Somewhat somnolent this morning.  Pain appears controlled.       Objective     Physical Exam  Left upper extremity:  Postoperative Ace is intact with no drainage.  Left upper extremity is soft and compressible.  She is able to gently wiggle all fingers without discomfort.  Able to gently range wrist without discomfort.  Sensation is intact throughout the left hand.  The left upper extremity is warm and well-perfused with brisk capillary refill.    Last Recorded Vitals  Blood pressure 152/73, pulse 96, temperature 36.5 °C (97.7 °F), temperature source Temporal, resp. rate 16, height 1.6 m (5' 2.99\"), weight 66.6 kg (146 lb 13.2 oz), SpO2 94%.  Intake/Output last 3 Shifts:  I/O last 3 completed shifts:  In: 2976 (44.7 mL/kg) [I.V.:1725 (25.9 mL/kg); Blood:350; IV Piggyback:901]  Out: 3210 (48.2 mL/kg) [Urine:3090 (1.3 mL/kg/hr); Stool:100; Blood:20]  Weight: 66.6 kg     Relevant Results  Results for orders placed or performed during the hospital encounter of 02/25/25 (from the past 24 hours)   POCT GLUCOSE   Result Value Ref Range    POCT Glucose 112 (H) 74 - 99 mg/dL   POCT GLUCOSE   Result Value Ref Range    POCT Glucose 132 (H) 74 - 99 mg/dL   Blood Gas Arterial   Result Value Ref Range    POCT pH, Arterial 7.33 (L) 7.38 - 7.42 pH    POCT pCO2, Arterial 59 (H) 38 - 42 mm Hg    POCT pO2, Arterial 100 (H) 85 - 95 mm Hg    POCT SO2, Arterial 100 94 - 100 %    POCT Oxy Hemoglobin, Arterial 96.4 94.0 - 98.0 %    POCT Base Excess, Arterial 3.6 (H) -2.0 - 3.0 mmol/L    POCT HCO3 Calculated, Arterial 31.1 (H) 22.0 - 26.0 mmol/L    Patient Temperature 37.0 degrees Celsius    FiO2 100 %    Apparatus NON REBREATHER     Site of Arterial Puncture Radial Right     Alexei's Test Positive    POCT GLUCOSE   Result Value Ref Range    POCT Glucose 146 (H) 74 - 99 " mg/dL   CBC   Result Value Ref Range    WBC 12.9 (H) 4.4 - 11.3 x10*3/uL    nRBC 2.7 (H) 0.0 - 0.0 /100 WBCs    RBC 2.78 (L) 4.00 - 5.20 x10*6/uL    Hemoglobin 7.6 (L) 12.0 - 16.0 g/dL    Hematocrit 23.4 (L) 36.0 - 46.0 %    MCV 84 80 - 100 fL    MCH 27.3 26.0 - 34.0 pg    MCHC 32.5 32.0 - 36.0 g/dL    RDW 19.2 (H) 11.5 - 14.5 %    Platelets 157 150 - 450 x10*3/uL   Magnesium   Result Value Ref Range    Magnesium 2.07 1.60 - 2.40 mg/dL   Comprehensive Metabolic Panel   Result Value Ref Range    Glucose 112 (H) 74 - 99 mg/dL    Sodium 146 (H) 136 - 145 mmol/L    Potassium 4.0 3.5 - 5.3 mmol/L    Chloride 109 (H) 98 - 107 mmol/L    Bicarbonate 33 (H) 21 - 32 mmol/L    Anion Gap 8 (L) 10 - 20 mmol/L    Urea Nitrogen 27 (H) 6 - 23 mg/dL    Creatinine 0.63 0.50 - 1.05 mg/dL    eGFR >90 >60 mL/min/1.73m*2    Calcium 8.3 (L) 8.6 - 10.3 mg/dL    Albumin 2.9 (L) 3.4 - 5.0 g/dL    Alkaline Phosphatase 37 33 - 110 U/L    Total Protein 4.9 (L) 6.4 - 8.2 g/dL    AST 31 9 - 39 U/L    Bilirubin, Total 0.6 0.0 - 1.2 mg/dL    ALT 18 7 - 45 U/L   CBC   Result Value Ref Range    WBC 14.4 (H) 4.4 - 11.3 x10*3/uL    nRBC 3.1 (H) 0.0 - 0.0 /100 WBCs    RBC 2.71 (L) 4.00 - 5.20 x10*6/uL    Hemoglobin 7.3 (L) 12.0 - 16.0 g/dL    Hematocrit 23.0 (L) 36.0 - 46.0 %    MCV 85 80 - 100 fL    MCH 26.9 26.0 - 34.0 pg    MCHC 31.7 (L) 32.0 - 36.0 g/dL    RDW 19.6 (H) 11.5 - 14.5 %    Platelets 154 150 - 450 x10*3/uL   aPTT - baseline   Result Value Ref Range    aPTT 25 (L) 26 - 36 seconds   Protime-INR   Result Value Ref Range    Protime 14.4 (H) 9.8 - 12.4 seconds    INR 1.3 (H) 0.9 - 1.1     *Note: Due to a large number of results and/or encounters for the requested time period, some results have not been displayed. A complete set of results can be found in Results Review.                  Assessment/Plan   Assessment & Plan  Nausea vomiting and diarrhea    Influenza A with pneumonia    Nontraumatic compartment syndrome of left upper  extremity    Postop day #1 left upper extremity wound closure status post left upper extremity fasciotomies on 3/2/2025 with Dr. Licea    Continue with compressive Ace bandage.  Okay for range of motion as tolerated to the left upper extremity.  Ice to the affected area.  Pain control per primary.  Follow-up with Dr. Licea as an outpatient for wound check and suture removal.  Orthopedics will continue to follow.       I spent 15 minutes in the professional and overall care of this patient.      Poncho Gutierrez, DO

## 2025-03-05 NOTE — PROGRESS NOTES
Kayley Lynch is a 54 y.o. female on day 6 of admission presenting with Nausea vomiting and diarrhea.      Subjective   No events overnight.  Patient seen and examined at bedside with  present.  Hospitalization and treatment plan reviewed with  at length.  Patient has no complaints.       Objective     Last Recorded Vitals  /79   Pulse 101   Temp 36.5 °C (97.7 °F) (Temporal)   Resp 23   Wt 66.6 kg (146 lb 13.2 oz)   SpO2 94%   Intake/Output last 3 Shifts:    Intake/Output Summary (Last 24 hours) at 3/4/2025 2126  Last data filed at 3/4/2025 2045  Gross per 24 hour   Intake 1900 ml   Output 2155 ml   Net -255 ml       Admission Weight  Weight: 59 kg (130 lb) (02/26/25 0159)    Daily Weight  03/01/25 : 66.6 kg (146 lb 13.2 oz)    Image Results  XR chest 1 view  Narrative: Interpreted By:  Danny Coffman,   STUDY:  XR CHEST 1 VIEW; 3/4/2025 4:21 pm      INDICATION:  Signs/Symptoms:post central line placement.      COMPARISON:  CT pulmonary angiography 03/04/2025.      ACCESSION NUMBER(S):  [id number]      ORDERING CLINICIAN:  NIKKI YATES      TECHNIQUE:  1 view of the chest was performed.      FINDINGS:  Left-greater-than-right pleural effusion with surrounding  atelectasis. Stable right midlung ill-defined infiltrate.. No  pneumothorax.  The cardiomediastinal silhouette is stable. Previous  median sternotomy. Right central venous catheter terminating in the  lower SVC.      Impression: 1. Right central venous catheter terminating in the lower SVC with no  sizable pneumothorax.  2. Stable left-greater-than-right small pleural effusion with  surrounding atelectasis as well as right midlung ill-defined  infiltrate.      Signed by: Danny Coffman 3/4/2025 5:38 PM  Dictation workstation:   [id number]  CT angio chest abdomen pelvis  Narrative: Interpreted By:  Donald Garcia,   STUDY:  CT ANGIO CHEST ABDOMEN PELVIS;  3/4/2025 1:22 pm      INDICATION:  Signs/Symptoms:Retroperitoneal bleed.       COMPARISON:  March 3, 2025      ACCESSION NUMBER(S):  [id number]      ORDERING CLINICIAN:  NIKKI YATES      TECHNIQUE:  Axial non-contrast images of the chest, abdomen, and pelvis with  coronal and sagittal reformatted images. Axial CT images of the  chest, abdomen and pelvis after the intravenous administration of 100  mL of Omnipaque 350 using angiographic technique with coronal and  sagittal reformatted images.  MIP images were provided and reviewed.  3D reconstructions were created on a separate independent workstation  and reviewed.      FINDINGS:  VASCULATURE:      PULMONARY ARTERIES: Limited, there is no large central filling defect  to suggest a pulmonary embolus.      THORACIC AORTA: Non-contrast images show no evidence of acute  intramural hematoma. No thoracic aortic aneurysm or dissection. No  significant thoracic aortic atherosclerosis.      ABDOMINAL AORTA: No abdominal aortic aneurysm or dissection. Minor  partially calcified abdominal aortic atherosclerosis.      ABDOMINAL AND PELVIC ARTERIES: Celiac artery, SMA, bilateral renal  arteries, and KENISHA are patent. Pelvic vascularity is tortuous and  contains diffuse partially calcified atherosclerotic disease, this  results in mild stenosis of the right common iliac artery and  moderate stenosis of the mid left common iliac artery. Bilateral  internal iliac artery demonstrates mild-to-moderate stenosis. The  external and common femoral arteries are patent.          CT CHEST:      MEDIASTINUM AND LYMPH NODES: No enlarged intrathoracic or axillary  lymph nodes. No pneumomediastinum.      HEART: The heart is enlarged. 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 There  is prosthetic mitral valve. Coronary artery calcifications are  evident. No significant pericardial effusion.      LUNG, PLEURA, LARGE AIRWAYS: Limited by respiratory motion artifact.  Stable appearance of severe emphysema and patchy densities in the  posterior aspect of both upper lobes and left  lower lobe, with a  trace left pleural effusion. Central airways are patent.      OSSEOUS STRUCTURES/CHEST WALL: There are median sternotomy wires.  There are degenerative changes of the spine. No acute osseous  abnormality.          CT ABDOMEN/PELVIS:      LIVER: No significant parenchymal abnormality.      BILE DUCTS: No significant intrahepatic or extrahepatic dilatation.      GALLBLADDER: Stable mild distention of the gallbladder.      PANCREAS: No significant abnormality.      SPLEEN: No significant abnormality.      ADRENALS: No significant abnormality.      KIDNEYS, URETERS, BLADDER: No urolithiasis or hydroureteronephrosis.  Jasso catheter balloon within a collapsed urinary bladder containing  gas.      REPRODUCTIVE ORGANS: No significant abnormality.      VESSELS: (See above). No additional significant abnormality.      PERITONEUM/RETROPERITONEUM/LYMPH NODES: Stranding again seen within  the left retroperitoneum similar in appearance to the prior  examination, consistent with hemorrhage. There is heterogeneity and  prominence of the left psoas and iliacus muscles similar to the prior  exam. Blood products are seen to course along the left hemipelvis and  extending along the posterior aspect of the rectum. There is also  mild heterogeneity of the right iliacus muscle. No lymphadenopathy.  There is no free air. Stable trace amount of free fluid in the pelvis.      BOWEL/MESENTERY/PERITONEUM: Rectal tube in place. There is colonic  diverticulosis without diverticulitis. There is no evidence of  obstruction. Normal appendix.      ABDOMINAL WALL: Stable appearance of subcutaneous stranding along the  left lateral flank with overlying skin thickening.      OSSEOUS STRUCTURES: No acute osseous abnormality.      Impression: No thoracic or abdominal aortic aneurysm or acute aortic pathology.      Stable asymmetric enlargement and heterogeneity of the left psoas and  both iliacus muscles. There is also stable amount  of left  retroperitoneal hemorrhage that extends down the left hemipelvis and  surrounding the rectum. There is no evidence of active contrast  extravasation.      Severe emphysema with stable infiltrates and/or atelectasis in the  posterior aspect of the upper lobes and left lower lobe.      Unchanged position of the Jasso catheter and rectal tube.      Signed by: Donald Garcia 3/4/2025 3:24 PM  Dictation workstation:   [id number]      Physical Exam  HENT:      Head: Normocephalic and atraumatic.      Nose: Nose normal.      Mouth/Throat:      Mouth: Mucous membranes are moist.      Pharynx: Oropharynx is clear.   Eyes:      Extraocular Movements: Extraocular movements intact.      Pupils: Pupils are equal, round, and reactive to light.   Cardiovascular:      Rate and Rhythm: Normal rate and regular rhythm.   Pulmonary:      Effort: No respiratory distress.      Breath sounds: Wheezing present. No rhonchi or rales.   Abdominal:      General: Bowel sounds are normal. There is no distension.      Palpations: Abdomen is soft.      Tenderness: There is no abdominal tenderness. There is no guarding.   Musculoskeletal:      Right lower leg: No edema.      Left lower leg: No edema.   Skin:     General: Skin is warm and dry.   Neurological:      Mental Status: She is alert. Mental status is at baseline.       Relevant Results               Assessment/Plan   This patient currently has cardiac telemetry ordered; if you would like to modify or discontinue the telemetry order, click here to go to the orders activity to modify/discontinue the order.      This patient has a urinary catheter   Reason for the urinary catheter remaining today? critically ill patient who need accurate urinary output measurements          Assessment & Plan  Nausea vomiting and diarrhea    Influenza A with pneumonia      Septic shock, dehydration, hypercapnic respiratory failure, and dehydration.  Nontraumatic compartment syndrome of left upper  extremity      Patient is a 54-year-old female with past medical history of COPD, CAD status post CABG, pulmonary hypertension, hyperlipidemia, cardiomyopathy, a flutter status post ablation, and mitral valve replacement who presented with malaise.  She was found to be in septic shock and admitted to the ICU.    Septic shock  Hypercapnic respiratory failure  COPD exacerbation  Influenza A  Pneumonia  Acute metabolic encephalopathy  C. difficile colitis  Dehydration  HILLARY    -Continue supplemental oxygen  -Continue antibiotics, currently on doxycycline  -Continue nebulizers  -Continue steroids  -Continue Tamiflu  -Continue p.o. vancomycin  -Continue home meds  -Monitor INR    3/2: Patient developed a large hematoma in the left arm leading to compartment syndrome. She was taken for urgent fasciotomies with Orthopedic Surgery. She is also being followed by Vascular Surgery. Vitamin K ordered for supratherapeutic INR. Her INR was 2.2 yesterday with goal being 2.5-3.5 for her A-flutter s/p mitral valve replacement. Critical Care reconsulted. Hematology consulted, appreciate recs. Hold coumadin and lovenox.    3/3: INR reversed. Patient is on low intensity heparin drip. She was transfused 1 unit pRBCs. Monitor hemoglobin closely. Hematology has ordered a work up including DIC panel. Patient will need another debridement and irrigation in 24-48 hours per Orthopedics. She has also developed a retroperitoneal bleed.    3/4: INR is down to 1.0.  Leukocytosis improved from 15 down to 13.  Hemoglobin is down to 6.4.  Patient to be transfused another unit packed RBCs.  She will be going back to the OR today for further debridement, washout, possible closure. She will also have a CT angiogram to determine if there is any active bleeding causing her retroperitoneal bleed.  She remains on a heparin drip due to her valve.  Continue doxycycline and p.o. Vanco.              [place] Steven,

## 2025-03-05 NOTE — CARE PLAN
Problem: Chronic Conditions and Co-morbidities  Goal: Patient's chronic conditions and co-morbidity symptoms are monitored and maintained or improved  Outcome: Progressing     Problem: Discharge Planning  Goal: Discharge to home or other facility with appropriate resources  Outcome: Progressing     Problem: Respiratory  Goal: Clear secretions with interventions this shift  Outcome: Progressing  Goal: Minimize anxiety/maximize coping throughout shift  Outcome: Progressing  Goal: Minimal/no exertional discomfort or dyspnea this shift  Outcome: Progressing  Goal: No signs of respiratory distress (eg. Use of accessory muscles. Peds grunting)  Outcome: Progressing  Goal: Patent airway maintained this shift  Outcome: Progressing  Goal: Verbalize decreased shortness of breath this shift  Outcome: Progressing  Goal: Wean oxygen to maintain O2 saturation per order/standard this shift  Outcome: Progressing  Goal: Increase self care and/or family involvement in next 24 hours  Outcome: Progressing     Problem: Pain  Goal: Takes deep breaths with improved pain control throughout the shift  Outcome: Progressing  Goal: Turns in bed with improved pain control throughout the shift  Outcome: Progressing  Goal: Walks with improved pain control throughout the shift  Outcome: Progressing  Goal: Performs ADL's with improved pain control throughout shift  Outcome: Progressing  Goal: Participates in PT with improved pain control throughout the shift  Outcome: Progressing  Goal: Free from opioid side effects throughout the shift  Outcome: Progressing  Goal: Free from acute confusion related to pain meds throughout the shift  Outcome: Progressing     Problem: Skin  Goal: Participates in plan/prevention/treatment measures  Outcome: Progressing  Goal: Prevent/manage excess moisture  Outcome: Progressing  Goal: Prevent/minimize sheer/friction injuries  Outcome: Progressing  Goal: Promote/optimize nutrition  Outcome: Progressing  Goal: Promote  skin healing  Outcome: Progressing     Problem: Fall/Injury  Goal: Not fall by end of shift  Outcome: Progressing  Goal: Be free from injury by end of the shift  Outcome: Progressing  Goal: Verbalize understanding of personal risk factors for fall in the hospital  Outcome: Progressing  Goal: Verbalize understanding of risk factor reduction measures to prevent injury from fall in the home  Outcome: Progressing  Goal: Use assistive devices by end of the shift  Outcome: Progressing  Goal: Pace activities to prevent fatigue by end of the shift  Outcome: Progressing     Problem: Nutrition  Goal: Nutrient intake appropriate for maintaining nutritional needs  Outcome: Progressing     Problem: Safety - Adult  Goal: Free from fall injury  Outcome: Progressing     Problem: Pain - Adult  Goal: Verbalizes/displays adequate comfort level or baseline comfort level  Outcome: Progressing

## 2025-03-05 NOTE — PROGRESS NOTES
"Kayley Lynch is a 54 y.o. female on day 7 of admission presenting with Nausea vomiting and diarrhea.    Subjective            Objective       Vitals:    03/05/25 1500   BP: 149/66   Pulse: 97   Resp: 15   Temp:    SpO2: 90%      Physical Exam  Constitutional: Well developed , drowsy, in no distress,  Eyes: Eyes closed  ENMT: mucous membranes are moist, no apparent injury, no lesions seen,   Head/neck: Neck supple, trachea  is midline, no apparent injury, no bruits, no mass, no stridor  Respiratory/thorax: Breath sounds clear and equal diminished bilaterally throughout, thorax symmetric  Cardiac/Vascular: Regular, rate and rhythm, no murmurs,  right radial multiphasic dopplerable signals, multiphasic dopplerable ulnar signal, radial artery harvested for CABG capillary refill brisk hands equally warm  Gastrointestinal: Nondistended soft nontender, positive bowel sounds, no bruits.  Musculoskeletal: Moves all extremities, limited range of motion , no joint swelling,   Extremities: No cyanosis, large bulky dressing of left upper extremity from wrist to upper arm  Neurological: Drowsy  Lymphatic: No significant lymphadenopathy  Skin: Warm and dry, no lesions, no rashes  Psychological: Appropriate mood and behavior  Blood pressure 149/66, pulse 97, temperature 37 °C (98.6 °F), temperature source Temporal, resp. rate 15, height 1.6 m (5' 2.99\"), weight 66.6 kg (146 lb 13.2 oz), SpO2 90%.        Intake/Output last 3 Shifts:  I/O last 3 completed shifts:  In: 2976 (44.7 mL/kg) [I.V.:1725 (25.9 mL/kg); Blood:350; IV Piggyback:901]  Out: 3210 (48.2 mL/kg) [Urine:3090 (1.3 mL/kg/hr); Stool:100; Blood:20]  Weight: 66.6 kg     Patient Active Problem List    Diagnosis Date Noted    Nausea vomiting and diarrhea 02/26/2025    Influenza A with pneumonia 02/26/2025    Chest pressure 03/22/2024    Current mild episode of major depressive disorder without prior episode (CMS-[facility]) 05/08/2023    Atrial flutter (Multi) 03/08/2023    " Bilateral edema of lower extremity 03/08/2023    CAD (coronary artery disease) 03/08/2023    Cardiomyopathy 03/08/2023    Hypercholesterolemia 03/08/2023    Mitral regurgitation 03/08/2023    Pleural effusion 03/08/2023    Pulmonary hypertension (Multi) 03/08/2023    S/P CABG (coronary artery bypass graft) 03/08/2023    S/P mitral valve replacement 03/08/2023    Dyspnea on exertion 03/08/2023    Tachycardia 03/08/2023    Nontraumatic compartment syndrome of left upper extremity 02/25/2025          Current Facility-Administered Medications:     acetaminophen (Tylenol) tablet 650 mg, 650 mg, oral, q4h PRN, Neto Clark MD, 650 mg at 03/05/25 1225    [Held by provider] aspirin EC tablet 81 mg, 81 mg, oral, Daily, Neto Clark MD, 81 mg at 03/02/25 0847    atorvastatin (Lipitor) tablet 80 mg, 80 mg, oral, Nightly, Neto Clark MD, 80 mg at 03/03/25 2118    budesonide (Pulmicort) 0.5 mg/2 mL nebulizer solution 0.5 mg, 0.5 mg, nebulization, BID, Neto Clark MD, 0.5 mg at 03/05/25 0632    buPROPion XL (Wellbutrin XL) 24 hr tablet 150 mg, 150 mg, oral, q AM, Neto Clark MD, 150 mg at 03/04/25 0923    dextrose 5% infusion, 83 mL/hr, intravenous, Continuous, Neto Clark MD, Last Rate: 83 mL/hr at 03/05/25 1130, 83 mL/hr at 03/05/25 1130    escitalopram (Lexapro) tablet 20 mg, 20 mg, oral, Daily, Neto Clark MD, 20 mg at 03/04/25 0923    [Held by provider] heparin 25,000 Units in dextrose 5% 250 mL (100 Units/mL) infusion (premix), 0-4,000 Units/hr, intravenous, Continuous, Dimas Mensah MD, Stopped at 03/05/25 1538    insulin lispro injection 0-5 Units, 0-5 Units, subcutaneous, TID AC, Neto Clark MD, 2 Units at 03/02/25 0847    ipratropium-albuteroL (Duo-Neb) 0.5-2.5 mg/3 mL nebulizer solution 3 mL, 3 mL, nebulization, q6h, Neto Clark MD, 3 mL at 03/05/25 1330    ipratropium-albuteroL (Duo-Neb) 0.5-2.5 mg/3 mL nebulizer solution 3 mL, 3 mL, nebulization, q2h PRN,  Neto Clark MD    melatonin tablet 3 mg, 3 mg, oral, Nightly PRN, Neto Clark MD, 3 mg at 02/28/25 2049    metoprolol tartrate (Lopressor) injection 5 mg, 5 mg, intravenous, q6h PRN, Neto Clark MD, 5 mg at 03/02/25 1958    metoprolol tartrate (Lopressor) tablet 25 mg, 25 mg, oral, TID, Neto Clark MD, 25 mg at 03/05/25 1520    [Held by provider] morphine injection 2 mg, 2 mg, intravenous, q2h PRN, Neto Clark MD, 2 mg at 03/04/25 0138    ondansetron (Zofran) injection 4 mg, 4 mg, intravenous, q4h PRN, Neto Clark MD, 4 mg at 03/03/25 0048    oxygen (O2) therapy, , inhalation, Continuous - Inhalation, Neto Clark MD, Last Rate: 240,000 mL/hr at 03/05/25 0730, 4 L/min at 03/05/25 0730    [START ON 3/6/2025] pantoprazole (ProtoNix) EC tablet 40 mg, 40 mg, oral, Daily, Neto Clark MD    perflutren lipid microspheres (Definity) injection 0.5-10 mL of dilution, 0.5-10 mL of dilution, intravenous, Once in imaging, Neto Clark MD    perflutren protein A microsphere (Optison) injection 0.5 mL, 0.5 mL, intravenous, Once in imaging, Neto Clark MD    promethazine (Phenergan) 25 mg in sodium chloride 0.9% 50 mL IV, 25 mg, intravenous, q4h PRN, Neto Clark MD, Stopped at 03/03/25 0245    sulfur hexafluoride microsphr (Lumason) injection 24.28 mg, 2 mL, intravenous, Once in imaging, Neto Clark MD    vancomycin (Firvanq) solution 250 mg, 250 mg, oral, 4x daily, Neto Clark MD, 250 mg at 03/05/25 1355    [Held by provider] warfarin (Coumadin) tablet 2 mg, 2 mg, oral, Daily, Neto Clark MD, 2 mg at 03/01/25 0001     Lab Results   Component Value Date    WBC 12.7 (H) 03/05/2025    HGB 6.6 (L) 03/05/2025    HCT 20.3 (L) 03/05/2025     03/05/2025    CHOL 113 05/22/2024    TRIG 66 05/22/2024    HDL 33.8 05/22/2024    ALT 18 03/05/2025    AST 31 03/05/2025     (H) 03/05/2025    K 4.0 03/05/2025     (H) 03/05/2025    CREATININE 0.63  03/05/2025    BUN 27 (H) 03/05/2025    CO2 33 (H) 03/05/2025    TSH 0.40 (L) 02/26/2025    INR 1.3 (H) 03/05/2025    BLOODCULT No growth at 4 days -  FINAL REPORT 02/26/2025    BLOODCULT No growth at 4 days -  FINAL REPORT 02/26/2025       ECG 12 lead    Result Date: 2/28/2025  Right and left arm electrode reversal, interpretation assumes no reversal Sinus rhythm Prolonged [place] interval Probable lateral infarct, age indeterminate See ED provider note for full interpretation and clinical correlation Confirmed by Gege Mclean ([id number]) on 2/28/2025 11:53:41 AM    ECG 12 lead    Result Date: 2/27/2025  Sinus rhythm Prolonged NY interval Borderline T wave abnormalities See ED provider note for full interpretation and clinical correlation Confirmed by Castro Ovalle ([id number]) on 2/27/2025 11:44:11 PM    XR chest 1 view    Result Date: 2/26/2025  Interpreted By:  Emilia Sargent, STUDY: XR CHEST 1 VIEW;  2/26/2025 5:29 am   INDICATION: Signs/Symptoms:increased oxygen requirement, influenza A positive.   COMPARISON: 02/26/2025   ACCESSION NUMBER(S): [id number]   ORDERING CLINICIAN: JOSELITO DE LA ROSA   FINDINGS: CARDIOMEDIASTINAL SILHOUETTE: The heart is enlarged. There are sternotomy wires from prior cardiac surgery. There is a prosthetic aortic valve.   LUNGS: There are bibasilar reticulonodular interstitial opacities greater at the left lung base consistent with pneumonia/pneumonitis. Patient is influenza A positive. This could represent viral pneumonitis. There is no focal dense area of consolidation or pleural fluid. This is unchanged.   ABDOMEN: No remarkable upper abdominal findings.     BONES: No acute osseous changes.       Stable chest. Unchanged bibasilar reticulonodular interstitial opacities consistent with pneumonia/viral pneumonitis.   MACRO: None   Signed by: Emilia Sargent 2/26/2025 10:13 AM Dictation workstation:   [id number]     renal complete    Result Date: 2/26/2025  Interpreted By:  Karthikeyan Cooper  STUDY: US RENAL COMPLETE;  2/26/2025 5:21 am   INDICATION: Signs/Symptoms:HILLARY.   COMPARISON: None.   ACCESSION NUMBER(S): [id number]   ORDERING CLINICIAN: TIFFANIE MONK   TECHNIQUE: Multiple images of the kidneys were obtained  , with color Doppler for blood flow.   FINDINGS: RIGHT KIDNEY: The right kidney measures 11.7 cm in length.  The renal cortex is within normal limits.   No hydronephrosis or evidence of nephrolithiasis. Color Doppler demonstrates renal blood flow.   LEFT KIDNEY: The left kidney measures 11.1 cm in length. The renal cortex is within normal limits.    No hydronephrosis or evidence of nephrolithiasis.. Color Doppler demonstrates renal blood flow.   BLADDER: The urinary bladder is unremarkable in appearance.   OTHER FINDINGS: None significant.       Unremarkable renal ultrasound.   Signed by: Karthikeyan Cooper 2/26/2025 8:29 AM Dictation workstation:   [id number]    CT abdomen pelvis wo IV contrast    Result Date: 2/26/2025  Interpreted By:  Ha Chawla, STUDY: CT ABDOMEN PELVIS WO IV CONTRAST; ;  2/26/2025 7:49 am   INDICATION: Signs/Symptoms:abd pain.   COMPARISON: None.   ACCESSION NUMBER(S): [id number]   ORDERING CLINICIAN: TIFFANIE MONK   TECHNIQUE: Contiguous axial CT sections are performed from the lung bases to the lesser trochanters without enteric or intravenous contrast. The study is supplemented with coronal and sagittal reformatted images.   FINDINGS: There scattered areas of interstitial and airspace infiltrate in the lung bases with linear areas of atelectasis. There is a tiny left pleural effusion in the left posterior costophrenic angle. The subpleural density is identified abutting the right anterolateral pleural surface right lower lobe measuring 5 mm in diameter.   The gallbladder is distended. There is no wall thickening or surrounding fluid.   The liver, spleen, pancreas, and adrenal glands are of unremarkable unenhanced CT appearance.   The kidneys are symmetric in  size. There is no renal calculus or hydronephrosis. The perinephric fat is preserved. There is mild prominence of both ureters though no obstructing ureteral calculus. The urinary bladder is significantly distended. There is no wall thickening or surrounding infiltration. No bladder calculus is detected.   There are diffuse atherosclerotic arterial calcifications of the abdominal aorta. The aorta is of normal caliber. There is no periaortic mass or fluid collection.   There is no bowel distension or infiltration of the bowel mesentery. Minimal bowel wall thickening of the colon may be present. There is no free air free fluid collection in the abdomen or pelvis.   The visualized pelvic viscera are unremarkable. The visualized osseous structures of the abdomen and pelvis are intact.       Faint nodular interstitial density in ground-glass infiltrates are identified in the lung bases with tiny left pleural effusion. This appearance is nonspecific.   Mild bowel wall thickening of the colon can not be excluded. Correlate with clinical findings colitis. There is no bowel obstruction.   The remainder of the abdomen and pelvis is unremarkable allowing for limitations of an unenhanced study.     MACRO: None   Signed by: Ha Chawla 2/26/2025 8:00 AM Dictation workstation:   [id number]    XR chest 1 view    Result Date: 2/26/2025  Interpreted By:  Christopher Casey, STUDY: XR CHEST 1 VIEW;  2/26/2025 12:26 am   INDICATION: Signs/Symptoms:weak.   COMPARISON: 12/2/2020   ACCESSION NUMBER(S): [id number]   ORDERING CLINICIAN: LYNSEY MILLER   FINDINGS: Postsurgical change of sternotomy and cardiac valve prosthesis. The cardiac silhouette is stable in size. Mild opacity in the left mid lung may correspond to atelectasis or infiltrate. No significant pleural effusion. No pneumothorax.       Mild opacity in the left mid lung may correspond to atelectasis or infiltrate.   MACRO: None   Signed by: Christopher Casey 2/26/2025 12:58  AM Dictation workstation:   [id number]                Assessment/Plan   Assessment & Plan  Nausea vomiting and diarrhea    Influenza A with pneumonia    Nontraumatic compartment syndrome of left upper extremity      Patient evaluated at bedside.  Patient case discussed with Dr. Clark after evaluation.  Patient has multiphasic dopplerable upper extremity left ulnar signals.  Hands are warm.  Continue elevation of left upper extremity to reduce edema.  Continue to keep hands warm.  Continue neurovascular checks left upper extremity.  Monitor for signs of bleeding.  Avoid supratherapeutic INR.  No surgical intervention at this time by vascular surgery.  Will evaluate tomorrow.     Thank you very much for allowing Vascular Surgery to be involved in the care of your patient sincerely José Luis Tadeo APRN-CNP .  (This note was generated with voice recognition software and may contain errors including spelling, grammar, syntax and missed recognition of what was dictated, of which may not have been fully corrected)    José Luis Tadeo, APRN-CNP

## 2025-03-05 NOTE — NURSING NOTE
IR Consult Note:    Spoke with Dr Clark, continuing to trend Hgb. IR signing off at this time and able to reassess if another consult is needed.

## 2025-03-05 NOTE — PROGRESS NOTES
Physical Therapy    Physical Therapy Evaluation    Patient Name: Kayley Lynch  MRN: [id number]  138/138-[id number]  Today's Date: 3/5/2025   Time Calculation  Start Time: 1041  Stop Time: 1105  Time Calculation (min): 24 min    Assessment/Plan   PT Assessment  PT Assessment Results: Decreased strength, Decreased endurance, Impaired balance, Decreased mobility  Rehab Prognosis: Good  Barriers to Discharge Home: Physical needs  Physical Needs: Ambulating household distances limited by function/safety  End of Session Communication: Bedside nurse  Assessment Comment: Pt is presenting with a decline in functional mobility from baseline. Pt would benefit from further PT services to address these deficits to return to prior level of function.  End of Session Patient Position: Bed, 3 rail up, Alarm off, not on at start of session  IP OR SWING BED PT PLAN  Inpatient or Swing Bed: Inpatient  PT Plan  Treatment/Interventions: Bed mobility, Transfer training, Gait training, Balance training, Strengthening, Endurance training, Therapeutic exercise, Therapeutic activity  PT Plan: Ongoing PT  PT Frequency: 3 times per week  PT Discharge Recommendations: Moderate intensity level of continued care  PT - OK to Discharge: Yes    Subjective     Current Problem:  Patient Active Problem List   Diagnosis    Atrial flutter (Multi)    Bilateral edema of lower extremity    CAD (coronary artery disease)    Cardiomyopathy    Hypercholesterolemia    Mitral regurgitation    Pleural effusion    Pulmonary hypertension (Multi)    S/P CABG (coronary artery bypass graft)    S/P mitral valve replacement    Dyspnea on exertion    Tachycardia    Current mild episode of major depressive disorder without prior episode (CMS-[facility])    Chest pressure    Nausea vomiting and diarrhea    Influenza A with pneumonia    Nontraumatic compartment syndrome of left upper extremity       General Visit Information:  General  Reason for Referral: PT eval and treat; spetic shock, COPD  exacerbation, large hematoma left arm/compartment syndrome s/p urgent fasciotomy on 3/2, closure on 3/4; spontaneous iliopsoas hematoma on left, stable amount of left  retroperitoneal hemorrhage that extends down the left hemipelvis and  surrounding the rectum  Referred By: Dimas Mensah  Past Medical History Relevant to Rehab: COPD, HLD, CAD s/p CABG, pulm HTN, cardiomyopathy, a flutter s/p ablation, MVR  Family/Caregiver Present:  (son)  Co-Treatment: OT  Co-Treatment Reason: For patient safety and to maximize mobility  Prior to Session Communication: Bedside nurse  Patient Position Received: Bed, 3 rail up, Alarm off, not on at start of session  General Comment: Pt resting in bed upon entering, agreeable to PT. Limited mobility preformed this date due to awaiting repeat imaging of retroperitineal bleed and increased lethargy    Home Living:  Home Living  Type of Home: House  Lives With: Spouse ( works)  Home Adaptive Equipment: None  Home Layout: One level, Laundry main level  Home Access:  (2 NNAMDI with rail in front, 2 NNAMDI from garage without rail)  Bathroom Shower/Tub: Walk-in shower  Bathroom Toilet: Standard  Bathroom Equipment: None    Prior Level of Function:  Prior Function Per Pt/Caregiver Report  Level of [place]: Independent with ADLs and functional transfers  ADL Assistance: Independent  Homemaking Assistance: Independent  Ambulatory Assistance: Independent  Prior Function Comments: (+) driving, (-) working    Precautions:  Precautions  Precautions Comment: contact plus precautions for c-diff, droplet precautions for flu A, hgb 7.3, davidson, FMS, telemetry, 4L O2, heparin drip, central line, ROM to LUE as tolerated per ortho    Vital Signs:  Vital Signs  Vital Signs Comment: VSS throughout session  Objective     Pain:  Pain Assessment  Pain Assessment: 0-10  0-10 (Numeric) Pain Score: 0 - No pain    Cognition:  Cognition  Overall Cognitive Status:  (Pt lethargic during session but oriented x  3)    General Assessments:      Activity Tolerance  Endurance: Tolerates less than 10 min exercise, no significant change in vital signs  Early Mobility/Exercise Safety Screen: Proceed with mobilization - No exclusion criteria met  Sensation  Light Touch: No apparent deficits  Strength  Strength Comments: While supine ankle DF/PF 4/5, knee ext 3-/5, hip flex 3-/5 x BLE  Perception  Inattention/Neglect: Appears intact  Coordination  Movements are Fluid and Coordinated: Yes             Functional Assessments:     Bed Mobility  Bed Mobility: Yes  Bed Mobility 1  Bed Mobility Comments 1: Pt placed in dependent chair position for session. Pt completes anterior weight shift in bed with draw sheet with max assist x 2. Held for 1 minute, use of RUE for support.  Transfers  Transfer: No (deferred due to awaiting imaging. ROM completed in bed)  Ambulation/Gait Training  Ambulation/Gait Training Performed:  (Unable this date)    Outcome Measures:  [facility] Basic Mobility  Turning from your back to your side while in a flat bed without using bedrails: A lot  Moving from lying on your back to sitting on the side of a flat bed without using bedrails: Total  Moving to and from bed to chair (including a wheelchair): Total  Standing up from a chair using your arms (e.g. wheelchair or bedside chair): Total  To walk in hospital room: Total  Climbing 3-5 steps with railing: Total  Basic Mobility - Total Score: 7           FSS-ICU  Ambulation: Unable to attempt due to weakness  Rolling: Maximal assistance (performs 25% - 49% of task)  Sitting: Total assistance (performs 25% or requires another person)  Transfer Sit-to-Stand: Unable to perform  Transfer Supine-to-Sit: Unable to perform  Total Score: 3  ICU Mobility Screen  Early Mobility/Exercise Safety Screen: Proceed with mobilization - No exclusion criteria met  ICU Mobility Scale: Sitting in bed, exercises in bed  E = Exercise and Early Mobility  Early Mobility/Exercise Safety Screen:  Proceed with mobilization - No exclusion criteria met  ICU Mobility Scale: Sitting in bed, exercises in bed          Goals:  Encounter Problems       Encounter Problems (Active)       PT Problem       PT Goal 1 STG - Pt will amb 10' using WW with MIN A   (Progressing)       Start:  03/05/25    Expected End:  03/19/25            PT Goal 2 STG - Pt will transition supine <> sitting with MIN A  (Progressing)       Start:  03/05/25    Expected End:  03/19/25            PT Goal 3 STG - Pt will SPT bed <> chair with MIN A  (Progressing)       Start:  03/05/25    Expected End:  03/19/25            PT Goal 4 STG - Pt will transfer STS with MIN A  (Progressing)       Start:  03/05/25    Expected End:  03/19/25               Pain - Adult            Education Documentation  Mobility Training, taught by Gege Workman, PT at 3/5/2025  3:21 PM.  Learner: Patient  Readiness: Acceptance  Method: Explanation  Response: Verbalizes Understanding    Education Comments  No comments found.

## 2025-03-05 NOTE — SIGNIFICANT EVENT
Brief Attending Summary:     54-year-old female with past medical history of COPD, mechanical mitral valve repair, CAD status post CABG, pulmonary hypertension,, a flutter status post ablation on Coumadin who presented on 2/26/2025 with fever, cough and congestion with septic shock, with influenza and secondary bacterial pneumonia.  Patient was transferred to ICU on 228 for left upper extremity swelling found to have compartment syndrome underwent fasciotomy.  Patient was continued on heparin infusion, her INR became supratherapeutic, which was treated with blood products and vitamin K.  Eventually became subtherapeutic.  CT chest abdomen done today shows retroperitoneal hematoma the left iliac extending down to the rectal region.    O/E   More awake and oriented to person and place, and year  Head is atraumatic  Neck is supple  Heart - S1, S2, mechanical click+  Lungs - decreased in bases  Abdomen - BS+, soft, nontender  Extremities - dressing of the LUE, edema LE, PP+  CNS - no focal deficit notes    Assessment/Plan    Acute hypoxic respiratory failure secondary to COPD exacerbation and pneumonia, requiring AVAPS overnight.  CT chest shows emphysematous changes with atelectasis and infiltrates.  Weaned to NC  Afib with RVR - on BB, cards following, on AC  HTN - elevated BP, optimize medication  Metabolic encephalopathy secondary to critical illness, monitor closely and correct etiology.  Reorient daily, avoid excessive caths and drains and benzo diazepam's and opiates.  HILLARY, improved, related to septic shock, prerenal etiology.  Monitor UO, renal function and electrolytes  DC IV fluids, encourage, monitor urine output and renal function.  Hypernatremia -encourage p.o. hydration  RPH - RPH bilateral along the iliopsoas and in plevis, CTA did not show any extravasation, monitor H/H, if remains stable, pt will need to be restarted on AC  Mechanical Mitral valve  - Holding AC due to left arm hematoma and RPH, cards  consulted regarding AC and bleeding due to supra therapeutic INR which was corrected. Cards consulted, appreciate recs, restarted on low dose heparin infusion, without bolus.  Monitor H&H   COPD exacerbation resolved - continue home meds  Discussed with bedside, he understood and agreed with the plan.  C Diff colitis - on vanco po, monitor     I have reviewed and evaluated the most recent data and results, personally examined the patient, and formulated the plan of care as presented above. This patient was critically ill and required continued critical care treatment. Teaching and any separately billable procedures are not included in the time calculation.    Billing Provider Critical Care Time: 43 minutes    Dimas Mensah MD

## 2025-03-06 ENCOUNTER — APPOINTMENT (OUTPATIENT)
Dept: RADIOLOGY | Facility: HOSPITAL | Age: 55
DRG: 853 | End: 2025-03-06
Payer: COMMERCIAL

## 2025-03-06 LAB
ALBUMIN SERPL BCP-MCNC: 2.9 G/DL (ref 3.4–5)
ALP SERPL-CCNC: 39 U/L (ref 33–110)
ALT SERPL W P-5'-P-CCNC: 14 U/L (ref 7–45)
ANION GAP SERPL CALC-SCNC: <7 MMOL/L (ref 10–20)
AST SERPL W P-5'-P-CCNC: 26 U/L (ref 9–39)
ATRIAL RATE: 219 BPM
BILIRUB SERPL-MCNC: 1 MG/DL (ref 0–1.2)
BLOOD EXPIRATION DATE: NORMAL
BUN SERPL-MCNC: 15 MG/DL (ref 6–23)
CALCIUM SERPL-MCNC: 8 MG/DL (ref 8.6–10.3)
CHLORIDE SERPL-SCNC: 103 MMOL/L (ref 98–107)
CO2 SERPL-SCNC: 36 MMOL/L (ref 21–32)
CREAT SERPL-MCNC: 0.46 MG/DL (ref 0.5–1.05)
DISPENSE STATUS: NORMAL
EGFRCR SERPLBLD CKD-EPI 2021: >90 ML/MIN/1.73M*2
ERYTHROCYTE [DISTWIDTH] IN BLOOD BY AUTOMATED COUNT: 18.7 % (ref 11.5–14.5)
GLUCOSE BLD MANUAL STRIP-MCNC: 111 MG/DL (ref 74–99)
GLUCOSE SERPL-MCNC: 92 MG/DL (ref 74–99)
HCT VFR BLD AUTO: 24.8 % (ref 36–46)
HCT VFR BLD AUTO: 29.2 % (ref 36–46)
HGB BLD-MCNC: 7.9 G/DL (ref 12–16)
HGB BLD-MCNC: 9.3 G/DL (ref 12–16)
MAGNESIUM SERPL-MCNC: 1.7 MG/DL (ref 1.6–2.4)
MCH RBC QN AUTO: 27.2 PG (ref 26–34)
MCHC RBC AUTO-ENTMCNC: 31.8 G/DL (ref 32–36)
MCV RBC AUTO: 85 FL (ref 80–100)
NRBC BLD-RTO: 1.4 /100 WBCS (ref 0–0)
P AXIS: 88 DEGREES
PLATELET # BLD AUTO: 172 X10*3/UL (ref 150–450)
POTASSIUM SERPL-SCNC: 3.3 MMOL/L (ref 3.5–5.3)
PR INTERVAL: 133 MS
PRODUCT BLOOD TYPE: 6200
PRODUCT CODE: NORMAL
PROT SERPL-MCNC: 4.8 G/DL (ref 6.4–8.2)
Q ONSET: 249 MS
QRS COUNT: 22 BEATS
QRS DURATION: 87 MS
QT INTERVAL: 289 MS
QTC CALCULATION(BAZETT): 428 MS
QTC FREDERICIA: 376 MS
R AXIS: 40 DEGREES
RBC # BLD AUTO: 3.42 X10*6/UL (ref 4–5.2)
SODIUM SERPL-SCNC: 142 MMOL/L (ref 136–145)
T AXIS: 257 DEGREES
T OFFSET: 394 MS
UFH PPP CHRO-ACNC: 0.3 IU/ML
UFH PPP CHRO-ACNC: 0.3 IU/ML
UNIT ABO: NORMAL
UNIT NUMBER: NORMAL
UNIT RH: NORMAL
UNIT VOLUME: 350
VENTRICULAR RATE: 132 BPM
WBC # BLD AUTO: 14.6 X10*3/UL (ref 4.4–11.3)
XM INTEP: NORMAL

## 2025-03-06 PROCEDURE — 94640 AIRWAY INHALATION TREATMENT: CPT

## 2025-03-06 PROCEDURE — 94660 CPAP INITIATION&MGMT: CPT

## 2025-03-06 PROCEDURE — 71045 X-RAY EXAM CHEST 1 VIEW: CPT | Performed by: RADIOLOGY

## 2025-03-06 PROCEDURE — 2500000002 HC RX 250 W HCPCS SELF ADMINISTERED DRUGS (ALT 637 FOR MEDICARE OP, ALT 636 FOR OP/ED)

## 2025-03-06 PROCEDURE — 37799 UNLISTED PX VASCULAR SURGERY: CPT | Performed by: HOSPITALIST

## 2025-03-06 PROCEDURE — 85027 COMPLETE CBC AUTOMATED: CPT

## 2025-03-06 PROCEDURE — 2020000001 HC ICU ROOM DAILY

## 2025-03-06 PROCEDURE — 37799 UNLISTED PX VASCULAR SURGERY: CPT

## 2025-03-06 PROCEDURE — 80053 COMPREHEN METABOLIC PANEL: CPT

## 2025-03-06 PROCEDURE — 2500000004 HC RX 250 GENERAL PHARMACY W/ HCPCS (ALT 636 FOR OP/ED)

## 2025-03-06 PROCEDURE — 99232 SBSQ HOSP IP/OBS MODERATE 35: CPT | Performed by: SURGERY

## 2025-03-06 PROCEDURE — 83735 ASSAY OF MAGNESIUM: CPT

## 2025-03-06 PROCEDURE — 99233 SBSQ HOSP IP/OBS HIGH 50: CPT | Performed by: INTERNAL MEDICINE

## 2025-03-06 PROCEDURE — 2500000005 HC RX 250 GENERAL PHARMACY W/O HCPCS

## 2025-03-06 PROCEDURE — 71045 X-RAY EXAM CHEST 1 VIEW: CPT

## 2025-03-06 PROCEDURE — 2500000001 HC RX 250 WO HCPCS SELF ADMINISTERED DRUGS (ALT 637 FOR MEDICARE OP)

## 2025-03-06 PROCEDURE — 85520 HEPARIN ASSAY: CPT | Performed by: HOSPITALIST

## 2025-03-06 PROCEDURE — 82947 ASSAY GLUCOSE BLOOD QUANT: CPT

## 2025-03-06 PROCEDURE — 99233 SBSQ HOSP IP/OBS HIGH 50: CPT

## 2025-03-06 PROCEDURE — 92526 ORAL FUNCTION THERAPY: CPT | Mod: GN | Performed by: SPEECH-LANGUAGE PATHOLOGIST

## 2025-03-06 RX ORDER — FUROSEMIDE 10 MG/ML
20 INJECTION INTRAMUSCULAR; INTRAVENOUS ONCE
Status: COMPLETED | OUTPATIENT
Start: 2025-03-06 | End: 2025-03-06

## 2025-03-06 RX ORDER — MAGNESIUM SULFATE HEPTAHYDRATE 40 MG/ML
2 INJECTION, SOLUTION INTRAVENOUS ONCE
Status: COMPLETED | OUTPATIENT
Start: 2025-03-06 | End: 2025-03-06

## 2025-03-06 RX ORDER — CARVEDILOL 25 MG/1
25 TABLET ORAL 2 TIMES DAILY
Status: DISCONTINUED | OUTPATIENT
Start: 2025-03-06 | End: 2025-03-18 | Stop reason: HOSPADM

## 2025-03-06 RX ORDER — LABETALOL HYDROCHLORIDE 5 MG/ML
5 INJECTION, SOLUTION INTRAVENOUS EVERY 4 HOURS PRN
Status: DISCONTINUED | OUTPATIENT
Start: 2025-03-06 | End: 2025-03-18 | Stop reason: HOSPADM

## 2025-03-06 RX ORDER — MAGNESIUM SULFATE HEPTAHYDRATE 40 MG/ML
INJECTION, SOLUTION INTRAVENOUS
Status: COMPLETED
Start: 2025-03-06 | End: 2025-03-06

## 2025-03-06 RX ORDER — CARVEDILOL 12.5 MG/1
12.5 TABLET ORAL 2 TIMES DAILY
Status: DISCONTINUED | OUTPATIENT
Start: 2025-03-06 | End: 2025-03-06

## 2025-03-06 RX ORDER — POTASSIUM CHLORIDE 1.5 G/1.58G
20 POWDER, FOR SOLUTION ORAL ONCE
Status: COMPLETED | OUTPATIENT
Start: 2025-03-06 | End: 2025-03-06

## 2025-03-06 RX ADMIN — MAGNESIUM SULFATE HEPTAHYDRATE 2 G: 40 INJECTION, SOLUTION INTRAVENOUS at 10:10

## 2025-03-06 RX ADMIN — LABETALOL HYDROCHLORIDE 5 MG: 5 INJECTION INTRAVENOUS at 18:08

## 2025-03-06 RX ADMIN — FUROSEMIDE 20 MG: 10 INJECTION, SOLUTION INTRAVENOUS at 11:10

## 2025-03-06 RX ADMIN — METOPROLOL TARTRATE 25 MG: 25 TABLET, FILM COATED ORAL at 10:04

## 2025-03-06 RX ADMIN — IPRATROPIUM BROMIDE AND ALBUTEROL SULFATE 3 ML: .5; 3 SOLUTION RESPIRATORY (INHALATION) at 18:44

## 2025-03-06 RX ADMIN — CARVEDILOL 25 MG: 25 TABLET, FILM COATED ORAL at 20:03

## 2025-03-06 RX ADMIN — VANCOMYCIN HYDROCHLORIDE 250 MG: KIT at 14:06

## 2025-03-06 RX ADMIN — BUPROPION HYDROCHLORIDE 150 MG: 150 TABLET, EXTENDED RELEASE ORAL at 10:06

## 2025-03-06 RX ADMIN — ATORVASTATIN CALCIUM 80 MG: 80 TABLET, FILM COATED ORAL at 20:03

## 2025-03-06 RX ADMIN — Medication 4 L/MIN: at 08:45

## 2025-03-06 RX ADMIN — ESCITALOPRAM OXALATE 20 MG: 10 TABLET ORAL at 10:03

## 2025-03-06 RX ADMIN — BUDESONIDE 0.5 MG: 0.5 INHALANT ORAL at 07:17

## 2025-03-06 RX ADMIN — VANCOMYCIN HYDROCHLORIDE 250 MG: KIT at 20:03

## 2025-03-06 RX ADMIN — IPRATROPIUM BROMIDE AND ALBUTEROL SULFATE 3 ML: .5; 3 SOLUTION RESPIRATORY (INHALATION) at 11:48

## 2025-03-06 RX ADMIN — VANCOMYCIN HYDROCHLORIDE 250 MG: KIT at 17:58

## 2025-03-06 RX ADMIN — Medication 40 PERCENT: at 19:16

## 2025-03-06 RX ADMIN — VANCOMYCIN HYDROCHLORIDE 250 MG: KIT at 06:13

## 2025-03-06 RX ADMIN — IPRATROPIUM BROMIDE AND ALBUTEROL SULFATE 3 ML: .5; 3 SOLUTION RESPIRATORY (INHALATION) at 02:51

## 2025-03-06 RX ADMIN — IPRATROPIUM BROMIDE AND ALBUTEROL SULFATE 3 ML: .5; 3 SOLUTION RESPIRATORY (INHALATION) at 07:17

## 2025-03-06 RX ADMIN — BUDESONIDE 0.5 MG: 0.5 INHALANT ORAL at 18:45

## 2025-03-06 RX ADMIN — PANTOPRAZOLE SODIUM 40 MG: 40 TABLET, DELAYED RELEASE ORAL at 10:04

## 2025-03-06 RX ADMIN — POTASSIUM CHLORIDE 20 MEQ: 1.5 POWDER, FOR SOLUTION ORAL at 10:03

## 2025-03-06 RX ADMIN — CARVEDILOL 25 MG: 25 TABLET, FILM COATED ORAL at 11:10

## 2025-03-06 ASSESSMENT — PAIN - FUNCTIONAL ASSESSMENT
PAIN_FUNCTIONAL_ASSESSMENT: 0-10

## 2025-03-06 ASSESSMENT — COGNITIVE AND FUNCTIONAL STATUS - GENERAL
WALKING IN HOSPITAL ROOM: A LOT
MOVING TO AND FROM BED TO CHAIR: A LOT
TOILETING: A LOT
CLIMB 3 TO 5 STEPS WITH RAILING: A LOT
DAILY ACTIVITIY SCORE: 12
PERSONAL GROOMING: A LOT
MOBILITY SCORE: 12
DRESSING REGULAR UPPER BODY CLOTHING: A LOT
MOVING FROM LYING ON BACK TO SITTING ON SIDE OF FLAT BED WITH BEDRAILS: A LOT
HELP NEEDED FOR BATHING: A LOT
TURNING FROM BACK TO SIDE WHILE IN FLAT BAD: A LOT
STANDING UP FROM CHAIR USING ARMS: A LOT
DRESSING REGULAR LOWER BODY CLOTHING: A LOT
EATING MEALS: A LOT

## 2025-03-06 ASSESSMENT — PAIN SCALES - GENERAL
PAINLEVEL_OUTOF10: 0 - NO PAIN

## 2025-03-06 NOTE — PROGRESS NOTES
Kayley Lynch is a 54 y.o. female on day 7 of admission presenting with Nausea vomiting and diarrhea.      Subjective   No events overnight.  Patient seen and examined at bedside with son present.  She is feeling better and more alert today. Patient has no complaints.       Objective     Last Recorded Vitals  /66   Pulse 74   Temp 36.6 °C (97.9 °F) (Temporal)   Resp 14   Wt 66.6 kg (146 lb 13.2 oz)   SpO2 96%   Intake/Output last 3 Shifts:    Intake/Output Summary (Last 24 hours) at 3/5/2025 2137  Last data filed at 3/5/2025 2130  Gross per 24 hour   Intake 3042.77 ml   Output 2110 ml   Net 932.77 ml       Admission Weight  Weight: 59 kg (130 lb) (02/26/25 0159)    Daily Weight  03/01/25 : 66.6 kg (146 lb 13.2 oz)    Image Results  XR chest 1 view  Narrative: Interpreted By:  Danny Coffman,   STUDY:  XR CHEST 1 VIEW; 3/4/2025 4:21 pm      INDICATION:  Signs/Symptoms:post central line placement.      COMPARISON:  CT pulmonary angiography 03/04/2025.      ACCESSION NUMBER(S):  [id number]      ORDERING CLINICIAN:  NIKKI YATES      TECHNIQUE:  1 view of the chest was performed.      FINDINGS:  Left-greater-than-right pleural effusion with surrounding  atelectasis. Stable right midlung ill-defined infiltrate.. No  pneumothorax.  The cardiomediastinal silhouette is stable. Previous  median sternotomy. Right central venous catheter terminating in the  lower SVC.      Impression: 1. Right central venous catheter terminating in the lower SVC with no  sizable pneumothorax.  2. Stable left-greater-than-right small pleural effusion with  surrounding atelectasis as well as right midlung ill-defined  infiltrate.      Signed by: Danny Coffman 3/4/2025 5:38 PM  Dictation workstation:   [id number]  CT angio chest abdomen pelvis  Narrative: Interpreted By:  Donald Garcia,   STUDY:  CT ANGIO CHEST ABDOMEN PELVIS;  3/4/2025 1:22 pm      INDICATION:  Signs/Symptoms:Retroperitoneal bleed.      COMPARISON:  March 3,  2025      ACCESSION NUMBER(S):  [id number]      ORDERING CLINICIAN:  NIKKI YATES      TECHNIQUE:  Axial non-contrast images of the chest, abdomen, and pelvis with  coronal and sagittal reformatted images. Axial CT images of the  chest, abdomen and pelvis after the intravenous administration of 100  mL of Omnipaque 350 using angiographic technique with coronal and  sagittal reformatted images.  MIP images were provided and reviewed.  3D reconstructions were created on a separate independent workstation  and reviewed.      FINDINGS:  VASCULATURE:      PULMONARY ARTERIES: Limited, there is no large central filling defect  to suggest a pulmonary embolus.      THORACIC AORTA: Non-contrast images show no evidence of acute  intramural hematoma. No thoracic aortic aneurysm or dissection. No  significant thoracic aortic atherosclerosis.      ABDOMINAL AORTA: No abdominal aortic aneurysm or dissection. Minor  partially calcified abdominal aortic atherosclerosis.      ABDOMINAL AND PELVIC ARTERIES: Celiac artery, SMA, bilateral renal  arteries, and KENISHA are patent. Pelvic vascularity is tortuous and  contains diffuse partially calcified atherosclerotic disease, this  results in mild stenosis of the right common iliac artery and  moderate stenosis of the mid left common iliac artery. Bilateral  internal iliac artery demonstrates mild-to-moderate stenosis. The  external and common femoral arteries are patent.          CT CHEST:      MEDIASTINUM AND LYMPH NODES: No enlarged intrathoracic or axillary  lymph nodes. No pneumomediastinum.      HEART: The heart is enlarged. 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 There  is prosthetic mitral valve. Coronary artery calcifications are  evident. No significant pericardial effusion.      LUNG, PLEURA, LARGE AIRWAYS: Limited by respiratory motion artifact.  Stable appearance of severe emphysema and patchy densities in the  posterior aspect of both upper lobes and left lower lobe, with a  trace  left pleural effusion. Central airways are patent.      OSSEOUS STRUCTURES/CHEST WALL: There are median sternotomy wires.  There are degenerative changes of the spine. No acute osseous  abnormality.          CT ABDOMEN/PELVIS:      LIVER: No significant parenchymal abnormality.      BILE DUCTS: No significant intrahepatic or extrahepatic dilatation.      GALLBLADDER: Stable mild distention of the gallbladder.      PANCREAS: No significant abnormality.      SPLEEN: No significant abnormality.      ADRENALS: No significant abnormality.      KIDNEYS, URETERS, BLADDER: No urolithiasis or hydroureteronephrosis.  Jasso catheter balloon within a collapsed urinary bladder containing  gas.      REPRODUCTIVE ORGANS: No significant abnormality.      VESSELS: (See above). No additional significant abnormality.      PERITONEUM/RETROPERITONEUM/LYMPH NODES: Stranding again seen within  the left retroperitoneum similar in appearance to the prior  examination, consistent with hemorrhage. There is heterogeneity and  prominence of the left psoas and iliacus muscles similar to the prior  exam. Blood products are seen to course along the left hemipelvis and  extending along the posterior aspect of the rectum. There is also  mild heterogeneity of the right iliacus muscle. No lymphadenopathy.  There is no free air. Stable trace amount of free fluid in the pelvis.      BOWEL/MESENTERY/PERITONEUM: Rectal tube in place. There is colonic  diverticulosis without diverticulitis. There is no evidence of  obstruction. Normal appendix.      ABDOMINAL WALL: Stable appearance of subcutaneous stranding along the  left lateral flank with overlying skin thickening.      OSSEOUS STRUCTURES: No acute osseous abnormality.      Impression: No thoracic or abdominal aortic aneurysm or acute aortic pathology.      Stable asymmetric enlargement and heterogeneity of the left psoas and  both iliacus muscles. There is also stable amount of left  retroperitoneal  hemorrhage that extends down the left hemipelvis and  surrounding the rectum. There is no evidence of active contrast  extravasation.      Severe emphysema with stable infiltrates and/or atelectasis in the  posterior aspect of the upper lobes and left lower lobe.      Unchanged position of the Jasso catheter and rectal tube.      Signed by: Donald Garcia 3/4/2025 3:24 PM  Dictation workstation:   [id number]      Physical Exam  HENT:      Head: Normocephalic and atraumatic.      Nose: Nose normal.      Mouth/Throat:      Mouth: Mucous membranes are moist.      Pharynx: Oropharynx is clear.   Eyes:      Extraocular Movements: Extraocular movements intact.      Pupils: Pupils are equal, round, and reactive to light.   Cardiovascular:      Rate and Rhythm: Normal rate and regular rhythm.   Pulmonary:      Effort: No respiratory distress.      Breath sounds: Wheezing present. No rhonchi or rales.   Abdominal:      General: Bowel sounds are normal. There is no distension.      Palpations: Abdomen is soft.      Tenderness: There is no abdominal tenderness. There is no guarding.   Musculoskeletal:      Right lower leg: No edema.      Left lower leg: No edema.   Skin:     General: Skin is warm and dry.   Neurological:      Mental Status: She is alert. Mental status is at baseline.       Relevant Results               Assessment/Plan   This patient currently has cardiac telemetry ordered; if you would like to modify or discontinue the telemetry order, click here to go to the orders activity to modify/discontinue the order.      This patient has a urinary catheter   Reason for the urinary catheter remaining today? critically ill patient who need accurate urinary output measurements          Assessment & Plan  Nausea vomiting and diarrhea    Influenza A with pneumonia      Septic shock, dehydration, hypercapnic respiratory failure, and dehydration.  Nontraumatic compartment syndrome of left upper extremity      Patient is a  54-year-old female with past medical history of COPD, CAD status post CABG, pulmonary hypertension, hyperlipidemia, cardiomyopathy, a flutter status post ablation, and mitral valve replacement who presented with malaise.  She was found to be in septic shock and admitted to the ICU.    Septic shock  Hypercapnic respiratory failure  COPD exacerbation  Influenza A  Pneumonia  Acute metabolic encephalopathy  C. difficile colitis  Dehydration  HILLARY    -Continue supplemental oxygen  -Continue antibiotics, currently on doxycycline  -Continue nebulizers  -Continue steroids  -Continue Tamiflu  -Continue p.o. vancomycin  -Continue home meds  -Monitor INR    3/2: Patient developed a large hematoma in the left arm leading to compartment syndrome. She was taken for urgent fasciotomies with Orthopedic Surgery. She is also being followed by Vascular Surgery. Vitamin K ordered for supratherapeutic INR. Her INR was 2.2 yesterday with goal being 2.5-3.5 for her A-flutter s/p mitral valve replacement. Critical Care reconsulted. Hematology consulted, appreciate recs. Hold coumadin and lovenox.    3/3: INR reversed. Patient is on low intensity heparin drip. She was transfused 1 unit pRBCs. Monitor hemoglobin closely. Hematology has ordered a work up including DIC panel. Patient will need another debridement and irrigation in 24-48 hours per Orthopedics. She has also developed a retroperitoneal bleed.    3/4: INR is down to 1.0.  Leukocytosis improved from 15 down to 13.  Hemoglobin is down to 6.4.  Patient to be transfused another unit packed RBCs.  She will be going back to the OR today for further debridement, washout, possible closure. She will also have a CT angiogram to determine if there is any active bleeding causing her retroperitoneal bleed.  She remains on a heparin drip due to her valve.  Continue doxycycline and p.o. Vanco.    3/5: Hemoglobin stable in the low 7's. INR 1.3. Fasciotomy site has been closed. Patient remains on  a heparin drip with plans to resume Coumadin when able. Continue antibiotics.               [place] Steven, DO

## 2025-03-06 NOTE — PROGRESS NOTES
"Kayley Lynch is a 54 y.o. female on day 8 of admission presenting with Nausea vomiting and diarrhea.    Subjective   Patient resting comfortably in bed. Orthopedics (Dr. Licea) at bedside performing dressing change.       Objective     Physical Exam  Vitals reviewed.   Constitutional:       General: She is sleeping. She is not in acute distress.     Appearance: Normal appearance. She is normal weight.   HENT:      Head: Normocephalic and atraumatic.   Eyes:      Extraocular Movements: Extraocular movements intact.      Conjunctiva/sclera: Conjunctivae normal.   Cardiovascular:      Rate and Rhythm: Normal rate and regular rhythm.      Pulses: Normal pulses.      Heart sounds: Normal heart sounds.      Comments: Triphasic left ulnar and palmar arch doppler signals  Pulmonary:      Effort: Pulmonary effort is normal.      Breath sounds: Normal breath sounds.   Abdominal:      Palpations: Abdomen is soft.   Musculoskeletal:      Comments: Left hand warm and pink  Left upper extremity incisions c/d/i   Skin:     General: Skin is warm and dry.   Neurological:      General: No focal deficit present.      Mental Status: She is easily aroused.   Psychiatric:         Mood and Affect: Mood normal.         Behavior: Behavior normal.         Last Recorded Vitals  Blood pressure 178/81, pulse 80, temperature 36.4 °C (97.5 °F), temperature source Temporal, resp. rate 17, height 1.6 m (5' 2.99\"), weight 66.6 kg (146 lb 13.2 oz), SpO2 94%.  Intake/Output last 3 Shifts:  I/O last 3 completed shifts:  In: 4245.8 (63.8 mL/kg) [I.V.:3102.8 (46.6 mL/kg); Blood:892; IV Piggyback:251]  Out: 3185 (47.8 mL/kg) [Urine:3185 (1.3 mL/kg/hr)]  Weight: 66.6 kg     Relevant Results      Current Facility-Administered Medications:     acetaminophen (Tylenol) tablet 650 mg, 650 mg, oral, q4h PRN, Neto Clark MD, 650 mg at 03/05/25 1225    [Held by provider] aspirin EC tablet 81 mg, 81 mg, oral, Daily, Neto Clark MD, 81 mg at 03/02/25 " 0847    atorvastatin (Lipitor) tablet 80 mg, 80 mg, oral, Nightly, Neto Clark MD, 80 mg at 03/05/25 2025    budesonide (Pulmicort) 0.5 mg/2 mL nebulizer solution 0.5 mg, 0.5 mg, nebulization, BID, Neto Clark MD, 0.5 mg at 03/06/25 0717    buPROPion XL (Wellbutrin XL) 24 hr tablet 150 mg, 150 mg, oral, q AM, Neto Clark MD, 150 mg at 03/06/25 1006    carvedilol (Coreg) tablet 25 mg, 25 mg, oral, BID, Neto Clark MD, 25 mg at 03/06/25 1110    escitalopram (Lexapro) tablet 20 mg, 20 mg, oral, Daily, Nteo Clark MD, 20 mg at 03/06/25 1003    heparin 25,000 Units in dextrose 5% 250 mL (100 Units/mL) infusion (premix), 0-4,000 Units/hr, intravenous, Continuous, Neto Clark MD, Last Rate: 8 mL/hr at 03/06/25 1116, 800 Units/hr at 03/06/25 1116    insulin lispro injection 0-5 Units, 0-5 Units, subcutaneous, TID AC, Neto Clark MD, 2 Units at 03/02/25 0847    ipratropium-albuteroL (Duo-Neb) 0.5-2.5 mg/3 mL nebulizer solution 3 mL, 3 mL, nebulization, q6h, Neto Clark MD, 3 mL at 03/06/25 0717    ipratropium-albuteroL (Duo-Neb) 0.5-2.5 mg/3 mL nebulizer solution 3 mL, 3 mL, nebulization, q2h PRN, Neto Clark MD    labetaloL (Normodyne,Trandate) injection 5 mg, 5 mg, intravenous, q4h PRN, Neto Clark MD    magnesium sulfate 2 g in sterile water for injection 50 mL, 2 g, intravenous, Once, Neto Clark MD, Last Rate: 25 mL/hr at 03/06/25 1010, 2 g at 03/06/25 1010    melatonin tablet 3 mg, 3 mg, oral, Nightly PRN, Neto Clark MD, 3 mg at 02/28/25 2049    [Held by provider] morphine injection 2 mg, 2 mg, intravenous, q2h PRN, Neto Clark MD, 2 mg at 03/04/25 0138    ondansetron (Zofran) injection 4 mg, 4 mg, intravenous, q4h PRN, Neto Clark MD, 4 mg at 03/03/25 0048    oxygen (O2) therapy, , inhalation, Continuous - Inhalation, Neto Clark MD, Last Rate: 240,000 mL/hr at 03/06/25 0845, 4 L/min at 03/06/25 0845    pantoprazole (ProtoNix)  EC tablet 40 mg, 40 mg, oral, Daily, Neto Clark MD, 40 mg at 03/06/25 1004    perflutren lipid microspheres (Definity) injection 0.5-10 mL of dilution, 0.5-10 mL of dilution, intravenous, Once in imaging, Neto Clark MD    perflutren protein A microsphere (Optison) injection 0.5 mL, 0.5 mL, intravenous, Once in imaging, Neto Clark MD    promethazine (Phenergan) 25 mg in sodium chloride 0.9% 50 mL IV, 25 mg, intravenous, q4h PRN, Neto Clark MD, Stopped at 03/03/25 0245    sulfur hexafluoride microsphr (Lumason) injection 24.28 mg, 2 mL, intravenous, Once in imaging, Neto Clark MD    vancomycin (Firvanq) solution 250 mg, 250 mg, oral, 4x daily, Neto Clark MD, 250 mg at 03/06/25 0613    [Held by provider] warfarin (Coumadin) tablet 2 mg, 2 mg, oral, Daily, Neto Clark MD, 2 mg at 03/01/25 1732       Results for orders placed or performed during the hospital encounter of 02/25/25 (from the past 24 hours)   Heparin Assay, UFH   Result Value Ref Range    Heparin Unfractionated 0.4 See Comment Below for Therapeutic Ranges IU/mL   CBC   Result Value Ref Range    WBC 12.7 (H) 4.4 - 11.3 x10*3/uL    nRBC 2.4 (H) 0.0 - 0.0 /100 WBCs    RBC 2.38 (L) 4.00 - 5.20 x10*6/uL    Hemoglobin 6.6 (L) 12.0 - 16.0 g/dL    Hematocrit 20.3 (L) 36.0 - 46.0 %    MCV 85 80 - 100 fL    MCH 27.7 26.0 - 34.0 pg    MCHC 32.5 32.0 - 36.0 g/dL    RDW 19.5 (H) 11.5 - 14.5 %    Platelets 152 150 - 450 x10*3/uL   POCT GLUCOSE   Result Value Ref Range    POCT Glucose 122 (H) 74 - 99 mg/dL   Type and screen   Result Value Ref Range    ABO TYPE A     Rh TYPE POS     ANTIBODY SCREEN NEG    Protime-INR   Result Value Ref Range    Protime 14.0 (H) 9.8 - 12.4 seconds    INR 1.3 (H) 0.9 - 1.1   Fibrinogen   Result Value Ref Range    Fibrinogen 232 200 - 400 mg/dL   Prepare RBC: 1 Units   Result Value Ref Range    PRODUCT CODE M9643P36     Unit Number [id number]     Unit ABO A     Unit RH POS     XM INTEP COMP      Dispense Status RE     Blood Expiration Date 4/1/2025 11:59:00 PM EDT     PRODUCT BLOOD TYPE 6200     UNIT VOLUME 350    Hemoglobin and hematocrit, blood   Result Value Ref Range    Hemoglobin 7.9 (L) 12.0 - 16.0 g/dL    Hematocrit 24.8 (L) 36.0 - 46.0 %   Magnesium   Result Value Ref Range    Magnesium 1.70 1.60 - 2.40 mg/dL   Comprehensive Metabolic Panel   Result Value Ref Range    Glucose 92 74 - 99 mg/dL    Sodium 142 136 - 145 mmol/L    Potassium 3.3 (L) 3.5 - 5.3 mmol/L    Chloride 103 98 - 107 mmol/L    Bicarbonate 36 (H) 21 - 32 mmol/L    Anion Gap <7 (L) 10 - 20 mmol/L    Urea Nitrogen 15 6 - 23 mg/dL    Creatinine 0.46 (L) 0.50 - 1.05 mg/dL    eGFR >90 >60 mL/min/1.73m*2    Calcium 8.0 (L) 8.6 - 10.3 mg/dL    Albumin 2.9 (L) 3.4 - 5.0 g/dL    Alkaline Phosphatase 39 33 - 110 U/L    Total Protein 4.8 (L) 6.4 - 8.2 g/dL    AST 26 9 - 39 U/L    Bilirubin, Total 1.0 0.0 - 1.2 mg/dL    ALT 14 7 - 45 U/L   CBC   Result Value Ref Range    WBC 14.6 (H) 4.4 - 11.3 x10*3/uL    nRBC 1.4 (H) 0.0 - 0.0 /100 WBCs    RBC 3.42 (L) 4.00 - 5.20 x10*6/uL    Hemoglobin 9.3 (L) 12.0 - 16.0 g/dL    Hematocrit 29.2 (L) 36.0 - 46.0 %    MCV 85 80 - 100 fL    MCH 27.2 26.0 - 34.0 pg    MCHC 31.8 (L) 32.0 - 36.0 g/dL    RDW 18.7 (H) 11.5 - 14.5 %    Platelets 172 150 - 450 x10*3/uL     *Note: Due to a large number of results and/or encounters for the requested time period, some results have not been displayed. A complete set of results can be found in Results Review.        CT abdomen pelvis w IV contrast    Result Date: 3/5/2025  Interpreted By:  Violet Malone, STUDY: CT ABDOMEN PELVIS W IV CONTRAST; 3/5/2025 11:31 pm   INDICATION: Signs/Symptoms:Reassess for retroperitoneal bleed.   COMPARISON: CT abdomen and pelvis 03/04/2025.   ACCESSION NUMBER(S): [id number]   ORDERING CLINICIAN: LARISA WATTS   TECHNIQUE: Oral contrast was not administered. 75 ml Omnipaque 350 was injected intravenously. CT of the Abdomen and Pelvis with  intravenous contrast was performed. Axial, sagittal and coronal reformatted images were reviewed.   FINDINGS: Lower Chest: There are bilateral infiltrates in the included lung bases. A small left pleural effusion is again seen. There is left lower lobe atelectasis and cardiomegaly. Sternal wires are noted.   Abdomen: Liver: Unremarkable. Bile Ducts: Normal caliber. Gallbladder: Gallbladder is distended and shows diffuse gallbladder wall thickening. Pancreas: Unremarkable. Spleen: Unremarkable. Adrenals: Normal. Kidneys: Normal.   Pelvis: Reproductive Organs: The uterus is lobulated likely due to uterine fibroids. Appendix: The appendix is not identified however there are no CT findings of acute appendicitis. Bladder: A catheter is noted in the urinary bladder. The bladder is decompressed.   Bowel: No bowel wall thickening or abnormal distention to suggest bowel obstruction. A rectal catheter is noted Mesenteric Lymph Nodes: No enlarged mesenteric lymph nodes. Peritoneum: There is a small amount of free fluid. There is no free air. Vessels: Atherosclerotic calcifications noted in the aorta and its branches. Retroperitoneum: Within the left psoas muscle there is a 3.7 x 2.8 by 9 cm hyperdense collection typical of a retroperitoneal hematoma similar to the prior exam. The infiltrative changes are also seen extending posterior to the left kidney and into the pelvis. In the presacral space there is fluid that could represent edema or additional hematoma. There is abnormal thickening of the iliacus muscles bilaterally probably due to retroperitoneal hematoma. Abdominal Wall: Diffuse infiltrative changes are seen in the subcutaneous fat of the lower abdominal wall left worse than right. Bones: No acute bony abnormalities.       1. There is a left psoas retroperitoneal hematoma that is similar to the previous exam with infiltrative changes extending into the pelvis and presacral space similar to the prior exam. Enlargement  of the iliacus muscles bilaterally is also likely due to bilateral retroperitoneal hematoma. This is unchanged since the prior exam 2. Small amount of free fluid in the pelvis 3. Distended gallbladder with gallbladder wall thickening. Consider gallbladder ultrasound if there is concern for acute cholecystitis. 4. Bilateral lower lung airspace consolidation possibly due to pneumonia and atelectasis. Small left pleural effusion 5. Fibroid uterus 6. No change in the rectal catheter and urinary bladder Jasso catheter     Signed by: Violet Malone 3/5/2025 11:49 PM Dictation workstation:   [id number]    XR chest 1 view    Result Date: 3/4/2025  Interpreted By:  Danny Coffman, STUDY: XR CHEST 1 VIEW; 3/4/2025 4:21 pm   INDICATION: Signs/Symptoms:post central line placement.   COMPARISON: CT pulmonary angiography 03/04/2025.   ACCESSION NUMBER(S): [id number]   ORDERING CLINICIAN: NIKKI YATES   TECHNIQUE: 1 view of the chest was performed.   FINDINGS: Left-greater-than-right pleural effusion with surrounding atelectasis. Stable right midlung ill-defined infiltrate.. No pneumothorax.  The cardiomediastinal silhouette is stable. Previous median sternotomy. Right central venous catheter terminating in the lower SVC.       1. Right central venous catheter terminating in the lower SVC with no sizable pneumothorax. 2. Stable left-greater-than-right small pleural effusion with surrounding atelectasis as well as right midlung ill-defined infiltrate.   Signed by: Danny Coffman 3/4/2025 5:38 PM Dictation workstation:   [id number]    CT angio chest abdomen pelvis    Result Date: 3/4/2025  Interpreted By:  Donald Garcia, STUDY: CT ANGIO CHEST ABDOMEN PELVIS;  3/4/2025 1:22 pm   INDICATION: Signs/Symptoms:Retroperitoneal bleed.   COMPARISON: March 3, 2025   ACCESSION NUMBER(S): [id number]   ORDERING CLINICIAN: NIKKI YATES   TECHNIQUE: Axial non-contrast images of the chest, abdomen, and pelvis with coronal and sagittal  reformatted images. Axial CT images of the chest, abdomen and pelvis after the intravenous administration of 100 mL of Omnipaque 350 using angiographic technique with coronal and sagittal reformatted images.  MIP images were provided and reviewed. 3D reconstructions were created on a separate independent workstation and reviewed.   FINDINGS: VASCULATURE:   PULMONARY ARTERIES: Limited, there is no large central filling defect to suggest a pulmonary embolus.   THORACIC AORTA: Non-contrast images show no evidence of acute intramural hematoma. No thoracic aortic aneurysm or dissection. No significant thoracic aortic atherosclerosis.   ABDOMINAL AORTA: No abdominal aortic aneurysm or dissection. Minor partially calcified abdominal aortic atherosclerosis.   ABDOMINAL AND PELVIC ARTERIES: Celiac artery, SMA, bilateral renal arteries, and KENISHA are patent. Pelvic vascularity is tortuous and contains diffuse partially calcified atherosclerotic disease, this results in mild stenosis of the right common iliac artery and moderate stenosis of the mid left common iliac artery. Bilateral internal iliac artery demonstrates mild-to-moderate stenosis. The external and common femoral arteries are patent.     CT CHEST:   MEDIASTINUM AND LYMPH NODES: No enlarged intrathoracic or axillary lymph nodes. No pneumomediastinum.   HEART: The heart is enlarged. 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 There is prosthetic mitral valve. Coronary artery calcifications are evident. No significant pericardial effusion.   LUNG, PLEURA, LARGE AIRWAYS: Limited by respiratory motion artifact. Stable appearance of severe emphysema and patchy densities in the posterior aspect of both upper lobes and left lower lobe, with a trace left pleural effusion. Central airways are patent.   OSSEOUS STRUCTURES/CHEST WALL: There are median sternotomy wires. There are degenerative changes of the spine. No acute osseous abnormality.     CT ABDOMEN/PELVIS:   LIVER: No significant  parenchymal abnormality.   BILE DUCTS: No significant intrahepatic or extrahepatic dilatation.   GALLBLADDER: Stable mild distention of the gallbladder.   PANCREAS: No significant abnormality.   SPLEEN: No significant abnormality.   ADRENALS: No significant abnormality.   KIDNEYS, URETERS, BLADDER: No urolithiasis or hydroureteronephrosis. Jasso catheter balloon within a collapsed urinary bladder containing gas.   REPRODUCTIVE ORGANS: No significant abnormality.   VESSELS: (See above). No additional significant abnormality.   PERITONEUM/RETROPERITONEUM/LYMPH NODES: Stranding again seen within the left retroperitoneum similar in appearance to the prior examination, consistent with hemorrhage. There is heterogeneity and prominence of the left psoas and iliacus muscles similar to the prior exam. Blood products are seen to course along the left hemipelvis and extending along the posterior aspect of the rectum. There is also mild heterogeneity of the right iliacus muscle. No lymphadenopathy. There is no free air. Stable trace amount of free fluid in the pelvis.   BOWEL/MESENTERY/PERITONEUM: Rectal tube in place. There is colonic diverticulosis without diverticulitis. There is no evidence of obstruction. Normal appendix.   ABDOMINAL WALL: Stable appearance of subcutaneous stranding along the left lateral flank with overlying skin thickening.   OSSEOUS STRUCTURES: No acute osseous abnormality.       No thoracic or abdominal aortic aneurysm or acute aortic pathology.   Stable asymmetric enlargement and heterogeneity of the left psoas and both iliacus muscles. There is also stable amount of left retroperitoneal hemorrhage that extends down the left hemipelvis and surrounding the rectum. There is no evidence of active contrast extravasation.   Severe emphysema with stable infiltrates and/or atelectasis in the posterior aspect of the upper lobes and left lower lobe.   Unchanged position of the Jasso catheter and rectal tube.    Signed by: Donald Garcia 3/4/2025 3:24 PM Dictation workstation:   [id number]               Assessment/Plan   Assessment & Plan  Nausea vomiting and diarrhea    Influenza A with pneumonia    Nontraumatic compartment syndrome of left upper extremity    55yo female doing well status post fasciotomy of left upper extremity.  She underwent closure of her fasciotomy incisions yesterday with orthopedics.  On exam today she has a triphasic signal over the ulnar artery at the rest and over the palmar arch.  Hand remains warm and pink with good perfusion.  No further vascular intervention warranted.  Patient also has a retroperitoneal hematoma.  Recent follow-up CT scan reveals hematoma is stable with no expansion.  Her blood counts and coagulation remained stable.  Therefore no further vascular intervention is indicated at this time.  Will sign off.  Please call again with any further vascular questions.    (This note was generated with voice recognition software and may contain errors including spelling, grammar, syntax and missed recognition of what was dictated, of which may not have been fully corrected)        Rosalie Clark MD

## 2025-03-06 NOTE — PROGRESS NOTES
Speech-Language Pathology    SLP Adult Inpatient  Speech-Language Pathology Treatment     Patient Name: Kayley Lynch  MRN: [id number]  Today's Date: 3/6/2025  Time Calculation   Start Time: 1000  Stop Time: 1020  Time Calculation (min): 20 min          Current Problem:   1. Influenza A with pneumonia        2. Nausea vomiting and diarrhea        3. Acute kidney injury (CMS-HCC)        4. Dehydration        5. Influenza A        6. Shock (Multi)  Transthoracic Echo (TTE) Complete    Transthoracic Echo (TTE) Complete      7. Nontraumatic compartment syndrome of left upper extremity  Case Request Operating Room: FASCIOTOMY, UPPER EXTREMITY    Case Request Operating Room: FASCIOTOMY, UPPER EXTREMITY    Case Request Operating Room: DEBRIDEMENT, WOUND, UPPER EXTREMITY    Case Request Operating Room: DEBRIDEMENT, WOUND, UPPER EXTREMITY        Therapeutic Swallow:  Therapeutic Swallow Intervention: PO Trials, Compensatory Strategies, Caregiver Education  *Solid Diet Recommendations: Regular (IDDSI Level 7)  *Liquid Diet Recommendations: Thin (IDDSI Level 0)    Compensatory Swallowing Strategies: Remain upright for 20-30 minutes after meals, Upright 90 degrees as possible for all oral intake     Medication Administration Recommendations: Crushed, With Pureed (or pt preference)     SLP Tx per POC:  Pt seen at bedside.  present.  states that pt did not want to eat modified diet of soft&bite sized. Did not eat dinner yesterday or breakfast this morning. Pt states that she is not nauseous, she just does not have an appetite. However, she is agreeable to PO trials for diet advancement. Eats regular textures, thin liquids at home.     PO trials of thin liquids (straw sips), a solid, and mixed consistency presented. Pt has good labial seal around straw, no anterior spillage noted. No overt s/sx of aspiration observed, and vocal quality remained unchanged. Pt takes extended period of time to masticate solid d/t missing  dentition. With mixed consistency, pt efficiently masticates and clears from oral cavity with no overt s/sx of aspiration noted. Continuous monitoring of SpO2 throughout session: 91-92%.     Will suggest pt maintain an oral diet of regular textures and thin liquids at home. Pt understands that it takes longer to chew d/t missing dentition. Takes appropriate bite sizes and eats at a slow rate. Pt able to choose softer solids off menu.     ST to discharge. If medical condition changes and concerns arise, please notify ST team.     Dysphagia Goals: 3/05/2025  Patient will consume prescribed diet without clinical or overt s/s aspiration. GOAL MET, 3/06  Patient will trial upgraded textures with SLP only for possible diet advancement as indicated given bedside and clinical indicators. GOAL MET, 3/06. Upgraded pt to regular textures today.   Pt/caregiver education. GOAL MET, 3/06    SLP TX Intervention Outcome: Making Progress Towards Goals  Prognosis: Excellent  Treatment Tolerance: Patient tolerated treatment well  Medical Staff Made Aware: Yes  Strengths: Family/Caregiver Support  Barriers: Comorbidities    Plan:  Inpatient/Swing Bed or Outpatient: Inpatient  SLP TX Plan: Discharge from Speech Therapy  SLP Frequency: One time visit  Discussed POC: Patient, Caregiver/family, Nursing  Discussed Risks/Benefits: Yes  Patient/Caregiver Agreeable: Yes  SLP - OK to Discharge: Yes (when cleared by MD for discharge)      Subjective   Pt seen at bedside, upright and on oxygen (4L/min).  is present. Pt cooperative throughout session.     Most Recent Visit:  SLP Received On: 03/06/25    General Visit Information:   Reason for Referral: assess oropharyngeal swallow  Prior to Session Communication: Bedside nurse    Baseline Assessment:  Respiratory Status: Oxygen via nasal cannula  Behavior/Cognition: Alert, Cooperative  Patient Positioning: Upright in Bed     Pain Assessment:  Pain Assessment  Pain Assessment: 0-10  0-10  (Numeric) Pain Score: 0 - No pain    Inpatient:  Education Documentation  SLP has provided pt and caregiver/RN with the results and recommendations of this session.       100% supervision throughout session: Marisela Minor - SLP

## 2025-03-06 NOTE — CARE PLAN
Problem: Chronic Conditions and Co-morbidities  Goal: Patient's chronic conditions and co-morbidity symptoms are monitored and maintained or improved  Outcome: Progressing     Problem: Discharge Planning  Goal: Discharge to home or other facility with appropriate resources  Outcome: Progressing     Problem: Safety - Adult  Goal: Free from fall injury  Outcome: Progressing     Problem: Respiratory  Goal: Clear secretions with interventions this shift  Outcome: Progressing  Goal: Minimize anxiety/maximize coping throughout shift  Outcome: Progressing  Goal: Minimal/no exertional discomfort or dyspnea this shift  Outcome: Progressing  Goal: No signs of respiratory distress (eg. Use of accessory muscles. Peds grunting)  Outcome: Progressing  Goal: Patent airway maintained this shift  Outcome: Progressing  Goal: Verbalize decreased shortness of breath this shift  Outcome: Progressing  Goal: Wean oxygen to maintain O2 saturation per order/standard this shift  Outcome: Progressing  Goal: Increase self care and/or family involvement in next 24 hours  Outcome: Progressing     Problem: Fall/Injury  Goal: Not fall by end of shift  Outcome: Progressing  Goal: Be free from injury by end of the shift  Outcome: Progressing  Goal: Verbalize understanding of personal risk factors for fall in the hospital  Outcome: Progressing  Goal: Verbalize understanding of risk factor reduction measures to prevent injury from fall in the home  Outcome: Progressing  Goal: Use assistive devices by end of the shift  Outcome: Progressing  Goal: Pace activities to prevent fatigue by end of the shift  Outcome: Progressing     Problem: Skin  Goal: Participates in plan/prevention/treatment measures  Outcome: Progressing  Goal: Prevent/manage excess moisture  Outcome: Progressing  Goal: Prevent/minimize sheer/friction injuries  Outcome: Progressing  Goal: Promote/optimize nutrition  Outcome: Progressing  Goal: Promote skin healing  Outcome: Progressing      Problem: Pain  Goal: Takes deep breaths with improved pain control throughout the shift  Outcome: Progressing  Goal: Turns in bed with improved pain control throughout the shift  Outcome: Progressing  Goal: Walks with improved pain control throughout the shift  Outcome: Progressing  Goal: Performs ADL's with improved pain control throughout shift  Outcome: Progressing  Goal: Participates in PT with improved pain control throughout the shift  Outcome: Progressing  Goal: Free from opioid side effects throughout the shift  Outcome: Progressing  Goal: Free from acute confusion related to pain meds throughout the shift  Outcome: Progressing     Problem: Pain - Adult  Goal: Verbalizes/displays adequate comfort level or baseline comfort level  Outcome: Progressing     Problem: Nutrition  Goal: Nutrient intake appropriate for maintaining nutritional needs  Outcome: Progressing

## 2025-03-06 NOTE — PROGRESS NOTES
Cardiology Progress Note      Kayley Lynch is a 54 y.o. female on day 8 of admission presenting with Nausea vomiting and diarrhea.      Subjective   Feeling okay.  Arm pain tolerable.  No abdominal pain.     ROS:  10 systems reviewed other than what is mentioned above.     MEDICATION:    Scheduled medications  [Held by provider] aspirin, 81 mg, oral, Daily  atorvastatin, 80 mg, oral, Nightly  budesonide, 0.5 mg, nebulization, BID  buPROPion XL, 150 mg, oral, q AM  carvedilol, 25 mg, oral, BID  escitalopram, 20 mg, oral, Daily  insulin lispro, 0-5 Units, subcutaneous, TID AC  ipratropium-albuteroL, 3 mL, nebulization, q6h  oxygen, , inhalation, Continuous - Inhalation  pantoprazole, 40 mg, oral, Daily  perflutren lipid microspheres, 0.5-10 mL of dilution, intravenous, Once in imaging  perflutren protein A microsphere, 0.5 mL, intravenous, Once in imaging  sulfur hexafluoride microsphr, 2 mL, intravenous, Once in imaging  vancomycin, 250 mg, oral, 4x daily  [Held by provider] warfarin, 2 mg, oral, Daily      Continuous medications  heparin, 0-4,000 Units/hr, Last Rate: 800 Units/hr (03/06/25 1116)      PRN medications  PRN medications: acetaminophen, ipratropium-albuteroL, labetaloL, melatonin, [Held by provider] morphine, ondansetron, promethazine     Assessment & Plan  Nausea vomiting and diarrhea    Influenza A with pneumonia    Nontraumatic compartment syndrome of left upper extremity      OBJECTIVE:    Visit Vitals  /76   Pulse 76   Temp 36.3 °C (97.3 °F) (Temporal)   Resp 15          Intake/Output Summary (Last 24 hours) at 3/6/2025 1646  Last data filed at 3/6/2025 1600  Gross per 24 hour   Intake 1477.87 ml   Output 3525 ml   Net -2047.13 ml      Patient is alert and oriented x3.  HEENT is unremarkable mucous members are moist  Neck no JVP no bruits upstrokes are full no thyromegaly  Lungs are clear bilaterally.  No wheezing crackles or rales  Heart regular rhythm tachycardic normal S1-S2 there is no S3  crisp prosthetic mitral valve click  Abdomen is soft bs are positive nontender nondistended no organomegaly no pulsatile masses  Extremities have no edema.  Left arm is bandaged distal pulses present palpable.  Neuro is grossly nonfocal  Skin has no rashes     Image Results  XR chest 1 view  Narrative: Interpreted By:  Francisco Milian,   STUDY:  XR CHEST 1 VIEW; 3/6/2025 12:05 pm      INDICATION:  Signs/Symptoms:persistent o2 requirement      COMPARISON:  03/04/2025      ACCESSION NUMBER(S):  [id number]      ORDERING CLINICIAN:  NIKKI YATES      FINDINGS:  The study is limited due to  rotation, respiratory motion and  overlying artifacts obscuring some detail. Right jugular central line  is again in position. Median sternotomy wires prosthetic mitral valve  are again present. The cardiac silhouette is within normal limits for  the technique. Patchy infiltrates are again noted in the right mid  and lower lung, slightly improved. There is no pneumothorax.  The osseous structures are unchanged.      Impression: Allowing for the aforementioned limitation, improving bilateral  infiltrates, most likely due to pulmonary edema; correlate clinically  and follow-up as needed.      Signed by: Francisco Milian 3/6/2025 1:50 PM  Dictation workstation:   [id number]  Electrocardiogram, 12-lead PRN ACS symptoms  Sinus tachycardia with irregular rate  Consider left ventricular hypertrophy  Borderline T abnormalities, diffuse leads        Relevant Results:  RESULTS:    Lab Results   Component Value Date    WBC 14.6 (H) 03/06/2025    HGB 9.3 (L) 03/06/2025    HCT 29.2 (L) 03/06/2025    MCV 85 03/06/2025     03/06/2025        Lab Results   Component Value Date    CREATININE 0.46 (L) 03/06/2025    BUN 15 03/06/2025     03/06/2025    K 3.3 (L) 03/06/2025     03/06/2025    CO2 36 (H) 03/06/2025        Results from last 7 days   Lab Units 03/06/25  0427   PROTEIN TOTAL g/dL 4.8*   BILIRUBIN TOTAL mg/dL 1.0   ALK PHOS  U/L 39   ALT U/L 14   AST U/L 26   GLUCOSE mg/dL 92       Assessment/Plan      PMH  1.  Mitral regurgitation status post mitral valve replacement with a #25/33 Kevin mechanical mitral valve October 2020  2.  Paroxysmal atrial flutter status post ablation October 2020  3.  CAD.  Bypass x 3 vessels LIMA to the LAD free radial artery graft to the marginal SVG to the RCA in 2020.  Last stress test 4/16/2024 no evidence of ischemia EF 51%  4.  COPD she continues to smoke  5.  Cardiomyopathy postoperative ejection fraction was 40%.  This was up to 50% on her last stress test  6.  Hyperlipidemia  7.  Hypertension  8.  Tobacco abuse  9.  Moderate to severe pulmonary hypertension in the past     3/4/2025  1.  Paroxysmal atrial fibrillation.  Previous history of atrial flutter status post ablation in 2020.  Currently sinus tachycardia.  Increase metoprolol to tartrate to 3 times daily for better rate control.  This was likely physiologically driven because of the stress of the situation.  Anticoagulation will need to be given regardless because of her mechanical mitral valve replacement.  2.  CAD.  Stable.  No symptoms of angina.  3.  Mechanical mitral valve replacement.  This is a difficult situation given her spontaneous left arm hematoma leading to compartment syndrome and iliopsoas spontaneous hematoma on the left side.  Agree with low-dose heparin.  Aspirin currently on hold.  Coumadin will need to be restarted along with an aspirin when stable.  4.  Respiratory failure.  Slow improvement.  Unfortunately she continues to smoke and has underlying lung disease.  5.  Anemia.  She will require transfusion.    3/5/2025    1.  Paroxysmal atrial fibrillation.  She is in a sinus tachycardia today.  She did have an atrial flutter ablation in 2020.  Continue metoprolol for rate control.  IV heparin to be restarted now as she went to the OR yesterday.  Transition to oral anticoagulation once her H&H is stable.  2.  CAD.  Status post  CABG x 3 vessels.  Stable.  No symptoms of angina.  3.  Mechanical mitral valve replacement.  Heparin to be restarted today.  Hopefully we can restart Coumadin and baby aspirin if her H&H remained stable.  These were held because of a spontaneous iliopsoas hematoma and bleed in the arm related to compartment syndrome.  4.  Respiratory failure due to influenza and underlying COPD.  This is improving.    3/6/2025    1.  Paroxysmal A-fib.  Remains in sinus.  Carvedilol started.  Continue IV heparin to ensure H&H remained stable.  Transition back to Coumadin.  Unfortunately, shorter acting agents like a DOAC is not an option given the mechanical mitral valve.  2.  CAD.  Stable.  3.  Mechanical mitral valve replacement.  On heparin.  Will need to have Coumadin and aspirin restarted once bleeding has been stabilized.    Brittney Lomeli MD

## 2025-03-06 NOTE — PROGRESS NOTES
Kayley Lynch is a 54 y.o. female on day 8 of admission presenting with Nausea vomiting and diarrhea.      Subjective   No events overnight.  Patient seen and examined at bedside with  present.  Hospitalization and treatment plan reviewed with  at length.  Patient has no complaints.       Objective     Last Recorded Vitals  /75   Pulse 77   Temp 36.3 °C (97.3 °F) (Temporal)   Resp 15   Wt 66.6 kg (146 lb 13.2 oz)   SpO2 94%   Intake/Output last 3 Shifts:    Intake/Output Summary (Last 24 hours) at 3/6/2025 1510  Last data filed at 3/6/2025 1210  Gross per 24 hour   Intake 1624.94 ml   Output 1360 ml   Net 264.94 ml       Admission Weight  Weight: 59 kg (130 lb) (02/26/25 0159)    Daily Weight  03/01/25 : 66.6 kg (146 lb 13.2 oz)    Image Results  XR chest 1 view  Narrative: Interpreted By:  Francisco Milian,   STUDY:  XR CHEST 1 VIEW; 3/6/2025 12:05 pm      INDICATION:  Signs/Symptoms:persistent o2 requirement      COMPARISON:  03/04/2025      ACCESSION NUMBER(S):  [id number]      ORDERING CLINICIAN:  NIKKI YATES      FINDINGS:  The study is limited due to  rotation, respiratory motion and  overlying artifacts obscuring some detail. Right jugular central line  is again in position. Median sternotomy wires prosthetic mitral valve  are again present. The cardiac silhouette is within normal limits for  the technique. Patchy infiltrates are again noted in the right mid  and lower lung, slightly improved. There is no pneumothorax.  The osseous structures are unchanged.      Impression: Allowing for the aforementioned limitation, improving bilateral  infiltrates, most likely due to pulmonary edema; correlate clinically  and follow-up as needed.      Signed by: Francisco Milian 3/6/2025 1:50 PM  Dictation workstation:   [id number]  Electrocardiogram, 12-lead PRN ACS symptoms  Sinus tachycardia with irregular rate  Consider left ventricular hypertrophy  Borderline T abnormalities, diffuse  leads      Physical Exam  HENT:      Head: Normocephalic and atraumatic.      Nose: Nose normal.      Mouth/Throat:      Mouth: Mucous membranes are moist.      Pharynx: Oropharynx is clear.   Eyes:      Extraocular Movements: Extraocular movements intact.      Pupils: Pupils are equal, round, and reactive to light.   Cardiovascular:      Rate and Rhythm: Normal rate and regular rhythm.   Pulmonary:      Effort: No respiratory distress.      Breath sounds: Wheezing present. No rhonchi or rales.   Abdominal:      General: Bowel sounds are normal. There is no distension.      Palpations: Abdomen is soft.      Tenderness: There is no abdominal tenderness. There is no guarding.   Musculoskeletal:      Right lower leg: No edema.      Left lower leg: No edema.   Skin:     General: Skin is warm and dry.   Neurological:      Mental Status: She is alert. Mental status is at baseline.       Relevant Results               Assessment/Plan   This patient currently has cardiac telemetry ordered; if you would like to modify or discontinue the telemetry order, click here to go to the orders activity to modify/discontinue the order.      This patient has a urinary catheter   Reason for the urinary catheter remaining today? critically ill patient who need accurate urinary output measurements          Assessment & Plan  Nausea vomiting and diarrhea    Influenza A with pneumonia      Septic shock, dehydration, hypercapnic respiratory failure, and dehydration.  Nontraumatic compartment syndrome of left upper extremity      Patient is a 54-year-old female with past medical history of COPD, CAD status post CABG, pulmonary hypertension, hyperlipidemia, cardiomyopathy, a flutter status post ablation, and mitral valve replacement who presented with malaise.  She was found to be in septic shock and admitted to the ICU.    Septic shock  Hypercapnic respiratory failure  COPD exacerbation  Influenza A  Pneumonia  Acute metabolic  encephalopathy  C. difficile colitis  Dehydration  HILLARY    -Continue supplemental oxygen  -Continue antibiotics, currently on doxycycline  -Continue nebulizers  -Continue steroids  -Continue Tamiflu  -Continue p.o. vancomycin  -Continue home meds  -Monitor INR    3/2: Patient developed a large hematoma in the left arm leading to compartment syndrome. She was taken for urgent fasciotomies with Orthopedic Surgery. She is also being followed by Vascular Surgery. Vitamin K ordered for supratherapeutic INR. Her INR was 2.2 yesterday with goal being 2.5-3.5 for her A-flutter s/p mitral valve replacement. Critical Care reconsulted. Hematology consulted, appreciate recs. Hold coumadin and lovenox.    3/3: INR reversed. Patient is on low intensity heparin drip. She was transfused 1 unit pRBCs. Monitor hemoglobin closely. Hematology has ordered a work up including DIC panel. Patient will need another debridement and irrigation in 24-48 hours per Orthopedics. She has also developed a retroperitoneal bleed.    3/4: INR is down to 1.0.  Leukocytosis improved from 15 down to 13.  Hemoglobin is down to 6.4.  Patient to be transfused another unit packed RBCs.  She will be going back to the OR today for further debridement, washout, possible closure. She will also have a CT angiogram to determine if there is any active bleeding causing her retroperitoneal bleed.  She remains on a heparin drip due to her valve.  Continue doxycycline and p.o. Vanco.  Patient fully evaluated on March 5.  Patient restarted on heparin.  Fasciotomy site for compartment syndrome appears clear.  Continue to monitor lab work.  Case discussed with  at bedside.  No active bleeding is noted at this time.  Recheck labs in AM.            Maurizio Read MD

## 2025-03-06 NOTE — PROGRESS NOTES
03/06/25 1427   Discharge Planning   Living Arrangements Spouse/significant other   Support Systems Spouse/significant other;Children   Type of Residence Private residence   Number of Stairs to Enter Residence 3   Do you have animals or pets at home? Yes   Type of Animals or Pets dog   Expected Discharge Disposition Home H   Intensity of Service   Intensity of Service 0-30 min     3/6/2025  Followed up with pt and wife in room, introduced self and explained role. Per pt, ok to speak with spouse.  [facility]- 7, mod recommended.  Discussed possible need for Skilled Care.  Spouse prefers pt to go home with [facility].  Stated he works, but can have family come over when he is not home. (Pt's son and brother in law).  Stated they have cane, walker, wheelchair, shower chair, bedside commode.  Home is one level.  No O2 at home. Zandra 3-4L  Ct Team will continue to follow.  Will ask therapy to continue to see pt.  Kanika Christian Rn TCC

## 2025-03-06 NOTE — PROGRESS NOTES
Subjective:  Patient lying in bed on exam.  She is drowsy but oriented.     Physical Exam:  GENERAL: Awake, lethargic.  HEAD:  Normocephalic, atraumatic.  EYES:  Round and reactive.  ENT:  No nasal discharge, mucous membranes moist and pink.  NECK:  Atraumatic, no meningismus.  CARDIOVASCULAR:  Regular rate and rhythm, no murmur.  RESPIRATORY:  Stridor, diminished breath sounds bilaterally  ABDOMEN:  Soft, no tenderness, nondistended.  EXTREMITIES:  LUE covered in dressing, RUE swelling following IV infiltration.  SKIN:  Warm and dry.  NEUROLOGICAL:  Nonfocal grossly.    Remainder of objective data including imaging and laboratory findings were all reviewed.    Admission history:  3/3: Patient is awake and responsive on exam. Trial CPAP removal, CT chest abdomen pelvis ordered. Heme-onc following, fibrinogen level ordered. Heparin drip and lopressor 25mg BID ordered.    3/4: Patient is awake and responsive in the morning.  Hemoglobin 6.4 on morning labs, 1 unit RBC ordered. Underwent irrigation and debridement with with LUE wound closing. CT angio chest abdomen pelvis shows no active extravasation, monitoring Hgb and assessing for appropriate increase following RBC.  Patient seen to be lethargic and hypertensive in the evening, ABG shows CO2 retention.  Placed on BiPAP, one-time labetalol dose administered, Lopressor restarted.    3/5: Heparin drip restarted in the morning after Hgb remained stable. Recheck in the afternoon shows drop in hemoglobin, heparin drip stopped and one unit RBC administered. Administering FFP as needed. Patient's HR and BP are more controlled, cardiology following.    3/6: Heparin drip resumed following stable Hgb with appropriate increase after RBC administration. Metoprolol discontinued and home carvedilol resumed. One time dose IV Lasix dose ordered, encouraging incentive spirometry. Continuing to monitor and transfuse as needed, tentative transfer out of ICU tomorrow.    Assessment and  plan:  Kayley Lynch is a 54-year-old female who presented on 2/26/2025 for feelings of malaise. She was treated for shock in the setting of Influenza A with suspected superimposed bacterial pneumonia, COPD exacerbation, and suspected infectious colitis. She was transferred out of the ICU on 2/28. LUE swelling was noted overnight 3/1-3/2, patient later had rapid response called for tachycardia and hypotension. Orthopedic surgery was consulted and she underwent emergent fasciotomy for compartment syndrome of the LUE. Patient is now under ICU care, vascular and orthopedics following.      Neurological System:  #Acute metabolic encephalopathy following LUE wound closure 3/4, improving  #C/f ICU delirium  -Patient is awake and alert, but mildly confused and is unable to accurately respond when asked about month and year. Monitor for worsening delirium, encourage appropriate sleep schedule, family engagement, and sedative avoidance.  - Continue home wellbutrin 150mg daily    Cardiovascular System:  #Afib with intermittent RVR  #Elevated Trop, demand ischemia vs 2/2 compartment syndrome/rhabdo  #Hx of Mechanical Mitral Valve Replacement  #CAD s/p CABG  #Hypertension  - Metoprolol discontinued, home carvedilol 25mg BID restarted. PRN labetalol for SBP>160 ordered.  - Heparin drip resumed after Hgb remained stable overnight  -Central line removal ordered      Respiratory System:  #Acute on chronic hypoxic hypercapnic respiratory failure  #Influenza A  #COPD exacerbation  #Secondary PHTN  - Doxycycline course complete  - One-time IV Lasix 20mg administered  - Continue Pulmicort and Duonebs    Gastrointestinal System:  #Diarrhea, c/f colitis  - Continue PO vancomycin, day 9  - Continue PPI    Endocrine System:  -Accu-checks     Renal System:  #HILLARY, resolved  -Continue to monitor renal function  - Monitor urine output    Hematological System:  #Supratherapeutic INR  #Suspected coagulopathy  #Retroperitoneal Hemorrhage    #Anemia  #Leukocytosis in setting of steroid use  -Low intensity heparin drip resumed at this time  -Hgb 9.3 on morning labs  -Continue to monitor CBC    Infectious Disease:  #CAP  #C. diff colitis  #Influenza A  - Doxycycline complete  - Continue PO vanc    Skin/MSK:  #Left Upper Extremity Compartment Syndrome s/p fasciotomy 3/2  -s/p irrigation and debridement  -vascular signing off, orthopedic planning for suture removal in 10-12 days  -pain management with PRN tylenol and morphine    Psych:  -No acute issues    ICU CHECK LIST:   Antimicrobials: PO vanc  Oxygen: 4L NC  Drips: -  Fluids: -  Feeding: NPO  Sedation/Analgesia: Tylenol, PRN morphine  Prophylaxis: Protonix  Glycemic control: -  Bowel care: PRN   Lines/Tubes: PIV, Jasso  Restraints: -  Dispo: ICU     Code Status: Full       Neto Clark MD  PGY-1

## 2025-03-06 NOTE — PROGRESS NOTES
"Kayley Lynch is a 54 y.o. female on day 8 of admission presenting with Nausea vomiting and diarrhea.    Subjective   The patient denies any significant left upper extremity pain.       Objective     Physical Exam  The dressing was removed.  There is minimal dried bloody drainage on the dressing.  The incision is clean and dry with resolving swelling and ecchymosis of the upper arm.  She is able to fully flex and extend the fingers and thumb.  Sensation to light touch is intact.  She has a positive ulnar and palmar Doppler signal.  Last Recorded Vitals  Blood pressure 178/81, pulse 80, temperature 36.4 °C (97.5 °F), temperature source Temporal, resp. rate 17, height 1.6 m (5' 2.99\"), weight 66.6 kg (146 lb 13.2 oz), SpO2 94%.  Intake/Output last 3 Shifts:  I/O last 3 completed shifts:  In: 4245.8 (63.8 mL/kg) [I.V.:3102.8 (46.6 mL/kg); Blood:892; IV Piggyback:251]  Out: 3185 (47.8 mL/kg) [Urine:3185 (1.3 mL/kg/hr)]  Weight: 66.6 kg     Relevant Results            This patient currently has cardiac telemetry ordered; if you would like to modify or discontinue the telemetry order, click here to go to the orders activity to modify/discontinue the order.                   Assessment/Plan   Assessment & Plan  Nausea vomiting and diarrhea    Influenza A with pneumonia    Nontraumatic compartment syndrome of left upper extremity    The patient is postoperative day 2 from her left upper extremity fasciotomy closure.  Overall, she is doing very well.  There are no signs of infection.  The extremity appears to be neurovascular intact.  I have encouraged her to work on shoulder,  elbow, hand and wrist range of motion.  The dressing was changed.  Plan for suture removal in 10 to 12 days.             Elijah Licea MD      "

## 2025-03-06 NOTE — PROGRESS NOTES
Left arm pain is minimal.  Afebrile vital signs stable.  Potassium 3.3.  Hemoglobin posttransfusion 9.3 recent white blood count 14 6.  Range of motion of the left hand and wrist are excellent with and without pain.  Good motion of the elbow.  Arm dressing is clean dry and intact.   noted some swelling of the right forearm but this is unremarkable in appearance now.  Patient is having no pain there and no issues.  Discussed with patient and .  Encouraged left shoulder motion also.  Patient is improving from our view.  If patient needs to be restarted on anticoagulation might be wise to initiate a shorter acting agent other than Coumadin in case it would need to be stopped and reversed quickly because of recurrent swelling or bleeding.

## 2025-03-07 ENCOUNTER — APPOINTMENT (OUTPATIENT)
Dept: RADIOLOGY | Facility: HOSPITAL | Age: 55
DRG: 853 | End: 2025-03-07
Payer: COMMERCIAL

## 2025-03-07 LAB
ALBUMIN SERPL BCP-MCNC: 2.7 G/DL (ref 3.4–5)
ALP SERPL-CCNC: 36 U/L (ref 33–110)
ALT SERPL W P-5'-P-CCNC: 12 U/L (ref 7–45)
ANION GAP SERPL CALC-SCNC: 7 MMOL/L (ref 10–20)
AST SERPL W P-5'-P-CCNC: 20 U/L (ref 9–39)
BILIRUB SERPL-MCNC: 1.2 MG/DL (ref 0–1.2)
BUN SERPL-MCNC: 15 MG/DL (ref 6–23)
CALCIUM SERPL-MCNC: 7.9 MG/DL (ref 8.6–10.3)
CHLORIDE SERPL-SCNC: 98 MMOL/L (ref 98–107)
CO2 SERPL-SCNC: 39 MMOL/L (ref 21–32)
CREAT SERPL-MCNC: 0.41 MG/DL (ref 0.5–1.05)
EGFRCR SERPLBLD CKD-EPI 2021: >90 ML/MIN/1.73M*2
ERYTHROCYTE [DISTWIDTH] IN BLOOD BY AUTOMATED COUNT: 18.5 % (ref 11.5–14.5)
GLUCOSE BLD MANUAL STRIP-MCNC: 104 MG/DL (ref 74–99)
GLUCOSE BLD MANUAL STRIP-MCNC: 112 MG/DL (ref 74–99)
GLUCOSE BLD MANUAL STRIP-MCNC: 92 MG/DL (ref 74–99)
GLUCOSE SERPL-MCNC: 93 MG/DL (ref 74–99)
HCT VFR BLD AUTO: 26.3 % (ref 36–46)
HGB BLD-MCNC: 8.5 G/DL (ref 12–16)
MAGNESIUM SERPL-MCNC: 2.01 MG/DL (ref 1.6–2.4)
MCH RBC QN AUTO: 27.8 PG (ref 26–34)
MCHC RBC AUTO-ENTMCNC: 32.3 G/DL (ref 32–36)
MCV RBC AUTO: 86 FL (ref 80–100)
NRBC BLD-RTO: 0.4 /100 WBCS (ref 0–0)
PLATELET # BLD AUTO: 185 X10*3/UL (ref 150–450)
POTASSIUM SERPL-SCNC: 3.3 MMOL/L (ref 3.5–5.3)
PROT SERPL-MCNC: 4.6 G/DL (ref 6.4–8.2)
RBC # BLD AUTO: 3.06 X10*6/UL (ref 4–5.2)
SODIUM SERPL-SCNC: 141 MMOL/L (ref 136–145)
UFH PPP CHRO-ACNC: 0.4 IU/ML
WBC # BLD AUTO: 14.1 X10*3/UL (ref 4.4–11.3)

## 2025-03-07 PROCEDURE — 2500000001 HC RX 250 WO HCPCS SELF ADMINISTERED DRUGS (ALT 637 FOR MEDICARE OP)

## 2025-03-07 PROCEDURE — 37799 UNLISTED PX VASCULAR SURGERY: CPT | Performed by: HOSPITALIST

## 2025-03-07 PROCEDURE — 2500000002 HC RX 250 W HCPCS SELF ADMINISTERED DRUGS (ALT 637 FOR MEDICARE OP, ALT 636 FOR OP/ED)

## 2025-03-07 PROCEDURE — 94640 AIRWAY INHALATION TREATMENT: CPT

## 2025-03-07 PROCEDURE — 2500000004 HC RX 250 GENERAL PHARMACY W/ HCPCS (ALT 636 FOR OP/ED)

## 2025-03-07 PROCEDURE — 2060000001 HC INTERMEDIATE ICU ROOM DAILY

## 2025-03-07 PROCEDURE — 85520 HEPARIN ASSAY: CPT | Performed by: HOSPITALIST

## 2025-03-07 PROCEDURE — 82947 ASSAY GLUCOSE BLOOD QUANT: CPT

## 2025-03-07 PROCEDURE — 97530 THERAPEUTIC ACTIVITIES: CPT | Mod: GP

## 2025-03-07 PROCEDURE — 85027 COMPLETE CBC AUTOMATED: CPT | Performed by: INTERNAL MEDICINE

## 2025-03-07 PROCEDURE — 99233 SBSQ HOSP IP/OBS HIGH 50: CPT

## 2025-03-07 PROCEDURE — 97110 THERAPEUTIC EXERCISES: CPT | Mod: GO

## 2025-03-07 PROCEDURE — 80053 COMPREHEN METABOLIC PANEL: CPT | Performed by: INTERNAL MEDICINE

## 2025-03-07 PROCEDURE — 2500000005 HC RX 250 GENERAL PHARMACY W/O HCPCS

## 2025-03-07 PROCEDURE — 99233 SBSQ HOSP IP/OBS HIGH 50: CPT | Performed by: INTERNAL MEDICINE

## 2025-03-07 PROCEDURE — 94660 CPAP INITIATION&MGMT: CPT

## 2025-03-07 PROCEDURE — 83735 ASSAY OF MAGNESIUM: CPT

## 2025-03-07 PROCEDURE — 71045 X-RAY EXAM CHEST 1 VIEW: CPT | Performed by: RADIOLOGY

## 2025-03-07 PROCEDURE — 71045 X-RAY EXAM CHEST 1 VIEW: CPT

## 2025-03-07 PROCEDURE — 2500000001 HC RX 250 WO HCPCS SELF ADMINISTERED DRUGS (ALT 637 FOR MEDICARE OP): Performed by: HOSPITALIST

## 2025-03-07 RX ORDER — LISINOPRIL 20 MG/1
20 TABLET ORAL DAILY
Status: DISCONTINUED | OUTPATIENT
Start: 2025-03-07 | End: 2025-03-18

## 2025-03-07 RX ORDER — POTASSIUM CHLORIDE 1.5 G/1.58G
40 POWDER, FOR SOLUTION ORAL ONCE
Status: COMPLETED | OUTPATIENT
Start: 2025-03-07 | End: 2025-03-07

## 2025-03-07 RX ORDER — POTASSIUM CHLORIDE 14.9 MG/ML
20 INJECTION INTRAVENOUS ONCE
Status: COMPLETED | OUTPATIENT
Start: 2025-03-07 | End: 2025-03-07

## 2025-03-07 RX ADMIN — VANCOMYCIN HYDROCHLORIDE 250 MG: KIT at 12:52

## 2025-03-07 RX ADMIN — BUDESONIDE 0.5 MG: 0.5 INHALANT ORAL at 09:13

## 2025-03-07 RX ADMIN — BUDESONIDE 0.5 MG: 0.5 INHALANT ORAL at 18:53

## 2025-03-07 RX ADMIN — POTASSIUM CHLORIDE 20 MEQ: 14.9 INJECTION, SOLUTION INTRAVENOUS at 04:50

## 2025-03-07 RX ADMIN — POTASSIUM CHLORIDE 40 MEQ: 1.5 POWDER, FOR SOLUTION ORAL at 05:47

## 2025-03-07 RX ADMIN — IPRATROPIUM BROMIDE AND ALBUTEROL SULFATE 3 ML: .5; 3 SOLUTION RESPIRATORY (INHALATION) at 18:53

## 2025-03-07 RX ADMIN — IPRATROPIUM BROMIDE AND ALBUTEROL SULFATE 3 ML: .5; 3 SOLUTION RESPIRATORY (INHALATION) at 15:20

## 2025-03-07 RX ADMIN — CARVEDILOL 25 MG: 25 TABLET, FILM COATED ORAL at 20:06

## 2025-03-07 RX ADMIN — LISINOPRIL 20 MG: 20 TABLET ORAL at 08:24

## 2025-03-07 RX ADMIN — Medication 6 L/MIN: at 18:55

## 2025-03-07 RX ADMIN — PANTOPRAZOLE SODIUM 40 MG: 40 TABLET, DELAYED RELEASE ORAL at 08:24

## 2025-03-07 RX ADMIN — BUPROPION HYDROCHLORIDE 150 MG: 150 TABLET, EXTENDED RELEASE ORAL at 08:24

## 2025-03-07 RX ADMIN — VANCOMYCIN HYDROCHLORIDE 250 MG: KIT at 06:02

## 2025-03-07 RX ADMIN — HEPARIN SODIUM 800 UNITS/HR: 10000 INJECTION, SOLUTION INTRAVENOUS at 11:37

## 2025-03-07 RX ADMIN — IPRATROPIUM BROMIDE AND ALBUTEROL SULFATE 3 ML: .5; 3 SOLUTION RESPIRATORY (INHALATION) at 09:12

## 2025-03-07 RX ADMIN — CARVEDILOL 25 MG: 25 TABLET, FILM COATED ORAL at 08:24

## 2025-03-07 RX ADMIN — ASPIRIN 81 MG: 81 TABLET, COATED ORAL at 08:24

## 2025-03-07 RX ADMIN — ATORVASTATIN CALCIUM 80 MG: 80 TABLET, FILM COATED ORAL at 20:06

## 2025-03-07 RX ADMIN — VANCOMYCIN HYDROCHLORIDE 250 MG: KIT at 20:07

## 2025-03-07 RX ADMIN — VANCOMYCIN HYDROCHLORIDE 250 MG: KIT at 17:56

## 2025-03-07 RX ADMIN — IPRATROPIUM BROMIDE AND ALBUTEROL SULFATE 3 ML: .5; 3 SOLUTION RESPIRATORY (INHALATION) at 00:57

## 2025-03-07 RX ADMIN — Medication 6 L/MIN: at 08:41

## 2025-03-07 RX ADMIN — ESCITALOPRAM OXALATE 20 MG: 10 TABLET ORAL at 08:23

## 2025-03-07 ASSESSMENT — COGNITIVE AND FUNCTIONAL STATUS - GENERAL
HELP NEEDED FOR BATHING: TOTAL
DAILY ACTIVITIY SCORE: 18
DAILY ACTIVITIY SCORE: 10
TURNING FROM BACK TO SIDE WHILE IN FLAT BAD: A LOT
DRESSING REGULAR UPPER BODY CLOTHING: A LITTLE
EATING MEALS: A LOT
DRESSING REGULAR LOWER BODY CLOTHING: A LITTLE
DRESSING REGULAR UPPER BODY CLOTHING: A LOT
TURNING FROM BACK TO SIDE WHILE IN FLAT BAD: A LITTLE
MOVING TO AND FROM BED TO CHAIR: A LOT
MOVING FROM LYING ON BACK TO SITTING ON SIDE OF FLAT BED WITH BEDRAILS: A LOT
WALKING IN HOSPITAL ROOM: A LOT
TOILETING: A LOT
CLIMB 3 TO 5 STEPS WITH RAILING: TOTAL
HELP NEEDED FOR BATHING: A LITTLE
STANDING UP FROM CHAIR USING ARMS: A LOT
STANDING UP FROM CHAIR USING ARMS: A LOT
PERSONAL GROOMING: A LOT
WALKING IN HOSPITAL ROOM: A LOT
MOVING TO AND FROM BED TO CHAIR: A LITTLE
PERSONAL GROOMING: A LITTLE
CLIMB 3 TO 5 STEPS WITH RAILING: A LOT
EATING MEALS: A LITTLE
MOBILITY SCORE: 15
MOBILITY SCORE: 11
MOVING FROM LYING ON BACK TO SITTING ON SIDE OF FLAT BED WITH BEDRAILS: A LITTLE
TOILETING: A LITTLE
DRESSING REGULAR LOWER BODY CLOTHING: TOTAL

## 2025-03-07 ASSESSMENT — PAIN - FUNCTIONAL ASSESSMENT
PAIN_FUNCTIONAL_ASSESSMENT: 0-10

## 2025-03-07 ASSESSMENT — PAIN SCALES - GENERAL
PAINLEVEL_OUTOF10: 0 - NO PAIN
PAINLEVEL_OUTOF10: 3
PAINLEVEL_OUTOF10: 0 - NO PAIN

## 2025-03-07 NOTE — PROGRESS NOTES
Occupational Therapy    Occupational Therapy Treatment    Name: Kayley Lynch  MRN: [id number]  Department: [facility] ICU  Room: [facility]  Date: 03/07/25  Time Calculation  Start Time: 1147  Stop Time: 1211  Time Calculation (min): 24 min    Assessment:  OT Assessment: Pt. progressing toward functional goals, will continue with OT treatment plan to address goals/promote independence with adl/transfers/mobility.  Prognosis: Good  Barriers to Discharge Home: Physical needs  End of Session Communication: Bedside nurse  End of Session Patient Position: Up in chair, Alarm off, not on at start of session, Alarm off, caregiver present  Plan:  Treatment Interventions: ADL retraining, Functional transfer training, Endurance training, Compensatory technique education  OT Frequency: 4 times per week  OT Discharge Recommendations: High intensity level of continued care  OT - OK to Discharge: Yes (to next level of care when cleared by medical team)    Subjective   Previous Visit Info:  OT Last Visit  OT Received On: 03/07/25  General:  General  Family/Caregiver Present:  (son)  Co-Treatment: PT  Co-Treatment Reason: to maximize patient safety/abilities  Prior to Session Communication: Bedside nurse  Patient Position Received: Bed, 3 rail up, Alarm off, not on at start of session  General Comment: pt. agreeable to therapy interventions  Precautions:  Precautions Comment: contact plus, o2, tele, central line, heparin drip, LUE bandaged, per ortho shoulder/elbow/hand rom, per chart no wb restrictions, however attempted to clarify further without success this date, VSS           Pain Assessment:  Pain Assessment  Pain Assessment:  (no pain complaints)    Objective   Cognition:  Overall Cognitive Status: Within Functional Limits  Orientation Level:  (oriented x 3)  Activities of Daily Living: Grooming  Grooming Comments: max assist for hair brushing while pt. sitting eob    LE Dressing  LE Dressing:  (dependent to don socks in supine)        Bed Mobility/Transfers: Bed Mobility  Bed Mobility:  (supine to sit:  mod assist x 2)    Transfers  Transfer:  (sit to stand from eob, stand to sit into recliner chair; mod assist x 2)      Functional Mobility:  Functional Mobility  Functional Mobility Performed:  (bed to chair completed via wh. walker (less pressure through LUE), mod assist x 2)  Sitting Balance:  Static Sitting Balance  Static Sitting-Comment/Number of Minutes: x 5 minutes, sba     Therapy/Activity: Therapeutic Exercise  Therapeutic Exercise Performed:  (pt. able to tolerate aarom/prom to LUE in all planes, encouraged to complete composite flexion/extension of all digits, LUE elevated on 2 pillows at conclusion of session)    Outcome Measures:  [facility] Daily Activity  Putting on and taking off regular lower body clothing: Total  Bathing (including washing, rinsing, drying): Total  Putting on and taking off regular upper body clothing: A lot  Toileting, which includes using toilet, bedpan or urinal: A lot  Taking care of personal grooming such as brushing teeth: A lot  Eating Meals: A lot  Daily Activity - Total Score: 10         and Early Mobility/Exercise Safety Screen: Proceed with mobilization - No exclusion criteria met  ICU Mobility Scale: Transferring bed to chair [5]    Education Documentation  Precautions, taught by Ginny Case OT at 3/7/2025  3:28 PM.  Learner: Patient  Readiness: Acceptance  Method: Explanation  Response: Verbalizes Understanding, Needs Reinforcement  LUE there ex        Goals:  Encounter Problems       Encounter Problems (Active)       OT Goals       Increase functional mobility and  functional transfers to min assist x 1 for bed/chair/toilet with dme prn   (Progressing)       Start:  03/05/25    Expected End:  03/19/25            increase bue ther ex/activity x 5-7 minutes and increase standing tolerance x 3-5 minutes with min assist x 1 to promote greater activity tolerance for assist with adl.   (Progressing)        Start:  03/05/25    Expected End:  03/19/25            Increase grooming/feeding to set up with dme prn  (Progressing)       Start:  03/05/25    Expected End:  03/19/25            Increase ub/lb dressing to mod assist x 1 with dme prn  (Progressing)       Start:  03/05/25    Expected End:  03/19/25

## 2025-03-07 NOTE — CONSULTS
Daily progress:  3/7/2025: Patient is weaned down to room air, no fever no chills  Assessment and Plan  Patient is 54 y.o. female  with the following medical Problems:  Acute on chronic hypoxic hypercapnic respiratory failure  COPD with acute exacerbation  Secondary pulmonary hypertension  Severe sepsis with septic shock requiring Levophed (resolved)  S/P Left upper extremity fasciotomy February 2, 2025 for nontraumatic compartment syndrome of left upper extremity  Influenza A.  Coronary artery disease (S/P CABG)  Valvular heart disease with mitral regurgitation (S/P mitral valve replacement)  Chronic atrial flutter on Coumadin (S/P ablation)  Dyslipidemia  C. difficile colitis  Acute kidney injury  Lactic acidosis  Hemorrhagic shock    Plan of Care:  Supplemental oxygen to keep sat 88 to 94%.  Continue DuoNeb, Pulmicort  Suspicion of adrenal insufficiency, monitor patient off steroids for now  Patient is off anticoagulation due to left upper extremity compartment syndrome suspected bleeding.  Continue p.o. vancomycin 250 every 6 hours  Incentive spirometer.  BiPAP while sleeping, napping, and as needed.  Serum lactate.  Monitor renal recovery  History of Present Illness:   Patient is a 54 year old female with CAD (s/p CABG), pHTN, COPD, nicotine use disorder, HLD, atrial flutter (s/p ablation), s/p mitral valve replacement who was initially admitted to [facility] MICU on February 26, 2025 for septic shock requiring vasopressors, influenza A, and Cdiff colitis. She received antibiotic therapy for suspected superimposed bacterial pneumonia, and PO vancomycin for Cdiff. She was transferred out of ICU on 2/28. Overnight 3/1 - 3/2 patient was noted to have increasing swelling of the LUE. Vascular Surgery reportedly evaluated the patient and was able to doppler pulses.      Rapid response was called at 0915 to evaluate the patient in bed 138 who was tachycardic and hypotensive. Nursing staff reported patient became  tachycardic in 160s with BP in 80s/50s. Patient denied any symptoms upon arrival and was mentating at her baseline. Nursing staff reported at least four watery bowel movements since shift change. LUE swelling also noted by nursing to have increased, stated that on evaluation this morning the arm, while swollen, was still soft to palpation. It has now become firm and began to extend into the forearm and hand.   Patient underwent Left upper extremity fasciotomy February 2, 2025 for nontraumatic compartment syndrome of left upper extremity.  Postoperatively patient's hemodynamic improved and she continues to be on supplemental oxygen.    Past Medical/Surgical History:   Past Medical History:   Diagnosis Date    Atrial flutter (Multi) 03/08/2023    s/p flutter ablation October 2020    Bilateral edema of lower extremity 03/08/2023    New July 2020 ... Better on Lasix    CAD (coronary artery disease) 03/08/2023    50% LAD, 60% diag, T.O. Cx, subtotal RCA, elevated RH pressure  CABG 10/2020: LIMA to LAD, FRA to OM, SVG to RCA      Cardiomyopathy 03/08/2023    EF 35-45%, now 50%    Chest pressure 03/22/2024    Hypercholesterolemia 03/08/2023    Dr. Lomeli follows    Mitral regurgitation 03/08/2023    MVT, mild MS, severe MR   s/p MVR = 25/33 [place] mechanical valve     Other conditions influencing health status     Patient denies significant medical history    Pulmonary hypertension (Multi) 03/08/2023    Mod to severe    S/P CABG (coronary artery bypass graft) 03/08/2023    10/2020: LIMA to LAD, FRA to OM, SVG to RCA      S/P mitral valve replacement 03/08/2023    MVT, Mild MS, severe MR ... s/p MVR 25/33 [place] Mechanical valve     Past Surgical History:   Procedure Laterality Date    OTHER SURGICAL HISTORY  08/17/2021    Cardiac catheterization    OTHER SURGICAL HISTORY  08/06/2020    Tonsillectomy with adenoidectomy       Family History:   No relevant family history has been documented for this patient.    Allergies:      Allergies   Allergen Reactions    Sulfa (Sulfonamide Antibiotics) Rash         Social history:   reports that she has been smoking cigarettes. She has never used smokeless tobacco. She reports that she does not drink alcohol and does not use drugs.    Current Medications:   No recently discontinued medications to reconcile    Review of Systems:   General: denies weight gain, denies loss of appetite, fever, chills, night sweats.  HEENT: denies headaches, dizziness, head trauma, visual changes, eye pain, tinnitus, nosebleeds, hoarseness or throat pain    Respiratory: denies chest pain, dyspnea, cough and hemoptysis  Cardiovascular: denies orthopnea, paroxysmal nocturnal dyspnea, leg swelling, and previous heart attack.    Gastrointestinal: denies pain, nausea vomiting, diarrhea, constipation, melena or bleeding.  Genitourinary: denies hematuria, frequency, urgency or dysuria  Neurology: denies syncope, seizures, paralysis, paraesthesia   Endocrine: denies polyuria, polydipsia, skin or hair changes, and heat or cold intolerance  Musculoskeletal: +ve lower extremities pain.  Hematologic: denies bleeding, adenopathy and easy bruising  Skin: denies rashes and skin discoloration  Psychiatry: denies depression    Physical Exam:   Vital Signs:   Vitals:    03/07/25 1600   BP:    Pulse:    Resp:    Temp: 35.6 °C (96.1 °F)   SpO2:        Input/Output:    Intake/Output Summary (Last 24 hours) at 3/7/2025 1720  Last data filed at 3/7/2025 1100  Gross per 24 hour   Intake 460 ml   Output 1475 ml   Net -1015 ml       Oxygen requirements:    Ventilator Information:  FiO2 (%):  [40 %-44 %] 44 %  S RR:  [18] 18  S VT:  [400 mL] 400 mL          General appearance: ill Looking, not in pain or distress, in no respiratory distress    HEENT: Atraumatic/normocephalic, EOMI, ELENA, pharynx clear, moist mucosa, redness of the uvula appreciated,   Neck: Supple, no jugular venous distension, lymphadenopathy, thyromegaly or carotid  bruits  Chest: Decreased breath sounds, +ve wheezing, no crackles and no tenderness over ribs   Cardiovascular: Normal S1, S2, regular rate and rhythm, no murmur, rub or gallop  Abdomen: Normal sounds present, soft, lax with no tenderness, no hepatosplenomegaly, and no masses  Extremities: No edema.  Surgically wrapped left upper extremity  Skin: intact, no rashes   Neurologic: Alert and oriented x 3, No focal deficit     Investigations:  Labs, radiological imaging and cardiac work up were personally reviewed      ICU STAFF PHYSICIAN NOTE OF PERSONAL INVOLVEMENT IN CARE  As the attending physician, I certify that I personally reviewed the patient's history and personally examined the patient to confirm the physical findings described above, and that I reviewed the relevant imaging studies and available reports.  I also discussed the differential diagnosis and all of the proposed management plans with the patient and individuals accompanying the patient to this visit.  They had the opportunity to ask questions about the proposed management plans and to have those questions answered.    This patient has a high probability of sudden, clinically significant deterioration, which requires the highest level of physician preparedness to intervene urgently.  I managed/supervised life or organ supporting interventions that required frequent physician assessment.  I devoted my full attention to the direct care of this patient for the amount of time indicated below.  Time I spent with family or surrogate(s) is included only if the patient was incapable of providing the necessary information or participating in medical decision-making.  Time devoted to teaching and to any procedures I billed separately is not included.  Critical Care Time: 31 minutes

## 2025-03-07 NOTE — PROGRESS NOTES
"Kayley Lynch is a 54 y.o. female on day 9 of admission presenting with Nausea vomiting and diarrhea.    Subjective   The patient is somnolent this morning.       Objective     Physical Exam  The left upper extremity dressing is clean, dry and intact.  Moderate hand swelling is noted.  The hand is warm and well-perfused.  She is able to flex and extend the fingers and thumb.  Last Recorded Vitals  Blood pressure 153/75, pulse 74, temperature 36.5 °C (97.7 °F), temperature source Temporal, resp. rate 15, height 1.6 m (5' 2.99\"), weight 66.5 kg (146 lb 9.7 oz), SpO2 95%.  Intake/Output last 3 Shifts:  I/O last 3 completed shifts:  In: 1929.9 (29 mL/kg) [P.O.:240; I.V.:697.9 (10.5 mL/kg); Blood:892; IV Piggyback:100]  Out: 4265 (64.1 mL/kg) [Urine:4165 (1.7 mL/kg/hr); Stool:100]  Weight: 66.5 kg     Relevant Results            This patient currently has cardiac telemetry ordered; if you would like to modify or discontinue the telemetry order, click here to go to the orders activity to modify/discontinue the order.                   Assessment/Plan   Assessment & Plan  Nausea vomiting and diarrhea    Influenza A with pneumonia    Nontraumatic compartment syndrome of left upper extremity    The patient is postoperative day 3 from her fasciotomy closure.  Overall, she appears to be doing well.  The left upper extremity can be changed as needed.  Plan for suture removal on March 18.  I have recommended shoulder elbow and hand range of motion.  Therapy has been ordered.           Elijah Licea MD      "

## 2025-03-07 NOTE — PROGRESS NOTES
Cardiology Progress Note      Kayley Lynch is a 54 y.o. female on day 9 of admission presenting with Nausea vomiting and diarrhea.      Subjective   Feeling okay.  No significant pain in her arm or belly.  Breathing is comfortable.     ROS:  10 systems reviewed other than what is mentioned above.     MEDICATION:    Scheduled medications  aspirin, 81 mg, oral, Daily  atorvastatin, 80 mg, oral, Nightly  budesonide, 0.5 mg, nebulization, BID  buPROPion XL, 150 mg, oral, q AM  carvedilol, 25 mg, oral, BID  escitalopram, 20 mg, oral, Daily  insulin lispro, 0-5 Units, subcutaneous, TID AC  ipratropium-albuteroL, 3 mL, nebulization, q6h  lisinopril, 20 mg, oral, Daily  oxygen, , inhalation, Continuous - Inhalation  pantoprazole, 40 mg, oral, Daily  perflutren lipid microspheres, 0.5-10 mL of dilution, intravenous, Once in imaging  perflutren protein A microsphere, 0.5 mL, intravenous, Once in imaging  sulfur hexafluoride microsphr, 2 mL, intravenous, Once in imaging  vancomycin, 250 mg, oral, 4x daily  [Held by provider] warfarin, 2 mg, oral, Daily      Continuous medications  heparin, 0-4,000 Units/hr, Last Rate: 800 Units/hr (03/07/25 1137)      PRN medications  PRN medications: acetaminophen, ipratropium-albuteroL, labetaloL, melatonin, [Held by provider] morphine, ondansetron, promethazine     Assessment & Plan  Nausea vomiting and diarrhea    Influenza A with pneumonia    Nontraumatic compartment syndrome of left upper extremity      OBJECTIVE:    Visit Vitals  /75   Pulse 74   Temp 35.6 °C (96.1 °F) (Temporal)   Resp 15        Intake/Output Summary (Last 24 hours) at 3/7/2025 1730  Last data filed at 3/7/2025 1100  Gross per 24 hour   Intake 460 ml   Output 1475 ml   Net -1015 ml      Patient is alert and oriented x3.  HEENT is unremarkable mucous members are moist  Neck no JVP no bruits upstrokes are full no thyromegaly  Lungs are clear bilaterally.  No wheezing crackles or rales  Heart regular rhythm  tachycardic normal S1-S2 there is no S3 crisp prosthetic mitral valve click  Abdomen is soft bs are positive nontender nondistended no organomegaly no pulsatile masses  Extremities have no edema.  Left arm is bandaged distal pulses present palpable.  Neuro is grossly nonfocal  Skin has no rashes     Image Results  XR chest 1 view  Narrative: Interpreted By:  Karthikeyan Cooper,   STUDY:  XR CHEST 1 VIEW;  3/7/2025 8:02 am      INDICATION:  Signs/Symptoms:Daily CXR.      COMPARISON:  03/06/2025      ACCESSION NUMBER(S):  [id number]      ORDERING CLINICIAN:  NIKKI YATES      FINDINGS:  CARDIOMEDIASTINAL SILHOUETTE AND VASCULATURE:      Cardiac size:  Within normal limits, status post median sternotomy.  Aortic shadow:  Within normal limits.      Mediastinal contours: Within normal limits.      Pulmonary vasculature:  The central vasculature is unremarkable      LUNGS:  A small zone of opacity at the right midlung indicating infiltrate,  likely pneumonia is stable. There has been improved infiltrate at the  left lower lung.      ABDOMEN AND OTHER FINDINGS:  No remarkable upper abdominal findings.      BONES:  No acute osseous changes.      Impression: 1.  As above.      Signed by: Karthikeyan Cooper 3/7/2025 9:06 AM  Dictation workstation:   [id number]  Relevant Results:  RESULTS:    Lab Results   Component Value Date    WBC 14.1 (H) 03/07/2025    HGB 8.5 (L) 03/07/2025    HCT 26.3 (L) 03/07/2025    MCV 86 03/07/2025     03/07/2025        Lab Results   Component Value Date    CREATININE 0.41 (L) 03/07/2025    BUN 15 03/07/2025     03/07/2025    K 3.3 (L) 03/07/2025    CL 98 03/07/2025    CO2 39 (H) 03/07/2025        Results from last 7 days   Lab Units 03/07/25  0312   PROTEIN TOTAL g/dL 4.6*   BILIRUBIN TOTAL mg/dL 1.2   ALK PHOS U/L 36   ALT U/L 12   AST U/L 20   GLUCOSE mg/dL 93       Assessment/Plan      PMH  1.  Mitral regurgitation status post mitral valve replacement with a #25/33 [place] mechanical mitral  valve October 2020  2.  Paroxysmal atrial flutter status post ablation October 2020  3.  CAD.  Bypass x 3 vessels LIMA to the LAD free radial artery graft to the marginal SVG to the RCA in 2020.  Last stress test 4/16/2024 no evidence of ischemia EF 51%  4.  COPD she continues to smoke  5.  Cardiomyopathy postoperative ejection fraction was 40%.  This was up to 50% on her last stress test  6.  Hyperlipidemia  7.  Hypertension  8.  Tobacco abuse  9.  Moderate to severe pulmonary hypertension in the past     3/4/2025  1.  Paroxysmal atrial fibrillation.  Previous history of atrial flutter status post ablation in 2020.  Currently sinus tachycardia.  Increase metoprolol to tartrate to 3 times daily for better rate control.  This was likely physiologically driven because of the stress of the situation.  Anticoagulation will need to be given regardless because of her mechanical mitral valve replacement.  2.  CAD.  Stable.  No symptoms of angina.  3.  Mechanical mitral valve replacement.  This is a difficult situation given her spontaneous left arm hematoma leading to compartment syndrome and iliopsoas spontaneous hematoma on the left side.  Agree with low-dose heparin.  Aspirin currently on hold.  Coumadin will need to be restarted along with an aspirin when stable.  4.  Respiratory failure.  Slow improvement.  Unfortunately she continues to smoke and has underlying lung disease.  5.  Anemia.  She will require transfusion.    3/5/2025    1.  Paroxysmal atrial fibrillation.  She is in a sinus tachycardia today.  She did have an atrial flutter ablation in 2020.  Continue metoprolol for rate control.  IV heparin to be restarted now as she went to the OR yesterday.  Transition to oral anticoagulation once her H&H is stable.  2.  CAD.  Status post CABG x 3 vessels.  Stable.  No symptoms of angina.  3.  Mechanical mitral valve replacement.  Heparin to be restarted today.  Hopefully we can restart Coumadin and baby aspirin if  her H&H remained stable.  These were held because of a spontaneous iliopsoas hematoma and bleed in the arm related to compartment syndrome.  4.  Respiratory failure due to influenza and underlying COPD.  This is improving.    3/6/2025    1.  Paroxysmal A-fib.  Remains in sinus.  Carvedilol started.  Continue IV heparin to ensure H&H remained stable.  Transition back to Coumadin.  Unfortunately, shorter acting agents like a DOAC is not an option given the mechanical mitral valve.  2.  CAD.  Stable.  3.  Mechanical mitral valve replacement.  On heparin.  Will need to have Coumadin and aspirin restarted once bleeding has been stabilized.    3/7/2025  1.  Paroxysmal A-fib.  Remains in sinus.  Doing well with IV heparin which she is on for her mechanical mitral valve also.  Transition to Coumadin tomorrow if her H&H remained stable.  2.  CAD.  Stable.  No symptoms of angina.  Previous bypass x 3 vessels.  Aspirin on hold.  4.  Mechanical mitral valve replacement.  Restart Coumadin tomorrow.  She had spontaneous bleeding in her left arm and her retroperitoneal hematoma with a markedly elevated INR.  Transition to Coumadin from IV heparin likely tomorrow.    Case was discussed with the ICU housestaff.  Brittney Lomeli MD

## 2025-03-07 NOTE — PROGRESS NOTES
Subjective:  Patient lying in bed on exam. She appears to have more energy and is     Physical Exam:  GENERAL: Awake and alert.  HEAD:  Normocephalic, atraumatic.  EYES:  Round and reactive.  ENT:  No nasal discharge, mucous membranes moist and pink.  NECK:  Atraumatic, no meningismus.  CARDIOVASCULAR:  Regular rate and rhythm, no murmur.  RESPIRATORY: Diminished breath sounds  ABDOMEN:  Soft, no tenderness, nondistended.  EXTREMITIES:  LUE covered in dressing, RUE swelling following IV infiltration.  SKIN:  Warm and dry.  NEUROLOGICAL:  Nonfocal grossly.    Remainder of objective data including imaging and laboratory findings were all reviewed.    Admission history:  3/3: Patient is awake and responsive on exam. Trial CPAP removal, CT chest abdomen pelvis ordered. Heme-onc following, fibrinogen level ordered. Heparin drip and lopressor 25mg BID ordered.    3/4: Patient is awake and responsive in the morning.  Hemoglobin 6.4 on morning labs, 1 unit RBC ordered. Underwent irrigation and debridement with with LUE wound closing. CT angio chest abdomen pelvis shows no active extravasation, monitoring Hgb and assessing for appropriate increase following RBC.  Patient seen to be lethargic and hypertensive in the evening, ABG shows CO2 retention.  Placed on BiPAP, one-time labetalol dose administered, Lopressor restarted.    3/5: Heparin drip restarted in the morning after Hgb remained stable. Recheck in the afternoon shows drop in hemoglobin, heparin drip stopped and one unit RBC administered. Administering FFP as needed. Patient's HR and BP are more controlled, cardiology following.    3/6: Heparin drip resumed following stable Hgb with appropriate increase after RBC administration. Metoprolol discontinued and home carvedilol resumed. One time dose IV Lasix dose ordered, encouraging incentive spirometry. Continuing to monitor and transfuse as needed, tentative transfer out of ICU tomorrow.    3/7: Hgb remains stable  overnight on heparin drip.  3 L UOP following Lasix dose.  Continue home beta-blockade, patient is stable at this time and has no further ICU needs. Transferred to general medicine.    Assessment and plan:  Kayley Lynch is a 54-year-old female who presented on 2/26/2025 for feelings of malaise. She was treated for shock in the setting of Influenza A with suspected superimposed bacterial pneumonia, COPD exacerbation, and suspected infectious colitis. She was transferred out of the ICU on 2/28. LUE swelling was noted overnight 3/1-3/2, patient later had rapid response called for tachycardia and hypotension. Orthopedic surgery was consulted and she underwent emergent fasciotomy for compartment syndrome of the LUE. Patient is now under ICU care, vascular and orthopedics following.      Neurological System:  #Acute metabolic encephalopathy following LUE wound closure 3/4, improving  #ICU delirium, improving  -Patient is less lethargic and more oriented on examination  - Continue home wellbutrin 150mg daily    Cardiovascular System:  #Afib with intermittent RVR, controlled  #Hx of Mechanical Mitral Valve Replacement  #CAD s/p CABG  #Hypertension  -Continue home carvedilol twice daily and as needed labetalol  -Continue heparin drip today, transition to warfarin recommended tomorrow  - Central line removed  - Patient has no ICU needs at this time, appropriate for transfer to general medicine       Respiratory System:  #Acute on chronic hypoxic hypercapnic respiratory failure  #Influenza A  #COPD exacerbation  #Secondary PHTN  - Continue Pulmicort and Duonebs  - Repeat CXR shows improved infiltrate at the left lower lung    Gastrointestinal System:  #Diarrhea, c/f colitis  -P.o. vancomycin course completed today  - FMS removed  - Continue PPI    Endocrine System:  -Accu-checks     Renal System:  #HILLARY, resolved  -Continue to monitor renal function  - Jasso removed  - Monitor urine output    Hematological  System:  #Supratherapeutic INR  #Suspected coagulopathy  #Retroperitoneal Hemorrhage   #Anemia  #Leukocytosis in setting of steroid use  -Continue heparin drip, plan to transition to warfarin tomorrow  -Hgb 8.5 down from 9.3  -Continue to monitor CBC    Infectious Disease:  #CAP  #C. diff colitis  #Influenza A  -P.o. vancomycin complete    Skin/MSK:  #Left Upper Extremity Compartment Syndrome s/p fasciotomy 3/2  -s/p irrigation and debridement  -pain management with PRN tylenol and morphine    Psych:  -No acute issues    ICU CHECK LIST:   Antimicrobials: -  Oxygen: 5L NC  Drips: Heparin  Fluids: -  Feeding: Regular diet  Sedation/Analgesia: Tylenol, PRN morphine  Prophylaxis: Protonix  Glycemic control: -  Bowel care: PRN   Lines/Tubes: PIV  Restraints: -  Dispo: Medicine    Code Status: Full       Neto Clark MD  PGY-1

## 2025-03-07 NOTE — CARE PLAN
The patient's goals for the shift include      The clinical goals for the shift include Patient to remain hemodynamically stable throughout the shift    Over the shift, the patient did not make progress toward the following goals. Barriers to progression include lethargy. Recommendations to address these barriers include continue to monitor.

## 2025-03-07 NOTE — PROGRESS NOTES
Physical Therapy    Physical Therapy Treatment    Patient Name: Kayley Lynch  MRN: [id number]  Today's Date: 3/7/2025  Time Calculation  Start Time: 1148  Stop Time: 1211  Time Calculation (min): 23 min     138/138-A    Assessment/Plan   PT Assessment  PT Assessment Results: Decreased strength, Decreased endurance, Impaired balance, Decreased mobility  Rehab Prognosis: Good  Barriers to Discharge Home: Physical needs  Physical Needs: Ambulating household distances limited by function/safety  End of Session Communication: Bedside nurse  Assessment Comment: Pt would continue to benefit from further physical therapy to address deficits in functional mobility and gait.  End of Session Patient Position: Up in chair, Alarm off, not on at start of session     PT Plan  Treatment/Interventions: Bed mobility, Transfer training, Gait training, Balance training, Strengthening, Endurance training, Therapeutic exercise, Therapeutic activity  PT Plan: Ongoing PT  PT Frequency: 4 times per week  PT Discharge Recommendations: High intensity level of continued care  PT - OK to Discharge: Yes    Current Problem:  Patient Active Problem List   Diagnosis    Atrial flutter (Multi)    Bilateral edema of lower extremity    CAD (coronary artery disease)    Cardiomyopathy    Hypercholesterolemia    Mitral regurgitation    Pleural effusion    Pulmonary hypertension (Multi)    S/P CABG (coronary artery bypass graft)    S/P mitral valve replacement    Dyspnea on exertion    Tachycardia    Current mild episode of major depressive disorder without prior episode ([facility], located within [facility])    Chest pressure    Nausea vomiting and diarrhea    Influenza A with pneumonia    Nontraumatic compartment syndrome of left upper extremity       General Visit Information:   PT  Visit  PT Received On: 03/07/25  General  Reason for Referral: PT eval and treat; spetic shock, COPD exacerbation, large hematoma left arm/compartment syndrome s/p urgent fasciotomy on 3/2, closure on  3/4; spontaneous iliopsoas hematoma on left, stable amount of left  retroperitoneal hemorrhage that extends down the left hemipelvis and  surrounding the rectum  Referred By: Dimas Mensah  Past Medical History Relevant to Rehab: COPD, HLD, CAD s/p CABG, pulm HTN, cardiomyopathy, a flutter s/p ablation, MVR  Family/Caregiver Present: Yes (son)  Co-Treatment: OT  Co-Treatment Reason: to maximize safety and functional mobility  Prior to Session Communication: Bedside nurse  Patient Position Received: Bed, 3 rail up, Alarm off, not on at start of session  General Comment: Pt resting in bed upon entering, agreeable to PT.  Subjective     Precautions:  Precautions  Precautions Comment: contact plus precautions, O2, telemetry, central line, heparin drip    Vital Signs:  Vital Signs  Vital Signs Comment: VSS throughout session  Objective     Pain:  Pain Assessment  Pain Assessment: 0-10  0-10 (Numeric) Pain Score: 0 - No pain    Cognition:  Cognition  Overall Cognitive Status: Within Functional Limits    Postural Control:  Postural Control  Posture Comment: Pt sits EOB 8 minutes, SBA. Cues for upright posture. Pt leans to the right with fatigue.      Activity Tolerance:  Activity Tolerance  Endurance: Tolerates less than 10 min exercise, no significant change in vital signs  Early Mobility/Exercise Safety Screen: Proceed with mobilization - No exclusion criteria met    Treatments:  Therapeutic Exercise  Therapeutic Exercise Performed:  (Sitting LAQs x 10 BLE)        Bed Mobility  Bed Mobility: Yes  Bed Mobility 1  Bed Mobility Comments 1: supine<>sit with mod assist x 2, HOB raised, cues for technique.  Ambulation/Gait Training  Ambulation/Gait Training Performed:  (Pt ambulates bed to chair x 3ft with WW with mod assist x 2. Pt unsteady throughout. Assist for walker management and cues for increased hip and knee ext in stance.)  Transfers  Transfer: Yes  Transfer 1  Trials/Comments 1: sit to stand with mod assist x 2 with  WW; cues for hand placement, safety and technique          Outcome Measures:     [facility] Basic Mobility  Turning from your back to your side while in a flat bed without using bedrails: A lot  Moving from lying on your back to sitting on the side of a flat bed without using bedrails: A lot  Moving to and from bed to chair (including a wheelchair): A lot  Standing up from a chair using your arms (e.g. wheelchair or bedside chair): A lot  To walk in hospital room: A lot  Climbing 3-5 steps with railing: Total  Basic Mobility - Total Score: 11              FSS-ICU  Ambulation: Walks <50 feet with any assistance x1 or walks any distance with assistance x2 people  Rolling: Maximal assistance (performs 25% - 49% of task)  Sitting: Minimal assistance (performs 75% or more of task)  Transfer Sit-to-Stand: Maximal assistance (performs 25% - 49% of task)  Transfer Supine-to-Sit: Maximal assistance (performs 25% - 49% of task)  Total Score: 11  ICU Mobility Screen  Early Mobility/Exercise Safety Screen: Proceed with mobilization - No exclusion criteria met  ICU Mobility Scale: Transferring bed to chair  E = Exercise and Early Mobility  Early Mobility/Exercise Safety Screen: Proceed with mobilization - No exclusion criteria met  ICU Mobility Scale: Transferring bed to chair                 Education Documentation  Mobility Training, taught by Gege Workman PT at 3/7/2025  1:30 PM.  Learner: Patient  Readiness: Acceptance  Method: Explanation  Response: Verbalizes Understanding  Comment: transfers    Education Comments  No comments found.      ICU Mobility Scale: Transferring bed to chair [5]    EDUCATION:     Encounter Problems       Encounter Problems (Active)       PT Problem       PT Goal 1 STG - Pt will amb 10' using WW with MIN A   (Progressing)       Start:  03/05/25    Expected End:  03/19/25            PT Goal 2 STG - Pt will transition supine <> sitting with MIN A  (Progressing)       Start:  03/05/25    Expected End:   03/19/25            PT Goal 3 STG - Pt will SPT bed <> chair with MIN A  (Progressing)       Start:  03/05/25    Expected End:  03/19/25            PT Goal 4 STG - Pt will transfer STS with MIN A  (Progressing)       Start:  03/05/25    Expected End:  03/19/25               Pain - Adult

## 2025-03-07 NOTE — CARE PLAN
The patient's goals for the shift include  Patient to remain safe and free from injury with pain and comfort managed and maintained throughout the shift    The clinical goals for the shift include Patient to remain hemodynamically stable throughout the shift    Over the shift, the patient did not make progress toward the following goals. Barriers to progression include LUE fasciotomy, pain, weakness, and poor nutritional intake. Recommendations to address these barriers include   Problem: Pain - Adult  Goal: Verbalizes/displays adequate comfort level or baseline comfort level  Outcome: Progressing     Problem: Safety - Adult  Goal: Free from fall injury  Outcome: Progressing     Problem: Discharge Planning  Goal: Discharge to home or other facility with appropriate resources  Outcome: Progressing     Problem: Chronic Conditions and Co-morbidities  Goal: Patient's chronic conditions and co-morbidity symptoms are monitored and maintained or improved  Outcome: Progressing     Problem: Nutrition  Goal: Nutrient intake appropriate for maintaining nutritional needs  Outcome: Progressing     Problem: Fall/Injury  Goal: Not fall by end of shift  Outcome: Progressing  Goal: Be free from injury by end of the shift  Outcome: Progressing  Goal: Verbalize understanding of personal risk factors for fall in the hospital  Outcome: Progressing  Goal: Verbalize understanding of risk factor reduction measures to prevent injury from fall in the home  Outcome: Progressing  Goal: Use assistive devices by end of the shift  Outcome: Progressing  Goal: Pace activities to prevent fatigue by end of the shift  Outcome: Progressing     Problem: Respiratory  Goal: Clear secretions with interventions this shift  Outcome: Progressing  Goal: Minimize anxiety/maximize coping throughout shift  Outcome: Progressing  Goal: Minimal/no exertional discomfort or dyspnea this shift  Outcome: Progressing  Goal: No signs of respiratory distress (eg. Use of  accessory muscles. Peds grunting)  Outcome: Progressing  Goal: Patent airway maintained this shift  Outcome: Progressing  Goal: Verbalize decreased shortness of breath this shift  Outcome: Progressing  Goal: Wean oxygen to maintain O2 saturation per order/standard this shift  Outcome: Progressing  Goal: Increase self care and/or family involvement in next 24 hours  Outcome: Progressing     Problem: Skin  Goal: Participates in plan/prevention/treatment measures  Outcome: Progressing  Flowsheets (Taken 3/7/2025 0024)  Participates in plan/prevention/treatment measures: Elevate heels  Goal: Prevent/manage excess moisture  Outcome: Progressing  Flowsheets (Taken 3/7/2025 0024)  Prevent/manage excess moisture:   Moisturize dry skin   Monitor for/manage infection if present   Cleanse incontinence/protect with barrier cream  Goal: Prevent/minimize sheer/friction injuries  Outcome: Progressing  Flowsheets (Taken 3/7/2025 0024)  Prevent/minimize sheer/friction injuries:   HOB 30 degrees or less   Turn/reposition every 2 hours/use positioning/transfer devices   Use pull sheet  Goal: Promote/optimize nutrition  Outcome: Progressing  Flowsheets (Taken 3/7/2025 0024)  Promote/optimize nutrition: Monitor/record intake including meals  Goal: Promote skin healing  Outcome: Progressing  Flowsheets (Taken 3/7/2025 0024)  Promote skin healing:   Assess skin/pad under line(s)/device(s)   Protective dressings over bony prominences   Turn/reposition every 2 hours/use positioning/transfer devices     Problem: Pain  Goal: Takes deep breaths with improved pain control throughout the shift  Outcome: Progressing  Goal: Turns in bed with improved pain control throughout the shift  Outcome: Progressing  Goal: Walks with improved pain control throughout the shift  Outcome: Progressing  Goal: Performs ADL's with improved pain control throughout shift  Outcome: Progressing  Goal: Participates in PT with improved pain control throughout the  shift  Outcome: Progressing  Goal: Free from opioid side effects throughout the shift  Outcome: Progressing  Goal: Free from acute confusion related to pain meds throughout the shift  Outcome: Progressing   .

## 2025-03-07 NOTE — PROGRESS NOTES
Kayley Lynch is a 54 y.o. female on day 9 of admission presenting with Nausea vomiting and diarrhea.      Subjective   No events overnight.  Patient seen and examined at bedside with  present.  Hospitalization and treatment plan reviewed with  at length.  Patient has no complaints.       Objective     Last Recorded Vitals  /75   Pulse 74   Temp 35.8 °C (96.4 °F) (Temporal)   Resp 15   Wt 66.5 kg (146 lb 9.7 oz)   SpO2 95%   Intake/Output last 3 Shifts:    Intake/Output Summary (Last 24 hours) at 3/7/2025 1606  Last data filed at 3/7/2025 1100  Gross per 24 hour   Intake 460 ml   Output 1525 ml   Net -1065 ml       Admission Weight  Weight: 59 kg (130 lb) (02/26/25 0159)    Daily Weight  03/07/25 : 66.5 kg (146 lb 9.7 oz)    Image Results  XR chest 1 view  Narrative: Interpreted By:  Karthikeyan Cooper,   STUDY:  XR CHEST 1 VIEW;  3/7/2025 8:02 am      INDICATION:  Signs/Symptoms:Daily CXR.      COMPARISON:  03/06/2025      ACCESSION NUMBER(S):  [id number]      ORDERING CLINICIAN:  NIKKI YATES      FINDINGS:  CARDIOMEDIASTINAL SILHOUETTE AND VASCULATURE:      Cardiac size:  Within normal limits, status post median sternotomy.  Aortic shadow:  Within normal limits.      Mediastinal contours: Within normal limits.      Pulmonary vasculature:  The central vasculature is unremarkable      LUNGS:  A small zone of opacity at the right midlung indicating infiltrate,  likely pneumonia is stable. There has been improved infiltrate at the  left lower lung.      ABDOMEN AND OTHER FINDINGS:  No remarkable upper abdominal findings.      BONES:  No acute osseous changes.      Impression: 1.  As above.      Signed by: Karthikeyan Cooper 3/7/2025 9:06 AM  Dictation workstation:   [id number]      Physical Exam  HENT:      Head: Normocephalic and atraumatic.      Nose: Nose normal.      Mouth/Throat:      Mouth: Mucous membranes are moist.      Pharynx: Oropharynx is clear.   Eyes:      Extraocular Movements:  Extraocular movements intact.      Pupils: Pupils are equal, round, and reactive to light.   Cardiovascular:      Rate and Rhythm: Normal rate and regular rhythm.   Pulmonary:      Effort: No respiratory distress.      Breath sounds: Wheezing present. No rhonchi or rales.   Abdominal:      General: Bowel sounds are normal. There is no distension.      Palpations: Abdomen is soft.      Tenderness: There is no abdominal tenderness. There is no guarding.   Musculoskeletal:      Right lower leg: No edema.      Left lower leg: No edema.   Skin:     General: Skin is warm and dry.   Neurological:      Mental Status: She is alert. Mental status is at baseline.         Relevant Results               Assessment/Plan   This patient currently has cardiac telemetry ordered; if you would like to modify or discontinue the telemetry order, click here to go to the orders activity to modify/discontinue the order.      This patient has a urinary catheter   Reason for the urinary catheter remaining today? critically ill patient who need accurate urinary output measurements    Assessment & Plan  Nausea vomiting and diarrhea    Influenza A with pneumonia      Septic shock, dehydration, hypercapnic respiratory failure, and dehydration.  Nontraumatic compartment syndrome of left upper extremity      Patient is a 54-year-old female with past medical history of COPD, CAD status post CABG, pulmonary hypertension, hyperlipidemia, cardiomyopathy, a flutter status post ablation, and mitral valve replacement who presented with malaise.  She was found to be in septic shock and admitted to the ICU.    Septic shock  Hypercapnic respiratory failure  COPD exacerbation  Influenza A  Pneumonia  Acute metabolic encephalopathy  C. difficile colitis  Dehydration  HILLARY    -Continue supplemental oxygen  -Continue antibiotics, currently on doxycycline  -Continue nebulizers  -Continue steroids  -Continue Tamiflu  -Continue p.o. vancomycin  -Continue home  meds  -Monitor INR    3/2: Patient developed a large hematoma in the left arm leading to compartment syndrome. She was taken for urgent fasciotomies with Orthopedic Surgery. She is also being followed by Vascular Surgery. Vitamin K ordered for supratherapeutic INR. Her INR was 2.2 yesterday with goal being 2.5-3.5 for her A-flutter s/p mitral valve replacement. Critical Care reconsulted. Hematology consulted, appreciate recs. Hold coumadin and lovenox.    3/3: INR reversed. Patient is on low intensity heparin drip. She was transfused 1 unit pRBCs. Monitor hemoglobin closely. Hematology has ordered a work up including DIC panel. Patient will need another debridement and irrigation in 24-48 hours per Orthopedics. She has also developed a retroperitoneal bleed.    3/4: INR is down to 1.0.  Leukocytosis improved from 15 down to 13.  Hemoglobin is down to 6.4.  Patient to be transfused another unit packed RBCs.  She will be going back to the OR today for further debridement, washout, possible closure. She will also have a CT angiogram to determine if there is any active bleeding causing her retroperitoneal bleed.  She remains on a heparin drip due to her valve.  Continue doxycycline and p.o. Vanco.  Patient fully evaluated on March 5.  Patient restarted on heparin.  Fasciotomy site for compartment syndrome appears clear.  Continue to monitor lab work.  Case discussed with  at bedside.  No active bleeding is noted at this time.  Recheck labs in AM.       Patient fully evaluated  03/07  for    Problem List Items Addressed This Visit       * (Principal) Nausea vomiting and diarrhea    Influenza A with pneumonia - Primary    Nontraumatic compartment syndrome of left upper extremity    Relevant Orders    Case Request Operating Room: FASCIOTOMY, UPPER EXTREMITY (Completed)    Case Request Operating Room: DEBRIDEMENT, WOUND, UPPER EXTREMITY (Completed)     Other Visit Diagnoses       Acute kidney injury (CMS-HCC)         Dehydration        Influenza A        Shock (Multi)        Relevant Orders    Transthoracic Echo (TTE) Complete (Completed)          Patient seen resting in bed with head of bed elevated, no s/s or c/o acute difficulties at this time.  Vital signs for last 24 hours Temp:  [35.8 °C (96.4 °F)-36.9 °C (98.4 °F)] 35.8 °C (96.4 °F)  Heart Rate:  [70-84] 74  Resp:  [14-18] 15  BP: (110-170)/(58-78) 153/75  FiO2 (%):  [40 %-44 %] 44 %    I/O this shift:  In: 8 [I.V.:8]  Out: 560 [Urine:360; Stool:200]  Patient still requiring frequent cardiac and SPO2 monitoring. Continue aggressive pulmonary hygiene and oral hygiene. Off loading as tolerated for skin integrity. Medications and labs reviewed-   Results for orders placed or performed during the hospital encounter of 02/25/25 (from the past 24 hours)   Heparin Assay, UFH   Result Value Ref Range    Heparin Unfractionated 0.3 See Comment Below for Therapeutic Ranges IU/mL   POCT GLUCOSE   Result Value Ref Range    POCT Glucose 111 (H) 74 - 99 mg/dL   CBC   Result Value Ref Range    WBC 14.1 (H) 4.4 - 11.3 x10*3/uL    nRBC 0.4 (H) 0.0 - 0.0 /100 WBCs    RBC 3.06 (L) 4.00 - 5.20 x10*6/uL    Hemoglobin 8.5 (L) 12.0 - 16.0 g/dL    Hematocrit 26.3 (L) 36.0 - 46.0 %    MCV 86 80 - 100 fL    MCH 27.8 26.0 - 34.0 pg    MCHC 32.3 32.0 - 36.0 g/dL    RDW 18.5 (H) 11.5 - 14.5 %    Platelets 185 150 - 450 x10*3/uL   Comprehensive Metabolic Panel   Result Value Ref Range    Glucose 93 74 - 99 mg/dL    Sodium 141 136 - 145 mmol/L    Potassium 3.3 (L) 3.5 - 5.3 mmol/L    Chloride 98 98 - 107 mmol/L    Bicarbonate 39 (H) 21 - 32 mmol/L    Anion Gap 7 (L) 10 - 20 mmol/L    Urea Nitrogen 15 6 - 23 mg/dL    Creatinine 0.41 (L) 0.50 - 1.05 mg/dL    eGFR >90 >60 mL/min/1.73m*2    Calcium 7.9 (L) 8.6 - 10.3 mg/dL    Albumin 2.7 (L) 3.4 - 5.0 g/dL    Alkaline Phosphatase 36 33 - 110 U/L    Total Protein 4.6 (L) 6.4 - 8.2 g/dL    AST 20 9 - 39 U/L    Bilirubin, Total 1.2 0.0 - 1.2 mg/dL    ALT 12 7  - 45 U/L   Magnesium   Result Value Ref Range    Magnesium 2.01 1.60 - 2.40 mg/dL   Heparin Assay, UFH   Result Value Ref Range    Heparin Unfractionated 0.4 See Comment Below for Therapeutic Ranges IU/mL   POCT GLUCOSE   Result Value Ref Range    POCT Glucose 104 (H) 74 - 99 mg/dL   POCT GLUCOSE   Result Value Ref Range    POCT Glucose 92 74 - 99 mg/dL   POCT GLUCOSE   Result Value Ref Range    POCT Glucose 112 (H) 74 - 99 mg/dL      Patient recently received an antibiotic (last 12 hours)       Date/Time Action Medication Dose    03/07/25 1252 Given    vancomycin (Firvanq) solution 250 mg 250 mg    03/07/25 0602 Given    vancomycin (Firvanq) solution 250 mg 250 mg           Plan discussed with interdisciplinary team, seen by orthopedics today - dressing changes as needed,   suture removal on March 18, recommended shoulder elbow and hand range of motion with therapy ordered. Will continue current and repeat labs in the AM. CXR shows improved infiltrate at the left lower lung, strict intake and output, trend hemoglobin.    Discharge planning discussed with patient and care team. Therapy evaluations ordered. [facility] - anticipate C/SNF at discharge. Patient aware and agreeable to current plan, continue plan as above.     I spent a total of 50 minutes on the date of the service which included preparing to see the patient, face-to-face patient care, completing clinical documentation, obtaining and/or reviewing separately obtained history, performing a medically appropriate examination, counseling and educating the patient/family/caregiver, ordering medications, tests, or procedures, communicating with other HCPs (not separately reported), independently interpreting results (not separately reported), communicating results to the patient/family/caregiver, and care coordination (not separately reported).        Ginny Willson

## 2025-03-08 ENCOUNTER — APPOINTMENT (OUTPATIENT)
Dept: RADIOLOGY | Facility: HOSPITAL | Age: 55
DRG: 853 | End: 2025-03-08
Payer: COMMERCIAL

## 2025-03-08 DIAGNOSIS — E78.00 HYPERCHOLESTEROLEMIA: ICD-10-CM

## 2025-03-08 LAB
ALBUMIN SERPL BCP-MCNC: 2.7 G/DL (ref 3.4–5)
ALP SERPL-CCNC: 36 U/L (ref 33–110)
ALT SERPL W P-5'-P-CCNC: 12 U/L (ref 7–45)
ANION GAP SERPL CALC-SCNC: 7 MMOL/L (ref 10–20)
AST SERPL W P-5'-P-CCNC: 17 U/L (ref 9–39)
BASOPHILS # BLD AUTO: 0.01 X10*3/UL (ref 0–0.1)
BASOPHILS NFR BLD AUTO: 0.1 %
BILIRUB SERPL-MCNC: 1.3 MG/DL (ref 0–1.2)
BUN SERPL-MCNC: 11 MG/DL (ref 6–23)
CALCIUM SERPL-MCNC: 8.3 MG/DL (ref 8.6–10.3)
CHLORIDE SERPL-SCNC: 102 MMOL/L (ref 98–107)
CO2 SERPL-SCNC: 35 MMOL/L (ref 21–32)
CREAT SERPL-MCNC: 0.3 MG/DL (ref 0.5–1.05)
EGFRCR SERPLBLD CKD-EPI 2021: >90 ML/MIN/1.73M*2
EOSINOPHIL # BLD AUTO: 0.02 X10*3/UL (ref 0–0.7)
EOSINOPHIL NFR BLD AUTO: 0.2 %
ERYTHROCYTE [DISTWIDTH] IN BLOOD BY AUTOMATED COUNT: 19 % (ref 11.5–14.5)
GLUCOSE BLD MANUAL STRIP-MCNC: 100 MG/DL (ref 74–99)
GLUCOSE BLD MANUAL STRIP-MCNC: 161 MG/DL (ref 74–99)
GLUCOSE SERPL-MCNC: 95 MG/DL (ref 74–99)
HCT VFR BLD AUTO: 27.7 % (ref 36–46)
HGB BLD-MCNC: 8.5 G/DL (ref 12–16)
IMM GRANULOCYTES # BLD AUTO: 0.11 X10*3/UL (ref 0–0.7)
IMM GRANULOCYTES NFR BLD AUTO: 0.9 % (ref 0–0.9)
INR PPP: 1.2 (ref 0.9–1.1)
LYMPHOCYTES # BLD AUTO: 1.1 X10*3/UL (ref 1.2–4.8)
LYMPHOCYTES NFR BLD AUTO: 9.1 %
MCH RBC QN AUTO: 27.3 PG (ref 26–34)
MCHC RBC AUTO-ENTMCNC: 30.7 G/DL (ref 32–36)
MCV RBC AUTO: 89 FL (ref 80–100)
MONOCYTES # BLD AUTO: 0.94 X10*3/UL (ref 0.1–1)
MONOCYTES NFR BLD AUTO: 7.7 %
NEUTROPHILS # BLD AUTO: 9.95 X10*3/UL (ref 1.2–7.7)
NEUTROPHILS NFR BLD AUTO: 82 %
NRBC BLD-RTO: 0.2 /100 WBCS (ref 0–0)
PLATELET # BLD AUTO: 216 X10*3/UL (ref 150–450)
POTASSIUM SERPL-SCNC: 3.5 MMOL/L (ref 3.5–5.3)
PROT SERPL-MCNC: 4.9 G/DL (ref 6.4–8.2)
PROTHROMBIN TIME: 12.9 SECONDS (ref 9.8–12.4)
RBC # BLD AUTO: 3.11 X10*6/UL (ref 4–5.2)
SODIUM SERPL-SCNC: 140 MMOL/L (ref 136–145)
UFH PPP CHRO-ACNC: 0.3 IU/ML
WBC # BLD AUTO: 12.1 X10*3/UL (ref 4.4–11.3)

## 2025-03-08 PROCEDURE — 94640 AIRWAY INHALATION TREATMENT: CPT

## 2025-03-08 PROCEDURE — 2060000001 HC INTERMEDIATE ICU ROOM DAILY

## 2025-03-08 PROCEDURE — 80053 COMPREHEN METABOLIC PANEL: CPT | Performed by: INTERNAL MEDICINE

## 2025-03-08 PROCEDURE — 2500000004 HC RX 250 GENERAL PHARMACY W/ HCPCS (ALT 636 FOR OP/ED)

## 2025-03-08 PROCEDURE — 71045 X-RAY EXAM CHEST 1 VIEW: CPT | Performed by: RADIOLOGY

## 2025-03-08 PROCEDURE — 2500000005 HC RX 250 GENERAL PHARMACY W/O HCPCS

## 2025-03-08 PROCEDURE — 2500000002 HC RX 250 W HCPCS SELF ADMINISTERED DRUGS (ALT 637 FOR MEDICARE OP, ALT 636 FOR OP/ED): Performed by: INTERNAL MEDICINE

## 2025-03-08 PROCEDURE — 36415 COLL VENOUS BLD VENIPUNCTURE: CPT | Performed by: INTERNAL MEDICINE

## 2025-03-08 PROCEDURE — 2500000001 HC RX 250 WO HCPCS SELF ADMINISTERED DRUGS (ALT 637 FOR MEDICARE OP)

## 2025-03-08 PROCEDURE — 85520 HEPARIN ASSAY: CPT | Performed by: HOSPITALIST

## 2025-03-08 PROCEDURE — 82947 ASSAY GLUCOSE BLOOD QUANT: CPT

## 2025-03-08 PROCEDURE — 51701 INSERT BLADDER CATHETER: CPT

## 2025-03-08 PROCEDURE — 2500000002 HC RX 250 W HCPCS SELF ADMINISTERED DRUGS (ALT 637 FOR MEDICARE OP, ALT 636 FOR OP/ED)

## 2025-03-08 PROCEDURE — 99233 SBSQ HOSP IP/OBS HIGH 50: CPT | Performed by: INTERNAL MEDICINE

## 2025-03-08 PROCEDURE — 71045 X-RAY EXAM CHEST 1 VIEW: CPT

## 2025-03-08 PROCEDURE — 36415 COLL VENOUS BLD VENIPUNCTURE: CPT | Performed by: HOSPITALIST

## 2025-03-08 PROCEDURE — 85025 COMPLETE CBC W/AUTO DIFF WBC: CPT | Performed by: INTERNAL MEDICINE

## 2025-03-08 PROCEDURE — 2500000001 HC RX 250 WO HCPCS SELF ADMINISTERED DRUGS (ALT 637 FOR MEDICARE OP): Performed by: HOSPITALIST

## 2025-03-08 PROCEDURE — 2500000005 HC RX 250 GENERAL PHARMACY W/O HCPCS: Performed by: INTERNAL MEDICINE

## 2025-03-08 PROCEDURE — 85610 PROTHROMBIN TIME: CPT | Performed by: INTERNAL MEDICINE

## 2025-03-08 RX ORDER — IPRATROPIUM BROMIDE AND ALBUTEROL SULFATE 2.5; .5 MG/3ML; MG/3ML
3 SOLUTION RESPIRATORY (INHALATION)
Status: DISCONTINUED | OUTPATIENT
Start: 2025-03-09 | End: 2025-03-18 | Stop reason: HOSPADM

## 2025-03-08 RX ORDER — VANCOMYCIN HYDROCHLORIDE 250 MG/1
250 CAPSULE ORAL 4 TIMES DAILY
Status: DISCONTINUED | OUTPATIENT
Start: 2025-03-08 | End: 2025-03-18 | Stop reason: HOSPADM

## 2025-03-08 RX ORDER — WARFARIN 2 MG/1
4 TABLET ORAL DAILY
Status: DISCONTINUED | OUTPATIENT
Start: 2025-03-08 | End: 2025-03-09

## 2025-03-08 RX ORDER — WARFARIN 2 MG/1
4 TABLET ORAL ONCE
Status: DISCONTINUED | OUTPATIENT
Start: 2025-03-08 | End: 2025-03-09

## 2025-03-08 RX ADMIN — IPRATROPIUM BROMIDE AND ALBUTEROL SULFATE 3 ML: .5; 3 SOLUTION RESPIRATORY (INHALATION) at 14:34

## 2025-03-08 RX ADMIN — VANCOMYCIN HYDROCHLORIDE 250 MG: 250 CAPSULE ORAL at 20:06

## 2025-03-08 RX ADMIN — ASPIRIN 81 MG: 81 TABLET, COATED ORAL at 08:30

## 2025-03-08 RX ADMIN — BUPROPION HYDROCHLORIDE 150 MG: 150 TABLET, EXTENDED RELEASE ORAL at 08:30

## 2025-03-08 RX ADMIN — WARFARIN SODIUM 4 MG: 4 TABLET ORAL at 18:11

## 2025-03-08 RX ADMIN — IPRATROPIUM BROMIDE AND ALBUTEROL SULFATE 3 ML: .5; 3 SOLUTION RESPIRATORY (INHALATION) at 02:04

## 2025-03-08 RX ADMIN — PANTOPRAZOLE SODIUM 40 MG: 40 TABLET, DELAYED RELEASE ORAL at 08:30

## 2025-03-08 RX ADMIN — CARVEDILOL 25 MG: 25 TABLET, FILM COATED ORAL at 08:30

## 2025-03-08 RX ADMIN — LISINOPRIL 20 MG: 20 TABLET ORAL at 08:30

## 2025-03-08 RX ADMIN — VANCOMYCIN HYDROCHLORIDE 250 MG: 250 CAPSULE ORAL at 18:11

## 2025-03-08 RX ADMIN — Medication 5 PERCENT: at 07:46

## 2025-03-08 RX ADMIN — Medication 5 L/MIN: at 20:00

## 2025-03-08 RX ADMIN — IPRATROPIUM BROMIDE AND ALBUTEROL SULFATE 3 ML: .5; 3 SOLUTION RESPIRATORY (INHALATION) at 08:29

## 2025-03-08 RX ADMIN — BUDESONIDE 0.5 MG: 0.5 INHALANT ORAL at 08:29

## 2025-03-08 RX ADMIN — HEPARIN SODIUM 800 UNITS/HR: 10000 INJECTION, SOLUTION INTRAVENOUS at 20:07

## 2025-03-08 RX ADMIN — ATORVASTATIN CALCIUM 80 MG: 80 TABLET, FILM COATED ORAL at 20:06

## 2025-03-08 RX ADMIN — ESCITALOPRAM OXALATE 20 MG: 10 TABLET ORAL at 08:30

## 2025-03-08 RX ADMIN — VANCOMYCIN HYDROCHLORIDE 250 MG: 250 CAPSULE ORAL at 12:20

## 2025-03-08 RX ADMIN — CARVEDILOL 25 MG: 25 TABLET, FILM COATED ORAL at 20:05

## 2025-03-08 ASSESSMENT — PAIN - FUNCTIONAL ASSESSMENT
PAIN_FUNCTIONAL_ASSESSMENT: 0-10

## 2025-03-08 ASSESSMENT — COGNITIVE AND FUNCTIONAL STATUS - GENERAL
CLIMB 3 TO 5 STEPS WITH RAILING: A LOT
DRESSING REGULAR LOWER BODY CLOTHING: A LOT
TURNING FROM BACK TO SIDE WHILE IN FLAT BAD: A LITTLE
HELP NEEDED FOR BATHING: A LOT
MOVING FROM LYING ON BACK TO SITTING ON SIDE OF FLAT BED WITH BEDRAILS: A LITTLE
TOILETING: A LOT
EATING MEALS: A LOT
DRESSING REGULAR UPPER BODY CLOTHING: A LOT
WALKING IN HOSPITAL ROOM: A LOT
DAILY ACTIVITIY SCORE: 12
PERSONAL GROOMING: A LOT
MOBILITY SCORE: 16
MOVING TO AND FROM BED TO CHAIR: A LITTLE
STANDING UP FROM CHAIR USING ARMS: A LITTLE

## 2025-03-08 ASSESSMENT — PAIN SCALES - GENERAL
PAINLEVEL_OUTOF10: 0 - NO PAIN

## 2025-03-08 NOTE — CARE PLAN
The patient's goals for the shift include      The clinical goals for the shift include Patient to remain hempodynamically stable throughout the shfit    Over the shift, the patient did not make progress toward the following goals. Barriers to progression include sepsis. Recommendations to address these barriers include continue to monitor.

## 2025-03-08 NOTE — CARE PLAN
The patient's goals for the shift include rest     The clinical goals for the shift include Patient to remain hempodynamically stable throughout the shfit

## 2025-03-08 NOTE — CONSULTS
"Nutrition Follow Up Assessment:   Nutrition Assessment         Patient is a 54 y.o. female presenting with nausea; vomting       Nutrition History:  Food and Nutrient History: Pt ate a little bit this morning.  She is starting to be more awake       Anthropometrics:  Height: 160 cm (5' 2.99\")   Weight: 66.5 kg (146 lb 9.7 oz)   BMI (Calculated): 25.98  IBW/kg (Dietitian Calculated): 52 kg  Percent of IBW: 113 %       Weight History:   Wt Readings from Last 10 Encounters:   03/07/25 66.5 kg (146 lb 9.7 oz)   09/27/24 60.8 kg (134 lb)   08/12/24 59 kg (130 lb)   07/24/24 61 kg (134 lb 6.4 oz)   05/31/24 61.7 kg (136 lb)   05/23/24 63 kg (139 lb)   03/22/24 63.5 kg (140 lb)   09/22/23 62.1 kg (137 lb)   05/08/23 61.7 kg (136 lb)   03/13/23 62.6 kg (138 lb)         Weight Change %:  Weight History / % Weight Change: pt is up 16 lbs since admit  Significant Weight Loss: Yes  Significant Weight Gain: Fluid related    Nutrition Focused Physical Exam Findings:    Subcutaneous Fat Loss:   Defer Subcutaneous Fat Loss Assessment: Defer all  Defer All Reason: not a good time  Muscle Wasting:  Defer Muscle Wasting Assessment: Defer all  Defer All Reason: not a good time  Edema:  Edema Location: some edema  Physical Findings:  Skin: Positive    Nutrition Significant Labs:  BMP Trend:   Results from last 7 days   Lab Units 03/07/25  0312 03/06/25  0427 03/05/25  0543 03/04/25  0610   GLUCOSE mg/dL 93 92 112* 116*   CALCIUM mg/dL 7.9* 8.0* 8.3* 7.8*   SODIUM mmol/L 141 142 146* 141   POTASSIUM mmol/L 3.3* 3.3* 4.0 4.0   CO2 mmol/L 39* 36* 33* 30   CHLORIDE mmol/L 98 103 109* 106   BUN mg/dL 15 15 27* 46*   CREATININE mg/dL 0.41* 0.46* 0.63 1.02        Nutrition Specific Medications:  Lipitor; solu cortef; protoinx; zofran    I/O:   Last BM Date: 03/07/25; Stool Appearance: Liquid (03/06/25 2000)    Dietary Orders (From admission, onward)       Start     Ordered    03/06/25 1029  Adult diet Regular; Thin 0  Diet effective now      "   Comments: Choose softer foods from menu d/t dentition.   Question Answer Comment   Diet type Regular    Fluid consistency Thin 0        03/06/25 1029    03/05/25 1138  Oral nutritional supplements  Until discontinued        Question Answer Comment   Deliver with All meals    Select supplement: Ensure Plus High Protein        03/05/25 1137    02/26/25 1213  May Participate in Room Service  ( ROOM SERVICE MAY PARTICIPATE)  Once        Question:  .  Answer:  Yes    02/26/25 1212                     Estimated Needs:      Method for Estimating Needs: 0114-5263  26-30 génesis kg of IBW     Method for Estimating 24 Hour Protein Needs: 52-68  1-1.3 gm kg of IBW     Method for Estimating 24 Hour Fluid Needs: 4710-1405  20-30 ml kg of IBW as medically indicated  Patient on Order Fluid Restriction: No        Nutrition Diagnosis   Malnutrition Diagnosis  Patient has Malnutrition Diagnosis: No    Nutrition Diagnosis  Patient has Nutrition Diagnosis: Yes  Diagnosis Status (1): Active  Nutrition Diagnosis 1: Inadequate oral intake  Related to (1): limited food acceptance  As Evidenced by (1): Pt is doing better today and started to eat a little  Additional Nutrition Diagnosis: Diagnosis 2  Diagnosis Status (2): Active  Nutrition Diagnosis 2: Increased nutrient needs  Related to (2): physiological causes  As Evidenced by (2): surgery wound healing needs       Nutrition Interventions/Recommendations        Nutrition Recommendations:  Individualized Nutrition Prescription Provided for : Continue regular diet to encourage intake,  continue ensure plus high protein X 3 to help meet nutritional needs    Nutrition Interventions/Goals:   Interventions: Meals and snacks, Medical food supplement  Goal: >75% of meals  Medical Food Supplement: Commercial beverage medical food supplement therapy  Goal: >75% of supplement  Coordination of Care with Providers: Nursing, Provider      Education Documentation  No documentation found.     Not at  this time       Nutrition Monitoring and Evaluation   Food/Nutrient Related History Monitoring  Monitoring and Evaluation Plan: Estimated Energy Intake, Fluid intake, Intake / amount of food  Estimated Energy Intake: Energy intake greater or equal to 75% of estimated energy needs  Fluid Intake:  (adequate fluid intake without fluid overload)  Intake / Amount of food: Consumes at least 75% or more of meals/snacks/supplements    Anthropometric Measurements  Monitoring and Evaluation Plan: Body weight    Biochemical Data, Medical Tests and Procedures  Monitoring and Evaluation Plan: Electrolyte/renal panel, Glucose/endocrine profile  Criteria: stable BMP  Criteria: glucose within desired range              Time Spent (min): 30 minutes

## 2025-03-08 NOTE — PROGRESS NOTES
Cardiology Progress Note      Kayley Lynch is a 54 y.o. female on day 10 of admission presenting with Nausea vomiting and diarrhea.      Subjective   The patient was seen and examined.  Son at the bedside.  Feeling better.  Sitting in the chair.  No chest pain or pressure no shortness of breath.     ROS:  10 systems reviewed other than what is mentioned above.     MEDICATION:    Scheduled medications  aspirin, 81 mg, oral, Daily  atorvastatin, 80 mg, oral, Nightly  budesonide, 0.5 mg, nebulization, BID  buPROPion XL, 150 mg, oral, q AM  carvedilol, 25 mg, oral, BID  escitalopram, 20 mg, oral, Daily  insulin lispro, 0-5 Units, subcutaneous, TID AC  ipratropium-albuteroL, 3 mL, nebulization, q6h  lisinopril, 20 mg, oral, Daily  oxygen, , inhalation, Continuous - Inhalation  pantoprazole, 40 mg, oral, Daily  perflutren lipid microspheres, 0.5-10 mL of dilution, intravenous, Once in imaging  perflutren protein A microsphere, 0.5 mL, intravenous, Once in imaging  sulfur hexafluoride microsphr, 2 mL, intravenous, Once in imaging  vancomycin, 250 mg, oral, 4x daily  [Held by provider] warfarin, 2 mg, oral, Daily  warfarin, 4 mg, oral, Daily      Continuous medications  heparin, 0-4,000 Units/hr, Last Rate: 800 Units/hr (03/08/25 0725)      PRN medications  PRN medications: acetaminophen, ipratropium-albuteroL, labetaloL, melatonin, [Held by provider] morphine, ondansetron, promethazine     Assessment & Plan  Nausea vomiting and diarrhea    Influenza A with pneumonia    Nontraumatic compartment syndrome of left upper extremity      OBJECTIVE:    Visit Vitals  /69   Pulse 72   Temp 36.8 °C (98.2 °F)   Resp 14        Intake/Output Summary (Last 24 hours) at 3/8/2025 1119  Last data filed at 3/8/2025 0800  Gross per 24 hour   Intake 368 ml   Output 1425 ml   Net -1057 ml      Patient is alert and oriented x3.  HEENT is unremarkable mucous members are moist  Neck no JVP no bruits upstrokes are full no thyromegaly  Lungs  are clear bilaterally.  No wheezing crackles or rales  Heart regular rhythm tachycardic normal S1-S2 there is no S3 crisp prosthetic mitral valve click  Abdomen is soft bs are positive nontender nondistended no organomegaly no pulsatile masses  Extremities have no edema.  Left arm is bandaged distal pulses present palpable.  Neuro is grossly nonfocal  Skin has no rashes     Image Results  XR chest 1 view  Narrative: Interpreted By:  Peter Sanabria,   STUDY:  XR CHEST 1 VIEW 3/8/2025 5:10 am      INDICATION:  Signs/Symptoms:Daily CXR      COMPARISON:  03/07/2025      ACCESSION NUMBER(S):  [id number]      ORDERING CLINICIAN:  NIKKI YATES      TECHNIQUE:  Single AP view chest      FINDINGS:  Cardiomediastinal silhouette is within normal limits. There is  persistent mild patchy consolidation in the right midlung laterally  which is mildly improved. Also, improving infiltrate in the left  lower lung zone with mild generalized increased interstitial  prominence unchanged in the lower lung zones.      Impression: 1. Improving patchy consolidation in the right midlung laterally as  well as improving left basilar infiltrate. Follow-up to clearing.      Signed by: Peter Sanabria 3/8/2025 11:09 AM  Dictation workstation:   [id number]  Relevant Results:  RESULTS:    Lab Results   Component Value Date    WBC 12.1 (H) 03/08/2025    HGB 8.5 (L) 03/08/2025    HCT 27.7 (L) 03/08/2025    MCV 89 03/08/2025     03/08/2025        Lab Results   Component Value Date    CREATININE 0.30 (L) 03/08/2025    BUN 11 03/08/2025     03/08/2025    K 3.5 03/08/2025     03/08/2025    CO2 35 (H) 03/08/2025        Results from last 7 days   Lab Units 03/08/25  0650   PROTEIN TOTAL g/dL 4.9*   BILIRUBIN TOTAL mg/dL 1.3*   ALK PHOS U/L 36   ALT U/L 12   AST U/L 17   GLUCOSE mg/dL 95       Assessment/Plan      PMH  1.  Mitral regurgitation status post mitral valve replacement with a #25/33 Kevin mechanical mitral valve October  2020  2.  Paroxysmal atrial flutter status post ablation October 2020  3.  CAD.  Bypass x 3 vessels LIMA to the LAD free radial artery graft to the marginal SVG to the RCA in 2020.  Last stress test 4/16/2024 no evidence of ischemia EF 51%  4.  COPD she continues to smoke  5.  Cardiomyopathy postoperative ejection fraction was 40%.  This was up to 50% on her last stress test  6.  Hyperlipidemia  7.  Hypertension  8.  Tobacco abuse  9.  Moderate to severe pulmonary hypertension in the past     3/4/2025  1.  Paroxysmal atrial fibrillation.  Previous history of atrial flutter status post ablation in 2020.  Currently sinus tachycardia.  Increase metoprolol to tartrate to 3 times daily for better rate control.  This was likely physiologically driven because of the stress of the situation.  Anticoagulation will need to be given regardless because of her mechanical mitral valve replacement.  2.  CAD.  Stable.  No symptoms of angina.  3.  Mechanical mitral valve replacement.  This is a difficult situation given her spontaneous left arm hematoma leading to compartment syndrome and iliopsoas spontaneous hematoma on the left side.  Agree with low-dose heparin.  Aspirin currently on hold.  Coumadin will need to be restarted along with an aspirin when stable.  4.  Respiratory failure.  Slow improvement.  Unfortunately she continues to smoke and has underlying lung disease.  5.  Anemia.  She will require transfusion.    3/5/2025    1.  Paroxysmal atrial fibrillation.  She is in a sinus tachycardia today.  She did have an atrial flutter ablation in 2020.  Continue metoprolol for rate control.  IV heparin to be restarted now as she went to the OR yesterday.  Transition to oral anticoagulation once her H&H is stable.  2.  CAD.  Status post CABG x 3 vessels.  Stable.  No symptoms of angina.  3.  Mechanical mitral valve replacement.  Heparin to be restarted today.  Hopefully we can restart Coumadin and baby aspirin if her H&H remained  stable.  These were held because of a spontaneous iliopsoas hematoma and bleed in the arm related to compartment syndrome.  4.  Respiratory failure due to influenza and underlying COPD.  This is improving.    3/6/2025    1.  Paroxysmal A-fib.  Remains in sinus.  Carvedilol started.  Continue IV heparin to ensure H&H remained stable.  Transition back to Coumadin.  Unfortunately, shorter acting agents like a DOAC is not an option given the mechanical mitral valve.  2.  CAD.  Stable.  3.  Mechanical mitral valve replacement.  On heparin.  Will need to have Coumadin and aspirin restarted once bleeding has been stabilized.    3/7/2025  1.  Paroxysmal A-fib.  Remains in sinus.  Doing well with IV heparin which she is on for her mechanical mitral valve also.  Transition to Coumadin tomorrow if her H&H remained stable.  2.  CAD.  Stable.  No symptoms of angina.  Previous bypass x 3 vessels.  Aspirin on hold.  4.  Mechanical mitral valve replacement.  Restart Coumadin tomorrow.  She had spontaneous bleeding in her left arm and her retroperitoneal hematoma with a markedly elevated INR.  Transition to Coumadin from IV heparin likely tomorrow.    3/8/2025  1.  Paroxysmal atrial fibrillation.  Remains in sinus.  Excellent control.  Restart Coumadin today.  She has been on IV heparin.  2.  CAD.  Stable.  No symptoms of angina.  Aspirin is on hold.  3.  Mechanical mitral valve replacement.  Restart Coumadin cautiously.  H&H has been stable on IV heparin.  Spontaneous retroperitoneal and left arm bleeding with a supratherapeutic INR  Brittney Lomeli MD

## 2025-03-09 ENCOUNTER — APPOINTMENT (OUTPATIENT)
Dept: RADIOLOGY | Facility: HOSPITAL | Age: 55
DRG: 853 | End: 2025-03-09
Payer: COMMERCIAL

## 2025-03-09 VITALS
WEIGHT: 146.61 LBS | DIASTOLIC BLOOD PRESSURE: 68 MMHG | TEMPERATURE: 97 F | SYSTOLIC BLOOD PRESSURE: 129 MMHG | BODY MASS INDEX: 25.98 KG/M2 | HEIGHT: 63 IN | HEART RATE: 75 BPM | RESPIRATION RATE: 16 BRPM | OXYGEN SATURATION: 96 %

## 2025-03-09 LAB
ALBUMIN SERPL BCP-MCNC: 2.6 G/DL (ref 3.4–5)
ALP SERPL-CCNC: 40 U/L (ref 33–110)
ALT SERPL W P-5'-P-CCNC: 12 U/L (ref 7–45)
ANION GAP SERPL CALC-SCNC: 10 MMOL/L (ref 10–20)
AST SERPL W P-5'-P-CCNC: 16 U/L (ref 9–39)
BASOPHILS # BLD AUTO: 0.01 X10*3/UL (ref 0–0.1)
BASOPHILS NFR BLD AUTO: 0.1 %
BILIRUB SERPL-MCNC: 1.5 MG/DL (ref 0–1.2)
BUN SERPL-MCNC: 9 MG/DL (ref 6–23)
CALCIUM SERPL-MCNC: 8.2 MG/DL (ref 8.6–10.3)
CHLORIDE SERPL-SCNC: 101 MMOL/L (ref 98–107)
CO2 SERPL-SCNC: 34 MMOL/L (ref 21–32)
CREAT SERPL-MCNC: 0.35 MG/DL (ref 0.5–1.05)
EGFRCR SERPLBLD CKD-EPI 2021: >90 ML/MIN/1.73M*2
EOSINOPHIL # BLD AUTO: 0.01 X10*3/UL (ref 0–0.7)
EOSINOPHIL NFR BLD AUTO: 0.1 %
ERYTHROCYTE [DISTWIDTH] IN BLOOD BY AUTOMATED COUNT: 18.8 % (ref 11.5–14.5)
GLUCOSE BLD MANUAL STRIP-MCNC: 107 MG/DL (ref 74–99)
GLUCOSE BLD MANUAL STRIP-MCNC: 120 MG/DL (ref 74–99)
GLUCOSE BLD MANUAL STRIP-MCNC: 124 MG/DL (ref 74–99)
GLUCOSE SERPL-MCNC: 99 MG/DL (ref 74–99)
HCT VFR BLD AUTO: 26 % (ref 36–46)
HGB BLD-MCNC: 8.1 G/DL (ref 12–16)
HOLD SPECIMEN: NORMAL
IMM GRANULOCYTES # BLD AUTO: 0.1 X10*3/UL (ref 0–0.7)
IMM GRANULOCYTES NFR BLD AUTO: 0.9 % (ref 0–0.9)
INR PPP: 1.4 (ref 0.9–1.1)
INR PPP: 1.9 (ref 0.9–1.1)
INR PPP: 1.9 (ref 0.9–1.1)
LYMPHOCYTES # BLD AUTO: 1.03 X10*3/UL (ref 1.2–4.8)
LYMPHOCYTES NFR BLD AUTO: 9.6 %
MCH RBC QN AUTO: 27.6 PG (ref 26–34)
MCHC RBC AUTO-ENTMCNC: 31.2 G/DL (ref 32–36)
MCV RBC AUTO: 88 FL (ref 80–100)
MONOCYTES # BLD AUTO: 0.84 X10*3/UL (ref 0.1–1)
MONOCYTES NFR BLD AUTO: 7.8 %
NEUTROPHILS # BLD AUTO: 8.72 X10*3/UL (ref 1.2–7.7)
NEUTROPHILS NFR BLD AUTO: 81.5 %
NRBC BLD-RTO: 0.2 /100 WBCS (ref 0–0)
PLATELET # BLD AUTO: 226 X10*3/UL (ref 150–450)
POTASSIUM SERPL-SCNC: 3.3 MMOL/L (ref 3.5–5.3)
PROT SERPL-MCNC: 5 G/DL (ref 6.4–8.2)
PROTHROMBIN TIME: 15.5 SECONDS (ref 9.8–12.4)
PROTHROMBIN TIME: 20.5 SECONDS (ref 9.8–12.4)
PROTHROMBIN TIME: 21.4 SECONDS (ref 9.8–12.4)
RBC # BLD AUTO: 2.94 X10*6/UL (ref 4–5.2)
SODIUM SERPL-SCNC: 142 MMOL/L (ref 136–145)
UFH PPP CHRO-ACNC: 0.2 IU/ML
UFH PPP CHRO-ACNC: 0.3 IU/ML
UFH PPP CHRO-ACNC: 0.3 IU/ML
WBC # BLD AUTO: 10.7 X10*3/UL (ref 4.4–11.3)

## 2025-03-09 PROCEDURE — 36415 COLL VENOUS BLD VENIPUNCTURE: CPT | Performed by: INTERNAL MEDICINE

## 2025-03-09 PROCEDURE — 2500000001 HC RX 250 WO HCPCS SELF ADMINISTERED DRUGS (ALT 637 FOR MEDICARE OP)

## 2025-03-09 PROCEDURE — 2500000002 HC RX 250 W HCPCS SELF ADMINISTERED DRUGS (ALT 637 FOR MEDICARE OP, ALT 636 FOR OP/ED)

## 2025-03-09 PROCEDURE — 84075 ASSAY ALKALINE PHOSPHATASE: CPT

## 2025-03-09 PROCEDURE — 85025 COMPLETE CBC W/AUTO DIFF WBC: CPT

## 2025-03-09 PROCEDURE — 2500000005 HC RX 250 GENERAL PHARMACY W/O HCPCS

## 2025-03-09 PROCEDURE — 85520 HEPARIN ASSAY: CPT | Performed by: INTERNAL MEDICINE

## 2025-03-09 PROCEDURE — 85610 PROTHROMBIN TIME: CPT

## 2025-03-09 PROCEDURE — 82947 ASSAY GLUCOSE BLOOD QUANT: CPT

## 2025-03-09 PROCEDURE — 71045 X-RAY EXAM CHEST 1 VIEW: CPT

## 2025-03-09 PROCEDURE — 2500000002 HC RX 250 W HCPCS SELF ADMINISTERED DRUGS (ALT 637 FOR MEDICARE OP, ALT 636 FOR OP/ED): Performed by: INTERNAL MEDICINE

## 2025-03-09 PROCEDURE — 71045 X-RAY EXAM CHEST 1 VIEW: CPT | Performed by: RADIOLOGY

## 2025-03-09 PROCEDURE — 36415 COLL VENOUS BLD VENIPUNCTURE: CPT

## 2025-03-09 PROCEDURE — 85610 PROTHROMBIN TIME: CPT | Performed by: INTERNAL MEDICINE

## 2025-03-09 PROCEDURE — 51701 INSERT BLADDER CATHETER: CPT

## 2025-03-09 PROCEDURE — 99233 SBSQ HOSP IP/OBS HIGH 50: CPT | Performed by: INTERNAL MEDICINE

## 2025-03-09 PROCEDURE — 2500000001 HC RX 250 WO HCPCS SELF ADMINISTERED DRUGS (ALT 637 FOR MEDICARE OP): Performed by: INTERNAL MEDICINE

## 2025-03-09 PROCEDURE — 2060000001 HC INTERMEDIATE ICU ROOM DAILY

## 2025-03-09 RX ORDER — WARFARIN 2 MG/1
4 TABLET ORAL ONCE
Status: COMPLETED | OUTPATIENT
Start: 2025-03-09 | End: 2025-03-09

## 2025-03-09 RX ORDER — POTASSIUM CHLORIDE 20 MEQ/1
40 TABLET, EXTENDED RELEASE ORAL ONCE
Status: COMPLETED | OUTPATIENT
Start: 2025-03-09 | End: 2025-03-09

## 2025-03-09 RX ADMIN — VANCOMYCIN HYDROCHLORIDE 250 MG: 250 CAPSULE ORAL at 18:00

## 2025-03-09 RX ADMIN — VANCOMYCIN HYDROCHLORIDE 250 MG: 250 CAPSULE ORAL at 21:06

## 2025-03-09 RX ADMIN — BUPROPION HYDROCHLORIDE 150 MG: 150 TABLET, EXTENDED RELEASE ORAL at 09:45

## 2025-03-09 RX ADMIN — POTASSIUM CHLORIDE 40 MEQ: 1500 TABLET, EXTENDED RELEASE ORAL at 13:53

## 2025-03-09 RX ADMIN — Medication 6 L/MIN: at 08:40

## 2025-03-09 RX ADMIN — VANCOMYCIN HYDROCHLORIDE 250 MG: 250 CAPSULE ORAL at 13:53

## 2025-03-09 RX ADMIN — WARFARIN SODIUM 4 MG: 2 TABLET ORAL at 18:12

## 2025-03-09 RX ADMIN — VANCOMYCIN HYDROCHLORIDE 250 MG: 250 CAPSULE ORAL at 06:18

## 2025-03-09 RX ADMIN — ESCITALOPRAM OXALATE 20 MG: 10 TABLET ORAL at 09:45

## 2025-03-09 RX ADMIN — ATORVASTATIN CALCIUM 80 MG: 80 TABLET, FILM COATED ORAL at 21:06

## 2025-03-09 RX ADMIN — LISINOPRIL 20 MG: 20 TABLET ORAL at 09:45

## 2025-03-09 RX ADMIN — ASPIRIN 81 MG: 81 TABLET, COATED ORAL at 09:45

## 2025-03-09 RX ADMIN — PANTOPRAZOLE SODIUM 40 MG: 40 TABLET, DELAYED RELEASE ORAL at 09:45

## 2025-03-09 RX ADMIN — CARVEDILOL 25 MG: 25 TABLET, FILM COATED ORAL at 21:06

## 2025-03-09 RX ADMIN — Medication 2 L/MIN: at 23:23

## 2025-03-09 RX ADMIN — CARVEDILOL 25 MG: 25 TABLET, FILM COATED ORAL at 09:45

## 2025-03-09 ASSESSMENT — COGNITIVE AND FUNCTIONAL STATUS - GENERAL
TURNING FROM BACK TO SIDE WHILE IN FLAT BAD: A LITTLE
MOVING FROM LYING ON BACK TO SITTING ON SIDE OF FLAT BED WITH BEDRAILS: A LITTLE
MOVING FROM LYING ON BACK TO SITTING ON SIDE OF FLAT BED WITH BEDRAILS: A LITTLE
MOBILITY SCORE: 14
STANDING UP FROM CHAIR USING ARMS: A LOT
DRESSING REGULAR UPPER BODY CLOTHING: A LITTLE
CLIMB 3 TO 5 STEPS WITH RAILING: A LOT
MOBILITY SCORE: 14
TURNING FROM BACK TO SIDE WHILE IN FLAT BAD: A LITTLE
HELP NEEDED FOR BATHING: A LITTLE
HELP NEEDED FOR BATHING: A LITTLE
PERSONAL GROOMING: A LITTLE
CLIMB 3 TO 5 STEPS WITH RAILING: A LOT
WALKING IN HOSPITAL ROOM: A LOT
MOVING TO AND FROM BED TO CHAIR: A LOT
EATING MEALS: A LITTLE
DRESSING REGULAR LOWER BODY CLOTHING: A LITTLE
DAILY ACTIVITIY SCORE: 18
DRESSING REGULAR LOWER BODY CLOTHING: A LITTLE
DAILY ACTIVITIY SCORE: 18
EATING MEALS: A LITTLE
MOVING TO AND FROM BED TO CHAIR: A LOT
TOILETING: A LITTLE
DRESSING REGULAR UPPER BODY CLOTHING: A LITTLE
TOILETING: A LITTLE
STANDING UP FROM CHAIR USING ARMS: A LOT
WALKING IN HOSPITAL ROOM: A LOT
PERSONAL GROOMING: A LITTLE

## 2025-03-09 ASSESSMENT — PAIN - FUNCTIONAL ASSESSMENT: PAIN_FUNCTIONAL_ASSESSMENT: 0-10

## 2025-03-09 ASSESSMENT — PAIN SCALES - GENERAL
PAINLEVEL_OUTOF10: 7
PAINLEVEL_OUTOF10: 0 - NO PAIN

## 2025-03-09 NOTE — PROGRESS NOTES
Cardiology Progress Note      Kayley Lynch is a 54 y.o. female on day 11 of admission presenting with Nausea vomiting and diarrhea.      Subjective   Stable.  No palpitations.  No chest pain     ROS:  10 systems reviewed other than what is mentioned above.     MEDICATION:    Scheduled medications  aspirin, 81 mg, oral, Daily  atorvastatin, 80 mg, oral, Nightly  budesonide, 0.5 mg, nebulization, BID  buPROPion XL, 150 mg, oral, q AM  carvedilol, 25 mg, oral, BID  escitalopram, 20 mg, oral, Daily  insulin lispro, 0-5 Units, subcutaneous, TID AC  ipratropium-albuteroL, 3 mL, nebulization, TID  lisinopril, 20 mg, oral, Daily  oxygen, , inhalation, Continuous - Inhalation  pantoprazole, 40 mg, oral, Daily  perflutren lipid microspheres, 0.5-10 mL of dilution, intravenous, Once in imaging  perflutren protein A microsphere, 0.5 mL, intravenous, Once in imaging  sulfur hexafluoride microsphr, 2 mL, intravenous, Once in imaging  vancomycin, 250 mg, oral, 4x daily  [Held by provider] warfarin, 2 mg, oral, Daily  warfarin, 4 mg, oral, Daily  warfarin, 4 mg, oral, Once      Continuous medications  heparin, 0-4,000 Units/hr, Last Rate: 800 Units/hr (03/09/25 0916)      PRN medications  PRN medications: acetaminophen, ipratropium-albuteroL, labetaloL, melatonin, [Held by provider] morphine, ondansetron, promethazine     Assessment & Plan  Nausea vomiting and diarrhea    Influenza A with pneumonia    Nontraumatic compartment syndrome of left upper extremity      OBJECTIVE:    Visit Vitals  /77   Pulse 74   Temp 36.1 °C (97 °F)   Resp 20        Intake/Output Summary (Last 24 hours) at 3/9/2025 1317  Last data filed at 3/9/2025 0916  Gross per 24 hour   Intake 324.8 ml   Output 200 ml   Net 124.8 ml      Patient is alert and oriented x3.  HEENT is unremarkable mucous members are moist  Neck no JVP no bruits upstrokes are full no thyromegaly  Lungs are clear bilaterally.  No wheezing crackles or rales  Heart regular rhythm  tachycardic normal S1-S2 there is no S3 crisp prosthetic mitral valve click  Abdomen is soft bs are positive nontender nondistended no organomegaly no pulsatile masses  Extremities have no edema.  Left arm is bandaged distal pulses present palpable.  Neuro is grossly nonfocal  Skin has no rashes     Image Results  XR chest 1 view  Narrative: Interpreted By:  Peter Sanabria,   STUDY:  XR CHEST 1 VIEW 3/9/2025 7:01 am      INDICATION:  Signs/Symptoms:Daily CXR      COMPARISON:  03/08/2025      ACCESSION NUMBER(S):  [id number]      ORDERING CLINICIAN:  MARIBELL PALOMINO      TECHNIQUE:  Single AP view chest      FINDINGS:  Cardiac silhouette is mildly enlarged. Post CABG changes are  demonstrated. Lung fields are hyperinflated. There are chronic  appearing interstitial changes. Component of left basilar scar  demonstrated. Improved aeration of the right midlung laterally with  mild persistent infiltrate.                  Impression: 1. Mild persistent infiltrate in the right midlung laterally. No  dense airspace consolidation. Emphysematous change noted.      Signed by: Peter Sanabria 3/9/2025 11:54 AM  Dictation workstation:   [id number]  Relevant Results:  RESULTS:    Lab Results   Component Value Date    WBC 10.7 03/09/2025    HGB 8.1 (L) 03/09/2025    HCT 26.0 (L) 03/09/2025    MCV 88 03/09/2025     03/09/2025        Lab Results   Component Value Date    CREATININE 0.35 (L) 03/09/2025    BUN 9 03/09/2025     03/09/2025    K 3.3 (L) 03/09/2025     03/09/2025    CO2 34 (H) 03/09/2025        Results from last 7 days   Lab Units 03/09/25  0546   PROTEIN TOTAL g/dL 5.0*   BILIRUBIN TOTAL mg/dL 1.5*   ALK PHOS U/L 40   ALT U/L 12   AST U/L 16   GLUCOSE mg/dL 99       Assessment/Plan      PMH  1.  Mitral regurgitation status post mitral valve replacement with a #25/33 Kevin mechanical mitral valve October 2020  2.  Paroxysmal atrial flutter status post ablation October 2020  3.  CAD.  Bypass x 3  vessels LIMA to the LAD free radial artery graft to the marginal SVG to the RCA in 2020.  Last stress test 4/16/2024 no evidence of ischemia EF 51%  4.  COPD she continues to smoke  5.  Cardiomyopathy postoperative ejection fraction was 40%.  This was up to 50% on her last stress test  6.  Hyperlipidemia  7.  Hypertension  8.  Tobacco abuse  9.  Moderate to severe pulmonary hypertension in the past     3/4/2025  1.  Paroxysmal atrial fibrillation.  Previous history of atrial flutter status post ablation in 2020.  Currently sinus tachycardia.  Increase metoprolol to tartrate to 3 times daily for better rate control.  This was likely physiologically driven because of the stress of the situation.  Anticoagulation will need to be given regardless because of her mechanical mitral valve replacement.  2.  CAD.  Stable.  No symptoms of angina.  3.  Mechanical mitral valve replacement.  This is a difficult situation given her spontaneous left arm hematoma leading to compartment syndrome and iliopsoas spontaneous hematoma on the left side.  Agree with low-dose heparin.  Aspirin currently on hold.  Coumadin will need to be restarted along with an aspirin when stable.  4.  Respiratory failure.  Slow improvement.  Unfortunately she continues to smoke and has underlying lung disease.  5.  Anemia.  She will require transfusion.    3/5/2025    1.  Paroxysmal atrial fibrillation.  She is in a sinus tachycardia today.  She did have an atrial flutter ablation in 2020.  Continue metoprolol for rate control.  IV heparin to be restarted now as she went to the OR yesterday.  Transition to oral anticoagulation once her H&H is stable.  2.  CAD.  Status post CABG x 3 vessels.  Stable.  No symptoms of angina.  3.  Mechanical mitral valve replacement.  Heparin to be restarted today.  Hopefully we can restart Coumadin and baby aspirin if her H&H remained stable.  These were held because of a spontaneous iliopsoas hematoma and bleed in the arm  related to compartment syndrome.  4.  Respiratory failure due to influenza and underlying COPD.  This is improving.    3/6/2025    1.  Paroxysmal A-fib.  Remains in sinus.  Carvedilol started.  Continue IV heparin to ensure H&H remained stable.  Transition back to Coumadin.  Unfortunately, shorter acting agents like a DOAC is not an option given the mechanical mitral valve.  2.  CAD.  Stable.  3.  Mechanical mitral valve replacement.  On heparin.  Will need to have Coumadin and aspirin restarted once bleeding has been stabilized.    3/7/2025  1.  Paroxysmal A-fib.  Remains in sinus.  Doing well with IV heparin which she is on for her mechanical mitral valve also.  Transition to Coumadin tomorrow if her H&H remained stable.  2.  CAD.  Stable.  No symptoms of angina.  Previous bypass x 3 vessels.  Aspirin on hold.  4.  Mechanical mitral valve replacement.  Restart Coumadin tomorrow.  She had spontaneous bleeding in her left arm and her retroperitoneal hematoma with a markedly elevated INR.  Transition to Coumadin from IV heparin likely tomorrow.    3/8/2025  1.  Paroxysmal atrial fibrillation.  Remains in sinus.  Excellent control.  Restart Coumadin today.  She has been on IV heparin.  2.  CAD.  Stable.  No symptoms of angina.  Aspirin is on hold.  3.  Mechanical mitral valve replacement.  Restart Coumadin cautiously.  H&H has been stable on IV heparin.  Spontaneous retroperitoneal and left arm bleeding with a supratherapeutic INR    3/9/2025  1.  Paroxysmal A-fib.  Remains in sinus.  Coumadin started yesterday with 5 mg.  INR today 1.4.  Will dose 4 mg today.  H&H relatively stable.  Continue heparin until therapeutic INR's are achieved.  2.  CAD.  No angina.  Aspirin on hold.  3.  Mechanical mitral valve replacement.  Coumadin restarted.  Continue IV heparin until INR is above 2.  Target INR 2.5-3.5.  Will need to watch H&H for bleeding.  Brittney Lomeli MD

## 2025-03-09 NOTE — CARE PLAN
The patient's goals for the shift include      The clinical goals for the shift include Safety and comfort      Problem: Pain - Adult  Goal: Verbalizes/displays adequate comfort level or baseline comfort level  3/9/2025 0914 by eRe Garcia RN  Outcome: Progressing  3/9/2025 0914 by Ree Garcia RN  Outcome: Progressing     Problem: Safety - Adult  Goal: Free from fall injury  3/9/2025 0914 by Ree Garcia RN  Outcome: Progressing  3/9/2025 0914 by Ree Garcia RN  Outcome: Progressing     Problem: Discharge Planning  Goal: Discharge to home or other facility with appropriate resources  3/9/2025 0914 by Ree Garcia RN  Outcome: Progressing  3/9/2025 0914 by Ree Garcia RN  Outcome: Progressing     Problem: Chronic Conditions and Co-morbidities  Goal: Patient's chronic conditions and co-morbidity symptoms are monitored and maintained or improved  3/9/2025 0914 by Ree Garcia RN  Outcome: Progressing  3/9/2025 0914 by Ree Garcia RN  Outcome: Progressing     Problem: Nutrition  Goal: Nutrient intake appropriate for maintaining nutritional needs  3/9/2025 0914 by Ree Garcia RN  Outcome: Progressing  3/9/2025 0914 by Ree Garcia RN  Outcome: Progressing     Problem: Fall/Injury  Goal: Not fall by end of shift  3/9/2025 0914 by Ree Garcia RN  Outcome: Progressing  3/9/2025 0914 by Ree Garcia RN  Outcome: Progressing  Goal: Be free from injury by end of the shift  3/9/2025 0914 by Ree Garcia RN  Outcome: Progressing  3/9/2025 0914 by Ree Garcia RN  Outcome: Progressing  Goal: Verbalize understanding of personal risk factors for fall in the hospital  3/9/2025 0914 by Ree Garcia RN  Outcome: Progressing  3/9/2025 0914 by Ree Garcia RN  Outcome: Progressing  Goal: Verbalize understanding of risk factor reduction measures to prevent injury from fall in the home  3/9/2025 0914 by Ree Garcia RN  Outcome:  Progressing  3/9/2025 0914 by Ree Garcia RN  Outcome: Progressing  Goal: Use assistive devices by end of the shift  3/9/2025 0914 by Ree Garcia RN  Outcome: Progressing  3/9/2025 0914 by Ree Garcia RN  Outcome: Progressing  Goal: Pace activities to prevent fatigue by end of the shift  3/9/2025 0914 by Ree Garcia RN  Outcome: Progressing  3/9/2025 0914 by Ree Garcia RN  Outcome: Progressing     Problem: Respiratory  Goal: Clear secretions with interventions this shift  3/9/2025 0914 by Ree Garcia RN  Outcome: Progressing  3/9/2025 0914 by Ree Garcia RN  Outcome: Progressing  Goal: Minimize anxiety/maximize coping throughout shift  3/9/2025 0914 by Ree Garcia RN  Outcome: Progressing  3/9/2025 0914 by Ree Garcia RN  Outcome: Progressing  Goal: Minimal/no exertional discomfort or dyspnea this shift  3/9/2025 0914 by Ree Garcia RN  Outcome: Progressing  3/9/2025 0914 by Ree Garcia RN  Outcome: Progressing  Goal: No signs of respiratory distress (eg. Use of accessory muscles. Peds grunting)  3/9/2025 0914 by Ree Garcia RN  Outcome: Progressing  3/9/2025 0914 by Ree Garcia RN  Outcome: Progressing  Goal: Patent airway maintained this shift  3/9/2025 0914 by Ree Garcia RN  Outcome: Progressing  3/9/2025 0914 by Ree Garcia RN  Outcome: Progressing  Goal: Verbalize decreased shortness of breath this shift  3/9/2025 0914 by Ree Garcia RN  Outcome: Progressing  3/9/2025 0914 by Ree Garcia RN  Outcome: Progressing  Goal: Wean oxygen to maintain O2 saturation per order/standard this shift  3/9/2025 0914 by Ree Garcia RN  Outcome: Progressing  3/9/2025 0914 by Ree Garcia RN  Outcome: Progressing  Goal: Increase self care and/or family involvement in next 24 hours  3/9/2025 0914 by Ree Garcia RN  Outcome: Progressing  3/9/2025 0914 by Ree Garcia RN  Outcome:  Progressing     Problem: Skin  Goal: Participates in plan/prevention/treatment measures  3/9/2025 0914 by Ree Garcia RN  Outcome: Progressing  3/9/2025 0914 by Ree Garcia RN  Outcome: Progressing  Goal: Prevent/manage excess moisture  3/9/2025 0914 by Ree Garcia RN  Outcome: Progressing  3/9/2025 0914 by Ree Garcia RN  Outcome: Progressing  Flowsheets (Taken 3/9/2025 0914)  Prevent/manage excess moisture: Cleanse incontinence/protect with barrier cream  Goal: Prevent/minimize sheer/friction injuries  3/9/2025 0914 by eRe Garcia RN  Outcome: Progressing  3/9/2025 0914 by Ree Garcia RN  Outcome: Progressing  Goal: Promote/optimize nutrition  3/9/2025 0914 by Ree Garcia RN  Outcome: Progressing  3/9/2025 0914 by Ree Garcia RN  Outcome: Progressing  Goal: Promote skin healing  3/9/2025 0914 by Ree Garcia, RN  Outcome: Progressing  3/9/2025 0914 by Ree Garcia RN  Outcome: Progressing     Problem: Pain  Goal: Takes deep breaths with improved pain control throughout the shift  3/9/2025 0914 by Ree Garcia, RN  Outcome: Progressing  3/9/2025 0914 by Ree Garcia RN  Outcome: Progressing  Goal: Turns in bed with improved pain control throughout the shift  3/9/2025 0914 by Ree Garcia, RN  Outcome: Progressing  3/9/2025 0914 by Ree Garcia RN  Outcome: Progressing  Goal: Walks with improved pain control throughout the shift  3/9/2025 0914 by Ree Garcia, RN  Outcome: Progressing  3/9/2025 0914 by Ree Garcia RN  Outcome: Progressing  Goal: Performs ADL's with improved pain control throughout shift  3/9/2025 0914 by Ree Garcia, RN  Outcome: Progressing  3/9/2025 0914 by Ree Garcia RN  Outcome: Progressing  Goal: Participates in PT with improved pain control throughout the shift  3/9/2025 0914 by Ree Garcia, RN  Outcome: Progressing  3/9/2025 0914 by Ree Garcia RN  Outcome:  Progressing  Goal: Free from opioid side effects throughout the shift  3/9/2025 0914 by Ree Garcia RN  Outcome: Progressing  3/9/2025 0914 by Ree Garcia RN  Outcome: Progressing  Goal: Free from acute confusion related to pain meds throughout the shift  3/9/2025 0914 by Ree Garcia RN  Outcome: Progressing  3/9/2025 0914 by Ree Garcia RN  Outcome: Progressing

## 2025-03-09 NOTE — PROGRESS NOTES
Kayley Lynch is a 54 y.o. female on day 11 of admission presenting with Nausea vomiting and diarrhea.      Subjective   No events overnight.  Patient seen and examined at bedside with  present.  Hospitalization and treatment plan reviewed with  at length.  Patient has no complaints.       Objective     Last Recorded Vitals  /77 (BP Location: Left leg, Patient Position: Lying)   Pulse 75   Temp 36.1 °C (97 °F) (Temporal)   Resp 18   Wt 66.5 kg (146 lb 9.7 oz)   SpO2 98%   Intake/Output last 3 Shifts:    Intake/Output Summary (Last 24 hours) at 3/9/2025 1519  Last data filed at 3/9/2025 0916  Gross per 24 hour   Intake 324.8 ml   Output 200 ml   Net 124.8 ml       Admission Weight  Weight: 59 kg (130 lb) (02/26/25 0159)    Daily Weight  03/07/25 : 66.5 kg (146 lb 9.7 oz)    Image Results  XR chest 1 view  Narrative: Interpreted By:  Peter Sanabria,   STUDY:  XR CHEST 1 VIEW 3/9/2025 7:01 am      INDICATION:  Signs/Symptoms:Daily CXR      COMPARISON:  03/08/2025      ACCESSION NUMBER(S):  [id number]      ORDERING CLINICIAN:  MARIBELL PALOMINO      TECHNIQUE:  Single AP view chest      FINDINGS:  Cardiac silhouette is mildly enlarged. Post CABG changes are  demonstrated. Lung fields are hyperinflated. There are chronic  appearing interstitial changes. Component of left basilar scar  demonstrated. Improved aeration of the right midlung laterally with  mild persistent infiltrate.                  Impression: 1. Mild persistent infiltrate in the right midlung laterally. No  dense airspace consolidation. Emphysematous change noted.      Signed by: Peter Sanabria 3/9/2025 11:54 AM  Dictation workstation:   [id number]      Physical Exam  HENT:      Head: Normocephalic and atraumatic.      Nose: Nose normal.      Mouth/Throat:      Mouth: Mucous membranes are moist.      Pharynx: Oropharynx is clear.   Eyes:      Extraocular Movements: Extraocular movements intact.      Pupils: Pupils are equal,  round, and reactive to light.   Cardiovascular:      Rate and Rhythm: Normal rate and regular rhythm.   Pulmonary:      Effort: No respiratory distress.      Breath sounds: Wheezing present. No rhonchi or rales.   Abdominal:      General: Bowel sounds are normal. There is no distension.      Palpations: Abdomen is soft.      Tenderness: There is no abdominal tenderness. There is no guarding.   Musculoskeletal:      Right lower leg: No edema.      Left lower leg: No edema.   Skin:     General: Skin is warm and dry.   Neurological:      Mental Status: She is alert. Mental status is at baseline.       Relevant Results               Assessment/Plan   This patient currently has cardiac telemetry ordered; if you would like to modify or discontinue the telemetry order, click here to go to the orders activity to modify/discontinue the order.      This patient has a urinary catheter   Reason for the urinary catheter remaining today? critically ill patient who need accurate urinary output measurements    Assessment & Plan  Nausea vomiting and diarrhea    Influenza A with pneumonia      Septic shock, dehydration, hypercapnic respiratory failure, and dehydration.  Nontraumatic compartment syndrome of left upper extremity      Patient is a 54-year-old female with past medical history of COPD, CAD status post CABG, pulmonary hypertension, hyperlipidemia, cardiomyopathy, a flutter status post ablation, and mitral valve replacement who presented with malaise.  She was found to be in septic shock and admitted to the ICU.    Septic shock  Hypercapnic respiratory failure  COPD exacerbation  Influenza A  Pneumonia  Acute metabolic encephalopathy  C. difficile colitis  Dehydration  HILLARY    -Continue supplemental oxygen  -Continue antibiotics, currently on doxycycline  -Continue nebulizers  -Continue steroids  -Continue Tamiflu  -Continue p.o. vancomycin  -Continue home meds  -Monitor INR    3/2: Patient developed a large hematoma in the  left arm leading to compartment syndrome. She was taken for urgent fasciotomies with Orthopedic Surgery. She is also being followed by Vascular Surgery. Vitamin K ordered for supratherapeutic INR. Her INR was 2.2 yesterday with goal being 2.5-3.5 for her A-flutter s/p mitral valve replacement. Critical Care reconsulted. Hematology consulted, appreciate recs. Hold coumadin and lovenox.    3/3: INR reversed. Patient is on low intensity heparin drip. She was transfused 1 unit pRBCs. Monitor hemoglobin closely. Hematology has ordered a work up including DIC panel. Patient will need another debridement and irrigation in 24-48 hours per Orthopedics. She has also developed a retroperitoneal bleed.    3/4: INR is down to 1.0.  Leukocytosis improved from 15 down to 13.  Hemoglobin is down to 6.4.  Patient to be transfused another unit packed RBCs.  She will be going back to the OR today for further debridement, washout, possible closure. She will also have a CT angiogram to determine if there is any active bleeding causing her retroperitoneal bleed.  She remains on a heparin drip due to her valve.  Continue doxycycline and p.o. Vanco.  Patient fully evaluated on March 5.  Patient restarted on heparin.  Fasciotomy site for compartment syndrome appears clear.  Continue to monitor lab work.  Case discussed with  at bedside.  No active bleeding is noted at this time.  Recheck labs in AM.       Patient fully evaluated  03/07  for    Problem List Items Addressed This Visit          Gastrointestinal and Abdominal    * (Principal) Nausea vomiting and diarrhea       Musculoskeletal and Injuries    Nontraumatic compartment syndrome of left upper extremity    Relevant Orders    Case Request Operating Room: FASCIOTOMY, UPPER EXTREMITY (Completed)    Case Request Operating Room: DEBRIDEMENT, WOUND, UPPER EXTREMITY (Completed)       Pulmonary and Pneumonias    Influenza A with pneumonia - Primary     Other Visit Diagnoses        Acute kidney injury (CMS-HCC)        Dehydration        Influenza A        Shock (Multi)        Relevant Orders    Transthoracic Echo (TTE) Complete (Completed)          Patient seen resting in bed with head of bed elevated, no s/s or c/o acute difficulties at this time.  Vital signs for last 24 hours Temp:  [36.1 °C (97 °F)-36.6 °C (97.9 °F)] 36.1 °C (97 °F)  Heart Rate:  [72-83] 75  Resp:  [15-21] 18  BP: (117-148)/(57-77) 144/77    I/O this shift:  In: 144.7 [P.O.:118; I.V.:26.7]  Out: -   Patient still requiring frequent cardiac and SPO2 monitoring. Continue aggressive pulmonary hygiene and oral hygiene. Off loading as tolerated for skin integrity. Medications and labs reviewed-   Results for orders placed or performed during the hospital encounter of 02/25/25 (from the past 24 hours)   POCT GLUCOSE   Result Value Ref Range    POCT Glucose 100 (H) 74 - 99 mg/dL   CBC and Auto Differential   Result Value Ref Range    WBC 10.7 4.4 - 11.3 x10*3/uL    nRBC 0.2 (H) 0.0 - 0.0 /100 WBCs    RBC 2.94 (L) 4.00 - 5.20 x10*6/uL    Hemoglobin 8.1 (L) 12.0 - 16.0 g/dL    Hematocrit 26.0 (L) 36.0 - 46.0 %    MCV 88 80 - 100 fL    MCH 27.6 26.0 - 34.0 pg    MCHC 31.2 (L) 32.0 - 36.0 g/dL    RDW 18.8 (H) 11.5 - 14.5 %    Platelets 226 150 - 450 x10*3/uL    Neutrophils % 81.5 40.0 - 80.0 %    Immature Granulocytes %, Automated 0.9 0.0 - 0.9 %    Lymphocytes % 9.6 13.0 - 44.0 %    Monocytes % 7.8 2.0 - 10.0 %    Eosinophils % 0.1 0.0 - 6.0 %    Basophils % 0.1 0.0 - 2.0 %    Neutrophils Absolute 8.72 (H) 1.20 - 7.70 x10*3/uL    Immature Granulocytes Absolute, Automated 0.10 0.00 - 0.70 x10*3/uL    Lymphocytes Absolute 1.03 (L) 1.20 - 4.80 x10*3/uL    Monocytes Absolute 0.84 0.10 - 1.00 x10*3/uL    Eosinophils Absolute 0.01 0.00 - 0.70 x10*3/uL    Basophils Absolute 0.01 0.00 - 0.10 x10*3/uL   Comprehensive Metabolic Panel   Result Value Ref Range    Glucose 99 74 - 99 mg/dL    Sodium 142 136 - 145 mmol/L    Potassium 3.3 (L) 3.5 -  5.3 mmol/L    Chloride 101 98 - 107 mmol/L    Bicarbonate 34 (H) 21 - 32 mmol/L    Anion Gap 10 10 - 20 mmol/L    Urea Nitrogen 9 6 - 23 mg/dL    Creatinine 0.35 (L) 0.50 - 1.05 mg/dL    eGFR >90 >60 mL/min/1.73m*2    Calcium 8.2 (L) 8.6 - 10.3 mg/dL    Albumin 2.6 (L) 3.4 - 5.0 g/dL    Alkaline Phosphatase 40 33 - 110 U/L    Total Protein 5.0 (L) 6.4 - 8.2 g/dL    AST 16 9 - 39 U/L    Bilirubin, Total 1.5 (H) 0.0 - 1.2 mg/dL    ALT 12 7 - 45 U/L   Protime-INR   Result Value Ref Range    Protime 15.5 (H) 9.8 - 12.4 seconds    INR 1.4 (H) 0.9 - 1.1   POCT GLUCOSE   Result Value Ref Range    POCT Glucose 107 (H) 74 - 99 mg/dL   Heparin Assay   Result Value Ref Range    Heparin Unfractionated 0.2 See Comment Below for Therapeutic Ranges IU/mL   Lavender Top   Result Value Ref Range    Extra Tube Hold for add-ons.    SST TOP   Result Value Ref Range    Extra Tube Hold for add-ons.    POCT GLUCOSE   Result Value Ref Range    POCT Glucose 120 (H) 74 - 99 mg/dL   Protime-INR   Result Value Ref Range    Protime 20.5 (H) 9.8 - 12.4 seconds    INR 1.9 (H) 0.9 - 1.1   SST TOP   Result Value Ref Range    Extra Tube Hold for add-ons.       Patient recently received an antibiotic (last 12 hours)       Date/Time Action Medication Dose    03/07/25 1252 Given    vancomycin (Firvanq) solution 250 mg 250 mg    03/07/25 0602 Given    vancomycin (Firvanq) solution 250 mg 250 mg           Plan discussed with interdisciplinary team, seen by orthopedics today - dressing changes as needed,   suture removal on March 18, recommended shoulder elbow and hand range of motion with therapy ordered. Will continue current and repeat labs in the AM. CXR shows improved infiltrate at the left lower lung, strict intake and output, trend hemoglobin.    Discharge planning discussed with patient and care team. Therapy evaluations ordered. [facility] -11 anticipate C/SNF at discharge. Patient aware and agreeable to current plan, continue plan as above.     I  spent a total of 50 minutes on the date of the service which included preparing to see the patient, face-to-face patient care, completing clinical documentation, obtaining and/or reviewing separately obtained history, performing a medically appropriate examination, counseling and educating the patient/family/caregiver, ordering medications, tests, or procedures, communicating with other HCPs (not separately reported), independently interpreting results (not separately reported), communicating results to the patient/family/caregiver, and care coordination (not separately reported).        Patient fully evaluated  03/08  for    Problem List Items Addressed This Visit          Gastrointestinal and Abdominal    * (Principal) Nausea vomiting and diarrhea       Musculoskeletal and Injuries    Nontraumatic compartment syndrome of left upper extremity    Relevant Orders    Case Request Operating Room: FASCIOTOMY, UPPER EXTREMITY (Completed)    Case Request Operating Room: DEBRIDEMENT, WOUND, UPPER EXTREMITY (Completed)       Pulmonary and Pneumonias    Influenza A with pneumonia - Primary     Other Visit Diagnoses       Acute kidney injury (CMS-HCC)        Dehydration        Influenza A        Shock (Multi)        Relevant Orders    Transthoracic Echo (TTE) Complete (Completed)          Patient seen resting in bed with head of bed elevated, no s/s or c/o acute difficulties at this time.  Vital signs for last 24 hours Temp:  [36.1 °C (97 °F)-36.6 °C (97.9 °F)] 36.1 °C (97 °F)  Heart Rate:  [72-83] 75  Resp:  [15-21] 18  BP: (117-148)/(57-77) 144/77    I/O this shift:  In: 144.7 [P.O.:118; I.V.:26.7]  Out: -   Patient still requiring frequent cardiac and SPO2 monitoring. Continue aggressive pulmonary hygiene and oral hygiene. Off loading as tolerated for skin integrity. Medications and labs reviewed-   Results for orders placed or performed during the hospital encounter of 02/25/25 (from the past 24 hours)   POCT GLUCOSE    Result Value Ref Range    POCT Glucose 100 (H) 74 - 99 mg/dL   CBC and Auto Differential   Result Value Ref Range    WBC 10.7 4.4 - 11.3 x10*3/uL    nRBC 0.2 (H) 0.0 - 0.0 /100 WBCs    RBC 2.94 (L) 4.00 - 5.20 x10*6/uL    Hemoglobin 8.1 (L) 12.0 - 16.0 g/dL    Hematocrit 26.0 (L) 36.0 - 46.0 %    MCV 88 80 - 100 fL    MCH 27.6 26.0 - 34.0 pg    MCHC 31.2 (L) 32.0 - 36.0 g/dL    RDW 18.8 (H) 11.5 - 14.5 %    Platelets 226 150 - 450 x10*3/uL    Neutrophils % 81.5 40.0 - 80.0 %    Immature Granulocytes %, Automated 0.9 0.0 - 0.9 %    Lymphocytes % 9.6 13.0 - 44.0 %    Monocytes % 7.8 2.0 - 10.0 %    Eosinophils % 0.1 0.0 - 6.0 %    Basophils % 0.1 0.0 - 2.0 %    Neutrophils Absolute 8.72 (H) 1.20 - 7.70 x10*3/uL    Immature Granulocytes Absolute, Automated 0.10 0.00 - 0.70 x10*3/uL    Lymphocytes Absolute 1.03 (L) 1.20 - 4.80 x10*3/uL    Monocytes Absolute 0.84 0.10 - 1.00 x10*3/uL    Eosinophils Absolute 0.01 0.00 - 0.70 x10*3/uL    Basophils Absolute 0.01 0.00 - 0.10 x10*3/uL   Comprehensive Metabolic Panel   Result Value Ref Range    Glucose 99 74 - 99 mg/dL    Sodium 142 136 - 145 mmol/L    Potassium 3.3 (L) 3.5 - 5.3 mmol/L    Chloride 101 98 - 107 mmol/L    Bicarbonate 34 (H) 21 - 32 mmol/L    Anion Gap 10 10 - 20 mmol/L    Urea Nitrogen 9 6 - 23 mg/dL    Creatinine 0.35 (L) 0.50 - 1.05 mg/dL    eGFR >90 >60 mL/min/1.73m*2    Calcium 8.2 (L) 8.6 - 10.3 mg/dL    Albumin 2.6 (L) 3.4 - 5.0 g/dL    Alkaline Phosphatase 40 33 - 110 U/L    Total Protein 5.0 (L) 6.4 - 8.2 g/dL    AST 16 9 - 39 U/L    Bilirubin, Total 1.5 (H) 0.0 - 1.2 mg/dL    ALT 12 7 - 45 U/L   Protime-INR   Result Value Ref Range    Protime 15.5 (H) 9.8 - 12.4 seconds    INR 1.4 (H) 0.9 - 1.1   POCT GLUCOSE   Result Value Ref Range    POCT Glucose 107 (H) 74 - 99 mg/dL   Heparin Assay   Result Value Ref Range    Heparin Unfractionated 0.2 See Comment Below for Therapeutic Ranges IU/mL   Lavender Top   Result Value Ref Range    Extra Tube Hold for  add-ons.    SST TOP   Result Value Ref Range    Extra Tube Hold for add-ons.    POCT GLUCOSE   Result Value Ref Range    POCT Glucose 120 (H) 74 - 99 mg/dL   Protime-INR   Result Value Ref Range    Protime 20.5 (H) 9.8 - 12.4 seconds    INR 1.9 (H) 0.9 - 1.1   SST TOP   Result Value Ref Range    Extra Tube Hold for add-ons.       Patient recently received an antibiotic (last 12 hours)       Date/Time Action Medication Dose    03/08/25 1220 Given    vancomycin (Vancocin) capsule 250 mg 250 mg           Plan discussed with interdisciplinary team, seen by cardiology today, will bridge patient to coumadin, ptt/inr in am, appreciate input and agree with plan. JESSICA dressing is clean, dry and intact, swelling is noted, warm and well-perfused.  She is able to flex and extend the fingers and thumb. Will continue current and repeat labs in the AM.    Patient fully evaluated on March 9 and clinically improved.  Recheck labs in AM.  INR is slowly improving.  Discontinue heparin when INR is therapeutic for mechanical valve.  Appreciate orthopedic and cardiology consultations.  Examination of area from compartment syndrome is clear without neurovascular compromise.  Case discussed with orthopedics at bedside    Discharge planning discussed with patient and care team. Patient aware and agreeable to current plan, continue plan as above.     I spent a total of 50 minutes on the date of the service which included preparing to see the patient, face-to-face patient care, completing clinical documentation, obtaining and/or reviewing separately obtained history, performing a medically appropriate examination, counseling and educating the patient/family/caregiver, ordering medications, tests, or procedures, communicating with other HCPs (not separately reported), independently interpreting results (not separately reported), communicating results to the patient/family/caregiver, and care coordination (not separately reported).   Maurizio SOLIS  MD Jacek

## 2025-03-09 NOTE — NURSING NOTE
Patient arrives from ICU via bed with RN transport on monitor.  Patient settled into room, oriented to call light, assisted with position of comfort, assessment as charted.  Patient is alert and oriented, able to make needs known.  Patient denies immediate needs.    Left arm dressing clean, dry and intact.  Radial pulse palpable, + sensation, + cap refill.

## 2025-03-09 NOTE — CARE PLAN
The patient's goals for the shift include      The clinical goals for the shift include Patient to remain hempodynamically stable throughout the shfit    Over the shift, the patient did not make progress toward the following goals. Barriers to progression include . Recommendations to address these barriers include .

## 2025-03-09 NOTE — PROGRESS NOTES
POD 7    COMFORTABLE  WOUD FINE  LESS SWELLING  NV INTACT  STABLE  MOBILIZE as KALEB.  NEW DRESSING APPLIED

## 2025-03-10 LAB
ALBUMIN SERPL BCP-MCNC: 2.7 G/DL (ref 3.4–5)
ALP SERPL-CCNC: 43 U/L (ref 33–110)
ALT SERPL W P-5'-P-CCNC: 11 U/L (ref 7–45)
ANION GAP SERPL CALC-SCNC: 8 MMOL/L (ref 10–20)
AST SERPL W P-5'-P-CCNC: 16 U/L (ref 9–39)
BASOPHILS # BLD AUTO: 0.01 X10*3/UL (ref 0–0.1)
BASOPHILS NFR BLD AUTO: 0.1 %
BILIRUB SERPL-MCNC: 1.6 MG/DL (ref 0–1.2)
BUN SERPL-MCNC: 7 MG/DL (ref 6–23)
CALCIUM SERPL-MCNC: 8.5 MG/DL (ref 8.6–10.3)
CHLORIDE SERPL-SCNC: 101 MMOL/L (ref 98–107)
CO2 SERPL-SCNC: 33 MMOL/L (ref 21–32)
CREAT SERPL-MCNC: 0.28 MG/DL (ref 0.5–1.05)
EGFRCR SERPLBLD CKD-EPI 2021: >90 ML/MIN/1.73M*2
EOSINOPHIL # BLD AUTO: 0 X10*3/UL (ref 0–0.7)
EOSINOPHIL NFR BLD AUTO: 0 %
ERYTHROCYTE [DISTWIDTH] IN BLOOD BY AUTOMATED COUNT: 19 % (ref 11.5–14.5)
GLUCOSE BLD MANUAL STRIP-MCNC: 104 MG/DL (ref 74–99)
GLUCOSE BLD MANUAL STRIP-MCNC: 130 MG/DL (ref 74–99)
GLUCOSE BLD MANUAL STRIP-MCNC: 131 MG/DL (ref 74–99)
GLUCOSE BLD MANUAL STRIP-MCNC: 164 MG/DL (ref 74–99)
GLUCOSE SERPL-MCNC: 88 MG/DL (ref 74–99)
HCT VFR BLD AUTO: 26.9 % (ref 36–46)
HGB BLD-MCNC: 8.1 G/DL (ref 12–16)
HOLD SPECIMEN: NORMAL
IMM GRANULOCYTES # BLD AUTO: 0.08 X10*3/UL (ref 0–0.7)
IMM GRANULOCYTES NFR BLD AUTO: 0.8 % (ref 0–0.9)
INR PPP: 2.5 (ref 0.9–1.1)
LYMPHOCYTES # BLD AUTO: 0.89 X10*3/UL (ref 1.2–4.8)
LYMPHOCYTES NFR BLD AUTO: 8.6 %
MCH RBC QN AUTO: 27.2 PG (ref 26–34)
MCHC RBC AUTO-ENTMCNC: 30.1 G/DL (ref 32–36)
MCV RBC AUTO: 90 FL (ref 80–100)
MONOCYTES # BLD AUTO: 0.87 X10*3/UL (ref 0.1–1)
MONOCYTES NFR BLD AUTO: 8.4 %
NEUTROPHILS # BLD AUTO: 8.52 X10*3/UL (ref 1.2–7.7)
NEUTROPHILS NFR BLD AUTO: 82.1 %
NRBC BLD-RTO: 0 /100 WBCS (ref 0–0)
PLATELET # BLD AUTO: 242 X10*3/UL (ref 150–450)
POTASSIUM SERPL-SCNC: 3.7 MMOL/L (ref 3.5–5.3)
PROT SERPL-MCNC: 5.2 G/DL (ref 6.4–8.2)
PROTHROMBIN TIME: 27.9 SECONDS (ref 9.8–12.4)
RBC # BLD AUTO: 2.98 X10*6/UL (ref 4–5.2)
SODIUM SERPL-SCNC: 138 MMOL/L (ref 136–145)
WBC # BLD AUTO: 10.4 X10*3/UL (ref 4.4–11.3)

## 2025-03-10 PROCEDURE — 2500000001 HC RX 250 WO HCPCS SELF ADMINISTERED DRUGS (ALT 637 FOR MEDICARE OP)

## 2025-03-10 PROCEDURE — 2500000002 HC RX 250 W HCPCS SELF ADMINISTERED DRUGS (ALT 637 FOR MEDICARE OP, ALT 636 FOR OP/ED)

## 2025-03-10 PROCEDURE — 97535 SELF CARE MNGMENT TRAINING: CPT | Mod: CQ,GP

## 2025-03-10 PROCEDURE — 36415 COLL VENOUS BLD VENIPUNCTURE: CPT

## 2025-03-10 PROCEDURE — 2500000004 HC RX 250 GENERAL PHARMACY W/ HCPCS (ALT 636 FOR OP/ED)

## 2025-03-10 PROCEDURE — 85610 PROTHROMBIN TIME: CPT

## 2025-03-10 PROCEDURE — 2060000001 HC INTERMEDIATE ICU ROOM DAILY

## 2025-03-10 PROCEDURE — 94640 AIRWAY INHALATION TREATMENT: CPT

## 2025-03-10 PROCEDURE — 82947 ASSAY GLUCOSE BLOOD QUANT: CPT

## 2025-03-10 PROCEDURE — 84075 ASSAY ALKALINE PHOSPHATASE: CPT

## 2025-03-10 PROCEDURE — 97535 SELF CARE MNGMENT TRAINING: CPT | Mod: CO,GO

## 2025-03-10 PROCEDURE — 2500000005 HC RX 250 GENERAL PHARMACY W/O HCPCS

## 2025-03-10 PROCEDURE — 99233 SBSQ HOSP IP/OBS HIGH 50: CPT | Performed by: INTERNAL MEDICINE

## 2025-03-10 PROCEDURE — 85025 COMPLETE CBC W/AUTO DIFF WBC: CPT

## 2025-03-10 RX ORDER — ATORVASTATIN CALCIUM 80 MG/1
80 TABLET, FILM COATED ORAL DAILY
Qty: 90 TABLET | Refills: 3 | Status: SHIPPED | OUTPATIENT
Start: 2025-03-10 | End: 2026-03-10

## 2025-03-10 RX ORDER — WARFARIN 2 MG/1
4 TABLET ORAL ONCE
Status: COMPLETED | OUTPATIENT
Start: 2025-03-10 | End: 2025-03-10

## 2025-03-10 RX ADMIN — LISINOPRIL 20 MG: 20 TABLET ORAL at 09:25

## 2025-03-10 RX ADMIN — ESCITALOPRAM OXALATE 20 MG: 10 TABLET ORAL at 09:24

## 2025-03-10 RX ADMIN — ASPIRIN 81 MG: 81 TABLET, COATED ORAL at 09:24

## 2025-03-10 RX ADMIN — IPRATROPIUM BROMIDE AND ALBUTEROL SULFATE 3 ML: .5; 3 SOLUTION RESPIRATORY (INHALATION) at 15:00

## 2025-03-10 RX ADMIN — BUDESONIDE 0.5 MG: 0.5 INHALANT ORAL at 19:28

## 2025-03-10 RX ADMIN — HEPARIN SODIUM 900 UNITS/HR: 10000 INJECTION, SOLUTION INTRAVENOUS at 05:56

## 2025-03-10 RX ADMIN — WARFARIN SODIUM 4 MG: 2 TABLET ORAL at 17:52

## 2025-03-10 RX ADMIN — BUPROPION HYDROCHLORIDE 150 MG: 150 TABLET, EXTENDED RELEASE ORAL at 09:24

## 2025-03-10 RX ADMIN — IPRATROPIUM BROMIDE AND ALBUTEROL SULFATE 3 ML: .5; 3 SOLUTION RESPIRATORY (INHALATION) at 08:46

## 2025-03-10 RX ADMIN — PANTOPRAZOLE SODIUM 40 MG: 40 TABLET, DELAYED RELEASE ORAL at 09:24

## 2025-03-10 RX ADMIN — Medication 0.1 L/MIN: at 10:26

## 2025-03-10 RX ADMIN — CARVEDILOL 25 MG: 25 TABLET, FILM COATED ORAL at 09:24

## 2025-03-10 RX ADMIN — VANCOMYCIN HYDROCHLORIDE 250 MG: 250 CAPSULE ORAL at 05:52

## 2025-03-10 RX ADMIN — CARVEDILOL 25 MG: 25 TABLET, FILM COATED ORAL at 21:40

## 2025-03-10 RX ADMIN — Medication 2 L/MIN: at 21:48

## 2025-03-10 RX ADMIN — VANCOMYCIN HYDROCHLORIDE 250 MG: 250 CAPSULE ORAL at 21:40

## 2025-03-10 RX ADMIN — VANCOMYCIN HYDROCHLORIDE 250 MG: 250 CAPSULE ORAL at 17:33

## 2025-03-10 RX ADMIN — BUDESONIDE 0.5 MG: 0.5 INHALANT ORAL at 08:47

## 2025-03-10 RX ADMIN — IPRATROPIUM BROMIDE AND ALBUTEROL SULFATE 3 ML: .5; 3 SOLUTION RESPIRATORY (INHALATION) at 19:28

## 2025-03-10 RX ADMIN — ATORVASTATIN CALCIUM 80 MG: 80 TABLET, FILM COATED ORAL at 21:40

## 2025-03-10 RX ADMIN — VANCOMYCIN HYDROCHLORIDE 250 MG: 250 CAPSULE ORAL at 15:41

## 2025-03-10 ASSESSMENT — COGNITIVE AND FUNCTIONAL STATUS - GENERAL
STANDING UP FROM CHAIR USING ARMS: A LOT
WALKING IN HOSPITAL ROOM: A LOT
DAILY ACTIVITIY SCORE: 18
TOILETING: A LITTLE
WALKING IN HOSPITAL ROOM: A LOT
MOVING TO AND FROM BED TO CHAIR: A LITTLE
DAILY ACTIVITIY SCORE: 17
DRESSING REGULAR LOWER BODY CLOTHING: A LOT
TOILETING: A LOT
STANDING UP FROM CHAIR USING ARMS: A LOT
MOBILITY SCORE: 11
WALKING IN HOSPITAL ROOM: A LOT
PERSONAL GROOMING: A LITTLE
DRESSING REGULAR UPPER BODY CLOTHING: A LITTLE
MOVING TO AND FROM BED TO CHAIR: A LOT
PERSONAL GROOMING: A LITTLE
DRESSING REGULAR LOWER BODY CLOTHING: A LITTLE
DAILY ACTIVITIY SCORE: 15
HELP NEEDED FOR BATHING: A LOT
STANDING UP FROM CHAIR USING ARMS: A LITTLE
MOBILITY SCORE: 14
TOILETING: TOTAL
MOBILITY SCORE: 16
CLIMB 3 TO 5 STEPS WITH RAILING: A LOT
CLIMB 3 TO 5 STEPS WITH RAILING: A LOT
PERSONAL GROOMING: A LITTLE
HELP NEEDED FOR BATHING: A LITTLE
DRESSING REGULAR UPPER BODY CLOTHING: A LITTLE
MOVING FROM LYING ON BACK TO SITTING ON SIDE OF FLAT BED WITH BEDRAILS: A LOT
MOVING FROM LYING ON BACK TO SITTING ON SIDE OF FLAT BED WITH BEDRAILS: A LITTLE
TURNING FROM BACK TO SIDE WHILE IN FLAT BAD: A LITTLE
CLIMB 3 TO 5 STEPS WITH RAILING: TOTAL
MOVING TO AND FROM BED TO CHAIR: A LOT
HELP NEEDED FOR BATHING: A LITTLE
DRESSING REGULAR UPPER BODY CLOTHING: A LITTLE
TURNING FROM BACK TO SIDE WHILE IN FLAT BAD: A LOT
TURNING FROM BACK TO SIDE WHILE IN FLAT BAD: A LITTLE
DRESSING REGULAR LOWER BODY CLOTHING: A LITTLE
EATING MEALS: A LITTLE
EATING MEALS: A LITTLE
MOVING FROM LYING ON BACK TO SITTING ON SIDE OF FLAT BED WITH BEDRAILS: A LITTLE

## 2025-03-10 ASSESSMENT — ACTIVITIES OF DAILY LIVING (ADL)
HOME_MANAGEMENT_TIME_ENTRY: 15
BATHING_LEVEL_OF_ASSISTANCE: MAXIMUM ASSISTANCE

## 2025-03-10 ASSESSMENT — PAIN SCALES - GENERAL: PAINLEVEL_OUTOF10: 5 - MODERATE PAIN

## 2025-03-10 ASSESSMENT — PAIN SCALES - WONG BAKER: WONGBAKER_NUMERICALRESPONSE: HURTS LITTLE BIT

## 2025-03-10 ASSESSMENT — PAIN - FUNCTIONAL ASSESSMENT: PAIN_FUNCTIONAL_ASSESSMENT: 0-10

## 2025-03-10 NOTE — PROGRESS NOTES
Physical Therapy    Physical Therapy Treatment    Patient Name: Kayley Lynch  MRN: [id number]  Department: [facility]  Room: [address] [place] [address]  Today's Date: 3/10/2025  Time Calculation  Start Time: 1326  Stop Time: 1348  Time Calculation (min): 22 min         Assessment/Plan         PT Plan  Treatment/Interventions: Bed mobility, Transfer training, Gait training, Balance training, Strengthening, Endurance training, Therapeutic exercise, Therapeutic activity  PT Plan: Ongoing PT  PT Frequency: 4 times per week  PT Discharge Recommendations: High intensity level of continued care  PT - OK to Discharge: Yes      General Visit Information:   PT  Visit  PT Received On: 03/10/25       Subjective                  Objective   Pain: lue-no rating                            Treatments:       Bed Mobility  Bed Mobility:  (supine to sit mod a x 1)    Ambulation/Gait Training  Ambulation/Gait Training Performed:  (ambulated 3 feet using fixed wheeled walker mod a x 2)  Transfers  Transfer:  (sit to stand mod a x 2   stood with walker mod a x 2 for one minute)         Outcome Measures:  [facility] Basic Mobility  Turning from your back to your side while in a flat bed without using bedrails: A lot  Moving from lying on your back to sitting on the side of a flat bed without using bedrails: A lot  Moving to and from bed to chair (including a wheelchair): A lot  Standing up from a chair using your arms (e.g. wheelchair or bedside chair): A lot  To walk in hospital room: A lot  Climbing 3-5 steps with railing: Total  Basic Mobility - Total Score: 11    Education Documentation  Mobility Training, taught by Katina Gray PTA at 3/10/2025  3:03 PM.  Learner: Patient  Readiness: Acceptance  Method: Demonstration  Response: Demonstrated Understanding    Precautions, taught by Katina Gray PTA at 3/10/2025  3:03 PM.  Learner: Patient  Readiness: Acceptance  Method: Demonstration  Response: Demonstrated Understanding    ADL Training, taught by Katina BOWEN  SUNSHINE Gray at 3/10/2025  3:03 PM.  Learner: Patient  Readiness: Acceptance  Method: Demonstration  Response: Demonstrated Understanding    Education Comments  No comments found.             Encounter Problems       Encounter Problems (Active)       PT Problem       PT Goal 1 STG - Pt will amb 10' using WW with MIN A   (Progressing)       Start:  03/05/25    Expected End:  03/19/25            PT Goal 2 STG - Pt will transition supine <> sitting with MIN A  (Progressing)       Start:  03/05/25    Expected End:  03/19/25            PT Goal 3 STG - Pt will SPT bed <> chair with MIN A  (Progressing)       Start:  03/05/25    Expected End:  03/19/25            PT Goal 4 STG - Pt will transfer STS with MIN A  (Progressing)       Start:  03/05/25    Expected End:  03/19/25               Pain - Adult

## 2025-03-10 NOTE — PROGRESS NOTES
"Kayley Lynch is a 54 y.o. female on day 12 of admission presenting with Nausea vomiting and diarrhea.    Subjective   The patient reports of the left upper extremity is doing well.       Objective     Physical Exam  There is a small amount of bloody drainage on the dressing.  She is able to flex and extend the elbow, wrist and hand.  Sensation to light touch is grossly intact.  Last Recorded Vitals  Blood pressure 169/82, pulse 78, temperature 35.8 °C (96.4 °F), resp. rate 16, height 1.6 m (5' 2.99\"), weight 66.5 kg (146 lb 9.7 oz), SpO2 94%.  Intake/Output last 3 Shifts:  I/O last 3 completed shifts:  In: 853.9 (12.8 mL/kg) [P.O.:595; I.V.:258.9 (3.9 mL/kg)]  Out: 1150 (17.3 mL/kg) [Urine:1150 (0.5 mL/kg/hr)]  Weight: 66.5 kg     Relevant Results            This patient currently has cardiac telemetry ordered; if you would like to modify or discontinue the telemetry order, click here to go to the orders activity to modify/discontinue the order.                 Assessment/Plan   Assessment & Plan  Nausea vomiting and diarrhea    Influenza A with pneumonia    Nontraumatic compartment syndrome of left upper extremity    The patient is postoperative day 6 from her left fasciotomy closure.  Overall, she appears to be doing well.  I encouraged her to continue to work on range of motion.  Plan for suture removal in 1 week.             Elijah Licea MD      "

## 2025-03-10 NOTE — CARE PLAN
The patient's goals for the shift include      The clinical goals for the shift include Promote urine output. Improve mood.    Over the shift, the patient did not make progress toward the following goals. Barriers to progression include . Recommendations to address these barriers include .

## 2025-03-10 NOTE — PROGRESS NOTES
Occupational Therapy    OT Treatment    Patient Name: Kayley Lynch  MRN: [id number]  Department: [facility]  Room: [address] [place] [address]  Today's Date: 3/10/2025  Time Calculation  Start Time: 1325  Stop Time: 1348  Time Calculation (min): 23 min        Assessment:  End of Session Patient Position: Up in chair, Alarm on     Plan:  Treatment Interventions: ADL retraining, Functional transfer training, Endurance training, Compensatory technique education  OT Frequency: 4 times per week  OT Discharge Recommendations: High intensity level of continued care  OT - OK to Discharge: Yes (to next level of care when cleared by medical team)  Treatment Interventions: ADL retraining, Functional transfer training, Endurance training, Compensatory technique education    Subjective   Previous Visit Info:  OT Last Visit  OT Received On: 03/10/25  General:  General  Co-Treatment: PT  Co-Treatment Reason: to maximize safety and functional mobility  Prior to Session Communication: Bedside nurse  Patient Position Received: Bed, 3 rail up, Alarm on  General Comment: pleasant and cooperative  Precautions:  Medical Precautions: Fall precautions, Infection precautions (cdiff)  Precautions Comment: stuart        Objective    Cognition:  Cognition  Overall Cognitive Status: Within Functional Limits       Activities of Daily Living: LE Bathing  LE Bathing Level of Assistance: Maximum assistance  LE Bathing Where Assessed:  (standing at FWW d/t incontinence of BM)    LE Dressing  LE Dressing: Yes  Sock Level of Assistance: Maximum assistance  LE Dressing Where Assessed: Bed level  Functional Standing Tolerance:  Time: 2:00 standing at [facility]  Bed Mobility/Transfers: Bed Mobility  Bed Mobility: Yes  Bed Mobility 1  Bed Mobility 1: Supine to sitting  Level of Assistance 1: Moderate assistance    Transfers  Transfer: Yes  Transfer 1  Technique 1: Sit to stand, Stand to sit  Transfer Device 1: Walker  Transfer Level of Assistance 1: Moderate assistance  (x2)      Functional Mobility:  Functional Mobility  Functional Mobility Performed: Yes  Functional Mobility 1  Device 1: Rolling walker  Assistance 1:  (min-Mod A x 2)  Comments 1: pt able to take a few side steps from EOB to chair with arms      Outcome Measures:[facility] Daily Activity  Putting on and taking off regular lower body clothing: A lot  Bathing (including washing, rinsing, drying): A lot  Putting on and taking off regular upper body clothing: A little  Toileting, which includes using toilet, bedpan or urinal: Total  Taking care of personal grooming such as brushing teeth: A little  Eating Meals: None  Daily Activity - Total Score: 15        Education Documentation  Precautions, taught by JACINTA Dale at 3/10/2025  2:42 PM.  Learner: Patient  Readiness: Acceptance  Method: Explanation  Response: Verbalizes Understanding    ADL Training, taught by JACINTA Dale at 3/10/2025  2:42 PM.  Learner: Patient  Readiness: Acceptance  Method: Explanation  Response: Verbalizes Understanding    Education Comments  No comments found.            Goals:  Encounter Problems       Encounter Problems (Active)       OT Goals       Increase functional mobility and  functional transfers to min assist x 1 for bed/chair/toilet with dme prn   (Progressing)       Start:  03/05/25    Expected End:  03/19/25            increase bue ther ex/activity x 5-7 minutes and increase standing tolerance x 3-5 minutes with min assist x 1 to promote greater activity tolerance for assist with adl.   (Progressing)       Start:  03/05/25    Expected End:  03/19/25            Increase grooming/feeding to set up with dme prn  (Progressing)       Start:  03/05/25    Expected End:  03/19/25            Increase ub/lb dressing to mod assist x 1 with dme prn  (Progressing)       Start:  03/05/25    Expected End:  03/19/25

## 2025-03-10 NOTE — PROGRESS NOTES
Kayley Lynch is a 54 y.o. female on day 12 of admission presenting with Nausea vomiting and diarrhea.      Subjective   No events overnight.  Patient seen and examined at bedside with  present.  Hospitalization and treatment plan reviewed with  at length.  Patient has no complaints.       Objective     Last Recorded Vitals  /78 (BP Location: Left leg, Patient Position: Lying)   Pulse 74   Temp 35.4 °C (95.7 °F) (Temporal)   Resp 18   Wt 66.5 kg (146 lb 9.7 oz)   SpO2 95%   Intake/Output last 3 Shifts:    Intake/Output Summary (Last 24 hours) at 3/10/2025 1444  Last data filed at 3/10/2025 1158  Gross per 24 hour   Intake 292.13 ml   Output 1450 ml   Net -1157.87 ml       Admission Weight  Weight: 59 kg (130 lb) (02/26/25 0159)    Daily Weight  03/07/25 : 66.5 kg (146 lb 9.7 oz)    Image Results  XR chest 1 view  Narrative: Interpreted By:  Peter Sanabria,   STUDY:  XR CHEST 1 VIEW 3/9/2025 7:01 am      INDICATION:  Signs/Symptoms:Daily CXR      COMPARISON:  03/08/2025      ACCESSION NUMBER(S):  [id number]      ORDERING CLINICIAN:  MARIBELL PALOMINO      TECHNIQUE:  Single AP view chest      FINDINGS:  Cardiac silhouette is mildly enlarged. Post CABG changes are  demonstrated. Lung fields are hyperinflated. There are chronic  appearing interstitial changes. Component of left basilar scar  demonstrated. Improved aeration of the right midlung laterally with  mild persistent infiltrate.                  Impression: 1. Mild persistent infiltrate in the right midlung laterally. No  dense airspace consolidation. Emphysematous change noted.      Signed by: Peter Sanabria 3/9/2025 11:54 AM  Dictation workstation:   [id number]      Physical Exam  HENT:      Head: Normocephalic and atraumatic.      Nose: Nose normal.      Mouth/Throat:      Mouth: Mucous membranes are moist.      Pharynx: Oropharynx is clear.   Eyes:      Extraocular Movements: Extraocular movements intact.      Pupils: Pupils  are equal, round, and reactive to light.   Cardiovascular:      Rate and Rhythm: Normal rate and regular rhythm.   Pulmonary:      Effort: No respiratory distress.      Breath sounds: Wheezing present. No rhonchi or rales.   Abdominal:      General: Bowel sounds are normal. There is no distension.      Palpations: Abdomen is soft.      Tenderness: There is no abdominal tenderness. There is no guarding.   Musculoskeletal:      Right lower leg: No edema.      Left lower leg: No edema.   Skin:     General: Skin is warm and dry.   Neurological:      Mental Status: She is alert. Mental status is at baseline.         Relevant Results               Assessment/Plan   This patient currently has cardiac telemetry ordered; if you would like to modify or discontinue the telemetry order, click here to go to the orders activity to modify/discontinue the order.      This patient has a urinary catheter   Reason for the urinary catheter remaining today? critically ill patient who need accurate urinary output measurements    Assessment & Plan  Nausea vomiting and diarrhea    Influenza A with pneumonia      Septic shock, dehydration, hypercapnic respiratory failure, and dehydration.  Nontraumatic compartment syndrome of left upper extremity      Patient is a 54-year-old female with past medical history of COPD, CAD status post CABG, pulmonary hypertension, hyperlipidemia, cardiomyopathy, a flutter status post ablation, and mitral valve replacement who presented with malaise.  She was found to be in septic shock and admitted to the ICU.    Septic shock  Hypercapnic respiratory failure  COPD exacerbation  Influenza A  Pneumonia  Acute metabolic encephalopathy  C. difficile colitis  Dehydration  H[facility]    -Continue supplemental oxygen  -Continue antibiotics, currently on doxycycline  -Continue nebulizers  -Continue steroids  -Continue Tamiflu  -Continue p.o. vancomycin  -Continue home meds  -Monitor INR    3/2: Patient developed a large  hematoma in the left arm leading to compartment syndrome. She was taken for urgent fasciotomies with Orthopedic Surgery. She is also being followed by Vascular Surgery. Vitamin K ordered for supratherapeutic INR. Her INR was 2.2 yesterday with goal being 2.5-3.5 for her A-flutter s/p mitral valve replacement. Critical Care reconsulted. Hematology consulted, appreciate recs. Hold coumadin and lovenox.    3/3: INR reversed. Patient is on low intensity heparin drip. She was transfused 1 unit pRBCs. Monitor hemoglobin closely. Hematology has ordered a work up including DIC panel. Patient will need another debridement and irrigation in 24-48 hours per Orthopedics. She has also developed a retroperitoneal bleed.    3/4: INR is down to 1.0.  Leukocytosis improved from 15 down to 13.  Hemoglobin is down to 6.4.  Patient to be transfused another unit packed RBCs.  She will be going back to the OR today for further debridement, washout, possible closure. She will also have a CT angiogram to determine if there is any active bleeding causing her retroperitoneal bleed.  She remains on a heparin drip due to her valve.  Continue doxycycline and p.o. Vanco.  Patient fully evaluated on March 5.  Patient restarted on heparin.  Fasciotomy site for compartment syndrome appears clear.  Continue to monitor lab work.  Case discussed with  at bedside.  No active bleeding is noted at this time.  Recheck labs in AM.       Patient fully evaluated  03/07  for    Problem List Items Addressed This Visit          Gastrointestinal and Abdominal    * (Principal) Nausea vomiting and diarrhea       Musculoskeletal and Injuries    Nontraumatic compartment syndrome of left upper extremity    Relevant Orders    Case Request Operating Room: FASCIOTOMY, UPPER EXTREMITY (Completed)    Case Request Operating Room: DEBRIDEMENT, WOUND, UPPER EXTREMITY (Completed)       Pulmonary and Pneumonias    Influenza A with pneumonia - Primary     Other Visit  Diagnoses       Acute kidney injury (CMS-HCC)        Dehydration        Influenza A        Shock (Multi)        Relevant Orders    Transthoracic Echo (TTE) Complete (Completed)          Patient seen resting in bed with head of bed elevated, no s/s or c/o acute difficulties at this time.  Vital signs for last 24 hours Temp:  [35.4 °C (95.7 °F)-36.5 °C (97.7 °F)] 35.4 °C (95.7 °F)  Heart Rate:  [74-78] 74  Resp:  [16-20] 18  BP: (129-169)/(67-82) 152/78  FiO2 (%):  [36 %] 36 %    I/O this shift:  In: -   Out: 300 [Urine:300]  Patient still requiring frequent cardiac and SPO2 monitoring. Continue aggressive pulmonary hygiene and oral hygiene. Off loading as tolerated for skin integrity. Medications and labs reviewed-   Results for orders placed or performed during the hospital encounter of 02/25/25 (from the past 24 hours)   POCT GLUCOSE   Result Value Ref Range    POCT Glucose 124 (H) 74 - 99 mg/dL   Protime-INR   Result Value Ref Range    Protime 21.4 (H) 9.8 - 12.4 seconds    INR 1.9 (H) 0.9 - 1.1   Heparin Assay   Result Value Ref Range    Heparin Unfractionated 0.3 See Comment Below for Therapeutic Ranges IU/mL   Lavender Top   Result Value Ref Range    Extra Tube Hold for add-ons.    Heparin Assay, UFH   Result Value Ref Range    Heparin Unfractionated 0.3 See Comment Below for Therapeutic Ranges IU/mL   SST TOP   Result Value Ref Range    Extra Tube Hold for add-ons.    CBC and Auto Differential   Result Value Ref Range    WBC 10.4 4.4 - 11.3 x10*3/uL    nRBC 0.0 0.0 - 0.0 /100 WBCs    RBC 2.98 (L) 4.00 - 5.20 x10*6/uL    Hemoglobin 8.1 (L) 12.0 - 16.0 g/dL    Hematocrit 26.9 (L) 36.0 - 46.0 %    MCV 90 80 - 100 fL    MCH 27.2 26.0 - 34.0 pg    MCHC 30.1 (L) 32.0 - 36.0 g/dL    RDW 19.0 (H) 11.5 - 14.5 %    Platelets 242 150 - 450 x10*3/uL    Neutrophils % 82.1 40.0 - 80.0 %    Immature Granulocytes %, Automated 0.8 0.0 - 0.9 %    Lymphocytes % 8.6 13.0 - 44.0 %    Monocytes % 8.4 2.0 - 10.0 %    Eosinophils %  0.0 0.0 - 6.0 %    Basophils % 0.1 0.0 - 2.0 %    Neutrophils Absolute 8.52 (H) 1.20 - 7.70 x10*3/uL    Immature Granulocytes Absolute, Automated 0.08 0.00 - 0.70 x10*3/uL    Lymphocytes Absolute 0.89 (L) 1.20 - 4.80 x10*3/uL    Monocytes Absolute 0.87 0.10 - 1.00 x10*3/uL    Eosinophils Absolute 0.00 0.00 - 0.70 x10*3/uL    Basophils Absolute 0.01 0.00 - 0.10 x10*3/uL   Comprehensive Metabolic Panel   Result Value Ref Range    Glucose 88 74 - 99 mg/dL    Sodium 138 136 - 145 mmol/L    Potassium 3.7 3.5 - 5.3 mmol/L    Chloride 101 98 - 107 mmol/L    Bicarbonate 33 (H) 21 - 32 mmol/L    Anion Gap 8 (L) 10 - 20 mmol/L    Urea Nitrogen 7 6 - 23 mg/dL    Creatinine 0.28 (L) 0.50 - 1.05 mg/dL    eGFR >90 >60 mL/min/1.73m*2    Calcium 8.5 (L) 8.6 - 10.3 mg/dL    Albumin 2.7 (L) 3.4 - 5.0 g/dL    Alkaline Phosphatase 43 33 - 110 U/L    Total Protein 5.2 (L) 6.4 - 8.2 g/dL    AST 16 9 - 39 U/L    Bilirubin, Total 1.6 (H) 0.0 - 1.2 mg/dL    ALT 11 7 - 45 U/L   Protime-INR   Result Value Ref Range    Protime 27.9 (H) 9.8 - 12.4 seconds    INR 2.5 (H) 0.9 - 1.1   POCT GLUCOSE   Result Value Ref Range    POCT Glucose 104 (H) 74 - 99 mg/dL   POCT GLUCOSE   Result Value Ref Range    POCT Glucose 130 (H) 74 - 99 mg/dL     *Note: Due to a large number of results and/or encounters for the requested time period, some results have not been displayed. A complete set of results can be found in Results Review.      Patient recently received an antibiotic (last 12 hours)       Date/Time Action Medication Dose    03/07/25 1252 Given    vancomycin (Firvanq) solution 250 mg 250 mg    03/07/25 0602 Given    vancomycin (Firvanq) solution 250 mg 250 mg           Plan discussed with interdisciplinary team, seen by orthopedics today - dressing changes as needed,   suture removal on March 18, recommended shoulder elbow and hand range of motion with therapy ordered. Will continue current and repeat labs in the AM. CXR shows improved infiltrate at  the left lower lung, strict intake and output, trend hemoglobin.    Discharge planning discussed with patient and care team. Therapy evaluations ordered. [facility] -11 anticipate HHC/SNF at discharge. Patient aware and agreeable to current plan, continue plan as above.     I spent a total of 50 minutes on the date of the service which included preparing to see the patient, face-to-face patient care, completing clinical documentation, obtaining and/or reviewing separately obtained history, performing a medically appropriate examination, counseling and educating the patient/family/caregiver, ordering medications, tests, or procedures, communicating with other HCPs (not separately reported), independently interpreting results (not separately reported), communicating results to the patient/family/caregiver, and care coordination (not separately reported).        Patient fully evaluated  03/08  for    Problem List Items Addressed This Visit          Gastrointestinal and Abdominal    * (Principal) Nausea vomiting and diarrhea       Musculoskeletal and Injuries    Nontraumatic compartment syndrome of left upper extremity    Relevant Orders    Case Request Operating Room: FASCIOTOMY, UPPER EXTREMITY (Completed)    Case Request Operating Room: DEBRIDEMENT, WOUND, UPPER EXTREMITY (Completed)       Pulmonary and Pneumonias    Influenza A with pneumonia - Primary     Other Visit Diagnoses       Acute kidney injury (CMS-HCC)        Dehydration        Influenza A        Shock (Multi)        Relevant Orders    Transthoracic Echo (TTE) Complete (Completed)          Patient seen resting in bed with head of bed elevated, no s/s or c/o acute difficulties at this time.  Vital signs for last 24 hours Temp:  [35.4 °C (95.7 °F)-36.5 °C (97.7 °F)] 35.4 °C (95.7 °F)  Heart Rate:  [74-78] 74  Resp:  [16-20] 18  BP: (129-169)/(67-82) 152/78  FiO2 (%):  [36 %] 36 %    I/O this shift:  In: -   Out: 300 [Urine:300]  Patient still requiring frequent  cardiac and SPO2 monitoring. Continue aggressive pulmonary hygiene and oral hygiene. Off loading as tolerated for skin integrity. Medications and labs reviewed-   Results for orders placed or performed during the hospital encounter of 02/25/25 (from the past 24 hours)   POCT GLUCOSE   Result Value Ref Range    POCT Glucose 124 (H) 74 - 99 mg/dL   Protime-INR   Result Value Ref Range    Protime 21.4 (H) 9.8 - 12.4 seconds    INR 1.9 (H) 0.9 - 1.1   Heparin Assay   Result Value Ref Range    Heparin Unfractionated 0.3 See Comment Below for Therapeutic Ranges IU/mL   Lavender Top   Result Value Ref Range    Extra Tube Hold for add-ons.    Heparin Assay, UFH   Result Value Ref Range    Heparin Unfractionated 0.3 See Comment Below for Therapeutic Ranges IU/mL   SST TOP   Result Value Ref Range    Extra Tube Hold for add-ons.    CBC and Auto Differential   Result Value Ref Range    WBC 10.4 4.4 - 11.3 x10*3/uL    nRBC 0.0 0.0 - 0.0 /100 WBCs    RBC 2.98 (L) 4.00 - 5.20 x10*6/uL    Hemoglobin 8.1 (L) 12.0 - 16.0 g/dL    Hematocrit 26.9 (L) 36.0 - 46.0 %    MCV 90 80 - 100 fL    MCH 27.2 26.0 - 34.0 pg    MCHC 30.1 (L) 32.0 - 36.0 g/dL    RDW 19.0 (H) 11.5 - 14.5 %    Platelets 242 150 - 450 x10*3/uL    Neutrophils % 82.1 40.0 - 80.0 %    Immature Granulocytes %, Automated 0.8 0.0 - 0.9 %    Lymphocytes % 8.6 13.0 - 44.0 %    Monocytes % 8.4 2.0 - 10.0 %    Eosinophils % 0.0 0.0 - 6.0 %    Basophils % 0.1 0.0 - 2.0 %    Neutrophils Absolute 8.52 (H) 1.20 - 7.70 x10*3/uL    Immature Granulocytes Absolute, Automated 0.08 0.00 - 0.70 x10*3/uL    Lymphocytes Absolute 0.89 (L) 1.20 - 4.80 x10*3/uL    Monocytes Absolute 0.87 0.10 - 1.00 x10*3/uL    Eosinophils Absolute 0.00 0.00 - 0.70 x10*3/uL    Basophils Absolute 0.01 0.00 - 0.10 x10*3/uL   Comprehensive Metabolic Panel   Result Value Ref Range    Glucose 88 74 - 99 mg/dL    Sodium 138 136 - 145 mmol/L    Potassium 3.7 3.5 - 5.3 mmol/L    Chloride 101 98 - 107 mmol/L     Bicarbonate 33 (H) 21 - 32 mmol/L    Anion Gap 8 (L) 10 - 20 mmol/L    Urea Nitrogen 7 6 - 23 mg/dL    Creatinine 0.28 (L) 0.50 - 1.05 mg/dL    eGFR >90 >60 mL/min/1.73m*2    Calcium 8.5 (L) 8.6 - 10.3 mg/dL    Albumin 2.7 (L) 3.4 - 5.0 g/dL    Alkaline Phosphatase 43 33 - 110 U/L    Total Protein 5.2 (L) 6.4 - 8.2 g/dL    AST 16 9 - 39 U/L    Bilirubin, Total 1.6 (H) 0.0 - 1.2 mg/dL    ALT 11 7 - 45 U/L   Protime-INR   Result Value Ref Range    Protime 27.9 (H) 9.8 - 12.4 seconds    INR 2.5 (H) 0.9 - 1.1   POCT GLUCOSE   Result Value Ref Range    POCT Glucose 104 (H) 74 - 99 mg/dL   POCT GLUCOSE   Result Value Ref Range    POCT Glucose 130 (H) 74 - 99 mg/dL      Patient recently received an antibiotic (last 12 hours)       Date/Time Action Medication Dose    03/08/25 1220 Given    vancomycin (Vancocin) capsule 250 mg 250 mg           Plan discussed with interdisciplinary team, seen by cardiology today, will bridge patient to coumadin, ptt/inr in am, appreciate input and agree with plan. LUE dressing is clean, dry and intact, swelling is noted, warm and well-perfused.  She is able to flex and extend the fingers and thumb. Will continue current and repeat labs in the AM.    Patient fully evaluated on March 9 and clinically improved.  Recheck labs in AM.  INR is slowly improving.  Discontinue heparin when INR is therapeutic for mechanical valve.  Appreciate orthopedic and cardiology consultations.  Examination of area from compartment syndrome is clear without neurovascular compromise.  Case discussed with orthopedics at bedside    Discharge planning discussed with patient and care team. Patient aware and agreeable to current plan, continue plan as above.     I spent a total of 50 minutes on the date of the service which included preparing to see the patient, face-to-face patient care, completing clinical documentation, obtaining and/or reviewing separately obtained history, performing a medically appropriate  examination, counseling and educating the patient/family/caregiver, ordering medications, tests, or procedures, communicating with other HCPs (not separately reported), independently interpreting results (not separately reported), communicating results to the patient/family/caregiver, and care coordination (not separately reported).     Patient fully evaluated 3/10.  for    Problem List Items Addressed This Visit          Gastrointestinal and Abdominal    * (Principal) Nausea vomiting and diarrhea       Musculoskeletal and Injuries    Nontraumatic compartment syndrome of left upper extremity    Relevant Orders    Case Request Operating Room: FASCIOTOMY, UPPER EXTREMITY (Completed)    Case Request Operating Room: DEBRIDEMENT, WOUND, UPPER EXTREMITY (Completed)       Pulmonary and Pneumonias    Influenza A with pneumonia - Primary     Other Visit Diagnoses       Acute kidney injury (CMS-HCC)        Dehydration        Influenza A        Shock (Multi)        Relevant Orders    Transthoracic Echo (TTE) Complete (Completed)          Patient seen resting in bed with head of bed elevated, no s/s or c/o acute difficulties at this time.  Vital signs for last 24 hours Temp:  [35.4 °C (95.7 °F)-36.5 °C (97.7 °F)] 35.4 °C (95.7 °F)  Heart Rate:  [74-78] 74  Resp:  [16-20] 18  BP: (129-169)/(67-82) 152/78  FiO2 (%):  [36 %] 36 %    I/O this shift:  In: -   Out: 300 [Urine:300]  Patient still requiring frequent cardiac and SPO2 monitoring. Continue aggressive pulmonary hygiene and oral hygiene. Off loading as tolerated for skin integrity. Medications and labs reviewed-   Results for orders placed or performed during the hospital encounter of 02/25/25 (from the past 24 hours)   POCT GLUCOSE   Result Value Ref Range    POCT Glucose 124 (H) 74 - 99 mg/dL   Protime-INR   Result Value Ref Range    Protime 21.4 (H) 9.8 - 12.4 seconds    INR 1.9 (H) 0.9 - 1.1   Heparin Assay   Result Value Ref Range    Heparin Unfractionated 0.3 See  Comment Below for Therapeutic Ranges IU/mL   Lavender Top   Result Value Ref Range    Extra Tube Hold for add-ons.    Heparin Assay, UFH   Result Value Ref Range    Heparin Unfractionated 0.3 See Comment Below for Therapeutic Ranges IU/mL   SST TOP   Result Value Ref Range    Extra Tube Hold for add-ons.    CBC and Auto Differential   Result Value Ref Range    WBC 10.4 4.4 - 11.3 x10*3/uL    nRBC 0.0 0.0 - 0.0 /100 WBCs    RBC 2.98 (L) 4.00 - 5.20 x10*6/uL    Hemoglobin 8.1 (L) 12.0 - 16.0 g/dL    Hematocrit 26.9 (L) 36.0 - 46.0 %    MCV 90 80 - 100 fL    MCH 27.2 26.0 - 34.0 pg    MCHC 30.1 (L) 32.0 - 36.0 g/dL    RDW 19.0 (H) 11.5 - 14.5 %    Platelets 242 150 - 450 x10*3/uL    Neutrophils % 82.1 40.0 - 80.0 %    Immature Granulocytes %, Automated 0.8 0.0 - 0.9 %    Lymphocytes % 8.6 13.0 - 44.0 %    Monocytes % 8.4 2.0 - 10.0 %    Eosinophils % 0.0 0.0 - 6.0 %    Basophils % 0.1 0.0 - 2.0 %    Neutrophils Absolute 8.52 (H) 1.20 - 7.70 x10*3/uL    Immature Granulocytes Absolute, Automated 0.08 0.00 - 0.70 x10*3/uL    Lymphocytes Absolute 0.89 (L) 1.20 - 4.80 x10*3/uL    Monocytes Absolute 0.87 0.10 - 1.00 x10*3/uL    Eosinophils Absolute 0.00 0.00 - 0.70 x10*3/uL    Basophils Absolute 0.01 0.00 - 0.10 x10*3/uL   Comprehensive Metabolic Panel   Result Value Ref Range    Glucose 88 74 - 99 mg/dL    Sodium 138 136 - 145 mmol/L    Potassium 3.7 3.5 - 5.3 mmol/L    Chloride 101 98 - 107 mmol/L    Bicarbonate 33 (H) 21 - 32 mmol/L    Anion Gap 8 (L) 10 - 20 mmol/L    Urea Nitrogen 7 6 - 23 mg/dL    Creatinine 0.28 (L) 0.50 - 1.05 mg/dL    eGFR >90 >60 mL/min/1.73m*2    Calcium 8.5 (L) 8.6 - 10.3 mg/dL    Albumin 2.7 (L) 3.4 - 5.0 g/dL    Alkaline Phosphatase 43 33 - 110 U/L    Total Protein 5.2 (L) 6.4 - 8.2 g/dL    AST 16 9 - 39 U/L    Bilirubin, Total 1.6 (H) 0.0 - 1.2 mg/dL    ALT 11 7 - 45 U/L   Protime-INR   Result Value Ref Range    Protime 27.9 (H) 9.8 - 12.4 seconds    INR 2.5 (H) 0.9 - 1.1   POCT GLUCOSE    Result Value Ref Range    POCT Glucose 104 (H) 74 - 99 mg/dL   POCT GLUCOSE   Result Value Ref Range    POCT Glucose 130 (H) 74 - 99 mg/dL      Patient recently received an antibiotic (last 12 hours)       Date/Time Action Medication Dose    03/10/25 0552 Given    vancomycin (Vancocin) capsule 250 mg 250 mg             Patient fully evaluated on 3/10. Patient was 4L on NC today. Vitals included /82, SpO2 94%, HR 78, Temp 35.8, and Resp 17. Ptt 27.9, INR 2.5. WBC 10.4, Hbg 8.1, plt 242, Na 138, K 3.7, Cl 101, HCO3- 33, BUN 7, Cr 0.28, glucose 88. Cr did drop slightly from 0.35 to 0.28 today. Pt insulin discontinued because of target control. Patient given NaCl. Discontinue heparin, ween O2, and goal to discharge tomorrow.    Plan discussed with interdisciplinary team, continue current and repeat labs in the AM.     Discharge planning discussed with patient and care team. Therapy evaluations ordered. [facility]-  , anticipate HHC/SNF at discharge. Patient aware and agreeable to current plan, continue plan as above.     I spent a total of 50 minutes on the date of the service which included preparing to see the patient, face-to-face patient care, completing clinical documentation, obtaining and/or reviewing separately obtained history, performing a medically appropriate examination, counseling and educating the patient/family/caregiver, ordering medications, tests, or procedures, communicating with other HCPs (not separately reported), independently interpreting results (not separately reported), communicating results to the patient/family/caregiver, and care coordination (not separately reported).     CLAUDIA LEDESMA       199.9

## 2025-03-10 NOTE — PROGRESS NOTES
Placed request for PT/OT to work with patient today for discharge planning. Care transitions team will continue to follow.

## 2025-03-11 LAB
ALBUMIN SERPL BCP-MCNC: 2.7 G/DL (ref 3.4–5)
ALP SERPL-CCNC: 43 U/L (ref 33–110)
ALT SERPL W P-5'-P-CCNC: 14 U/L (ref 7–45)
ANION GAP SERPL CALC-SCNC: 9 MMOL/L (ref 10–20)
AST SERPL W P-5'-P-CCNC: 15 U/L (ref 9–39)
ATRIAL RATE: 219 BPM
BASOPHILS # BLD AUTO: 0.01 X10*3/UL (ref 0–0.1)
BASOPHILS NFR BLD AUTO: 0.1 %
BILIRUB SERPL-MCNC: 1.8 MG/DL (ref 0–1.2)
BUN SERPL-MCNC: 7 MG/DL (ref 6–23)
CALCIUM SERPL-MCNC: 8.5 MG/DL (ref 8.6–10.3)
CHLORIDE SERPL-SCNC: 100 MMOL/L (ref 98–107)
CO2 SERPL-SCNC: 32 MMOL/L (ref 21–32)
CREAT SERPL-MCNC: 0.25 MG/DL (ref 0.5–1.05)
EGFRCR SERPLBLD CKD-EPI 2021: >90 ML/MIN/1.73M*2
EOSINOPHIL # BLD AUTO: 0 X10*3/UL (ref 0–0.7)
EOSINOPHIL NFR BLD AUTO: 0 %
ERYTHROCYTE [DISTWIDTH] IN BLOOD BY AUTOMATED COUNT: 19 % (ref 11.5–14.5)
GLUCOSE BLD MANUAL STRIP-MCNC: 100 MG/DL (ref 74–99)
GLUCOSE BLD MANUAL STRIP-MCNC: 109 MG/DL (ref 74–99)
GLUCOSE BLD MANUAL STRIP-MCNC: 122 MG/DL (ref 74–99)
GLUCOSE BLD MANUAL STRIP-MCNC: 123 MG/DL (ref 74–99)
GLUCOSE SERPL-MCNC: 95 MG/DL (ref 74–99)
HCT VFR BLD AUTO: 26.9 % (ref 36–46)
HGB BLD-MCNC: 8.3 G/DL (ref 12–16)
HOLD SPECIMEN: NORMAL
IMM GRANULOCYTES # BLD AUTO: 0.06 X10*3/UL (ref 0–0.7)
IMM GRANULOCYTES NFR BLD AUTO: 0.6 % (ref 0–0.9)
INR PPP: 2.9 (ref 0.9–1.1)
INR PPP: 5.5 (ref 0.9–1.1)
LYMPHOCYTES # BLD AUTO: 0.93 X10*3/UL (ref 1.2–4.8)
LYMPHOCYTES NFR BLD AUTO: 9.7 %
MCH RBC QN AUTO: 27.5 PG (ref 26–34)
MCHC RBC AUTO-ENTMCNC: 30.9 G/DL (ref 32–36)
MCV RBC AUTO: 89 FL (ref 80–100)
MONOCYTES # BLD AUTO: 0.75 X10*3/UL (ref 0.1–1)
MONOCYTES NFR BLD AUTO: 7.8 %
NEUTROPHILS # BLD AUTO: 7.87 X10*3/UL (ref 1.2–7.7)
NEUTROPHILS NFR BLD AUTO: 81.8 %
NRBC BLD-RTO: 0 /100 WBCS (ref 0–0)
P AXIS: 88 DEGREES
PLATELET # BLD AUTO: 233 X10*3/UL (ref 150–450)
POTASSIUM SERPL-SCNC: 3.3 MMOL/L (ref 3.5–5.3)
PR INTERVAL: 133 MS
PROT SERPL-MCNC: 5.3 G/DL (ref 6.4–8.2)
PROTHROMBIN TIME: 32 SECONDS (ref 9.8–12.4)
PROTHROMBIN TIME: 61.3 SECONDS (ref 9.8–12.4)
Q ONSET: 249 MS
QRS COUNT: 22 BEATS
QRS DURATION: 87 MS
QT INTERVAL: 289 MS
QTC CALCULATION(BAZETT): 428 MS
QTC FREDERICIA: 376 MS
R AXIS: 40 DEGREES
RBC # BLD AUTO: 3.02 X10*6/UL (ref 4–5.2)
SODIUM SERPL-SCNC: 138 MMOL/L (ref 136–145)
T AXIS: 257 DEGREES
T OFFSET: 394 MS
VENTRICULAR RATE: 132 BPM
WBC # BLD AUTO: 9.6 X10*3/UL (ref 4.4–11.3)

## 2025-03-11 PROCEDURE — 80053 COMPREHEN METABOLIC PANEL: CPT

## 2025-03-11 PROCEDURE — 2500000002 HC RX 250 W HCPCS SELF ADMINISTERED DRUGS (ALT 637 FOR MEDICARE OP, ALT 636 FOR OP/ED)

## 2025-03-11 PROCEDURE — 85610 PROTHROMBIN TIME: CPT | Performed by: INTERNAL MEDICINE

## 2025-03-11 PROCEDURE — 2500000001 HC RX 250 WO HCPCS SELF ADMINISTERED DRUGS (ALT 637 FOR MEDICARE OP): Performed by: INTERNAL MEDICINE

## 2025-03-11 PROCEDURE — 2500000001 HC RX 250 WO HCPCS SELF ADMINISTERED DRUGS (ALT 637 FOR MEDICARE OP)

## 2025-03-11 PROCEDURE — 2500000005 HC RX 250 GENERAL PHARMACY W/O HCPCS

## 2025-03-11 PROCEDURE — 82947 ASSAY GLUCOSE BLOOD QUANT: CPT

## 2025-03-11 PROCEDURE — 36415 COLL VENOUS BLD VENIPUNCTURE: CPT | Performed by: INTERNAL MEDICINE

## 2025-03-11 PROCEDURE — 36415 COLL VENOUS BLD VENIPUNCTURE: CPT

## 2025-03-11 PROCEDURE — 99233 SBSQ HOSP IP/OBS HIGH 50: CPT | Performed by: INTERNAL MEDICINE

## 2025-03-11 PROCEDURE — 85610 PROTHROMBIN TIME: CPT

## 2025-03-11 PROCEDURE — 2060000001 HC INTERMEDIATE ICU ROOM DAILY

## 2025-03-11 PROCEDURE — 85025 COMPLETE CBC W/AUTO DIFF WBC: CPT

## 2025-03-11 PROCEDURE — 94640 AIRWAY INHALATION TREATMENT: CPT

## 2025-03-11 PROCEDURE — 2500000002 HC RX 250 W HCPCS SELF ADMINISTERED DRUGS (ALT 637 FOR MEDICARE OP, ALT 636 FOR OP/ED): Performed by: INTERNAL MEDICINE

## 2025-03-11 RX ORDER — PHYTONADIONE 5 MG/1
2.5 TABLET ORAL ONCE
Status: COMPLETED | OUTPATIENT
Start: 2025-03-11 | End: 2025-03-11

## 2025-03-11 RX ORDER — POTASSIUM CHLORIDE 1.5 G/1.58G
20 POWDER, FOR SOLUTION ORAL 2 TIMES DAILY
Status: COMPLETED | OUTPATIENT
Start: 2025-03-11 | End: 2025-03-11

## 2025-03-11 RX ADMIN — ESCITALOPRAM OXALATE 20 MG: 10 TABLET ORAL at 09:02

## 2025-03-11 RX ADMIN — IPRATROPIUM BROMIDE AND ALBUTEROL SULFATE 3 ML: .5; 3 SOLUTION RESPIRATORY (INHALATION) at 19:49

## 2025-03-11 RX ADMIN — Medication 3 L/MIN: at 19:49

## 2025-03-11 RX ADMIN — VANCOMYCIN HYDROCHLORIDE 250 MG: 250 CAPSULE ORAL at 17:44

## 2025-03-11 RX ADMIN — LISINOPRIL 20 MG: 20 TABLET ORAL at 09:02

## 2025-03-11 RX ADMIN — VANCOMYCIN HYDROCHLORIDE 250 MG: 250 CAPSULE ORAL at 21:30

## 2025-03-11 RX ADMIN — IPRATROPIUM BROMIDE AND ALBUTEROL SULFATE 3 ML: .5; 3 SOLUTION RESPIRATORY (INHALATION) at 12:18

## 2025-03-11 RX ADMIN — IPRATROPIUM BROMIDE AND ALBUTEROL SULFATE 3 ML: .5; 3 SOLUTION RESPIRATORY (INHALATION) at 06:40

## 2025-03-11 RX ADMIN — PANTOPRAZOLE SODIUM 40 MG: 40 TABLET, DELAYED RELEASE ORAL at 09:03

## 2025-03-11 RX ADMIN — BUDESONIDE 0.5 MG: 0.5 INHALANT ORAL at 06:40

## 2025-03-11 RX ADMIN — Medication 6 L/MIN: at 07:24

## 2025-03-11 RX ADMIN — PHYTONADIONE 2.5 MG: 5 TABLET ORAL at 10:40

## 2025-03-11 RX ADMIN — BUDESONIDE 0.5 MG: 0.5 INHALANT ORAL at 19:49

## 2025-03-11 RX ADMIN — VANCOMYCIN HYDROCHLORIDE 250 MG: 250 CAPSULE ORAL at 06:17

## 2025-03-11 RX ADMIN — POTASSIUM CHLORIDE 20 MEQ: 1.5 POWDER, FOR SOLUTION ORAL at 09:02

## 2025-03-11 RX ADMIN — ASPIRIN 81 MG: 81 TABLET, COATED ORAL at 09:03

## 2025-03-11 RX ADMIN — BUPROPION HYDROCHLORIDE 150 MG: 150 TABLET, EXTENDED RELEASE ORAL at 09:03

## 2025-03-11 RX ADMIN — CARVEDILOL 25 MG: 25 TABLET, FILM COATED ORAL at 21:30

## 2025-03-11 RX ADMIN — POTASSIUM CHLORIDE 20 MEQ: 1.5 POWDER, FOR SOLUTION ORAL at 21:29

## 2025-03-11 RX ADMIN — ATORVASTATIN CALCIUM 80 MG: 80 TABLET, FILM COATED ORAL at 21:29

## 2025-03-11 RX ADMIN — CARVEDILOL 25 MG: 25 TABLET, FILM COATED ORAL at 09:03

## 2025-03-11 ASSESSMENT — COGNITIVE AND FUNCTIONAL STATUS - GENERAL
MOVING FROM LYING ON BACK TO SITTING ON SIDE OF FLAT BED WITH BEDRAILS: A LITTLE
DAILY ACTIVITIY SCORE: 18
DRESSING REGULAR LOWER BODY CLOTHING: A LITTLE
MOBILITY SCORE: 15
PERSONAL GROOMING: A LITTLE
TURNING FROM BACK TO SIDE WHILE IN FLAT BAD: A LITTLE
STANDING UP FROM CHAIR USING ARMS: A LOT
EATING MEALS: A LITTLE
HELP NEEDED FOR BATHING: A LITTLE
CLIMB 3 TO 5 STEPS WITH RAILING: A LOT
MOVING TO AND FROM BED TO CHAIR: A LITTLE
PERSONAL GROOMING: A LITTLE
TOILETING: A LOT
DRESSING REGULAR LOWER BODY CLOTHING: A LITTLE
WALKING IN HOSPITAL ROOM: A LOT
WALKING IN HOSPITAL ROOM: A LOT
STANDING UP FROM CHAIR USING ARMS: A LITTLE
CLIMB 3 TO 5 STEPS WITH RAILING: A LOT
EATING MEALS: A LITTLE
MOBILITY SCORE: 16
TURNING FROM BACK TO SIDE WHILE IN FLAT BAD: A LITTLE
MOVING TO AND FROM BED TO CHAIR: A LITTLE
MOVING FROM LYING ON BACK TO SITTING ON SIDE OF FLAT BED WITH BEDRAILS: A LITTLE
DRESSING REGULAR UPPER BODY CLOTHING: A LITTLE
TOILETING: A LITTLE
DAILY ACTIVITIY SCORE: 17
HELP NEEDED FOR BATHING: A LITTLE
DRESSING REGULAR UPPER BODY CLOTHING: A LITTLE

## 2025-03-11 ASSESSMENT — PAIN SCALES - GENERAL: PAINLEVEL_OUTOF10: 0 - NO PAIN

## 2025-03-11 NOTE — PROGRESS NOTES
Cardiology Progress Note      Kayley Lynch is a 54 y.o. female on day 13 of admission presenting with Nausea vomiting and diarrhea.      Subjective   The patient is feeling fine.  She is having trouble yesterday.  Her son is at the bedside.  She has no chest pain or pressure no shortness of breath.     ROS:  10 systems reviewed other than what is mentioned above.     MEDICATION:    Scheduled medications  aspirin, 81 mg, oral, Daily  atorvastatin, 80 mg, oral, Nightly  budesonide, 0.5 mg, nebulization, BID  buPROPion XL, 150 mg, oral, q AM  carvedilol, 25 mg, oral, BID  escitalopram, 20 mg, oral, Daily  insulin lispro, 0-5 Units, subcutaneous, TID AC  ipratropium-albuteroL, 3 mL, nebulization, TID  lisinopril, 20 mg, oral, Daily  oxygen, , inhalation, Continuous - Inhalation  pantoprazole, 40 mg, oral, Daily  perflutren lipid microspheres, 0.5-10 mL of dilution, intravenous, Once in imaging  perflutren protein A microsphere, 0.5 mL, intravenous, Once in imaging  phytonadione, 2.5 mg, oral, Once  potassium chloride, 20 mEq, oral, BID  sulfur hexafluoride microsphr, 2 mL, intravenous, Once in imaging  vancomycin, 250 mg, oral, 4x daily    Continuous medications  heparin, 0-4,000 Units/hr, Last Rate: Stopped (03/10/25 1237)      PRN medications  PRN medications: acetaminophen, ipratropium-albuteroL, labetaloL, melatonin, [Held by provider] morphine, ondansetron, promethazine     Assessment & Plan  Nausea vomiting and diarrhea    Influenza A with pneumonia    Nontraumatic compartment syndrome of left upper extremity      OBJECTIVE:    Visit Vitals  /60   Pulse 77   Temp 35.7 °C (96.3 °F)   Resp 16        Intake/Output Summary (Last 24 hours) at 3/11/2025 0933  Last data filed at 3/10/2025 2300  Gross per 24 hour   Intake 554 ml   Output 650 ml   Net -96 ml      Patient is alert and oriented x3.  HEENT is unremarkable mucous members are moist  Neck no JVP no bruits upstrokes are full no thyromegaly  Lungs are clear  bilaterally.  No wheezing crackles or rales  Heart regular rhythm tachycardic normal S1-S2 there is no S3 crisp prosthetic mitral valve click  Abdomen is soft bs are positive nontender nondistended no organomegaly no pulsatile masses  Extremities have no edema.  Left arm is bandaged distal pulses present palpable.  Neuro is grossly nonfocal  Skin has no rashes     Image Results  XR chest 1 view  Narrative: Interpreted By:  Peter Sanabria,   STUDY:  XR CHEST 1 VIEW 3/9/2025 7:01 am      INDICATION:  Signs/Symptoms:Daily CXR      COMPARISON:  03/08/2025      ACCESSION NUMBER(S):  [id number]      ORDERING CLINICIAN:  MARIBELL PALOMINO      TECHNIQUE:  Single AP view chest      FINDINGS:  Cardiac silhouette is mildly enlarged. Post CABG changes are  demonstrated. Lung fields are hyperinflated. There are chronic  appearing interstitial changes. Component of left basilar scar  demonstrated. Improved aeration of the right midlung laterally with  mild persistent infiltrate.                  Impression: 1. Mild persistent infiltrate in the right midlung laterally. No  dense airspace consolidation. Emphysematous change noted.      Signed by: Peter Sanabria 3/9/2025 11:54 AM  Dictation workstation:   [id number]  Relevant Results:  RESULTS:    Lab Results   Component Value Date    WBC 9.6 03/11/2025    HGB 8.3 (L) 03/11/2025    HCT 26.9 (L) 03/11/2025    MCV 89 03/11/2025     03/11/2025        Lab Results   Component Value Date    CREATININE 0.25 (L) 03/11/2025    BUN 7 03/11/2025     03/11/2025    K 3.3 (L) 03/11/2025     03/11/2025    CO2 32 03/11/2025        Results from last 7 days   Lab Units 03/11/25  0547   PROTEIN TOTAL g/dL 5.3*   BILIRUBIN TOTAL mg/dL 1.8*   ALK PHOS U/L 43   ALT U/L 14   AST U/L 15   GLUCOSE mg/dL 95       Assessment/Plan      PMH  1.  Mitral regurgitation status post mitral valve replacement with a #25/33 [place] mechanical mitral valve October 2020  2.  Paroxysmal atrial  flutter status post ablation October 2020  3.  CAD.  Bypass x 3 vessels LIMA to the LAD free radial artery graft to the marginal SVG to the RCA in 2020.  Last stress test 4/16/2024 no evidence of ischemia EF 51%  4.  COPD she continues to smoke  5.  Cardiomyopathy postoperative ejection fraction was 40%.  This was up to 50% on her last stress test  6.  Hyperlipidemia  7.  Hypertension  8.  Tobacco abuse  9.  Moderate to severe pulmonary hypertension in the past     3/4/2025  1.  Paroxysmal atrial fibrillation.  Previous history of atrial flutter status post ablation in 2020.  Currently sinus tachycardia.  Increase metoprolol to tartrate to 3 times daily for better rate control.  This was likely physiologically driven because of the stress of the situation.  Anticoagulation will need to be given regardless because of her mechanical mitral valve replacement.  2.  CAD.  Stable.  No symptoms of angina.  3.  Mechanical mitral valve replacement.  This is a difficult situation given her spontaneous left arm hematoma leading to compartment syndrome and iliopsoas spontaneous hematoma on the left side.  Agree with low-dose heparin.  Aspirin currently on hold.  Coumadin will need to be restarted along with an aspirin when stable.  4.  Respiratory failure.  Slow improvement.  Unfortunately she continues to smoke and has underlying lung disease.  5.  Anemia.  She will require transfusion.    3/5/2025    1.  Paroxysmal atrial fibrillation.  She is in a sinus tachycardia today.  She did have an atrial flutter ablation in 2020.  Continue metoprolol for rate control.  IV heparin to be restarted now as she went to the OR yesterday.  Transition to oral anticoagulation once her H&H is stable.  2.  CAD.  Status post CABG x 3 vessels.  Stable.  No symptoms of angina.  3.  Mechanical mitral valve replacement.  Heparin to be restarted today.  Hopefully we can restart Coumadin and baby aspirin if her H&H remained stable.  These were held  because of a spontaneous iliopsoas hematoma and bleed in the arm related to compartment syndrome.  4.  Respiratory failure due to influenza and underlying COPD.  This is improving.    3/6/2025    1.  Paroxysmal A-fib.  Remains in sinus.  Carvedilol started.  Continue IV heparin to ensure H&H remained stable.  Transition back to Coumadin.  Unfortunately, shorter acting agents like a DOAC is not an option given the mechanical mitral valve.  2.  CAD.  Stable.  3.  Mechanical mitral valve replacement.  On heparin.  Will need to have Coumadin and aspirin restarted once bleeding has been stabilized.    3/7/2025  1.  Paroxysmal A-fib.  Remains in sinus.  Doing well with IV heparin which she is on for her mechanical mitral valve also.  Transition to Coumadin tomorrow if her H&H remained stable.  2.  CAD.  Stable.  No symptoms of angina.  Previous bypass x 3 vessels.  Aspirin on hold.  4.  Mechanical mitral valve replacement.  Restart Coumadin tomorrow.  She had spontaneous bleeding in her left arm and her retroperitoneal hematoma with a markedly elevated INR.  Transition to Coumadin from IV heparin likely tomorrow.    3/8/2025  1.  Paroxysmal atrial fibrillation.  Remains in sinus.  Excellent control.  Restart Coumadin today.  She has been on IV heparin.  2.  CAD.  Stable.  No symptoms of angina.  Aspirin is on hold.  3.  Mechanical mitral valve replacement.  Restart Coumadin cautiously.  H&H has been stable on IV heparin.  Spontaneous retroperitoneal and left arm bleeding with a supratherapeutic INR    3/9/2025  1.  Paroxysmal A-fib.  Remains in sinus.  Coumadin started yesterday with 5 mg.  INR today 1.4.  Will dose 4 mg today.  H&H relatively stable.  Continue heparin until therapeutic INR's are achieved.  2.  CAD.  No angina.  Aspirin on hold.  3.  Mechanical mitral valve replacement.  Coumadin restarted.  Continue IV heparin until INR is above 2.  Target INR 2.5-3.5.  Will need to watch H&H for  bleeding.    3/10/2025  1.  Paroxysmal A-fib.  Daniel in sinus.  Coumadin restarted.  INR is up to 2.5.  Heparin stopped.  Will check an INR tomorrow before dosing any Coumadin for fear that the INR may continue climb.  2.  CAD stable  3.  Mechanical mitral valve replacement.  INR is at 2.5.  Target is 2.5-3.5.  No clear evidence of bleeding H&H stable.  Check INR tomorrow prior to dosing Coumadin.  At home, she takes 4 mg on Saturday and Thursday and 2 mg all other days.    3/11/2025  1.  Paroxysmal A-fib.  Remains in sinus.  Heparin stopped.  INR to 5.5 today.  My concern is is going up further.  I will dose vitamin K 2.5 mg x 1 today orally.  No Coumadin should be given until the INR is stabilized.  2.  Mechanical mitral valve replacement.  Again INR is gone from 2.5-5.5.  Vitamin K 2.5 mg to be given orally now.  H&H is stable.  Brittney Lomeli MD

## 2025-03-11 NOTE — PROGRESS NOTES
Kayley Lynch is a 54 y.o. female on day 13 of admission presenting with Nausea vomiting and diarrhea.      Subjective   No events overnight.  Patient seen and examined at bedside with  present.  Hospitalization and treatment plan reviewed with  at length.  Patient has no complaints.       Objective     Last Recorded Vitals  /60   Pulse 77   Temp 35.7 °C (96.3 °F)   Resp 16   Wt 66.5 kg (146 lb 9.7 oz)   SpO2 100%   Intake/Output last 3 Shifts:    Intake/Output Summary (Last 24 hours) at 3/11/2025 0844  Last data filed at 3/10/2025 2300  Gross per 24 hour   Intake 554 ml   Output 650 ml   Net -96 ml       Admission Weight  Weight: 59 kg (130 lb) (02/26/25 0159)    Daily Weight  03/07/25 : 66.5 kg (146 lb 9.7 oz)    Image Results  XR chest 1 view  Narrative: Interpreted By:  Peter Sanabria,   STUDY:  XR CHEST 1 VIEW 3/9/2025 7:01 am      INDICATION:  Signs/Symptoms:Daily CXR      COMPARISON:  03/08/2025      ACCESSION NUMBER(S):  [id number]      ORDERING CLINICIAN:  MARIBELL PALOMINO      TECHNIQUE:  Single AP view chest      FINDINGS:  Cardiac silhouette is mildly enlarged. Post CABG changes are  demonstrated. Lung fields are hyperinflated. There are chronic  appearing interstitial changes. Component of left basilar scar  demonstrated. Improved aeration of the right midlung laterally with  mild persistent infiltrate.                  Impression: 1. Mild persistent infiltrate in the right midlung laterally. No  dense airspace consolidation. Emphysematous change noted.      Signed by: Peter Sanabria 3/9/2025 11:54 AM  Dictation workstation:   [id number]      Physical Exam  HENT:      Head: Normocephalic and atraumatic.      Nose: Nose normal.      Mouth/Throat:      Mouth: Mucous membranes are moist.      Pharynx: Oropharynx is clear.   Eyes:      Extraocular Movements: Extraocular movements intact.      Pupils: Pupils are equal, round, and reactive to light.   Cardiovascular:      Rate  and Rhythm: Normal rate and regular rhythm.   Pulmonary:      Effort: No respiratory distress.      Breath sounds: Wheezing present. No rhonchi or rales.   Abdominal:      General: Bowel sounds are normal. There is no distension.      Palpations: Abdomen is soft.      Tenderness: There is no abdominal tenderness. There is no guarding.   Musculoskeletal:      Right lower leg: No edema.      Left lower leg: No edema.   Skin:     General: Skin is warm and dry.   Neurological:      Mental Status: She is alert. Mental status is at baseline.         Relevant Results               Assessment/Plan   This patient currently has cardiac telemetry ordered; if you would like to modify or discontinue the telemetry order, click here to go to the orders activity to modify/discontinue the order.      This patient has a urinary catheter   Reason for the urinary catheter remaining today? critically ill patient who need accurate urinary output measurements    Assessment & Plan  Nausea vomiting and diarrhea    Influenza A with pneumonia      Septic shock, dehydration, hypercapnic respiratory failure, and dehydration.  Nontraumatic compartment syndrome of left upper extremity      Patient is a 54-year-old female with past medical history of COPD, CAD status post CABG, pulmonary hypertension, hyperlipidemia, cardiomyopathy, a flutter status post ablation, and mitral valve replacement who presented with malaise.  She was found to be in septic shock and admitted to the ICU.    Septic shock  Hypercapnic respiratory failure  COPD exacerbation  Influenza A  Pneumonia  Acute metabolic encephalopathy  C. difficile colitis  Dehydration  HILLARY    -Continue supplemental oxygen  -Continue antibiotics, currently on doxycycline  -Continue nebulizers  -Continue steroids  -Continue Tamiflu  -Continue p.o. vancomycin  -Continue home meds  -Monitor INR    3/2: Patient developed a large hematoma in the left arm leading to compartment syndrome. She was taken  for urgent fasciotomies with Orthopedic Surgery. She is also being followed by Vascular Surgery. Vitamin K ordered for supratherapeutic INR. Her INR was 2.2 yesterday with goal being 2.5-3.5 for her A-flutter s/p mitral valve replacement. Critical Care reconsulted. Hematology consulted, appreciate recs. Hold coumadin and lovenox.    3/3: INR reversed. Patient is on low intensity heparin drip. She was transfused 1 unit pRBCs. Monitor hemoglobin closely. Hematology has ordered a work up including DIC panel. Patient will need another debridement and irrigation in 24-48 hours per Orthopedics. She has also developed a retroperitoneal bleed.    3/4: INR is down to 1.0.  Leukocytosis improved from 15 down to 13.  Hemoglobin is down to 6.4.  Patient to be transfused another unit packed RBCs.  She will be going back to the OR today for further debridement, washout, possible closure. She will also have a CT angiogram to determine if there is any active bleeding causing her retroperitoneal bleed.  She remains on a heparin drip due to her valve.  Continue doxycycline and p.o. Vanco.  Patient fully evaluated on March 5.  Patient restarted on heparin.  Fasciotomy site for compartment syndrome appears clear.  Continue to monitor lab work.  Case discussed with  at bedside.  No active bleeding is noted at this time.  Recheck labs in AM.       Patient fully evaluated  03/07  for    Problem List Items Addressed This Visit          Gastrointestinal and Abdominal    * (Principal) Nausea vomiting and diarrhea       Musculoskeletal and Injuries    Nontraumatic compartment syndrome of left upper extremity    Relevant Orders    Case Request Operating Room: FASCIOTOMY, UPPER EXTREMITY (Completed)    Case Request Operating Room: DEBRIDEMENT, WOUND, UPPER EXTREMITY (Completed)       Pulmonary and Pneumonias    Influenza A with pneumonia - Primary     Other Visit Diagnoses       Acute kidney injury (CMS-[facility])        Dehydration         Influenza A        Shock (Multi)        Relevant Orders    Transthoracic Echo (TTE) Complete (Completed)          Patient seen resting in bed with head of bed elevated, no s/s or c/o acute difficulties at this time.  Vital signs for last 24 hours Temp:  [35.4 °C (95.7 °F)-35.9 °C (96.6 °F)] 35.7 °C (96.3 °F)  Heart Rate:  [72-88] 77  Resp:  [16-18] 16  BP: (117-152)/(59-88) 129/60  FiO2 (%):  [28 %-36 %] 28 %    No intake/output data recorded.  Patient still requiring frequent cardiac and SPO2 monitoring. Continue aggressive pulmonary hygiene and oral hygiene. Off loading as tolerated for skin integrity. Medications and labs reviewed-   Results for orders placed or performed during the hospital encounter of 02/25/25 (from the past 24 hours)   POCT GLUCOSE   Result Value Ref Range    POCT Glucose 130 (H) 74 - 99 mg/dL   POCT GLUCOSE   Result Value Ref Range    POCT Glucose 164 (H) 74 - 99 mg/dL   POCT GLUCOSE   Result Value Ref Range    POCT Glucose 131 (H) 74 - 99 mg/dL   CBC and Auto Differential   Result Value Ref Range    WBC 9.6 4.4 - 11.3 x10*3/uL    nRBC 0.0 0.0 - 0.0 /100 WBCs    RBC 3.02 (L) 4.00 - 5.20 x10*6/uL    Hemoglobin 8.3 (L) 12.0 - 16.0 g/dL    Hematocrit 26.9 (L) 36.0 - 46.0 %    MCV 89 80 - 100 fL    MCH 27.5 26.0 - 34.0 pg    MCHC 30.9 (L) 32.0 - 36.0 g/dL    RDW 19.0 (H) 11.5 - 14.5 %    Platelets 233 150 - 450 x10*3/uL    Neutrophils % 81.8 40.0 - 80.0 %    Immature Granulocytes %, Automated 0.6 0.0 - 0.9 %    Lymphocytes % 9.7 13.0 - 44.0 %    Monocytes % 7.8 2.0 - 10.0 %    Eosinophils % 0.0 0.0 - 6.0 %    Basophils % 0.1 0.0 - 2.0 %    Neutrophils Absolute 7.87 (H) 1.20 - 7.70 x10*3/uL    Immature Granulocytes Absolute, Automated 0.06 0.00 - 0.70 x10*3/uL    Lymphocytes Absolute 0.93 (L) 1.20 - 4.80 x10*3/uL    Monocytes Absolute 0.75 0.10 - 1.00 x10*3/uL    Eosinophils Absolute 0.00 0.00 - 0.70 x10*3/uL    Basophils Absolute 0.01 0.00 - 0.10 x10*3/uL   Comprehensive Metabolic Panel   Result  Value Ref Range    Glucose 95 74 - 99 mg/dL    Sodium 138 136 - 145 mmol/L    Potassium 3.3 (L) 3.5 - 5.3 mmol/L    Chloride 100 98 - 107 mmol/L    Bicarbonate 32 21 - 32 mmol/L    Anion Gap 9 (L) 10 - 20 mmol/L    Urea Nitrogen 7 6 - 23 mg/dL    Creatinine 0.25 (L) 0.50 - 1.05 mg/dL    eGFR >90 >60 mL/min/1.73m*2    Calcium 8.5 (L) 8.6 - 10.3 mg/dL    Albumin 2.7 (L) 3.4 - 5.0 g/dL    Alkaline Phosphatase 43 33 - 110 U/L    Total Protein 5.3 (L) 6.4 - 8.2 g/dL    AST 15 9 - 39 U/L    Bilirubin, Total 1.8 (H) 0.0 - 1.2 mg/dL    ALT 14 7 - 45 U/L   Protime-INR   Result Value Ref Range    Protime 61.3 (HH) 9.8 - 12.4 seconds    INR 5.5 (HH) 0.9 - 1.1   POCT GLUCOSE   Result Value Ref Range    POCT Glucose 100 (H) 74 - 99 mg/dL     *Note: Due to a large number of results and/or encounters for the requested time period, some results have not been displayed. A complete set of results can be found in Results Review.      Patient recently received an antibiotic (last 12 hours)       Date/Time Action Medication Dose    03/07/25 1252 Given    vancomycin (Firvanq) solution 250 mg 250 mg    03/07/25 0602 Given    vancomycin (Firvanq) solution 250 mg 250 mg           Plan discussed with interdisciplinary team, seen by orthopedics today - dressing changes as needed,   suture removal on March 18, recommended shoulder elbow and hand range of motion with therapy ordered. Will continue current and repeat labs in the AM. CXR shows improved infiltrate at the left lower lung, strict intake and output, trend hemoglobin.    Discharge planning discussed with patient and care team. Therapy evaluations ordered. [facility] -11 anticipate C/SNF at discharge. Patient aware and agreeable to current plan, continue plan as above.     I spent a total of 50 minutes on the date of the service which included preparing to see the patient, face-to-face patient care, completing clinical documentation, obtaining and/or reviewing separately obtained history,  performing a medically appropriate examination, counseling and educating the patient/family/caregiver, ordering medications, tests, or procedures, communicating with other HCPs (not separately reported), independently interpreting results (not separately reported), communicating results to the patient/family/caregiver, and care coordination (not separately reported).        Patient fully evaluated  03/08  for    Problem List Items Addressed This Visit          Gastrointestinal and Abdominal    * (Principal) Nausea vomiting and diarrhea       Musculoskeletal and Injuries    Nontraumatic compartment syndrome of left upper extremity    Relevant Orders    Case Request Operating Room: FASCIOTOMY, UPPER EXTREMITY (Completed)    Case Request Operating Room: DEBRIDEMENT, WOUND, UPPER EXTREMITY (Completed)       Pulmonary and Pneumonias    Influenza A with pneumonia - Primary     Other Visit Diagnoses       Acute kidney injury (CMS-HCC)        Dehydration        Influenza A        Shock (Multi)        Relevant Orders    Transthoracic Echo (TTE) Complete (Completed)          Patient seen resting in bed with head of bed elevated, no s/s or c/o acute difficulties at this time.  Vital signs for last 24 hours Temp:  [35.4 °C (95.7 °F)-35.9 °C (96.6 °F)] 35.7 °C (96.3 °F)  Heart Rate:  [72-88] 77  Resp:  [16-18] 16  BP: (117-152)/(59-88) 129/60  FiO2 (%):  [28 %-36 %] 28 %    No intake/output data recorded.  Patient still requiring frequent cardiac and SPO2 monitoring. Continue aggressive pulmonary hygiene and oral hygiene. Off loading as tolerated for skin integrity. Medications and labs reviewed-   Results for orders placed or performed during the hospital encounter of 02/25/25 (from the past 24 hours)   POCT GLUCOSE   Result Value Ref Range    POCT Glucose 130 (H) 74 - 99 mg/dL   POCT GLUCOSE   Result Value Ref Range    POCT Glucose 164 (H) 74 - 99 mg/dL   POCT GLUCOSE   Result Value Ref Range    POCT Glucose 131 (H) 74 - 99  mg/dL   CBC and Auto Differential   Result Value Ref Range    WBC 9.6 4.4 - 11.3 x10*3/uL    nRBC 0.0 0.0 - 0.0 /100 WBCs    RBC 3.02 (L) 4.00 - 5.20 x10*6/uL    Hemoglobin 8.3 (L) 12.0 - 16.0 g/dL    Hematocrit 26.9 (L) 36.0 - 46.0 %    MCV 89 80 - 100 fL    MCH 27.5 26.0 - 34.0 pg    MCHC 30.9 (L) 32.0 - 36.0 g/dL    RDW 19.0 (H) 11.5 - 14.5 %    Platelets 233 150 - 450 x10*3/uL    Neutrophils % 81.8 40.0 - 80.0 %    Immature Granulocytes %, Automated 0.6 0.0 - 0.9 %    Lymphocytes % 9.7 13.0 - 44.0 %    Monocytes % 7.8 2.0 - 10.0 %    Eosinophils % 0.0 0.0 - 6.0 %    Basophils % 0.1 0.0 - 2.0 %    Neutrophils Absolute 7.87 (H) 1.20 - 7.70 x10*3/uL    Immature Granulocytes Absolute, Automated 0.06 0.00 - 0.70 x10*3/uL    Lymphocytes Absolute 0.93 (L) 1.20 - 4.80 x10*3/uL    Monocytes Absolute 0.75 0.10 - 1.00 x10*3/uL    Eosinophils Absolute 0.00 0.00 - 0.70 x10*3/uL    Basophils Absolute 0.01 0.00 - 0.10 x10*3/uL   Comprehensive Metabolic Panel   Result Value Ref Range    Glucose 95 74 - 99 mg/dL    Sodium 138 136 - 145 mmol/L    Potassium 3.3 (L) 3.5 - 5.3 mmol/L    Chloride 100 98 - 107 mmol/L    Bicarbonate 32 21 - 32 mmol/L    Anion Gap 9 (L) 10 - 20 mmol/L    Urea Nitrogen 7 6 - 23 mg/dL    Creatinine 0.25 (L) 0.50 - 1.05 mg/dL    eGFR >90 >60 mL/min/1.73m*2    Calcium 8.5 (L) 8.6 - 10.3 mg/dL    Albumin 2.7 (L) 3.4 - 5.0 g/dL    Alkaline Phosphatase 43 33 - 110 U/L    Total Protein 5.3 (L) 6.4 - 8.2 g/dL    AST 15 9 - 39 U/L    Bilirubin, Total 1.8 (H) 0.0 - 1.2 mg/dL    ALT 14 7 - 45 U/L   Protime-INR   Result Value Ref Range    Protime 61.3 (HH) 9.8 - 12.4 seconds    INR 5.5 (HH) 0.9 - 1.1   POCT GLUCOSE   Result Value Ref Range    POCT Glucose 100 (H) 74 - 99 mg/dL      Patient recently received an antibiotic (last 12 hours)       Date/Time Action Medication Dose    03/08/25 1220 Given    vancomycin (Vancocin) capsule 250 mg 250 mg           Plan discussed with interdisciplinary team, seen by cardiology  today, will bridge patient to coumadin, ptt/inr in am, appreciate input and agree with plan. JESSICA dressing is clean, dry and intact, swelling is noted, warm and well-perfused.  She is able to flex and extend the fingers and thumb. Will continue current and repeat labs in the AM.    Patient fully evaluated on March 9 and clinically improved.  Recheck labs in AM.  INR is slowly improving.  Discontinue heparin when INR is therapeutic for mechanical valve.  Appreciate orthopedic and cardiology consultations.  Examination of area from compartment syndrome is clear without neurovascular compromise.  Case discussed with orthopedics at bedside    Discharge planning discussed with patient and care team. Patient aware and agreeable to current plan, continue plan as above.     I spent a total of 50 minutes on the date of the service which included preparing to see the patient, face-to-face patient care, completing clinical documentation, obtaining and/or reviewing separately obtained history, performing a medically appropriate examination, counseling and educating the patient/family/caregiver, ordering medications, tests, or procedures, communicating with other HCPs (not separately reported), independently interpreting results (not separately reported), communicating results to the patient/family/caregiver, and care coordination (not separately reported).     Patient fully evaluated    for    Problem List Items Addressed This Visit          Gastrointestinal and Abdominal    * (Principal) Nausea vomiting and diarrhea       Musculoskeletal and Injuries    Nontraumatic compartment syndrome of left upper extremity    Relevant Orders    Case Request Operating Room: FASCIOTOMY, UPPER EXTREMITY (Completed)    Case Request Operating Room: DEBRIDEMENT, WOUND, UPPER EXTREMITY (Completed)       Pulmonary and Pneumonias    Influenza A with pneumonia - Primary     Other Visit Diagnoses       Acute kidney injury (CMS-HCC)        Dehydration         Influenza A        Shock (Multi)        Relevant Orders    Transthoracic Echo (TTE) Complete (Completed)          Patient seen resting in bed with head of bed elevated, no s/s or c/o acute difficulties at this time.  Vital signs for last 24 hours Temp:  [35.4 °C (95.7 °F)-35.9 °C (96.6 °F)] 35.7 °C (96.3 °F)  Heart Rate:  [72-88] 77  Resp:  [16-18] 16  BP: (117-152)/(59-88) 129/60  FiO2 (%):  [28 %-36 %] 28 %    No intake/output data recorded.  Patient still requiring frequent cardiac and SPO2 monitoring. Continue aggressive pulmonary hygiene and oral hygiene. Off loading as tolerated for skin integrity. Medications and labs reviewed-   Results for orders placed or performed during the hospital encounter of 02/25/25 (from the past 24 hours)   POCT GLUCOSE   Result Value Ref Range    POCT Glucose 130 (H) 74 - 99 mg/dL   POCT GLUCOSE   Result Value Ref Range    POCT Glucose 164 (H) 74 - 99 mg/dL   POCT GLUCOSE   Result Value Ref Range    POCT Glucose 131 (H) 74 - 99 mg/dL   CBC and Auto Differential   Result Value Ref Range    WBC 9.6 4.4 - 11.3 x10*3/uL    nRBC 0.0 0.0 - 0.0 /100 WBCs    RBC 3.02 (L) 4.00 - 5.20 x10*6/uL    Hemoglobin 8.3 (L) 12.0 - 16.0 g/dL    Hematocrit 26.9 (L) 36.0 - 46.0 %    MCV 89 80 - 100 fL    MCH 27.5 26.0 - 34.0 pg    MCHC 30.9 (L) 32.0 - 36.0 g/dL    RDW 19.0 (H) 11.5 - 14.5 %    Platelets 233 150 - 450 x10*3/uL    Neutrophils % 81.8 40.0 - 80.0 %    Immature Granulocytes %, Automated 0.6 0.0 - 0.9 %    Lymphocytes % 9.7 13.0 - 44.0 %    Monocytes % 7.8 2.0 - 10.0 %    Eosinophils % 0.0 0.0 - 6.0 %    Basophils % 0.1 0.0 - 2.0 %    Neutrophils Absolute 7.87 (H) 1.20 - 7.70 x10*3/uL    Immature Granulocytes Absolute, Automated 0.06 0.00 - 0.70 x10*3/uL    Lymphocytes Absolute 0.93 (L) 1.20 - 4.80 x10*3/uL    Monocytes Absolute 0.75 0.10 - 1.00 x10*3/uL    Eosinophils Absolute 0.00 0.00 - 0.70 x10*3/uL    Basophils Absolute 0.01 0.00 - 0.10 x10*3/uL   Comprehensive Metabolic Panel    Result Value Ref Range    Glucose 95 74 - 99 mg/dL    Sodium 138 136 - 145 mmol/L    Potassium 3.3 (L) 3.5 - 5.3 mmol/L    Chloride 100 98 - 107 mmol/L    Bicarbonate 32 21 - 32 mmol/L    Anion Gap 9 (L) 10 - 20 mmol/L    Urea Nitrogen 7 6 - 23 mg/dL    Creatinine 0.25 (L) 0.50 - 1.05 mg/dL    eGFR >90 >60 mL/min/1.73m*2    Calcium 8.5 (L) 8.6 - 10.3 mg/dL    Albumin 2.7 (L) 3.4 - 5.0 g/dL    Alkaline Phosphatase 43 33 - 110 U/L    Total Protein 5.3 (L) 6.4 - 8.2 g/dL    AST 15 9 - 39 U/L    Bilirubin, Total 1.8 (H) 0.0 - 1.2 mg/dL    ALT 14 7 - 45 U/L   Protime-INR   Result Value Ref Range    Protime 61.3 (HH) 9.8 - 12.4 seconds    INR 5.5 (HH) 0.9 - 1.1   POCT GLUCOSE   Result Value Ref Range    POCT Glucose 100 (H) 74 - 99 mg/dL      Patient recently received an antibiotic (last 12 hours)       Date/Time Action Medication Dose    03/11/25 0617 Given    vancomycin (Vancocin) capsule 250 mg 250 mg    03/10/25 2140 Given    vancomycin (Vancocin) capsule 250 mg 250 mg           Pt fully evaluated 3/11. PT recommended high intensity level of clinical care. Potassium is 3.3 and gave Kcl and INR is 5.5 out of therapeutic range and protime is 61.3. Pt on 2L NC today. Plan discussed with interdisciplinary team, continue current and repeat labs in the AM.     Discharge planning discussed with patient and care team. Therapy evaluations ordered. [facility]-  , anticipate HHC/SNF at discharge. Patient aware and agreeable to current plan, continue plan as above.     I spent a total of 50 minutes on the date of the service which included preparing to see the patient, face-to-face patient care, completing clinical documentation, obtaining and/or reviewing separately obtained history, performing a medically appropriate examination, counseling and educating the patient/family/caregiver, ordering medications, tests, or procedures, communicating with other HCPs (not separately reported), independently interpreting results (not  separately reported), communicating results to the patient/family/caregiver, and care coordination (not separately reported).   SADA CHAN

## 2025-03-11 NOTE — PROGRESS NOTES
Cardiology Progress Note      Kayley Lynch is a 54 y.o. female on day 12 of admission presenting with Nausea vomiting and diarrhea.      Subjective   Feeling well.  Denies chest pain or pressure.  No shortness of breath.     ROS:  10 systems reviewed other than what is mentioned above.     MEDICATION:    Scheduled medications  aspirin, 81 mg, oral, Daily  atorvastatin, 80 mg, oral, Nightly  budesonide, 0.5 mg, nebulization, BID  buPROPion XL, 150 mg, oral, q AM  carvedilol, 25 mg, oral, BID  escitalopram, 20 mg, oral, Daily  insulin lispro, 0-5 Units, subcutaneous, TID AC  ipratropium-albuteroL, 3 mL, nebulization, TID  lisinopril, 20 mg, oral, Daily  oxygen, , inhalation, Continuous - Inhalation  pantoprazole, 40 mg, oral, Daily  perflutren lipid microspheres, 0.5-10 mL of dilution, intravenous, Once in imaging  perflutren protein A microsphere, 0.5 mL, intravenous, Once in imaging  sulfur hexafluoride microsphr, 2 mL, intravenous, Once in imaging  vancomycin, 250 mg, oral, 4x daily    Continuous medications  heparin, 0-4,000 Units/hr, Last Rate: Stopped (03/10/25 1237)      PRN medications  PRN medications: acetaminophen, ipratropium-albuteroL, labetaloL, melatonin, [Held by provider] morphine, ondansetron, promethazine     Assessment & Plan  Nausea vomiting and diarrhea    Influenza A with pneumonia    Nontraumatic compartment syndrome of left upper extremity      OBJECTIVE:    Visit Vitals  /81   Pulse 83   Temp 35.4 °C (95.7 °F)   Resp 16        Intake/Output Summary (Last 24 hours) at 3/10/2025 2132  Last data filed at 3/10/2025 1828  Gross per 24 hour   Intake 354 ml   Output 1800 ml   Net -1446 ml      Patient is alert and oriented x3.  HEENT is unremarkable mucous members are moist  Neck no JVP no bruits upstrokes are full no thyromegaly  Lungs are clear bilaterally.  No wheezing crackles or rales  Heart regular rhythm tachycardic normal S1-S2 there is no S3 crisp prosthetic mitral valve  click  Abdomen is soft bs are positive nontender nondistended no organomegaly no pulsatile masses  Extremities have no edema.  Left arm is bandaged distal pulses present palpable.  Neuro is grossly nonfocal  Skin has no rashes     Image Results  XR chest 1 view  Narrative: Interpreted By:  Peter Sanabria,   STUDY:  XR CHEST 1 VIEW 3/9/2025 7:01 am      INDICATION:  Signs/Symptoms:Daily CXR      COMPARISON:  03/08/2025      ACCESSION NUMBER(S):  [id number]      ORDERING CLINICIAN:  MARIBELL PALOMINO      TECHNIQUE:  Single AP view chest      FINDINGS:  Cardiac silhouette is mildly enlarged. Post CABG changes are  demonstrated. Lung fields are hyperinflated. There are chronic  appearing interstitial changes. Component of left basilar scar  demonstrated. Improved aeration of the right midlung laterally with  mild persistent infiltrate.                  Impression: 1. Mild persistent infiltrate in the right midlung laterally. No  dense airspace consolidation. Emphysematous change noted.      Signed by: Peter Sanabria 3/9/2025 11:54 AM  Dictation workstation:   [id number]  Relevant Results:  RESULTS:    Lab Results   Component Value Date    WBC 10.4 03/10/2025    HGB 8.1 (L) 03/10/2025    HCT 26.9 (L) 03/10/2025    MCV 90 03/10/2025     03/10/2025        Lab Results   Component Value Date    CREATININE 0.28 (L) 03/10/2025    BUN 7 03/10/2025     03/10/2025    K 3.7 03/10/2025     03/10/2025    CO2 33 (H) 03/10/2025        Results from last 7 days   Lab Units 03/10/25  0550   PROTEIN TOTAL g/dL 5.2*   BILIRUBIN TOTAL mg/dL 1.6*   ALK PHOS U/L 43   ALT U/L 11   AST U/L 16   GLUCOSE mg/dL 88       Assessment/Plan      PMH  1.  Mitral regurgitation status post mitral valve replacement with a #25/33 [place] mechanical mitral valve October 2020  2.  Paroxysmal atrial flutter status post ablation October 2020  3.  CAD.  Bypass x 3 vessels LIMA to the LAD free radial artery graft to the marginal SVG to the  RCA in 2020.  Last stress test 4/16/2024 no evidence of ischemia EF 51%  4.  COPD she continues to smoke  5.  Cardiomyopathy postoperative ejection fraction was 40%.  This was up to 50% on her last stress test  6.  Hyperlipidemia  7.  Hypertension  8.  Tobacco abuse  9.  Moderate to severe pulmonary hypertension in the past     3/4/2025  1.  Paroxysmal atrial fibrillation.  Previous history of atrial flutter status post ablation in 2020.  Currently sinus tachycardia.  Increase metoprolol to tartrate to 3 times daily for better rate control.  This was likely physiologically driven because of the stress of the situation.  Anticoagulation will need to be given regardless because of her mechanical mitral valve replacement.  2.  CAD.  Stable.  No symptoms of angina.  3.  Mechanical mitral valve replacement.  This is a difficult situation given her spontaneous left arm hematoma leading to compartment syndrome and iliopsoas spontaneous hematoma on the left side.  Agree with low-dose heparin.  Aspirin currently on hold.  Coumadin will need to be restarted along with an aspirin when stable.  4.  Respiratory failure.  Slow improvement.  Unfortunately she continues to smoke and has underlying lung disease.  5.  Anemia.  She will require transfusion.    3/5/2025    1.  Paroxysmal atrial fibrillation.  She is in a sinus tachycardia today.  She did have an atrial flutter ablation in 2020.  Continue metoprolol for rate control.  IV heparin to be restarted now as she went to the OR yesterday.  Transition to oral anticoagulation once her H&H is stable.  2.  CAD.  Status post CABG x 3 vessels.  Stable.  No symptoms of angina.  3.  Mechanical mitral valve replacement.  Heparin to be restarted today.  Hopefully we can restart Coumadin and baby aspirin if her H&H remained stable.  These were held because of a spontaneous iliopsoas hematoma and bleed in the arm related to compartment syndrome.  4.  Respiratory failure due to influenza  and underlying COPD.  This is improving.    3/6/2025    1.  Paroxysmal A-fib.  Remains in sinus.  Carvedilol started.  Continue IV heparin to ensure H&H remained stable.  Transition back to Coumadin.  Unfortunately, shorter acting agents like a DOAC is not an option given the mechanical mitral valve.  2.  CAD.  Stable.  3.  Mechanical mitral valve replacement.  On heparin.  Will need to have Coumadin and aspirin restarted once bleeding has been stabilized.    3/7/2025  1.  Paroxysmal A-fib.  Remains in sinus.  Doing well with IV heparin which she is on for her mechanical mitral valve also.  Transition to Coumadin tomorrow if her H&H remained stable.  2.  CAD.  Stable.  No symptoms of angina.  Previous bypass x 3 vessels.  Aspirin on hold.  4.  Mechanical mitral valve replacement.  Restart Coumadin tomorrow.  She had spontaneous bleeding in her left arm and her retroperitoneal hematoma with a markedly elevated INR.  Transition to Coumadin from IV heparin likely tomorrow.    3/8/2025  1.  Paroxysmal atrial fibrillation.  Remains in sinus.  Excellent control.  Restart Coumadin today.  She has been on IV heparin.  2.  CAD.  Stable.  No symptoms of angina.  Aspirin is on hold.  3.  Mechanical mitral valve replacement.  Restart Coumadin cautiously.  H&H has been stable on IV heparin.  Spontaneous retroperitoneal and left arm bleeding with a supratherapeutic INR    3/9/2025  1.  Paroxysmal A-fib.  Remains in sinus.  Coumadin started yesterday with 5 mg.  INR today 1.4.  Will dose 4 mg today.  H&H relatively stable.  Continue heparin until therapeutic INR's are achieved.  2.  CAD.  No angina.  Aspirin on hold.  3.  Mechanical mitral valve replacement.  Coumadin restarted.  Continue IV heparin until INR is above 2.  Target INR 2.5-3.5.  Will need to watch H&H for bleeding.    3/10/2025  1.  Paroxysmal A-fib.  Daniel in sinus.  Coumadin restarted.  INR is up to 2.5.  Heparin stopped.  Will check an INR tomorrow before dosing  any Coumadin for fear that the INR may continue climb.  2.  CAD stable  3.  Mechanical mitral valve replacement.  INR is at 2.5.  Target is 2.5-3.5.  No clear evidence of bleeding H&H stable.  Check INR tomorrow prior to dosing Coumadin.  At home, she takes 4 mg on Saturday and Thursday and 2 mg all other days.  Brittney Lomeli MD

## 2025-03-11 NOTE — CARE PLAN
The patient's goals for the shift include      The clinical goals for the shift include Participate in POC      Problem: Pain - Adult  Goal: Verbalizes/displays adequate comfort level or baseline comfort level  Outcome: Progressing     Problem: Safety - Adult  Goal: Free from fall injury  Outcome: Progressing     Problem: Discharge Planning  Goal: Discharge to home or other facility with appropriate resources  Outcome: Progressing     Problem: Chronic Conditions and Co-morbidities  Goal: Patient's chronic conditions and co-morbidity symptoms are monitored and maintained or improved  Outcome: Progressing     Problem: Nutrition  Goal: Nutrient intake appropriate for maintaining nutritional needs  Outcome: Progressing     Problem: Fall/Injury  Goal: Not fall by end of shift  Outcome: Progressing  Goal: Be free from injury by end of the shift  Outcome: Progressing  Goal: Verbalize understanding of personal risk factors for fall in the hospital  Outcome: Progressing  Goal: Verbalize understanding of risk factor reduction measures to prevent injury from fall in the home  Outcome: Progressing  Goal: Use assistive devices by end of the shift  Outcome: Progressing  Goal: Pace activities to prevent fatigue by end of the shift  Outcome: Progressing     Problem: Respiratory  Goal: Clear secretions with interventions this shift  Outcome: Progressing  Goal: Minimize anxiety/maximize coping throughout shift  Outcome: Progressing  Goal: Minimal/no exertional discomfort or dyspnea this shift  Outcome: Progressing  Goal: No signs of respiratory distress (eg. Use of accessory muscles. Peds grunting)  Outcome: Progressing  Goal: Patent airway maintained this shift  Outcome: Progressing  Goal: Verbalize decreased shortness of breath this shift  Outcome: Progressing  Goal: Wean oxygen to maintain O2 saturation per order/standard this shift  Outcome: Progressing  Goal: Increase self care and/or family involvement in next 24  hours  Outcome: Progressing     Problem: Skin  Goal: Participates in plan/prevention/treatment measures  Outcome: Progressing  Goal: Prevent/manage excess moisture  Outcome: Progressing  Goal: Prevent/minimize sheer/friction injuries  Outcome: Progressing  Goal: Promote/optimize nutrition  Outcome: Progressing  Goal: Promote skin healing  Outcome: Progressing     Problem: Pain  Goal: Takes deep breaths with improved pain control throughout the shift  Outcome: Progressing  Goal: Turns in bed with improved pain control throughout the shift  Outcome: Progressing  Goal: Walks with improved pain control throughout the shift  Outcome: Progressing  Goal: Performs ADL's with improved pain control throughout shift  Outcome: Progressing  Goal: Participates in PT with improved pain control throughout the shift  Outcome: Progressing  Goal: Free from opioid side effects throughout the shift  Outcome: Progressing  Goal: Free from acute confusion related to pain meds throughout the shift  Outcome: Progressing

## 2025-03-11 NOTE — PROGRESS NOTES
[facility] PT: 11 OT: 15, with care team recommendations for high intensity therapy. Met with patient at bedside to follow up on discharge plan. Discussed acute rehab with patient at discharge, patient states she will discuss with her . Care transitions team will continue to follow.     Addendum 1454: Met with patient at bedside to follow up on discharge plan. Patient states she plans to discuss acute rehab with her  this evening. Patient has declined SNF. Discussed home health care with patient  and provided patient with home health care choice list. Placed request for PT/OT to work with patient tomorrow.

## 2025-03-12 LAB
ALBUMIN SERPL BCP-MCNC: 2.8 G/DL (ref 3.4–5)
ALP SERPL-CCNC: 47 U/L (ref 33–110)
ALT SERPL W P-5'-P-CCNC: 14 U/L (ref 7–45)
ANION GAP SERPL CALC-SCNC: 10 MMOL/L (ref 10–20)
AST SERPL W P-5'-P-CCNC: 14 U/L (ref 9–39)
BASOPHILS # BLD AUTO: 0.01 X10*3/UL (ref 0–0.1)
BASOPHILS NFR BLD AUTO: 0.1 %
BILIRUB SERPL-MCNC: 2 MG/DL (ref 0–1.2)
BUN SERPL-MCNC: 6 MG/DL (ref 6–23)
CALCIUM SERPL-MCNC: 8.6 MG/DL (ref 8.6–10.3)
CHLORIDE SERPL-SCNC: 101 MMOL/L (ref 98–107)
CO2 SERPL-SCNC: 31 MMOL/L (ref 21–32)
CREAT SERPL-MCNC: 0.31 MG/DL (ref 0.5–1.05)
EGFRCR SERPLBLD CKD-EPI 2021: >90 ML/MIN/1.73M*2
EOSINOPHIL # BLD AUTO: 0 X10*3/UL (ref 0–0.7)
EOSINOPHIL NFR BLD AUTO: 0 %
ERYTHROCYTE [DISTWIDTH] IN BLOOD BY AUTOMATED COUNT: 19.7 % (ref 11.5–14.5)
GLUCOSE BLD MANUAL STRIP-MCNC: 101 MG/DL (ref 74–99)
GLUCOSE BLD MANUAL STRIP-MCNC: 136 MG/DL (ref 74–99)
GLUCOSE BLD MANUAL STRIP-MCNC: 144 MG/DL (ref 74–99)
GLUCOSE BLD MANUAL STRIP-MCNC: 172 MG/DL (ref 74–99)
GLUCOSE SERPL-MCNC: 94 MG/DL (ref 74–99)
HCT VFR BLD AUTO: 28.6 % (ref 36–46)
HGB BLD-MCNC: 8.6 G/DL (ref 12–16)
IMM GRANULOCYTES # BLD AUTO: 0.04 X10*3/UL (ref 0–0.7)
IMM GRANULOCYTES NFR BLD AUTO: 0.5 % (ref 0–0.9)
INR PPP: 1.7 (ref 0.9–1.1)
INR PPP: 1.8 (ref 0.9–1.1)
LYMPHOCYTES # BLD AUTO: 0.82 X10*3/UL (ref 1.2–4.8)
LYMPHOCYTES NFR BLD AUTO: 9.8 %
MCH RBC QN AUTO: 27.1 PG (ref 26–34)
MCHC RBC AUTO-ENTMCNC: 30.1 G/DL (ref 32–36)
MCV RBC AUTO: 90 FL (ref 80–100)
MONOCYTES # BLD AUTO: 0.71 X10*3/UL (ref 0.1–1)
MONOCYTES NFR BLD AUTO: 8.5 %
NEUTROPHILS # BLD AUTO: 6.8 X10*3/UL (ref 1.2–7.7)
NEUTROPHILS NFR BLD AUTO: 81.1 %
NRBC BLD-RTO: 0 /100 WBCS (ref 0–0)
PLATELET # BLD AUTO: 259 X10*3/UL (ref 150–450)
POTASSIUM SERPL-SCNC: 3.5 MMOL/L (ref 3.5–5.3)
PROT SERPL-MCNC: 5.6 G/DL (ref 6.4–8.2)
PROTHROMBIN TIME: 19.1 SECONDS (ref 9.8–12.4)
PROTHROMBIN TIME: 19.4 SECONDS (ref 9.8–12.4)
RBC # BLD AUTO: 3.17 X10*6/UL (ref 4–5.2)
SODIUM SERPL-SCNC: 138 MMOL/L (ref 136–145)
WBC # BLD AUTO: 8.4 X10*3/UL (ref 4.4–11.3)

## 2025-03-12 PROCEDURE — 97535 SELF CARE MNGMENT TRAINING: CPT | Mod: CO,GO

## 2025-03-12 PROCEDURE — 2500000005 HC RX 250 GENERAL PHARMACY W/O HCPCS

## 2025-03-12 PROCEDURE — 2500000002 HC RX 250 W HCPCS SELF ADMINISTERED DRUGS (ALT 637 FOR MEDICARE OP, ALT 636 FOR OP/ED)

## 2025-03-12 PROCEDURE — 36415 COLL VENOUS BLD VENIPUNCTURE: CPT

## 2025-03-12 PROCEDURE — 82947 ASSAY GLUCOSE BLOOD QUANT: CPT

## 2025-03-12 PROCEDURE — 99233 SBSQ HOSP IP/OBS HIGH 50: CPT | Performed by: INTERNAL MEDICINE

## 2025-03-12 PROCEDURE — 2500000001 HC RX 250 WO HCPCS SELF ADMINISTERED DRUGS (ALT 637 FOR MEDICARE OP)

## 2025-03-12 PROCEDURE — 2060000001 HC INTERMEDIATE ICU ROOM DAILY

## 2025-03-12 PROCEDURE — 36415 COLL VENOUS BLD VENIPUNCTURE: CPT | Performed by: INTERNAL MEDICINE

## 2025-03-12 PROCEDURE — 80053 COMPREHEN METABOLIC PANEL: CPT

## 2025-03-12 PROCEDURE — 97535 SELF CARE MNGMENT TRAINING: CPT | Mod: GP,CQ

## 2025-03-12 PROCEDURE — 85610 PROTHROMBIN TIME: CPT

## 2025-03-12 PROCEDURE — 94640 AIRWAY INHALATION TREATMENT: CPT

## 2025-03-12 PROCEDURE — 85610 PROTHROMBIN TIME: CPT | Performed by: INTERNAL MEDICINE

## 2025-03-12 PROCEDURE — 2500000001 HC RX 250 WO HCPCS SELF ADMINISTERED DRUGS (ALT 637 FOR MEDICARE OP): Performed by: INTERNAL MEDICINE

## 2025-03-12 PROCEDURE — 85025 COMPLETE CBC W/AUTO DIFF WBC: CPT

## 2025-03-12 RX ORDER — WARFARIN 2 MG/1
2 TABLET ORAL ONCE
Status: COMPLETED | OUTPATIENT
Start: 2025-03-12 | End: 2025-03-12

## 2025-03-12 RX ADMIN — WARFARIN SODIUM 2 MG: 2 TABLET ORAL at 17:56

## 2025-03-12 RX ADMIN — LISINOPRIL 20 MG: 20 TABLET ORAL at 09:30

## 2025-03-12 RX ADMIN — BUPROPION HYDROCHLORIDE 150 MG: 150 TABLET, EXTENDED RELEASE ORAL at 09:30

## 2025-03-12 RX ADMIN — ASPIRIN 81 MG: 81 TABLET, COATED ORAL at 09:30

## 2025-03-12 RX ADMIN — VANCOMYCIN HYDROCHLORIDE 250 MG: 250 CAPSULE ORAL at 12:52

## 2025-03-12 RX ADMIN — CARVEDILOL 25 MG: 25 TABLET, FILM COATED ORAL at 20:27

## 2025-03-12 RX ADMIN — IPRATROPIUM BROMIDE AND ALBUTEROL SULFATE 3 ML: .5; 3 SOLUTION RESPIRATORY (INHALATION) at 07:52

## 2025-03-12 RX ADMIN — Medication 2.5 L/MIN: at 20:35

## 2025-03-12 RX ADMIN — IPRATROPIUM BROMIDE AND ALBUTEROL SULFATE 3 ML: .5; 3 SOLUTION RESPIRATORY (INHALATION) at 18:20

## 2025-03-12 RX ADMIN — ATORVASTATIN CALCIUM 80 MG: 80 TABLET, FILM COATED ORAL at 20:27

## 2025-03-12 RX ADMIN — BUDESONIDE 0.5 MG: 0.5 INHALANT ORAL at 18:21

## 2025-03-12 RX ADMIN — ACETAMINOPHEN 650 MG: 325 TABLET, FILM COATED ORAL at 20:27

## 2025-03-12 RX ADMIN — INSULIN LISPRO 1 UNITS: 100 INJECTION, SOLUTION INTRAVENOUS; SUBCUTANEOUS at 12:43

## 2025-03-12 RX ADMIN — Medication 3 L/MIN: at 07:54

## 2025-03-12 RX ADMIN — BUDESONIDE 0.5 MG: 0.5 INHALANT ORAL at 07:52

## 2025-03-12 RX ADMIN — CARVEDILOL 25 MG: 25 TABLET, FILM COATED ORAL at 09:30

## 2025-03-12 RX ADMIN — VANCOMYCIN HYDROCHLORIDE 250 MG: 250 CAPSULE ORAL at 17:56

## 2025-03-12 RX ADMIN — PANTOPRAZOLE SODIUM 40 MG: 40 TABLET, DELAYED RELEASE ORAL at 09:30

## 2025-03-12 RX ADMIN — VANCOMYCIN HYDROCHLORIDE 250 MG: 250 CAPSULE ORAL at 20:27

## 2025-03-12 RX ADMIN — IPRATROPIUM BROMIDE AND ALBUTEROL SULFATE 3 ML: .5; 3 SOLUTION RESPIRATORY (INHALATION) at 13:19

## 2025-03-12 RX ADMIN — VANCOMYCIN HYDROCHLORIDE 250 MG: 250 CAPSULE ORAL at 06:14

## 2025-03-12 RX ADMIN — ESCITALOPRAM OXALATE 20 MG: 10 TABLET ORAL at 09:30

## 2025-03-12 ASSESSMENT — PAIN DESCRIPTION - LOCATION: LOCATION: ARM

## 2025-03-12 ASSESSMENT — COGNITIVE AND FUNCTIONAL STATUS - GENERAL
DRESSING REGULAR LOWER BODY CLOTHING: A LOT
MOVING TO AND FROM BED TO CHAIR: A LITTLE
MOVING FROM LYING ON BACK TO SITTING ON SIDE OF FLAT BED WITH BEDRAILS: A LITTLE
HELP NEEDED FOR BATHING: A LITTLE
PERSONAL GROOMING: A LITTLE
TOILETING: A LOT
DRESSING REGULAR LOWER BODY CLOTHING: A LITTLE
TOILETING: A LITTLE
WALKING IN HOSPITAL ROOM: A LITTLE
STANDING UP FROM CHAIR USING ARMS: A LOT
CLIMB 3 TO 5 STEPS WITH RAILING: A LOT
MOVING FROM LYING ON BACK TO SITTING ON SIDE OF FLAT BED WITH BEDRAILS: A LITTLE
TURNING FROM BACK TO SIDE WHILE IN FLAT BAD: A LITTLE
HELP NEEDED FOR BATHING: A LOT
MOBILITY SCORE: 15
DAILY ACTIVITIY SCORE: 16
MOBILITY SCORE: 17
DRESSING REGULAR UPPER BODY CLOTHING: A LITTLE
STANDING UP FROM CHAIR USING ARMS: A LITTLE
TURNING FROM BACK TO SIDE WHILE IN FLAT BAD: A LITTLE
WALKING IN HOSPITAL ROOM: A LITTLE
DRESSING REGULAR UPPER BODY CLOTHING: A LITTLE
DAILY ACTIVITIY SCORE: 20
CLIMB 3 TO 5 STEPS WITH RAILING: TOTAL
MOVING TO AND FROM BED TO CHAIR: A LITTLE

## 2025-03-12 ASSESSMENT — PAIN SCALES - GENERAL
PAINLEVEL_OUTOF10: 0 - NO PAIN
PAINLEVEL_OUTOF10: 6
PAINLEVEL_OUTOF10: 2
PAINLEVEL_OUTOF10: 0 - NO PAIN

## 2025-03-12 ASSESSMENT — PAIN DESCRIPTION - ORIENTATION: ORIENTATION: LEFT

## 2025-03-12 ASSESSMENT — PAIN - FUNCTIONAL ASSESSMENT
PAIN_FUNCTIONAL_ASSESSMENT: 0-10

## 2025-03-12 ASSESSMENT — ACTIVITIES OF DAILY LIVING (ADL): HOME_MANAGEMENT_TIME_ENTRY: 15

## 2025-03-12 NOTE — CARE PLAN
The clinical goals for the shift include Up to chair, increased activity      Problem: Pain - Adult  Goal: Verbalizes/displays adequate comfort level or baseline comfort level  Outcome: Progressing     Problem: Safety - Adult  Goal: Free from fall injury  Outcome: Progressing     Problem: Nutrition  Goal: Nutrient intake appropriate for maintaining nutritional needs  Outcome: Progressing     Problem: Fall/Injury  Goal: Be free from injury by end of the shift  Outcome: Progressing

## 2025-03-12 NOTE — CARE PLAN
The patient's goals for the shift include      The clinical goals for the shift include Up to chair/walking in the morning    Over the shift, the patient did not make progress toward the following goals. Barriers to progression include . Recommendations to address these barriers include .

## 2025-03-12 NOTE — PROGRESS NOTES
Kayley Lynch is a 54 y.o. female on day 14 of admission presenting with Nausea vomiting and diarrhea.      Subjective   No events overnight.  Patient seen and examined at bedside with  present.  Hospitalization and treatment plan reviewed with  at length.  Patient has no complaints.       Objective     Last Recorded Vitals  /58   Pulse 92   Temp 35.7 °C (96.3 °F)   Resp 18   Wt 66.5 kg (146 lb 9.7 oz)   SpO2 96%   Intake/Output last 3 Shifts:    Intake/Output Summary (Last 24 hours) at 3/12/2025 1334  Last data filed at 3/12/2025 0700  Gross per 24 hour   Intake 400 ml   Output 1750 ml   Net -1350 ml       Admission Weight  Weight: 59 kg (130 lb) (02/26/25 0159)    Daily Weight  03/07/25 : 66.5 kg (146 lb 9.7 oz)    Image Results  Electrocardiogram, 12-lead PRN ACS symptoms  Sinus tachycardia with irregular rate  Consider left ventricular hypertrophy  Borderline T abnormalities, diffuse leads    Confirmed by Shane Dunbar ([id number]) on 3/11/2025 11:44:57 AM      Physical Exam  HENT:      Head: Normocephalic and atraumatic.      Nose: Nose normal.      Mouth/Throat:      Mouth: Mucous membranes are moist.      Pharynx: Oropharynx is clear.   Eyes:      Extraocular Movements: Extraocular movements intact.      Pupils: Pupils are equal, round, and reactive to light.   Cardiovascular:      Rate and Rhythm: Normal rate and regular rhythm.   Pulmonary:      Effort: No respiratory distress.      Breath sounds: Wheezing present. No rhonchi or rales.   Abdominal:      General: Bowel sounds are normal. There is no distension.      Palpations: Abdomen is soft.      Tenderness: There is no abdominal tenderness. There is no guarding.   Musculoskeletal:      Right lower leg: No edema.      Left lower leg: No edema.   Skin:     General: Skin is warm and dry.   Neurological:      Mental Status: She is alert. Mental status is at baseline.         Relevant Results               Assessment/Plan   This patient  currently has cardiac telemetry ordered; if you would like to modify or discontinue the telemetry order, click here to go to the orders activity to modify/discontinue the order.      This patient has a urinary catheter   Reason for the urinary catheter remaining today? critically ill patient who need accurate urinary output measurements    Assessment & Plan  Nausea vomiting and diarrhea    Influenza A with pneumonia      Septic shock, dehydration, hypercapnic respiratory failure, and dehydration.  Nontraumatic compartment syndrome of left upper extremity      Patient is a 54-year-old female with past medical history of COPD, CAD status post CABG, pulmonary hypertension, hyperlipidemia, cardiomyopathy, a flutter status post ablation, and mitral valve replacement who presented with malaise.  She was found to be in septic shock and admitted to the ICU.    Septic shock  Hypercapnic respiratory failure  COPD exacerbation  Influenza A  Pneumonia  Acute metabolic encephalopathy  C. difficile colitis  Dehydration  [facility]    -Continue supplemental oxygen  -Continue antibiotics, currently on doxycycline  -Continue nebulizers  -Continue steroids  -Continue Tamiflu  -Continue p.o. vancomycin  -Continue home meds  -Monitor INR    3/2: Patient developed a large hematoma in the left arm leading to compartment syndrome. She was taken for urgent fasciotomies with Orthopedic Surgery. She is also being followed by Vascular Surgery. Vitamin K ordered for supratherapeutic INR. Her INR was 2.2 yesterday with goal being 2.5-3.5 for her A-flutter s/p mitral valve replacement. Critical Care reconsulted. Hematology consulted, appreciate recs. Hold coumadin and lovenox.    3/3: INR reversed. Patient is on low intensity heparin drip. She was transfused 1 unit pRBCs. Monitor hemoglobin closely. Hematology has ordered a work up including DIC panel. Patient will need another debridement and irrigation in 24-48 hours per Orthopedics. She has also  developed a retroperitoneal bleed.    3/4: INR is down to 1.0.  Leukocytosis improved from 15 down to 13.  Hemoglobin is down to 6.4.  Patient to be transfused another unit packed RBCs.  She will be going back to the OR today for further debridement, washout, possible closure. She will also have a CT angiogram to determine if there is any active bleeding causing her retroperitoneal bleed.  She remains on a heparin drip due to her valve.  Continue doxycycline and p.o. Vanco.  Patient fully evaluated on March 5.  Patient restarted on heparin.  Fasciotomy site for compartment syndrome appears clear.  Continue to monitor lab work.  Case discussed with  at bedside.  No active bleeding is noted at this time.  Recheck labs in AM.       Patient fully evaluated  03/07  for    Problem List Items Addressed This Visit          Gastrointestinal and Abdominal    * (Principal) Nausea vomiting and diarrhea       Musculoskeletal and Injuries    Nontraumatic compartment syndrome of left upper extremity    Relevant Orders    Case Request Operating Room: FASCIOTOMY, UPPER EXTREMITY (Completed)    Case Request Operating Room: DEBRIDEMENT, WOUND, UPPER EXTREMITY (Completed)       Pulmonary and Pneumonias    Influenza A with pneumonia - Primary     Other Visit Diagnoses       Acute kidney injury (CMS-HCC)        Dehydration        Influenza A        Shock (Multi)        Relevant Orders    Transthoracic Echo (TTE) Complete (Completed)          Patient seen resting in bed with head of bed elevated, no s/s or c/o acute difficulties at this time.  Vital signs for last 24 hours Temp:  [35.7 °C (96.3 °F)-36.8 °C (98.2 °F)] 35.7 °C (96.3 °F)  Heart Rate:  [77-92] 92  Resp:  [16-18] 18  BP: (112-152)/(58-84) 112/58  FiO2 (%):  [32 %] 32 %    I/O this shift:  In: -   Out: 950 [Urine:950]  Patient still requiring frequent cardiac and SPO2 monitoring. Continue aggressive pulmonary hygiene and oral hygiene. Off loading as tolerated for skin  integrity. Medications and labs reviewed-   Results for orders placed or performed during the hospital encounter of 02/25/25 (from the past 24 hours)   POCT GLUCOSE   Result Value Ref Range    POCT Glucose 109 (H) 74 - 99 mg/dL   SST TOP   Result Value Ref Range    Extra Tube Hold for add-ons.    Protime-INR   Result Value Ref Range    Protime 32.0 (H) 9.8 - 12.4 seconds    INR 2.9 (H) 0.9 - 1.1   POCT GLUCOSE   Result Value Ref Range    POCT Glucose 123 (H) 74 - 99 mg/dL   CBC and Auto Differential   Result Value Ref Range    WBC 8.4 4.4 - 11.3 x10*3/uL    nRBC 0.0 0.0 - 0.0 /100 WBCs    RBC 3.17 (L) 4.00 - 5.20 x10*6/uL    Hemoglobin 8.6 (L) 12.0 - 16.0 g/dL    Hematocrit 28.6 (L) 36.0 - 46.0 %    MCV 90 80 - 100 fL    MCH 27.1 26.0 - 34.0 pg    MCHC 30.1 (L) 32.0 - 36.0 g/dL    RDW 19.7 (H) 11.5 - 14.5 %    Platelets 259 150 - 450 x10*3/uL    Neutrophils % 81.1 40.0 - 80.0 %    Immature Granulocytes %, Automated 0.5 0.0 - 0.9 %    Lymphocytes % 9.8 13.0 - 44.0 %    Monocytes % 8.5 2.0 - 10.0 %    Eosinophils % 0.0 0.0 - 6.0 %    Basophils % 0.1 0.0 - 2.0 %    Neutrophils Absolute 6.80 1.20 - 7.70 x10*3/uL    Immature Granulocytes Absolute, Automated 0.04 0.00 - 0.70 x10*3/uL    Lymphocytes Absolute 0.82 (L) 1.20 - 4.80 x10*3/uL    Monocytes Absolute 0.71 0.10 - 1.00 x10*3/uL    Eosinophils Absolute 0.00 0.00 - 0.70 x10*3/uL    Basophils Absolute 0.01 0.00 - 0.10 x10*3/uL   Comprehensive Metabolic Panel   Result Value Ref Range    Glucose 94 74 - 99 mg/dL    Sodium 138 136 - 145 mmol/L    Potassium 3.5 3.5 - 5.3 mmol/L    Chloride 101 98 - 107 mmol/L    Bicarbonate 31 21 - 32 mmol/L    Anion Gap 10 10 - 20 mmol/L    Urea Nitrogen 6 6 - 23 mg/dL    Creatinine 0.31 (L) 0.50 - 1.05 mg/dL    eGFR >90 >60 mL/min/1.73m*2    Calcium 8.6 8.6 - 10.3 mg/dL    Albumin 2.8 (L) 3.4 - 5.0 g/dL    Alkaline Phosphatase 47 33 - 110 U/L    Total Protein 5.6 (L) 6.4 - 8.2 g/dL    AST 14 9 - 39 U/L    Bilirubin, Total 2.0 (H) 0.0 -  1.2 mg/dL    ALT 14 7 - 45 U/L   Protime-INR   Result Value Ref Range    Protime 19.1 (H) 9.8 - 12.4 seconds    INR 1.7 (H) 0.9 - 1.1   POCT GLUCOSE   Result Value Ref Range    POCT Glucose 101 (H) 74 - 99 mg/dL   POCT GLUCOSE   Result Value Ref Range    POCT Glucose 172 (H) 74 - 99 mg/dL     *Note: Due to a large number of results and/or encounters for the requested time period, some results have not been displayed. A complete set of results can be found in Results Review.      Patient recently received an antibiotic (last 12 hours)       Date/Time Action Medication Dose    03/07/25 1252 Given    vancomycin (Firvanq) solution 250 mg 250 mg    03/07/25 0602 Given    vancomycin (Firvanq) solution 250 mg 250 mg           Plan discussed with interdisciplinary team, seen by orthopedics today - dressing changes as needed,   suture removal on March 18, recommended shoulder elbow and hand range of motion with therapy ordered. Will continue current and repeat labs in the AM. CXR shows improved infiltrate at the left lower lung, strict intake and output, trend hemoglobin.    Discharge planning discussed with patient and care team. Therapy evaluations ordered. [facility] -11 anticipate C/SNF at discharge. Patient aware and agreeable to current plan, continue plan as above.     I spent a total of 50 minutes on the date of the service which included preparing to see the patient, face-to-face patient care, completing clinical documentation, obtaining and/or reviewing separately obtained history, performing a medically appropriate examination, counseling and educating the patient/family/caregiver, ordering medications, tests, or procedures, communicating with other HCPs (not separately reported), independently interpreting results (not separately reported), communicating results to the patient/family/caregiver, and care coordination (not separately reported).        Patient fully evaluated  03/08  for    Problem List Items Addressed  This Visit          Gastrointestinal and Abdominal    * (Principal) Nausea vomiting and diarrhea       Musculoskeletal and Injuries    Nontraumatic compartment syndrome of left upper extremity    Relevant Orders    Case Request Operating Room: FASCIOTOMY, UPPER EXTREMITY (Completed)    Case Request Operating Room: DEBRIDEMENT, WOUND, UPPER EXTREMITY (Completed)       Pulmonary and Pneumonias    Influenza A with pneumonia - Primary     Other Visit Diagnoses       Acute kidney injury (CMS-HCC)        Dehydration        Influenza A        Shock (Multi)        Relevant Orders    Transthoracic Echo (TTE) Complete (Completed)          Patient seen resting in bed with head of bed elevated, no s/s or c/o acute difficulties at this time.  Vital signs for last 24 hours Temp:  [35.7 °C (96.3 °F)-36.8 °C (98.2 °F)] 35.7 °C (96.3 °F)  Heart Rate:  [77-92] 92  Resp:  [16-18] 18  BP: (112-152)/(58-84) 112/58  FiO2 (%):  [32 %] 32 %    I/O this shift:  In: -   Out: 950 [Urine:950]  Patient still requiring frequent cardiac and SPO2 monitoring. Continue aggressive pulmonary hygiene and oral hygiene. Off loading as tolerated for skin integrity. Medications and labs reviewed-   Results for orders placed or performed during the hospital encounter of 02/25/25 (from the past 24 hours)   POCT GLUCOSE   Result Value Ref Range    POCT Glucose 109 (H) 74 - 99 mg/dL   SST TOP   Result Value Ref Range    Extra Tube Hold for add-ons.    Protime-INR   Result Value Ref Range    Protime 32.0 (H) 9.8 - 12.4 seconds    INR 2.9 (H) 0.9 - 1.1   POCT GLUCOSE   Result Value Ref Range    POCT Glucose 123 (H) 74 - 99 mg/dL   CBC and Auto Differential   Result Value Ref Range    WBC 8.4 4.4 - 11.3 x10*3/uL    nRBC 0.0 0.0 - 0.0 /100 WBCs    RBC 3.17 (L) 4.00 - 5.20 x10*6/uL    Hemoglobin 8.6 (L) 12.0 - 16.0 g/dL    Hematocrit 28.6 (L) 36.0 - 46.0 %    MCV 90 80 - 100 fL    MCH 27.1 26.0 - 34.0 pg    MCHC 30.1 (L) 32.0 - 36.0 g/dL    RDW 19.7 (H) 11.5 - 14.5  %    Platelets 259 150 - 450 x10*3/uL    Neutrophils % 81.1 40.0 - 80.0 %    Immature Granulocytes %, Automated 0.5 0.0 - 0.9 %    Lymphocytes % 9.8 13.0 - 44.0 %    Monocytes % 8.5 2.0 - 10.0 %    Eosinophils % 0.0 0.0 - 6.0 %    Basophils % 0.1 0.0 - 2.0 %    Neutrophils Absolute 6.80 1.20 - 7.70 x10*3/uL    Immature Granulocytes Absolute, Automated 0.04 0.00 - 0.70 x10*3/uL    Lymphocytes Absolute 0.82 (L) 1.20 - 4.80 x10*3/uL    Monocytes Absolute 0.71 0.10 - 1.00 x10*3/uL    Eosinophils Absolute 0.00 0.00 - 0.70 x10*3/uL    Basophils Absolute 0.01 0.00 - 0.10 x10*3/uL   Comprehensive Metabolic Panel   Result Value Ref Range    Glucose 94 74 - 99 mg/dL    Sodium 138 136 - 145 mmol/L    Potassium 3.5 3.5 - 5.3 mmol/L    Chloride 101 98 - 107 mmol/L    Bicarbonate 31 21 - 32 mmol/L    Anion Gap 10 10 - 20 mmol/L    Urea Nitrogen 6 6 - 23 mg/dL    Creatinine 0.31 (L) 0.50 - 1.05 mg/dL    eGFR >90 >60 mL/min/1.73m*2    Calcium 8.6 8.6 - 10.3 mg/dL    Albumin 2.8 (L) 3.4 - 5.0 g/dL    Alkaline Phosphatase 47 33 - 110 U/L    Total Protein 5.6 (L) 6.4 - 8.2 g/dL    AST 14 9 - 39 U/L    Bilirubin, Total 2.0 (H) 0.0 - 1.2 mg/dL    ALT 14 7 - 45 U/L   Protime-INR   Result Value Ref Range    Protime 19.1 (H) 9.8 - 12.4 seconds    INR 1.7 (H) 0.9 - 1.1   POCT GLUCOSE   Result Value Ref Range    POCT Glucose 101 (H) 74 - 99 mg/dL   POCT GLUCOSE   Result Value Ref Range    POCT Glucose 172 (H) 74 - 99 mg/dL      Patient recently received an antibiotic (last 12 hours)       Date/Time Action Medication Dose    03/08/25 1220 Given    vancomycin (Vancocin) capsule 250 mg 250 mg           Plan discussed with interdisciplinary team, seen by cardiology today, will bridge patient to coumadin, ptt/inr in am, appreciate input and agree with plan. LUE dressing is clean, dry and intact, swelling is noted, warm and well-perfused.  She is able to flex and extend the fingers and thumb. Will continue current and repeat labs in the  AM.    Patient fully evaluated on March 9 and clinically improved.  Recheck labs in AM.  INR is slowly improving.  Discontinue heparin when INR is therapeutic for mechanical valve.  Appreciate orthopedic and cardiology consultations.  Examination of area from compartment syndrome is clear without neurovascular compromise.  Case discussed with orthopedics at bedside    Discharge planning discussed with patient and care team. Patient aware and agreeable to current plan, continue plan as above.     I spent a total of 50 minutes on the date of the service which included preparing to see the patient, face-to-face patient care, completing clinical documentation, obtaining and/or reviewing separately obtained history, performing a medically appropriate examination, counseling and educating the patient/family/caregiver, ordering medications, tests, or procedures, communicating with other HCPs (not separately reported), independently interpreting results (not separately reported), communicating results to the patient/family/caregiver, and care coordination (not separately reported).     Patient fully evaluated    for    Problem List Items Addressed This Visit          Gastrointestinal and Abdominal    * (Principal) Nausea vomiting and diarrhea       Musculoskeletal and Injuries    Nontraumatic compartment syndrome of left upper extremity    Relevant Orders    Case Request Operating Room: FASCIOTOMY, UPPER EXTREMITY (Completed)    Case Request Operating Room: DEBRIDEMENT, WOUND, UPPER EXTREMITY (Completed)       Pulmonary and Pneumonias    Influenza A with pneumonia - Primary     Other Visit Diagnoses       Acute kidney injury (CMS-HCC)        Dehydration        Influenza A        Shock (Multi)        Relevant Orders    Transthoracic Echo (TTE) Complete (Completed)          Patient seen resting in bed with head of bed elevated, no s/s or c/o acute difficulties at this time.  Vital signs for last 24 hours Temp:  [35.7 °C (96.3  °F)-36.8 °C (98.2 °F)] 35.7 °C (96.3 °F)  Heart Rate:  [77-92] 92  Resp:  [16-18] 18  BP: (112-152)/(58-84) 112/58  FiO2 (%):  [32 %] 32 %    I/O this shift:  In: -   Out: 950 [Urine:950]  Patient still requiring frequent cardiac and SPO2 monitoring. Continue aggressive pulmonary hygiene and oral hygiene. Off loading as tolerated for skin integrity. Medications and labs reviewed-   Results for orders placed or performed during the hospital encounter of 02/25/25 (from the past 24 hours)   POCT GLUCOSE   Result Value Ref Range    POCT Glucose 109 (H) 74 - 99 mg/dL   SST TOP   Result Value Ref Range    Extra Tube Hold for add-ons.    Protime-INR   Result Value Ref Range    Protime 32.0 (H) 9.8 - 12.4 seconds    INR 2.9 (H) 0.9 - 1.1   POCT GLUCOSE   Result Value Ref Range    POCT Glucose 123 (H) 74 - 99 mg/dL   CBC and Auto Differential   Result Value Ref Range    WBC 8.4 4.4 - 11.3 x10*3/uL    nRBC 0.0 0.0 - 0.0 /100 WBCs    RBC 3.17 (L) 4.00 - 5.20 x10*6/uL    Hemoglobin 8.6 (L) 12.0 - 16.0 g/dL    Hematocrit 28.6 (L) 36.0 - 46.0 %    MCV 90 80 - 100 fL    MCH 27.1 26.0 - 34.0 pg    MCHC 30.1 (L) 32.0 - 36.0 g/dL    RDW 19.7 (H) 11.5 - 14.5 %    Platelets 259 150 - 450 x10*3/uL    Neutrophils % 81.1 40.0 - 80.0 %    Immature Granulocytes %, Automated 0.5 0.0 - 0.9 %    Lymphocytes % 9.8 13.0 - 44.0 %    Monocytes % 8.5 2.0 - 10.0 %    Eosinophils % 0.0 0.0 - 6.0 %    Basophils % 0.1 0.0 - 2.0 %    Neutrophils Absolute 6.80 1.20 - 7.70 x10*3/uL    Immature Granulocytes Absolute, Automated 0.04 0.00 - 0.70 x10*3/uL    Lymphocytes Absolute 0.82 (L) 1.20 - 4.80 x10*3/uL    Monocytes Absolute 0.71 0.10 - 1.00 x10*3/uL    Eosinophils Absolute 0.00 0.00 - 0.70 x10*3/uL    Basophils Absolute 0.01 0.00 - 0.10 x10*3/uL   Comprehensive Metabolic Panel   Result Value Ref Range    Glucose 94 74 - 99 mg/dL    Sodium 138 136 - 145 mmol/L    Potassium 3.5 3.5 - 5.3 mmol/L    Chloride 101 98 - 107 mmol/L    Bicarbonate 31 21 - 32  mmol/L    Anion Gap 10 10 - 20 mmol/L    Urea Nitrogen 6 6 - 23 mg/dL    Creatinine 0.31 (L) 0.50 - 1.05 mg/dL    eGFR >90 >60 mL/min/1.73m*2    Calcium 8.6 8.6 - 10.3 mg/dL    Albumin 2.8 (L) 3.4 - 5.0 g/dL    Alkaline Phosphatase 47 33 - 110 U/L    Total Protein 5.6 (L) 6.4 - 8.2 g/dL    AST 14 9 - 39 U/L    Bilirubin, Total 2.0 (H) 0.0 - 1.2 mg/dL    ALT 14 7 - 45 U/L   Protime-INR   Result Value Ref Range    Protime 19.1 (H) 9.8 - 12.4 seconds    INR 1.7 (H) 0.9 - 1.1   POCT GLUCOSE   Result Value Ref Range    POCT Glucose 101 (H) 74 - 99 mg/dL   POCT GLUCOSE   Result Value Ref Range    POCT Glucose 172 (H) 74 - 99 mg/dL      Patient recently received an antibiotic (last 12 hours)       Date/Time Action Medication Dose    03/11/25 0617 Given    vancomycin (Vancocin) capsule 250 mg 250 mg    03/10/25 2140 Given    vancomycin (Vancocin) capsule 250 mg 250 mg           Pt fully evaluated 3/11. PT recommended high intensity level of clinical care. Potassium is 3.3 and gave Kcl and INR is 5.5 out of therapeutic range and protime is 61.3. Pt on 2L NC today. Plan discussed with interdisciplinary team, continue current and repeat labs in the AM.     Discharge planning discussed with patient and care team. Therapy evaluations ordered. [facility]-  , anticipate HHC/SNF at discharge. Patient aware and agreeable to current plan, continue plan as above.     I spent a total of 50 minutes on the date of the service which included preparing to see the patient, face-to-face patient care, completing clinical documentation, obtaining and/or reviewing separately obtained history, performing a medically appropriate examination, counseling and educating the patient/family/caregiver, ordering medications, tests, or procedures, communicating with other HCPs (not separately reported), independently interpreting results (not separately reported), communicating results to the patient/family/caregiver, and care coordination (not separately  reported).     Patient fully evaluated    for    Problem List Items Addressed This Visit          Gastrointestinal and Abdominal    * (Principal) Nausea vomiting and diarrhea       Musculoskeletal and Injuries    Nontraumatic compartment syndrome of left upper extremity    Relevant Orders    Case Request Operating Room: FASCIOTOMY, UPPER EXTREMITY (Completed)    Case Request Operating Room: DEBRIDEMENT, WOUND, UPPER EXTREMITY (Completed)       Pulmonary and Pneumonias    Influenza A with pneumonia - Primary     Other Visit Diagnoses       Acute kidney injury (CMS-HCC)        Dehydration        Influenza A        Shock (Multi)        Relevant Orders    Transthoracic Echo (TTE) Complete (Completed)          Patient seen resting in bed with head of bed elevated, no s/s or c/o acute difficulties at this time.  Vital signs for last 24 hours Temp:  [35.7 °C (96.3 °F)-36.8 °C (98.2 °F)] 35.7 °C (96.3 °F)  Heart Rate:  [77-92] 92  Resp:  [16-18] 18  BP: (112-152)/(58-84) 112/58  FiO2 (%):  [32 %] 32 %    I/O this shift:  In: -   Out: 950 [Urine:950]  Patient still requiring frequent cardiac and SPO2 monitoring. Continue aggressive pulmonary hygiene and oral hygiene. Off loading as tolerated for skin integrity. Medications and labs reviewed-   Results for orders placed or performed during the hospital encounter of 02/25/25 (from the past 24 hours)   POCT GLUCOSE   Result Value Ref Range    POCT Glucose 109 (H) 74 - 99 mg/dL   SST TOP   Result Value Ref Range    Extra Tube Hold for add-ons.    Protime-INR   Result Value Ref Range    Protime 32.0 (H) 9.8 - 12.4 seconds    INR 2.9 (H) 0.9 - 1.1   POCT GLUCOSE   Result Value Ref Range    POCT Glucose 123 (H) 74 - 99 mg/dL   CBC and Auto Differential   Result Value Ref Range    WBC 8.4 4.4 - 11.3 x10*3/uL    nRBC 0.0 0.0 - 0.0 /100 WBCs    RBC 3.17 (L) 4.00 - 5.20 x10*6/uL    Hemoglobin 8.6 (L) 12.0 - 16.0 g/dL    Hematocrit 28.6 (L) 36.0 - 46.0 %    MCV 90 80 - 100 fL    MCH  27.1 26.0 - 34.0 pg    MCHC 30.1 (L) 32.0 - 36.0 g/dL    RDW 19.7 (H) 11.5 - 14.5 %    Platelets 259 150 - 450 x10*3/uL    Neutrophils % 81.1 40.0 - 80.0 %    Immature Granulocytes %, Automated 0.5 0.0 - 0.9 %    Lymphocytes % 9.8 13.0 - 44.0 %    Monocytes % 8.5 2.0 - 10.0 %    Eosinophils % 0.0 0.0 - 6.0 %    Basophils % 0.1 0.0 - 2.0 %    Neutrophils Absolute 6.80 1.20 - 7.70 x10*3/uL    Immature Granulocytes Absolute, Automated 0.04 0.00 - 0.70 x10*3/uL    Lymphocytes Absolute 0.82 (L) 1.20 - 4.80 x10*3/uL    Monocytes Absolute 0.71 0.10 - 1.00 x10*3/uL    Eosinophils Absolute 0.00 0.00 - 0.70 x10*3/uL    Basophils Absolute 0.01 0.00 - 0.10 x10*3/uL   Comprehensive Metabolic Panel   Result Value Ref Range    Glucose 94 74 - 99 mg/dL    Sodium 138 136 - 145 mmol/L    Potassium 3.5 3.5 - 5.3 mmol/L    Chloride 101 98 - 107 mmol/L    Bicarbonate 31 21 - 32 mmol/L    Anion Gap 10 10 - 20 mmol/L    Urea Nitrogen 6 6 - 23 mg/dL    Creatinine 0.31 (L) 0.50 - 1.05 mg/dL    eGFR >90 >60 mL/min/1.73m*2    Calcium 8.6 8.6 - 10.3 mg/dL    Albumin 2.8 (L) 3.4 - 5.0 g/dL    Alkaline Phosphatase 47 33 - 110 U/L    Total Protein 5.6 (L) 6.4 - 8.2 g/dL    AST 14 9 - 39 U/L    Bilirubin, Total 2.0 (H) 0.0 - 1.2 mg/dL    ALT 14 7 - 45 U/L   Protime-INR   Result Value Ref Range    Protime 19.1 (H) 9.8 - 12.4 seconds    INR 1.7 (H) 0.9 - 1.1   POCT GLUCOSE   Result Value Ref Range    POCT Glucose 101 (H) 74 - 99 mg/dL   POCT GLUCOSE   Result Value Ref Range    POCT Glucose 172 (H) 74 - 99 mg/dL      Patient recently received an antibiotic (last 12 hours)       Date/Time Action Medication Dose    03/12/25 1252 Given    vancomycin (Vancocin) capsule 250 mg 250 mg    03/12/25 0614 Given    vancomycin (Vancocin) capsule 250 mg 250 mg           Pt fully evaluated 3/12. Pt on 3L NC today increased from yesterday. With start of Coumadin will continue to monitor INR.  Plan discussed with interdisciplinary team, continue current and repeat  labs in the AM.     Discharge planning discussed with patient and care team. Therapy evaluations ordered. [facility]- 11 , anticipate HHC/SNF at discharge. Patient aware and agreeable to current plan, continue plan as above.     I spent a total of 50 minutes on the date of the service which included preparing to see the patient, face-to-face patient care, completing clinical documentation, obtaining and/or reviewing separately obtained history, performing a medically appropriate examination, counseling and educating the patient/family/caregiver, ordering medications, tests, or procedures, communicating with other HCPs (not separately reported), independently interpreting results (not separately reported), communicating results to the patient/family/caregiver, and care coordination (not separately reported).   SADA CHAN

## 2025-03-12 NOTE — PROGRESS NOTES
03/12/25 0959   Discharge Planning   Home or Post Acute Services Post acute facilities (Rehab/SNF/etc)   Type of Post Acute Facility Services Rehab   Expected Discharge Disposition Rehab     Spoke with patients , discussed acute rehab with patients . Patient  preference is [facility]. Met with patient and patients son at bedside. Patient preference is [facility]. Spoke with Autumn with [facility] they are able to accept. Patient will need pre-cert.

## 2025-03-12 NOTE — PROGRESS NOTES
Cardiology Progress Note      Kayley Lynch is a 54 y.o. female on day 14 of admission presenting with Nausea vomiting and diarrhea.      Subjective   Feeling well this morning.  Denies chest pain or pressure no shortness of breath..     ROS:  10 systems reviewed other than what is mentioned above.     MEDICATION:    Scheduled medications  aspirin, 81 mg, oral, Daily  atorvastatin, 80 mg, oral, Nightly  budesonide, 0.5 mg, nebulization, BID  buPROPion XL, 150 mg, oral, q AM  carvedilol, 25 mg, oral, BID  escitalopram, 20 mg, oral, Daily  insulin lispro, 0-5 Units, subcutaneous, TID AC  ipratropium-albuteroL, 3 mL, nebulization, TID  lisinopril, 20 mg, oral, Daily  oxygen, , inhalation, Continuous - Inhalation  pantoprazole, 40 mg, oral, Daily  perflutren lipid microspheres, 0.5-10 mL of dilution, intravenous, Once in imaging  perflutren protein A microsphere, 0.5 mL, intravenous, Once in imaging  sulfur hexafluoride microsphr, 2 mL, intravenous, Once in imaging  vancomycin, 250 mg, oral, 4x daily  warfarin, 2 mg, oral, Once    Continuous medications  heparin, 0-4,000 Units/hr, Last Rate: Stopped (03/10/25 1237)      PRN medications  PRN medications: acetaminophen, ipratropium-albuteroL, labetaloL, melatonin, [Held by provider] morphine, ondansetron, promethazine     Assessment & Plan  Nausea vomiting and diarrhea    Influenza A with pneumonia    Nontraumatic compartment syndrome of left upper extremity      OBJECTIVE:    Visit Vitals  /84   Pulse 80   Temp 36.8 °C (98.2 °F)   Resp 17        Intake/Output Summary (Last 24 hours) at 3/12/2025 0836  Last data filed at 3/11/2025 2200  Gross per 24 hour   Intake 400 ml   Output 800 ml   Net -400 ml      Patient is alert and oriented x3.  HEENT is unremarkable mucous members are moist  Neck no JVP no bruits upstrokes are full no thyromegaly  Lungs are clear bilaterally.  No wheezing crackles or rales  Heart regular rhythm tachycardic normal S1-S2 there is no S3 crisp  prosthetic mitral valve click  Abdomen is soft bs are positive nontender nondistended no organomegaly no pulsatile masses  Extremities have no edema.  Left arm is bandaged distal pulses present palpable.  Neuro is grossly nonfocal  Skin has no rashes     Image Results  Electrocardiogram, 12-lead PRN ACS symptoms  Sinus tachycardia with irregular rate  Consider left ventricular hypertrophy  Borderline T abnormalities, diffuse leads    Confirmed by Shane Dubnar ([id number]) on 3/11/2025 11:44:57 AM  Relevant Results:  RESULTS:    Lab Results   Component Value Date    WBC 8.4 03/12/2025    HGB 8.6 (L) 03/12/2025    HCT 28.6 (L) 03/12/2025    MCV 90 03/12/2025     03/12/2025        Lab Results   Component Value Date    CREATININE 0.31 (L) 03/12/2025    BUN 6 03/12/2025     03/12/2025    K 3.5 03/12/2025     03/12/2025    CO2 31 03/12/2025        Results from last 7 days   Lab Units 03/12/25  0546   PROTEIN TOTAL g/dL 5.6*   BILIRUBIN TOTAL mg/dL 2.0*   ALK PHOS U/L 47   ALT U/L 14   AST U/L 14   GLUCOSE mg/dL 94       Assessment/Plan      PMH  1.  Mitral regurgitation status post mitral valve replacement with a #25/33 Kevin mechanical mitral valve October 2020  2.  Paroxysmal atrial flutter status post ablation October 2020  3.  CAD.  Bypass x 3 vessels LIMA to the LAD free radial artery graft to the marginal SVG to the RCA in 2020.  Last stress test 4/16/2024 no evidence of ischemia EF 51%  4.  COPD she continues to smoke  5.  Cardiomyopathy postoperative ejection fraction was 40%.  This was up to 50% on her last stress test  6.  Hyperlipidemia  7.  Hypertension  8.  Tobacco abuse  9.  Moderate to severe pulmonary hypertension in the past     3/4/2025  1.  Paroxysmal atrial fibrillation.  Previous history of atrial flutter status post ablation in 2020.  Currently sinus tachycardia.  Increase metoprolol to tartrate to 3 times daily for better rate control.  This was likely physiologically driven  because of the stress of the situation.  Anticoagulation will need to be given regardless because of her mechanical mitral valve replacement.  2.  CAD.  Stable.  No symptoms of angina.  3.  Mechanical mitral valve replacement.  This is a difficult situation given her spontaneous left arm hematoma leading to compartment syndrome and iliopsoas spontaneous hematoma on the left side.  Agree with low-dose heparin.  Aspirin currently on hold.  Coumadin will need to be restarted along with an aspirin when stable.  4.  Respiratory failure.  Slow improvement.  Unfortunately she continues to smoke and has underlying lung disease.  5.  Anemia.  She will require transfusion.    3/5/2025    1.  Paroxysmal atrial fibrillation.  She is in a sinus tachycardia today.  She did have an atrial flutter ablation in 2020.  Continue metoprolol for rate control.  IV heparin to be restarted now as she went to the OR yesterday.  Transition to oral anticoagulation once her H&H is stable.  2.  CAD.  Status post CABG x 3 vessels.  Stable.  No symptoms of angina.  3.  Mechanical mitral valve replacement.  Heparin to be restarted today.  Hopefully we can restart Coumadin and baby aspirin if her H&H remained stable.  These were held because of a spontaneous iliopsoas hematoma and bleed in the arm related to compartment syndrome.  4.  Respiratory failure due to influenza and underlying COPD.  This is improving.    3/6/2025    1.  Paroxysmal A-fib.  Remains in sinus.  Carvedilol started.  Continue IV heparin to ensure H&H remained stable.  Transition back to Coumadin.  Unfortunately, shorter acting agents like a DOAC is not an option given the mechanical mitral valve.  2.  CAD.  Stable.  3.  Mechanical mitral valve replacement.  On heparin.  Will need to have Coumadin and aspirin restarted once bleeding has been stabilized.    3/7/2025  1.  Paroxysmal A-fib.  Remains in sinus.  Doing well with IV heparin which she is on for her mechanical mitral  valve also.  Transition to Coumadin tomorrow if her H&H remained stable.  2.  CAD.  Stable.  No symptoms of angina.  Previous bypass x 3 vessels.  Aspirin on hold.  4.  Mechanical mitral valve replacement.  Restart Coumadin tomorrow.  She had spontaneous bleeding in her left arm and her retroperitoneal hematoma with a markedly elevated INR.  Transition to Coumadin from IV heparin likely tomorrow.    3/8/2025  1.  Paroxysmal atrial fibrillation.  Remains in sinus.  Excellent control.  Restart Coumadin today.  She has been on IV heparin.  2.  CAD.  Stable.  No symptoms of angina.  Aspirin is on hold.  3.  Mechanical mitral valve replacement.  Restart Coumadin cautiously.  H&H has been stable on IV heparin.  Spontaneous retroperitoneal and left arm bleeding with a supratherapeutic INR    3/9/2025  1.  Paroxysmal A-fib.  Remains in sinus.  Coumadin started yesterday with 5 mg.  INR today 1.4.  Will dose 4 mg today.  H&H relatively stable.  Continue heparin until therapeutic INR's are achieved.  2.  CAD.  No angina.  Aspirin on hold.  3.  Mechanical mitral valve replacement.  Coumadin restarted.  Continue IV heparin until INR is above 2.  Target INR 2.5-3.5.  Will need to watch H&H for bleeding.    3/10/2025  1.  Paroxysmal A-fib.  Daniel in sinus.  Coumadin restarted.  INR is up to 2.5.  Heparin stopped.  Will check an INR tomorrow before dosing any Coumadin for fear that the INR may continue climb.  2.  CAD stable  3.  Mechanical mitral valve replacement.  INR is at 2.5.  Target is 2.5-3.5.  No clear evidence of bleeding H&H stable.  Check INR tomorrow prior to dosing Coumadin.  At home, she takes 4 mg on Saturday and Thursday and 2 mg all other days.    3/11/2025  1.  Paroxysmal A-fib.  Remains in sinus.  Heparin stopped.  INR to 5.5 today.  My concern is is going up further.  I will dose vitamin K 2.5 mg x 1 today orally.  No Coumadin should be given until the INR is stabilized.  2.  Mechanical mitral valve  replacement.  Again INR is gone from 2.5-5.5.  Vitamin K 2.5 mg to be given orally now.  H&H is stable.    3/12/2025  1.  Paroxysmal A-fib.  Remains in sinus.  2.  Mechanical mitral valve placement.  INR yesterday went up to 5.5.  My concern was getting a higher.  Given her spontaneous retroperitoneal hematoma I did give her 2.5 mg of vitamin K.  INR came down to 2.9 less than 1.7 today.  Today I will give her Coumadin 2 mg.  IV heparin will be continued until her INR is therapeutic.  3.  Spontaneous retroperitoneal hematoma and spontaneous bleeding of her left arm.  This led to compartment syndrome.  Currently stable.  Brittney Lomeli MD

## 2025-03-12 NOTE — PROGRESS NOTES
Occupational Therapy    OT Treatment    Patient Name: Kayley Lynch  MRN: [id number]  Department: [facility]  Room: [address] [place] [address]  Today's Date: 3/12/2025  Time Calculation  Start Time: 1033  Stop Time: 1059  Time Calculation (min): 26 min        Assessment:  End of Session Patient Position: Up in chair, Alarm on     Plan:  Treatment Interventions: ADL retraining, Functional transfer training, Endurance training, Compensatory technique education  OT Frequency: 4 times per week  OT Discharge Recommendations: High intensity level of continued care  OT - OK to Discharge: Yes (to next level of care when cleared by medical team)  Treatment Interventions: ADL retraining, Functional transfer training, Endurance training, Compensatory technique education    Subjective   Previous Visit Info:  OT Last Visit  OT Received On: 03/12/25  General:  General  Co-Treatment: PT  Co-Treatment Reason: to maximize safety and functional mobility  Prior to Session Communication: Bedside nurse  Patient Position Received: Bed, 2 rail up, Alarm off, not on at start of session  General Comment: pleasant and cooperative  Precautions:  Medical Precautions: Fall precautions, Infection precautions, Oxygen therapy device and L/min (2L, cdiff)  Precautions Comment: stuart      Objective    Cognition:  Cognition  Overall Cognitive Status: Within Functional Limits  Coordination:     Activities of Daily Living: LE Dressing  LE Dressing: Yes  Pants Level of Assistance: Maximum assistance  Sock Level of Assistance: Maximum assistance  LE Dressing Where Assessed: Edge of bed    Toileting  Toileting Level of Assistance: Maximum assistance  Where Assessed: Bedside commode  Functional Standing Tolerance:  Time: 2:00 standing at FWW  Bed Mobility/Transfers: Bed Mobility  Bed Mobility: Yes  Bed Mobility 1  Bed Mobility 1: Supine to sitting  Level of Assistance 1: Close supervision    Transfers  Transfer: Yes  Transfer 1  Technique 1: Sit to stand, Stand to sit  Transfer  Device 1: Walker  Transfer Level of Assistance 1:  (Min A x1-2)    Toilet Transfers  Toilet Transfer From: Rolling walker  Toilet Transfer Type: To and from  Toilet Transfer to: Standard bedside commode  Toilet Transfer Technique: Stand pivot  Toilet Transfers: Minimal assistance    Functional Mobility:  Functional Mobility  Functional Mobility Performed: Yes  Functional Mobility 1  Device 1: Rolling walker  Assistance 1: Minimum assistance  Comments 1: pt ambulated short distance from EOB to BSC and then from BSC to chair with arms      Outcome Measures:[facility] Daily Activity  Putting on and taking off regular lower body clothing: A lot  Bathing (including washing, rinsing, drying): A lot  Putting on and taking off regular upper body clothing: A little  Toileting, which includes using toilet, bedpan or urinal: A lot  Taking care of personal grooming such as brushing teeth: A little  Eating Meals: None  Daily Activity - Total Score: 16        Education Documentation  Precautions, taught by JACINTA Dale at 3/12/2025 12:14 PM.  Learner: Patient  Readiness: Acceptance  Method: Explanation  Response: Verbalizes Understanding    ADL Training, taught by JACINTA Dale at 3/12/2025 12:14 PM.  Learner: Patient  Readiness: Acceptance  Method: Explanation  Response: Verbalizes Understanding    Education Comments  No comments found.               Goals:  Encounter Problems       Encounter Problems (Active)       OT Goals       Increase functional mobility and  functional transfers to min assist x 1 for bed/chair/toilet with dme prn   (Progressing)       Start:  03/05/25    Expected End:  03/19/25            increase bue ther ex/activity x 5-7 minutes and increase standing tolerance x 3-5 minutes with min assist x 1 to promote greater activity tolerance for assist with adl.   (Progressing)       Start:  03/05/25    Expected End:  03/19/25            Increase grooming/feeding to set up with dme prn  (Progressing)        Start:  03/05/25    Expected End:  03/19/25            Increase ub/lb dressing to mod assist x 1 with dme prn  (Progressing)       Start:  03/05/25    Expected End:  03/19/25

## 2025-03-12 NOTE — PROGRESS NOTES
"Nutrition Follow Up Assessment:   Nutrition Assessment         Patient is a 54 y.o. female presenting with nausea, vomiting, and diarrhea.       Nutrition History:  Energy Intake: Poor < 50 %  Pain affecting nutrition status: N/A  Food and Nutrient History: Pt reports she is eating better, feels she is back to baseline intake. Meal intakes since last assessment have been 25-50% per EMR documentation. Pt states she is normally not a big eater. Has several Dr. Pepper bottles at bedside, along with 2 boost drinks. States she is not drinking the ensure supplements being sent and is not drinking the boost supplements brought in by family, states family brought them in as a joke. States she usually drinks 5-6 bottles of Dr. Pepper per day but this is the first day she is really drinking them in the hospital. Encouraged pt to be aware of her protein and other intake at meals as excessive soda intake may be displacing intake of other nutrients. Pt agreeable to trying gelatein supplements instead of ensure. Reports some diarrhea.       Anthropometrics:  Height: 160 cm (5' 2.99\")   Weight: 66.5 kg (146 lb 9.7 oz)   BMI (Calculated): 25.98  IBW/kg (Dietitian Calculated): 52 kg  Percent of IBW: 113 %       Weight History:     Weight Change %:  Weight History / % Weight Change: No new wt to assess    Nutrition Focused Physical Exam Findings:    Subcutaneous Fat Loss:   Defer Subcutaneous Fat Loss Assessment: Defer all  Muscle Wasting:  Defer Muscle Wasting Assessment: Defer all  Edema:  Edema Location: 1+ RUE, non-pitting LUE and BLE per nursing assessment  Physical Findings:  Skin: Positive (left arm incisions per nursing assessment)  Digestive System Findings: Diarrhea  Teeth Findings: Impaired dentition    Nutrition Significant Labs:    Reviewed   Nutrition Specific Medications:  Reviewed     I/O:   Last BM Date: 03/10/25; Stool Appearance: Soft (03/10/25 1527)    Dietary Orders (From admission, onward)       Start     " Ordered    03/12/25 1109  Oral nutritional supplements  Until discontinued        Question Answer Comment   Deliver with Breakfast    Deliver with Dinner    Select supplement: Gelatein Plus        03/12/25 1108    03/06/25 1029  Adult diet Regular; Thin 0  Diet effective now        Comments: Choose softer foods from menu d/t dentition.   Question Answer Comment   Diet type Regular    Fluid consistency Thin 0        03/06/25 1029    02/26/25 1213  May Participate in Room Service  ( ROOM SERVICE MAY PARTICIPATE)  Once        Question:  .  Answer:  Yes    02/26/25 1212                     Estimated Needs:      Method for Estimating Needs: 5130-6702  26-30 génesis kg of IBW     Method for Estimating 24 Hour Protein Needs: 52-68  1-1.3 gm kg of IBW     Method for Estimating 24 Hour Fluid Needs: 2966-3485  20-30 ml kg of IBW as medically indicated  Patient on Order Fluid Restriction: No        Nutrition Diagnosis   Malnutrition Diagnosis  Patient has Malnutrition Diagnosis: No    Nutrition Diagnosis  Patient has Nutrition Diagnosis: Yes  Diagnosis Status (1): Active  Nutrition Diagnosis 1: Inadequate oral intake  Related to (1): limited food acceptance  As Evidenced by (1): Pt is doing better today and started to eat a little  Additional Assessment Information (1): Meal intakes remain 25-50%, not drinking ensure  Additional Nutrition Diagnosis: Diagnosis 2  Diagnosis Status (2): Active  Nutrition Diagnosis 2: Increased nutrient needs  Related to (2): physiological causes  As Evidenced by (2): surgery wound healing needs       Nutrition Interventions/Recommendations   Nutrition prescription for oral nutrition    Nutrition Recommendations:  Individualized Nutrition Prescription Provided for : Regular diet with ONS    Nutrition Interventions/Goals:   Interventions: Meals and snacks, Medical food supplement  Meals and Snacks: General healthful diet  Goal: Consumes 3 meals per day  Medical Food Supplement: Commercial beverage  medical food supplement therapy  Goal: Gelatein Plus BID (160 kcal and 20 g protein per serving)      Education Documentation  No documentation found.     Patient with no diet related questions at this time.         Nutrition Monitoring and Evaluation   Food/Nutrient Related History Monitoring  Monitoring and Evaluation Plan: Intake / amount of food, Estimated Energy Intake, Fluid intake  Estimated Energy Intake: Energy intake 50 -75% of estimated energy needs  Fluid Intake: Estimated fluid intake  Intake / Amount of food: Consumes at least 50% or more of meals/snacks/supplements    Anthropometric Measurements  Monitoring and Evaluation Plan: Body weight  Body Weight: Body weight - Maintain stable weight    Biochemical Data, Medical Tests and Procedures  Monitoring and Evaluation Plan: Electrolyte/renal panel, Glucose/endocrine profile  Electrolyte and Renal Panel: Electrolytes within normal limits  Glucose/Endocrine Profile: Glucose within normal limits ( mg/dL)    Physical Exam Findings  Monitoring and Evaluation Plan: Digestive System  Digestive System Finding: Diarrhea  Criteria: Improvement in GI function/symptoms    Goal Status: Some progress toward goal(s)    Time Spent (min): 30 minutes

## 2025-03-12 NOTE — PROGRESS NOTES
Physical Therapy    Physical Therapy Treatment    Patient Name: Kayley Lynch  MRN: [id number]  Department: [facility]  Room: [facility]844-  Today's Date: 3/12/2025  Time Calculation  Start Time: 1032  Stop Time: 1058  Time Calculation (min): 26 min         Assessment/Plan   PT Assessment  End of Session Patient Position: Up in chair, Alarm on     PT Plan  Treatment/Interventions: Bed mobility, Transfer training, Gait training, Balance training, Strengthening, Endurance training, Therapeutic exercise, Therapeutic activity  PT Plan: Ongoing PT  PT Frequency: 4 times per week  PT Discharge Recommendations: High intensity level of continued care  PT - OK to Discharge: Yes      General Visit Information:   PT  Visit  PT Received On: 03/12/25  General  Co-Treatment: OT  Co-Treatment Reason: to maximize safety and functional mobility  Prior to Session Communication: Bedside nurse  Patient Position Received: Bed, 2 rail up, Alarm off, not on at start of session  General Comment:  (pt pleasant and agreeable to participate in therapy session)    Subjective   Precautions:  Precautions  Medical Precautions: Fall precautions, Infection precautions (contact plus: c-diff), Oxygen therapy device and L/min (3L)  Precautions Comment: tele; davidson catheter        Objective   Pain:  Pain Assessment  Pain Assessment: 0-10  0-10 (Numeric) Pain Score: 0 - No pain     Treatments:  Bed Mobility  Bed Mobility:  (sup > sit with SBA; bedrail used to assist)    Ambulation/Gait Training  Ambulation/Gait Training Performed:  (pt ambulates several feet each bed > BSC and BSC > chair.  FWW and min A x 1.  cuing for sequencing and safe walker management.  pt also able to tolerate x2 trials static stance 1-2 min. each with FWW and CGA/min A for support/balance.)    Transfers  Transfer:  (sit <> stand x4 trials  (x2 from EOB and x2 from BSC)  completed with FWW and min A x 1-2.  repeated cuing for safe hand placement and sequencing.)    Outcome Measures:  [facility]  Basic Mobility  Turning from your back to your side while in a flat bed without using bedrails: A little  Moving from lying on your back to sitting on the side of a flat bed without using bedrails: A little  Moving to and from bed to chair (including a wheelchair): A little  Standing up from a chair using your arms (e.g. wheelchair or bedside chair): A lot  To walk in hospital room: A little  Climbing 3-5 steps with railing: Total  Basic Mobility - Total Score: 15    Education Documentation  Precautions, taught by Anabel Ambrocio PTA at 3/12/2025 12:21 PM.  Learner: Patient  Readiness: Acceptance  Method: Explanation  Response: Verbalizes Understanding, Needs Reinforcement    Mobility Training, taught by Anabel Ambrocio PTA at 3/12/2025 12:21 PM.  Learner: Patient  Readiness: Acceptance  Method: Explanation  Response: Verbalizes Understanding, Needs Reinforcement    Education Comments  No comments found.        EDUCATION:       Encounter Problems       Encounter Problems (Active)       PT Problem       PT Goal 1 STG - Pt will amb 10' using WW with MIN A   (Progressing)       Start:  03/05/25    Expected End:  03/19/25            PT Goal 2 STG - Pt will transition supine <> sitting with MIN A  (Progressing)       Start:  03/05/25    Expected End:  03/19/25            PT Goal 3 STG - Pt will SPT bed <> chair with MIN A  (Progressing)       Start:  03/05/25    Expected End:  03/19/25            PT Goal 4 STG - Pt will transfer STS with MIN A  (Progressing)       Start:  03/05/25    Expected End:  03/19/25

## 2025-03-13 LAB
ALBUMIN SERPL BCP-MCNC: 2.8 G/DL (ref 3.4–5)
ALP SERPL-CCNC: 45 U/L (ref 33–110)
ALT SERPL W P-5'-P-CCNC: 14 U/L (ref 7–45)
ANION GAP SERPL CALC-SCNC: 11 MMOL/L (ref 10–20)
AST SERPL W P-5'-P-CCNC: 13 U/L (ref 9–39)
BASOPHILS # BLD AUTO: 0.01 X10*3/UL (ref 0–0.1)
BASOPHILS NFR BLD AUTO: 0.1 %
BILIRUB SERPL-MCNC: 1.9 MG/DL (ref 0–1.2)
BUN SERPL-MCNC: 8 MG/DL (ref 6–23)
CALCIUM SERPL-MCNC: 8.6 MG/DL (ref 8.6–10.3)
CHLORIDE SERPL-SCNC: 100 MMOL/L (ref 98–107)
CO2 SERPL-SCNC: 30 MMOL/L (ref 21–32)
CREAT SERPL-MCNC: 0.32 MG/DL (ref 0.5–1.05)
EGFRCR SERPLBLD CKD-EPI 2021: >90 ML/MIN/1.73M*2
EOSINOPHIL # BLD AUTO: 0.01 X10*3/UL (ref 0–0.7)
EOSINOPHIL NFR BLD AUTO: 0.1 %
ERYTHROCYTE [DISTWIDTH] IN BLOOD BY AUTOMATED COUNT: 19.9 % (ref 11.5–14.5)
GLUCOSE BLD MANUAL STRIP-MCNC: 116 MG/DL (ref 74–99)
GLUCOSE BLD MANUAL STRIP-MCNC: 132 MG/DL (ref 74–99)
GLUCOSE BLD MANUAL STRIP-MCNC: 93 MG/DL (ref 74–99)
GLUCOSE BLD MANUAL STRIP-MCNC: 96 MG/DL (ref 74–99)
GLUCOSE SERPL-MCNC: 95 MG/DL (ref 74–99)
HCT VFR BLD AUTO: 28.3 % (ref 36–46)
HGB BLD-MCNC: 8.7 G/DL (ref 12–16)
IMM GRANULOCYTES # BLD AUTO: 0.04 X10*3/UL (ref 0–0.7)
IMM GRANULOCYTES NFR BLD AUTO: 0.5 % (ref 0–0.9)
INR PPP: 2.1 (ref 0.9–1.1)
INR PPP: 2.9 (ref 0.9–1.1)
LYMPHOCYTES # BLD AUTO: 0.95 X10*3/UL (ref 1.2–4.8)
LYMPHOCYTES NFR BLD AUTO: 12.9 %
MCH RBC QN AUTO: 27.8 PG (ref 26–34)
MCHC RBC AUTO-ENTMCNC: 30.7 G/DL (ref 32–36)
MCV RBC AUTO: 90 FL (ref 80–100)
MONOCYTES # BLD AUTO: 0.69 X10*3/UL (ref 0.1–1)
MONOCYTES NFR BLD AUTO: 9.4 %
NEUTROPHILS # BLD AUTO: 5.66 X10*3/UL (ref 1.2–7.7)
NEUTROPHILS NFR BLD AUTO: 77 %
NRBC BLD-RTO: 0 /100 WBCS (ref 0–0)
PLATELET # BLD AUTO: 247 X10*3/UL (ref 150–450)
POTASSIUM SERPL-SCNC: 3.1 MMOL/L (ref 3.5–5.3)
PROT SERPL-MCNC: 5.6 G/DL (ref 6.4–8.2)
PROTHROMBIN TIME: 23.6 SECONDS (ref 9.8–12.4)
PROTHROMBIN TIME: 31.7 SECONDS (ref 9.8–12.4)
RBC # BLD AUTO: 3.13 X10*6/UL (ref 4–5.2)
SODIUM SERPL-SCNC: 138 MMOL/L (ref 136–145)
UFH PPP CHRO-ACNC: 0.1 IU/ML
UFH PPP CHRO-ACNC: 0.2 IU/ML
WBC # BLD AUTO: 7.4 X10*3/UL (ref 4.4–11.3)

## 2025-03-13 PROCEDURE — 2500000002 HC RX 250 W HCPCS SELF ADMINISTERED DRUGS (ALT 637 FOR MEDICARE OP, ALT 636 FOR OP/ED)

## 2025-03-13 PROCEDURE — 80053 COMPREHEN METABOLIC PANEL: CPT

## 2025-03-13 PROCEDURE — 85610 PROTHROMBIN TIME: CPT

## 2025-03-13 PROCEDURE — 97530 THERAPEUTIC ACTIVITIES: CPT | Mod: GP,CQ

## 2025-03-13 PROCEDURE — 2060000001 HC INTERMEDIATE ICU ROOM DAILY

## 2025-03-13 PROCEDURE — 97110 THERAPEUTIC EXERCISES: CPT | Mod: GP,CQ

## 2025-03-13 PROCEDURE — 82947 ASSAY GLUCOSE BLOOD QUANT: CPT

## 2025-03-13 PROCEDURE — 36415 COLL VENOUS BLD VENIPUNCTURE: CPT

## 2025-03-13 PROCEDURE — 85610 PROTHROMBIN TIME: CPT | Performed by: INTERNAL MEDICINE

## 2025-03-13 PROCEDURE — 36415 COLL VENOUS BLD VENIPUNCTURE: CPT | Performed by: INTERNAL MEDICINE

## 2025-03-13 PROCEDURE — 2500000001 HC RX 250 WO HCPCS SELF ADMINISTERED DRUGS (ALT 637 FOR MEDICARE OP)

## 2025-03-13 PROCEDURE — 2500000002 HC RX 250 W HCPCS SELF ADMINISTERED DRUGS (ALT 637 FOR MEDICARE OP, ALT 636 FOR OP/ED): Performed by: INTERNAL MEDICINE

## 2025-03-13 PROCEDURE — 85520 HEPARIN ASSAY: CPT | Performed by: INTERNAL MEDICINE

## 2025-03-13 PROCEDURE — 85025 COMPLETE CBC W/AUTO DIFF WBC: CPT

## 2025-03-13 PROCEDURE — 94640 AIRWAY INHALATION TREATMENT: CPT

## 2025-03-13 PROCEDURE — 2500000005 HC RX 250 GENERAL PHARMACY W/O HCPCS

## 2025-03-13 PROCEDURE — 2500000004 HC RX 250 GENERAL PHARMACY W/ HCPCS (ALT 636 FOR OP/ED): Performed by: INTERNAL MEDICINE

## 2025-03-13 PROCEDURE — 99233 SBSQ HOSP IP/OBS HIGH 50: CPT | Performed by: INTERNAL MEDICINE

## 2025-03-13 PROCEDURE — 2500000001 HC RX 250 WO HCPCS SELF ADMINISTERED DRUGS (ALT 637 FOR MEDICARE OP): Performed by: INTERNAL MEDICINE

## 2025-03-13 RX ORDER — WARFARIN 2 MG/1
2 TABLET ORAL ONCE
Status: COMPLETED | OUTPATIENT
Start: 2025-03-13 | End: 2025-03-13

## 2025-03-13 RX ORDER — POTASSIUM CHLORIDE 20 MEQ/1
40 TABLET, EXTENDED RELEASE ORAL ONCE
Status: COMPLETED | OUTPATIENT
Start: 2025-03-13 | End: 2025-03-13

## 2025-03-13 RX ADMIN — POTASSIUM CHLORIDE 40 MEQ: 1500 TABLET, EXTENDED RELEASE ORAL at 09:28

## 2025-03-13 RX ADMIN — BUPROPION HYDROCHLORIDE 150 MG: 150 TABLET, EXTENDED RELEASE ORAL at 08:05

## 2025-03-13 RX ADMIN — CARVEDILOL 25 MG: 25 TABLET, FILM COATED ORAL at 20:10

## 2025-03-13 RX ADMIN — BUDESONIDE 0.5 MG: 0.5 INHALANT ORAL at 08:17

## 2025-03-13 RX ADMIN — VANCOMYCIN HYDROCHLORIDE 250 MG: 250 CAPSULE ORAL at 12:56

## 2025-03-13 RX ADMIN — ATORVASTATIN CALCIUM 80 MG: 80 TABLET, FILM COATED ORAL at 20:10

## 2025-03-13 RX ADMIN — ASPIRIN 81 MG: 81 TABLET, COATED ORAL at 08:04

## 2025-03-13 RX ADMIN — WARFARIN SODIUM 2 MG: 2 TABLET ORAL at 17:40

## 2025-03-13 RX ADMIN — ESCITALOPRAM OXALATE 20 MG: 10 TABLET ORAL at 08:04

## 2025-03-13 RX ADMIN — VANCOMYCIN HYDROCHLORIDE 250 MG: 250 CAPSULE ORAL at 08:04

## 2025-03-13 RX ADMIN — VANCOMYCIN HYDROCHLORIDE 250 MG: 250 CAPSULE ORAL at 20:13

## 2025-03-13 RX ADMIN — HEPARIN SODIUM 798 UNITS/HR: 10000 INJECTION, SOLUTION INTRAVENOUS at 08:04

## 2025-03-13 RX ADMIN — BUDESONIDE 0.5 MG: 0.5 INHALANT ORAL at 20:07

## 2025-03-13 RX ADMIN — Medication 2 L/MIN: at 20:09

## 2025-03-13 RX ADMIN — LISINOPRIL 20 MG: 20 TABLET ORAL at 08:05

## 2025-03-13 RX ADMIN — VANCOMYCIN HYDROCHLORIDE 250 MG: 250 CAPSULE ORAL at 17:40

## 2025-03-13 RX ADMIN — IPRATROPIUM BROMIDE AND ALBUTEROL SULFATE 3 ML: .5; 3 SOLUTION RESPIRATORY (INHALATION) at 20:08

## 2025-03-13 RX ADMIN — ACETAMINOPHEN 650 MG: 325 TABLET, FILM COATED ORAL at 20:10

## 2025-03-13 RX ADMIN — IPRATROPIUM BROMIDE AND ALBUTEROL SULFATE 3 ML: .5; 3 SOLUTION RESPIRATORY (INHALATION) at 13:58

## 2025-03-13 RX ADMIN — CARVEDILOL 25 MG: 25 TABLET, FILM COATED ORAL at 08:04

## 2025-03-13 RX ADMIN — IPRATROPIUM BROMIDE AND ALBUTEROL SULFATE 3 ML: .5; 3 SOLUTION RESPIRATORY (INHALATION) at 08:17

## 2025-03-13 RX ADMIN — Medication 2 L/MIN: at 20:12

## 2025-03-13 RX ADMIN — PANTOPRAZOLE SODIUM 40 MG: 40 TABLET, DELAYED RELEASE ORAL at 08:04

## 2025-03-13 ASSESSMENT — PAIN - FUNCTIONAL ASSESSMENT
PAIN_FUNCTIONAL_ASSESSMENT: 0-10

## 2025-03-13 ASSESSMENT — COGNITIVE AND FUNCTIONAL STATUS - GENERAL
TURNING FROM BACK TO SIDE WHILE IN FLAT BAD: A LITTLE
DAILY ACTIVITIY SCORE: 20
TURNING FROM BACK TO SIDE WHILE IN FLAT BAD: A LITTLE
MOVING TO AND FROM BED TO CHAIR: A LITTLE
HELP NEEDED FOR BATHING: A LITTLE
CLIMB 3 TO 5 STEPS WITH RAILING: A LOT
TOILETING: A LITTLE
STANDING UP FROM CHAIR USING ARMS: A LITTLE
CLIMB 3 TO 5 STEPS WITH RAILING: TOTAL
DRESSING REGULAR UPPER BODY CLOTHING: A LITTLE
MOVING TO AND FROM BED TO CHAIR: A LITTLE
STANDING UP FROM CHAIR USING ARMS: A LOT
WALKING IN HOSPITAL ROOM: A LITTLE
DRESSING REGULAR LOWER BODY CLOTHING: A LITTLE
WALKING IN HOSPITAL ROOM: A LITTLE
MOVING FROM LYING ON BACK TO SITTING ON SIDE OF FLAT BED WITH BEDRAILS: A LITTLE
MOBILITY SCORE: 17
MOBILITY SCORE: 15
MOVING FROM LYING ON BACK TO SITTING ON SIDE OF FLAT BED WITH BEDRAILS: A LITTLE

## 2025-03-13 ASSESSMENT — PAIN SCALES - GENERAL
PAINLEVEL_OUTOF10: 6
PAINLEVEL_OUTOF10: 0 - NO PAIN

## 2025-03-13 NOTE — PROGRESS NOTES
Physical Therapy    Physical Therapy Treatment    Patient Name: Kayley Lynch  MRN: [id number]  Department: [facility]  Room: [facility]844-  Today's Date: 3/13/2025  Time Calculation  Start Time: 1300  Stop Time: 1330  Time Calculation (min): 30 min         Assessment/Plan   PT Assessment  End of Session Communication: Bedside nurse  End of Session Patient Position: Bed, 2 rail up, Alarm on (Supine with HOB elevated, call bell in reach.)     PT Plan  Treatment/Interventions: Bed mobility, Transfer training, Gait training, Balance training, Strengthening, Endurance training, Therapeutic exercise, Therapeutic activity  PT Plan: Ongoing PT  PT Frequency: 4 times per week  PT Discharge Recommendations: High intensity level of continued care  PT - OK to Discharge: Yes      General Visit Information:   PT  Visit  PT Received On: 03/13/25  General  Prior to Session Communication: Bedside nurse  Patient Position Received: Bed, 2 rail up, Alarm on  General Comment:  (Pt resting in bed upon arrival; Pleasant although politely declined participating in OOB activity this date d/t feeling very fatigued/tired this afternoon.   SCD's donned/activated pre/post session.)    Subjective   Precautions:  Precautions  Medical Precautions: Fall precautions, Infection precautions, Oxygen therapy device and L/min  Precautions Comment: IV, Falls, Infection precautions, 2LO2 via NC     Date/Time Vitals Session Patient Position Pulse Resp SpO2 BP MAP (mmHg)    03/13/25 1358 --  --  --  --  99 %  --  --     03/13/25 15:47:58 --  --  --  --  96 %  135/72  98                 Objective   Pain:  Pain Assessment  Pain Assessment: 0-10  0-10 (Numeric) Pain Score: 0 - No pain              Treatments:  Therapeutic Exercise  Therapeutic Exercise Performed: Yes (Pt performed seated BLE therex consisting of: heel/toe raises, marching, LAQ, HS, GS, hip ADD isometrics with pillow, ABD 2x10 reps each.)    Bed Mobility  Bed Mobility: Yes (Pt performed supine>sit with HOB  elevated, sit>supine with HOB flat and SBA. Pt sat EOB ~20 minutes with BUE support and SBA.)    Outcome Measures:  [facility] Basic Mobility  Turning from your back to your side while in a flat bed without using bedrails: A little  Moving from lying on your back to sitting on the side of a flat bed without using bedrails: A little  Moving to and from bed to chair (including a wheelchair): A little  Standing up from a chair using your arms (e.g. wheelchair or bedside chair): A lot  To walk in hospital room: A little  Climbing 3-5 steps with railing: Total  Basic Mobility - Total Score: 15    Education Documentation  Precautions, taught by Bruce Carlton PTA at 3/13/2025  3:51 PM.  Learner: Patient  Readiness: Acceptance  Method: Explanation  Response: Verbalizes Understanding    Mobility Training, taught by Bruce Carlton PTA at 3/13/2025  3:51 PM.  Learner: Patient  Readiness: Acceptance  Method: Explanation  Response: Verbalizes Understanding    Education Comments  No comments found.        OP EDUCATION:       Encounter Problems       Encounter Problems (Active)       PT Problem       PT Goal 1 STG - Pt will amb 10' using WW with MIN A   (Progressing)       Start:  03/05/25    Expected End:  03/19/25            PT Goal 2 STG - Pt will transition supine <> sitting with MIN A  (Progressing)       Start:  03/05/25    Expected End:  03/19/25            PT Goal 3 STG - Pt will SPT bed <> chair with MIN A  (Progressing)       Start:  03/05/25    Expected End:  03/19/25            PT Goal 4 STG - Pt will transfer STS with MIN A  (Progressing)       Start:  03/05/25    Expected End:  03/19/25               Pain - Adult

## 2025-03-13 NOTE — PROGRESS NOTES
3/13/2025  Spoke with Autumn WINSLOW from [facility], per MD notes- pt on Heparin gtt until INR >2.5.   Stated she will start pre-cert once pt off Heparin gtt.  CT Team will continue to monitor.   Will ask for therapy to see pt tomorrow.   Kanika Christian RN [facility]

## 2025-03-13 NOTE — PROGRESS NOTES
Kayley Lynch is a 54 y.o. female on day 15 of admission presenting with Nausea vomiting and diarrhea.      Subjective   No events overnight.  Patient seen and examined at bedside with  present.  Hospitalization and treatment plan reviewed with  at length.  Patient has no complaints.       Objective     Last Recorded Vitals  /80 (BP Location: Right leg, Patient Position: Lying)   Pulse 85   Temp 36.3 °C (97.3 °F) (Temporal)   Resp 18   Wt 66.5 kg (146 lb 9.7 oz)   SpO2 99%   Intake/Output last 3 Shifts:    Intake/Output Summary (Last 24 hours) at 3/13/2025 1529  Last data filed at 3/13/2025 1300  Gross per 24 hour   Intake --   Output 901 ml   Net -901 ml       Admission Weight  Weight: 59 kg (130 lb) (02/26/25 0159)    Daily Weight  03/07/25 : 66.5 kg (146 lb 9.7 oz)    Image Results  Electrocardiogram, 12-lead PRN ACS symptoms  Sinus tachycardia with irregular rate  Consider left ventricular hypertrophy  Borderline T abnormalities, diffuse leads    Confirmed by Shane Dunbar ([id number]) on 3/11/2025 11:44:57 AM      Physical Exam  HENT:      Head: Normocephalic and atraumatic.      Nose: Nose normal.      Mouth/Throat:      Mouth: Mucous membranes are moist.      Pharynx: Oropharynx is clear.   Eyes:      Extraocular Movements: Extraocular movements intact.      Pupils: Pupils are equal, round, and reactive to light.   Cardiovascular:      Rate and Rhythm: Normal rate and regular rhythm.   Pulmonary:      Effort: No respiratory distress.      Breath sounds: Wheezing present. No rhonchi or rales.   Abdominal:      General: Bowel sounds are normal. There is no distension.      Palpations: Abdomen is soft.      Tenderness: There is no abdominal tenderness. There is no guarding.   Musculoskeletal:      Right lower leg: No edema.      Left lower leg: No edema.   Skin:     General: Skin is warm and dry.   Neurological:      Mental Status: She is alert. Mental status is at baseline.          Relevant Results               Assessment/Plan   This patient currently has cardiac telemetry ordered; if you would like to modify or discontinue the telemetry order, click here to go to the orders activity to modify/discontinue the order.      This patient has a urinary catheter   Reason for the urinary catheter remaining today? critically ill patient who need accurate urinary output measurements    Assessment & Plan  Nausea vomiting and diarrhea    Influenza A with pneumonia      Septic shock, dehydration, hypercapnic respiratory failure, and dehydration.  Nontraumatic compartment syndrome of left upper extremity      Patient is a 54-year-old female with past medical history of COPD, CAD status post CABG, pulmonary hypertension, hyperlipidemia, cardiomyopathy, a flutter status post ablation, and mitral valve replacement who presented with malaise.  She was found to be in septic shock and admitted to the ICU.    Septic shock  Hypercapnic respiratory failure  COPD exacerbation  Influenza A  Pneumonia  Acute metabolic encephalopathy  C. difficile colitis  Dehydration  H[facility]    -Continue supplemental oxygen  -Continue antibiotics, currently on doxycycline  -Continue nebulizers  -Continue steroids  -Continue Tamiflu  -Continue p.o. vancomycin  -Continue home meds  -Monitor INR    3/2: Patient developed a large hematoma in the left arm leading to compartment syndrome. She was taken for urgent fasciotomies with Orthopedic Surgery. She is also being followed by Vascular Surgery. Vitamin K ordered for supratherapeutic INR. Her INR was 2.2 yesterday with goal being 2.5-3.5 for her A-flutter s/p mitral valve replacement. Critical Care reconsulted. Hematology consulted, appreciate recs. Hold coumadin and lovenox.    3/3: INR reversed. Patient is on low intensity heparin drip. She was transfused 1 unit pRBCs. Monitor hemoglobin closely. Hematology has ordered a work up including DIC panel. Patient will need another  debridement and irrigation in 24-48 hours per Orthopedics. She has also developed a retroperitoneal bleed.    3/4: INR is down to 1.0.  Leukocytosis improved from 15 down to 13.  Hemoglobin is down to 6.4.  Patient to be transfused another unit packed RBCs.  She will be going back to the OR today for further debridement, washout, possible closure. She will also have a CT angiogram to determine if there is any active bleeding causing her retroperitoneal bleed.  She remains on a heparin drip due to her valve.  Continue doxycycline and p.o. Vanco.  Patient fully evaluated on March 5.  Patient restarted on heparin.  Fasciotomy site for compartment syndrome appears clear.  Continue to monitor lab work.  Case discussed with  at bedside.  No active bleeding is noted at this time.  Recheck labs in AM.       Patient fully evaluated  03/07  for    Problem List Items Addressed This Visit          Gastrointestinal and Abdominal    * (Principal) Nausea vomiting and diarrhea       Musculoskeletal and Injuries    Nontraumatic compartment syndrome of left upper extremity    Relevant Orders    Case Request Operating Room: FASCIOTOMY, UPPER EXTREMITY (Completed)    Case Request Operating Room: DEBRIDEMENT, WOUND, UPPER EXTREMITY (Completed)       Pulmonary and Pneumonias    Influenza A with pneumonia - Primary     Other Visit Diagnoses       Acute kidney injury (CMS-HCC)        Dehydration        Influenza A        Shock (Multi)        Relevant Orders    Transthoracic Echo (TTE) Complete (Completed)          Patient seen resting in bed with head of bed elevated, no s/s or c/o acute difficulties at this time.  Vital signs for last 24 hours Temp:  [35.9 °C (96.6 °F)-36.6 °C (97.9 °F)] 36.3 °C (97.3 °F)  Heart Rate:  [70-93] 85  Resp:  [16-18] 18  BP: (120-169)/(56-92) 164/80  FiO2 (%):  [28 %-32 %] 28 %    I/O this shift:  In: -   Out: 1 [Stool:1]  Patient still requiring frequent cardiac and SPO2 monitoring. Continue aggressive  pulmonary hygiene and oral hygiene. Off loading as tolerated for skin integrity. Medications and labs reviewed-   Results for orders placed or performed during the hospital encounter of 02/25/25 (from the past 24 hours)   Protime-INR   Result Value Ref Range    Protime 19.4 (H) 9.8 - 12.4 seconds    INR 1.8 (H) 0.9 - 1.1   POCT GLUCOSE   Result Value Ref Range    POCT Glucose 136 (H) 74 - 99 mg/dL   POCT GLUCOSE   Result Value Ref Range    POCT Glucose 144 (H) 74 - 99 mg/dL   CBC and Auto Differential   Result Value Ref Range    WBC 7.4 4.4 - 11.3 x10*3/uL    nRBC 0.0 0.0 - 0.0 /100 WBCs    RBC 3.13 (L) 4.00 - 5.20 x10*6/uL    Hemoglobin 8.7 (L) 12.0 - 16.0 g/dL    Hematocrit 28.3 (L) 36.0 - 46.0 %    MCV 90 80 - 100 fL    MCH 27.8 26.0 - 34.0 pg    MCHC 30.7 (L) 32.0 - 36.0 g/dL    RDW 19.9 (H) 11.5 - 14.5 %    Platelets 247 150 - 450 x10*3/uL    Neutrophils % 77.0 40.0 - 80.0 %    Immature Granulocytes %, Automated 0.5 0.0 - 0.9 %    Lymphocytes % 12.9 13.0 - 44.0 %    Monocytes % 9.4 2.0 - 10.0 %    Eosinophils % 0.1 0.0 - 6.0 %    Basophils % 0.1 0.0 - 2.0 %    Neutrophils Absolute 5.66 1.20 - 7.70 x10*3/uL    Immature Granulocytes Absolute, Automated 0.04 0.00 - 0.70 x10*3/uL    Lymphocytes Absolute 0.95 (L) 1.20 - 4.80 x10*3/uL    Monocytes Absolute 0.69 0.10 - 1.00 x10*3/uL    Eosinophils Absolute 0.01 0.00 - 0.70 x10*3/uL    Basophils Absolute 0.01 0.00 - 0.10 x10*3/uL   Comprehensive Metabolic Panel   Result Value Ref Range    Glucose 95 74 - 99 mg/dL    Sodium 138 136 - 145 mmol/L    Potassium 3.1 (L) 3.5 - 5.3 mmol/L    Chloride 100 98 - 107 mmol/L    Bicarbonate 30 21 - 32 mmol/L    Anion Gap 11 10 - 20 mmol/L    Urea Nitrogen 8 6 - 23 mg/dL    Creatinine 0.32 (L) 0.50 - 1.05 mg/dL    eGFR >90 >60 mL/min/1.73m*2    Calcium 8.6 8.6 - 10.3 mg/dL    Albumin 2.8 (L) 3.4 - 5.0 g/dL    Alkaline Phosphatase 45 33 - 110 U/L    Total Protein 5.6 (L) 6.4 - 8.2 g/dL    AST 13 9 - 39 U/L    Bilirubin, Total 1.9 (H)  0.0 - 1.2 mg/dL    ALT 14 7 - 45 U/L   Protime-INR   Result Value Ref Range    Protime 23.6 (H) 9.8 - 12.4 seconds    INR 2.1 (H) 0.9 - 1.1   POCT GLUCOSE   Result Value Ref Range    POCT Glucose 93 74 - 99 mg/dL   POCT GLUCOSE   Result Value Ref Range    POCT Glucose 132 (H) 74 - 99 mg/dL   Heparin Assay   Result Value Ref Range    Heparin Unfractionated 0.1 See Comment Below for Therapeutic Ranges IU/mL     *Note: Due to a large number of results and/or encounters for the requested time period, some results have not been displayed. A complete set of results can be found in Results Review.      Patient recently received an antibiotic (last 12 hours)       Date/Time Action Medication Dose    03/07/25 1252 Given    vancomycin (Firvanq) solution 250 mg 250 mg    03/07/25 0602 Given    vancomycin (Firvanq) solution 250 mg 250 mg           Plan discussed with interdisciplinary team, seen by orthopedics today - dressing changes as needed,   suture removal on March 18, recommended shoulder elbow and hand range of motion with therapy ordered. Will continue current and repeat labs in the AM. CXR shows improved infiltrate at the left lower lung, strict intake and output, trend hemoglobin.    Discharge planning discussed with patient and care team. Therapy evaluations ordered. [facility] - anticipate C/SNF at discharge. Patient aware and agreeable to current plan, continue plan as above.     I spent a total of 50 minutes on the date of the service which included preparing to see the patient, face-to-face patient care, completing clinical documentation, obtaining and/or reviewing separately obtained history, performing a medically appropriate examination, counseling and educating the patient/family/caregiver, ordering medications, tests, or procedures, communicating with other HCPs (not separately reported), independently interpreting results (not separately reported), communicating results to the patient/family/caregiver, and  care coordination (not separately reported).        Patient fully evaluated  03/08  for    Problem List Items Addressed This Visit          Gastrointestinal and Abdominal    * (Principal) Nausea vomiting and diarrhea       Musculoskeletal and Injuries    Nontraumatic compartment syndrome of left upper extremity    Relevant Orders    Case Request Operating Room: FASCIOTOMY, UPPER EXTREMITY (Completed)    Case Request Operating Room: DEBRIDEMENT, WOUND, UPPER EXTREMITY (Completed)       Pulmonary and Pneumonias    Influenza A with pneumonia - Primary     Other Visit Diagnoses       Acute kidney injury (CMS-HCC)        Dehydration        Influenza A        Shock (Multi)        Relevant Orders    Transthoracic Echo (TTE) Complete (Completed)          Patient seen resting in bed with head of bed elevated, no s/s or c/o acute difficulties at this time.  Vital signs for last 24 hours Temp:  [35.9 °C (96.6 °F)-36.6 °C (97.9 °F)] 36.3 °C (97.3 °F)  Heart Rate:  [70-93] 85  Resp:  [16-18] 18  BP: (120-169)/(56-92) 164/80  FiO2 (%):  [28 %-32 %] 28 %    I/O this shift:  In: -   Out: 1 [Stool:1]  Patient still requiring frequent cardiac and SPO2 monitoring. Continue aggressive pulmonary hygiene and oral hygiene. Off loading as tolerated for skin integrity. Medications and labs reviewed-   Results for orders placed or performed during the hospital encounter of 02/25/25 (from the past 24 hours)   Protime-INR   Result Value Ref Range    Protime 19.4 (H) 9.8 - 12.4 seconds    INR 1.8 (H) 0.9 - 1.1   POCT GLUCOSE   Result Value Ref Range    POCT Glucose 136 (H) 74 - 99 mg/dL   POCT GLUCOSE   Result Value Ref Range    POCT Glucose 144 (H) 74 - 99 mg/dL   CBC and Auto Differential   Result Value Ref Range    WBC 7.4 4.4 - 11.3 x10*3/uL    nRBC 0.0 0.0 - 0.0 /100 WBCs    RBC 3.13 (L) 4.00 - 5.20 x10*6/uL    Hemoglobin 8.7 (L) 12.0 - 16.0 g/dL    Hematocrit 28.3 (L) 36.0 - 46.0 %    MCV 90 80 - 100 fL    MCH 27.8 26.0 - 34.0 pg    MCHC  30.7 (L) 32.0 - 36.0 g/dL    RDW 19.9 (H) 11.5 - 14.5 %    Platelets 247 150 - 450 x10*3/uL    Neutrophils % 77.0 40.0 - 80.0 %    Immature Granulocytes %, Automated 0.5 0.0 - 0.9 %    Lymphocytes % 12.9 13.0 - 44.0 %    Monocytes % 9.4 2.0 - 10.0 %    Eosinophils % 0.1 0.0 - 6.0 %    Basophils % 0.1 0.0 - 2.0 %    Neutrophils Absolute 5.66 1.20 - 7.70 x10*3/uL    Immature Granulocytes Absolute, Automated 0.04 0.00 - 0.70 x10*3/uL    Lymphocytes Absolute 0.95 (L) 1.20 - 4.80 x10*3/uL    Monocytes Absolute 0.69 0.10 - 1.00 x10*3/uL    Eosinophils Absolute 0.01 0.00 - 0.70 x10*3/uL    Basophils Absolute 0.01 0.00 - 0.10 x10*3/uL   Comprehensive Metabolic Panel   Result Value Ref Range    Glucose 95 74 - 99 mg/dL    Sodium 138 136 - 145 mmol/L    Potassium 3.1 (L) 3.5 - 5.3 mmol/L    Chloride 100 98 - 107 mmol/L    Bicarbonate 30 21 - 32 mmol/L    Anion Gap 11 10 - 20 mmol/L    Urea Nitrogen 8 6 - 23 mg/dL    Creatinine 0.32 (L) 0.50 - 1.05 mg/dL    eGFR >90 >60 mL/min/1.73m*2    Calcium 8.6 8.6 - 10.3 mg/dL    Albumin 2.8 (L) 3.4 - 5.0 g/dL    Alkaline Phosphatase 45 33 - 110 U/L    Total Protein 5.6 (L) 6.4 - 8.2 g/dL    AST 13 9 - 39 U/L    Bilirubin, Total 1.9 (H) 0.0 - 1.2 mg/dL    ALT 14 7 - 45 U/L   Protime-INR   Result Value Ref Range    Protime 23.6 (H) 9.8 - 12.4 seconds    INR 2.1 (H) 0.9 - 1.1   POCT GLUCOSE   Result Value Ref Range    POCT Glucose 93 74 - 99 mg/dL   POCT GLUCOSE   Result Value Ref Range    POCT Glucose 132 (H) 74 - 99 mg/dL   Heparin Assay   Result Value Ref Range    Heparin Unfractionated 0.1 See Comment Below for Therapeutic Ranges IU/mL      Patient recently received an antibiotic (last 12 hours)       Date/Time Action Medication Dose    03/08/25 1220 Given    vancomycin (Vancocin) capsule 250 mg 250 mg           Plan discussed with interdisciplinary team, seen by cardiology today, will bridge patient to coumadin, ptt/inr in am, appreciate input and agree with plan. LUE dressing is  clean, dry and intact, swelling is noted, warm and well-perfused.  She is able to flex and extend the fingers and thumb. Will continue current and repeat labs in the AM.    Patient fully evaluated on March 9 and clinically improved.  Recheck labs in AM.  INR is slowly improving.  Discontinue heparin when INR is therapeutic for mechanical valve.  Appreciate orthopedic and cardiology consultations.  Examination of area from compartment syndrome is clear without neurovascular compromise.  Case discussed with orthopedics at bedside    Discharge planning discussed with patient and care team. Patient aware and agreeable to current plan, continue plan as above.     I spent a total of 50 minutes on the date of the service which included preparing to see the patient, face-to-face patient care, completing clinical documentation, obtaining and/or reviewing separately obtained history, performing a medically appropriate examination, counseling and educating the patient/family/caregiver, ordering medications, tests, or procedures, communicating with other HCPs (not separately reported), independently interpreting results (not separately reported), communicating results to the patient/family/caregiver, and care coordination (not separately reported).     Patient fully evaluated    for    Problem List Items Addressed This Visit          Gastrointestinal and Abdominal    * (Principal) Nausea vomiting and diarrhea       Musculoskeletal and Injuries    Nontraumatic compartment syndrome of left upper extremity    Relevant Orders    Case Request Operating Room: FASCIOTOMY, UPPER EXTREMITY (Completed)    Case Request Operating Room: DEBRIDEMENT, WOUND, UPPER EXTREMITY (Completed)       Pulmonary and Pneumonias    Influenza A with pneumonia - Primary     Other Visit Diagnoses       Acute kidney injury (CMS-HCC)        Dehydration        Influenza A        Shock (Multi)        Relevant Orders    Transthoracic Echo (TTE) Complete  (Completed)          Patient seen resting in bed with head of bed elevated, no s/s or c/o acute difficulties at this time.  Vital signs for last 24 hours Temp:  [35.9 °C (96.6 °F)-36.6 °C (97.9 °F)] 36.3 °C (97.3 °F)  Heart Rate:  [70-93] 85  Resp:  [16-18] 18  BP: (120-169)/(56-92) 164/80  FiO2 (%):  [28 %-32 %] 28 %    I/O this shift:  In: -   Out: 1 [Stool:1]  Patient still requiring frequent cardiac and SPO2 monitoring. Continue aggressive pulmonary hygiene and oral hygiene. Off loading as tolerated for skin integrity. Medications and labs reviewed-   Results for orders placed or performed during the hospital encounter of 02/25/25 (from the past 24 hours)   Protime-INR   Result Value Ref Range    Protime 19.4 (H) 9.8 - 12.4 seconds    INR 1.8 (H) 0.9 - 1.1   POCT GLUCOSE   Result Value Ref Range    POCT Glucose 136 (H) 74 - 99 mg/dL   POCT GLUCOSE   Result Value Ref Range    POCT Glucose 144 (H) 74 - 99 mg/dL   CBC and Auto Differential   Result Value Ref Range    WBC 7.4 4.4 - 11.3 x10*3/uL    nRBC 0.0 0.0 - 0.0 /100 WBCs    RBC 3.13 (L) 4.00 - 5.20 x10*6/uL    Hemoglobin 8.7 (L) 12.0 - 16.0 g/dL    Hematocrit 28.3 (L) 36.0 - 46.0 %    MCV 90 80 - 100 fL    MCH 27.8 26.0 - 34.0 pg    MCHC 30.7 (L) 32.0 - 36.0 g/dL    RDW 19.9 (H) 11.5 - 14.5 %    Platelets 247 150 - 450 x10*3/uL    Neutrophils % 77.0 40.0 - 80.0 %    Immature Granulocytes %, Automated 0.5 0.0 - 0.9 %    Lymphocytes % 12.9 13.0 - 44.0 %    Monocytes % 9.4 2.0 - 10.0 %    Eosinophils % 0.1 0.0 - 6.0 %    Basophils % 0.1 0.0 - 2.0 %    Neutrophils Absolute 5.66 1.20 - 7.70 x10*3/uL    Immature Granulocytes Absolute, Automated 0.04 0.00 - 0.70 x10*3/uL    Lymphocytes Absolute 0.95 (L) 1.20 - 4.80 x10*3/uL    Monocytes Absolute 0.69 0.10 - 1.00 x10*3/uL    Eosinophils Absolute 0.01 0.00 - 0.70 x10*3/uL    Basophils Absolute 0.01 0.00 - 0.10 x10*3/uL   Comprehensive Metabolic Panel   Result Value Ref Range    Glucose 95 74 - 99 mg/dL    Sodium 138  136 - 145 mmol/L    Potassium 3.1 (L) 3.5 - 5.3 mmol/L    Chloride 100 98 - 107 mmol/L    Bicarbonate 30 21 - 32 mmol/L    Anion Gap 11 10 - 20 mmol/L    Urea Nitrogen 8 6 - 23 mg/dL    Creatinine 0.32 (L) 0.50 - 1.05 mg/dL    eGFR >90 >60 mL/min/1.73m*2    Calcium 8.6 8.6 - 10.3 mg/dL    Albumin 2.8 (L) 3.4 - 5.0 g/dL    Alkaline Phosphatase 45 33 - 110 U/L    Total Protein 5.6 (L) 6.4 - 8.2 g/dL    AST 13 9 - 39 U/L    Bilirubin, Total 1.9 (H) 0.0 - 1.2 mg/dL    ALT 14 7 - 45 U/L   Protime-INR   Result Value Ref Range    Protime 23.6 (H) 9.8 - 12.4 seconds    INR 2.1 (H) 0.9 - 1.1   POCT GLUCOSE   Result Value Ref Range    POCT Glucose 93 74 - 99 mg/dL   POCT GLUCOSE   Result Value Ref Range    POCT Glucose 132 (H) 74 - 99 mg/dL   Heparin Assay   Result Value Ref Range    Heparin Unfractionated 0.1 See Comment Below for Therapeutic Ranges IU/mL      Patient recently received an antibiotic (last 12 hours)       Date/Time Action Medication Dose    03/11/25 0617 Given    vancomycin (Vancocin) capsule 250 mg 250 mg    03/10/25 2140 Given    vancomycin (Vancocin) capsule 250 mg 250 mg           Pt fully evaluated 3/11. PT recommended high intensity level of clinical care. Potassium is 3.3 and gave Kcl and INR is 5.5 out of therapeutic range and protime is 61.3. Pt on 2L NC today. Plan discussed with interdisciplinary team, continue current and repeat labs in the AM.     Discharge planning discussed with patient and care team. Therapy evaluations ordered. [facility]-  , anticipate HHC/SNF at discharge. Patient aware and agreeable to current plan, continue plan as above.     I spent a total of 50 minutes on the date of the service which included preparing to see the patient, face-to-face patient care, completing clinical documentation, obtaining and/or reviewing separately obtained history, performing a medically appropriate examination, counseling and educating the patient/family/caregiver, ordering medications, tests, or  procedures, communicating with other HCPs (not separately reported), independently interpreting results (not separately reported), communicating results to the patient/family/caregiver, and care coordination (not separately reported).     Patient fully evaluated    for    Problem List Items Addressed This Visit          Gastrointestinal and Abdominal    * (Principal) Nausea vomiting and diarrhea       Musculoskeletal and Injuries    Nontraumatic compartment syndrome of left upper extremity    Relevant Orders    Case Request Operating Room: FASCIOTOMY, UPPER EXTREMITY (Completed)    Case Request Operating Room: DEBRIDEMENT, WOUND, UPPER EXTREMITY (Completed)       Pulmonary and Pneumonias    Influenza A with pneumonia - Primary     Other Visit Diagnoses       Acute kidney injury (CMS-HCC)        Dehydration        Influenza A        Shock (Multi)        Relevant Orders    Transthoracic Echo (TTE) Complete (Completed)          Patient seen resting in bed with head of bed elevated, no s/s or c/o acute difficulties at this time.  Vital signs for last 24 hours Temp:  [35.9 °C (96.6 °F)-36.6 °C (97.9 °F)] 36.3 °C (97.3 °F)  Heart Rate:  [70-93] 85  Resp:  [16-18] 18  BP: (120-169)/(56-92) 164/80  FiO2 (%):  [28 %-32 %] 28 %    I/O this shift:  In: -   Out: 1 [Stool:1]  Patient still requiring frequent cardiac and SPO2 monitoring. Continue aggressive pulmonary hygiene and oral hygiene. Off loading as tolerated for skin integrity. Medications and labs reviewed-   Results for orders placed or performed during the hospital encounter of 02/25/25 (from the past 24 hours)   Protime-INR   Result Value Ref Range    Protime 19.4 (H) 9.8 - 12.4 seconds    INR 1.8 (H) 0.9 - 1.1   POCT GLUCOSE   Result Value Ref Range    POCT Glucose 136 (H) 74 - 99 mg/dL   POCT GLUCOSE   Result Value Ref Range    POCT Glucose 144 (H) 74 - 99 mg/dL   CBC and Auto Differential   Result Value Ref Range    WBC 7.4 4.4 - 11.3 x10*3/uL    nRBC 0.0 0.0 -  0.0 /100 WBCs    RBC 3.13 (L) 4.00 - 5.20 x10*6/uL    Hemoglobin 8.7 (L) 12.0 - 16.0 g/dL    Hematocrit 28.3 (L) 36.0 - 46.0 %    MCV 90 80 - 100 fL    MCH 27.8 26.0 - 34.0 pg    MCHC 30.7 (L) 32.0 - 36.0 g/dL    RDW 19.9 (H) 11.5 - 14.5 %    Platelets 247 150 - 450 x10*3/uL    Neutrophils % 77.0 40.0 - 80.0 %    Immature Granulocytes %, Automated 0.5 0.0 - 0.9 %    Lymphocytes % 12.9 13.0 - 44.0 %    Monocytes % 9.4 2.0 - 10.0 %    Eosinophils % 0.1 0.0 - 6.0 %    Basophils % 0.1 0.0 - 2.0 %    Neutrophils Absolute 5.66 1.20 - 7.70 x10*3/uL    Immature Granulocytes Absolute, Automated 0.04 0.00 - 0.70 x10*3/uL    Lymphocytes Absolute 0.95 (L) 1.20 - 4.80 x10*3/uL    Monocytes Absolute 0.69 0.10 - 1.00 x10*3/uL    Eosinophils Absolute 0.01 0.00 - 0.70 x10*3/uL    Basophils Absolute 0.01 0.00 - 0.10 x10*3/uL   Comprehensive Metabolic Panel   Result Value Ref Range    Glucose 95 74 - 99 mg/dL    Sodium 138 136 - 145 mmol/L    Potassium 3.1 (L) 3.5 - 5.3 mmol/L    Chloride 100 98 - 107 mmol/L    Bicarbonate 30 21 - 32 mmol/L    Anion Gap 11 10 - 20 mmol/L    Urea Nitrogen 8 6 - 23 mg/dL    Creatinine 0.32 (L) 0.50 - 1.05 mg/dL    eGFR >90 >60 mL/min/1.73m*2    Calcium 8.6 8.6 - 10.3 mg/dL    Albumin 2.8 (L) 3.4 - 5.0 g/dL    Alkaline Phosphatase 45 33 - 110 U/L    Total Protein 5.6 (L) 6.4 - 8.2 g/dL    AST 13 9 - 39 U/L    Bilirubin, Total 1.9 (H) 0.0 - 1.2 mg/dL    ALT 14 7 - 45 U/L   Protime-INR   Result Value Ref Range    Protime 23.6 (H) 9.8 - 12.4 seconds    INR 2.1 (H) 0.9 - 1.1   POCT GLUCOSE   Result Value Ref Range    POCT Glucose 93 74 - 99 mg/dL   POCT GLUCOSE   Result Value Ref Range    POCT Glucose 132 (H) 74 - 99 mg/dL   Heparin Assay   Result Value Ref Range    Heparin Unfractionated 0.1 See Comment Below for Therapeutic Ranges IU/mL      Patient recently received an antibiotic (last 12 hours)       Date/Time Action Medication Dose    03/12/25 1252 Given    vancomycin (Vancocin) capsule 250 mg 250 mg     03/12/25 0614 Given    vancomycin (Vancocin) capsule 250 mg 250 mg           Pt fully evaluated 3/12. Pt on 3L NC today increased from yesterday. With start of Coumadin will continue to monitor INR.  Plan discussed with interdisciplinary team, continue current and repeat labs in the AM.     Discharge planning discussed with patient and care team. Therapy evaluations ordered. [facility]- [facility] , anticipate HHC/SNF at discharge. Patient aware and agreeable to current plan, continue plan as above.     I spent a total of 50 minutes on the date of the service which included preparing to see the patient, face-to-face patient care, completing clinical documentation, obtaining and/or reviewing separately obtained history, performing a medically appropriate examination, counseling and educating the patient/family/caregiver, ordering medications, tests, or procedures, communicating with other HCPs (not separately reported), independently interpreting results (not separately reported), communicating results to the patient/family/caregiver, and care coordination (not separately reported).       Patient fully evaluated  3/13  for    Problem List Items Addressed This Visit          Gastrointestinal and Abdominal    * (Principal) Nausea vomiting and diarrhea       Musculoskeletal and Injuries    Nontraumatic compartment syndrome of left upper extremity    Relevant Orders    Case Request Operating Room: FASCIOTOMY, UPPER EXTREMITY (Completed)    Case Request Operating Room: DEBRIDEMENT, WOUND, UPPER EXTREMITY (Completed)       Pulmonary and Pneumonias    Influenza A with pneumonia - Primary     Other Visit Diagnoses       Acute kidney injury (CMS-HCC)        Dehydration        Influenza A        Shock (Multi)        Relevant Orders    Transthoracic Echo (TTE) Complete (Completed)          Patient seen resting in bed with head of bed elevated, no s/s or c/o acute difficulties at this time.  Vital signs for last 24 hours Temp:  [35.9 °C  (96.6 °F)-36.6 °C (97.9 °F)] 36.3 °C (97.3 °F)  Heart Rate:  [70-93] 85  Resp:  [16-18] 18  BP: (120-169)/(56-92) 164/80  FiO2 (%):  [28 %-32 %] 28 %    I/O this shift:  In: -   Out: 1 [Stool:1]  Patient still requiring frequent cardiac and SPO2 monitoring. Continue aggressive pulmonary hygiene and oral hygiene. Off loading as tolerated for skin integrity. Medications and labs reviewed-   Results for orders placed or performed during the hospital encounter of 02/25/25 (from the past 24 hours)   Protime-INR   Result Value Ref Range    Protime 19.4 (H) 9.8 - 12.4 seconds    INR 1.8 (H) 0.9 - 1.1   POCT GLUCOSE   Result Value Ref Range    POCT Glucose 136 (H) 74 - 99 mg/dL   POCT GLUCOSE   Result Value Ref Range    POCT Glucose 144 (H) 74 - 99 mg/dL   CBC and Auto Differential   Result Value Ref Range    WBC 7.4 4.4 - 11.3 x10*3/uL    nRBC 0.0 0.0 - 0.0 /100 WBCs    RBC 3.13 (L) 4.00 - 5.20 x10*6/uL    Hemoglobin 8.7 (L) 12.0 - 16.0 g/dL    Hematocrit 28.3 (L) 36.0 - 46.0 %    MCV 90 80 - 100 fL    MCH 27.8 26.0 - 34.0 pg    MCHC 30.7 (L) 32.0 - 36.0 g/dL    RDW 19.9 (H) 11.5 - 14.5 %    Platelets 247 150 - 450 x10*3/uL    Neutrophils % 77.0 40.0 - 80.0 %    Immature Granulocytes %, Automated 0.5 0.0 - 0.9 %    Lymphocytes % 12.9 13.0 - 44.0 %    Monocytes % 9.4 2.0 - 10.0 %    Eosinophils % 0.1 0.0 - 6.0 %    Basophils % 0.1 0.0 - 2.0 %    Neutrophils Absolute 5.66 1.20 - 7.70 x10*3/uL    Immature Granulocytes Absolute, Automated 0.04 0.00 - 0.70 x10*3/uL    Lymphocytes Absolute 0.95 (L) 1.20 - 4.80 x10*3/uL    Monocytes Absolute 0.69 0.10 - 1.00 x10*3/uL    Eosinophils Absolute 0.01 0.00 - 0.70 x10*3/uL    Basophils Absolute 0.01 0.00 - 0.10 x10*3/uL   Comprehensive Metabolic Panel   Result Value Ref Range    Glucose 95 74 - 99 mg/dL    Sodium 138 136 - 145 mmol/L    Potassium 3.1 (L) 3.5 - 5.3 mmol/L    Chloride 100 98 - 107 mmol/L    Bicarbonate 30 21 - 32 mmol/L    Anion Gap 11 10 - 20 mmol/L    Urea Nitrogen 8 6  - 23 mg/dL    Creatinine 0.32 (L) 0.50 - 1.05 mg/dL    eGFR >90 >60 mL/min/1.73m*2    Calcium 8.6 8.6 - 10.3 mg/dL    Albumin 2.8 (L) 3.4 - 5.0 g/dL    Alkaline Phosphatase 45 33 - 110 U/L    Total Protein 5.6 (L) 6.4 - 8.2 g/dL    AST 13 9 - 39 U/L    Bilirubin, Total 1.9 (H) 0.0 - 1.2 mg/dL    ALT 14 7 - 45 U/L   Protime-INR   Result Value Ref Range    Protime 23.6 (H) 9.8 - 12.4 seconds    INR 2.1 (H) 0.9 - 1.1   POCT GLUCOSE   Result Value Ref Range    POCT Glucose 93 74 - 99 mg/dL   POCT GLUCOSE   Result Value Ref Range    POCT Glucose 132 (H) 74 - 99 mg/dL   Heparin Assay   Result Value Ref Range    Heparin Unfractionated 0.1 See Comment Below for Therapeutic Ranges IU/mL      Patient recently received an antibiotic (last 12 hours)       Date/Time Action Medication Dose    03/13/25 1256 Given    vancomycin (Vancocin) capsule 250 mg 250 mg    03/13/25 0804 Given    vancomycin (Vancocin) capsule 250 mg 250 mg           Pt fully evaluated 3/13. Pt still on 3L NC. INR is 2.1 today and heparin was started, will discontinue when INR is 2.5 then Pt can discharge so one more day. Potasoum was 3.1. Plan discussed with interdisciplinary team, continue current and repeat labs in the AM.     Discharge planning discussed with patient and care team. Therapy evaluations ordered. [facility]-  , anticipate HHC/SNF at discharge. Patient aware and agreeable to current plan, continue plan as above.     I spent a total of 50 minutes on the date of the service which included preparing to see the patient, face-to-face patient care, completing clinical documentation, obtaining and/or reviewing separately obtained history, performing a medically appropriate examination, counseling and educating the patient/family/caregiver, ordering medications, tests, or procedures, communicating with other HCPs (not separately reported), independently interpreting results (not separately reported), communicating results to the patient/family/caregiver,  and care coordination (not separately reported).   SADA CHAN

## 2025-03-13 NOTE — CARE PLAN
The patient's goals for the shift include      The clinical goals for the shift include Up to chair, increased activity      Problem: Pain - Adult  Goal: Verbalizes/displays adequate comfort level or baseline comfort level  Outcome: Progressing     Problem: Safety - Adult  Goal: Free from fall injury  Outcome: Progressing

## 2025-03-13 NOTE — PROGRESS NOTES
Cardiology Progress Note      Kayley Lynch is a 54 y.o. female on day 15 of admission presenting with Nausea vomiting and diarrhea.      Subjective   This is feeling well this morning.  No chest pain or pressure.  Breathing is good.  She was up in a chair yesterday.     ROS:  10 systems reviewed other than what is mentioned above.     MEDICATION:    Scheduled medications  aspirin, 81 mg, oral, Daily  atorvastatin, 80 mg, oral, Nightly  budesonide, 0.5 mg, nebulization, BID  buPROPion XL, 150 mg, oral, q AM  carvedilol, 25 mg, oral, BID  escitalopram, 20 mg, oral, Daily  insulin lispro, 0-5 Units, subcutaneous, TID AC  ipratropium-albuteroL, 3 mL, nebulization, TID  lisinopril, 20 mg, oral, Daily  oxygen, , inhalation, Continuous - Inhalation  pantoprazole, 40 mg, oral, Daily  perflutren lipid microspheres, 0.5-10 mL of dilution, intravenous, Once in imaging  perflutren protein A microsphere, 0.5 mL, intravenous, Once in imaging  potassium chloride CR, 40 mEq, oral, Once  sulfur hexafluoride microsphr, 2 mL, intravenous, Once in imaging  vancomycin, 250 mg, oral, 4x daily  warfarin, 2 mg, oral, Once    Continuous medications  heparin, 0-4,000 Units/hr, Last Rate: 798 Units/hr (03/13/25 0804)      PRN medications  PRN medications: acetaminophen, ipratropium-albuteroL, labetaloL, melatonin, [Held by provider] morphine, ondansetron, promethazine     Assessment & Plan  Nausea vomiting and diarrhea    Influenza A with pneumonia    Nontraumatic compartment syndrome of left upper extremity      OBJECTIVE:    Visit Vitals  BP (!) 169/92   Pulse 76   Temp 36 °C (96.8 °F)   Resp 16        Intake/Output Summary (Last 24 hours) at 3/13/2025 0819  Last data filed at 3/13/2025 0300  Gross per 24 hour   Intake --   Output 900 ml   Net -900 ml      Patient is alert and oriented x3.  HEENT is unremarkable mucous members are moist  Neck no JVP no bruits upstrokes are full no thyromegaly  Lungs are clear bilaterally.  No wheezing  crackles or rales  Heart regular rhythm tachycardic normal S1-S2 there is no S3 crisp prosthetic mitral valve click  Abdomen is soft bs are positive nontender nondistended no organomegaly no pulsatile masses  Extremities have no edema.  Left arm is bandaged distal pulses present palpable.  Neuro is grossly nonfocal  Skin has no rashes     Image Results  Electrocardiogram, 12-lead PRN ACS symptoms  Sinus tachycardia with irregular rate  Consider left ventricular hypertrophy  Borderline T abnormalities, diffuse leads    Confirmed by Shane Dunbar ([id number]) on 3/11/2025 11:44:57 AM  Relevant Results:  RESULTS:    Lab Results   Component Value Date    WBC 7.4 03/13/2025    HGB 8.7 (L) 03/13/2025    HCT 28.3 (L) 03/13/2025    MCV 90 03/13/2025     03/13/2025        Lab Results   Component Value Date    CREATININE 0.32 (L) 03/13/2025    BUN 8 03/13/2025     03/13/2025    K 3.1 (L) 03/13/2025     03/13/2025    CO2 30 03/13/2025        Results from last 7 days   Lab Units 03/13/25  0545   PROTEIN TOTAL g/dL 5.6*   BILIRUBIN TOTAL mg/dL 1.9*   ALK PHOS U/L 45   ALT U/L 14   AST U/L 13   GLUCOSE mg/dL 95       Assessment/Plan      PMH  1.  Mitral regurgitation status post mitral valve replacement with a #25/33 [place] mechanical mitral valve October 2020  2.  Paroxysmal atrial flutter status post ablation October 2020  3.  CAD.  Bypass x 3 vessels LIMA to the LAD free radial artery graft to the marginal SVG to the RCA in 2020.  Last stress test 4/16/2024 no evidence of ischemia EF 51%  4.  COPD she continues to smoke  5.  Cardiomyopathy postoperative ejection fraction was 40%.  This was up to 50% on her last stress test  6.  Hyperlipidemia  7.  Hypertension  8.  Tobacco abuse  9.  Moderate to severe pulmonary hypertension in the past     3/4/2025  1.  Paroxysmal atrial fibrillation.  Previous history of atrial flutter status post ablation in 2020.  Currently sinus tachycardia.  Increase metoprolol to  tartrate to 3 times daily for better rate control.  This was likely physiologically driven because of the stress of the situation.  Anticoagulation will need to be given regardless because of her mechanical mitral valve replacement.  2.  CAD.  Stable.  No symptoms of angina.  3.  Mechanical mitral valve replacement.  This is a difficult situation given her spontaneous left arm hematoma leading to compartment syndrome and iliopsoas spontaneous hematoma on the left side.  Agree with low-dose heparin.  Aspirin currently on hold.  Coumadin will need to be restarted along with an aspirin when stable.  4.  Respiratory failure.  Slow improvement.  Unfortunately she continues to smoke and has underlying lung disease.  5.  Anemia.  She will require transfusion.    3/5/2025    1.  Paroxysmal atrial fibrillation.  She is in a sinus tachycardia today.  She did have an atrial flutter ablation in 2020.  Continue metoprolol for rate control.  IV heparin to be restarted now as she went to the OR yesterday.  Transition to oral anticoagulation once her H&H is stable.  2.  CAD.  Status post CABG x 3 vessels.  Stable.  No symptoms of angina.  3.  Mechanical mitral valve replacement.  Heparin to be restarted today.  Hopefully we can restart Coumadin and baby aspirin if her H&H remained stable.  These were held because of a spontaneous iliopsoas hematoma and bleed in the arm related to compartment syndrome.  4.  Respiratory failure due to influenza and underlying COPD.  This is improving.    3/6/2025    1.  Paroxysmal A-fib.  Remains in sinus.  Carvedilol started.  Continue IV heparin to ensure H&H remained stable.  Transition back to Coumadin.  Unfortunately, shorter acting agents like a DOAC is not an option given the mechanical mitral valve.  2.  CAD.  Stable.  3.  Mechanical mitral valve replacement.  On heparin.  Will need to have Coumadin and aspirin restarted once bleeding has been stabilized.    3/7/2025  1.  Paroxysmal A-fib.   Remains in sinus.  Doing well with IV heparin which she is on for her mechanical mitral valve also.  Transition to Coumadin tomorrow if her H&H remained stable.  2.  CAD.  Stable.  No symptoms of angina.  Previous bypass x 3 vessels.  Aspirin on hold.  4.  Mechanical mitral valve replacement.  Restart Coumadin tomorrow.  She had spontaneous bleeding in her left arm and her retroperitoneal hematoma with a markedly elevated INR.  Transition to Coumadin from IV heparin likely tomorrow.    3/8/2025  1.  Paroxysmal atrial fibrillation.  Remains in sinus.  Excellent control.  Restart Coumadin today.  She has been on IV heparin.  2.  CAD.  Stable.  No symptoms of angina.  Aspirin is on hold.  3.  Mechanical mitral valve replacement.  Restart Coumadin cautiously.  H&H has been stable on IV heparin.  Spontaneous retroperitoneal and left arm bleeding with a supratherapeutic INR    3/9/2025  1.  Paroxysmal A-fib.  Remains in sinus.  Coumadin started yesterday with 5 mg.  INR today 1.4.  Will dose 4 mg today.  H&H relatively stable.  Continue heparin until therapeutic INR's are achieved.  2.  CAD.  No angina.  Aspirin on hold.  3.  Mechanical mitral valve replacement.  Coumadin restarted.  Continue IV heparin until INR is above 2.  Target INR 2.5-3.5.  Will need to watch H&H for bleeding.    3/10/2025  1.  Paroxysmal A-fib.  Daniel in sinus.  Coumadin restarted.  INR is up to 2.5.  Heparin stopped.  Will check an INR tomorrow before dosing any Coumadin for fear that the INR may continue climb.  2.  CAD stable  3.  Mechanical mitral valve replacement.  INR is at 2.5.  Target is 2.5-3.5.  No clear evidence of bleeding H&H stable.  Check INR tomorrow prior to dosing Coumadin.  At home, she takes 4 mg on Saturday and Thursday and 2 mg all other days.    3/11/2025  1.  Paroxysmal A-fib.  Remains in sinus.  Heparin stopped.  INR to 5.5 today.  My concern is is going up further.  I will dose vitamin K 2.5 mg x 1 today orally.  No  Coumadin should be given until the INR is stabilized.  2.  Mechanical mitral valve replacement.  Again INR is gone from 2.5-5.5.  Vitamin K 2.5 mg to be given orally now.  H&H is stable.    3/12/2025  1.  Paroxysmal A-fib.  Remains in sinus.  2.  Mechanical mitral valve placement.  INR yesterday went up to 5.5.  My concern was getting a higher.  Given her spontaneous retroperitoneal hematoma I did give her 2.5 mg of vitamin K.  INR came down to 2.9 less than 1.7 today.  Today I will give her Coumadin 2 mg.  IV heparin will be continued until her INR is therapeutic.  3.  Spontaneous retroperitoneal hematoma and spontaneous bleeding of her left arm.  This led to compartment syndrome.  Currently stable.    3/13/2025  1.  Paroxysmal atrial fibrillation.  Remains in sinus.  2.  Mechanical mitral valve replacement.  INR today is 2.1.  I discussed the case with the pharmacist yesterday.  Heparin was written for but has not been started.  I spoken with the nurse directly today and a heparin drip will be started.  Discontinue once the INR is 2.5.  Continue baby aspirin.  The patient takes 2 mg every day except for Saturdays and Thursdays when she takes 4 mg.  3.  Spontaneous retroperitoneal hematoma and spontaneous bleed of the left arm leading to compartment syndrome.  H&H's are stable.  Brittney Lomeli MD

## 2025-03-13 NOTE — CARE PLAN
The clinical goals for the shift include tolerate heparin drip      Problem: Safety - Adult  Goal: Free from fall injury  Outcome: Progressing     Problem: Pain - Adult  Goal: Verbalizes/displays adequate comfort level or baseline comfort level  Outcome: Progressing     Problem: Skin  Goal: Promote/optimize nutrition  Outcome: Progressing

## 2025-03-14 LAB
ALBUMIN SERPL BCP-MCNC: 3 G/DL (ref 3.4–5)
ALP SERPL-CCNC: 52 U/L (ref 33–110)
ALT SERPL W P-5'-P-CCNC: 16 U/L (ref 7–45)
ANION GAP SERPL CALC-SCNC: 8 MMOL/L (ref 10–20)
AST SERPL W P-5'-P-CCNC: 14 U/L (ref 9–39)
BASOPHILS # BLD AUTO: 0.01 X10*3/UL (ref 0–0.1)
BASOPHILS NFR BLD AUTO: 0.1 %
BILIRUB SERPL-MCNC: 1.9 MG/DL (ref 0–1.2)
BUN SERPL-MCNC: 7 MG/DL (ref 6–23)
CALCIUM SERPL-MCNC: 9 MG/DL (ref 8.6–10.3)
CHLORIDE SERPL-SCNC: 101 MMOL/L (ref 98–107)
CO2 SERPL-SCNC: 32 MMOL/L (ref 21–32)
CREAT SERPL-MCNC: 0.37 MG/DL (ref 0.5–1.05)
EGFRCR SERPLBLD CKD-EPI 2021: >90 ML/MIN/1.73M*2
EOSINOPHIL # BLD AUTO: 0 X10*3/UL (ref 0–0.7)
EOSINOPHIL NFR BLD AUTO: 0 %
ERYTHROCYTE [DISTWIDTH] IN BLOOD BY AUTOMATED COUNT: 19.9 % (ref 11.5–14.5)
GLUCOSE BLD MANUAL STRIP-MCNC: 115 MG/DL (ref 74–99)
GLUCOSE BLD MANUAL STRIP-MCNC: 173 MG/DL (ref 74–99)
GLUCOSE BLD MANUAL STRIP-MCNC: 222 MG/DL (ref 74–99)
GLUCOSE BLD MANUAL STRIP-MCNC: 88 MG/DL (ref 74–99)
GLUCOSE SERPL-MCNC: 94 MG/DL (ref 74–99)
HCT VFR BLD AUTO: 29.5 % (ref 36–46)
HGB BLD-MCNC: 9 G/DL (ref 12–16)
IMM GRANULOCYTES # BLD AUTO: 0.03 X10*3/UL (ref 0–0.7)
IMM GRANULOCYTES NFR BLD AUTO: 0.4 % (ref 0–0.9)
INR PPP: 3.9 (ref 0.9–1.1)
INR PPP: 4.9 (ref 0.9–1.1)
LYMPHOCYTES # BLD AUTO: 0.96 X10*3/UL (ref 1.2–4.8)
LYMPHOCYTES NFR BLD AUTO: 13.2 %
MCH RBC QN AUTO: 28.1 PG (ref 26–34)
MCHC RBC AUTO-ENTMCNC: 30.5 G/DL (ref 32–36)
MCV RBC AUTO: 92 FL (ref 80–100)
MONOCYTES # BLD AUTO: 0.77 X10*3/UL (ref 0.1–1)
MONOCYTES NFR BLD AUTO: 10.6 %
NEUTROPHILS # BLD AUTO: 5.52 X10*3/UL (ref 1.2–7.7)
NEUTROPHILS NFR BLD AUTO: 75.7 %
NRBC BLD-RTO: 0 /100 WBCS (ref 0–0)
PLATELET # BLD AUTO: 280 X10*3/UL (ref 150–450)
POTASSIUM SERPL-SCNC: 3.4 MMOL/L (ref 3.5–5.3)
PROT SERPL-MCNC: 5.9 G/DL (ref 6.4–8.2)
PROTHROMBIN TIME: 43.2 SECONDS (ref 9.8–12.4)
PROTHROMBIN TIME: 54 SECONDS (ref 9.8–12.4)
RBC # BLD AUTO: 3.2 X10*6/UL (ref 4–5.2)
SODIUM SERPL-SCNC: 138 MMOL/L (ref 136–145)
WBC # BLD AUTO: 7.3 X10*3/UL (ref 4.4–11.3)

## 2025-03-14 PROCEDURE — 80053 COMPREHEN METABOLIC PANEL: CPT

## 2025-03-14 PROCEDURE — 2500000002 HC RX 250 W HCPCS SELF ADMINISTERED DRUGS (ALT 637 FOR MEDICARE OP, ALT 636 FOR OP/ED)

## 2025-03-14 PROCEDURE — 2500000001 HC RX 250 WO HCPCS SELF ADMINISTERED DRUGS (ALT 637 FOR MEDICARE OP)

## 2025-03-14 PROCEDURE — 94640 AIRWAY INHALATION TREATMENT: CPT

## 2025-03-14 PROCEDURE — 36415 COLL VENOUS BLD VENIPUNCTURE: CPT | Performed by: INTERNAL MEDICINE

## 2025-03-14 PROCEDURE — 36415 COLL VENOUS BLD VENIPUNCTURE: CPT

## 2025-03-14 PROCEDURE — 85025 COMPLETE CBC W/AUTO DIFF WBC: CPT

## 2025-03-14 PROCEDURE — 82947 ASSAY GLUCOSE BLOOD QUANT: CPT

## 2025-03-14 PROCEDURE — 2500000005 HC RX 250 GENERAL PHARMACY W/O HCPCS

## 2025-03-14 PROCEDURE — 97116 GAIT TRAINING THERAPY: CPT | Mod: GP,CQ

## 2025-03-14 PROCEDURE — 85610 PROTHROMBIN TIME: CPT | Performed by: INTERNAL MEDICINE

## 2025-03-14 PROCEDURE — 2060000001 HC INTERMEDIATE ICU ROOM DAILY

## 2025-03-14 PROCEDURE — 85610 PROTHROMBIN TIME: CPT

## 2025-03-14 PROCEDURE — 2500000001 HC RX 250 WO HCPCS SELF ADMINISTERED DRUGS (ALT 637 FOR MEDICARE OP): Performed by: INTERNAL MEDICINE

## 2025-03-14 RX ORDER — TALC
3 POWDER (GRAM) TOPICAL NIGHTLY PRN
Status: ON HOLD
Start: 2025-03-14

## 2025-03-14 RX ORDER — ATORVASTATIN CALCIUM 80 MG/1
80 TABLET, FILM COATED ORAL NIGHTLY
Status: ON HOLD
Start: 2025-03-14

## 2025-03-14 RX ORDER — VANCOMYCIN HYDROCHLORIDE 250 MG/1
250 CAPSULE ORAL 4 TIMES DAILY
Status: ON HOLD
Start: 2025-03-14

## 2025-03-14 RX ORDER — ACETAMINOPHEN 325 MG/1
650 TABLET ORAL EVERY 4 HOURS PRN
Status: ON HOLD
Start: 2025-03-14

## 2025-03-14 RX ORDER — IPRATROPIUM BROMIDE AND ALBUTEROL SULFATE 2.5; .5 MG/3ML; MG/3ML
3 SOLUTION RESPIRATORY (INHALATION)
Status: ON HOLD
Start: 2025-03-14

## 2025-03-14 RX ORDER — INSULIN LISPRO 100 [IU]/ML
0-5 INJECTION, SOLUTION INTRAVENOUS; SUBCUTANEOUS
Status: ON HOLD
Start: 2025-03-15

## 2025-03-14 RX ORDER — BUDESONIDE 0.5 MG/2ML
0.5 INHALANT ORAL
Status: ON HOLD
Start: 2025-03-14

## 2025-03-14 RX ORDER — PANTOPRAZOLE SODIUM 40 MG/1
40 TABLET, DELAYED RELEASE ORAL DAILY
Status: ON HOLD
Start: 2025-03-15

## 2025-03-14 RX ORDER — WARFARIN 2 MG/1
2 TABLET ORAL EVERY EVENING
Start: 2025-03-14 | End: 2025-03-18 | Stop reason: HOSPADM

## 2025-03-14 RX ORDER — POTASSIUM CHLORIDE 1.5 G/1.58G
40 POWDER, FOR SOLUTION ORAL 2 TIMES DAILY
Status: COMPLETED | OUTPATIENT
Start: 2025-03-14 | End: 2025-03-15

## 2025-03-14 RX ADMIN — BUDESONIDE 0.5 MG: 0.5 INHALANT ORAL at 07:55

## 2025-03-14 RX ADMIN — CARVEDILOL 25 MG: 25 TABLET, FILM COATED ORAL at 09:05

## 2025-03-14 RX ADMIN — INSULIN LISPRO 1 UNITS: 100 INJECTION, SOLUTION INTRAVENOUS; SUBCUTANEOUS at 16:57

## 2025-03-14 RX ADMIN — POTASSIUM CHLORIDE 40 MEQ: 1.5 POWDER, FOR SOLUTION ORAL at 18:45

## 2025-03-14 RX ADMIN — VANCOMYCIN HYDROCHLORIDE 250 MG: 250 CAPSULE ORAL at 13:03

## 2025-03-14 RX ADMIN — ASPIRIN 81 MG: 81 TABLET, COATED ORAL at 09:05

## 2025-03-14 RX ADMIN — IPRATROPIUM BROMIDE AND ALBUTEROL SULFATE 3 ML: .5; 3 SOLUTION RESPIRATORY (INHALATION) at 19:35

## 2025-03-14 RX ADMIN — IPRATROPIUM BROMIDE AND ALBUTEROL SULFATE 3 ML: .5; 3 SOLUTION RESPIRATORY (INHALATION) at 07:55

## 2025-03-14 RX ADMIN — BUPROPION HYDROCHLORIDE 150 MG: 150 TABLET, EXTENDED RELEASE ORAL at 09:05

## 2025-03-14 RX ADMIN — BUDESONIDE 0.5 MG: 0.5 INHALANT ORAL at 19:35

## 2025-03-14 RX ADMIN — ATORVASTATIN CALCIUM 80 MG: 80 TABLET, FILM COATED ORAL at 20:58

## 2025-03-14 RX ADMIN — VANCOMYCIN HYDROCHLORIDE 250 MG: 250 CAPSULE ORAL at 20:58

## 2025-03-14 RX ADMIN — VANCOMYCIN HYDROCHLORIDE 250 MG: 250 CAPSULE ORAL at 17:06

## 2025-03-14 RX ADMIN — ESCITALOPRAM OXALATE 20 MG: 10 TABLET ORAL at 09:05

## 2025-03-14 RX ADMIN — VANCOMYCIN HYDROCHLORIDE 250 MG: 250 CAPSULE ORAL at 06:32

## 2025-03-14 RX ADMIN — CARVEDILOL 25 MG: 25 TABLET, FILM COATED ORAL at 20:58

## 2025-03-14 RX ADMIN — LISINOPRIL 20 MG: 20 TABLET ORAL at 09:05

## 2025-03-14 RX ADMIN — ACETAMINOPHEN 650 MG: 325 TABLET, FILM COATED ORAL at 20:58

## 2025-03-14 RX ADMIN — Medication 3 L/MIN: at 20:58

## 2025-03-14 RX ADMIN — Medication 2 L/MIN: at 09:02

## 2025-03-14 RX ADMIN — PANTOPRAZOLE SODIUM 40 MG: 40 TABLET, DELAYED RELEASE ORAL at 09:05

## 2025-03-14 ASSESSMENT — COGNITIVE AND FUNCTIONAL STATUS - GENERAL
HELP NEEDED FOR BATHING: A LITTLE
TOILETING: A LITTLE
TOILETING: A LITTLE
PERSONAL GROOMING: A LITTLE
MOBILITY SCORE: 16
WALKING IN HOSPITAL ROOM: A LITTLE
HELP NEEDED FOR BATHING: A LITTLE
CLIMB 3 TO 5 STEPS WITH RAILING: TOTAL
MOBILITY SCORE: 17
DAILY ACTIVITIY SCORE: 17
WALKING IN HOSPITAL ROOM: A LITTLE
DRESSING REGULAR LOWER BODY CLOTHING: A LITTLE
WALKING IN HOSPITAL ROOM: A LITTLE
MOVING FROM LYING ON BACK TO SITTING ON SIDE OF FLAT BED WITH BEDRAILS: A LITTLE
MOVING TO AND FROM BED TO CHAIR: A LITTLE
TURNING FROM BACK TO SIDE WHILE IN FLAT BAD: A LITTLE
DRESSING REGULAR UPPER BODY CLOTHING: A LITTLE
DRESSING REGULAR LOWER BODY CLOTHING: A LOT
HELP NEEDED FOR BATHING: A LOT
STANDING UP FROM CHAIR USING ARMS: A LITTLE
TURNING FROM BACK TO SIDE WHILE IN FLAT BAD: A LITTLE
DAILY ACTIVITIY SCORE: 20
MOVING FROM LYING ON BACK TO SITTING ON SIDE OF FLAT BED WITH BEDRAILS: A LITTLE
DRESSING REGULAR LOWER BODY CLOTHING: A LITTLE
CLIMB 3 TO 5 STEPS WITH RAILING: A LOT
TOILETING: A LITTLE
CLIMB 3 TO 5 STEPS WITH RAILING: A LOT
DAILY ACTIVITIY SCORE: 21
MOVING TO AND FROM BED TO CHAIR: A LITTLE
TURNING FROM BACK TO SIDE WHILE IN FLAT BAD: A LITTLE
MOVING TO AND FROM BED TO CHAIR: A LITTLE
DRESSING REGULAR UPPER BODY CLOTHING: A LITTLE
MOBILITY SCORE: 17
STANDING UP FROM CHAIR USING ARMS: A LITTLE
MOVING FROM LYING ON BACK TO SITTING ON SIDE OF FLAT BED WITH BEDRAILS: A LITTLE
STANDING UP FROM CHAIR USING ARMS: A LITTLE

## 2025-03-14 ASSESSMENT — PAIN SCALES - GENERAL
PAINLEVEL_OUTOF10: 0 - NO PAIN

## 2025-03-14 ASSESSMENT — PAIN - FUNCTIONAL ASSESSMENT
PAIN_FUNCTIONAL_ASSESSMENT: 0-10

## 2025-03-14 NOTE — CARE PLAN
The patient's goals for the shift include      The clinical goals for the shift include pain management & for pt to remain HDS/safe throughout the shift      Problem: Pain - Adult  Goal: Verbalizes/displays adequate comfort level or baseline comfort level  Outcome: Progressing     Problem: Safety - Adult  Goal: Free from fall injury  Outcome: Progressing

## 2025-03-14 NOTE — CARE PLAN
The patient's goals for the shift include      The clinical goals for the shift include Remain HDS      Problem: Pain - Adult  Goal: Verbalizes/displays adequate comfort level or baseline comfort level  Outcome: Progressing     Problem: Safety - Adult  Goal: Free from fall injury  Outcome: Progressing     Problem: Discharge Planning  Goal: Discharge to home or other facility with appropriate resources  Outcome: Progressing     Problem: Chronic Conditions and Co-morbidities  Goal: Patient's chronic conditions and co-morbidity symptoms are monitored and maintained or improved  Outcome: Progressing     Problem: Nutrition  Goal: Nutrient intake appropriate for maintaining nutritional needs  Outcome: Progressing     Problem: Fall/Injury  Goal: Not fall by end of shift  Outcome: Progressing  Goal: Be free from injury by end of the shift  Outcome: Progressing  Goal: Verbalize understanding of personal risk factors for fall in the hospital  Outcome: Progressing  Goal: Verbalize understanding of risk factor reduction measures to prevent injury from fall in the home  Outcome: Progressing  Goal: Use assistive devices by end of the shift  Outcome: Progressing  Goal: Pace activities to prevent fatigue by end of the shift  Outcome: Progressing     Problem: Respiratory  Goal: Clear secretions with interventions this shift  Outcome: Progressing  Goal: Minimize anxiety/maximize coping throughout shift  Outcome: Progressing  Goal: Minimal/no exertional discomfort or dyspnea this shift  Outcome: Progressing  Goal: No signs of respiratory distress (eg. Use of accessory muscles. Peds grunting)  Outcome: Progressing  Goal: Patent airway maintained this shift  Outcome: Progressing  Goal: Verbalize decreased shortness of breath this shift  Outcome: Progressing  Goal: Wean oxygen to maintain O2 saturation per order/standard this shift  Outcome: Progressing  Goal: Increase self care and/or family involvement in next 24 hours  Outcome:  Progressing     Problem: Skin  Goal: Participates in plan/prevention/treatment measures  Outcome: Progressing  Goal: Prevent/manage excess moisture  Outcome: Progressing  Goal: Prevent/minimize sheer/friction injuries  Outcome: Progressing  Goal: Promote/optimize nutrition  Outcome: Progressing  Goal: Promote skin healing  Outcome: Progressing     Problem: Pain  Goal: Takes deep breaths with improved pain control throughout the shift  Outcome: Progressing  Goal: Turns in bed with improved pain control throughout the shift  Outcome: Progressing  Goal: Walks with improved pain control throughout the shift  Outcome: Progressing  Goal: Performs ADL's with improved pain control throughout shift  Outcome: Progressing  Goal: Participates in PT with improved pain control throughout the shift  Outcome: Progressing  Goal: Free from opioid side effects throughout the shift  Outcome: Progressing  Goal: Free from acute confusion related to pain meds throughout the shift  Outcome: Progressing

## 2025-03-14 NOTE — PROGRESS NOTES
03/14/25 1315   Discharge Planning   Home or Post Acute Services Post acute facilities (Rehab/SNF/etc)   Type of Post Acute Facility Services Rehab   Expected Discharge Disposition Rehab     Spoke with Autumn with [facility], they are starting pre-cert. Care transitions team will continue to follow.

## 2025-03-14 NOTE — NURSING NOTE
50 sutures to left arm removed at this time per orders. Pt tolerated well. Kerlix wrap dry and intact.

## 2025-03-14 NOTE — DISCHARGE SUMMARY
Discharge Diagnosis  Nausea vomiting and diarrhea    Issues Requiring Follow-Up  Patient fully evaluated 03/14   for   Assessment & Plan  Nausea vomiting and diarrhea    Influenza A with pneumonia      Septic shock, dehydration, hypercapnic respiratory failure, and dehydration.  Nontraumatic compartment syndrome of left upper extremity    No acute events overnight, seen by orthopedic surgery today for suture removal. Patient with significant clinical improvement noted, patient medically cleared for discharge today. Patient seen resting in bed with head of bed elevated, no s/s or c/o acute difficulties at this time. Vital signs for last 24 hours:  Temp:  [36 °C (96.8 °F)-36.7 °C (98.1 °F)] 36 °C (96.8 °F)  Heart Rate:  [81-98] 82  Resp:  [18-20] 18  BP: (101-142)/(53-72) 134/64  FiO2 (%):  [28 %] 28 % Medications and labs reviewed-   Results for orders placed or performed during the hospital encounter of 02/25/25 (from the past 24 hours)   POCT GLUCOSE   Result Value Ref Range    POCT Glucose 96 74 - 99 mg/dL   Heparin Assay   Result Value Ref Range    Heparin Unfractionated 0.2 See Comment Below for Therapeutic Ranges IU/mL   Protime-INR   Result Value Ref Range    Protime 31.7 (H) 9.8 - 12.4 seconds    INR 2.9 (H) 0.9 - 1.1   POCT GLUCOSE   Result Value Ref Range    POCT Glucose 116 (H) 74 - 99 mg/dL   CBC and Auto Differential   Result Value Ref Range    WBC 7.3 4.4 - 11.3 x10*3/uL    nRBC 0.0 0.0 - 0.0 /100 WBCs    RBC 3.20 (L) 4.00 - 5.20 x10*6/uL    Hemoglobin 9.0 (L) 12.0 - 16.0 g/dL    Hematocrit 29.5 (L) 36.0 - 46.0 %    MCV 92 80 - 100 fL    MCH 28.1 26.0 - 34.0 pg    MCHC 30.5 (L) 32.0 - 36.0 g/dL    RDW 19.9 (H) 11.5 - 14.5 %    Platelets 280 150 - 450 x10*3/uL    Neutrophils % 75.7 40.0 - 80.0 %    Immature Granulocytes %, Automated 0.4 0.0 - 0.9 %    Lymphocytes % 13.2 13.0 - 44.0 %    Monocytes % 10.6 2.0 - 10.0 %    Eosinophils % 0.0 0.0 - 6.0 %    Basophils % 0.1 0.0 - 2.0 %    Neutrophils Absolute  5.52 1.20 - 7.70 x10*3/uL    Immature Granulocytes Absolute, Automated 0.03 0.00 - 0.70 x10*3/uL    Lymphocytes Absolute 0.96 (L) 1.20 - 4.80 x10*3/uL    Monocytes Absolute 0.77 0.10 - 1.00 x10*3/uL    Eosinophils Absolute 0.00 0.00 - 0.70 x10*3/uL    Basophils Absolute 0.01 0.00 - 0.10 x10*3/uL   Comprehensive Metabolic Panel   Result Value Ref Range    Glucose 94 74 - 99 mg/dL    Sodium 138 136 - 145 mmol/L    Potassium 3.4 (L) 3.5 - 5.3 mmol/L    Chloride 101 98 - 107 mmol/L    Bicarbonate 32 21 - 32 mmol/L    Anion Gap 8 (L) 10 - 20 mmol/L    Urea Nitrogen 7 6 - 23 mg/dL    Creatinine 0.37 (L) 0.50 - 1.05 mg/dL    eGFR >90 >60 mL/min/1.73m*2    Calcium 9.0 8.6 - 10.3 mg/dL    Albumin 3.0 (L) 3.4 - 5.0 g/dL    Alkaline Phosphatase 52 33 - 110 U/L    Total Protein 5.9 (L) 6.4 - 8.2 g/dL    AST 14 9 - 39 U/L    Bilirubin, Total 1.9 (H) 0.0 - 1.2 mg/dL    ALT 16 7 - 45 U/L   Protime-INR   Result Value Ref Range    Protime 43.2 (H) 9.8 - 12.4 seconds    INR 3.9 (H) 0.9 - 1.1   POCT GLUCOSE   Result Value Ref Range    POCT Glucose 88 74 - 99 mg/dL   POCT GLUCOSE   Result Value Ref Range    POCT Glucose 222 (H) 74 - 99 mg/dL      Patient recently received an antibiotic (last 12 hours)       Date/Time Action Medication Dose    03/14/25 1303 Given    vancomycin (Vancocin) capsule 250 mg 250 mg    03/14/25 0632 Given    vancomycin (Vancocin) capsule 250 mg 250 mg           No results found for the last 90 days.       Continue aggressive pulmonary hygiene and oral hygiene. Off loading as tolerated for skin integrity.  Plan discussed with interdisciplinary team, ok to discharge, will continue current and repeat labs in the AM if patient still hospitalized. Patient aware and agreeable to current plan, continue plan as above.     I spent 30 minutes on the date of the service which included preparing to see the patient, face-to-face patient care, completing clinical documentation, obtaining and/or reviewing separately obtained  history, performing a medically appropriate examination, counseling and educating the patient/family/caregiver, ordering medications, tests, or procedures, communicating with other HCPs (not separately reported), independently interpreting results (not separately reported), communicating results to the patient/family/caregiver, and care coordination (not separately reported    Discharge Meds     Medication List      ASK your doctor about these medications     acetaminophen-codeine 300-30 mg tablet; Commonly known as: Tylenol w/   Codeine #3   albuterol 90 mcg/actuation inhaler; Inhale 2 puffs every 6 hours if   needed for wheezing.   aspirin 81 mg EC tablet   atorvastatin 80 mg tablet; Commonly known as: Lipitor; Take 1 tablet (80   mg) by mouth once daily.; Ask about: Which instructions should I use?   buPROPion  mg 24 hr tablet; Commonly known as: Wellbutrin XL; Take   1 tablet (150 mg) by mouth once daily in the morning. Do not crush, chew,   or split.   carvedilol 25 mg tablet; Commonly known as: Coreg; Take 1 tablet (25 mg)   by mouth 2 times a day.   escitalopram 20 mg tablet; Commonly known as: Lexapro; Take 1 tablet (20   mg) by mouth once daily.   lisinopril 20 mg tablet; Take 1 tablet (20 mg) by mouth once daily.   varenicline tartrate 1 mg tablet; Commonly known as: Chantix; Take 1   tablet (1 mg) by mouth 2 times a day. Take with full glass of water.   warfarin 2 mg tablet; Commonly known as: Coumadin; Take as directed. If   you are unsure how to take this medication, talk to your nurse or doctor.;   Original instructions: Take 2 tablets (4 mg) by mouth on Mondays and   Saturdays, and take 1 tablet (2 mg) on all other days of week or as   directed by anticoagulation clinic       Test Results Pending At Discharge  Pending Labs       No current pending labs.            Hospital Course         Maurizio Read MD   Physician  Internal Medicine     Progress Notes     Signed     Date of Service:  3/13/2025  3:29 PM     Signed       Expand All Collapse All    Kayley Lynch is a 54 y.o. female on day 15 of admission presenting with Nausea vomiting and diarrhea.           Subjective  No events overnight.  Patient seen and examined at bedside with  present.  Hospitalization and treatment plan reviewed with  at length.  Patient has no complaints.              Objective  Last Recorded Vitals  /80 (BP Location: Right leg, Patient Position: Lying)   Pulse 85   Temp 36.3 °C (97.3 °F) (Temporal)   Resp 18   Wt 66.5 kg (146 lb 9.7 oz)   SpO2 99%   Intake/Output last 3 Shifts:     Intake/Output Summary (Last 24 hours) at 3/13/2025 1529  Last data filed at 3/13/2025 1300      Gross per 24 hour   Intake --   Output 901 ml   Net -901 ml         Admission Weight  Weight: 59 kg (130 lb) (02/26/25 0159)     Daily Weight  03/07/25 : 66.5 kg (146 lb 9.7 oz)     Image Results  Electrocardiogram, 12-lead PRN ACS symptoms  Sinus tachycardia with irregular rate  Consider left ventricular hypertrophy  Borderline T abnormalities, diffuse leads     Confirmed by Shane Dunbar ([id number]) on 3/11/2025 11:44:57 AM        Physical Exam  HENT:      Head: Normocephalic and atraumatic.      Nose: Nose normal.      Mouth/Throat:      Mouth: Mucous membranes are moist.      Pharynx: Oropharynx is clear.   Eyes:      Extraocular Movements: Extraocular movements intact.      Pupils: Pupils are equal, round, and reactive to light.   Cardiovascular:      Rate and Rhythm: Normal rate and regular rhythm.   Pulmonary:      Effort: No respiratory distress.      Breath sounds: Wheezing present. No rhonchi or rales.   Abdominal:      General: Bowel sounds are normal. There is no distension.      Palpations: Abdomen is soft.      Tenderness: There is no abdominal tenderness. There is no guarding.   Musculoskeletal:      Right lower leg: No edema.      Left lower leg: No edema.   Skin:     General: Skin is warm and dry.    Neurological:      Mental Status: She is alert. Mental status is at baseline.            Relevant Results                       Assessment/Plan  This patient currently has cardiac telemetry ordered; if you would like to modify or discontinue the telemetry order, click hereto go to the orders activity to modify/discontinue the order.        This patient has a urinary catheter              Reason for the urinary catheter remaining today? critically ill patient who need accurate urinary output measurements        Assessment & Plan  Nausea vomiting and diarrhea     Influenza A with pneumonia        Septic shock, dehydration, hypercapnic respiratory failure, and dehydration.  Nontraumatic compartment syndrome of left upper extremity        Patient is a 54-year-old female with past medical history of COPD, CAD status post CABG, pulmonary hypertension, hyperlipidemia, cardiomyopathy, a flutter status post ablation, and mitral valve replacement who presented with malaise.  She was found to be in septic shock and admitted to the ICU.     Septic shock  Hypercapnic respiratory failure  COPD exacerbation  Influenza A  Pneumonia  Acute metabolic encephalopathy  C. difficile colitis  Dehydration  HILLARY     -Continue supplemental oxygen  -Continue antibiotics, currently on doxycycline  -Continue nebulizers  -Continue steroids  -Continue Tamiflu  -Continue p.o. vancomycin  -Continue home meds  -Monitor INR     3/2: Patient developed a large hematoma in the left arm leading to compartment syndrome. She was taken for urgent fasciotomies with Orthopedic Surgery. She is also being followed by Vascular Surgery. Vitamin K ordered for supratherapeutic INR. Her INR was 2.2 yesterday with goal being 2.5-3.5 for her A-flutter s/p mitral valve replacement. Critical Care reconsulted. Hematology consulted, appreciate recs. Hold coumadin and lovenox.     3/3: INR reversed. Patient is on low intensity heparin drip. She was transfused 1 unit  pRBCs. Monitor hemoglobin closely. Hematology has ordered a work up including DIC panel. Patient will need another debridement and irrigation in 24-48 hours per Orthopedics. She has also developed a retroperitoneal bleed.     3/4: INR is down to 1.0.  Leukocytosis improved from 15 down to 13.  Hemoglobin is down to 6.4.  Patient to be transfused another unit packed RBCs.  She will be going back to the OR today for further debridement, washout, possible closure. She will also have a CT angiogram to determine if there is any active bleeding causing her retroperitoneal bleed.  She remains on a heparin drip due to her valve.  Continue doxycycline and p.o. Vanco.  Patient fully evaluated on March 5.  Patient restarted on heparin.  Fasciotomy site for compartment syndrome appears clear.  Continue to monitor lab work.  Case discussed with  at bedside.  No active bleeding is noted at this time.  Recheck labs in AM.     Patient fully evaluated  03/07  for    Problem List Items Addressed This Visit                  Gastrointestinal and Abdominal     * (Principal) Nausea vomiting and diarrhea          Musculoskeletal and Injuries     Nontraumatic compartment syndrome of left upper extremity     Relevant Orders     Case Request Operating Room: FASCIOTOMY, UPPER EXTREMITY (Completed)     Case Request Operating Room: DEBRIDEMENT, WOUND, UPPER EXTREMITY (Completed)          Pulmonary and Pneumonias     Influenza A with pneumonia - Primary      Other Visit Diagnoses         Acute kidney injury (CMS-HCC)         Dehydration         Influenza A         Shock (Multi)         Relevant Orders     Transthoracic Echo (TTE) Complete (Completed)             Patient seen resting in bed with head of bed elevated, no s/s or c/o acute difficulties at this time.  Vital signs for last 24 hours Temp:  [35.9 °C (96.6 °F)-36.6 °C (97.9 °F)] 36.3 °C (97.3 °F)  Heart Rate:  [70-93] 85  Resp:  [16-18] 18  BP: (120-169)/(56-92) 164/80  FiO2 (%):   [28 %-32 %] 28 %    I/O this shift:  In: -   Out: 1 [Stool:1]  Patient still requiring frequent cardiac and SPO2 monitoring. Continue aggressive pulmonary hygiene and oral hygiene. Off loading as tolerated for skin integrity. Medications and labs reviewed-         Results for orders placed or performed during the hospital encounter of 02/25/25 (from the past 24 hours)   Protime-INR   Result Value Ref Range     Protime 19.4 (H) 9.8 - 12.4 seconds     INR 1.8 (H) 0.9 - 1.1   POCT GLUCOSE   Result Value Ref Range     POCT Glucose 136 (H) 74 - 99 mg/dL   POCT GLUCOSE   Result Value Ref Range     POCT Glucose 144 (H) 74 - 99 mg/dL   CBC and Auto Differential   Result Value Ref Range     WBC 7.4 4.4 - 11.3 x10*3/uL     nRBC 0.0 0.0 - 0.0 /100 WBCs     RBC 3.13 (L) 4.00 - 5.20 x10*6/uL     Hemoglobin 8.7 (L) 12.0 - 16.0 g/dL     Hematocrit 28.3 (L) 36.0 - 46.0 %     MCV 90 80 - 100 fL     MCH 27.8 26.0 - 34.0 pg     MCHC 30.7 (L) 32.0 - 36.0 g/dL     RDW 19.9 (H) 11.5 - 14.5 %     Platelets 247 150 - 450 x10*3/uL     Neutrophils % 77.0 40.0 - 80.0 %     Immature Granulocytes %, Automated 0.5 0.0 - 0.9 %     Lymphocytes % 12.9 13.0 - 44.0 %     Monocytes % 9.4 2.0 - 10.0 %     Eosinophils % 0.1 0.0 - 6.0 %     Basophils % 0.1 0.0 - 2.0 %     Neutrophils Absolute 5.66 1.20 - 7.70 x10*3/uL     Immature Granulocytes Absolute, Automated 0.04 0.00 - 0.70 x10*3/uL     Lymphocytes Absolute 0.95 (L) 1.20 - 4.80 x10*3/uL     Monocytes Absolute 0.69 0.10 - 1.00 x10*3/uL     Eosinophils Absolute 0.01 0.00 - 0.70 x10*3/uL     Basophils Absolute 0.01 0.00 - 0.10 x10*3/uL   Comprehensive Metabolic Panel   Result Value Ref Range     Glucose 95 74 - 99 mg/dL     Sodium 138 136 - 145 mmol/L     Potassium 3.1 (L) 3.5 - 5.3 mmol/L     Chloride 100 98 - 107 mmol/L     Bicarbonate 30 21 - 32 mmol/L     Anion Gap 11 10 - 20 mmol/L     Urea Nitrogen 8 6 - 23 mg/dL     Creatinine 0.32 (L) 0.50 - 1.05 mg/dL     eGFR >90 >60 mL/min/1.73m*2      Calcium 8.6 8.6 - 10.3 mg/dL     Albumin 2.8 (L) 3.4 - 5.0 g/dL     Alkaline Phosphatase 45 33 - 110 U/L     Total Protein 5.6 (L) 6.4 - 8.2 g/dL     AST 13 9 - 39 U/L     Bilirubin, Total 1.9 (H) 0.0 - 1.2 mg/dL     ALT 14 7 - 45 U/L   Protime-INR   Result Value Ref Range     Protime 23.6 (H) 9.8 - 12.4 seconds     INR 2.1 (H) 0.9 - 1.1   POCT GLUCOSE   Result Value Ref Range     POCT Glucose 93 74 - 99 mg/dL   POCT GLUCOSE   Result Value Ref Range     POCT Glucose 132 (H) 74 - 99 mg/dL   Heparin Assay   Result Value Ref Range     Heparin Unfractionated 0.1 See Comment Below for Therapeutic Ranges IU/mL      *Note: Due to a large number of results and/or encounters for the requested time period, some results have not been displayed. A complete set of results can be found in Results Review.      Patient recently received an antibiotic (last 12 hours)         Date/Time Action Medication Dose     03/07/25 1252 Given    vancomycin (Firvanq) solution 250 mg 250 mg     03/07/25 0602 Given    vancomycin (Firvanq) solution 250 mg 250 mg             Plan discussed with interdisciplinary team, seen by orthopedics today - dressing changes as needed,   suture removal on March 18, recommended shoulder elbow and hand range of motion with therapy ordered. Will continue current and repeat labs in the AM. CXR shows improved infiltrate at the left lower lung, strict intake and output, trend hemoglobin.     Discharge planning discussed with patient and care team. Therapy evaluations ordered. [facility] - anticipate C/SNF at discharge. Patient aware and agreeable to current plan, continue plan as above.      I spent a total of 50 minutes on the date of the service which included preparing to see the patient, face-to-face patient care, completing clinical documentation, obtaining and/or reviewing separately obtained history, performing a medically appropriate examination, counseling and educating the patient/family/caregiver, ordering  medications, tests, or procedures, communicating with other HCPs (not separately reported), independently interpreting results (not separately reported), communicating results to the patient/family/caregiver, and care coordination (not separately reported).      Patient fully evaluated  03/08  for    Problem List Items Addressed This Visit                  Gastrointestinal and Abdominal     * (Principal) Nausea vomiting and diarrhea          Musculoskeletal and Injuries     Nontraumatic compartment syndrome of left upper extremity     Relevant Orders     Case Request Operating Room: FASCIOTOMY, UPPER EXTREMITY (Completed)     Case Request Operating Room: DEBRIDEMENT, WOUND, UPPER EXTREMITY (Completed)          Pulmonary and Pneumonias     Influenza A with pneumonia - Primary      Other Visit Diagnoses         Acute kidney injury (CMS-HCC)         Dehydration         Influenza A         Shock (Multi)         Relevant Orders     Transthoracic Echo (TTE) Complete (Completed)             Patient seen resting in bed with head of bed elevated, no s/s or c/o acute difficulties at this time.  Vital signs for last 24 hours Temp:  [35.9 °C (96.6 °F)-36.6 °C (97.9 °F)] 36.3 °C (97.3 °F)  Heart Rate:  [70-93] 85  Resp:  [16-18] 18  BP: (120-169)/(56-92) 164/80  FiO2 (%):  [28 %-32 %] 28 %    I/O this shift:  In: -   Out: 1 [Stool:1]  Patient still requiring frequent cardiac and SPO2 monitoring. Continue aggressive pulmonary hygiene and oral hygiene. Off loading as tolerated for skin integrity. Medications and labs reviewed-         Results for orders placed or performed during the hospital encounter of 02/25/25 (from the past 24 hours)   Protime-INR   Result Value Ref Range     Protime 19.4 (H) 9.8 - 12.4 seconds     INR 1.8 (H) 0.9 - 1.1   POCT GLUCOSE   Result Value Ref Range     POCT Glucose 136 (H) 74 - 99 mg/dL   POCT GLUCOSE   Result Value Ref Range     POCT Glucose 144 (H) 74 - 99 mg/dL   CBC and Auto Differential    Result Value Ref Range     WBC 7.4 4.4 - 11.3 x10*3/uL     nRBC 0.0 0.0 - 0.0 /100 WBCs     RBC 3.13 (L) 4.00 - 5.20 x10*6/uL     Hemoglobin 8.7 (L) 12.0 - 16.0 g/dL     Hematocrit 28.3 (L) 36.0 - 46.0 %     MCV 90 80 - 100 fL     MCH 27.8 26.0 - 34.0 pg     MCHC 30.7 (L) 32.0 - 36.0 g/dL     RDW 19.9 (H) 11.5 - 14.5 %     Platelets 247 150 - 450 x10*3/uL     Neutrophils % 77.0 40.0 - 80.0 %     Immature Granulocytes %, Automated 0.5 0.0 - 0.9 %     Lymphocytes % 12.9 13.0 - 44.0 %     Monocytes % 9.4 2.0 - 10.0 %     Eosinophils % 0.1 0.0 - 6.0 %     Basophils % 0.1 0.0 - 2.0 %     Neutrophils Absolute 5.66 1.20 - 7.70 x10*3/uL     Immature Granulocytes Absolute, Automated 0.04 0.00 - 0.70 x10*3/uL     Lymphocytes Absolute 0.95 (L) 1.20 - 4.80 x10*3/uL     Monocytes Absolute 0.69 0.10 - 1.00 x10*3/uL     Eosinophils Absolute 0.01 0.00 - 0.70 x10*3/uL     Basophils Absolute 0.01 0.00 - 0.10 x10*3/uL   Comprehensive Metabolic Panel   Result Value Ref Range     Glucose 95 74 - 99 mg/dL     Sodium 138 136 - 145 mmol/L     Potassium 3.1 (L) 3.5 - 5.3 mmol/L     Chloride 100 98 - 107 mmol/L     Bicarbonate 30 21 - 32 mmol/L     Anion Gap 11 10 - 20 mmol/L     Urea Nitrogen 8 6 - 23 mg/dL     Creatinine 0.32 (L) 0.50 - 1.05 mg/dL     eGFR >90 >60 mL/min/1.73m*2     Calcium 8.6 8.6 - 10.3 mg/dL     Albumin 2.8 (L) 3.4 - 5.0 g/dL     Alkaline Phosphatase 45 33 - 110 U/L     Total Protein 5.6 (L) 6.4 - 8.2 g/dL     AST 13 9 - 39 U/L     Bilirubin, Total 1.9 (H) 0.0 - 1.2 mg/dL     ALT 14 7 - 45 U/L   Protime-INR   Result Value Ref Range     Protime 23.6 (H) 9.8 - 12.4 seconds     INR 2.1 (H) 0.9 - 1.1   POCT GLUCOSE   Result Value Ref Range     POCT Glucose 93 74 - 99 mg/dL   POCT GLUCOSE   Result Value Ref Range     POCT Glucose 132 (H) 74 - 99 mg/dL   Heparin Assay   Result Value Ref Range     Heparin Unfractionated 0.1 See Comment Below for Therapeutic Ranges IU/mL      Patient recently received an antibiotic (last 12  hours)         Date/Time Action Medication Dose     03/08/25 1220 Given    vancomycin (Vancocin) capsule 250 mg 250 mg             Plan discussed with interdisciplinary team, seen by cardiology today, will bridge patient to coumadin, ptt/inr in am, appreciate input and agree with plan. JESSICA dressing is clean, dry and intact, swelling is noted, warm and well-perfused.  She is able to flex and extend the fingers and thumb. Will continue current and repeat labs in the AM.     Patient fully evaluated on March 9 and clinically improved.  Recheck labs in AM.  INR is slowly improving.  Discontinue heparin when INR is therapeutic for mechanical valve.  Appreciate orthopedic and cardiology consultations.  Examination of area from compartment syndrome is clear without neurovascular compromise.  Case discussed with orthopedics at bedside     Discharge planning discussed with patient and care team. Patient aware and agreeable to current plan, continue plan as above.      I spent a total of 50 minutes on the date of the service which included preparing to see the patient, face-to-face patient care, completing clinical documentation, obtaining and/or reviewing separately obtained history, performing a medically appropriate examination, counseling and educating the patient/family/caregiver, ordering medications, tests, or procedures, communicating with other HCPs (not separately reported), independently interpreting results (not separately reported), communicating results to the patient/family/caregiver, and care coordination (not separately reported).      Patient fully evaluated    for    Problem List Items Addressed This Visit                  Gastrointestinal and Abdominal     * (Principal) Nausea vomiting and diarrhea          Musculoskeletal and Injuries     Nontraumatic compartment syndrome of left upper extremity     Relevant Orders     Case Request Operating Room: FASCIOTOMY, UPPER EXTREMITY (Completed)     Case Request  Operating Room: DEBRIDEMENT, WOUND, UPPER EXTREMITY (Completed)          Pulmonary and Pneumonias     Influenza A with pneumonia - Primary      Other Visit Diagnoses         Acute kidney injury (CMS-HCC)         Dehydration         Influenza A         Shock (Multi)         Relevant Orders     Transthoracic Echo (TTE) Complete (Completed)             Patient seen resting in bed with head of bed elevated, no s/s or c/o acute difficulties at this time.  Vital signs for last 24 hours Temp:  [35.9 °C (96.6 °F)-36.6 °C (97.9 °F)] 36.3 °C (97.3 °F)  Heart Rate:  [70-93] 85  Resp:  [16-18] 18  BP: (120-169)/(56-92) 164/80  FiO2 (%):  [28 %-32 %] 28 %    I/O this shift:  In: -   Out: 1 [Stool:1]  Patient still requiring frequent cardiac and SPO2 monitoring. Continue aggressive pulmonary hygiene and oral hygiene. Off loading as tolerated for skin integrity. Medications and labs reviewed-         Results for orders placed or performed during the hospital encounter of 02/25/25 (from the past 24 hours)   Protime-INR   Result Value Ref Range     Protime 19.4 (H) 9.8 - 12.4 seconds     INR 1.8 (H) 0.9 - 1.1   POCT GLUCOSE   Result Value Ref Range     POCT Glucose 136 (H) 74 - 99 mg/dL   POCT GLUCOSE   Result Value Ref Range     POCT Glucose 144 (H) 74 - 99 mg/dL   CBC and Auto Differential   Result Value Ref Range     WBC 7.4 4.4 - 11.3 x10*3/uL     nRBC 0.0 0.0 - 0.0 /100 WBCs     RBC 3.13 (L) 4.00 - 5.20 x10*6/uL     Hemoglobin 8.7 (L) 12.0 - 16.0 g/dL     Hematocrit 28.3 (L) 36.0 - 46.0 %     MCV 90 80 - 100 fL     MCH 27.8 26.0 - 34.0 pg     MCHC 30.7 (L) 32.0 - 36.0 g/dL     RDW 19.9 (H) 11.5 - 14.5 %     Platelets 247 150 - 450 x10*3/uL     Neutrophils % 77.0 40.0 - 80.0 %     Immature Granulocytes %, Automated 0.5 0.0 - 0.9 %     Lymphocytes % 12.9 13.0 - 44.0 %     Monocytes % 9.4 2.0 - 10.0 %     Eosinophils % 0.1 0.0 - 6.0 %     Basophils % 0.1 0.0 - 2.0 %     Neutrophils Absolute 5.66 1.20 - 7.70 x10*3/uL     Immature  Granulocytes Absolute, Automated 0.04 0.00 - 0.70 x10*3/uL     Lymphocytes Absolute 0.95 (L) 1.20 - 4.80 x10*3/uL     Monocytes Absolute 0.69 0.10 - 1.00 x10*3/uL     Eosinophils Absolute 0.01 0.00 - 0.70 x10*3/uL     Basophils Absolute 0.01 0.00 - 0.10 x10*3/uL   Comprehensive Metabolic Panel   Result Value Ref Range     Glucose 95 74 - 99 mg/dL     Sodium 138 136 - 145 mmol/L     Potassium 3.1 (L) 3.5 - 5.3 mmol/L     Chloride 100 98 - 107 mmol/L     Bicarbonate 30 21 - 32 mmol/L     Anion Gap 11 10 - 20 mmol/L     Urea Nitrogen 8 6 - 23 mg/dL     Creatinine 0.32 (L) 0.50 - 1.05 mg/dL     eGFR >90 >60 mL/min/1.73m*2     Calcium 8.6 8.6 - 10.3 mg/dL     Albumin 2.8 (L) 3.4 - 5.0 g/dL     Alkaline Phosphatase 45 33 - 110 U/L     Total Protein 5.6 (L) 6.4 - 8.2 g/dL     AST 13 9 - 39 U/L     Bilirubin, Total 1.9 (H) 0.0 - 1.2 mg/dL     ALT 14 7 - 45 U/L   Protime-INR   Result Value Ref Range     Protime 23.6 (H) 9.8 - 12.4 seconds     INR 2.1 (H) 0.9 - 1.1   POCT GLUCOSE   Result Value Ref Range     POCT Glucose 93 74 - 99 mg/dL   POCT GLUCOSE   Result Value Ref Range     POCT Glucose 132 (H) 74 - 99 mg/dL   Heparin Assay   Result Value Ref Range     Heparin Unfractionated 0.1 See Comment Below for Therapeutic Ranges IU/mL      Patient recently received an antibiotic (last 12 hours)         Date/Time Action Medication Dose     03/11/25 0617 Given    vancomycin (Vancocin) capsule 250 mg 250 mg     03/10/25 2140 Given    vancomycin (Vancocin) capsule 250 mg 250 mg             Pt fully evaluated 3/11. PT recommended high intensity level of clinical care. Potassium is 3.3 and gave Kcl and INR is 5.5 out of therapeutic range and protime is 61.3. Pt on 2L NC today. Plan discussed with interdisciplinary team, continue current and repeat labs in the AM.      Discharge planning discussed with patient and care team. Therapy evaluations ordered. [facility]-  , anticipate HHC/SNF at discharge. Patient aware and agreeable to current  plan, continue plan as above.      I spent a total of 50 minutes on the date of the service which included preparing to see the patient, face-to-face patient care, completing clinical documentation, obtaining and/or reviewing separately obtained history, performing a medically appropriate examination, counseling and educating the patient/family/caregiver, ordering medications, tests, or procedures, communicating with other HCPs (not separately reported), independently interpreting results (not separately reported), communicating results to the patient/family/caregiver, and care coordination (not separately reported).      Patient fully evaluated    for    Problem List Items Addressed This Visit                  Gastrointestinal and Abdominal     * (Principal) Nausea vomiting and diarrhea          Musculoskeletal and Injuries     Nontraumatic compartment syndrome of left upper extremity     Relevant Orders     Case Request Operating Room: FASCIOTOMY, UPPER EXTREMITY (Completed)     Case Request Operating Room: DEBRIDEMENT, WOUND, UPPER EXTREMITY (Completed)          Pulmonary and Pneumonias     Influenza A with pneumonia - Primary      Other Visit Diagnoses         Acute kidney injury (CMS-HCC)         Dehydration         Influenza A         Shock (Multi)         Relevant Orders     Transthoracic Echo (TTE) Complete (Completed)             Patient seen resting in bed with head of bed elevated, no s/s or c/o acute difficulties at this time.  Vital signs for last 24 hours Temp:  [35.9 °C (96.6 °F)-36.6 °C (97.9 °F)] 36.3 °C (97.3 °F)  Heart Rate:  [70-93] 85  Resp:  [16-18] 18  BP: (120-169)/(56-92) 164/80  FiO2 (%):  [28 %-32 %] 28 %    I/O this shift:  In: -   Out: 1 [Stool:1]  Patient still requiring frequent cardiac and SPO2 monitoring. Continue aggressive pulmonary hygiene and oral hygiene. Off loading as tolerated for skin integrity. Medications and labs reviewed-         Results for orders placed or performed  during the hospital encounter of 02/25/25 (from the past 24 hours)   Protime-INR   Result Value Ref Range     Protime 19.4 (H) 9.8 - 12.4 seconds     INR 1.8 (H) 0.9 - 1.1   POCT GLUCOSE   Result Value Ref Range     POCT Glucose 136 (H) 74 - 99 mg/dL   POCT GLUCOSE   Result Value Ref Range     POCT Glucose 144 (H) 74 - 99 mg/dL   CBC and Auto Differential   Result Value Ref Range     WBC 7.4 4.4 - 11.3 x10*3/uL     nRBC 0.0 0.0 - 0.0 /100 WBCs     RBC 3.13 (L) 4.00 - 5.20 x10*6/uL     Hemoglobin 8.7 (L) 12.0 - 16.0 g/dL     Hematocrit 28.3 (L) 36.0 - 46.0 %     MCV 90 80 - 100 fL     MCH 27.8 26.0 - 34.0 pg     MCHC 30.7 (L) 32.0 - 36.0 g/dL     RDW 19.9 (H) 11.5 - 14.5 %     Platelets 247 150 - 450 x10*3/uL     Neutrophils % 77.0 40.0 - 80.0 %     Immature Granulocytes %, Automated 0.5 0.0 - 0.9 %     Lymphocytes % 12.9 13.0 - 44.0 %     Monocytes % 9.4 2.0 - 10.0 %     Eosinophils % 0.1 0.0 - 6.0 %     Basophils % 0.1 0.0 - 2.0 %     Neutrophils Absolute 5.66 1.20 - 7.70 x10*3/uL     Immature Granulocytes Absolute, Automated 0.04 0.00 - 0.70 x10*3/uL     Lymphocytes Absolute 0.95 (L) 1.20 - 4.80 x10*3/uL     Monocytes Absolute 0.69 0.10 - 1.00 x10*3/uL     Eosinophils Absolute 0.01 0.00 - 0.70 x10*3/uL     Basophils Absolute 0.01 0.00 - 0.10 x10*3/uL   Comprehensive Metabolic Panel   Result Value Ref Range     Glucose 95 74 - 99 mg/dL     Sodium 138 136 - 145 mmol/L     Potassium 3.1 (L) 3.5 - 5.3 mmol/L     Chloride 100 98 - 107 mmol/L     Bicarbonate 30 21 - 32 mmol/L     Anion Gap 11 10 - 20 mmol/L     Urea Nitrogen 8 6 - 23 mg/dL     Creatinine 0.32 (L) 0.50 - 1.05 mg/dL     eGFR >90 >60 mL/min/1.73m*2     Calcium 8.6 8.6 - 10.3 mg/dL     Albumin 2.8 (L) 3.4 - 5.0 g/dL     Alkaline Phosphatase 45 33 - 110 U/L     Total Protein 5.6 (L) 6.4 - 8.2 g/dL     AST 13 9 - 39 U/L     Bilirubin, Total 1.9 (H) 0.0 - 1.2 mg/dL     ALT 14 7 - 45 U/L   Protime-INR   Result Value Ref Range     Protime 23.6 (H) 9.8 - 12.4  seconds     INR 2.1 (H) 0.9 - 1.1   POCT GLUCOSE   Result Value Ref Range     POCT Glucose 93 74 - 99 mg/dL   POCT GLUCOSE   Result Value Ref Range     POCT Glucose 132 (H) 74 - 99 mg/dL   Heparin Assay   Result Value Ref Range     Heparin Unfractionated 0.1 See Comment Below for Therapeutic Ranges IU/mL      Patient recently received an antibiotic (last 12 hours)         Date/Time Action Medication Dose     03/12/25 1252 Given    vancomycin (Vancocin) capsule 250 mg 250 mg     03/12/25 0614 Given    vancomycin (Vancocin) capsule 250 mg 250 mg             Pt fully evaluated 3/12. Pt on 3L NC today increased from yesterday. With start of Coumadin will continue to monitor INR.  Plan discussed with interdisciplinary team, continue current and repeat labs in the AM.      Discharge planning discussed with patient and care team. Therapy evaluations ordered. [facility]- 11 , anticipate HHC/SNF at discharge. Patient aware and agreeable to current plan, continue plan as above.      I spent a total of 50 minutes on the date of the service which included preparing to see the patient, face-to-face patient care, completing clinical documentation, obtaining and/or reviewing separately obtained history, performing a medically appropriate examination, counseling and educating the patient/family/caregiver, ordering medications, tests, or procedures, communicating with other HCPs (not separately reported), independently interpreting results (not separately reported), communicating results to the patient/family/caregiver, and care coordination (not separately reported).         Patient fully evaluated  3/13  for    Problem List Items Addressed This Visit                  Gastrointestinal and Abdominal     * (Principal) Nausea vomiting and diarrhea          Musculoskeletal and Injuries     Nontraumatic compartment syndrome of left upper extremity     Relevant Orders     Case Request Operating Room: FASCIOTOMY, UPPER EXTREMITY (Completed)      Case Request Operating Room: DEBRIDEMENT, WOUND, UPPER EXTREMITY (Completed)          Pulmonary and Pneumonias     Influenza A with pneumonia - Primary      Other Visit Diagnoses         Acute kidney injury (CMS-HCC)         Dehydration         Influenza A         Shock (Multi)         Relevant Orders     Transthoracic Echo (TTE) Complete (Completed)             Patient seen resting in bed with head of bed elevated, no s/s or c/o acute difficulties at this time.  Vital signs for last 24 hours Temp:  [35.9 °C (96.6 °F)-36.6 °C (97.9 °F)] 36.3 °C (97.3 °F)  Heart Rate:  [70-93] 85  Resp:  [16-18] 18  BP: (120-169)/(56-92) 164/80  FiO2 (%):  [28 %-32 %] 28 %    I/O this shift:  In: -   Out: 1 [Stool:1]  Patient still requiring frequent cardiac and SPO2 monitoring. Continue aggressive pulmonary hygiene and oral hygiene. Off loading as tolerated for skin integrity. Medications and labs reviewed-         Results for orders placed or performed during the hospital encounter of 02/25/25 (from the past 24 hours)   Protime-INR   Result Value Ref Range     Protime 19.4 (H) 9.8 - 12.4 seconds     INR 1.8 (H) 0.9 - 1.1   POCT GLUCOSE   Result Value Ref Range     POCT Glucose 136 (H) 74 - 99 mg/dL   POCT GLUCOSE   Result Value Ref Range     POCT Glucose 144 (H) 74 - 99 mg/dL   CBC and Auto Differential   Result Value Ref Range     WBC 7.4 4.4 - 11.3 x10*3/uL     nRBC 0.0 0.0 - 0.0 /100 WBCs     RBC 3.13 (L) 4.00 - 5.20 x10*6/uL     Hemoglobin 8.7 (L) 12.0 - 16.0 g/dL     Hematocrit 28.3 (L) 36.0 - 46.0 %     MCV 90 80 - 100 fL     MCH 27.8 26.0 - 34.0 pg     MCHC 30.7 (L) 32.0 - 36.0 g/dL     RDW 19.9 (H) 11.5 - 14.5 %     Platelets 247 150 - 450 x10*3/uL     Neutrophils % 77.0 40.0 - 80.0 %     Immature Granulocytes %, Automated 0.5 0.0 - 0.9 %     Lymphocytes % 12.9 13.0 - 44.0 %     Monocytes % 9.4 2.0 - 10.0 %     Eosinophils % 0.1 0.0 - 6.0 %     Basophils % 0.1 0.0 - 2.0 %     Neutrophils Absolute 5.66 1.20 - 7.70 x10*3/uL      Immature Granulocytes Absolute, Automated 0.04 0.00 - 0.70 x10*3/uL     Lymphocytes Absolute 0.95 (L) 1.20 - 4.80 x10*3/uL     Monocytes Absolute 0.69 0.10 - 1.00 x10*3/uL     Eosinophils Absolute 0.01 0.00 - 0.70 x10*3/uL     Basophils Absolute 0.01 0.00 - 0.10 x10*3/uL   Comprehensive Metabolic Panel   Result Value Ref Range     Glucose 95 74 - 99 mg/dL     Sodium 138 136 - 145 mmol/L     Potassium 3.1 (L) 3.5 - 5.3 mmol/L     Chloride 100 98 - 107 mmol/L     Bicarbonate 30 21 - 32 mmol/L     Anion Gap 11 10 - 20 mmol/L     Urea Nitrogen 8 6 - 23 mg/dL     Creatinine 0.32 (L) 0.50 - 1.05 mg/dL     eGFR >90 >60 mL/min/1.73m*2     Calcium 8.6 8.6 - 10.3 mg/dL     Albumin 2.8 (L) 3.4 - 5.0 g/dL     Alkaline Phosphatase 45 33 - 110 U/L     Total Protein 5.6 (L) 6.4 - 8.2 g/dL     AST 13 9 - 39 U/L     Bilirubin, Total 1.9 (H) 0.0 - 1.2 mg/dL     ALT 14 7 - 45 U/L   Protime-INR   Result Value Ref Range     Protime 23.6 (H) 9.8 - 12.4 seconds     INR 2.1 (H) 0.9 - 1.1   POCT GLUCOSE   Result Value Ref Range     POCT Glucose 93 74 - 99 mg/dL   POCT GLUCOSE   Result Value Ref Range     POCT Glucose 132 (H) 74 - 99 mg/dL   Heparin Assay   Result Value Ref Range     Heparin Unfractionated 0.1 See Comment Below for Therapeutic Ranges IU/mL      Patient recently received an antibiotic (last 12 hours)         Date/Time Action Medication Dose     03/13/25 1256 Given    vancomycin (Vancocin) capsule 250 mg 250 mg     03/13/25 0804 Given    vancomycin (Vancocin) capsule 250 mg 250 mg             Pt fully evaluated 3/13. Pt still on 3L NC. INR is 2.1 today and heparin was started, will discontinue when INR is 2.5 then Pt can discharge so one more day. Potasoum was 3.1. Plan discussed with interdisciplinary team, continue current and repeat labs in the AM.      Discharge planning discussed with patient and care team. Therapy evaluations ordered. [facility]-  , anticipate HHC/SNF at discharge. Patient aware and agreeable to current  plan, continue plan as above.      I spent a total of 50 minutes on the date of the service which included preparing to see the patient, face-to-face patient care, completing clinical documentation, obtaining and/or reviewing separately obtained history, performing a medically appropriate examination, counseling and educating the patient/family/caregiver, ordering medications, tests, or procedures, communicating with other HCPs (not separately reported), independently interpreting results (not separately reported), communicating results to the patient/family/caregiver, and care coordination (not separately reported).   SADA CHAN                    Revision History         Pertinent Physical Exam At Time of Discharge  Physical Exam  no acute events overnight, patient denies chest pain, worsening SOB at this time.  Outpatient Follow-Up  Future Appointments   Date Time Provider Department Center   3/27/2025  1:30 PM HONORIO [id number] [facility] [id number] [place]   4/8/2025  1:15 PM Brittney Lomeli MD [id number] [place]         Ginny Willson

## 2025-03-14 NOTE — PROGRESS NOTES
Occupational Therapy    OT Treatment    Patient Name: Kayley Lynch  MRN: [id number]  Department: [facility]  Room: [address] [place] [address]  Today's Date: 3/14/2025  Time Calculation  Start Time: 0934  Stop Time: 0949  Time Calculation (min): 15 min        Assessment:  End of Session Patient Position: Up in chair, Alarm on     Plan:  Treatment Interventions: ADL retraining, Functional transfer training, Endurance training, Compensatory technique education  OT Frequency: 4 times per week  OT Discharge Recommendations: High intensity level of continued care  OT - OK to Discharge: Yes (to next level of care when cleared by medical team)  Treatment Interventions: ADL retraining, Functional transfer training, Endurance training, Compensatory technique education    Subjective   Previous Visit Info:  OT Last Visit  OT Received On: 03/14/25  General:  General  Family/Caregiver Present: Yes (pt son arriving towards start of session)  Co-Treatment: PT  Co-Treatment Reason: to maximize safety and functional mobility  Prior to Session Communication: Bedside nurse  Patient Position Received: Up in chair, Alarm on  General Comment: pleasant and cooperative  Precautions:  Medical Precautions: Fall precautions, Infection precautions, Oxygen therapy device and L/min            Pain:  Pain Assessment  0-10 (Numeric) Pain Score: 0 - No pain    Objective      Functional Standing Tolerance:  Time: 2:00 standing at FWW  Bed Mobility/Transfers: Transfers  Transfer: Yes  Transfer 1  Technique 1: Sit to stand, Stand to sit  Transfer Device 1: Walker  Transfer Level of Assistance 1: Contact guard      Functional Mobility:  Functional Mobility  Functional Mobility Performed: Yes  Functional Mobility 1  Device 1: Rolling walker  Assistance 1:  (CGA-Min A x1)  Comments 1: pt ambulated across room to  hallway x2 trials       Therapy/Activity: Therapeutic Exercise  Therapeutic Exercise Performed: Yes  Therapeutic Exercise Activity 1: pt completed AROM to L UE for  shouler, elbow, and wrist. pt instructed on hand pumps with L UE elevated on pillow to decrease swelling. pt denied pain throughout ROM      Outcome Measures:[facility] Daily Activity  Putting on and taking off regular lower body clothing: A lot  Bathing (including washing, rinsing, drying): A lot  Putting on and taking off regular upper body clothing: A little  Toileting, which includes using toilet, bedpan or urinal: A little  Taking care of personal grooming such as brushing teeth: A little  Eating Meals: None  Daily Activity - Total Score: 17        Education Documentation  Precautions, taught by JACINTA Dale at 3/14/2025  2:37 PM.  Learner: Patient  Readiness: Acceptance  Method: Explanation  Response: Verbalizes Understanding    ADL Training, taught by JACINTA Dale at 3/14/2025  2:37 PM.  Learner: Patient  Readiness: Acceptance  Method: Explanation  Response: Verbalizes Understanding    Education Comments  No comments found.            Goals:  Encounter Problems       Encounter Problems (Active)       OT Goals       Increase functional mobility and  functional transfers to min assist x 1 for bed/chair/toilet with dme prn   (Progressing)       Start:  03/05/25    Expected End:  03/19/25            increase bue ther ex/activity x 5-7 minutes and increase standing tolerance x 3-5 minutes with min assist x 1 to promote greater activity tolerance for assist with adl.   (Progressing)       Start:  03/05/25    Expected End:  03/19/25            Increase grooming/feeding to set up with dme prn  (Progressing)       Start:  03/05/25    Expected End:  03/19/25            Increase ub/lb dressing to mod assist x 1 with dme prn  (Progressing)       Start:  03/05/25    Expected End:  03/19/25

## 2025-03-14 NOTE — PROGRESS NOTES
Physical Therapy    Physical Therapy Treatment    Patient Name: Kayley Lynch  MRN: [id number]  Department: [facility]  Room: [facility]-  Today's Date: 3/14/2025  Time Calculation  Start Time: 0934  Stop Time: 0949  Time Calculation (min): 15 min         Assessment/Plan   PT Assessment  End of Session Patient Position: Up in chair, Alarm on     PT Plan  Treatment/Interventions: Bed mobility, Transfer training, Gait training, Balance training, Strengthening, Endurance training, Therapeutic exercise, Therapeutic activity  PT Plan: Ongoing PT  PT Frequency: 4 times per week  PT Discharge Recommendations: High intensity level of continued care  PT - OK to Discharge: Yes      General Visit Information:   PT  Visit  PT Received On: 03/14/25  General  Family/Caregiver Present: Yes (pt's son arriving partway into tx session)  Co-Treatment: OT  Co-Treatment Reason: to maximize safety and functional mobility  Prior to Session Communication: Bedside nurse  Patient Position Received: Up in chair, Alarm on  General Comment:  (pt very pleasant and agreeable to participate in therapy.  reports feeling better today)    Subjective   Precautions:  Precautions  Medical Precautions: Fall precautions, Infection precautions (contact plus: cdiff), Oxygen therapy device and L/min (2L)          Objective   Pain:  Pain Assessment  Pain Assessment: 0-10  0-10 (Numeric) Pain Score: 0 - No pain     Treatments:  Therapeutic Exercise  Therapeutic Exercise Performed:  (seated BLE AP and LAQ)    Bed Mobility  Bed Mobility:  (NT - pt seated in chair upon therapy arrival and departure)    Ambulation/Gait Training  Ambulation/Gait Training Performed:  (pt ambulates approx. 20 ft x 2 and 10 ft x 2 with seated rest break btwn.  FWW and CGA to min A x 1.  slow pace; guarded gait.  pt also somewhat unsteady throughout; one LOB when first turning/changing directions; min A to recover.)    Transfers  Transfer:  (sit <> stand x2 trials with FWW and CGA.  cuing for  safe hand placement/sequencing.)    Outcome Measures:  [facility] Basic Mobility  Turning from your back to your side while in a flat bed without using bedrails: A little  Moving from lying on your back to sitting on the side of a flat bed without using bedrails: A little  Moving to and from bed to chair (including a wheelchair): A little  Standing up from a chair using your arms (e.g. wheelchair or bedside chair): A little  To walk in hospital room: A little  Climbing 3-5 steps with railing: Total  Basic Mobility - Total Score: 16    Education Documentation  Precautions, taught by Anabel Ambrocio PTA at 3/14/2025  2:33 PM.  Learner: Patient  Readiness: Acceptance  Method: Explanation  Response: Verbalizes Understanding    Mobility Training, taught by Anabel Ambrocio PTA at 3/14/2025  2:33 PM.  Learner: Patient  Readiness: Acceptance  Method: Explanation  Response: Verbalizes Understanding    Education Comments  No comments found.        EDUCATION:       Encounter Problems       Encounter Problems (Active)       PT Problem       PT Goal 1 STG - Pt will amb 10' using WW with MIN A   (Met)       Start:  03/05/25    Expected End:  03/19/25    Resolved:  03/14/25         PT Goal 2 STG - Pt will transition supine <> sitting with MIN A  (Progressing)       Start:  03/05/25    Expected End:  03/19/25            PT Goal 3 STG - Pt will SPT bed <> chair with MIN A  (Progressing)       Start:  03/05/25    Expected End:  03/19/25            PT Goal 4 STG - Pt will transfer STS with MIN A  (Met)       Start:  03/05/25    Expected End:  03/19/25    Resolved:  03/14/25

## 2025-03-14 NOTE — PROGRESS NOTES
"Kayley Lynch is a 54 y.o. female on day 16 of admission presenting with Nausea vomiting and diarrhea.    Subjective   The patient denies any significant left upper extremity pain.       Objective     Physical Exam  There is a small amount of dried bloody drainage on the distal aspect of the dressing.  The underlying incision line is intact with sutures in place.  There is no active drainage.  There is surrounding ecchymosis and crusting along the incision.  She is able to flex and extend the elbow, wrist and hand grossly within normal limits.  Sensation to light touch is intact.  Last Recorded Vitals  Blood pressure 139/63, pulse 97, temperature 36 °C (96.8 °F), resp. rate 18, height 1.6 m (5' 2.99\"), weight 66.5 kg (146 lb 9.7 oz), SpO2 96%.  Intake/Output last 3 Shifts:  I/O last 3 completed shifts:  In: - (0 mL/kg)   Out: 901 (13.5 mL/kg) [Urine:900 (0.4 mL/kg/hr); Stool:1]  Weight: 66.5 kg     Relevant Results            This patient currently has cardiac telemetry ordered; if you would like to modify or discontinue the telemetry order, click here to go to the orders activity to modify/discontinue the order.               Results for orders placed or performed during the hospital encounter of 02/25/25 (from the past 24 hours)   POCT GLUCOSE   Result Value Ref Range    POCT Glucose 132 (H) 74 - 99 mg/dL   Heparin Assay   Result Value Ref Range    Heparin Unfractionated 0.1 See Comment Below for Therapeutic Ranges IU/mL   POCT GLUCOSE   Result Value Ref Range    POCT Glucose 96 74 - 99 mg/dL   Heparin Assay   Result Value Ref Range    Heparin Unfractionated 0.2 See Comment Below for Therapeutic Ranges IU/mL   Protime-INR   Result Value Ref Range    Protime 31.7 (H) 9.8 - 12.4 seconds    INR 2.9 (H) 0.9 - 1.1   POCT GLUCOSE   Result Value Ref Range    POCT Glucose 116 (H) 74 - 99 mg/dL   CBC and Auto Differential   Result Value Ref Range    WBC 7.3 4.4 - 11.3 x10*3/uL    nRBC 0.0 0.0 - 0.0 /100 WBCs    RBC 3.20 (L) " 4.00 - 5.20 x10*6/uL    Hemoglobin 9.0 (L) 12.0 - 16.0 g/dL    Hematocrit 29.5 (L) 36.0 - 46.0 %    MCV 92 80 - 100 fL    MCH 28.1 26.0 - 34.0 pg    MCHC 30.5 (L) 32.0 - 36.0 g/dL    RDW 19.9 (H) 11.5 - 14.5 %    Platelets 280 150 - 450 x10*3/uL    Neutrophils % 75.7 40.0 - 80.0 %    Immature Granulocytes %, Automated 0.4 0.0 - 0.9 %    Lymphocytes % 13.2 13.0 - 44.0 %    Monocytes % 10.6 2.0 - 10.0 %    Eosinophils % 0.0 0.0 - 6.0 %    Basophils % 0.1 0.0 - 2.0 %    Neutrophils Absolute 5.52 1.20 - 7.70 x10*3/uL    Immature Granulocytes Absolute, Automated 0.03 0.00 - 0.70 x10*3/uL    Lymphocytes Absolute 0.96 (L) 1.20 - 4.80 x10*3/uL    Monocytes Absolute 0.77 0.10 - 1.00 x10*3/uL    Eosinophils Absolute 0.00 0.00 - 0.70 x10*3/uL    Basophils Absolute 0.01 0.00 - 0.10 x10*3/uL   Comprehensive Metabolic Panel   Result Value Ref Range    Glucose 94 74 - 99 mg/dL    Sodium 138 136 - 145 mmol/L    Potassium 3.4 (L) 3.5 - 5.3 mmol/L    Chloride 101 98 - 107 mmol/L    Bicarbonate 32 21 - 32 mmol/L    Anion Gap 8 (L) 10 - 20 mmol/L    Urea Nitrogen 7 6 - 23 mg/dL    Creatinine 0.37 (L) 0.50 - 1.05 mg/dL    eGFR >90 >60 mL/min/1.73m*2    Calcium 9.0 8.6 - 10.3 mg/dL    Albumin 3.0 (L) 3.4 - 5.0 g/dL    Alkaline Phosphatase 52 33 - 110 U/L    Total Protein 5.9 (L) 6.4 - 8.2 g/dL    AST 14 9 - 39 U/L    Bilirubin, Total 1.9 (H) 0.0 - 1.2 mg/dL    ALT 16 7 - 45 U/L   Protime-INR   Result Value Ref Range    Protime 43.2 (H) 9.8 - 12.4 seconds    INR 3.9 (H) 0.9 - 1.1   POCT GLUCOSE   Result Value Ref Range    POCT Glucose 88 74 - 99 mg/dL     *Note: Due to a large number of results and/or encounters for the requested time period, some results have not been displayed. A complete set of results can be found in Results Review.      Assessment/Plan   Assessment & Plan  Nausea vomiting and diarrhea    Influenza A with pneumonia    Nontraumatic compartment syndrome of left upper extremity    The patient is now 10 days status post  her left upper extremity fasciotomy closure.  Overall, she appears to be doing well.  Plan for suture removal today.  Nursing was notified.             Elijah Licea MD

## 2025-03-15 LAB
ALBUMIN SERPL BCP-MCNC: 3.1 G/DL (ref 3.4–5)
ALP SERPL-CCNC: 58 U/L (ref 33–110)
ALT SERPL W P-5'-P-CCNC: 16 U/L (ref 7–45)
ANION GAP SERPL CALC-SCNC: 9 MMOL/L (ref 10–20)
AST SERPL W P-5'-P-CCNC: 15 U/L (ref 9–39)
BASO STIPL BLD QL SMEAR: PRESENT
BASOPHILS # BLD AUTO: 0.01 X10*3/UL (ref 0–0.1)
BASOPHILS NFR BLD AUTO: 0.1 %
BILIRUB SERPL-MCNC: 1.7 MG/DL (ref 0–1.2)
BUN SERPL-MCNC: 8 MG/DL (ref 6–23)
CALCIUM SERPL-MCNC: 9 MG/DL (ref 8.6–10.3)
CHLORIDE SERPL-SCNC: 101 MMOL/L (ref 98–107)
CO2 SERPL-SCNC: 30 MMOL/L (ref 21–32)
CREAT SERPL-MCNC: 0.42 MG/DL (ref 0.5–1.05)
EGFRCR SERPLBLD CKD-EPI 2021: >90 ML/MIN/1.73M*2
EOSINOPHIL # BLD AUTO: 0 X10*3/UL (ref 0–0.7)
EOSINOPHIL NFR BLD AUTO: 0 %
ERYTHROCYTE [DISTWIDTH] IN BLOOD BY AUTOMATED COUNT: 20.7 % (ref 11.5–14.5)
GIANT PLATELETS BLD QL SMEAR: NORMAL
GLUCOSE BLD MANUAL STRIP-MCNC: 107 MG/DL (ref 74–99)
GLUCOSE BLD MANUAL STRIP-MCNC: 109 MG/DL (ref 74–99)
GLUCOSE BLD MANUAL STRIP-MCNC: 87 MG/DL (ref 74–99)
GLUCOSE BLD MANUAL STRIP-MCNC: 88 MG/DL (ref 74–99)
GLUCOSE SERPL-MCNC: 88 MG/DL (ref 74–99)
HCT VFR BLD AUTO: 29 % (ref 36–46)
HGB BLD-MCNC: 8.7 G/DL (ref 12–16)
IMM GRANULOCYTES # BLD AUTO: 0.04 X10*3/UL (ref 0–0.7)
IMM GRANULOCYTES NFR BLD AUTO: 0.5 % (ref 0–0.9)
INR PPP: 4 (ref 0.9–1.1)
LYMPHOCYTES # BLD AUTO: 1.23 X10*3/UL (ref 1.2–4.8)
LYMPHOCYTES NFR BLD AUTO: 16.5 %
MCH RBC QN AUTO: 27.6 PG (ref 26–34)
MCHC RBC AUTO-ENTMCNC: 30 G/DL (ref 32–36)
MCV RBC AUTO: 92 FL (ref 80–100)
MONOCYTES # BLD AUTO: 0.75 X10*3/UL (ref 0.1–1)
MONOCYTES NFR BLD AUTO: 10 %
NEUTROPHILS # BLD AUTO: 5.44 X10*3/UL (ref 1.2–7.7)
NEUTROPHILS NFR BLD AUTO: 72.9 %
NRBC BLD-RTO: 0 /100 WBCS (ref 0–0)
OVALOCYTES BLD QL SMEAR: NORMAL
PLATELET # BLD AUTO: 276 X10*3/UL (ref 150–450)
POLYCHROMASIA BLD QL SMEAR: NORMAL
POTASSIUM SERPL-SCNC: 3.6 MMOL/L (ref 3.5–5.3)
PROT SERPL-MCNC: 6.1 G/DL (ref 6.4–8.2)
PROTHROMBIN TIME: 44.3 SECONDS (ref 9.8–12.4)
RBC # BLD AUTO: 3.15 X10*6/UL (ref 4–5.2)
RBC MORPH BLD: NORMAL
SCHISTOCYTES BLD QL SMEAR: NORMAL
SODIUM SERPL-SCNC: 136 MMOL/L (ref 136–145)
TARGETS BLD QL SMEAR: NORMAL
WBC # BLD AUTO: 7.5 X10*3/UL (ref 4.4–11.3)

## 2025-03-15 PROCEDURE — 2500000001 HC RX 250 WO HCPCS SELF ADMINISTERED DRUGS (ALT 637 FOR MEDICARE OP)

## 2025-03-15 PROCEDURE — 2060000001 HC INTERMEDIATE ICU ROOM DAILY

## 2025-03-15 PROCEDURE — 94640 AIRWAY INHALATION TREATMENT: CPT

## 2025-03-15 PROCEDURE — 2500000002 HC RX 250 W HCPCS SELF ADMINISTERED DRUGS (ALT 637 FOR MEDICARE OP, ALT 636 FOR OP/ED)

## 2025-03-15 PROCEDURE — 85025 COMPLETE CBC W/AUTO DIFF WBC: CPT

## 2025-03-15 PROCEDURE — 82947 ASSAY GLUCOSE BLOOD QUANT: CPT

## 2025-03-15 PROCEDURE — 36415 COLL VENOUS BLD VENIPUNCTURE: CPT

## 2025-03-15 PROCEDURE — 84075 ASSAY ALKALINE PHOSPHATASE: CPT

## 2025-03-15 PROCEDURE — 2500000001 HC RX 250 WO HCPCS SELF ADMINISTERED DRUGS (ALT 637 FOR MEDICARE OP): Performed by: INTERNAL MEDICINE

## 2025-03-15 PROCEDURE — 85610 PROTHROMBIN TIME: CPT

## 2025-03-15 PROCEDURE — 2500000005 HC RX 250 GENERAL PHARMACY W/O HCPCS

## 2025-03-15 RX ADMIN — VANCOMYCIN HYDROCHLORIDE 250 MG: 250 CAPSULE ORAL at 12:11

## 2025-03-15 RX ADMIN — ESCITALOPRAM OXALATE 20 MG: 10 TABLET ORAL at 08:11

## 2025-03-15 RX ADMIN — PANTOPRAZOLE SODIUM 40 MG: 40 TABLET, DELAYED RELEASE ORAL at 08:11

## 2025-03-15 RX ADMIN — POTASSIUM CHLORIDE 40 MEQ: 1.5 POWDER, FOR SOLUTION ORAL at 08:11

## 2025-03-15 RX ADMIN — BUDESONIDE 0.5 MG: 0.5 INHALANT ORAL at 07:04

## 2025-03-15 RX ADMIN — BUPROPION HYDROCHLORIDE 150 MG: 150 TABLET, EXTENDED RELEASE ORAL at 08:11

## 2025-03-15 RX ADMIN — IPRATROPIUM BROMIDE AND ALBUTEROL SULFATE 3 ML: .5; 3 SOLUTION RESPIRATORY (INHALATION) at 13:45

## 2025-03-15 RX ADMIN — Medication 2 L/MIN: at 20:20

## 2025-03-15 RX ADMIN — VANCOMYCIN HYDROCHLORIDE 250 MG: 250 CAPSULE ORAL at 21:05

## 2025-03-15 RX ADMIN — VANCOMYCIN HYDROCHLORIDE 250 MG: 250 CAPSULE ORAL at 17:53

## 2025-03-15 RX ADMIN — ACETAMINOPHEN 650 MG: 325 TABLET, FILM COATED ORAL at 21:07

## 2025-03-15 RX ADMIN — ATORVASTATIN CALCIUM 80 MG: 80 TABLET, FILM COATED ORAL at 21:05

## 2025-03-15 RX ADMIN — ASPIRIN 81 MG: 81 TABLET, COATED ORAL at 08:10

## 2025-03-15 RX ADMIN — BUDESONIDE 0.5 MG: 0.5 INHALANT ORAL at 20:19

## 2025-03-15 RX ADMIN — LISINOPRIL 20 MG: 20 TABLET ORAL at 08:11

## 2025-03-15 RX ADMIN — Medication 2 L/MIN: at 07:07

## 2025-03-15 RX ADMIN — IPRATROPIUM BROMIDE AND ALBUTEROL SULFATE 3 ML: .5; 3 SOLUTION RESPIRATORY (INHALATION) at 07:04

## 2025-03-15 RX ADMIN — IPRATROPIUM BROMIDE AND ALBUTEROL SULFATE 3 ML: .5; 3 SOLUTION RESPIRATORY (INHALATION) at 20:19

## 2025-03-15 RX ADMIN — CARVEDILOL 25 MG: 25 TABLET, FILM COATED ORAL at 21:05

## 2025-03-15 ASSESSMENT — COGNITIVE AND FUNCTIONAL STATUS - GENERAL
DRESSING REGULAR LOWER BODY CLOTHING: A LITTLE
HELP NEEDED FOR BATHING: A LITTLE
WALKING IN HOSPITAL ROOM: A LOT
WALKING IN HOSPITAL ROOM: A LITTLE
CLIMB 3 TO 5 STEPS WITH RAILING: A LOT
STANDING UP FROM CHAIR USING ARMS: A LITTLE
MOBILITY SCORE: 16
MOVING FROM LYING ON BACK TO SITTING ON SIDE OF FLAT BED WITH BEDRAILS: A LITTLE
TOILETING: A LITTLE
TURNING FROM BACK TO SIDE WHILE IN FLAT BAD: A LITTLE
DRESSING REGULAR LOWER BODY CLOTHING: A LITTLE
MOVING TO AND FROM BED TO CHAIR: A LITTLE
TURNING FROM BACK TO SIDE WHILE IN FLAT BAD: A LITTLE
CLIMB 3 TO 5 STEPS WITH RAILING: A LITTLE
MOBILITY SCORE: 19
TOILETING: A LITTLE
DAILY ACTIVITIY SCORE: 21
HELP NEEDED FOR BATHING: A LITTLE
MOVING FROM LYING ON BACK TO SITTING ON SIDE OF FLAT BED WITH BEDRAILS: A LITTLE
DAILY ACTIVITIY SCORE: 21
MOVING TO AND FROM BED TO CHAIR: A LITTLE

## 2025-03-15 ASSESSMENT — PAIN SCALES - GENERAL
PAINLEVEL_OUTOF10: 6
PAINLEVEL_OUTOF10: 0 - NO PAIN
PAINLEVEL_OUTOF10: 0 - NO PAIN

## 2025-03-15 ASSESSMENT — PAIN DESCRIPTION - LOCATION: LOCATION: ARM

## 2025-03-15 ASSESSMENT — PAIN - FUNCTIONAL ASSESSMENT
PAIN_FUNCTIONAL_ASSESSMENT: 0-10
PAIN_FUNCTIONAL_ASSESSMENT: 0-10

## 2025-03-15 NOTE — PROGRESS NOTES
Kayley Lynch is a 54 y.o. female on day 17 of admission presenting with Nausea vomiting and diarrhea.      Subjective   Patient fully evaluated  03/15  for    Problem List Items Addressed This Visit       * (Principal) Nausea vomiting and diarrhea    Influenza A with pneumonia - Primary    Relevant Medications    acetaminophen (Tylenol) 325 mg tablet    budesonide (Pulmicort) 0.5 mg/2 mL nebulizer solution    insulin lispro 100 unit/mL injection    ipratropium-albuteroL (Duo-Neb) 0.5-2.5 mg/3 mL nebulizer solution    melatonin 3 mg tablet    oxygen (O2) gas therapy    pantoprazole (ProtoNix) 40 mg EC tablet    vancomycin (Vancocin) 250 mg capsule    warfarin (Coumadin) 2 mg tablet    atorvastatin (Lipitor) 80 mg tablet    Nontraumatic compartment syndrome of left upper extremity    Relevant Orders    Case Request Operating Room: FASCIOTOMY, UPPER EXTREMITY (Completed)    Case Request Operating Room: DEBRIDEMENT, WOUND, UPPER EXTREMITY (Completed)     Other Visit Diagnoses       Acute kidney injury (CMS-HCC)        Dehydration        Influenza A        Shock (Multi)        Relevant Orders    Transthoracic Echo (TTE) Complete (Completed)          Patient seen resting in bed with head of bed elevated, no s/s or c/o acute difficulties at this time.  Vital signs for last 24 hours Temp:  [35.8 °C (96.4 °F)-36.9 °C (98.4 °F)] 36.7 °C (98.1 °F)  Heart Rate:  [] 103  Resp:  [18] 18  BP: ()/(50-74) 120/59  FiO2 (%):  [28 %] 28 %    No intake/output data recorded.  Patient still requiring frequent cardiac and SPO2 monitoring. Continue aggressive pulmonary hygiene and oral hygiene. Off loading as tolerated for skin integrity. Medications and labs reviewed-   Results for orders placed or performed during the hospital encounter of 02/25/25 (from the past 24 hours)   Protime-INR   Result Value Ref Range    Protime 54.0 (H) 9.8 - 12.4 seconds    INR 4.9 (H) 0.9 - 1.1   POCT GLUCOSE   Result Value Ref Range    POCT  Glucose 115 (H) 74 - 99 mg/dL   POCT GLUCOSE   Result Value Ref Range    POCT Glucose 87 74 - 99 mg/dL   CBC and Auto Differential   Result Value Ref Range    WBC 7.5 4.4 - 11.3 x10*3/uL    nRBC 0.0 0.0 - 0.0 /100 WBCs    RBC 3.15 (L) 4.00 - 5.20 x10*6/uL    Hemoglobin 8.7 (L) 12.0 - 16.0 g/dL    Hematocrit 29.0 (L) 36.0 - 46.0 %    MCV 92 80 - 100 fL    MCH 27.6 26.0 - 34.0 pg    MCHC 30.0 (L) 32.0 - 36.0 g/dL    RDW 20.7 (H) 11.5 - 14.5 %    Platelets 276 150 - 450 x10*3/uL    Neutrophils % 72.9 40.0 - 80.0 %    Immature Granulocytes %, Automated 0.5 0.0 - 0.9 %    Lymphocytes % 16.5 13.0 - 44.0 %    Monocytes % 10.0 2.0 - 10.0 %    Eosinophils % 0.0 0.0 - 6.0 %    Basophils % 0.1 0.0 - 2.0 %    Neutrophils Absolute 5.44 1.20 - 7.70 x10*3/uL    Immature Granulocytes Absolute, Automated 0.04 0.00 - 0.70 x10*3/uL    Lymphocytes Absolute 1.23 1.20 - 4.80 x10*3/uL    Monocytes Absolute 0.75 0.10 - 1.00 x10*3/uL    Eosinophils Absolute 0.00 0.00 - 0.70 x10*3/uL    Basophils Absolute 0.01 0.00 - 0.10 x10*3/uL   Comprehensive Metabolic Panel   Result Value Ref Range    Glucose 88 74 - 99 mg/dL    Sodium 136 136 - 145 mmol/L    Potassium 3.6 3.5 - 5.3 mmol/L    Chloride 101 98 - 107 mmol/L    Bicarbonate 30 21 - 32 mmol/L    Anion Gap 9 (L) 10 - 20 mmol/L    Urea Nitrogen 8 6 - 23 mg/dL    Creatinine 0.42 (L) 0.50 - 1.05 mg/dL    eGFR >90 >60 mL/min/1.73m*2    Calcium 9.0 8.6 - 10.3 mg/dL    Albumin 3.1 (L) 3.4 - 5.0 g/dL    Alkaline Phosphatase 58 33 - 110 U/L    Total Protein 6.1 (L) 6.4 - 8.2 g/dL    AST 15 9 - 39 U/L    Bilirubin, Total 1.7 (H) 0.0 - 1.2 mg/dL    ALT 16 7 - 45 U/L   Protime-INR   Result Value Ref Range    Protime 44.3 (H) 9.8 - 12.4 seconds    INR 4.0 (H) 0.9 - 1.1   Morphology   Result Value Ref Range    RBC Morphology See Below     Polychromasia Mild     RBC Fragments Few     Target Cells Few     Ovalocytes Few     Basophilic Stippling Present     Giant Platelets Few    POCT GLUCOSE   Result Value  Ref Range    POCT Glucose 88 74 - 99 mg/dL   POCT GLUCOSE   Result Value Ref Range    POCT Glucose 107 (H) 74 - 99 mg/dL      Patient recently received an antibiotic (last 12 hours)       Date/Time Action Medication Dose    03/15/25 1211 Given    vancomycin (Vancocin) capsule 250 mg 250 mg           Plan discussed with interdisciplinary team, INR elevated , coumadin on hold, will recheck labs in the AM. Patient improving, awaiting precert for  Acute Rehab. Willl continue current and repeat labs in the AM.     Discharge planning discussed with patient and care team. Patient aware and agreeable to current plan, continue plan as above.     I spent a total of 50 minutes on the date of the service which included preparing to see the patient, face-to-face patient care, completing clinical documentation, obtaining and/or reviewing separately obtained history, performing a medically appropriate examination, counseling and educating the patient/family/caregiver, ordering medications, tests, or procedures, communicating with other HCPs (not separately reported), independently interpreting results (not separately reported), communicating results to the patient/family/caregiver, and care coordination (not separately reported).        Objective     Last Recorded Vitals  /59 (BP Location: Right arm, Patient Position: Sitting)   Pulse 103   Temp 36.7 °C (98.1 °F) (Temporal)   Resp 18   Wt 66.5 kg (146 lb 9.7 oz)   SpO2 93%   Intake/Output last 3 Shifts:  No intake or output data in the 24 hours ending 03/15/25 1736    Admission Weight  Weight: 59 kg (130 lb) (02/26/25 0159)    Daily Weight  03/07/25 : 66.5 kg (146 lb 9.7 oz)    Image Results  Electrocardiogram, 12-lead PRN ACS symptoms  Sinus tachycardia with irregular rate  Consider left ventricular hypertrophy  Borderline T abnormalities, diffuse leads    Confirmed by Shane Dunbar ([id number]) on 3/11/2025 11:44:57 AM      Physical Exam    Relevant Results                Assessment/Plan   This patient currently has cardiac telemetry ordered; if you would like to modify or discontinue the telemetry order, click here to go to the orders activity to modify/discontinue the order.              Assessment & Plan  Nausea vomiting and diarrhea    Influenza A with pneumonia      Septic shock, dehydration, hypercapnic respiratory failure, and dehydration.  Nontraumatic compartment syndrome of left upper extremity          Maurizio Read MD   Physician  Internal Medicine     Discharge Summary     Signed     Date of Service: 3/14/2025  2:38 PM     Signed         Discharge Diagnosis  Nausea vomiting and diarrhea     Issues Requiring Follow-Up  Patient fully evaluated 03/14   for   Assessment & Plan  Nausea vomiting and diarrhea     Influenza A with pneumonia        Septic shock, dehydration, hypercapnic respiratory failure, and dehydration.  Nontraumatic compartment syndrome of left upper extremity     No acute events overnight, seen by orthopedic surgery today for suture removal. Patient with significant clinical improvement noted, patient medically cleared for discharge today. Patient seen resting in bed with head of bed elevated, no s/s or c/o acute difficulties at this time. Vital signs for last 24 hours:  Temp:  [36 °C (96.8 °F)-36.7 °C (98.1 °F)] 36 °C (96.8 °F)  Heart Rate:  [81-98] 82  Resp:  [18-20] 18  BP: (101-142)/(53-72) 134/64  FiO2 (%):  [28 %] 28 % Medications and labs reviewed-         Results for orders placed or performed during the hospital encounter of 02/25/25 (from the past 24 hours)   POCT GLUCOSE   Result Value Ref Range     POCT Glucose 96 74 - 99 mg/dL   Heparin Assay   Result Value Ref Range     Heparin Unfractionated 0.2 See Comment Below for Therapeutic Ranges IU/mL   Protime-INR   Result Value Ref Range     Protime 31.7 (H) 9.8 - 12.4 seconds     INR 2.9 (H) 0.9 - 1.1   POCT GLUCOSE   Result Value Ref Range     POCT Glucose 116 (H) 74 - 99 mg/dL   CBC and  Auto Differential   Result Value Ref Range     WBC 7.3 4.4 - 11.3 x10*3/uL     nRBC 0.0 0.0 - 0.0 /100 WBCs     RBC 3.20 (L) 4.00 - 5.20 x10*6/uL     Hemoglobin 9.0 (L) 12.0 - 16.0 g/dL     Hematocrit 29.5 (L) 36.0 - 46.0 %     MCV 92 80 - 100 fL     MCH 28.1 26.0 - 34.0 pg     MCHC 30.5 (L) 32.0 - 36.0 g/dL     RDW 19.9 (H) 11.5 - 14.5 %     Platelets 280 150 - 450 x10*3/uL     Neutrophils % 75.7 40.0 - 80.0 %     Immature Granulocytes %, Automated 0.4 0.0 - 0.9 %     Lymphocytes % 13.2 13.0 - 44.0 %     Monocytes % 10.6 2.0 - 10.0 %     Eosinophils % 0.0 0.0 - 6.0 %     Basophils % 0.1 0.0 - 2.0 %     Neutrophils Absolute 5.52 1.20 - 7.70 x10*3/uL     Immature Granulocytes Absolute, Automated 0.03 0.00 - 0.70 x10*3/uL     Lymphocytes Absolute 0.96 (L) 1.20 - 4.80 x10*3/uL     Monocytes Absolute 0.77 0.10 - 1.00 x10*3/uL     Eosinophils Absolute 0.00 0.00 - 0.70 x10*3/uL     Basophils Absolute 0.01 0.00 - 0.10 x10*3/uL   Comprehensive Metabolic Panel   Result Value Ref Range     Glucose 94 74 - 99 mg/dL     Sodium 138 136 - 145 mmol/L     Potassium 3.4 (L) 3.5 - 5.3 mmol/L     Chloride 101 98 - 107 mmol/L     Bicarbonate 32 21 - 32 mmol/L     Anion Gap 8 (L) 10 - 20 mmol/L     Urea Nitrogen 7 6 - 23 mg/dL     Creatinine 0.37 (L) 0.50 - 1.05 mg/dL     eGFR >90 >60 mL/min/1.73m*2     Calcium 9.0 8.6 - 10.3 mg/dL     Albumin 3.0 (L) 3.4 - 5.0 g/dL     Alkaline Phosphatase 52 33 - 110 U/L     Total Protein 5.9 (L) 6.4 - 8.2 g/dL     AST 14 9 - 39 U/L     Bilirubin, Total 1.9 (H) 0.0 - 1.2 mg/dL     ALT 16 7 - 45 U/L   Protime-INR   Result Value Ref Range     Protime 43.2 (H) 9.8 - 12.4 seconds     INR 3.9 (H) 0.9 - 1.1   POCT GLUCOSE   Result Value Ref Range     POCT Glucose 88 74 - 99 mg/dL   POCT GLUCOSE   Result Value Ref Range     POCT Glucose 222 (H) 74 - 99 mg/dL      Patient recently received an antibiotic (last 12 hours)         Date/Time Action Medication Dose     03/14/25 1303 Given    vancomycin (Vancocin)  capsule 250 mg 250 mg     03/14/25 0632 Given    vancomycin (Vancocin) capsule 250 mg 250 mg             No results found for the last 90 days.        Continue aggressive pulmonary hygiene and oral hygiene. Off loading as tolerated for skin integrity.  Plan discussed with interdisciplinary team, ok to discharge, will continue current and repeat labs in the AM if patient still hospitalized. Patient aware and agreeable to current plan, continue plan as above.      I spent 30 minutes on the date of the service which included preparing to see the patient, face-to-face patient care, completing clinical documentation, obtaining and/or reviewing separately obtained history, performing a medically appropriate examination, counseling and educating the patient/family/caregiver, ordering medications, tests, or procedures, communicating with other HCPs (not separately reported), independently interpreting results (not separately reported), communicating results to the patient/family/caregiver, and care coordination (not separately reported     Discharge Meds     Medication List      ASK your doctor about these medications     acetaminophen-codeine 300-30 mg tablet; Commonly known as: Tylenol w/   Codeine #3   albuterol 90 mcg/actuation inhaler; Inhale 2 puffs every 6 hours if   needed for wheezing.   aspirin 81 mg EC tablet   atorvastatin 80 mg tablet; Commonly known as: Lipitor; Take 1 tablet (80   mg) by mouth once daily.; Ask about: Which instructions should I use?   buPROPion  mg 24 hr tablet; Commonly known as: Wellbutrin XL; Take   1 tablet (150 mg) by mouth once daily in the morning. Do not crush, chew,   or split.   carvedilol 25 mg tablet; Commonly known as: Coreg; Take 1 tablet (25 mg)   by mouth 2 times a day.   escitalopram 20 mg tablet; Commonly known as: Lexapro; Take 1 tablet (20   mg) by mouth once daily.   lisinopril 20 mg tablet; Take 1 tablet (20 mg) by mouth once daily.   varenicline tartrate 1 mg  tablet; Commonly known as: Chantix; Take 1   tablet (1 mg) by mouth 2 times a day. Take with full glass of water.   warfarin 2 mg tablet; Commonly known as: Coumadin; Take as directed. If   you are unsure how to take this medication, talk to your nurse or doctor.;   Original instructions: Take 2 tablets (4 mg) by mouth on Mondays and   Saturdays, and take 1 tablet (2 mg) on all other days of week or as   directed by anticoagulation clinic        Test Results Pending At Discharge  Pending Labs         No current pending labs.                Hospital Course         Maurizio Read MD   Physician  Internal Medicine     Progress Notes     Signed     Date of Service: 3/13/2025  3:29 PM      Signed        Expand All Collapse All    Kayley Lynch is a 54 y.o. female on day 15 of admission presenting with Nausea vomiting and diarrhea.           Subjective  No events overnight.  Patient seen and examined at bedside with  present.  Hospitalization and treatment plan reviewed with  at length.  Patient has no complaints.              Objective  Last Recorded Vitals  /80 (BP Location: Right leg, Patient Position: Lying)   Pulse 85   Temp 36.3 °C (97.3 °F) (Temporal)   Resp 18   Wt 66.5 kg (146 lb 9.7 oz)   SpO2 99%   Intake/Output last 3 Shifts:     Intake/Output Summary (Last 24 hours) at 3/13/2025 1529  Last data filed at 3/13/2025 1300        Gross per 24 hour   Intake --   Output 901 ml   Net -901 ml         Admission Weight  Weight: 59 kg (130 lb) (02/26/25 0159)     Daily Weight  03/07/25 : 66.5 kg (146 lb 9.7 oz)     Image Results  Electrocardiogram, 12-lead PRN ACS symptoms  Sinus tachycardia with irregular rate  Consider left ventricular hypertrophy  Borderline T abnormalities, diffuse leads     Confirmed by Shane Dunbar ([id number]) on 3/11/2025 11:44:57 AM        Physical Exam  HENT:      Head: Normocephalic and atraumatic.      Nose: Nose normal.      Mouth/Throat:      Mouth: Mucous  membranes are moist.      Pharynx: Oropharynx is clear.   Eyes:      Extraocular Movements: Extraocular movements intact.      Pupils: Pupils are equal, round, and reactive to light.   Cardiovascular:      Rate and Rhythm: Normal rate and regular rhythm.   Pulmonary:      Effort: No respiratory distress.      Breath sounds: Wheezing present. No rhonchi or rales.   Abdominal:      General: Bowel sounds are normal. There is no distension.      Palpations: Abdomen is soft.      Tenderness: There is no abdominal tenderness. There is no guarding.   Musculoskeletal:      Right lower leg: No edema.      Left lower leg: No edema.   Skin:     General: Skin is warm and dry.   Neurological:      Mental Status: She is alert. Mental status is at baseline.            Relevant Results                       Assessment/Plan  This patient currently has cardiac telemetry ordered; if you would like to modify or discontinue the telemetry order, click hereto go to the orders activity to modify/discontinue the order.        This patient has a urinary catheter              Reason for the urinary catheter remaining today? critically ill patient who need accurate urinary output measurements        Assessment & Plan  Nausea vomiting and diarrhea     Influenza A with pneumonia        Septic shock, dehydration, hypercapnic respiratory failure, and dehydration.  Nontraumatic compartment syndrome of left upper extremity        Patient is a 54-year-old female with past medical history of COPD, CAD status post CABG, pulmonary hypertension, hyperlipidemia, cardiomyopathy, a flutter status post ablation, and mitral valve replacement who presented with malaise.  She was found to be in septic shock and admitted to the ICU.     Septic shock  Hypercapnic respiratory failure  COPD exacerbation  Influenza A  Pneumonia  Acute metabolic encephalopathy  C. difficile colitis  Dehydration  [facility]     -Continue supplemental oxygen  -Continue antibiotics, currently  on doxycycline  -Continue nebulizers  -Continue steroids  -Continue Tamiflu  -Continue p.o. vancomycin  -Continue home meds  -Monitor INR     3/2: Patient developed a large hematoma in the left arm leading to compartment syndrome. She was taken for urgent fasciotomies with Orthopedic Surgery. She is also being followed by Vascular Surgery. Vitamin K ordered for supratherapeutic INR. Her INR was 2.2 yesterday with goal being 2.5-3.5 for her A-flutter s/p mitral valve replacement. Critical Care reconsulted. Hematology consulted, appreciate recs. Hold coumadin and lovenox.     3/3: INR reversed. Patient is on low intensity heparin drip. She was transfused 1 unit pRBCs. Monitor hemoglobin closely. Hematology has ordered a work up including DIC panel. Patient will need another debridement and irrigation in 24-48 hours per Orthopedics. She has also developed a retroperitoneal bleed.     3/4: INR is down to 1.0.  Leukocytosis improved from 15 down to 13.  Hemoglobin is down to 6.4.  Patient to be transfused another unit packed RBCs.  She will be going back to the OR today for further debridement, washout, possible closure. She will also have a CT angiogram to determine if there is any active bleeding causing her retroperitoneal bleed.  She remains on a heparin drip due to her valve.  Continue doxycycline and p.o. Vanco.  Patient fully evaluated on March 5.  Patient restarted on heparin.  Fasciotomy site for compartment syndrome appears clear.  Continue to monitor lab work.  Case discussed with  at bedside.  No active bleeding is noted at this time.  Recheck labs in AM.     Patient fully evaluated  03/07  for    Problem List Items Addressed This Visit                     Gastrointestinal and Abdominal     * (Principal) Nausea vomiting and diarrhea           Musculoskeletal and Injuries     Nontraumatic compartment syndrome of left upper extremity     Relevant Orders     Case Request Operating Room: FASCIOTOMY, [facility]  EXTREMITY (Completed)     Case Request Operating Room: DEBRIDEMENT, WOUND, UPPER EXTREMITY (Completed)           Pulmonary and Pneumonias     Influenza A with pneumonia - Primary      Other Visit Diagnoses         Acute kidney injury (CMS-HCC)         Dehydration         Influenza A         Shock (Multi)         Relevant Orders     Transthoracic Echo (TTE) Complete (Completed)             Patient seen resting in bed with head of bed elevated, no s/s or c/o acute difficulties at this time.  Vital signs for last 24 hours Temp:  [35.9 °C (96.6 °F)-36.6 °C (97.9 °F)] 36.3 °C (97.3 °F)  Heart Rate:  [70-93] 85  Resp:  [16-18] 18  BP: (120-169)/(56-92) 164/80  FiO2 (%):  [28 %-32 %] 28 %    I/O this shift:  In: -   Out: 1 [Stool:1]  Patient still requiring frequent cardiac and SPO2 monitoring. Continue aggressive pulmonary hygiene and oral hygiene. Off loading as tolerated for skin integrity. Medications and labs reviewed-             Results for orders placed or performed during the hospital encounter of 02/25/25 (from the past 24 hours)   Protime-INR   Result Value Ref Range     Protime 19.4 (H) 9.8 - 12.4 seconds     INR 1.8 (H) 0.9 - 1.1   POCT GLUCOSE   Result Value Ref Range     POCT Glucose 136 (H) 74 - 99 mg/dL   POCT GLUCOSE   Result Value Ref Range     POCT Glucose 144 (H) 74 - 99 mg/dL   CBC and Auto Differential   Result Value Ref Range     WBC 7.4 4.4 - 11.3 x10*3/uL     nRBC 0.0 0.0 - 0.0 /100 WBCs     RBC 3.13 (L) 4.00 - 5.20 x10*6/uL     Hemoglobin 8.7 (L) 12.0 - 16.0 g/dL     Hematocrit 28.3 (L) 36.0 - 46.0 %     MCV 90 80 - 100 fL     MCH 27.8 26.0 - 34.0 pg     MCHC 30.7 (L) 32.0 - 36.0 g/dL     RDW 19.9 (H) 11.5 - 14.5 %     Platelets 247 150 - 450 x10*3/uL     Neutrophils % 77.0 40.0 - 80.0 %     Immature Granulocytes %, Automated 0.5 0.0 - 0.9 %     Lymphocytes % 12.9 13.0 - 44.0 %     Monocytes % 9.4 2.0 - 10.0 %     Eosinophils % 0.1 0.0 - 6.0 %     Basophils % 0.1 0.0 - 2.0 %     Neutrophils  Absolute 5.66 1.20 - 7.70 x10*3/uL     Immature Granulocytes Absolute, Automated 0.04 0.00 - 0.70 x10*3/uL     Lymphocytes Absolute 0.95 (L) 1.20 - 4.80 x10*3/uL     Monocytes Absolute 0.69 0.10 - 1.00 x10*3/uL     Eosinophils Absolute 0.01 0.00 - 0.70 x10*3/uL     Basophils Absolute 0.01 0.00 - 0.10 x10*3/uL   Comprehensive Metabolic Panel   Result Value Ref Range     Glucose 95 74 - 99 mg/dL     Sodium 138 136 - 145 mmol/L     Potassium 3.1 (L) 3.5 - 5.3 mmol/L     Chloride 100 98 - 107 mmol/L     Bicarbonate 30 21 - 32 mmol/L     Anion Gap 11 10 - 20 mmol/L     Urea Nitrogen 8 6 - 23 mg/dL     Creatinine 0.32 (L) 0.50 - 1.05 mg/dL     eGFR >90 >60 mL/min/1.73m*2     Calcium 8.6 8.6 - 10.3 mg/dL     Albumin 2.8 (L) 3.4 - 5.0 g/dL     Alkaline Phosphatase 45 33 - 110 U/L     Total Protein 5.6 (L) 6.4 - 8.2 g/dL     AST 13 9 - 39 U/L     Bilirubin, Total 1.9 (H) 0.0 - 1.2 mg/dL     ALT 14 7 - 45 U/L   Protime-INR   Result Value Ref Range     Protime 23.6 (H) 9.8 - 12.4 seconds     INR 2.1 (H) 0.9 - 1.1   POCT GLUCOSE   Result Value Ref Range     POCT Glucose 93 74 - 99 mg/dL   POCT GLUCOSE   Result Value Ref Range     POCT Glucose 132 (H) 74 - 99 mg/dL   Heparin Assay   Result Value Ref Range     Heparin Unfractionated 0.1 See Comment Below for Therapeutic Ranges IU/mL      *Note: Due to a large number of results and/or encounters for the requested time period, some results have not been displayed. A complete set of results can be found in Results Review.      Patient recently received an antibiotic (last 12 hours)         Date/Time Action Medication Dose     03/07/25 1252 Given    vancomycin (Firvanq) solution 250 mg 250 mg     03/07/25 0602 Given    vancomycin (Firvanq) solution 250 mg 250 mg             Plan discussed with interdisciplinary team, seen by orthopedics today - dressing changes as needed,   suture removal on March 18, recommended shoulder elbow and hand range of motion with therapy ordered. Will  continue current and repeat labs in the AM. CXR shows improved infiltrate at the left lower lung, strict intake and output, trend hemoglobin.     Discharge planning discussed with patient and care team. Therapy evaluations ordered. [facility] -[facility] anticipate HHC/SNF at discharge. Patient aware and agreeable to current plan, continue plan as above.      I spent a total of 50 minutes on the date of the service which included preparing to see the patient, face-to-face patient care, completing clinical documentation, obtaining and/or reviewing separately obtained history, performing a medically appropriate examination, counseling and educating the patient/family/caregiver, ordering medications, tests, or procedures, communicating with other HCPs (not separately reported), independently interpreting results (not separately reported), communicating results to the patient/family/caregiver, and care coordination (not separately reported).      Patient fully evaluated  03/08  for    Problem List Items Addressed This Visit                     Gastrointestinal and Abdominal     * (Principal) Nausea vomiting and diarrhea           Musculoskeletal and Injuries     Nontraumatic compartment syndrome of left upper extremity     Relevant Orders     Case Request Operating Room: FASCIOTOMY, UPPER EXTREMITY (Completed)     Case Request Operating Room: DEBRIDEMENT, WOUND, UPPER EXTREMITY (Completed)           Pulmonary and Pneumonias     Influenza A with pneumonia - Primary      Other Visit Diagnoses         Acute kidney injury (CMS-HCC)         Dehydration         Influenza A         Shock (Multi)         Relevant Orders     Transthoracic Echo (TTE) Complete (Completed)             Patient seen resting in bed with head of bed elevated, no s/s or c/o acute difficulties at this time.  Vital signs for last 24 hours Temp:  [35.9 °C (96.6 °F)-36.6 °C (97.9 °F)] 36.3 °C (97.3 °F)  Heart Rate:  [70-93] 85  Resp:  [16-18] 18  BP: (120-169)/(56-92)  164/80  FiO2 (%):  [28 %-32 %] 28 %    I/O this shift:  In: -   Out: 1 [Stool:1]  Patient still requiring frequent cardiac and SPO2 monitoring. Continue aggressive pulmonary hygiene and oral hygiene. Off loading as tolerated for skin integrity. Medications and labs reviewed-             Results for orders placed or performed during the hospital encounter of 02/25/25 (from the past 24 hours)   Protime-INR   Result Value Ref Range     Protime 19.4 (H) 9.8 - 12.4 seconds     INR 1.8 (H) 0.9 - 1.1   POCT GLUCOSE   Result Value Ref Range     POCT Glucose 136 (H) 74 - 99 mg/dL   POCT GLUCOSE   Result Value Ref Range     POCT Glucose 144 (H) 74 - 99 mg/dL   CBC and Auto Differential   Result Value Ref Range     WBC 7.4 4.4 - 11.3 x10*3/uL     nRBC 0.0 0.0 - 0.0 /100 WBCs     RBC 3.13 (L) 4.00 - 5.20 x10*6/uL     Hemoglobin 8.7 (L) 12.0 - 16.0 g/dL     Hematocrit 28.3 (L) 36.0 - 46.0 %     MCV 90 80 - 100 fL     MCH 27.8 26.0 - 34.0 pg     MCHC 30.7 (L) 32.0 - 36.0 g/dL     RDW 19.9 (H) 11.5 - 14.5 %     Platelets 247 150 - 450 x10*3/uL     Neutrophils % 77.0 40.0 - 80.0 %     Immature Granulocytes %, Automated 0.5 0.0 - 0.9 %     Lymphocytes % 12.9 13.0 - 44.0 %     Monocytes % 9.4 2.0 - 10.0 %     Eosinophils % 0.1 0.0 - 6.0 %     Basophils % 0.1 0.0 - 2.0 %     Neutrophils Absolute 5.66 1.20 - 7.70 x10*3/uL     Immature Granulocytes Absolute, Automated 0.04 0.00 - 0.70 x10*3/uL     Lymphocytes Absolute 0.95 (L) 1.20 - 4.80 x10*3/uL     Monocytes Absolute 0.69 0.10 - 1.00 x10*3/uL     Eosinophils Absolute 0.01 0.00 - 0.70 x10*3/uL     Basophils Absolute 0.01 0.00 - 0.10 x10*3/uL   Comprehensive Metabolic Panel   Result Value Ref Range     Glucose 95 74 - 99 mg/dL     Sodium 138 136 - 145 mmol/L     Potassium 3.1 (L) 3.5 - 5.3 mmol/L     Chloride 100 98 - 107 mmol/L     Bicarbonate 30 21 - 32 mmol/L     Anion Gap 11 10 - 20 mmol/L     Urea Nitrogen 8 6 - 23 mg/dL     Creatinine 0.32 (L) 0.50 - 1.05 mg/dL     eGFR >90 >60  mL/min/1.73m*2     Calcium 8.6 8.6 - 10.3 mg/dL     Albumin 2.8 (L) 3.4 - 5.0 g/dL     Alkaline Phosphatase 45 33 - 110 U/L     Total Protein 5.6 (L) 6.4 - 8.2 g/dL     AST 13 9 - 39 U/L     Bilirubin, Total 1.9 (H) 0.0 - 1.2 mg/dL     ALT 14 7 - 45 U/L   Protime-INR   Result Value Ref Range     Protime 23.6 (H) 9.8 - 12.4 seconds     INR 2.1 (H) 0.9 - 1.1   POCT GLUCOSE   Result Value Ref Range     POCT Glucose 93 74 - 99 mg/dL   POCT GLUCOSE   Result Value Ref Range     POCT Glucose 132 (H) 74 - 99 mg/dL   Heparin Assay   Result Value Ref Range     Heparin Unfractionated 0.1 See Comment Below for Therapeutic Ranges IU/mL      Patient recently received an antibiotic (last 12 hours)         Date/Time Action Medication Dose     03/08/25 1220 Given    vancomycin (Vancocin) capsule 250 mg 250 mg             Plan discussed with interdisciplinary team, seen by cardiology today, will bridge patient to coumadin, ptt/inr in am, appreciate input and agree with plan. NICAE dressing is clean, dry and intact, swelling is noted, warm and well-perfused.  She is able to flex and extend the fingers and thumb. Will continue current and repeat labs in the AM.     Patient fully evaluated on March 9 and clinically improved.  Recheck labs in AM.  INR is slowly improving.  Discontinue heparin when INR is therapeutic for mechanical valve.  Appreciate orthopedic and cardiology consultations.  Examination of area from compartment syndrome is clear without neurovascular compromise.  Case discussed with orthopedics at bedside     Discharge planning discussed with patient and care team. Patient aware and agreeable to current plan, continue plan as above.      I spent a total of 50 minutes on the date of the service which included preparing to see the patient, face-to-face patient care, completing clinical documentation, obtaining and/or reviewing separately obtained history, performing a medically appropriate examination, counseling and  educating the patient/family/caregiver, ordering medications, tests, or procedures, communicating with other HCPs (not separately reported), independently interpreting results (not separately reported), communicating results to the patient/family/caregiver, and care coordination (not separately reported).      Patient fully evaluated    for    Problem List Items Addressed This Visit                     Gastrointestinal and Abdominal     * (Principal) Nausea vomiting and diarrhea           Musculoskeletal and Injuries     Nontraumatic compartment syndrome of left upper extremity     Relevant Orders     Case Request Operating Room: FASCIOTOMY, UPPER EXTREMITY (Completed)     Case Request Operating Room: DEBRIDEMENT, WOUND, UPPER EXTREMITY (Completed)           Pulmonary and Pneumonias     Influenza A with pneumonia - Primary      Other Visit Diagnoses         Acute kidney injury (CMS-HCC)         Dehydration         Influenza A         Shock (Multi)         Relevant Orders     Transthoracic Echo (TTE) Complete (Completed)             Patient seen resting in bed with head of bed elevated, no s/s or c/o acute difficulties at this time.  Vital signs for last 24 hours Temp:  [35.9 °C (96.6 °F)-36.6 °C (97.9 °F)] 36.3 °C (97.3 °F)  Heart Rate:  [70-93] 85  Resp:  [16-18] 18  BP: (120-169)/(56-92) 164/80  FiO2 (%):  [28 %-32 %] 28 %    I/O this shift:  In: -   Out: 1 [Stool:1]  Patient still requiring frequent cardiac and SPO2 monitoring. Continue aggressive pulmonary hygiene and oral hygiene. Off loading as tolerated for skin integrity. Medications and labs reviewed-             Results for orders placed or performed during the hospital encounter of 02/25/25 (from the past 24 hours)   Protime-INR   Result Value Ref Range     Protime 19.4 (H) 9.8 - 12.4 seconds     INR 1.8 (H) 0.9 - 1.1   POCT GLUCOSE   Result Value Ref Range     POCT Glucose 136 (H) 74 - 99 mg/dL   POCT GLUCOSE   Result Value Ref Range     POCT Glucose 144  (H) 74 - 99 mg/dL   CBC and Auto Differential   Result Value Ref Range     WBC 7.4 4.4 - 11.3 x10*3/uL     nRBC 0.0 0.0 - 0.0 /100 WBCs     RBC 3.13 (L) 4.00 - 5.20 x10*6/uL     Hemoglobin 8.7 (L) 12.0 - 16.0 g/dL     Hematocrit 28.3 (L) 36.0 - 46.0 %     MCV 90 80 - 100 fL     MCH 27.8 26.0 - 34.0 pg     MCHC 30.7 (L) 32.0 - 36.0 g/dL     RDW 19.9 (H) 11.5 - 14.5 %     Platelets 247 150 - 450 x10*3/uL     Neutrophils % 77.0 40.0 - 80.0 %     Immature Granulocytes %, Automated 0.5 0.0 - 0.9 %     Lymphocytes % 12.9 13.0 - 44.0 %     Monocytes % 9.4 2.0 - 10.0 %     Eosinophils % 0.1 0.0 - 6.0 %     Basophils % 0.1 0.0 - 2.0 %     Neutrophils Absolute 5.66 1.20 - 7.70 x10*3/uL     Immature Granulocytes Absolute, Automated 0.04 0.00 - 0.70 x10*3/uL     Lymphocytes Absolute 0.95 (L) 1.20 - 4.80 x10*3/uL     Monocytes Absolute 0.69 0.10 - 1.00 x10*3/uL     Eosinophils Absolute 0.01 0.00 - 0.70 x10*3/uL     Basophils Absolute 0.01 0.00 - 0.10 x10*3/uL   Comprehensive Metabolic Panel   Result Value Ref Range     Glucose 95 74 - 99 mg/dL     Sodium 138 136 - 145 mmol/L     Potassium 3.1 (L) 3.5 - 5.3 mmol/L     Chloride 100 98 - 107 mmol/L     Bicarbonate 30 21 - 32 mmol/L     Anion Gap 11 10 - 20 mmol/L     Urea Nitrogen 8 6 - 23 mg/dL     Creatinine 0.32 (L) 0.50 - 1.05 mg/dL     eGFR >90 >60 mL/min/1.73m*2     Calcium 8.6 8.6 - 10.3 mg/dL     Albumin 2.8 (L) 3.4 - 5.0 g/dL     Alkaline Phosphatase 45 33 - 110 U/L     Total Protein 5.6 (L) 6.4 - 8.2 g/dL     AST 13 9 - 39 U/L     Bilirubin, Total 1.9 (H) 0.0 - 1.2 mg/dL     ALT 14 7 - 45 U/L   Protime-INR   Result Value Ref Range     Protime 23.6 (H) 9.8 - 12.4 seconds     INR 2.1 (H) 0.9 - 1.1   POCT GLUCOSE   Result Value Ref Range     POCT Glucose 93 74 - 99 mg/dL   POCT GLUCOSE   Result Value Ref Range     POCT Glucose 132 (H) 74 - 99 mg/dL   Heparin Assay   Result Value Ref Range     Heparin Unfractionated 0.1 See Comment Below for Therapeutic Ranges IU/mL       Patient recently received an antibiotic (last 12 hours)         Date/Time Action Medication Dose     03/11/25 0617 Given    vancomycin (Vancocin) capsule 250 mg 250 mg     03/10/25 2140 Given    vancomycin (Vancocin) capsule 250 mg 250 mg             Pt fully evaluated 3/11. PT recommended high intensity level of clinical care. Potassium is 3.3 and gave Kcl and INR is 5.5 out of therapeutic range and protime is 61.3. Pt on 2L NC today. Plan discussed with interdisciplinary team, continue current and repeat labs in the AM.      Discharge planning discussed with patient and care team. Therapy evaluations ordered. [facility]-  , anticipate C/SNF at discharge. Patient aware and agreeable to current plan, continue plan as above.      I spent a total of 50 minutes on the date of the service which included preparing to see the patient, face-to-face patient care, completing clinical documentation, obtaining and/or reviewing separately obtained history, performing a medically appropriate examination, counseling and educating the patient/family/caregiver, ordering medications, tests, or procedures, communicating with other HCPs (not separately reported), independently interpreting results (not separately reported), communicating results to the patient/family/caregiver, and care coordination (not separately reported).      Patient fully evaluated    for    Problem List Items Addressed This Visit                     Gastrointestinal and Abdominal     * (Principal) Nausea vomiting and diarrhea           Musculoskeletal and Injuries     Nontraumatic compartment syndrome of left upper extremity     Relevant Orders     Case Request Operating Room: FASCIOTOMY, UPPER EXTREMITY (Completed)     Case Request Operating Room: DEBRIDEMENT, WOUND, UPPER EXTREMITY (Completed)           Pulmonary and Pneumonias     Influenza A with pneumonia - Primary      Other Visit Diagnoses         Acute kidney injury (CMS-[facility])         Dehydration          Influenza A         Shock (Multi)         Relevant Orders     Transthoracic Echo (TTE) Complete (Completed)             Patient seen resting in bed with head of bed elevated, no s/s or c/o acute difficulties at this time.  Vital signs for last 24 hours Temp:  [35.9 °C (96.6 °F)-36.6 °C (97.9 °F)] 36.3 °C (97.3 °F)  Heart Rate:  [70-93] 85  Resp:  [16-18] 18  BP: (120-169)/(56-92) 164/80  FiO2 (%):  [28 %-32 %] 28 %    I/O this shift:  In: -   Out: 1 [Stool:1]  Patient still requiring frequent cardiac and SPO2 monitoring. Continue aggressive pulmonary hygiene and oral hygiene. Off loading as tolerated for skin integrity. Medications and labs reviewed-             Results for orders placed or performed during the hospital encounter of 02/25/25 (from the past 24 hours)   Protime-INR   Result Value Ref Range     Protime 19.4 (H) 9.8 - 12.4 seconds     INR 1.8 (H) 0.9 - 1.1   POCT GLUCOSE   Result Value Ref Range     POCT Glucose 136 (H) 74 - 99 mg/dL   POCT GLUCOSE   Result Value Ref Range     POCT Glucose 144 (H) 74 - 99 mg/dL   CBC and Auto Differential   Result Value Ref Range     WBC 7.4 4.4 - 11.3 x10*3/uL     nRBC 0.0 0.0 - 0.0 /100 WBCs     RBC 3.13 (L) 4.00 - 5.20 x10*6/uL     Hemoglobin 8.7 (L) 12.0 - 16.0 g/dL     Hematocrit 28.3 (L) 36.0 - 46.0 %     MCV 90 80 - 100 fL     MCH 27.8 26.0 - 34.0 pg     MCHC 30.7 (L) 32.0 - 36.0 g/dL     RDW 19.9 (H) 11.5 - 14.5 %     Platelets 247 150 - 450 x10*3/uL     Neutrophils % 77.0 40.0 - 80.0 %     Immature Granulocytes %, Automated 0.5 0.0 - 0.9 %     Lymphocytes % 12.9 13.0 - 44.0 %     Monocytes % 9.4 2.0 - 10.0 %     Eosinophils % 0.1 0.0 - 6.0 %     Basophils % 0.1 0.0 - 2.0 %     Neutrophils Absolute 5.66 1.20 - 7.70 x10*3/uL     Immature Granulocytes Absolute, Automated 0.04 0.00 - 0.70 x10*3/uL     Lymphocytes Absolute 0.95 (L) 1.20 - 4.80 x10*3/uL     Monocytes Absolute 0.69 0.10 - 1.00 x10*3/uL     Eosinophils Absolute 0.01 0.00 - 0.70 x10*3/uL     Basophils  Absolute 0.01 0.00 - 0.10 x10*3/uL   Comprehensive Metabolic Panel   Result Value Ref Range     Glucose 95 74 - 99 mg/dL     Sodium 138 136 - 145 mmol/L     Potassium 3.1 (L) 3.5 - 5.3 mmol/L     Chloride 100 98 - 107 mmol/L     Bicarbonate 30 21 - 32 mmol/L     Anion Gap 11 10 - 20 mmol/L     Urea Nitrogen 8 6 - 23 mg/dL     Creatinine 0.32 (L) 0.50 - 1.05 mg/dL     eGFR >90 >60 mL/min/1.73m*2     Calcium 8.6 8.6 - 10.3 mg/dL     Albumin 2.8 (L) 3.4 - 5.0 g/dL     Alkaline Phosphatase 45 33 - 110 U/L     Total Protein 5.6 (L) 6.4 - 8.2 g/dL     AST 13 9 - 39 U/L     Bilirubin, Total 1.9 (H) 0.0 - 1.2 mg/dL     ALT 14 7 - 45 U/L   Protime-INR   Result Value Ref Range     Protime 23.6 (H) 9.8 - 12.4 seconds     INR 2.1 (H) 0.9 - 1.1   POCT GLUCOSE   Result Value Ref Range     POCT Glucose 93 74 - 99 mg/dL   POCT GLUCOSE   Result Value Ref Range     POCT Glucose 132 (H) 74 - 99 mg/dL   Heparin Assay   Result Value Ref Range     Heparin Unfractionated 0.1 See Comment Below for Therapeutic Ranges IU/mL      Patient recently received an antibiotic (last 12 hours)         Date/Time Action Medication Dose     03/12/25 1252 Given    vancomycin (Vancocin) capsule 250 mg 250 mg     03/12/25 0614 Given    vancomycin (Vancocin) capsule 250 mg 250 mg             Pt fully evaluated 3/12. Pt on 3L NC today increased from yesterday. With start of Coumadin will continue to monitor INR.  Plan discussed with interdisciplinary team, continue current and repeat labs in the AM.      Discharge planning discussed with patient and care team. Therapy evaluations ordered. [facility]-  , anticipate HHC/SNF at discharge. Patient aware and agreeable to current plan, continue plan as above.      I spent a total of 50 minutes on the date of the service which included preparing to see the patient, face-to-face patient care, completing clinical documentation, obtaining and/or reviewing separately obtained history, performing a medically appropriate  examination, counseling and educating the patient/family/caregiver, ordering medications, tests, or procedures, communicating with other HCPs (not separately reported), independently interpreting results (not separately reported), communicating results to the patient/family/caregiver, and care coordination (not separately reported).         Patient fully evaluated  3/13  for    Problem List Items Addressed This Visit                     Gastrointestinal and Abdominal     * (Principal) Nausea vomiting and diarrhea           Musculoskeletal and Injuries     Nontraumatic compartment syndrome of left upper extremity     Relevant Orders     Case Request Operating Room: FASCIOTOMY, UPPER EXTREMITY (Completed)     Case Request Operating Room: DEBRIDEMENT, WOUND, UPPER EXTREMITY (Completed)           Pulmonary and Pneumonias     Influenza A with pneumonia - Primary      Other Visit Diagnoses         Acute kidney injury (CMS-HCC)         Dehydration         Influenza A         Shock (Multi)         Relevant Orders     Transthoracic Echo (TTE) Complete (Completed)             Patient seen resting in bed with head of bed elevated, no s/s or c/o acute difficulties at this time.  Vital signs for last 24 hours Temp:  [35.9 °C (96.6 °F)-36.6 °C (97.9 °F)] 36.3 °C (97.3 °F)  Heart Rate:  [70-93] 85  Resp:  [16-18] 18  BP: (120-169)/(56-92) 164/80  FiO2 (%):  [28 %-32 %] 28 %    I/O this shift:  In: -   Out: 1 [Stool:1]  Patient still requiring frequent cardiac and SPO2 monitoring. Continue aggressive pulmonary hygiene and oral hygiene. Off loading as tolerated for skin integrity. Medications and labs reviewed-             Results for orders placed or performed during the hospital encounter of 02/25/25 (from the past 24 hours)   Protime-INR   Result Value Ref Range     Protime 19.4 (H) 9.8 - 12.4 seconds     INR 1.8 (H) 0.9 - 1.1   POCT GLUCOSE   Result Value Ref Range     POCT Glucose 136 (H) 74 - 99 mg/dL   POCT GLUCOSE   Result  Value Ref Range     POCT Glucose 144 (H) 74 - 99 mg/dL   CBC and Auto Differential   Result Value Ref Range     WBC 7.4 4.4 - 11.3 x10*3/uL     nRBC 0.0 0.0 - 0.0 /100 WBCs     RBC 3.13 (L) 4.00 - 5.20 x10*6/uL     Hemoglobin 8.7 (L) 12.0 - 16.0 g/dL     Hematocrit 28.3 (L) 36.0 - 46.0 %     MCV 90 80 - 100 fL     MCH 27.8 26.0 - 34.0 pg     MCHC 30.7 (L) 32.0 - 36.0 g/dL     RDW 19.9 (H) 11.5 - 14.5 %     Platelets 247 150 - 450 x10*3/uL     Neutrophils % 77.0 40.0 - 80.0 %     Immature Granulocytes %, Automated 0.5 0.0 - 0.9 %     Lymphocytes % 12.9 13.0 - 44.0 %     Monocytes % 9.4 2.0 - 10.0 %     Eosinophils % 0.1 0.0 - 6.0 %     Basophils % 0.1 0.0 - 2.0 %     Neutrophils Absolute 5.66 1.20 - 7.70 x10*3/uL     Immature Granulocytes Absolute, Automated 0.04 0.00 - 0.70 x10*3/uL     Lymphocytes Absolute 0.95 (L) 1.20 - 4.80 x10*3/uL     Monocytes Absolute 0.69 0.10 - 1.00 x10*3/uL     Eosinophils Absolute 0.01 0.00 - 0.70 x10*3/uL     Basophils Absolute 0.01 0.00 - 0.10 x10*3/uL   Comprehensive Metabolic Panel   Result Value Ref Range     Glucose 95 74 - 99 mg/dL     Sodium 138 136 - 145 mmol/L     Potassium 3.1 (L) 3.5 - 5.3 mmol/L     Chloride 100 98 - 107 mmol/L     Bicarbonate 30 21 - 32 mmol/L     Anion Gap 11 10 - 20 mmol/L     Urea Nitrogen 8 6 - 23 mg/dL     Creatinine 0.32 (L) 0.50 - 1.05 mg/dL     eGFR >90 >60 mL/min/1.73m*2     Calcium 8.6 8.6 - 10.3 mg/dL     Albumin 2.8 (L) 3.4 - 5.0 g/dL     Alkaline Phosphatase 45 33 - 110 U/L     Total Protein 5.6 (L) 6.4 - 8.2 g/dL     AST 13 9 - 39 U/L     Bilirubin, Total 1.9 (H) 0.0 - 1.2 mg/dL     ALT 14 7 - 45 U/L   Protime-INR   Result Value Ref Range     Protime 23.6 (H) 9.8 - 12.4 seconds     INR 2.1 (H) 0.9 - 1.1   POCT GLUCOSE   Result Value Ref Range     POCT Glucose 93 74 - 99 mg/dL   POCT GLUCOSE   Result Value Ref Range     POCT Glucose 132 (H) 74 - 99 mg/dL   Heparin Assay   Result Value Ref Range     Heparin Unfractionated 0.1 See Comment Below  for Therapeutic Ranges IU/mL      Patient recently received an antibiotic (last 12 hours)         Date/Time Action Medication Dose     03/13/25 1256 Given    vancomycin (Vancocin) capsule 250 mg 250 mg     03/13/25 0804 Given    vancomycin (Vancocin) capsule 250 mg 250 mg             Pt fully evaluated 3/13. Pt still on 3L NC. INR is 2.1 today and heparin was started, will discontinue when INR is 2.5 then Pt can discharge so one more day. Potasoum was 3.1. Plan discussed with interdisciplinary team, continue current and repeat labs in the AM.      Discharge planning discussed with patient and care team. Therapy evaluations ordered. [facility]-  , anticipate HHC/SNF at discharge. Patient aware and agreeable to current plan, continue plan as above.      I spent a total of 50 minutes on the date of the service which included preparing to see the patient, face-to-face patient care, completing clinical documentation, obtaining and/or reviewing separately obtained history, performing a medically appropriate examination, counseling and educating the patient/family/caregiver, ordering medications, tests, or procedures, communicating with other HCPs (not separately reported), independently interpreting results (not separately reported), communicating results to the patient/family/caregiver, and care coordination (not separately reported).   SADA CHAN                     Revision History          Pertinent Physical Exam At Time of Discharge  Physical Exam  no acute events overnight, patient denies chest pain, worsening SOB at this time.  Outpatient Follow-Up         Future Appointments   Date Time Provider Department Center   3/27/2025  1:30 PM PAR [id number] PHARM [facility] PH[facility]ACIST [id number] [place]   4/8/2025  1:15 PM Brittney Lomeli MD [id number] [place]            Ginny Willson                Revision History                   Ginny Willson

## 2025-03-15 NOTE — CARE PLAN
The clinical goals for the shift include staying free from harm      Problem: Safety - Adult  Goal: Free from fall injury  Outcome: Progressing     Problem: Nutrition  Goal: Nutrient intake appropriate for maintaining nutritional needs  Outcome: Progressing     Problem: Fall/Injury  Goal: Not fall by end of shift  Outcome: Progressing     Problem: Respiratory  Goal: Minimize anxiety/maximize coping throughout shift  Outcome: Progressing

## 2025-03-15 NOTE — PROGRESS NOTES
03/15/25 1041   Discharge Planning   Home or Post Acute Services Post acute facilities (Rehab/SNF/etc)   Type of Post Acute Facility Services Rehab   Expected Discharge Disposition Inter     Spoke with Autumn with [facility], pre-cert is pending.

## 2025-03-15 NOTE — PROGRESS NOTES
"Kayley Lynch is a 54 y.o. female on day 17 of admission presenting with Nausea vomiting and diarrhea.    Subjective   Minimal left upper extremity pain       Objective     Physical Exam  Examination of left upper extremity shows no drainage seen mild ecchymosis no cellulitis.  Last Recorded Vitals  Blood pressure 139/65, pulse 101, temperature 36.2 °C (97.2 °F), temperature source Temporal, resp. rate 18, height 1.6 m (5' 2.99\"), weight 66.5 kg (146 lb 9.7 oz), SpO2 92%.  Intake/Output last 3 Shifts:  No intake/output data recorded.    Relevant Results      Scheduled medications  aspirin, 81 mg, oral, Daily  atorvastatin, 80 mg, oral, Nightly  budesonide, 0.5 mg, nebulization, BID  buPROPion XL, 150 mg, oral, q AM  carvedilol, 25 mg, oral, BID  escitalopram, 20 mg, oral, Daily  insulin lispro, 0-5 Units, subcutaneous, TID AC  ipratropium-albuteroL, 3 mL, nebulization, TID  lisinopril, 20 mg, oral, Daily  oxygen, , inhalation, Continuous - Inhalation  pantoprazole, 40 mg, oral, Daily  perflutren lipid microspheres, 0.5-10 mL of dilution, intravenous, Once in imaging  perflutren protein A microsphere, 0.5 mL, intravenous, Once in imaging  sulfur hexafluoride microsphr, 2 mL, intravenous, Once in imaging  vancomycin, 250 mg, oral, 4x daily      Continuous medications  [Held by provider] heparin, 0-4,000 Units/hr, Last Rate: 900 Units/hr (03/13/25 1253)      PRN medications  PRN medications: acetaminophen, ipratropium-albuteroL, labetaloL, melatonin, [Held by provider] morphine, ondansetron, promethazine  Results for orders placed or performed during the hospital encounter of 02/25/25 (from the past 24 hours)   POCT GLUCOSE   Result Value Ref Range    POCT Glucose 173 (H) 74 - 99 mg/dL   Protime-INR   Result Value Ref Range    Protime 54.0 (H) 9.8 - 12.4 seconds    INR 4.9 (H) 0.9 - 1.1   POCT GLUCOSE   Result Value Ref Range    POCT Glucose 115 (H) 74 - 99 mg/dL   POCT GLUCOSE   Result Value Ref Range    POCT Glucose " 87 74 - 99 mg/dL   CBC and Auto Differential   Result Value Ref Range    WBC 7.5 4.4 - 11.3 x10*3/uL    nRBC 0.0 0.0 - 0.0 /100 WBCs    RBC 3.15 (L) 4.00 - 5.20 x10*6/uL    Hemoglobin 8.7 (L) 12.0 - 16.0 g/dL    Hematocrit 29.0 (L) 36.0 - 46.0 %    MCV 92 80 - 100 fL    MCH 27.6 26.0 - 34.0 pg    MCHC 30.0 (L) 32.0 - 36.0 g/dL    RDW 20.7 (H) 11.5 - 14.5 %    Platelets 276 150 - 450 x10*3/uL    Neutrophils % 72.9 40.0 - 80.0 %    Immature Granulocytes %, Automated 0.5 0.0 - 0.9 %    Lymphocytes % 16.5 13.0 - 44.0 %    Monocytes % 10.0 2.0 - 10.0 %    Eosinophils % 0.0 0.0 - 6.0 %    Basophils % 0.1 0.0 - 2.0 %    Neutrophils Absolute 5.44 1.20 - 7.70 x10*3/uL    Immature Granulocytes Absolute, Automated 0.04 0.00 - 0.70 x10*3/uL    Lymphocytes Absolute 1.23 1.20 - 4.80 x10*3/uL    Monocytes Absolute 0.75 0.10 - 1.00 x10*3/uL    Eosinophils Absolute 0.00 0.00 - 0.70 x10*3/uL    Basophils Absolute 0.01 0.00 - 0.10 x10*3/uL   Comprehensive Metabolic Panel   Result Value Ref Range    Glucose 88 74 - 99 mg/dL    Sodium 136 136 - 145 mmol/L    Potassium 3.6 3.5 - 5.3 mmol/L    Chloride 101 98 - 107 mmol/L    Bicarbonate 30 21 - 32 mmol/L    Anion Gap 9 (L) 10 - 20 mmol/L    Urea Nitrogen 8 6 - 23 mg/dL    Creatinine 0.42 (L) 0.50 - 1.05 mg/dL    eGFR >90 >60 mL/min/1.73m*2    Calcium 9.0 8.6 - 10.3 mg/dL    Albumin 3.1 (L) 3.4 - 5.0 g/dL    Alkaline Phosphatase 58 33 - 110 U/L    Total Protein 6.1 (L) 6.4 - 8.2 g/dL    AST 15 9 - 39 U/L    Bilirubin, Total 1.7 (H) 0.0 - 1.2 mg/dL    ALT 16 7 - 45 U/L   Protime-INR   Result Value Ref Range    Protime 44.3 (H) 9.8 - 12.4 seconds    INR 4.0 (H) 0.9 - 1.1   Morphology   Result Value Ref Range    RBC Morphology See Below     Polychromasia Mild     RBC Fragments Few     Target Cells Few     Ovalocytes Few     Basophilic Stippling Present     Giant Platelets Few    POCT GLUCOSE   Result Value Ref Range    POCT Glucose 88 74 - 99 mg/dL     *Note: Due to a large number of results  and/or encounters for the requested time period, some results have not been displayed. A complete set of results can be found in Results Review.          This patient currently has cardiac telemetry ordered; if you would like to modify or discontinue the telemetry order, click here to go to the orders activity to modify/discontinue the order.                 Assessment/Plan   Assessment & Plan  Nausea vomiting and diarrhea    Influenza A with pneumonia    Nontraumatic compartment syndrome of left upper extremity    11 days status post left upper extremity fasciotomy closure.  Patient doing well continue current care.       I spent 10 minutes in the professional and overall care of this patient.      Lucas Triplett, DO

## 2025-03-15 NOTE — CARE PLAN
The patient's goals for the shift include  monitor dressing for bleeding    The clinical goals for the shift include Remain HDS      Problem: Pain - Adult  Goal: Verbalizes/displays adequate comfort level or baseline comfort level  Outcome: Progressing  Flowsheets (Taken 3/15/2025 0340)  Verbalizes/displays adequate comfort level or baseline comfort level:   Administer analgesics based on type and severity of pain and evaluate response   Encourage patient to monitor pain and request assistance   Consider cultural and social influences on pain and pain management     Problem: Safety - Adult  Goal: Free from fall injury  Outcome: Progressing     Problem: Fall/Injury  Goal: Not fall by end of shift  Outcome: Progressing  Goal: Be free from injury by end of the shift  Outcome: Progressing     Problem: Skin  Goal: Participates in plan/prevention/treatment measures  Outcome: Progressing  Flowsheets (Taken 3/15/2025 0340)  Participates in plan/prevention/treatment measures:   Discuss with provider PT/OT consult   Elevate heels  Goal: Prevent/manage excess moisture  Outcome: Progressing  Flowsheets (Taken 3/15/2025 0340)  Prevent/manage excess moisture: Moisturize dry skin  Goal: Promote/optimize nutrition  Outcome: Progressing  Flowsheets (Taken 3/15/2025 0340)  Promote/optimize nutrition:   Assist with feeding   Consume > 50% meals/supplements

## 2025-03-16 VITALS
BODY MASS INDEX: 25.98 KG/M2 | OXYGEN SATURATION: 98 % | WEIGHT: 146.61 LBS | DIASTOLIC BLOOD PRESSURE: 60 MMHG | TEMPERATURE: 97.3 F | SYSTOLIC BLOOD PRESSURE: 122 MMHG | HEIGHT: 63 IN | HEART RATE: 98 BPM | RESPIRATION RATE: 18 BRPM

## 2025-03-16 LAB
ALBUMIN SERPL BCP-MCNC: 3.2 G/DL (ref 3.4–5)
ALP SERPL-CCNC: 62 U/L (ref 33–110)
ALT SERPL W P-5'-P-CCNC: 17 U/L (ref 7–45)
ANION GAP SERPL CALC-SCNC: 12 MMOL/L (ref 10–20)
AST SERPL W P-5'-P-CCNC: 15 U/L (ref 9–39)
BASOPHILS # BLD AUTO: 0.01 X10*3/UL (ref 0–0.1)
BASOPHILS NFR BLD AUTO: 0.1 %
BILIRUB SERPL-MCNC: 1.7 MG/DL (ref 0–1.2)
BUN SERPL-MCNC: 8 MG/DL (ref 6–23)
BURR CELLS BLD QL SMEAR: NORMAL
CALCIUM SERPL-MCNC: 8.8 MG/DL (ref 8.6–10.3)
CHLORIDE SERPL-SCNC: 100 MMOL/L (ref 98–107)
CO2 SERPL-SCNC: 28 MMOL/L (ref 21–32)
CREAT SERPL-MCNC: 0.36 MG/DL (ref 0.5–1.05)
DACRYOCYTES BLD QL SMEAR: NORMAL
EGFRCR SERPLBLD CKD-EPI 2021: >90 ML/MIN/1.73M*2
EOSINOPHIL # BLD AUTO: 0 X10*3/UL (ref 0–0.7)
EOSINOPHIL NFR BLD AUTO: 0 %
ERYTHROCYTE [DISTWIDTH] IN BLOOD BY AUTOMATED COUNT: 21.4 % (ref 11.5–14.5)
GLUCOSE BLD MANUAL STRIP-MCNC: 102 MG/DL (ref 74–99)
GLUCOSE BLD MANUAL STRIP-MCNC: 125 MG/DL (ref 74–99)
GLUCOSE BLD MANUAL STRIP-MCNC: 125 MG/DL (ref 74–99)
GLUCOSE BLD MANUAL STRIP-MCNC: 147 MG/DL (ref 74–99)
GLUCOSE SERPL-MCNC: 84 MG/DL (ref 74–99)
HCT VFR BLD AUTO: 29.8 % (ref 36–46)
HGB BLD-MCNC: 9 G/DL (ref 12–16)
IMM GRANULOCYTES # BLD AUTO: 0.04 X10*3/UL (ref 0–0.7)
IMM GRANULOCYTES NFR BLD AUTO: 0.5 % (ref 0–0.9)
LYMPHOCYTES # BLD AUTO: 1.44 X10*3/UL (ref 1.2–4.8)
LYMPHOCYTES NFR BLD AUTO: 19.6 %
MCH RBC QN AUTO: 27.8 PG (ref 26–34)
MCHC RBC AUTO-ENTMCNC: 30.2 G/DL (ref 32–36)
MCV RBC AUTO: 92 FL (ref 80–100)
MONOCYTES # BLD AUTO: 0.82 X10*3/UL (ref 0.1–1)
MONOCYTES NFR BLD AUTO: 11.2 %
NEUTROPHILS # BLD AUTO: 5.02 X10*3/UL (ref 1.2–7.7)
NEUTROPHILS NFR BLD AUTO: 68.6 %
NRBC BLD-RTO: 0 /100 WBCS (ref 0–0)
OVALOCYTES BLD QL SMEAR: NORMAL
PLATELET # BLD AUTO: 285 X10*3/UL (ref 150–450)
POTASSIUM SERPL-SCNC: 3.7 MMOL/L (ref 3.5–5.3)
PROT SERPL-MCNC: 6.3 G/DL (ref 6.4–8.2)
RBC # BLD AUTO: 3.24 X10*6/UL (ref 4–5.2)
RBC MORPH BLD: NORMAL
SODIUM SERPL-SCNC: 136 MMOL/L (ref 136–145)
WBC # BLD AUTO: 7.3 X10*3/UL (ref 4.4–11.3)

## 2025-03-16 PROCEDURE — 2500000002 HC RX 250 W HCPCS SELF ADMINISTERED DRUGS (ALT 637 FOR MEDICARE OP, ALT 636 FOR OP/ED)

## 2025-03-16 PROCEDURE — 94640 AIRWAY INHALATION TREATMENT: CPT

## 2025-03-16 PROCEDURE — 85025 COMPLETE CBC W/AUTO DIFF WBC: CPT

## 2025-03-16 PROCEDURE — 2500000001 HC RX 250 WO HCPCS SELF ADMINISTERED DRUGS (ALT 637 FOR MEDICARE OP)

## 2025-03-16 PROCEDURE — 2060000001 HC INTERMEDIATE ICU ROOM DAILY

## 2025-03-16 PROCEDURE — 82947 ASSAY GLUCOSE BLOOD QUANT: CPT

## 2025-03-16 PROCEDURE — 80053 COMPREHEN METABOLIC PANEL: CPT

## 2025-03-16 PROCEDURE — 36415 COLL VENOUS BLD VENIPUNCTURE: CPT

## 2025-03-16 PROCEDURE — 2500000005 HC RX 250 GENERAL PHARMACY W/O HCPCS

## 2025-03-16 RX ADMIN — Medication 28 PERCENT: at 07:00

## 2025-03-16 RX ADMIN — BUDESONIDE 0.5 MG: 0.5 INHALANT ORAL at 19:59

## 2025-03-16 RX ADMIN — ATORVASTATIN CALCIUM 80 MG: 80 TABLET, FILM COATED ORAL at 20:07

## 2025-03-16 RX ADMIN — Medication 28 PERCENT: at 19:59

## 2025-03-16 RX ADMIN — VANCOMYCIN HYDROCHLORIDE 250 MG: 250 CAPSULE ORAL at 18:07

## 2025-03-16 RX ADMIN — ACETAMINOPHEN 650 MG: 325 TABLET, FILM COATED ORAL at 20:07

## 2025-03-16 RX ADMIN — VANCOMYCIN HYDROCHLORIDE 250 MG: 250 CAPSULE ORAL at 20:07

## 2025-03-16 RX ADMIN — CARVEDILOL 25 MG: 25 TABLET, FILM COATED ORAL at 08:51

## 2025-03-16 RX ADMIN — BUPROPION HYDROCHLORIDE 150 MG: 150 TABLET, EXTENDED RELEASE ORAL at 08:51

## 2025-03-16 RX ADMIN — ESCITALOPRAM OXALATE 20 MG: 10 TABLET ORAL at 08:51

## 2025-03-16 RX ADMIN — IPRATROPIUM BROMIDE AND ALBUTEROL SULFATE 3 ML: .5; 3 SOLUTION RESPIRATORY (INHALATION) at 19:58

## 2025-03-16 RX ADMIN — VANCOMYCIN HYDROCHLORIDE 250 MG: 250 CAPSULE ORAL at 06:13

## 2025-03-16 RX ADMIN — IPRATROPIUM BROMIDE AND ALBUTEROL SULFATE 3 ML: .5; 3 SOLUTION RESPIRATORY (INHALATION) at 12:54

## 2025-03-16 RX ADMIN — CARVEDILOL 25 MG: 25 TABLET, FILM COATED ORAL at 20:07

## 2025-03-16 RX ADMIN — BUDESONIDE 0.5 MG: 0.5 INHALANT ORAL at 07:00

## 2025-03-16 RX ADMIN — ASPIRIN 81 MG: 81 TABLET, COATED ORAL at 08:51

## 2025-03-16 RX ADMIN — LISINOPRIL 20 MG: 20 TABLET ORAL at 08:51

## 2025-03-16 RX ADMIN — PANTOPRAZOLE SODIUM 40 MG: 40 TABLET, DELAYED RELEASE ORAL at 08:51

## 2025-03-16 RX ADMIN — IPRATROPIUM BROMIDE AND ALBUTEROL SULFATE 3 ML: .5; 3 SOLUTION RESPIRATORY (INHALATION) at 07:01

## 2025-03-16 RX ADMIN — VANCOMYCIN HYDROCHLORIDE 250 MG: 250 CAPSULE ORAL at 13:43

## 2025-03-16 ASSESSMENT — COGNITIVE AND FUNCTIONAL STATUS - GENERAL
WALKING IN HOSPITAL ROOM: A LITTLE
DRESSING REGULAR LOWER BODY CLOTHING: A LITTLE
HELP NEEDED FOR BATHING: A LITTLE
MOBILITY SCORE: 18
WALKING IN HOSPITAL ROOM: A LITTLE
DRESSING REGULAR LOWER BODY CLOTHING: A LITTLE
HELP NEEDED FOR BATHING: A LITTLE
MOBILITY SCORE: 17
MOVING FROM LYING ON BACK TO SITTING ON SIDE OF FLAT BED WITH BEDRAILS: A LITTLE
MOVING FROM LYING ON BACK TO SITTING ON SIDE OF FLAT BED WITH BEDRAILS: A LITTLE
TURNING FROM BACK TO SIDE WHILE IN FLAT BAD: A LITTLE
TURNING FROM BACK TO SIDE WHILE IN FLAT BAD: A LITTLE
CLIMB 3 TO 5 STEPS WITH RAILING: A LOT
STANDING UP FROM CHAIR USING ARMS: A LITTLE
MOVING TO AND FROM BED TO CHAIR: A LITTLE
DAILY ACTIVITIY SCORE: 21
TOILETING: A LITTLE
MOVING TO AND FROM BED TO CHAIR: A LITTLE
DAILY ACTIVITIY SCORE: 22
CLIMB 3 TO 5 STEPS WITH RAILING: A LOT

## 2025-03-16 ASSESSMENT — PAIN - FUNCTIONAL ASSESSMENT
PAIN_FUNCTIONAL_ASSESSMENT: 0-10

## 2025-03-16 ASSESSMENT — PAIN SCALES - GENERAL
PAINLEVEL_OUTOF10: 0 - NO PAIN
PAINLEVEL_OUTOF10: 0 - NO PAIN
PAINLEVEL_OUTOF10: 6
PAINLEVEL_OUTOF10: 0 - NO PAIN

## 2025-03-16 NOTE — PROGRESS NOTES
"Kayley Lynch is a 54 y.o. female on day 18 of admission presenting with Nausea vomiting and diarrhea.    Subjective   Denies any discomfort left upper extremity       Objective     Physical Exam  Examination of her left upper extremity reveals incision is intact sutures removed small amount of drainage on dressing  distal incision.  No active drainage is noted.  No ecchymosis no erythema.  Last Recorded Vitals  Blood pressure 113/58, pulse 80, temperature 35.8 °C (96.4 °F), temperature source Temporal, resp. rate 18, height 1.6 m (5' 2.99\"), weight 66.5 kg (146 lb 9.7 oz), SpO2 96%.  Intake/Output last 3 Shifts:  I/O last 3 completed shifts:  In: - (0 mL/kg)   Out: 700 (10.5 mL/kg) [Urine:700 (0.3 mL/kg/hr)]  Weight: 66.5 kg     Relevant Results      Scheduled medications  aspirin, 81 mg, oral, Daily  atorvastatin, 80 mg, oral, Nightly  budesonide, 0.5 mg, nebulization, BID  buPROPion XL, 150 mg, oral, q AM  carvedilol, 25 mg, oral, BID  escitalopram, 20 mg, oral, Daily  insulin lispro, 0-5 Units, subcutaneous, TID AC  ipratropium-albuteroL, 3 mL, nebulization, TID  lisinopril, 20 mg, oral, Daily  oxygen, , inhalation, Continuous - Inhalation  pantoprazole, 40 mg, oral, Daily  perflutren lipid microspheres, 0.5-10 mL of dilution, intravenous, Once in imaging  perflutren protein A microsphere, 0.5 mL, intravenous, Once in imaging  sulfur hexafluoride microsphr, 2 mL, intravenous, Once in imaging  vancomycin, 250 mg, oral, 4x daily      Continuous medications  [Held by provider] heparin, 0-4,000 Units/hr, Last Rate: 900 Units/hr (03/13/25 1253)      PRN medications  PRN medications: acetaminophen, ipratropium-albuteroL, labetaloL, melatonin, [Held by provider] morphine, ondansetron, promethazine  Results for orders placed or performed during the hospital encounter of 02/25/25 (from the past 24 hours)   POCT GLUCOSE   Result Value Ref Range    POCT Glucose 107 (H) 74 - 99 mg/dL   POCT GLUCOSE   Result Value Ref Range "    POCT Glucose 109 (H) 74 - 99 mg/dL   CBC and Auto Differential   Result Value Ref Range    WBC 7.3 4.4 - 11.3 x10*3/uL    nRBC 0.0 0.0 - 0.0 /100 WBCs    RBC 3.24 (L) 4.00 - 5.20 x10*6/uL    Hemoglobin 9.0 (L) 12.0 - 16.0 g/dL    Hematocrit 29.8 (L) 36.0 - 46.0 %    MCV 92 80 - 100 fL    MCH 27.8 26.0 - 34.0 pg    MCHC 30.2 (L) 32.0 - 36.0 g/dL    RDW 21.4 (H) 11.5 - 14.5 %    Platelets 285 150 - 450 x10*3/uL    Neutrophils % 68.6 40.0 - 80.0 %    Immature Granulocytes %, Automated 0.5 0.0 - 0.9 %    Lymphocytes % 19.6 13.0 - 44.0 %    Monocytes % 11.2 2.0 - 10.0 %    Eosinophils % 0.0 0.0 - 6.0 %    Basophils % 0.1 0.0 - 2.0 %    Neutrophils Absolute 5.02 1.20 - 7.70 x10*3/uL    Immature Granulocytes Absolute, Automated 0.04 0.00 - 0.70 x10*3/uL    Lymphocytes Absolute 1.44 1.20 - 4.80 x10*3/uL    Monocytes Absolute 0.82 0.10 - 1.00 x10*3/uL    Eosinophils Absolute 0.00 0.00 - 0.70 x10*3/uL    Basophils Absolute 0.01 0.00 - 0.10 x10*3/uL   Comprehensive Metabolic Panel   Result Value Ref Range    Glucose 84 74 - 99 mg/dL    Sodium 136 136 - 145 mmol/L    Potassium 3.7 3.5 - 5.3 mmol/L    Chloride 100 98 - 107 mmol/L    Bicarbonate 28 21 - 32 mmol/L    Anion Gap 12 10 - 20 mmol/L    Urea Nitrogen 8 6 - 23 mg/dL    Creatinine 0.36 (L) 0.50 - 1.05 mg/dL    eGFR >90 >60 mL/min/1.73m*2    Calcium 8.8 8.6 - 10.3 mg/dL    Albumin 3.2 (L) 3.4 - 5.0 g/dL    Alkaline Phosphatase 62 33 - 110 U/L    Total Protein 6.3 (L) 6.4 - 8.2 g/dL    AST 15 9 - 39 U/L    Bilirubin, Total 1.7 (H) 0.0 - 1.2 mg/dL    ALT 17 7 - 45 U/L   Morphology   Result Value Ref Range    RBC Morphology See Below     Ovalocytes Few     Teardrop Cells Few     Koki Cells Few    POCT GLUCOSE   Result Value Ref Range    POCT Glucose 125 (H) 74 - 99 mg/dL   POCT GLUCOSE   Result Value Ref Range    POCT Glucose 102 (H) 74 - 99 mg/dL          This patient currently has cardiac telemetry ordered; if you would like to modify or discontinue the telemetry order,  click here to go to the orders activity to modify/discontinue the order.                 Assessment/Plan postoperative day #12 left upper extremity fasciotomy closure.  Assessment & Plan  Nausea vomiting and diarrhea    Influenza A with pneumonia    Nontraumatic compartment syndrome of left upper extremity    Postoperative day #12 left upper extremity fasciotomy closure       I spent 10   minutes in the professional and overall care of this patient.      Lucas Triplett, DO

## 2025-03-16 NOTE — CARE PLAN
The patient's goals for the shift include      The clinical goals for the shift include pt to feel stronger, do leg exercises in bed & for pt to remain HDS/safe throughout the shift      Problem: Pain - Adult  Goal: Verbalizes/displays adequate comfort level or baseline comfort level  Outcome: Progressing     Problem: Safety - Adult  Goal: Free from fall injury  Outcome: Progressing     Problem: Skin  Goal: Prevent/manage excess moisture  Flowsheets (Taken 3/15/2025 2122)  Prevent/manage excess moisture:   Cleanse incontinence/protect with barrier cream   Monitor for/manage infection if present   Moisturize dry skin

## 2025-03-16 NOTE — CARE PLAN
The clinical goals for the shift include consume >50% of meals      Problem: Safety - Adult  Goal: Free from fall injury  Outcome: Progressing     Problem: Pain - Adult  Goal: Verbalizes/displays adequate comfort level or baseline comfort level  Outcome: Progressing     Problem: Chronic Conditions and Co-morbidities  Goal: Patient's chronic conditions and co-morbidity symptoms are monitored and maintained or improved  Outcome: Progressing     Problem: Skin  Goal: Promote skin healing  Outcome: Progressing     Problem: Skin  Goal: Promote/optimize nutrition  Outcome: Progressing

## 2025-03-17 LAB
ALBUMIN SERPL BCP-MCNC: 3.5 G/DL (ref 3.4–5)
ALP SERPL-CCNC: 72 U/L (ref 33–110)
ALT SERPL W P-5'-P-CCNC: 21 U/L (ref 7–45)
ANION GAP SERPL CALC-SCNC: 11 MMOL/L (ref 10–20)
AST SERPL W P-5'-P-CCNC: 20 U/L (ref 9–39)
BASOPHILS # BLD AUTO: 0.02 X10*3/UL (ref 0–0.1)
BASOPHILS NFR BLD AUTO: 0.3 %
BILIRUB SERPL-MCNC: 1.7 MG/DL (ref 0–1.2)
BUN SERPL-MCNC: 6 MG/DL (ref 6–23)
BURR CELLS BLD QL SMEAR: NORMAL
CALCIUM SERPL-MCNC: 9.3 MG/DL (ref 8.6–10.3)
CHLORIDE SERPL-SCNC: 100 MMOL/L (ref 98–107)
CO2 SERPL-SCNC: 29 MMOL/L (ref 21–32)
CREAT SERPL-MCNC: 0.42 MG/DL (ref 0.5–1.05)
DACRYOCYTES BLD QL SMEAR: NORMAL
EGFRCR SERPLBLD CKD-EPI 2021: >90 ML/MIN/1.73M*2
EOSINOPHIL # BLD AUTO: 0.02 X10*3/UL (ref 0–0.7)
EOSINOPHIL NFR BLD AUTO: 0.3 %
ERYTHROCYTE [DISTWIDTH] IN BLOOD BY AUTOMATED COUNT: 21.4 % (ref 11.5–14.5)
GLUCOSE BLD MANUAL STRIP-MCNC: 103 MG/DL (ref 74–99)
GLUCOSE BLD MANUAL STRIP-MCNC: 106 MG/DL (ref 74–99)
GLUCOSE BLD MANUAL STRIP-MCNC: 111 MG/DL (ref 74–99)
GLUCOSE BLD MANUAL STRIP-MCNC: 94 MG/DL (ref 74–99)
GLUCOSE SERPL-MCNC: 94 MG/DL (ref 74–99)
HCT VFR BLD AUTO: 33.6 % (ref 36–46)
HGB BLD-MCNC: 10.3 G/DL (ref 12–16)
HOLD SPECIMEN: NORMAL
IMM GRANULOCYTES # BLD AUTO: 0.02 X10*3/UL (ref 0–0.7)
IMM GRANULOCYTES NFR BLD AUTO: 0.3 % (ref 0–0.9)
INR PPP: 1.9 (ref 0.9–1.1)
LYMPHOCYTES # BLD AUTO: 1.33 X10*3/UL (ref 1.2–4.8)
LYMPHOCYTES NFR BLD AUTO: 19.4 %
MCH RBC QN AUTO: 28.5 PG (ref 26–34)
MCHC RBC AUTO-ENTMCNC: 30.7 G/DL (ref 32–36)
MCV RBC AUTO: 93 FL (ref 80–100)
MONOCYTES # BLD AUTO: 0.77 X10*3/UL (ref 0.1–1)
MONOCYTES NFR BLD AUTO: 11.3 %
NEUTROPHILS # BLD AUTO: 4.68 X10*3/UL (ref 1.2–7.7)
NEUTROPHILS NFR BLD AUTO: 68.4 %
NRBC BLD-RTO: 0 /100 WBCS (ref 0–0)
OVALOCYTES BLD QL SMEAR: NORMAL
PLATELET # BLD AUTO: 290 X10*3/UL (ref 150–450)
POTASSIUM SERPL-SCNC: 3.6 MMOL/L (ref 3.5–5.3)
PROT SERPL-MCNC: 6.9 G/DL (ref 6.4–8.2)
PROTHROMBIN TIME: 21.5 SECONDS (ref 9.8–12.4)
RBC # BLD AUTO: 3.62 X10*6/UL (ref 4–5.2)
RBC MORPH BLD: NORMAL
SODIUM SERPL-SCNC: 136 MMOL/L (ref 136–145)
WBC # BLD AUTO: 6.8 X10*3/UL (ref 4.4–11.3)

## 2025-03-17 PROCEDURE — 85025 COMPLETE CBC W/AUTO DIFF WBC: CPT

## 2025-03-17 PROCEDURE — 2500000001 HC RX 250 WO HCPCS SELF ADMINISTERED DRUGS (ALT 637 FOR MEDICARE OP): Performed by: INTERNAL MEDICINE

## 2025-03-17 PROCEDURE — 2500000005 HC RX 250 GENERAL PHARMACY W/O HCPCS

## 2025-03-17 PROCEDURE — 97535 SELF CARE MNGMENT TRAINING: CPT | Mod: GP,CQ

## 2025-03-17 PROCEDURE — 2500000004 HC RX 250 GENERAL PHARMACY W/ HCPCS (ALT 636 FOR OP/ED): Performed by: INTERNAL MEDICINE

## 2025-03-17 PROCEDURE — 82947 ASSAY GLUCOSE BLOOD QUANT: CPT

## 2025-03-17 PROCEDURE — 94640 AIRWAY INHALATION TREATMENT: CPT

## 2025-03-17 PROCEDURE — 97530 THERAPEUTIC ACTIVITIES: CPT | Mod: GO

## 2025-03-17 PROCEDURE — 2500000001 HC RX 250 WO HCPCS SELF ADMINISTERED DRUGS (ALT 637 FOR MEDICARE OP)

## 2025-03-17 PROCEDURE — 80053 COMPREHEN METABOLIC PANEL: CPT

## 2025-03-17 PROCEDURE — 2060000001 HC INTERMEDIATE ICU ROOM DAILY

## 2025-03-17 PROCEDURE — 97535 SELF CARE MNGMENT TRAINING: CPT | Mod: GO

## 2025-03-17 PROCEDURE — 2500000002 HC RX 250 W HCPCS SELF ADMINISTERED DRUGS (ALT 637 FOR MEDICARE OP, ALT 636 FOR OP/ED)

## 2025-03-17 PROCEDURE — 97116 GAIT TRAINING THERAPY: CPT | Mod: GP,CQ

## 2025-03-17 PROCEDURE — 36415 COLL VENOUS BLD VENIPUNCTURE: CPT

## 2025-03-17 PROCEDURE — 85610 PROTHROMBIN TIME: CPT | Performed by: INTERNAL MEDICINE

## 2025-03-17 RX ORDER — WARFARIN 2 MG/1
2 TABLET ORAL DAILY
Status: DISCONTINUED | OUTPATIENT
Start: 2025-03-17 | End: 2025-03-18 | Stop reason: HOSPADM

## 2025-03-17 RX ADMIN — IPRATROPIUM BROMIDE AND ALBUTEROL SULFATE 3 ML: .5; 3 SOLUTION RESPIRATORY (INHALATION) at 19:53

## 2025-03-17 RX ADMIN — IPRATROPIUM BROMIDE AND ALBUTEROL SULFATE 3 ML: .5; 3 SOLUTION RESPIRATORY (INHALATION) at 06:40

## 2025-03-17 RX ADMIN — IPRATROPIUM BROMIDE AND ALBUTEROL SULFATE 3 ML: .5; 3 SOLUTION RESPIRATORY (INHALATION) at 13:38

## 2025-03-17 RX ADMIN — BUPROPION HYDROCHLORIDE 150 MG: 150 TABLET, EXTENDED RELEASE ORAL at 09:10

## 2025-03-17 RX ADMIN — ACETAMINOPHEN 650 MG: 325 TABLET, FILM COATED ORAL at 20:35

## 2025-03-17 RX ADMIN — WARFARIN SODIUM 2 MG: 2 TABLET ORAL at 17:20

## 2025-03-17 RX ADMIN — Medication 2 L/MIN: at 06:40

## 2025-03-17 RX ADMIN — LISINOPRIL 20 MG: 20 TABLET ORAL at 09:10

## 2025-03-17 RX ADMIN — ASPIRIN 81 MG: 81 TABLET, COATED ORAL at 09:10

## 2025-03-17 RX ADMIN — ATORVASTATIN CALCIUM 80 MG: 80 TABLET, FILM COATED ORAL at 20:35

## 2025-03-17 RX ADMIN — SODIUM CHLORIDE 500 ML: 9 INJECTION, SOLUTION INTRAVENOUS at 11:57

## 2025-03-17 RX ADMIN — VANCOMYCIN HYDROCHLORIDE 250 MG: 250 CAPSULE ORAL at 13:00

## 2025-03-17 RX ADMIN — VANCOMYCIN HYDROCHLORIDE 250 MG: 250 CAPSULE ORAL at 17:20

## 2025-03-17 RX ADMIN — CARVEDILOL 25 MG: 25 TABLET, FILM COATED ORAL at 09:10

## 2025-03-17 RX ADMIN — BUDESONIDE 0.5 MG: 0.5 INHALANT ORAL at 19:53

## 2025-03-17 RX ADMIN — Medication 2 L/MIN: at 20:36

## 2025-03-17 RX ADMIN — CARVEDILOL 25 MG: 25 TABLET, FILM COATED ORAL at 20:35

## 2025-03-17 RX ADMIN — VANCOMYCIN HYDROCHLORIDE 250 MG: 250 CAPSULE ORAL at 20:35

## 2025-03-17 RX ADMIN — ESCITALOPRAM OXALATE 20 MG: 10 TABLET ORAL at 09:10

## 2025-03-17 RX ADMIN — BUDESONIDE 0.5 MG: 0.5 INHALANT ORAL at 06:40

## 2025-03-17 RX ADMIN — PANTOPRAZOLE SODIUM 40 MG: 40 TABLET, DELAYED RELEASE ORAL at 09:10

## 2025-03-17 RX ADMIN — VANCOMYCIN HYDROCHLORIDE 250 MG: 250 CAPSULE ORAL at 06:02

## 2025-03-17 ASSESSMENT — COGNITIVE AND FUNCTIONAL STATUS - GENERAL
MOBILITY SCORE: 19
HELP NEEDED FOR BATHING: A LOT
DRESSING REGULAR UPPER BODY CLOTHING: A LITTLE
CLIMB 3 TO 5 STEPS WITH RAILING: A LOT
DRESSING REGULAR UPPER BODY CLOTHING: A LITTLE
TOILETING: A LITTLE
TURNING FROM BACK TO SIDE WHILE IN FLAT BAD: A LITTLE
MOBILITY SCORE: 17
DAILY ACTIVITIY SCORE: 20
CLIMB 3 TO 5 STEPS WITH RAILING: A LOT
MOVING TO AND FROM BED TO CHAIR: A LITTLE
MOVING FROM LYING ON BACK TO SITTING ON SIDE OF FLAT BED WITH BEDRAILS: A LITTLE
TURNING FROM BACK TO SIDE WHILE IN FLAT BAD: A LITTLE
CLIMB 3 TO 5 STEPS WITH RAILING: A LOT
MOVING TO AND FROM BED TO CHAIR: A LITTLE
WALKING IN HOSPITAL ROOM: A LITTLE
TOILETING: A LITTLE
STANDING UP FROM CHAIR USING ARMS: A LITTLE
DRESSING REGULAR LOWER BODY CLOTHING: A LITTLE
MOVING FROM LYING ON BACK TO SITTING ON SIDE OF FLAT BED WITH BEDRAILS: A LITTLE
MOBILITY SCORE: 17
HELP NEEDED FOR BATHING: A LITTLE
PERSONAL GROOMING: A LITTLE
WALKING IN HOSPITAL ROOM: A LITTLE
WALKING IN HOSPITAL ROOM: A LITTLE
DRESSING REGULAR LOWER BODY CLOTHING: A LITTLE
STANDING UP FROM CHAIR USING ARMS: A LITTLE
DAILY ACTIVITIY SCORE: 18
MOVING TO AND FROM BED TO CHAIR: A LITTLE
STANDING UP FROM CHAIR USING ARMS: A LITTLE

## 2025-03-17 ASSESSMENT — PAIN - FUNCTIONAL ASSESSMENT
PAIN_FUNCTIONAL_ASSESSMENT: 0-10

## 2025-03-17 ASSESSMENT — PAIN SCALES - GENERAL
PAINLEVEL_OUTOF10: 0 - NO PAIN

## 2025-03-17 ASSESSMENT — ACTIVITIES OF DAILY LIVING (ADL): HOME_MANAGEMENT_TIME_ENTRY: 15

## 2025-03-17 NOTE — PROGRESS NOTES
"Nutrition Follow Up Assessment:   Nutrition Assessment         Patient is a 54 y.o. female presenting with nausea vomiting and diarrhea.       Nutrition History:  Energy Intake: Fair 50-75 %  Pain affecting nutrition status: N/A  Food and Nutrient History: Pt reports she is eating more of her meals, usually about 50% but sometimes eats 100%. Meal intakes in EMR since last assessment have been 50% x 3, 100% x 1. Does not like the gelatein supplement but would like to try magic cup once per day. No other nutrition concerns at this time.       Anthropometrics:  Height: 160 cm (5' 2.99\")   Weight: 66.5 kg (146 lb 9.7 oz)   BMI (Calculated): 25.98  IBW/kg (Dietitian Calculated): 52 kg  Percent of IBW: 113 %       Weight History:     Weight Change %:  Weight History / % Weight Change: No new wt to assess    Nutrition Focused Physical Exam Findings:    Subcutaneous Fat Loss:   Defer Subcutaneous Fat Loss Assessment: Defer all  Muscle Wasting:  Defer Muscle Wasting Assessment: Defer all  Edema:  Edema Location: 1+ RUE, non-pitting LUE and BLE per nursing assessment  Physical Findings:  Skin: Positive (left arm incision per nursing assessment)  Teeth Findings: Impaired dentition    Nutrition Significant Labs:    Reviewed   Nutrition Specific Medications:  Reviewed     I/O:   Last BM Date: 03/16/25; Stool Appearance: Unable to assess (03/16/25 2010)    Dietary Orders (From admission, onward)       Start     Ordered    03/17/25 1452  Oral nutritional supplements  Until discontinued        Question Answer Comment   Deliver with Dinner    Select supplement: Magic Cup        03/17/25 1451    03/06/25 1029  Adult diet Regular; Thin 0  Diet effective now        Comments: Choose softer foods from menu d/t dentition.   Question Answer Comment   Diet type Regular    Fluid consistency Thin 0        03/06/25 1029    02/26/25 1213  May Participate in Room Service  ( ROOM SERVICE MAY PARTICIPATE)  Once        Question:  .  Answer:  Yes "    02/26/25 1212                     Estimated Needs:      Method for Estimating Needs: 7046-0142  26-30 génesis kg of IBW     Method for Estimating 24 Hour Protein Needs: 52-68  1-1.3 gm kg of IBW     Method for Estimating 24 Hour Fluid Needs: 0110-1636  20-30 ml kg of IBW as medically indicated  Patient on Order Fluid Restriction: No        Nutrition Diagnosis   Malnutrition Diagnosis  Patient has Malnutrition Diagnosis: No    Nutrition Diagnosis  Patient has Nutrition Diagnosis: Yes  Diagnosis Status (1): Active  Nutrition Diagnosis 1: Inadequate oral intake  Related to (1): limited food acceptance  As Evidenced by (1): Pt is doing better today and started to eat a little  Additional Assessment Information (1): meal intakes at least 50% now  Additional Nutrition Diagnosis: Diagnosis 2  Diagnosis Status (2): Active  Nutrition Diagnosis 2: Increased nutrient needs  Related to (2): physiological causes  As Evidenced by (2): surgery wound healing needs       Nutrition Interventions/Recommendations   Nutrition prescription for oral nutrition    Nutrition Recommendations:  Individualized Nutrition Prescription Provided for : Regular diet with ONS    Nutrition Interventions/Goals:   Interventions: Meals and snacks, Medical food supplement  Meals and Snacks: General healthful diet  Goal: Consumes 3 meals per day  Medical Food Supplement: Commercial beverage medical food supplement therapy  Goal: Magic cup daily (290 kcal, 9 g protein per serving).      Education Documentation  No documentation found.     Patient with no diet related questions at this time.         Nutrition Monitoring and Evaluation   Food/Nutrient Related History Monitoring  Monitoring and Evaluation Plan: Intake / amount of food, Estimated Energy Intake  Estimated Energy Intake: Energy intake greater or equal to 75% of estimated energy needs  Intake / Amount of food: Consumes at least 75% or more of meals/snacks/supplements, Meets > 75% estimated energy  needs    Anthropometric Measurements  Monitoring and Evaluation Plan: Body weight  Body Weight: Body weight - Maintain stable weight    Biochemical Data, Medical Tests and Procedures  Monitoring and Evaluation Plan: Electrolyte/renal panel, Glucose/endocrine profile  Electrolyte and Renal Panel: Electrolytes within normal limits  Glucose/Endocrine Profile: Glucose within normal limits ( mg/dL)    Physical Exam Findings  Monitoring and Evaluation Plan: Digestive System  Digestive System Finding: Diarrhea  Criteria: Improvement in GI function/symptoms    Goal Status: Some progress toward goal(s)    Time Spent (min): 30 minutes

## 2025-03-17 NOTE — PROGRESS NOTES
Occupational Therapy    OT Treatment    Patient Name: Kayley Lynch  MRN: [id number]  Department: [facility]  Room: [facility]844-  Today's Date: 3/17/2025  Time Calculation  Start Time: 1345  Stop Time: 1414  Time Calculation (min): 29 min        Plan:  Treatment Interventions: ADL retraining, Functional transfer training, Endurance training, Compensatory technique education  OT Frequency: 4 times per week  OT Discharge Recommendations: High intensity level of continued care  OT - OK to Discharge: Yes (to next level of care when cleared by medical team)  Treatment Interventions: ADL retraining, Functional transfer training, Endurance training, Compensatory technique education    Subjective   Previous Visit Info:     General:  General  Prior to Session Communication: Bedside nurse  Patient Position Received: Bed, 2 rail up  General Comment: pt receiving breathing treatment prior to treatment session.  pt pleasant and agreeable to working with therapy.  pt in need o ftoileting and oral hygiene.  adrress endurance and adl retraining this treatment session.  Precautions:  Medical Precautions:  (2 lo2 and contact precautions, and iv therapy)     Date/Time Vitals Session Patient Position Pulse Resp SpO2 BP MAP (mmHg)    03/17/25 1338 --  --  --  --  99 %  --  --     03/17/25 1400 --  --  --  --  --  92/53  65           Objective      Activities of Daily Living:      Grooming  Grooming Level of Assistance:  (pt required cga for dyn standing at sink standing for oral hygine after set up. x 2.5 minutes.  pt able to brush hair with min assist needed for back of hair only)      Toileting  Toileting Level of Assistance:  (pt required cga for toileting up to toilet with grab bar and heightened toilet surface.)  Functional Standing Tolerance:  Activity: pt tolerated static stand up to walker x 2 trials x 2  Bed Mobility/Transfers: Bed Mobility  Bed Mobility:  (supine to sit to edge of bed with sba.)    Transfers  Transfer:  (sit to stand up  from chair with cga/ higher toilet with cga)         Functional Mobility:  Functional Mobility  Functional Mobility Performed:  (pt able to mobilize from bed to bathroom and then from toilet to chair with ww with cga/min assist x 10 and 12 feet respectively.)    Outcome Measures:[facility] Daily Activity  Putting on and taking off regular lower body clothing: A little  Bathing (including washing, rinsing, drying): A lot  Putting on and taking off regular upper body clothing: A little  Toileting, which includes using toilet, bedpan or urinal: A little  Taking care of personal grooming such as brushing teeth: A little  Eating Meals: None  Daily Activity - Total Score: 18        Education Documentation  Precautions, taught by Mona Santamaria OT at 3/17/2025  2:22 PM.  Learner: Patient  Readiness: Acceptance  Method: Explanation  Response: Needs Reinforcement    ADL Training, taught by Mona Santamaria OT at 3/17/2025  2:22 PM.  Learner: Patient  Readiness: Acceptance  Method: Explanation  Response: Needs Reinforcement    Education Comments  No comments found.        OP EDUCATION:       Goals:  Encounter Problems       Encounter Problems (Active)       OT Goals       Increase functional mobility and  functional transfers to min assist x 1 for bed/chair/toilet with dme prn   (Progressing)       Start:  03/05/25    Expected End:  03/19/25            increase bue ther ex/activity x 5-7 minutes and increase standing tolerance x 3-5 minutes with min assist x 1 to promote greater activity tolerance for assist with adl.   (Progressing)       Start:  03/05/25    Expected End:  03/19/25            Increase grooming/feeding to set up with dme prn  (Progressing)       Start:  03/05/25    Expected End:  03/19/25            Increase ub/lb dressing to mod assist x 1 with dme prn  (Progressing)       Start:  03/05/25    Expected End:  03/19/25

## 2025-03-17 NOTE — PROGRESS NOTES
03/17/25 1345   Discharge Planning   Home or Post Acute Services Post acute facilities (Rehab/SNF/etc)   Type of Post Acute Facility Services Rehab   Expected Discharge Disposition Rehab     Received update from Autumn with [facility] Acute rehab that pre-cert has been approved. Bedside nurse updated. Received update on plan for discharge tomorrow. Autumn with acute rehab updated. Patient updated at bedside. Called and updated patients .

## 2025-03-17 NOTE — CARE PLAN
The patient's goals for the shift include      The clinical goals for the shift include Pt to remain HDS throughout shift      Problem: Pain - Adult  Goal: Verbalizes/displays adequate comfort level or baseline comfort level  Outcome: Progressing     Problem: Safety - Adult  Goal: Free from fall injury  Outcome: Progressing     Problem: Discharge Planning  Goal: Discharge to home or other facility with appropriate resources  Outcome: Progressing     Problem: Chronic Conditions and Co-morbidities  Goal: Patient's chronic conditions and co-morbidity symptoms are monitored and maintained or improved  Outcome: Progressing     Problem: Nutrition  Goal: Nutrient intake appropriate for maintaining nutritional needs  Outcome: Progressing     Problem: Fall/Injury  Goal: Not fall by end of shift  Outcome: Progressing  Goal: Be free from injury by end of the shift  Outcome: Progressing  Goal: Verbalize understanding of personal risk factors for fall in the hospital  Outcome: Progressing  Goal: Verbalize understanding of risk factor reduction measures to prevent injury from fall in the home  Outcome: Progressing  Goal: Use assistive devices by end of the shift  Outcome: Progressing  Goal: Pace activities to prevent fatigue by end of the shift  Outcome: Progressing     Problem: Respiratory  Goal: Clear secretions with interventions this shift  Outcome: Progressing  Goal: Minimize anxiety/maximize coping throughout shift  Outcome: Progressing  Goal: Minimal/no exertional discomfort or dyspnea this shift  Outcome: Progressing  Goal: No signs of respiratory distress (eg. Use of accessory muscles. Peds grunting)  Outcome: Progressing  Goal: Patent airway maintained this shift  Outcome: Progressing  Goal: Verbalize decreased shortness of breath this shift  Outcome: Progressing  Goal: Wean oxygen to maintain O2 saturation per order/standard this shift  Outcome: Progressing  Goal: Increase self care and/or family involvement in next 24  hours  Outcome: Progressing     Problem: Skin  Goal: Participates in plan/prevention/treatment measures  Outcome: Progressing  Goal: Prevent/manage excess moisture  Outcome: Progressing  Goal: Prevent/minimize sheer/friction injuries  Outcome: Progressing  Goal: Promote/optimize nutrition  Outcome: Progressing  Goal: Promote skin healing  Outcome: Progressing     Problem: Pain  Goal: Takes deep breaths with improved pain control throughout the shift  Outcome: Progressing  Goal: Turns in bed with improved pain control throughout the shift  Outcome: Progressing  Goal: Walks with improved pain control throughout the shift  Outcome: Progressing  Goal: Performs ADL's with improved pain control throughout shift  Outcome: Progressing  Goal: Participates in PT with improved pain control throughout the shift  Outcome: Progressing  Goal: Free from opioid side effects throughout the shift  Outcome: Progressing  Goal: Free from acute confusion related to pain meds throughout the shift  Outcome: Progressing

## 2025-03-17 NOTE — PROGRESS NOTES
Physical Therapy    Physical Therapy Treatment    Patient Name: Kayley Lynch  MRN: [id number]  Department: [facility]  Room: [facility]84-  Today's Date: 3/17/2025  Time Calculation  Start Time: 0816  Stop Time: 0839  Time Calculation (min): 23 min         Assessment/Plan   PT Assessment  End of Session Patient Position: Up in chair, Alarm off, not on at start of session (no posey alarm needed per communication with RN)     PT Plan  Treatment/Interventions: Bed mobility, Transfer training, Gait training, Balance training, Strengthening, Endurance training, Therapeutic exercise, Therapeutic activity  PT Plan: Ongoing PT  PT Frequency: 4 times per week  PT Discharge Recommendations: High intensity level of continued care  PT - OK to Discharge: Yes      General Visit Information:   PT  Visit  PT Received On: 03/17/25  General  Prior to Session Communication: Bedside nurse  Patient Position Received: Bed, 2 rail up, Alarm off, not on at start of session  General Comment:  (pt remains very pleasant and agreeable to participate in therapy)    Subjective   Precautions:  Precautions  Medical Precautions: Fall precautions, Infection precautions (contact plus: cdiff), Oxygen therapy device and L/min (2L)  Precautions Comment: tele          Objective   Pain:  Pain Assessment  Pain Assessment: 0-10  0-10 (Numeric) Pain Score: 0 - No pain     Treatments:  Therapeutic Exercise  Therapeutic Exercise Performed:  (seated BLE AP and LAQ; UE AROM including hand pumps x10 ea. (denies any LUE pain throughout))    Bed Mobility  Bed Mobility:  (sup > sit with supervision; HOB elevated)    Ambulation/Gait Training  Ambulation/Gait Training Performed:  (pt ambulates approx. 25 ft and 40 ft with seated rest break btwn.  FWW and CGA.  slow, steady pace.  somewhat guarded still though no episodes LOB this date.)    Transfers  Transfer:  (sit <> stand x2 trials with FWW.  min A x 1 initially (2/2 feet sliding) however progressing to CGA on subsequent  trial.)    Outcome Measures:  [facility] Basic Mobility  Turning from your back to your side while in a flat bed without using bedrails: A little  Moving from lying on your back to sitting on the side of a flat bed without using bedrails: A little  Moving to and from bed to chair (including a wheelchair): A little  Standing up from a chair using your arms (e.g. wheelchair or bedside chair): A little  To walk in hospital room: A little  Climbing 3-5 steps with railing: A lot  Basic Mobility - Total Score: 17    Education Documentation  Precautions, taught by Anabel Ambrocio PTA at 3/17/2025 11:54 AM.  Learner: Patient  Readiness: Acceptance  Method: Explanation  Response: Verbalizes Understanding    Mobility Training, taught by Anabel Ambrocio PTA at 3/17/2025 11:54 AM.  Learner: Patient  Readiness: Acceptance  Method: Explanation  Response: Verbalizes Understanding    Education Comments  No comments found.        EDUCATION:       Encounter Problems       Encounter Problems (Active)       PT Problem       PT Goal 1 STG - Pt will amb 10' using WW with MIN A   (Met)       Start:  03/05/25    Expected End:  03/19/25    Resolved:  03/14/25         PT Goal 2 STG - Pt will transition supine <> sitting with MIN A  (Progressing)       Start:  03/05/25    Expected End:  03/19/25            PT Goal 3 STG - Pt will SPT bed <> chair with MIN A  (Progressing)       Start:  03/05/25    Expected End:  03/19/25            PT Goal 4 STG - Pt will transfer STS with MIN A  (Met)       Start:  03/05/25    Expected End:  03/19/25    Resolved:  03/14/25

## 2025-03-17 NOTE — PREADMISSION SCREENING NOTE
Physical Medicine and Rehabilitation  Preadmission Screening    Rehab Physician's Review and Admission Determination:  Kayley Lynch is a 54 y.o. female who needs acute inpatient rehabilitation in order to achieve the functional goals outlined below. She requires close rehabilitation physician monitoring and management due to her complex medical conditions and co-morbidities with the primary indication of:  Rehab Admission Indication: 0016 Debility (Non-Cardiac, Non-Pulmonary): influenza with pneumonia, compartment syndrome of left upper extrmety . Comorbid conditions that will impact course of rehabilitation: C Diff. She requires 24-hour rehabilitation nursing to manage bowel and bladder function, skin care, nutrition and fluid intake, pulmonary hygiene, pain control, safety, medication management, and patient/family goals. In addition, rehabilitation nursing will reiterate and reinforce therapy skills and equipment use, including ADLs, as well as provide education to the patient and family. Kayley Lynch is willing to participate in and is able to tolerate the proposed plan of care.    History of Present Illness: Patient is a 54-year-old female with past medical history of COPD, CAD status post CABG, pulmonary hypertension, hyperlipidemia, cardiomyopathy, a flutter status post ablation, and mitral valve replacement who presented to [facility] ED on 2/26/25 with malaise and influenza with pneumonia.  She was found to be in septic shock and admitted to the ICU.    Surgical History: CABG, aflutter s/p ablation, and mechanical mitral valve replacement     Social History: current cigarette smoker, estimates 10 cigarettes/day, began at age 11; no cigars, chewing tobacco, vaping; no EtOH use; no illicit drug use    Problem list   Septic shock  Hypercapnic respiratory failure  COPD exacerbation  Influenza A  Pneumonia  Acute metabolic encephalopathy  C. difficile colitis  Dehydration  [place]    3/2: Patient developed a large  "hematoma in the left arm leading to compartment syndrome. She was taken for urgent fasciotomies on 3/2 and closure on 3/4 with Orthopedic Surgery.     PER ORTHO NOTE TODAY 3/17. The left upper extremity sutures have been removed. There is some dehiscence along the proximal forearm incision. There is no active drainage. There are no signs of infection. She is able to range the elbow, wrist and hand grossly within normal limits. The left upper extremity is neurovascular intact.   The fasciotomy incision is healing, however there are areas of dehiscence which should heal by secondary intention.    I recommend keeping the wound clean, covered and monitor for infection.       On C Diff isolation, Contact Plus precautions    Last Recorded Vitals  Blood pressure 124/61, pulse 92, temperature 36.3 °C (97.3 °F), temperature source Temporal, resp. rate 16, height 1.6 m (5' 2.99\"), weight 66.5 kg (146 lb 9.7 oz), SpO2 99%.    Medications  aspirin, 81 mg, oral, Daily  atorvastatin, 80 mg, oral, Nightly  budesonide, 0.5 mg, nebulization, BID  buPROPion XL, 150 mg, oral, q AM  carvedilol, 25 mg, oral, BID  escitalopram, 20 mg, oral, Daily  insulin lispro, 0-5 Units, subcutaneous, TID AC  ipratropium-albuteroL, 3 mL, nebulization, TID  lisinopril, 20 mg, oral, Daily  oxygen, , inhalation, Continuous - Inhalation  pantoprazole, 40 mg, oral, Daily  perflutren lipid microspheres, 0.5-10 mL of dilution, intravenous, Once in imaging  perflutren protein A microsphere, 0.5 mL, intravenous, Once in imaging  sulfur hexafluoride microsphr, 2 mL, intravenous, Once in imaging  vancomycin, 250 mg, oral, 4x daily      PRN medications: acetaminophen, ipratropium-albuteroL, labetaloL, melatonin, [Held by provider] morphine, ondansetron, promethazine     ADULT REGULAR DIET THIN LIQUIDS     Oxygen therapy device and L/min (2L)      Prior Functional Status:  Type of Home: House  Lives With: Spouse ( works)  Home Adaptive Equipment: None  Home " Layout: One level, Laundry main level  Home Access:  (2 NNAMDI with rail in front, 2 NNAMDI from garage without rail)  Bathroom Shower/Tub: Walk-in shower  Bathroom Toilet: Standard  Bathroom Equipment: None     Prior Level of Function:  Prior Function Per Pt/Caregiver Report  Level of [place]: Independent with ADLs and functional transfers  ADL Assistance: Independent  Homemaking Assistance: Independent  Ambulatory Assistance: Independent  Prior Function Comments: (+) driving, (-) working  Cognition: ALERT AND ORIENTED X3  Devices: SEE ABOVE    Current Functional Status:  Eating: SET UP  Oral hygiene: MIN A  Toileting: MIN A  Bathing: MIN A  Upper body dressing: MIN A  Lower body dressing: MOD A  Bed Mobility: MIN A  Transfers: MIN A  Bladder and Bowel: CONTINENT  Cognition: ALERT AND ORIENTED X3    Rehabilitation Goals and Plan:  Expected level of improvement: MOD INDEP  Likelihood of reaching these goals: excellent.  Therapy treatments needed to achieve these goals: physical therapy, occupational therapy, , nursing, and aide.  Barriers to achieving these goals: Pain   Expected length of stay: 10 day(s)    When medically stable, anticipated discharge disposition: home with outpatient therapy  The potential to achieve that is excellent.

## 2025-03-17 NOTE — DISCHARGE SUMMARY
Discharge Diagnosis  Nausea vomiting and diarrhea    Issues Requiring Follow-Up  Patient fully evaluated  03/17  for   Assessment & Plan  Nausea vomiting and diarrhea    Influenza A with pneumonia      Septic shock, dehydration, hypercapnic respiratory failure, and dehydration.  Nontraumatic compartment syndrome of left upper extremity    No acute events overnight, patient denies chest pain, worsening SOB at this time.Patient with significant clinical improvement noted, patient medically cleared for discharge today. Patient seen resting in bed with head of bed elevated, no s/s or c/o acute difficulties at this time. Vital signs for last 24 hours:  Temp:  [35.9 °C (96.6 °F)-36.5 °C (97.7 °F)] 36.1 °C (97 °F)  Heart Rate:  [77-99] 99  Resp:  [15-18] 16  BP: ()/(45-72) 92/53 Medications and labs reviewed-   Results for orders placed or performed during the hospital encounter of 02/25/25 (from the past 24 hours)   POCT GLUCOSE   Result Value Ref Range    POCT Glucose 147 (H) 74 - 99 mg/dL   POCT GLUCOSE   Result Value Ref Range    POCT Glucose 125 (H) 74 - 99 mg/dL   CBC and Auto Differential   Result Value Ref Range    WBC 6.8 4.4 - 11.3 x10*3/uL    nRBC 0.0 0.0 - 0.0 /100 WBCs    RBC 3.62 (L) 4.00 - 5.20 x10*6/uL    Hemoglobin 10.3 (L) 12.0 - 16.0 g/dL    Hematocrit 33.6 (L) 36.0 - 46.0 %    MCV 93 80 - 100 fL    MCH 28.5 26.0 - 34.0 pg    MCHC 30.7 (L) 32.0 - 36.0 g/dL    RDW 21.4 (H) 11.5 - 14.5 %    Platelets 290 150 - 450 x10*3/uL    Neutrophils % 68.4 40.0 - 80.0 %    Immature Granulocytes %, Automated 0.3 0.0 - 0.9 %    Lymphocytes % 19.4 13.0 - 44.0 %    Monocytes % 11.3 2.0 - 10.0 %    Eosinophils % 0.3 0.0 - 6.0 %    Basophils % 0.3 0.0 - 2.0 %    Neutrophils Absolute 4.68 1.20 - 7.70 x10*3/uL    Immature Granulocytes Absolute, Automated 0.02 0.00 - 0.70 x10*3/uL    Lymphocytes Absolute 1.33 1.20 - 4.80 x10*3/uL    Monocytes Absolute 0.77 0.10 - 1.00 x10*3/uL    Eosinophils Absolute 0.02 0.00 - 0.70  x10*3/uL    Basophils Absolute 0.02 0.00 - 0.10 x10*3/uL   Comprehensive Metabolic Panel   Result Value Ref Range    Glucose 94 74 - 99 mg/dL    Sodium 136 136 - 145 mmol/L    Potassium 3.6 3.5 - 5.3 mmol/L    Chloride 100 98 - 107 mmol/L    Bicarbonate 29 21 - 32 mmol/L    Anion Gap 11 10 - 20 mmol/L    Urea Nitrogen 6 6 - 23 mg/dL    Creatinine 0.42 (L) 0.50 - 1.05 mg/dL    eGFR >90 >60 mL/min/1.73m*2    Calcium 9.3 8.6 - 10.3 mg/dL    Albumin 3.5 3.4 - 5.0 g/dL    Alkaline Phosphatase 72 33 - 110 U/L    Total Protein 6.9 6.4 - 8.2 g/dL    AST 20 9 - 39 U/L    Bilirubin, Total 1.7 (H) 0.0 - 1.2 mg/dL    ALT 21 7 - 45 U/L   Light Blue Top   Result Value Ref Range    Extra Tube Hold for add-ons.    Morphology   Result Value Ref Range    RBC Morphology See Below     Ovalocytes Few     Teardrop Cells Few     Koki Cells Few    Protime-INR   Result Value Ref Range    Protime 21.5 (H) 9.8 - 12.4 seconds    INR 1.9 (H) 0.9 - 1.1   POCT GLUCOSE   Result Value Ref Range    POCT Glucose 103 (H) 74 - 99 mg/dL   POCT GLUCOSE   Result Value Ref Range    POCT Glucose 111 (H) 74 - 99 mg/dL      Patient recently received an antibiotic (last 12 hours)       Date/Time Action Medication Dose    03/17/25 1300 Given    vancomycin (Vancocin) capsule 250 mg 250 mg    03/17/25 0602 Given    vancomycin (Vancocin) capsule 250 mg 250 mg           No results found for the last 90 days.       Continue aggressive pulmonary hygiene and oral hygiene. Off loading as tolerated for skin integrity.  Plan discussed with interdisciplinary team, seen by orthopedics today - the fasciotomy incision is healing, however there are areas of dehiscence which should heal by secondary intention.  I recommend keeping the wound clean, covered and monitor for infection.ok to discharge, will continue current and repeat labs in the AM if patient still hospitalized. Patient aware and agreeable to current plan, continue plan as above.     I spent 30 minutes on the date  of the service which included preparing to see the patient, face-to-face patient care, completing clinical documentation, obtaining and/or reviewing separately obtained history, performing a medically appropriate examination, counseling and educating the patient/family/caregiver, ordering medications, tests, or procedures, communicating with other HCPs (not separately reported), independently interpreting results (not separately reported), communicating results to the patient/family/caregiver, and care coordination (not separately reported    Discharge Meds     Medication List      START taking these medications     acetaminophen 325 mg tablet; Commonly known as: Tylenol; Take 2 tablets   (650 mg) by mouth every 4 hours if needed for mild pain (1 - 3).   * atorvastatin 80 mg tablet; Commonly known as: Lipitor; Take 1 tablet   (80 mg) by mouth once daily.   * atorvastatin 80 mg tablet; Commonly known as: Lipitor; Take 1 tablet   (80 mg) by mouth once daily at bedtime.   budesonide 0.5 mg/2 mL nebulizer solution; Commonly known as: Pulmicort;   Take 2 mL (0.5 mg) by nebulization 2 times a day. Rinse mouth with water   after use to reduce aftertaste and incidence of candidiasis. Do not   swallow.   insulin lispro 100 unit/mL injection; Inject 0-5 Units under the skin 3   times a day before meals. Take as directed per insulin instructions.   ipratropium-albuteroL 0.5-2.5 mg/3 mL nebulizer solution; Commonly known   as: Duo-Neb; Take 3 mL by nebulization 3 times a day.   melatonin 3 mg tablet; Take 1 tablet (3 mg) by mouth as needed at   bedtime for sleep.   oxygen gas therapy; Commonly known as: O2; Inhale 4 L/min every 12   hours.   pantoprazole 40 mg EC tablet; Commonly known as: ProtoNix; Take 1 tablet   (40 mg) by mouth once daily. Do not crush, chew, or split.   vancomycin 250 mg capsule; Commonly known as: Vancocin; Take 1 capsule   (250 mg) by mouth 4 times a day.  * This list has 2 medication(s) that are the  same as other medications   prescribed for you. Read the directions carefully, and ask your doctor or   other care provider to review them with you.     CHANGE how you take these medications     warfarin 2 mg tablet; Commonly known as: Coumadin; Take as directed. If   you are unsure how to take this medication, talk to your nurse or doctor.;   Original instructions: Take 1 tablet (2 mg) by mouth once daily in the   evening. Take as directed per After Visit Summary.; What changed: how much   to take, how to take this, when to take this, additional instructions     CONTINUE taking these medications     aspirin 81 mg EC tablet   buPROPion  mg 24 hr tablet; Commonly known as: Wellbutrin XL; Take   1 tablet (150 mg) by mouth once daily in the morning. Do not crush, chew,   or split.   carvedilol 25 mg tablet; Commonly known as: Coreg; Take 1 tablet (25 mg)   by mouth 2 times a day.   escitalopram 20 mg tablet; Commonly known as: Lexapro; Take 1 tablet (20   mg) by mouth once daily.   lisinopril 20 mg tablet; Take 1 tablet (20 mg) by mouth once daily.     STOP taking these medications     acetaminophen-codeine 300-30 mg tablet; Commonly known as: Tylenol w/   Codeine #3   albuterol 90 mcg/actuation inhaler   varenicline tartrate 1 mg tablet; Commonly known as: Chantix       Test Results Pending At Discharge  Pending Labs       No current pending labs.            Hospital Course         Maurizio Read MD   Physician  Internal Medicine     Progress Notes     Signed     Date of Service: 3/15/2025  5:35 PM     Signed       Expand All Collapse All    Kayley Lynch is a 54 y.o. female on day 17 of admission presenting with Nausea vomiting and diarrhea.           Subjective  Patient fully evaluated  03/15  for    Problem List Items Addressed This Visit         * (Principal) Nausea vomiting and diarrhea     Influenza A with pneumonia - Primary     Relevant Medications     acetaminophen (Tylenol) 325 mg tablet      budesonide (Pulmicort) 0.5 mg/2 mL nebulizer solution     insulin lispro 100 unit/mL injection     ipratropium-albuteroL (Duo-Neb) 0.5-2.5 mg/3 mL nebulizer solution     melatonin 3 mg tablet     oxygen (O2) gas therapy     pantoprazole (ProtoNix) 40 mg EC tablet     vancomycin (Vancocin) 250 mg capsule     warfarin (Coumadin) 2 mg tablet     atorvastatin (Lipitor) 80 mg tablet     Nontraumatic compartment syndrome of left upper extremity     Relevant Orders     Case Request Operating Room: FASCIOTOMY, UPPER EXTREMITY (Completed)     Case Request Operating Room: DEBRIDEMENT, WOUND, UPPER EXTREMITY (Completed)      Other Visit Diagnoses         Acute kidney injury (CMS-HCC)         Dehydration         Influenza A         Shock (Multi)         Relevant Orders     Transthoracic Echo (TTE) Complete (Completed)             Patient seen resting in bed with head of bed elevated, no s/s or c/o acute difficulties at this time.  Vital signs for last 24 hours Temp:  [35.8 °C (96.4 °F)-36.9 °C (98.4 °F)] 36.7 °C (98.1 °F)  Heart Rate:  [] 103  Resp:  [18] 18  BP: ()/(50-74) 120/59  FiO2 (%):  [28 %] 28 %    No intake/output data recorded.  Patient still requiring frequent cardiac and SPO2 monitoring. Continue aggressive pulmonary hygiene and oral hygiene. Off loading as tolerated for skin integrity. Medications and labs reviewed-         Results for orders placed or performed during the hospital encounter of 02/25/25 (from the past 24 hours)   Protime-INR   Result Value Ref Range     Protime 54.0 (H) 9.8 - 12.4 seconds     INR 4.9 (H) 0.9 - 1.1   POCT GLUCOSE   Result Value Ref Range     POCT Glucose 115 (H) 74 - 99 mg/dL   POCT GLUCOSE   Result Value Ref Range     POCT Glucose 87 74 - 99 mg/dL   CBC and Auto Differential   Result Value Ref Range     WBC 7.5 4.4 - 11.3 x10*3/uL     nRBC 0.0 0.0 - 0.0 /100 WBCs     RBC 3.15 (L) 4.00 - 5.20 x10*6/uL     Hemoglobin 8.7 (L) 12.0 - 16.0 g/dL     Hematocrit 29.0 (L) 36.0  - 46.0 %     MCV 92 80 - 100 fL     MCH 27.6 26.0 - 34.0 pg     MCHC 30.0 (L) 32.0 - 36.0 g/dL     RDW 20.7 (H) 11.5 - 14.5 %     Platelets 276 150 - 450 x10*3/uL     Neutrophils % 72.9 40.0 - 80.0 %     Immature Granulocytes %, Automated 0.5 0.0 - 0.9 %     Lymphocytes % 16.5 13.0 - 44.0 %     Monocytes % 10.0 2.0 - 10.0 %     Eosinophils % 0.0 0.0 - 6.0 %     Basophils % 0.1 0.0 - 2.0 %     Neutrophils Absolute 5.44 1.20 - 7.70 x10*3/uL     Immature Granulocytes Absolute, Automated 0.04 0.00 - 0.70 x10*3/uL     Lymphocytes Absolute 1.23 1.20 - 4.80 x10*3/uL     Monocytes Absolute 0.75 0.10 - 1.00 x10*3/uL     Eosinophils Absolute 0.00 0.00 - 0.70 x10*3/uL     Basophils Absolute 0.01 0.00 - 0.10 x10*3/uL   Comprehensive Metabolic Panel   Result Value Ref Range     Glucose 88 74 - 99 mg/dL     Sodium 136 136 - 145 mmol/L     Potassium 3.6 3.5 - 5.3 mmol/L     Chloride 101 98 - 107 mmol/L     Bicarbonate 30 21 - 32 mmol/L     Anion Gap 9 (L) 10 - 20 mmol/L     Urea Nitrogen 8 6 - 23 mg/dL     Creatinine 0.42 (L) 0.50 - 1.05 mg/dL     eGFR >90 >60 mL/min/1.73m*2     Calcium 9.0 8.6 - 10.3 mg/dL     Albumin 3.1 (L) 3.4 - 5.0 g/dL     Alkaline Phosphatase 58 33 - 110 U/L     Total Protein 6.1 (L) 6.4 - 8.2 g/dL     AST 15 9 - 39 U/L     Bilirubin, Total 1.7 (H) 0.0 - 1.2 mg/dL     ALT 16 7 - 45 U/L   Protime-INR   Result Value Ref Range     Protime 44.3 (H) 9.8 - 12.4 seconds     INR 4.0 (H) 0.9 - 1.1   Morphology   Result Value Ref Range     RBC Morphology See Below       Polychromasia Mild       RBC Fragments Few       Target Cells Few       Ovalocytes Few       Basophilic Stippling Present       Giant Platelets Few     POCT GLUCOSE   Result Value Ref Range     POCT Glucose 88 74 - 99 mg/dL   POCT GLUCOSE   Result Value Ref Range     POCT Glucose 107 (H) 74 - 99 mg/dL      Patient recently received an antibiotic (last 12 hours)         Date/Time Action Medication Dose     03/15/25 1211 Given    vancomycin (Vancocin)  capsule 250 mg 250 mg             Plan discussed with interdisciplinary team, INR elevated , coumadin on hold, will recheck labs in the AM. Patient improving, awaiting precert for  Acute Rehab. Willl continue current and repeat labs in the AM.      Discharge planning discussed with patient and care team. Patient aware and agreeable to current plan, continue plan as above.      I spent a total of 50 minutes on the date of the service which included preparing to see the patient, face-to-face patient care, completing clinical documentation, obtaining and/or reviewing separately obtained history, performing a medically appropriate examination, counseling and educating the patient/family/caregiver, ordering medications, tests, or procedures, communicating with other HCPs (not separately reported), independently interpreting results (not separately reported), communicating results to the patient/family/caregiver, and care coordination (not separately reported).               Objective  Last Recorded Vitals  /59 (BP Location: Right arm, Patient Position: Sitting)   Pulse 103   Temp 36.7 °C (98.1 °F) (Temporal)   Resp 18   Wt 66.5 kg (146 lb 9.7 oz)   SpO2 93%   Intake/Output last 3 Shifts:  No intake or output data in the 24 hours ending 03/15/25 1736     Admission Weight  Weight: 59 kg (130 lb) (02/26/25 0159)     Daily Weight  03/07/25 : 66.5 kg (146 lb 9.7 oz)     Image Results  Electrocardiogram, 12-lead PRN ACS symptoms  Sinus tachycardia with irregular rate  Consider left ventricular hypertrophy  Borderline T abnormalities, diffuse leads     Confirmed by Shane Dunbar ([id number]) on 3/11/2025 11:44:57 AM        Physical Exam     Relevant Results                       Assessment/Plan  This patient currently has cardiac telemetry ordered; if you would like to modify or discontinue the telemetry order, click hereto go to the orders activity to modify/discontinue the order.                       Assessment &  Plan  Nausea vomiting and diarrhea     Influenza A with pneumonia        Septic shock, dehydration, hypercapnic respiratory failure, and dehydration.  Nontraumatic compartment syndrome of left upper extremity           Maurizio Read MD   Physician  Internal Medicine     Discharge Summary     Signed     Date of Service: 3/14/2025  2:38 PM      Signed          Discharge Diagnosis  Nausea vomiting and diarrhea     Issues Requiring Follow-Up  Patient fully evaluated 03/14   for   Assessment & Plan  Nausea vomiting and diarrhea     Influenza A with pneumonia        Septic shock, dehydration, hypercapnic respiratory failure, and dehydration.  Nontraumatic compartment syndrome of left upper extremity     No acute events overnight, seen by orthopedic surgery today for suture removal. Patient with significant clinical improvement noted, patient medically cleared for discharge today. Patient seen resting in bed with head of bed elevated, no s/s or c/o acute difficulties at this time. Vital signs for last 24 hours:  Temp:  [36 °C (96.8 °F)-36.7 °C (98.1 °F)] 36 °C (96.8 °F)  Heart Rate:  [81-98] 82  Resp:  [18-20] 18  BP: (101-142)/(53-72) 134/64  FiO2 (%):  [28 %] 28 % Medications and labs reviewed-             Results for orders placed or performed during the hospital encounter of 02/25/25 (from the past 24 hours)   POCT GLUCOSE   Result Value Ref Range     POCT Glucose 96 74 - 99 mg/dL   Heparin Assay   Result Value Ref Range     Heparin Unfractionated 0.2 See Comment Below for Therapeutic Ranges IU/mL   Protime-INR   Result Value Ref Range     Protime 31.7 (H) 9.8 - 12.4 seconds     INR 2.9 (H) 0.9 - 1.1   POCT GLUCOSE   Result Value Ref Range     POCT Glucose 116 (H) 74 - 99 mg/dL   CBC and Auto Differential   Result Value Ref Range     WBC 7.3 4.4 - 11.3 x10*3/uL     nRBC 0.0 0.0 - 0.0 /100 WBCs     RBC 3.20 (L) 4.00 - 5.20 x10*6/uL     Hemoglobin 9.0 (L) 12.0 - 16.0 g/dL     Hematocrit 29.5 (L) 36.0 - 46.0 %     MCV  92 80 - 100 fL     MCH 28.1 26.0 - 34.0 pg     MCHC 30.5 (L) 32.0 - 36.0 g/dL     RDW 19.9 (H) 11.5 - 14.5 %     Platelets 280 150 - 450 x10*3/uL     Neutrophils % 75.7 40.0 - 80.0 %     Immature Granulocytes %, Automated 0.4 0.0 - 0.9 %     Lymphocytes % 13.2 13.0 - 44.0 %     Monocytes % 10.6 2.0 - 10.0 %     Eosinophils % 0.0 0.0 - 6.0 %     Basophils % 0.1 0.0 - 2.0 %     Neutrophils Absolute 5.52 1.20 - 7.70 x10*3/uL     Immature Granulocytes Absolute, Automated 0.03 0.00 - 0.70 x10*3/uL     Lymphocytes Absolute 0.96 (L) 1.20 - 4.80 x10*3/uL     Monocytes Absolute 0.77 0.10 - 1.00 x10*3/uL     Eosinophils Absolute 0.00 0.00 - 0.70 x10*3/uL     Basophils Absolute 0.01 0.00 - 0.10 x10*3/uL   Comprehensive Metabolic Panel   Result Value Ref Range     Glucose 94 74 - 99 mg/dL     Sodium 138 136 - 145 mmol/L     Potassium 3.4 (L) 3.5 - 5.3 mmol/L     Chloride 101 98 - 107 mmol/L     Bicarbonate 32 21 - 32 mmol/L     Anion Gap 8 (L) 10 - 20 mmol/L     Urea Nitrogen 7 6 - 23 mg/dL     Creatinine 0.37 (L) 0.50 - 1.05 mg/dL     eGFR >90 >60 mL/min/1.73m*2     Calcium 9.0 8.6 - 10.3 mg/dL     Albumin 3.0 (L) 3.4 - 5.0 g/dL     Alkaline Phosphatase 52 33 - 110 U/L     Total Protein 5.9 (L) 6.4 - 8.2 g/dL     AST 14 9 - 39 U/L     Bilirubin, Total 1.9 (H) 0.0 - 1.2 mg/dL     ALT 16 7 - 45 U/L   Protime-INR   Result Value Ref Range     Protime 43.2 (H) 9.8 - 12.4 seconds     INR 3.9 (H) 0.9 - 1.1   POCT GLUCOSE   Result Value Ref Range     POCT Glucose 88 74 - 99 mg/dL   POCT GLUCOSE   Result Value Ref Range     POCT Glucose 222 (H) 74 - 99 mg/dL      Patient recently received an antibiotic (last 12 hours)         Date/Time Action Medication Dose     03/14/25 1303 Given    vancomycin (Vancocin) capsule 250 mg 250 mg     03/14/25 0632 Given    vancomycin (Vancocin) capsule 250 mg 250 mg             No results found for the last 90 days.        Continue aggressive pulmonary hygiene and oral hygiene. Off loading as tolerated  for skin integrity.  Plan discussed with interdisciplinary team, ok to discharge, will continue current and repeat labs in the AM if patient still hospitalized. Patient aware and agreeable to current plan, continue plan as above.      I spent 30 minutes on the date of the service which included preparing to see the patient, face-to-face patient care, completing clinical documentation, obtaining and/or reviewing separately obtained history, performing a medically appropriate examination, counseling and educating the patient/family/caregiver, ordering medications, tests, or procedures, communicating with other HCPs (not separately reported), independently interpreting results (not separately reported), communicating results to the patient/family/caregiver, and care coordination (not separately reported     Discharge Meds     Medication List      ASK your doctor about these medications     acetaminophen-codeine 300-30 mg tablet; Commonly known as: Tylenol w/   Codeine #3   albuterol 90 mcg/actuation inhaler; Inhale 2 puffs every 6 hours if   needed for wheezing.   aspirin 81 mg EC tablet   atorvastatin 80 mg tablet; Commonly known as: Lipitor; Take 1 tablet (80   mg) by mouth once daily.; Ask about: Which instructions should I use?   buPROPion  mg 24 hr tablet; Commonly known as: Wellbutrin XL; Take   1 tablet (150 mg) by mouth once daily in the morning. Do not crush, chew,   or split.   carvedilol 25 mg tablet; Commonly known as: Coreg; Take 1 tablet (25 mg)   by mouth 2 times a day.   escitalopram 20 mg tablet; Commonly known as: Lexapro; Take 1 tablet (20   mg) by mouth once daily.   lisinopril 20 mg tablet; Take 1 tablet (20 mg) by mouth once daily.   varenicline tartrate 1 mg tablet; Commonly known as: Chantix; Take 1   tablet (1 mg) by mouth 2 times a day. Take with full glass of water.   warfarin 2 mg tablet; Commonly known as: Coumadin; Take as directed. If   you are unsure how to take this medication,  talk to your nurse or doctor.;   Original instructions: Take 2 tablets (4 mg) by mouth on Mondays and   Saturdays, and take 1 tablet (2 mg) on all other days of week or as   directed by anticoagulation clinic        Test Results Pending At Discharge  Pending Labs         No current pending labs.                Hospital Course         Maurizio Read MD   Physician  Internal Medicine     Progress Notes     Signed     Date of Service: 3/13/2025  3:29 PM      Signed        Expand All Collapse All    Kayley Lynch is a 54 y.o. female on day 15 of admission presenting with Nausea vomiting and diarrhea.           Subjective  No events overnight.  Patient seen and examined at bedside with  present.  Hospitalization and treatment plan reviewed with  at length.  Patient has no complaints.              Objective  Last Recorded Vitals  /80 (BP Location: Right leg, Patient Position: Lying)   Pulse 85   Temp 36.3 °C (97.3 °F) (Temporal)   Resp 18   Wt 66.5 kg (146 lb 9.7 oz)   SpO2 99%   Intake/Output last 3 Shifts:     Intake/Output Summary (Last 24 hours) at 3/13/2025 1529  Last data filed at 3/13/2025 1300        Gross per 24 hour   Intake --   Output 901 ml   Net -901 ml         Admission Weight  Weight: 59 kg (130 lb) (02/26/25 0159)     Daily Weight  03/07/25 : 66.5 kg (146 lb 9.7 oz)     Image Results  Electrocardiogram, 12-lead PRN ACS symptoms  Sinus tachycardia with irregular rate  Consider left ventricular hypertrophy  Borderline T abnormalities, diffuse leads     Confirmed by Shane Dunbar ([id number]) on 3/11/2025 11:44:57 AM        Physical Exam  HENT:      Head: Normocephalic and atraumatic.      Nose: Nose normal.      Mouth/Throat:      Mouth: Mucous membranes are moist.      Pharynx: Oropharynx is clear.   Eyes:      Extraocular Movements: Extraocular movements intact.      Pupils: Pupils are equal, round, and reactive to light.   Cardiovascular:      Rate and Rhythm: Normal rate and  regular rhythm.   Pulmonary:      Effort: No respiratory distress.      Breath sounds: Wheezing present. No rhonchi or rales.   Abdominal:      General: Bowel sounds are normal. There is no distension.      Palpations: Abdomen is soft.      Tenderness: There is no abdominal tenderness. There is no guarding.   Musculoskeletal:      Right lower leg: No edema.      Left lower leg: No edema.   Skin:     General: Skin is warm and dry.   Neurological:      Mental Status: She is alert. Mental status is at baseline.            Relevant Results                       Assessment/Plan  This patient currently has cardiac telemetry ordered; if you would like to modify or discontinue the telemetry order, click hereto go to the orders activity to modify/discontinue the order.        This patient has a urinary catheter              Reason for the urinary catheter remaining today? critically ill patient who need accurate urinary output measurements        Assessment & Plan  Nausea vomiting and diarrhea     Influenza A with pneumonia        Septic shock, dehydration, hypercapnic respiratory failure, and dehydration.  Nontraumatic compartment syndrome of left upper extremity        Patient is a 54-year-old female with past medical history of COPD, CAD status post CABG, pulmonary hypertension, hyperlipidemia, cardiomyopathy, a flutter status post ablation, and mitral valve replacement who presented with malaise.  She was found to be in septic shock and admitted to the ICU.     Septic shock  Hypercapnic respiratory failure  COPD exacerbation  Influenza A  Pneumonia  Acute metabolic encephalopathy  C. difficile colitis  Dehydration  H[facility]     -Continue supplemental oxygen  -Continue antibiotics, currently on doxycycline  -Continue nebulizers  -Continue steroids  -Continue Tamiflu  -Continue p.o. vancomycin  -Continue home meds  -Monitor INR     3/2: Patient developed a large hematoma in the left arm leading to compartment syndrome. She was  taken for urgent fasciotomies with Orthopedic Surgery. She is also being followed by Vascular Surgery. Vitamin K ordered for supratherapeutic INR. Her INR was 2.2 yesterday with goal being 2.5-3.5 for her A-flutter s/p mitral valve replacement. Critical Care reconsulted. Hematology consulted, appreciate recs. Hold coumadin and lovenox.     3/3: INR reversed. Patient is on low intensity heparin drip. She was transfused 1 unit pRBCs. Monitor hemoglobin closely. Hematology has ordered a work up including DIC panel. Patient will need another debridement and irrigation in 24-48 hours per Orthopedics. She has also developed a retroperitoneal bleed.     3/4: INR is down to 1.0.  Leukocytosis improved from 15 down to 13.  Hemoglobin is down to 6.4.  Patient to be transfused another unit packed RBCs.  She will be going back to the OR today for further debridement, washout, possible closure. She will also have a CT angiogram to determine if there is any active bleeding causing her retroperitoneal bleed.  She remains on a heparin drip due to her valve.  Continue doxycycline and p.o. Vanco.  Patient fully evaluated on March 5.  Patient restarted on heparin.  Fasciotomy site for compartment syndrome appears clear.  Continue to monitor lab work.  Case discussed with  at bedside.  No active bleeding is noted at this time.  Recheck labs in AM.     Patient fully evaluated  03/07  for    Problem List Items Addressed This Visit                     Gastrointestinal and Abdominal     * (Principal) Nausea vomiting and diarrhea           Musculoskeletal and Injuries     Nontraumatic compartment syndrome of left upper extremity     Relevant Orders     Case Request Operating Room: FASCIOTOMY, UPPER EXTREMITY (Completed)     Case Request Operating Room: DEBRIDEMENT, WOUND, UPPER EXTREMITY (Completed)           Pulmonary and Pneumonias     Influenza A with pneumonia - Primary      Other Visit Diagnoses         Acute kidney injury  (CMS-[facility])         Dehydration         Influenza A         Shock (Multi)         Relevant Orders     Transthoracic Echo (TTE) Complete (Completed)             Patient seen resting in bed with head of bed elevated, no s/s or c/o acute difficulties at this time.  Vital signs for last 24 hours Temp:  [35.9 °C (96.6 °F)-36.6 °C (97.9 °F)] 36.3 °C (97.3 °F)  Heart Rate:  [70-93] 85  Resp:  [16-18] 18  BP: (120-169)/(56-92) 164/80  FiO2 (%):  [28 %-32 %] 28 %    I/O this shift:  In: -   Out: 1 [Stool:1]  Patient still requiring frequent cardiac and SPO2 monitoring. Continue aggressive pulmonary hygiene and oral hygiene. Off loading as tolerated for skin integrity. Medications and labs reviewed-             Results for orders placed or performed during the hospital encounter of 02/25/25 (from the past 24 hours)   Protime-INR   Result Value Ref Range     Protime 19.4 (H) 9.8 - 12.4 seconds     INR 1.8 (H) 0.9 - 1.1   POCT GLUCOSE   Result Value Ref Range     POCT Glucose 136 (H) 74 - 99 mg/dL   POCT GLUCOSE   Result Value Ref Range     POCT Glucose 144 (H) 74 - 99 mg/dL   CBC and Auto Differential   Result Value Ref Range     WBC 7.4 4.4 - 11.3 x10*3/uL     nRBC 0.0 0.0 - 0.0 /100 WBCs     RBC 3.13 (L) 4.00 - 5.20 x10*6/uL     Hemoglobin 8.7 (L) 12.0 - 16.0 g/dL     Hematocrit 28.3 (L) 36.0 - 46.0 %     MCV 90 80 - 100 fL     MCH 27.8 26.0 - 34.0 pg     MCHC 30.7 (L) 32.0 - 36.0 g/dL     RDW 19.9 (H) 11.5 - 14.5 %     Platelets 247 150 - 450 x10*3/uL     Neutrophils % 77.0 40.0 - 80.0 %     Immature Granulocytes %, Automated 0.5 0.0 - 0.9 %     Lymphocytes % 12.9 13.0 - 44.0 %     Monocytes % 9.4 2.0 - 10.0 %     Eosinophils % 0.1 0.0 - 6.0 %     Basophils % 0.1 0.0 - 2.0 %     Neutrophils Absolute 5.66 1.20 - 7.70 x10*3/uL     Immature Granulocytes Absolute, Automated 0.04 0.00 - 0.70 x10*3/uL     Lymphocytes Absolute 0.95 (L) 1.20 - 4.80 x10*3/uL     Monocytes Absolute 0.69 0.10 - 1.00 x10*3/uL     Eosinophils Absolute  0.01 0.00 - 0.70 x10*3/uL     Basophils Absolute 0.01 0.00 - 0.10 x10*3/uL   Comprehensive Metabolic Panel   Result Value Ref Range     Glucose 95 74 - 99 mg/dL     Sodium 138 136 - 145 mmol/L     Potassium 3.1 (L) 3.5 - 5.3 mmol/L     Chloride 100 98 - 107 mmol/L     Bicarbonate 30 21 - 32 mmol/L     Anion Gap 11 10 - 20 mmol/L     Urea Nitrogen 8 6 - 23 mg/dL     Creatinine 0.32 (L) 0.50 - 1.05 mg/dL     eGFR >90 >60 mL/min/1.73m*2     Calcium 8.6 8.6 - 10.3 mg/dL     Albumin 2.8 (L) 3.4 - 5.0 g/dL     Alkaline Phosphatase 45 33 - 110 U/L     Total Protein 5.6 (L) 6.4 - 8.2 g/dL     AST 13 9 - 39 U/L     Bilirubin, Total 1.9 (H) 0.0 - 1.2 mg/dL     ALT 14 7 - 45 U/L   Protime-INR   Result Value Ref Range     Protime 23.6 (H) 9.8 - 12.4 seconds     INR 2.1 (H) 0.9 - 1.1   POCT GLUCOSE   Result Value Ref Range     POCT Glucose 93 74 - 99 mg/dL   POCT GLUCOSE   Result Value Ref Range     POCT Glucose 132 (H) 74 - 99 mg/dL   Heparin Assay   Result Value Ref Range     Heparin Unfractionated 0.1 See Comment Below for Therapeutic Ranges IU/mL      *Note: Due to a large number of results and/or encounters for the requested time period, some results have not been displayed. A complete set of results can be found in Results Review.      Patient recently received an antibiotic (last 12 hours)         Date/Time Action Medication Dose     03/07/25 1252 Given    vancomycin (Firvanq) solution 250 mg 250 mg     03/07/25 0602 Given    vancomycin (Firvanq) solution 250 mg 250 mg             Plan discussed with interdisciplinary team, seen by orthopedics today - dressing changes as needed,   suture removal on March 18, recommended shoulder elbow and hand range of motion with therapy ordered. Will continue current and repeat labs in the AM. CXR shows improved infiltrate at the left lower lung, strict intake and output, trend hemoglobin.     Discharge planning discussed with patient and care team. Therapy evaluations ordered. [facility]  -11 anticipate HHC/SNF at discharge. Patient aware and agreeable to current plan, continue plan as above.      I spent a total of 50 minutes on the date of the service which included preparing to see the patient, face-to-face patient care, completing clinical documentation, obtaining and/or reviewing separately obtained history, performing a medically appropriate examination, counseling and educating the patient/family/caregiver, ordering medications, tests, or procedures, communicating with other HCPs (not separately reported), independently interpreting results (not separately reported), communicating results to the patient/family/caregiver, and care coordination (not separately reported).      Patient fully evaluated  03/08  for    Problem List Items Addressed This Visit                     Gastrointestinal and Abdominal     * (Principal) Nausea vomiting and diarrhea           Musculoskeletal and Injuries     Nontraumatic compartment syndrome of left upper extremity     Relevant Orders     Case Request Operating Room: FASCIOTOMY, UPPER EXTREMITY (Completed)     Case Request Operating Room: DEBRIDEMENT, WOUND, UPPER EXTREMITY (Completed)           Pulmonary and Pneumonias     Influenza A with pneumonia - Primary      Other Visit Diagnoses         Acute kidney injury (CMS-HCC)         Dehydration         Influenza A         Shock (Multi)         Relevant Orders     Transthoracic Echo (TTE) Complete (Completed)             Patient seen resting in bed with head of bed elevated, no s/s or c/o acute difficulties at this time.  Vital signs for last 24 hours Temp:  [35.9 °C (96.6 °F)-36.6 °C (97.9 °F)] 36.3 °C (97.3 °F)  Heart Rate:  [70-93] 85  Resp:  [16-18] 18  BP: (120-169)/(56-92) 164/80  FiO2 (%):  [28 %-32 %] 28 %    I/O this shift:  In: -   Out: 1 [Stool:1]  Patient still requiring frequent cardiac and SPO2 monitoring. Continue aggressive pulmonary hygiene and oral hygiene. Off loading as tolerated for skin  integrity. Medications and labs reviewed-             Results for orders placed or performed during the hospital encounter of 02/25/25 (from the past 24 hours)   Protime-INR   Result Value Ref Range     Protime 19.4 (H) 9.8 - 12.4 seconds     INR 1.8 (H) 0.9 - 1.1   POCT GLUCOSE   Result Value Ref Range     POCT Glucose 136 (H) 74 - 99 mg/dL   POCT GLUCOSE   Result Value Ref Range     POCT Glucose 144 (H) 74 - 99 mg/dL   CBC and Auto Differential   Result Value Ref Range     WBC 7.4 4.4 - 11.3 x10*3/uL     nRBC 0.0 0.0 - 0.0 /100 WBCs     RBC 3.13 (L) 4.00 - 5.20 x10*6/uL     Hemoglobin 8.7 (L) 12.0 - 16.0 g/dL     Hematocrit 28.3 (L) 36.0 - 46.0 %     MCV 90 80 - 100 fL     MCH 27.8 26.0 - 34.0 pg     MCHC 30.7 (L) 32.0 - 36.0 g/dL     RDW 19.9 (H) 11.5 - 14.5 %     Platelets 247 150 - 450 x10*3/uL     Neutrophils % 77.0 40.0 - 80.0 %     Immature Granulocytes %, Automated 0.5 0.0 - 0.9 %     Lymphocytes % 12.9 13.0 - 44.0 %     Monocytes % 9.4 2.0 - 10.0 %     Eosinophils % 0.1 0.0 - 6.0 %     Basophils % 0.1 0.0 - 2.0 %     Neutrophils Absolute 5.66 1.20 - 7.70 x10*3/uL     Immature Granulocytes Absolute, Automated 0.04 0.00 - 0.70 x10*3/uL     Lymphocytes Absolute 0.95 (L) 1.20 - 4.80 x10*3/uL     Monocytes Absolute 0.69 0.10 - 1.00 x10*3/uL     Eosinophils Absolute 0.01 0.00 - 0.70 x10*3/uL     Basophils Absolute 0.01 0.00 - 0.10 x10*3/uL   Comprehensive Metabolic Panel   Result Value Ref Range     Glucose 95 74 - 99 mg/dL     Sodium 138 136 - 145 mmol/L     Potassium 3.1 (L) 3.5 - 5.3 mmol/L     Chloride 100 98 - 107 mmol/L     Bicarbonate 30 21 - 32 mmol/L     Anion Gap 11 10 - 20 mmol/L     Urea Nitrogen 8 6 - 23 mg/dL     Creatinine 0.32 (L) 0.50 - 1.05 mg/dL     eGFR >90 >60 mL/min/1.73m*2     Calcium 8.6 8.6 - 10.3 mg/dL     Albumin 2.8 (L) 3.4 - 5.0 g/dL     Alkaline Phosphatase 45 33 - 110 U/L     Total Protein 5.6 (L) 6.4 - 8.2 g/dL     AST 13 9 - 39 U/L     Bilirubin, Total 1.9 (H) 0.0 - 1.2 mg/dL      ALT 14 7 - 45 U/L   Protime-INR   Result Value Ref Range     Protime 23.6 (H) 9.8 - 12.4 seconds     INR 2.1 (H) 0.9 - 1.1   POCT GLUCOSE   Result Value Ref Range     POCT Glucose 93 74 - 99 mg/dL   POCT GLUCOSE   Result Value Ref Range     POCT Glucose 132 (H) 74 - 99 mg/dL   Heparin Assay   Result Value Ref Range     Heparin Unfractionated 0.1 See Comment Below for Therapeutic Ranges IU/mL      Patient recently received an antibiotic (last 12 hours)         Date/Time Action Medication Dose     03/08/25 1220 Given    vancomycin (Vancocin) capsule 250 mg 250 mg             Plan discussed with interdisciplinary team, seen by cardiology today, will bridge patient to coumadin, ptt/inr in am, appreciate input and agree with plan. LUE dressing is clean, dry and intact, swelling is noted, warm and well-perfused.  She is able to flex and extend the fingers and thumb. Will continue current and repeat labs in the AM.     Patient fully evaluated on March 9 and clinically improved.  Recheck labs in AM.  INR is slowly improving.  Discontinue heparin when INR is therapeutic for mechanical valve.  Appreciate orthopedic and cardiology consultations.  Examination of area from compartment syndrome is clear without neurovascular compromise.  Case discussed with orthopedics at bedside     Discharge planning discussed with patient and care team. Patient aware and agreeable to current plan, continue plan as above.      I spent a total of 50 minutes on the date of the service which included preparing to see the patient, face-to-face patient care, completing clinical documentation, obtaining and/or reviewing separately obtained history, performing a medically appropriate examination, counseling and educating the patient/family/caregiver, ordering medications, tests, or procedures, communicating with other HCPs (not separately reported), independently interpreting results (not separately reported), communicating results to the  patient/family/caregiver, and care coordination (not separately reported).      Patient fully evaluated    for    Problem List Items Addressed This Visit                     Gastrointestinal and Abdominal     * (Principal) Nausea vomiting and diarrhea           Musculoskeletal and Injuries     Nontraumatic compartment syndrome of left upper extremity     Relevant Orders     Case Request Operating Room: FASCIOTOMY, UPPER EXTREMITY (Completed)     Case Request Operating Room: DEBRIDEMENT, WOUND, UPPER EXTREMITY (Completed)           Pulmonary and Pneumonias     Influenza A with pneumonia - Primary      Other Visit Diagnoses         Acute kidney injury (CMS-HCC)         Dehydration         Influenza A         Shock (Multi)         Relevant Orders     Transthoracic Echo (TTE) Complete (Completed)             Patient seen resting in bed with head of bed elevated, no s/s or c/o acute difficulties at this time.  Vital signs for last 24 hours Temp:  [35.9 °C (96.6 °F)-36.6 °C (97.9 °F)] 36.3 °C (97.3 °F)  Heart Rate:  [70-93] 85  Resp:  [16-18] 18  BP: (120-169)/(56-92) 164/80  FiO2 (%):  [28 %-32 %] 28 %    I/O this shift:  In: -   Out: 1 [Stool:1]  Patient still requiring frequent cardiac and SPO2 monitoring. Continue aggressive pulmonary hygiene and oral hygiene. Off loading as tolerated for skin integrity. Medications and labs reviewed-             Results for orders placed or performed during the hospital encounter of 02/25/25 (from the past 24 hours)   Protime-INR   Result Value Ref Range     Protime 19.4 (H) 9.8 - 12.4 seconds     INR 1.8 (H) 0.9 - 1.1   POCT GLUCOSE   Result Value Ref Range     POCT Glucose 136 (H) 74 - 99 mg/dL   POCT GLUCOSE   Result Value Ref Range     POCT Glucose 144 (H) 74 - 99 mg/dL   CBC and Auto Differential   Result Value Ref Range     WBC 7.4 4.4 - 11.3 x10*3/uL     nRBC 0.0 0.0 - 0.0 /100 WBCs     RBC 3.13 (L) 4.00 - 5.20 x10*6/uL     Hemoglobin 8.7 (L) 12.0 - 16.0 g/dL     Hematocrit  28.3 (L) 36.0 - 46.0 %     MCV 90 80 - 100 fL     MCH 27.8 26.0 - 34.0 pg     MCHC 30.7 (L) 32.0 - 36.0 g/dL     RDW 19.9 (H) 11.5 - 14.5 %     Platelets 247 150 - 450 x10*3/uL     Neutrophils % 77.0 40.0 - 80.0 %     Immature Granulocytes %, Automated 0.5 0.0 - 0.9 %     Lymphocytes % 12.9 13.0 - 44.0 %     Monocytes % 9.4 2.0 - 10.0 %     Eosinophils % 0.1 0.0 - 6.0 %     Basophils % 0.1 0.0 - 2.0 %     Neutrophils Absolute 5.66 1.20 - 7.70 x10*3/uL     Immature Granulocytes Absolute, Automated 0.04 0.00 - 0.70 x10*3/uL     Lymphocytes Absolute 0.95 (L) 1.20 - 4.80 x10*3/uL     Monocytes Absolute 0.69 0.10 - 1.00 x10*3/uL     Eosinophils Absolute 0.01 0.00 - 0.70 x10*3/uL     Basophils Absolute 0.01 0.00 - 0.10 x10*3/uL   Comprehensive Metabolic Panel   Result Value Ref Range     Glucose 95 74 - 99 mg/dL     Sodium 138 136 - 145 mmol/L     Potassium 3.1 (L) 3.5 - 5.3 mmol/L     Chloride 100 98 - 107 mmol/L     Bicarbonate 30 21 - 32 mmol/L     Anion Gap 11 10 - 20 mmol/L     Urea Nitrogen 8 6 - 23 mg/dL     Creatinine 0.32 (L) 0.50 - 1.05 mg/dL     eGFR >90 >60 mL/min/1.73m*2     Calcium 8.6 8.6 - 10.3 mg/dL     Albumin 2.8 (L) 3.4 - 5.0 g/dL     Alkaline Phosphatase 45 33 - 110 U/L     Total Protein 5.6 (L) 6.4 - 8.2 g/dL     AST 13 9 - 39 U/L     Bilirubin, Total 1.9 (H) 0.0 - 1.2 mg/dL     ALT 14 7 - 45 U/L   Protime-INR   Result Value Ref Range     Protime 23.6 (H) 9.8 - 12.4 seconds     INR 2.1 (H) 0.9 - 1.1   POCT GLUCOSE   Result Value Ref Range     POCT Glucose 93 74 - 99 mg/dL   POCT GLUCOSE   Result Value Ref Range     POCT Glucose 132 (H) 74 - 99 mg/dL   Heparin Assay   Result Value Ref Range     Heparin Unfractionated 0.1 See Comment Below for Therapeutic Ranges IU/mL      Patient recently received an antibiotic (last 12 hours)         Date/Time Action Medication Dose     03/11/25 0617 Given    vancomycin (Vancocin) capsule 250 mg 250 mg     03/10/25 2140 Given    vancomycin (Vancocin) capsule 250 mg  250 mg             Pt fully evaluated 3/11. PT recommended high intensity level of clinical care. Potassium is 3.3 and gave Kcl and INR is 5.5 out of therapeutic range and protime is 61.3. Pt on 2L NC today. Plan discussed with interdisciplinary team, continue current and repeat labs in the AM.      Discharge planning discussed with patient and care team. [facility] at discharge. Patient aware and agreeable to current plan, continue plan as above.      I spent a total of 50 minutes on the date of the service which included preparing to see the patient, face-to-face patient care, completing clinical documentation, obtaining and/or reviewing separately obtained history, performing a medically appropriate examination, counseling and educating the patient/family/caregiver, ordering medications, tests, or procedures, communicating with other HCPs (not separately reported), independently interpreting results (not separately reported), communicating results to the patient/family/caregiver, and care coordination (not separately reported).      Patient fully evaluated    for    Problem List Items Addressed This Visit                     Gastrointestinal and Abdominal     * (Principal) Nausea vomiting and diarrhea           Musculoskeletal and Injuries     Nontraumatic compartment syndrome of left upper extremity     Relevant Orders     Case Request Operating Room: FASCIOTOMY, UPPER EXTREMITY (Completed)     Case Request Operating Room: DEBRIDEMENT, WOUND, UPPER EXTREMITY (Completed)           Pulmonary and Pneumonias     Influenza A with pneumonia - Primary      Other Visit Diagnoses         Acute kidney injury (CMS-HCC)         Dehydration         Influenza A         Shock (Multi)         Relevant Orders     Transthoracic Echo (TTE) Complete (Completed)             Patient seen resting in bed with head of bed elevated, no s/s or c/o acute difficulties at this time.  Vital signs  for last 24 hours Temp:  [35.9 °C (96.6 °F)-36.6 °C (97.9 °F)] 36.3 °C (97.3 °F)  Heart Rate:  [70-93] 85  Resp:  [16-18] 18  BP: (120-169)/(56-92) 164/80  FiO2 (%):  [28 %-32 %] 28 %    I/O this shift:  In: -   Out: 1 [Stool:1]  Patient still requiring frequent cardiac and SPO2 monitoring. Continue aggressive pulmonary hygiene and oral hygiene. Off loading as tolerated for skin integrity. Medications and labs reviewed-             Results for orders placed or performed during the hospital encounter of 02/25/25 (from the past 24 hours)   Protime-INR   Result Value Ref Range     Protime 19.4 (H) 9.8 - 12.4 seconds     INR 1.8 (H) 0.9 - 1.1   POCT GLUCOSE   Result Value Ref Range     POCT Glucose 136 (H) 74 - 99 mg/dL   POCT GLUCOSE   Result Value Ref Range     POCT Glucose 144 (H) 74 - 99 mg/dL   CBC and Auto Differential   Result Value Ref Range     WBC 7.4 4.4 - 11.3 x10*3/uL     nRBC 0.0 0.0 - 0.0 /100 WBCs     RBC 3.13 (L) 4.00 - 5.20 x10*6/uL     Hemoglobin 8.7 (L) 12.0 - 16.0 g/dL     Hematocrit 28.3 (L) 36.0 - 46.0 %     MCV 90 80 - 100 fL     MCH 27.8 26.0 - 34.0 pg     MCHC 30.7 (L) 32.0 - 36.0 g/dL     RDW 19.9 (H) 11.5 - 14.5 %     Platelets 247 150 - 450 x10*3/uL     Neutrophils % 77.0 40.0 - 80.0 %     Immature Granulocytes %, Automated 0.5 0.0 - 0.9 %     Lymphocytes % 12.9 13.0 - 44.0 %     Monocytes % 9.4 2.0 - 10.0 %     Eosinophils % 0.1 0.0 - 6.0 %     Basophils % 0.1 0.0 - 2.0 %     Neutrophils Absolute 5.66 1.20 - 7.70 x10*3/uL     Immature Granulocytes Absolute, Automated 0.04 0.00 - 0.70 x10*3/uL     Lymphocytes Absolute 0.95 (L) 1.20 - 4.80 x10*3/uL     Monocytes Absolute 0.69 0.10 - 1.00 x10*3/uL     Eosinophils Absolute 0.01 0.00 - 0.70 x10*3/uL     Basophils Absolute 0.01 0.00 - 0.10 x10*3/uL   Comprehensive Metabolic Panel   Result Value Ref Range     Glucose 95 74 - 99 mg/dL     Sodium 138 136 - 145 mmol/L     Potassium 3.1 (L) 3.5 - 5.3 mmol/L     Chloride 100 98 - 107 mmol/L      Bicarbonate 30 21 - 32 mmol/L     Anion Gap 11 10 - 20 mmol/L     Urea Nitrogen 8 6 - 23 mg/dL     Creatinine 0.32 (L) 0.50 - 1.05 mg/dL     eGFR >90 >60 mL/min/1.73m*2     Calcium 8.6 8.6 - 10.3 mg/dL     Albumin 2.8 (L) 3.4 - 5.0 g/dL     Alkaline Phosphatase 45 33 - 110 U/L     Total Protein 5.6 (L) 6.4 - 8.2 g/dL     AST 13 9 - 39 U/L     Bilirubin, Total 1.9 (H) 0.0 - 1.2 mg/dL     ALT 14 7 - 45 U/L   Protime-INR   Result Value Ref Range     Protime 23.6 (H) 9.8 - 12.4 seconds     INR 2.1 (H) 0.9 - 1.1   POCT GLUCOSE   Result Value Ref Range     POCT Glucose 93 74 - 99 mg/dL   POCT GLUCOSE   Result Value Ref Range     POCT Glucose 132 (H) 74 - 99 mg/dL   Heparin Assay   Result Value Ref Range     Heparin Unfractionated 0.1 See Comment Below for Therapeutic Ranges IU/mL      Patient recently received an antibiotic (last 12 hours)         Date/Time Action Medication Dose     03/12/25 1252 Given    vancomycin (Vancocin) capsule 250 mg 250 mg     03/12/25 0614 Given    vancomycin (Vancocin) capsule 250 mg 250 mg             Pt fully evaluated 3/12. Pt on 3L NC today increased from yesterday. With start of Coumadin will continue to monitor INR.  Plan discussed with interdisciplinary team, continue current and repeat labs in the AM.      Discharge planning discussed with patient and care team. Therapy evaluations ordered. [facility]- 11 , anticipate HHC/SNF at discharge. Patient aware and agreeable to current plan, continue plan as above.      I spent a total of 50 minutes on the date of the service which included preparing to see the patient, face-to-face patient care, completing clinical documentation, obtaining and/or reviewing separately obtained history, performing a medically appropriate examination, counseling and educating the patient/family/caregiver, ordering medications, tests, or procedures, communicating with other HCPs (not separately reported), independently interpreting results (not separately reported),  communicating results to the patient/family/caregiver, and care coordination (not separately reported).         Patient fully evaluated  3/13  for    Problem List Items Addressed This Visit                     Gastrointestinal and Abdominal     * (Principal) Nausea vomiting and diarrhea           Musculoskeletal and Injuries     Nontraumatic compartment syndrome of left upper extremity     Relevant Orders     Case Request Operating Room: FASCIOTOMY, UPPER EXTREMITY (Completed)     Case Request Operating Room: DEBRIDEMENT, WOUND, UPPER EXTREMITY (Completed)           Pulmonary and Pneumonias     Influenza A with pneumonia - Primary      Other Visit Diagnoses         Acute kidney injury (CMS-HCC)         Dehydration         Influenza A         Shock (Multi)         Relevant Orders     Transthoracic Echo (TTE) Complete (Completed)             Patient seen resting in bed with head of bed elevated, no s/s or c/o acute difficulties at this time.  Vital signs for last 24 hours Temp:  [35.9 °C (96.6 °F)-36.6 °C (97.9 °F)] 36.3 °C (97.3 °F)  Heart Rate:  [70-93] 85  Resp:  [16-18] 18  BP: (120-169)/(56-92) 164/80  FiO2 (%):  [28 %-32 %] 28 %    I/O this shift:  In: -   Out: 1 [Stool:1]  Patient still requiring frequent cardiac and SPO2 monitoring. Continue aggressive pulmonary hygiene and oral hygiene. Off loading as tolerated for skin integrity. Medications and labs reviewed-             Results for orders placed or performed during the hospital encounter of 02/25/25 (from the past 24 hours)   Protime-INR   Result Value Ref Range     Protime 19.4 (H) 9.8 - 12.4 seconds     INR 1.8 (H) 0.9 - 1.1   POCT GLUCOSE   Result Value Ref Range     POCT Glucose 136 (H) 74 - 99 mg/dL   POCT GLUCOSE   Result Value Ref Range     POCT Glucose 144 (H) 74 - 99 mg/dL   CBC and Auto Differential   Result Value Ref Range     WBC 7.4 4.4 - 11.3 x10*3/uL     nRBC 0.0 0.0 - 0.0 /100 WBCs     RBC 3.13 (L) 4.00 - 5.20 x10*6/uL     Hemoglobin 8.7  (L) 12.0 - 16.0 g/dL     Hematocrit 28.3 (L) 36.0 - 46.0 %     MCV 90 80 - 100 fL     MCH 27.8 26.0 - 34.0 pg     MCHC 30.7 (L) 32.0 - 36.0 g/dL     RDW 19.9 (H) 11.5 - 14.5 %     Platelets 247 150 - 450 x10*3/uL     Neutrophils % 77.0 40.0 - 80.0 %     Immature Granulocytes %, Automated 0.5 0.0 - 0.9 %     Lymphocytes % 12.9 13.0 - 44.0 %     Monocytes % 9.4 2.0 - 10.0 %     Eosinophils % 0.1 0.0 - 6.0 %     Basophils % 0.1 0.0 - 2.0 %     Neutrophils Absolute 5.66 1.20 - 7.70 x10*3/uL     Immature Granulocytes Absolute, Automated 0.04 0.00 - 0.70 x10*3/uL     Lymphocytes Absolute 0.95 (L) 1.20 - 4.80 x10*3/uL     Monocytes Absolute 0.69 0.10 - 1.00 x10*3/uL     Eosinophils Absolute 0.01 0.00 - 0.70 x10*3/uL     Basophils Absolute 0.01 0.00 - 0.10 x10*3/uL   Comprehensive Metabolic Panel   Result Value Ref Range     Glucose 95 74 - 99 mg/dL     Sodium 138 136 - 145 mmol/L     Potassium 3.1 (L) 3.5 - 5.3 mmol/L     Chloride 100 98 - 107 mmol/L     Bicarbonate 30 21 - 32 mmol/L     Anion Gap 11 10 - 20 mmol/L     Urea Nitrogen 8 6 - 23 mg/dL     Creatinine 0.32 (L) 0.50 - 1.05 mg/dL     eGFR >90 >60 mL/min/1.73m*2     Calcium 8.6 8.6 - 10.3 mg/dL     Albumin 2.8 (L) 3.4 - 5.0 g/dL     Alkaline Phosphatase 45 33 - 110 U/L     Total Protein 5.6 (L) 6.4 - 8.2 g/dL     AST 13 9 - 39 U/L     Bilirubin, Total 1.9 (H) 0.0 - 1.2 mg/dL     ALT 14 7 - 45 U/L   Protime-INR   Result Value Ref Range     Protime 23.6 (H) 9.8 - 12.4 seconds     INR 2.1 (H) 0.9 - 1.1   POCT GLUCOSE   Result Value Ref Range     POCT Glucose 93 74 - 99 mg/dL   POCT GLUCOSE   Result Value Ref Range     POCT Glucose 132 (H) 74 - 99 mg/dL   Heparin Assay   Result Value Ref Range     Heparin Unfractionated 0.1 See Comment Below for Therapeutic Ranges IU/mL      Patient recently received an antibiotic (last 12 hours)         Date/Time Action Medication Dose     03/13/25 1256 Given    vancomycin (Vancocin) capsule 250 mg 250 mg     03/13/25 0804 Given     vancomycin (Vancocin) capsule 250 mg 250 mg             Pt fully evaluated 3/13. Pt still on 3L NC. INR is 2.1 today and heparin was started, will discontinue when INR is 2.5 then Pt can discharge so one more day. Potasoum was 3.1. Plan discussed with interdisciplinary team, continue current and repeat labs in the AM.      Discharge planning discussed with patient and care team. [facility] at discharge. Patient aware and agreeable to current plan, continue plan as above.      I spent a total of 50 minutes on the date of the service which included preparing to see the patient, face-to-face patient care, completing clinical documentation, obtaining and/or reviewing separately obtained history, performing a medically appropriate examination, counseling and educating the patient/family/caregiver, ordering medications, tests, or procedures, communicating with other HCPs (not separately reported), independently interpreting results (not separately reported), communicating results to the patient/family/caregiver, and care coordination (not separately reported).   SADA CHAN                     Revision History          Pertinent Physical Exam At Time of Discharge  Physical Exam  no acute events overnight, patient denies chest pain, worsening SOB at this time.  Outpatient Follow-Up              Future Appointments   Date Time Provider Department [facility] [facility]   3/27/2025  1:30 PM PAR [id number] [facility]ARM [facility] PHARMACIST [id number] [place]   4/8/2025  1:15 PM Brittney Lomeli MD [id number] [place]            Ginny Willson                  Revision History                Ginny Demetris                      Revision History         Pertinent Physical Exam At Time of Discharge  Physical Exam  no acute events overnight, patient denies chest pain, worsening SOB at this time.  Outpatient Follow-Up  Future Appointments   Date Time Provider Department [facility]   3/27/2025  1:30 PM PAR [id number] PHARM [facility]  PHARMACIST [id number] [place]   4/8/2025  1:15 PM Brittney Lomeli MD [id number] [place]         Ginny Farleyley

## 2025-03-17 NOTE — PROGRESS NOTES
"Kayley Lynch is a 54 y.o. female on day 19 of admission presenting with Nausea vomiting and diarrhea.    Subjective   The patient denies any pain in regards to the left upper extremity.       Objective     Physical Exam  .  The left upper extremity sutures have been removed.  There is some dehiscence along the proximal forearm incision.  There is no active drainage.  There are no signs of infection.  She is able to range the elbow, wrist and hand grossly within normal limits.  The left upper extremity is neurovascular intact.  Last Recorded Vitals  Blood pressure 124/61, pulse 92, temperature 36.3 °C (97.3 °F), temperature source Temporal, resp. rate 16, height 1.6 m (5' 2.99\"), weight 66.5 kg (146 lb 9.7 oz), SpO2 99%.  Intake/Output last 3 Shifts:  I/O last 3 completed shifts:  In: - (0 mL/kg)   Out: 700 (10.5 mL/kg) [Urine:700 (0.3 mL/kg/hr)]  Weight: 66.5 kg     Relevant Results            This patient currently has cardiac telemetry ordered; if you would like to modify or discontinue the telemetry order, click here to go to the orders activity to modify/discontinue the order.                 Assessment/Plan   Assessment & Plan  Nausea vomiting and diarrhea    Influenza A with pneumonia    Nontraumatic compartment syndrome of left upper extremity    The fasciotomy incision is healing, however there are areas of dehiscence which should heal by secondary intention.  I recommend keeping the wound clean, covered and monitor for infection.             Elijah Licea MD      "

## 2025-03-17 NOTE — PROGRESS NOTES
Kayley Lynch is a 54 y.o. female on day 18 of admission presenting with Nausea vomiting and diarrhea.      Subjective   No events overnight.  Patient seen and examined at bedside.  She complains of some pain in her left arm.       Objective     Last Recorded Vitals  /60 (BP Location: Right arm, Patient Position: Lying)   Pulse 98   Temp 36.3 °C (97.3 °F) (Temporal)   Resp 18   Wt 66.5 kg (146 lb 9.7 oz)   SpO2 98%   Intake/Output last 3 Shifts:    Intake/Output Summary (Last 24 hours) at 3/16/2025 8475  Last data filed at 3/16/2025 0628  Gross per 24 hour   Intake --   Output 300 ml   Net -300 ml       Admission Weight  Weight: 59 kg (130 lb) (02/26/25 0159)    Daily Weight  03/07/25 : 66.5 kg (146 lb 9.7 oz)    Image Results  Electrocardiogram, 12-lead PRN ACS symptoms  Sinus tachycardia with irregular rate  Consider left ventricular hypertrophy  Borderline T abnormalities, diffuse leads    Confirmed by Shane Dunbar ([id number]) on 3/11/2025 11:44:57 AM      Physical Exam  HENT:      Head: Normocephalic and atraumatic.      Nose: Nose normal.      Mouth/Throat:      Mouth: Mucous membranes are moist.      Pharynx: Oropharynx is clear.   Eyes:      Extraocular Movements: Extraocular movements intact.      Pupils: Pupils are equal, round, and reactive to light.   Cardiovascular:      Rate and Rhythm: Normal rate and regular rhythm.   Pulmonary:      Effort: No respiratory distress.      Breath sounds: Wheezing present. No rhonchi or rales.   Abdominal:      General: Bowel sounds are normal. There is no distension.      Palpations: Abdomen is soft.      Tenderness: There is no abdominal tenderness. There is no guarding.   Musculoskeletal:      Right lower leg: No edema.      Left lower leg: No edema.   Skin:     General: Skin is warm and dry.   Neurological:      Mental Status: She is alert. Mental status is at baseline.       Relevant Results               Assessment/Plan   This patient currently has  cardiac telemetry ordered; if you would like to modify or discontinue the telemetry order, click here to go to the orders activity to modify/discontinue the order.      This patient has a urinary catheter   Reason for the urinary catheter remaining today? critically ill patient who need accurate urinary output measurements          Assessment & Plan  Nausea vomiting and diarrhea    Influenza A with pneumonia      Septic shock, dehydration, hypercapnic respiratory failure, and dehydration.  Nontraumatic compartment syndrome of left upper extremity      Patient is a 54-year-old female with past medical history of COPD, CAD status post CABG, pulmonary hypertension, hyperlipidemia, cardiomyopathy, a flutter status post ablation, and mitral valve replacement who presented with malaise.  She was found to be in septic shock and admitted to the ICU.    Septic shock  Hypercapnic respiratory failure  COPD exacerbation  Influenza A  Pneumonia  Acute metabolic encephalopathy  C. difficile colitis  Dehydration  H[facility]    -Continue supplemental oxygen  -Continue antibiotics, currently on doxycycline  -Continue nebulizers  -Continue steroids  -Continue Tamiflu  -Continue p.o. vancomycin  -Continue home meds  -Monitor INR    3/2: Patient developed a large hematoma in the left arm leading to compartment syndrome. She was taken for urgent fasciotomies with Orthopedic Surgery. She is also being followed by Vascular Surgery. Vitamin K ordered for supratherapeutic INR. Her INR was 2.2 yesterday with goal being 2.5-3.5 for her A-flutter s/p mitral valve replacement. Critical Care reconsulted. Hematology consulted, appreciate recs. Hold coumadin and lovenox.    3/3: INR reversed. Patient is on low intensity heparin drip. She was transfused 1 unit pRBCs. Monitor hemoglobin closely. Hematology has ordered a work up including DIC panel. Patient will need another debridement and irrigation in 24-48 hours per Orthopedics. She has also developed a  retroperitoneal bleed.    3/4: INR is down to 1.0.  Leukocytosis improved from 15 down to 13.  Hemoglobin is down to 6.4.  Patient to be transfused another unit packed RBCs.  She will be going back to the OR today for further debridement, washout, possible closure. She will also have a CT angiogram to determine if there is any active bleeding causing her retroperitoneal bleed.  She remains on a heparin drip due to her valve.  Continue doxycycline and p.o. Vanco.    3/5: Hemoglobin stable in the low 7's. INR 1.3. Fasciotomy site has been closed. Patient remains on a heparin drip with plans to resume Coumadin when able. Continue antibiotics.     3/16: Patient stable and ready for discharge.  Awaiting pre-CERT for acute rehab.              [place] Steven, DO

## 2025-03-18 ENCOUNTER — HOSPITAL ENCOUNTER (INPATIENT)
Facility: HOSPITAL | Age: 55
DRG: 948 | End: 2025-03-18
Attending: PHYSICAL MEDICINE & REHABILITATION | Admitting: PHYSICAL MEDICINE & REHABILITATION
Payer: COMMERCIAL

## 2025-03-18 VITALS
BODY MASS INDEX: 25.98 KG/M2 | HEIGHT: 63 IN | SYSTOLIC BLOOD PRESSURE: 104 MMHG | OXYGEN SATURATION: 93 % | TEMPERATURE: 96.8 F | HEART RATE: 85 BPM | WEIGHT: 146.61 LBS | RESPIRATION RATE: 17 BRPM | DIASTOLIC BLOOD PRESSURE: 52 MMHG

## 2025-03-18 DIAGNOSIS — J09.X1 INFLUENZA A WITH PNEUMONIA: ICD-10-CM

## 2025-03-18 DIAGNOSIS — M79.A11 NON-TRAUMATIC COMPARTMENT SYNDROME OF RIGHT UPPER EXTREMITY: Primary | ICD-10-CM

## 2025-03-18 DIAGNOSIS — M79.A12 NONTRAUMATIC COMPARTMENT SYNDROME OF LEFT UPPER EXTREMITY: ICD-10-CM

## 2025-03-18 DIAGNOSIS — R53.81 PHYSICAL DEBILITY: ICD-10-CM

## 2025-03-18 LAB
ALBUMIN SERPL BCP-MCNC: 3.1 G/DL (ref 3.4–5)
ALP SERPL-CCNC: 63 U/L (ref 33–110)
ALT SERPL W P-5'-P-CCNC: 19 U/L (ref 7–45)
ANION GAP SERPL CALC-SCNC: 10 MMOL/L (ref 10–20)
AST SERPL W P-5'-P-CCNC: 15 U/L (ref 9–39)
BASOPHILS # BLD AUTO: 0.01 X10*3/UL (ref 0–0.1)
BASOPHILS NFR BLD AUTO: 0.2 %
BILIRUB SERPL-MCNC: 1.5 MG/DL (ref 0–1.2)
BUN SERPL-MCNC: 11 MG/DL (ref 6–23)
BURR CELLS BLD QL SMEAR: NORMAL
CALCIUM SERPL-MCNC: 9.1 MG/DL (ref 8.6–10.3)
CHLORIDE SERPL-SCNC: 102 MMOL/L (ref 98–107)
CO2 SERPL-SCNC: 28 MMOL/L (ref 21–32)
CREAT SERPL-MCNC: 0.49 MG/DL (ref 0.5–1.05)
EGFRCR SERPLBLD CKD-EPI 2021: >90 ML/MIN/1.73M*2
EOSINOPHIL # BLD AUTO: 0.01 X10*3/UL (ref 0–0.7)
EOSINOPHIL NFR BLD AUTO: 0.2 %
ERYTHROCYTE [DISTWIDTH] IN BLOOD BY AUTOMATED COUNT: 21.6 % (ref 11.5–14.5)
GLUCOSE BLD MANUAL STRIP-MCNC: 103 MG/DL (ref 74–99)
GLUCOSE BLD MANUAL STRIP-MCNC: 164 MG/DL (ref 74–99)
GLUCOSE BLD MANUAL STRIP-MCNC: 168 MG/DL (ref 74–99)
GLUCOSE BLD MANUAL STRIP-MCNC: 94 MG/DL (ref 74–99)
GLUCOSE SERPL-MCNC: 89 MG/DL (ref 74–99)
HCT VFR BLD AUTO: 31.1 % (ref 36–46)
HGB BLD-MCNC: 9.5 G/DL (ref 12–16)
HOLD SPECIMEN: NORMAL
IMM GRANULOCYTES # BLD AUTO: 0.03 X10*3/UL (ref 0–0.7)
IMM GRANULOCYTES NFR BLD AUTO: 0.5 % (ref 0–0.9)
INR PPP: 1.7 (ref 0.9–1.1)
LYMPHOCYTES # BLD AUTO: 1.27 X10*3/UL (ref 1.2–4.8)
LYMPHOCYTES NFR BLD AUTO: 20.6 %
MCH RBC QN AUTO: 28.4 PG (ref 26–34)
MCHC RBC AUTO-ENTMCNC: 30.5 G/DL (ref 32–36)
MCV RBC AUTO: 93 FL (ref 80–100)
MONOCYTES # BLD AUTO: 0.71 X10*3/UL (ref 0.1–1)
MONOCYTES NFR BLD AUTO: 11.5 %
NEUTROPHILS # BLD AUTO: 4.14 X10*3/UL (ref 1.2–7.7)
NEUTROPHILS NFR BLD AUTO: 67 %
NRBC BLD-RTO: 0 /100 WBCS (ref 0–0)
OVALOCYTES BLD QL SMEAR: NORMAL
PLATELET # BLD AUTO: 263 X10*3/UL (ref 150–450)
POLYCHROMASIA BLD QL SMEAR: NORMAL
POTASSIUM SERPL-SCNC: 3.4 MMOL/L (ref 3.5–5.3)
PROT SERPL-MCNC: 6.1 G/DL (ref 6.4–8.2)
PROTHROMBIN TIME: 18.8 SECONDS (ref 9.8–12.4)
RBC # BLD AUTO: 3.35 X10*6/UL (ref 4–5.2)
RBC MORPH BLD: NORMAL
SODIUM SERPL-SCNC: 137 MMOL/L (ref 136–145)
WBC # BLD AUTO: 6.2 X10*3/UL (ref 4.4–11.3)

## 2025-03-18 PROCEDURE — 2500000005 HC RX 250 GENERAL PHARMACY W/O HCPCS

## 2025-03-18 PROCEDURE — 2500000004 HC RX 250 GENERAL PHARMACY W/ HCPCS (ALT 636 FOR OP/ED): Performed by: INTERNAL MEDICINE

## 2025-03-18 PROCEDURE — 36415 COLL VENOUS BLD VENIPUNCTURE: CPT | Performed by: INTERNAL MEDICINE

## 2025-03-18 PROCEDURE — 2500000002 HC RX 250 W HCPCS SELF ADMINISTERED DRUGS (ALT 637 FOR MEDICARE OP, ALT 636 FOR OP/ED): Performed by: INTERNAL MEDICINE

## 2025-03-18 PROCEDURE — 85610 PROTHROMBIN TIME: CPT | Performed by: INTERNAL MEDICINE

## 2025-03-18 PROCEDURE — 1180000001 HC REHAB PRIVATE ROOM DAILY

## 2025-03-18 PROCEDURE — 2500000001 HC RX 250 WO HCPCS SELF ADMINISTERED DRUGS (ALT 637 FOR MEDICARE OP)

## 2025-03-18 PROCEDURE — 2500000001 HC RX 250 WO HCPCS SELF ADMINISTERED DRUGS (ALT 637 FOR MEDICARE OP): Performed by: INTERNAL MEDICINE

## 2025-03-18 PROCEDURE — 2500000001 HC RX 250 WO HCPCS SELF ADMINISTERED DRUGS (ALT 637 FOR MEDICARE OP): Performed by: PHYSICAL MEDICINE & REHABILITATION

## 2025-03-18 PROCEDURE — 2500000002 HC RX 250 W HCPCS SELF ADMINISTERED DRUGS (ALT 637 FOR MEDICARE OP, ALT 636 FOR OP/ED)

## 2025-03-18 PROCEDURE — 80053 COMPREHEN METABOLIC PANEL: CPT

## 2025-03-18 PROCEDURE — 36415 COLL VENOUS BLD VENIPUNCTURE: CPT

## 2025-03-18 PROCEDURE — 82947 ASSAY GLUCOSE BLOOD QUANT: CPT

## 2025-03-18 PROCEDURE — 94640 AIRWAY INHALATION TREATMENT: CPT

## 2025-03-18 PROCEDURE — 85025 COMPLETE CBC W/AUTO DIFF WBC: CPT

## 2025-03-18 RX ORDER — BUPROPION HYDROCHLORIDE 150 MG/1
150 TABLET ORAL EVERY MORNING
Status: DISCONTINUED | OUTPATIENT
Start: 2025-03-19 | End: 2025-03-26 | Stop reason: HOSPADM

## 2025-03-18 RX ORDER — BISACODYL 10 MG/1
10 SUPPOSITORY RECTAL DAILY PRN
Status: DISCONTINUED | OUTPATIENT
Start: 2025-03-18 | End: 2025-03-26 | Stop reason: HOSPADM

## 2025-03-18 RX ORDER — ALBUTEROL SULFATE 90 UG/1
2 INHALANT RESPIRATORY (INHALATION) EVERY 6 HOURS PRN
Status: CANCELLED | OUTPATIENT
Start: 2025-03-18

## 2025-03-18 RX ORDER — MIDODRINE HYDROCHLORIDE 5 MG/1
5 TABLET ORAL ONCE
Status: COMPLETED | OUTPATIENT
Start: 2025-03-18 | End: 2025-03-18

## 2025-03-18 RX ORDER — ALUMINUM HYDROXIDE, MAGNESIUM HYDROXIDE, AND SIMETHICONE 1200; 120; 1200 MG/30ML; MG/30ML; MG/30ML
30 SUSPENSION ORAL EVERY 6 HOURS PRN
Status: DISCONTINUED | OUTPATIENT
Start: 2025-03-18 | End: 2025-03-26 | Stop reason: HOSPADM

## 2025-03-18 RX ORDER — VANCOMYCIN HYDROCHLORIDE 250 MG/1
250 CAPSULE ORAL 4 TIMES DAILY
Status: DISCONTINUED | OUTPATIENT
Start: 2025-03-19 | End: 2025-03-26 | Stop reason: HOSPADM

## 2025-03-18 RX ORDER — TALC
3 POWDER (GRAM) TOPICAL NIGHTLY PRN
Status: DISCONTINUED | OUTPATIENT
Start: 2025-03-18 | End: 2025-03-26 | Stop reason: HOSPADM

## 2025-03-18 RX ORDER — WARFARIN 3 MG/1
3 TABLET ORAL ONCE
Status: COMPLETED | OUTPATIENT
Start: 2025-03-18 | End: 2025-03-18

## 2025-03-18 RX ORDER — CARVEDILOL 25 MG/1
25 TABLET ORAL 2 TIMES DAILY
Status: DISCONTINUED | OUTPATIENT
Start: 2025-03-18 | End: 2025-03-26 | Stop reason: HOSPADM

## 2025-03-18 RX ORDER — WARFARIN 2 MG/1
TABLET ORAL
Status: CANCELLED | OUTPATIENT
Start: 2025-03-18

## 2025-03-18 RX ORDER — SENNOSIDES 8.6 MG/1
1 TABLET ORAL NIGHTLY
Status: DISCONTINUED | OUTPATIENT
Start: 2025-03-18 | End: 2025-03-26 | Stop reason: HOSPADM

## 2025-03-18 RX ORDER — WARFARIN 3 MG/1
TABLET ORAL
Status: ON HOLD
Start: 2025-03-18

## 2025-03-18 RX ORDER — WARFARIN 2 MG/1
TABLET ORAL
Start: 2025-03-18 | End: 2025-03-18 | Stop reason: HOSPADM

## 2025-03-18 RX ORDER — ESCITALOPRAM OXALATE 10 MG/1
20 TABLET ORAL DAILY
Status: DISCONTINUED | OUTPATIENT
Start: 2025-03-19 | End: 2025-03-26 | Stop reason: HOSPADM

## 2025-03-18 RX ORDER — PANTOPRAZOLE SODIUM 40 MG/1
40 TABLET, DELAYED RELEASE ORAL DAILY
Status: DISCONTINUED | OUTPATIENT
Start: 2025-03-19 | End: 2025-03-26 | Stop reason: HOSPADM

## 2025-03-18 RX ORDER — LISINOPRIL 20 MG/1
20 TABLET ORAL DAILY
Status: CANCELLED | OUTPATIENT
Start: 2025-03-19

## 2025-03-18 RX ORDER — VARENICLINE TARTRATE 0.5 MG/1
1 TABLET, FILM COATED ORAL 2 TIMES DAILY
Status: CANCELLED | OUTPATIENT
Start: 2025-03-18

## 2025-03-18 RX ORDER — ASPIRIN 81 MG/1
81 TABLET ORAL DAILY
Status: DISCONTINUED | OUTPATIENT
Start: 2025-03-19 | End: 2025-03-26 | Stop reason: HOSPADM

## 2025-03-18 RX ORDER — ATORVASTATIN CALCIUM 80 MG/1
80 TABLET, FILM COATED ORAL DAILY
Status: DISCONTINUED | OUTPATIENT
Start: 2025-03-19 | End: 2025-03-26 | Stop reason: HOSPADM

## 2025-03-18 RX ORDER — WARFARIN 2 MG/1
2 TABLET ORAL DAILY
Status: CANCELLED | OUTPATIENT
Start: 2025-03-18

## 2025-03-18 RX ORDER — BISACODYL 5 MG
10 TABLET, DELAYED RELEASE (ENTERIC COATED) ORAL DAILY PRN
Status: DISCONTINUED | OUTPATIENT
Start: 2025-03-18 | End: 2025-03-26 | Stop reason: HOSPADM

## 2025-03-18 RX ORDER — ACETAMINOPHEN 325 MG/1
650 TABLET ORAL EVERY 4 HOURS PRN
Status: DISCONTINUED | OUTPATIENT
Start: 2025-03-18 | End: 2025-03-26 | Stop reason: HOSPADM

## 2025-03-18 RX ORDER — ACETAMINOPHEN AND CODEINE PHOSPHATE 300; 30 MG/1; MG/1
1 TABLET ORAL EVERY 6 HOURS PRN
Status: CANCELLED | OUTPATIENT
Start: 2025-03-18

## 2025-03-18 RX ORDER — BUDESONIDE 0.5 MG/2ML
0.5 INHALANT ORAL
Status: DISCONTINUED | OUTPATIENT
Start: 2025-03-19 | End: 2025-03-26 | Stop reason: HOSPADM

## 2025-03-18 RX ORDER — WARFARIN 2 MG/1
3 TABLET ORAL DAILY
Status: DISCONTINUED | OUTPATIENT
Start: 2025-03-19 | End: 2025-03-26 | Stop reason: HOSPADM

## 2025-03-18 RX ORDER — IPRATROPIUM BROMIDE AND ALBUTEROL SULFATE 2.5; .5 MG/3ML; MG/3ML
3 SOLUTION RESPIRATORY (INHALATION)
Status: DISCONTINUED | OUTPATIENT
Start: 2025-03-19 | End: 2025-03-26 | Stop reason: HOSPADM

## 2025-03-18 RX ADMIN — BUDESONIDE 0.5 MG: 0.5 INHALANT ORAL at 19:32

## 2025-03-18 RX ADMIN — LISINOPRIL 20 MG: 20 TABLET ORAL at 08:11

## 2025-03-18 RX ADMIN — Medication 1 L/MIN: at 08:27

## 2025-03-18 RX ADMIN — VANCOMYCIN HYDROCHLORIDE 250 MG: 250 CAPSULE ORAL at 20:02

## 2025-03-18 RX ADMIN — INSULIN LISPRO 1 UNITS: 100 INJECTION, SOLUTION INTRAVENOUS; SUBCUTANEOUS at 12:14

## 2025-03-18 RX ADMIN — WARFARIN SODIUM 3 MG: 3 TABLET ORAL at 18:45

## 2025-03-18 RX ADMIN — ASPIRIN 81 MG: 81 TABLET, COATED ORAL at 08:11

## 2025-03-18 RX ADMIN — IPRATROPIUM BROMIDE AND ALBUTEROL SULFATE 3 ML: .5; 3 SOLUTION RESPIRATORY (INHALATION) at 13:20

## 2025-03-18 RX ADMIN — MIDODRINE HYDROCHLORIDE 5 MG: 5 TABLET ORAL at 15:43

## 2025-03-18 RX ADMIN — Medication 21 PERCENT: at 19:31

## 2025-03-18 RX ADMIN — IPRATROPIUM BROMIDE AND ALBUTEROL SULFATE 3 ML: .5; 3 SOLUTION RESPIRATORY (INHALATION) at 19:31

## 2025-03-18 RX ADMIN — ESCITALOPRAM OXALATE 20 MG: 10 TABLET ORAL at 08:11

## 2025-03-18 RX ADMIN — MIDODRINE HYDROCHLORIDE 5 MG: 5 TABLET ORAL at 12:37

## 2025-03-18 RX ADMIN — CARVEDILOL 25 MG: 25 TABLET, FILM COATED ORAL at 08:11

## 2025-03-18 RX ADMIN — VANCOMYCIN HYDROCHLORIDE 250 MG: 250 CAPSULE ORAL at 12:14

## 2025-03-18 RX ADMIN — BUDESONIDE 0.5 MG: 0.5 INHALANT ORAL at 08:18

## 2025-03-18 RX ADMIN — PANTOPRAZOLE SODIUM 40 MG: 40 TABLET, DELAYED RELEASE ORAL at 08:11

## 2025-03-18 RX ADMIN — SODIUM CHLORIDE 500 ML: 0.9 INJECTION, SOLUTION INTRAVENOUS at 15:42

## 2025-03-18 RX ADMIN — VANCOMYCIN HYDROCHLORIDE 250 MG: 250 CAPSULE ORAL at 08:11

## 2025-03-18 RX ADMIN — IPRATROPIUM BROMIDE AND ALBUTEROL SULFATE 3 ML: .5; 3 SOLUTION RESPIRATORY (INHALATION) at 08:18

## 2025-03-18 RX ADMIN — ACETAMINOPHEN 650 MG: 325 TABLET, FILM COATED ORAL at 20:02

## 2025-03-18 RX ADMIN — VANCOMYCIN HYDROCHLORIDE 250 MG: 250 CAPSULE ORAL at 18:42

## 2025-03-18 RX ADMIN — ATORVASTATIN CALCIUM 80 MG: 80 TABLET, FILM COATED ORAL at 20:02

## 2025-03-18 RX ADMIN — BUPROPION HYDROCHLORIDE 150 MG: 150 TABLET, EXTENDED RELEASE ORAL at 08:11

## 2025-03-18 ASSESSMENT — COGNITIVE AND FUNCTIONAL STATUS - GENERAL
DRESSING REGULAR UPPER BODY CLOTHING: A LITTLE
TOILETING: A LITTLE
MOBILITY SCORE: 19
WALKING IN HOSPITAL ROOM: A LITTLE
MOVING TO AND FROM BED TO CHAIR: A LITTLE
DAILY ACTIVITIY SCORE: 20
DRESSING REGULAR LOWER BODY CLOTHING: A LITTLE
HELP NEEDED FOR BATHING: A LITTLE
CLIMB 3 TO 5 STEPS WITH RAILING: A LOT
STANDING UP FROM CHAIR USING ARMS: A LITTLE

## 2025-03-18 ASSESSMENT — PAIN - FUNCTIONAL ASSESSMENT
PAIN_FUNCTIONAL_ASSESSMENT: 0-10
PAIN_FUNCTIONAL_ASSESSMENT: 0-10

## 2025-03-18 ASSESSMENT — PAIN SCALES - GENERAL
PAINLEVEL_OUTOF10: 5 - MODERATE PAIN
PAINLEVEL_OUTOF10: 0 - NO PAIN
PAINLEVEL_OUTOF10: 0 - NO PAIN

## 2025-03-18 NOTE — PROGRESS NOTES
03/18/25 1337   Discharge Planning   Type of Post Acute Facility Services Rehab   Expected Discharge Disposition Rehab   Does the patient need discharge transport arranged? No     Patient updated at bedside that she will be going to acute rehab after 2pm today.  Bedside nurse provided with report number and time.  Care transitions to follow.

## 2025-03-18 NOTE — CARE PLAN
The patient's goals for the shift include  rest    The clinical goals for the shift include maintain hemodynamically stable throughout the shift      Problem: Fall/Injury  Goal: Not fall by end of shift  Outcome: Progressing     Problem: Chronic Conditions and Co-morbidities  Goal: Patient's chronic conditions and co-morbidity symptoms are monitored and maintained or improved  Outcome: Progressing     Problem: Safety - Adult  Goal: Free from fall injury  Outcome: Progressing     Problem: Skin  Goal: Prevent/minimize sheer/friction injuries  Outcome: Progressing      [Negative] : Genitourinary

## 2025-03-18 NOTE — CARE PLAN
The patient's goals for the shift include  no falls during shift    The clinical goals for the shift include Wound care. Maintain safety and comfort      Problem: Pain - Adult  Goal: Verbalizes/displays adequate comfort level or baseline comfort level  Outcome: Progressing     Problem: Safety - Adult  Goal: Free from fall injury  Outcome: Progressing     Problem: Fall/Injury  Goal: Not fall by end of shift  Outcome: Progressing  Goal: Be free from injury by end of the shift  Outcome: Progressing  Goal: Verbalize understanding of personal risk factors for fall in the hospital  Outcome: Progressing     Problem: Skin  Goal: Participates in plan/prevention/treatment measures  Outcome: Progressing  Flowsheets (Taken 3/17/2025 2248)  Participates in plan/prevention/treatment measures:   Discuss with provider PT/OT consult   Elevate heels  Goal: Prevent/manage excess moisture  Outcome: Progressing  Flowsheets (Taken 3/17/2025 2248)  Prevent/manage excess moisture:   Cleanse incontinence/protect with barrier cream   Moisturize dry skin  Goal: Prevent/minimize sheer/friction injuries  Outcome: Progressing  Flowsheets (Taken 3/17/2025 2248)  Prevent/minimize sheer/friction injuries:   HOB 30 degrees or less   Increase activity/out of bed for meals   Use pull sheet  Goal: Promote/optimize nutrition  Outcome: Progressing  Flowsheets (Taken 3/17/2025 2248)  Promote/optimize nutrition:   Consume > 50% meals/supplements   Monitor/record intake including meals  Goal: Promote skin healing  Outcome: Progressing  Flowsheets (Taken 3/17/2025 2248)  Promote skin healing:   Rotate device position/do not position patient on device   Turn/reposition every 2 hours/use positioning/transfer devices     Problem: Pain  Goal: Takes deep breaths with improved pain control throughout the shift  Outcome: Progressing

## 2025-03-18 NOTE — PROGRESS NOTES
"Kayley Lynch is a 54 y.o. female on day 20 of admission presenting with Nausea vomiting and diarrhea.    Subjective   No new reports.       Objective     Physical Exam  There is a 3 cm area of dehiscence of the proximal forearm wound.  There is serial sanguinous drainage on the dressing, but no active drainage from the wound.  There is no evidence of infection at this time.  The upper extremities neurovasc intact.  Last Recorded Vitals  Blood pressure 117/64, pulse 86, temperature 35.9 °C (96.6 °F), resp. rate 18, height 1.6 m (5' 2.99\"), weight 66.5 kg (146 lb 9.7 oz), SpO2 98%.  Intake/Output last 3 Shifts:  I/O last 3 completed shifts:  In: 529.2 (8 mL/kg) [IV Piggyback:529.2]  Out: - (0 mL/kg)   Weight: 66.5 kg     Relevant Results            This patient currently has cardiac telemetry ordered; if you would like to modify or discontinue the telemetry order, click here to go to the orders activity to modify/discontinue the order.                 Assessment/Plan   Assessment & Plan  Nausea vomiting and diarrhea    Influenza A with pneumonia    Nontraumatic compartment syndrome of left upper extremity    The fasciotomy wound has an open area measuring approximately 3 cm in diameter.  I recommend packing this area with quarter inch sterile gauze once daily.  An order for this has been placed.           Elijah Licea MD      "

## 2025-03-18 NOTE — DISCHARGE SUMMARY
Discharge Diagnosis  Nausea vomiting and diarrhea    Issues Requiring Follow-Up  Patient fully evaluated  03/17  for   Assessment & Plan  Nausea vomiting and diarrhea    Influenza A with pneumonia      Septic shock, dehydration, hypercapnic respiratory failure, and dehydration.  Nontraumatic compartment syndrome of left upper extremity    No acute events overnight, patient denies chest pain, worsening SOB at this time.Patient with significant clinical improvement noted, patient medically cleared for discharge today. Patient seen resting in bed with head of bed elevated, no s/s or c/o acute difficulties at this time. Vital signs for last 24 hours:  Temp:  [35.8 °C (96.4 °F)-36.3 °C (97.3 °F)] 36.1 °C (97 °F)  Heart Rate:  [76-98] 76  Resp:  [18] 18  BP: ()/(40-91) 72/40  FiO2 (%):  [21 %-24 %] 21 % Medications and labs reviewed-   Results for orders placed or performed during the hospital encounter of 02/25/25 (from the past 24 hours)   POCT GLUCOSE   Result Value Ref Range    POCT Glucose 94 74 - 99 mg/dL   POCT GLUCOSE   Result Value Ref Range    POCT Glucose 106 (H) 74 - 99 mg/dL   CBC and Auto Differential   Result Value Ref Range    WBC 6.2 4.4 - 11.3 x10*3/uL    nRBC 0.0 0.0 - 0.0 /100 WBCs    RBC 3.35 (L) 4.00 - 5.20 x10*6/uL    Hemoglobin 9.5 (L) 12.0 - 16.0 g/dL    Hematocrit 31.1 (L) 36.0 - 46.0 %    MCV 93 80 - 100 fL    MCH 28.4 26.0 - 34.0 pg    MCHC 30.5 (L) 32.0 - 36.0 g/dL    RDW 21.6 (H) 11.5 - 14.5 %    Platelets 263 150 - 450 x10*3/uL    Neutrophils % 67.0 40.0 - 80.0 %    Immature Granulocytes %, Automated 0.5 0.0 - 0.9 %    Lymphocytes % 20.6 13.0 - 44.0 %    Monocytes % 11.5 2.0 - 10.0 %    Eosinophils % 0.2 0.0 - 6.0 %    Basophils % 0.2 0.0 - 2.0 %    Neutrophils Absolute 4.14 1.20 - 7.70 x10*3/uL    Immature Granulocytes Absolute, Automated 0.03 0.00 - 0.70 x10*3/uL    Lymphocytes Absolute 1.27 1.20 - 4.80 x10*3/uL    Monocytes Absolute 0.71 0.10 - 1.00 x10*3/uL    Eosinophils Absolute  0.01 0.00 - 0.70 x10*3/uL    Basophils Absolute 0.01 0.00 - 0.10 x10*3/uL   Comprehensive Metabolic Panel   Result Value Ref Range    Glucose 89 74 - 99 mg/dL    Sodium 137 136 - 145 mmol/L    Potassium 3.4 (L) 3.5 - 5.3 mmol/L    Chloride 102 98 - 107 mmol/L    Bicarbonate 28 21 - 32 mmol/L    Anion Gap 10 10 - 20 mmol/L    Urea Nitrogen 11 6 - 23 mg/dL    Creatinine 0.49 (L) 0.50 - 1.05 mg/dL    eGFR >90 >60 mL/min/1.73m*2    Calcium 9.1 8.6 - 10.3 mg/dL    Albumin 3.1 (L) 3.4 - 5.0 g/dL    Alkaline Phosphatase 63 33 - 110 U/L    Total Protein 6.1 (L) 6.4 - 8.2 g/dL    AST 15 9 - 39 U/L    Bilirubin, Total 1.5 (H) 0.0 - 1.2 mg/dL    ALT 19 7 - 45 U/L   SST TOP   Result Value Ref Range    Extra Tube Hold for add-ons.    Morphology   Result Value Ref Range    RBC Morphology See Below     Polychromasia Mild     Ovalocytes Few     Koki Cells Few    POCT GLUCOSE   Result Value Ref Range    POCT Glucose 103 (H) 74 - 99 mg/dL   POCT GLUCOSE   Result Value Ref Range    POCT Glucose 168 (H) 74 - 99 mg/dL   Protime-INR   Result Value Ref Range    Protime 18.8 (H) 9.8 - 12.4 seconds    INR 1.7 (H) 0.9 - 1.1   Lavender Top   Result Value Ref Range    Extra Tube Hold for add-ons.    SST TOP   Result Value Ref Range    Extra Tube Hold for add-ons.      *Note: Due to a large number of results and/or encounters for the requested time period, some results have not been displayed. A complete set of results can be found in Results Review.      Patient recently received an antibiotic (last 12 hours)       Date/Time Action Medication Dose    03/17/25 1300 Given    vancomycin (Vancocin) capsule 250 mg 250 mg    03/17/25 0602 Given    vancomycin (Vancocin) capsule 250 mg 250 mg           No results found for the last 90 days.       Continue aggressive pulmonary hygiene and oral hygiene. Off loading as tolerated for skin integrity.  Plan discussed with interdisciplinary team, seen by orthopedics today - the fasciotomy incision is  healing, however there are areas of dehiscence which should heal by secondary intention.  I recommend keeping the wound clean, covered and monitor for infection.ok to discharge, will continue current and repeat labs in the AM if patient still hospitalized. Patient aware and agreeable to current plan, continue plan as above.     I spent 30 minutes on the date of the service which included preparing to see the patient, face-to-face patient care, completing clinical documentation, obtaining and/or reviewing separately obtained history, performing a medically appropriate examination, counseling and educating the patient/family/caregiver, ordering medications, tests, or procedures, communicating with other HCPs (not separately reported), independently interpreting results (not separately reported), communicating results to the patient/family/caregiver, and care coordination (not separately reported    Discharge Meds     Medication List      START taking these medications     acetaminophen 325 mg tablet; Commonly known as: Tylenol; Take 2 tablets   (650 mg) by mouth every 4 hours if needed for mild pain (1 - 3).   * atorvastatin 80 mg tablet; Commonly known as: Lipitor; Take 1 tablet   (80 mg) by mouth once daily.   * atorvastatin 80 mg tablet; Commonly known as: Lipitor; Take 1 tablet   (80 mg) by mouth once daily at bedtime.   budesonide 0.5 mg/2 mL nebulizer solution; Commonly known as: Pulmicort;   Take 2 mL (0.5 mg) by nebulization 2 times a day. Rinse mouth with water   after use to reduce aftertaste and incidence of candidiasis. Do not   swallow.   insulin lispro 100 unit/mL injection; Inject 0-5 Units under the skin 3   times a day before meals. Take as directed per insulin instructions.   ipratropium-albuteroL 0.5-2.5 mg/3 mL nebulizer solution; Commonly known   as: Duo-Neb; Take 3 mL by nebulization 3 times a day.   melatonin 3 mg tablet; Take 1 tablet (3 mg) by mouth as needed at   bedtime for sleep.   oxygen  gas therapy; Commonly known as: O2; Inhale 4 L/min every 12   hours.   pantoprazole 40 mg EC tablet; Commonly known as: ProtoNix; Take 1 tablet   (40 mg) by mouth once daily. Do not crush, chew, or split.   vancomycin 250 mg capsule; Commonly known as: Vancocin; Take 1 capsule   (250 mg) by mouth 4 times a day.  * This list has 2 medication(s) that are the same as other medications   prescribed for you. Read the directions carefully, and ask your doctor or   other care provider to review them with you.     CHANGE how you take these medications     warfarin 2 mg tablet; Commonly known as: Coumadin; Take as directed. If   you are unsure how to take this medication, talk to your nurse or doctor.;   Original instructions: Take as directed per After Visit Summary.; What   changed: additional instructions     CONTINUE taking these medications     aspirin 81 mg EC tablet   buPROPion  mg 24 hr tablet; Commonly known as: Wellbutrin XL; Take   1 tablet (150 mg) by mouth once daily in the morning. Do not crush, chew,   or split.   carvedilol 25 mg tablet; Commonly known as: Coreg; Take 1 tablet (25 mg)   by mouth 2 times a day.   escitalopram 20 mg tablet; Commonly known as: Lexapro; Take 1 tablet (20   mg) by mouth once daily.   lisinopril 20 mg tablet; Take 1 tablet (20 mg) by mouth once daily.     STOP taking these medications     acetaminophen-codeine 300-30 mg tablet; Commonly known as: Tylenol w/   Codeine #3   albuterol 90 mcg/actuation inhaler   varenicline tartrate 1 mg tablet; Commonly known as: Chantix       Test Results Pending At Discharge  Pending Labs       No current pending labs.            Hospital Course         Maurizio Read MD   Physician  Internal Medicine     Progress Notes     Signed     Date of Service: 3/15/2025  5:35 PM     Signed       Expand All Collapse All    Kayley Lynch is a 54 y.o. female on day 17 of admission presenting with Nausea vomiting and diarrhea.            Subjective  Patient fully evaluated  03/15  for    Problem List Items Addressed This Visit         * (Principal) Nausea vomiting and diarrhea     Influenza A with pneumonia - Primary     Relevant Medications     acetaminophen (Tylenol) 325 mg tablet     budesonide (Pulmicort) 0.5 mg/2 mL nebulizer solution     insulin lispro 100 unit/mL injection     ipratropium-albuteroL (Duo-Neb) 0.5-2.5 mg/3 mL nebulizer solution     melatonin 3 mg tablet     oxygen (O2) gas therapy     pantoprazole (ProtoNix) 40 mg EC tablet     vancomycin (Vancocin) 250 mg capsule     warfarin (Coumadin) 2 mg tablet     atorvastatin (Lipitor) 80 mg tablet     Nontraumatic compartment syndrome of left upper extremity     Relevant Orders     Case Request Operating Room: FASCIOTOMY, UPPER EXTREMITY (Completed)     Case Request Operating Room: DEBRIDEMENT, WOUND, UPPER EXTREMITY (Completed)      Other Visit Diagnoses         Acute kidney injury (CMS-HCC)         Dehydration         Influenza A         Shock (Multi)         Relevant Orders     Transthoracic Echo (TTE) Complete (Completed)             Patient seen resting in bed with head of bed elevated, no s/s or c/o acute difficulties at this time.  Vital signs for last 24 hours Temp:  [35.8 °C (96.4 °F)-36.9 °C (98.4 °F)] 36.7 °C (98.1 °F)  Heart Rate:  [] 103  Resp:  [18] 18  BP: ()/(50-74) 120/59  FiO2 (%):  [28 %] 28 %    No intake/output data recorded.  Patient still requiring frequent cardiac and SPO2 monitoring. Continue aggressive pulmonary hygiene and oral hygiene. Off loading as tolerated for skin integrity. Medications and labs reviewed-         Results for orders placed or performed during the hospital encounter of 02/25/25 (from the past 24 hours)   Protime-INR   Result Value Ref Range     Protime 54.0 (H) 9.8 - 12.4 seconds     INR 4.9 (H) 0.9 - 1.1   POCT GLUCOSE   Result Value Ref Range     POCT Glucose 115 (H) 74 - 99 mg/dL   POCT GLUCOSE   Result Value Ref Range      POCT Glucose 87 74 - 99 mg/dL   CBC and Auto Differential   Result Value Ref Range     WBC 7.5 4.4 - 11.3 x10*3/uL     nRBC 0.0 0.0 - 0.0 /100 WBCs     RBC 3.15 (L) 4.00 - 5.20 x10*6/uL     Hemoglobin 8.7 (L) 12.0 - 16.0 g/dL     Hematocrit 29.0 (L) 36.0 - 46.0 %     MCV 92 80 - 100 fL     MCH 27.6 26.0 - 34.0 pg     MCHC 30.0 (L) 32.0 - 36.0 g/dL     RDW 20.7 (H) 11.5 - 14.5 %     Platelets 276 150 - 450 x10*3/uL     Neutrophils % 72.9 40.0 - 80.0 %     Immature Granulocytes %, Automated 0.5 0.0 - 0.9 %     Lymphocytes % 16.5 13.0 - 44.0 %     Monocytes % 10.0 2.0 - 10.0 %     Eosinophils % 0.0 0.0 - 6.0 %     Basophils % 0.1 0.0 - 2.0 %     Neutrophils Absolute 5.44 1.20 - 7.70 x10*3/uL     Immature Granulocytes Absolute, Automated 0.04 0.00 - 0.70 x10*3/uL     Lymphocytes Absolute 1.23 1.20 - 4.80 x10*3/uL     Monocytes Absolute 0.75 0.10 - 1.00 x10*3/uL     Eosinophils Absolute 0.00 0.00 - 0.70 x10*3/uL     Basophils Absolute 0.01 0.00 - 0.10 x10*3/uL   Comprehensive Metabolic Panel   Result Value Ref Range     Glucose 88 74 - 99 mg/dL     Sodium 136 136 - 145 mmol/L     Potassium 3.6 3.5 - 5.3 mmol/L     Chloride 101 98 - 107 mmol/L     Bicarbonate 30 21 - 32 mmol/L     Anion Gap 9 (L) 10 - 20 mmol/L     Urea Nitrogen 8 6 - 23 mg/dL     Creatinine 0.42 (L) 0.50 - 1.05 mg/dL     eGFR >90 >60 mL/min/1.73m*2     Calcium 9.0 8.6 - 10.3 mg/dL     Albumin 3.1 (L) 3.4 - 5.0 g/dL     Alkaline Phosphatase 58 33 - 110 U/L     Total Protein 6.1 (L) 6.4 - 8.2 g/dL     AST 15 9 - 39 U/L     Bilirubin, Total 1.7 (H) 0.0 - 1.2 mg/dL     ALT 16 7 - 45 U/L   Protime-INR   Result Value Ref Range     Protime 44.3 (H) 9.8 - 12.4 seconds     INR 4.0 (H) 0.9 - 1.1   Morphology   Result Value Ref Range     RBC Morphology See Below       Polychromasia Mild       RBC Fragments Few       Target Cells Few       Ovalocytes Few       Basophilic Stippling Present       Giant Platelets Few     POCT GLUCOSE   Result Value Ref Range     POCT  Glucose 88 74 - 99 mg/dL   POCT GLUCOSE   Result Value Ref Range     POCT Glucose 107 (H) 74 - 99 mg/dL      Patient recently received an antibiotic (last 12 hours)         Date/Time Action Medication Dose     03/15/25 1211 Given    vancomycin (Vancocin) capsule 250 mg 250 mg             Plan discussed with interdisciplinary team, INR elevated , coumadin on hold, will recheck labs in the AM. Patient improving, awaiting precert for  Acute Rehab. Willl continue current and repeat labs in the AM.      Discharge planning discussed with patient and care team. Patient aware and agreeable to current plan, continue plan as above.      I spent a total of 50 minutes on the date of the service which included preparing to see the patient, face-to-face patient care, completing clinical documentation, obtaining and/or reviewing separately obtained history, performing a medically appropriate examination, counseling and educating the patient/family/caregiver, ordering medications, tests, or procedures, communicating with other HCPs (not separately reported), independently interpreting results (not separately reported), communicating results to the patient/family/caregiver, and care coordination (not separately reported).               Objective  Last Recorded Vitals  /59 (BP Location: Right arm, Patient Position: Sitting)   Pulse 103   Temp 36.7 °C (98.1 °F) (Temporal)   Resp 18   Wt 66.5 kg (146 lb 9.7 oz)   SpO2 93%   Intake/Output last 3 Shifts:  No intake or output data in the 24 hours ending 03/15/25 1736     Admission Weight  Weight: 59 kg (130 lb) (02/26/25 0159)     Daily Weight  03/07/25 : 66.5 kg (146 lb 9.7 oz)     Image Results  Electrocardiogram, 12-lead PRN ACS symptoms  Sinus tachycardia with irregular rate  Consider left ventricular hypertrophy  Borderline T abnormalities, diffuse leads     Confirmed by Shane Dunbar ([id number]) on 3/11/2025 11:44:57 AM        Physical Exam     Relevant Results                        Assessment/Plan  This patient currently has cardiac telemetry ordered; if you would like to modify or discontinue the telemetry order, click hereto go to the orders activity to modify/discontinue the order.                       Assessment & Plan  Nausea vomiting and diarrhea     Influenza A with pneumonia        Septic shock, dehydration, hypercapnic respiratory failure, and dehydration.  Nontraumatic compartment syndrome of left upper extremity           Maurizio Read MD   Physician  Internal Medicine     Discharge Summary     Signed     Date of Service: 3/14/2025  2:38 PM      Signed          Discharge Diagnosis  Nausea vomiting and diarrhea     Issues Requiring Follow-Up  Patient fully evaluated 03/14   for   Assessment & Plan  Nausea vomiting and diarrhea     Influenza A with pneumonia        Septic shock, dehydration, hypercapnic respiratory failure, and dehydration.  Nontraumatic compartment syndrome of left upper extremity     No acute events overnight, seen by orthopedic surgery today for suture removal. Patient with significant clinical improvement noted, patient medically cleared for discharge today. Patient seen resting in bed with head of bed elevated, no s/s or c/o acute difficulties at this time. Vital signs for last 24 hours:  Temp:  [36 °C (96.8 °F)-36.7 °C (98.1 °F)] 36 °C (96.8 °F)  Heart Rate:  [81-98] 82  Resp:  [18-20] 18  BP: (101-142)/(53-72) 134/64  FiO2 (%):  [28 %] 28 % Medications and labs reviewed-             Results for orders placed or performed during the hospital encounter of 02/25/25 (from the past 24 hours)   POCT GLUCOSE   Result Value Ref Range     POCT Glucose 96 74 - 99 mg/dL   Heparin Assay   Result Value Ref Range     Heparin Unfractionated 0.2 See Comment Below for Therapeutic Ranges IU/mL   Protime-INR   Result Value Ref Range     Protime 31.7 (H) 9.8 - 12.4 seconds     INR 2.9 (H) 0.9 - 1.1   POCT GLUCOSE   Result Value Ref Range     POCT Glucose 116 (H)  74 - 99 mg/dL   CBC and Auto Differential   Result Value Ref Range     WBC 7.3 4.4 - 11.3 x10*3/uL     nRBC 0.0 0.0 - 0.0 /100 WBCs     RBC 3.20 (L) 4.00 - 5.20 x10*6/uL     Hemoglobin 9.0 (L) 12.0 - 16.0 g/dL     Hematocrit 29.5 (L) 36.0 - 46.0 %     MCV 92 80 - 100 fL     MCH 28.1 26.0 - 34.0 pg     MCHC 30.5 (L) 32.0 - 36.0 g/dL     RDW 19.9 (H) 11.5 - 14.5 %     Platelets 280 150 - 450 x10*3/uL     Neutrophils % 75.7 40.0 - 80.0 %     Immature Granulocytes %, Automated 0.4 0.0 - 0.9 %     Lymphocytes % 13.2 13.0 - 44.0 %     Monocytes % 10.6 2.0 - 10.0 %     Eosinophils % 0.0 0.0 - 6.0 %     Basophils % 0.1 0.0 - 2.0 %     Neutrophils Absolute 5.52 1.20 - 7.70 x10*3/uL     Immature Granulocytes Absolute, Automated 0.03 0.00 - 0.70 x10*3/uL     Lymphocytes Absolute 0.96 (L) 1.20 - 4.80 x10*3/uL     Monocytes Absolute 0.77 0.10 - 1.00 x10*3/uL     Eosinophils Absolute 0.00 0.00 - 0.70 x10*3/uL     Basophils Absolute 0.01 0.00 - 0.10 x10*3/uL   Comprehensive Metabolic Panel   Result Value Ref Range     Glucose 94 74 - 99 mg/dL     Sodium 138 136 - 145 mmol/L     Potassium 3.4 (L) 3.5 - 5.3 mmol/L     Chloride 101 98 - 107 mmol/L     Bicarbonate 32 21 - 32 mmol/L     Anion Gap 8 (L) 10 - 20 mmol/L     Urea Nitrogen 7 6 - 23 mg/dL     Creatinine 0.37 (L) 0.50 - 1.05 mg/dL     eGFR >90 >60 mL/min/1.73m*2     Calcium 9.0 8.6 - 10.3 mg/dL     Albumin 3.0 (L) 3.4 - 5.0 g/dL     Alkaline Phosphatase 52 33 - 110 U/L     Total Protein 5.9 (L) 6.4 - 8.2 g/dL     AST 14 9 - 39 U/L     Bilirubin, Total 1.9 (H) 0.0 - 1.2 mg/dL     ALT 16 7 - 45 U/L   Protime-INR   Result Value Ref Range     Protime 43.2 (H) 9.8 - 12.4 seconds     INR 3.9 (H) 0.9 - 1.1   POCT GLUCOSE   Result Value Ref Range     POCT Glucose 88 74 - 99 mg/dL   POCT GLUCOSE   Result Value Ref Range     POCT Glucose 222 (H) 74 - 99 mg/dL      Patient recently received an antibiotic (last 12 hours)         Date/Time Action Medication Dose     03/14/25 1303 Given     vancomycin (Vancocin) capsule 250 mg 250 mg     03/14/25 0632 Given    vancomycin (Vancocin) capsule 250 mg 250 mg             No results found for the last 90 days.        Continue aggressive pulmonary hygiene and oral hygiene. Off loading as tolerated for skin integrity.  Plan discussed with interdisciplinary team, ok to discharge, will continue current and repeat labs in the AM if patient still hospitalized. Patient aware and agreeable to current plan, continue plan as above.      I spent 30 minutes on the date of the service which included preparing to see the patient, face-to-face patient care, completing clinical documentation, obtaining and/or reviewing separately obtained history, performing a medically appropriate examination, counseling and educating the patient/family/caregiver, ordering medications, tests, or procedures, communicating with other HCPs (not separately reported), independently interpreting results (not separately reported), communicating results to the patient/family/caregiver, and care coordination (not separately reported     Discharge Meds     Medication List      ASK your doctor about these medications     acetaminophen-codeine 300-30 mg tablet; Commonly known as: Tylenol w/   Codeine #3   albuterol 90 mcg/actuation inhaler; Inhale 2 puffs every 6 hours if   needed for wheezing.   aspirin 81 mg EC tablet   atorvastatin 80 mg tablet; Commonly known as: Lipitor; Take 1 tablet (80   mg) by mouth once daily.; Ask about: Which instructions should I use?   buPROPion  mg 24 hr tablet; Commonly known as: Wellbutrin XL; Take   1 tablet (150 mg) by mouth once daily in the morning. Do not crush, chew,   or split.   carvedilol 25 mg tablet; Commonly known as: Coreg; Take 1 tablet (25 mg)   by mouth 2 times a day.   escitalopram 20 mg tablet; Commonly known as: Lexapro; Take 1 tablet (20   mg) by mouth once daily.   lisinopril 20 mg tablet; Take 1 tablet (20 mg) by mouth once daily.    varenicline tartrate 1 mg tablet; Commonly known as: Chantix; Take 1   tablet (1 mg) by mouth 2 times a day. Take with full glass of water.   warfarin 2 mg tablet; Commonly known as: Coumadin; Take as directed. If   you are unsure how to take this medication, talk to your nurse or doctor.;   Original instructions: Take 2 tablets (4 mg) by mouth on Mondays and   Saturdays, and take 1 tablet (2 mg) on all other days of week or as   directed by anticoagulation clinic        Test Results Pending At Discharge  Pending Labs         No current pending labs.                Hospital Course         Maurizio Read MD   Physician  Internal Medicine     Progress Notes     Signed     Date of Service: 3/13/2025  3:29 PM      Signed        Expand All Collapse All    Kayley Lynch is a 54 y.o. female on day 15 of admission presenting with Nausea vomiting and diarrhea.           Subjective  No events overnight.  Patient seen and examined at bedside with  present.  Hospitalization and treatment plan reviewed with  at length.  Patient has no complaints.              Objective  Last Recorded Vitals  /80 (BP Location: Right leg, Patient Position: Lying)   Pulse 85   Temp 36.3 °C (97.3 °F) (Temporal)   Resp 18   Wt 66.5 kg (146 lb 9.7 oz)   SpO2 99%   Intake/Output last 3 Shifts:     Intake/Output Summary (Last 24 hours) at 3/13/2025 1529  Last data filed at 3/13/2025 1300        Gross per 24 hour   Intake --   Output 901 ml   Net -901 ml         Admission Weight  Weight: 59 kg (130 lb) (02/26/25 0159)     Daily Weight  03/07/25 : 66.5 kg (146 lb 9.7 oz)     Image Results  Electrocardiogram, 12-lead PRN ACS symptoms  Sinus tachycardia with irregular rate  Consider left ventricular hypertrophy  Borderline T abnormalities, diffuse leads     Confirmed by Shane Dunbar ([id number]) on 3/11/2025 11:44:57 AM        Physical Exam  HENT:      Head: Normocephalic and atraumatic.      Nose: Nose normal.      Mouth/Throat:       Mouth: Mucous membranes are moist.      Pharynx: Oropharynx is clear.   Eyes:      Extraocular Movements: Extraocular movements intact.      Pupils: Pupils are equal, round, and reactive to light.   Cardiovascular:      Rate and Rhythm: Normal rate and regular rhythm.   Pulmonary:      Effort: No respiratory distress.      Breath sounds: Wheezing present. No rhonchi or rales.   Abdominal:      General: Bowel sounds are normal. There is no distension.      Palpations: Abdomen is soft.      Tenderness: There is no abdominal tenderness. There is no guarding.   Musculoskeletal:      Right lower leg: No edema.      Left lower leg: No edema.   Skin:     General: Skin is warm and dry.   Neurological:      Mental Status: She is alert. Mental status is at baseline.            Relevant Results                       Assessment/Plan  This patient currently has cardiac telemetry ordered; if you would like to modify or discontinue the telemetry order, click hereto go to the orders activity to modify/discontinue the order.        This patient has a urinary catheter              Reason for the urinary catheter remaining today? critically ill patient who need accurate urinary output measurements        Assessment & Plan  Nausea vomiting and diarrhea     Influenza A with pneumonia        Septic shock, dehydration, hypercapnic respiratory failure, and dehydration.  Nontraumatic compartment syndrome of left upper extremity        Patient is a 54-year-old female with past medical history of COPD, CAD status post CABG, pulmonary hypertension, hyperlipidemia, cardiomyopathy, a flutter status post ablation, and mitral valve replacement who presented with malaise.  She was found to be in septic shock and admitted to the ICU.     Septic shock  Hypercapnic respiratory failure  COPD exacerbation  Influenza A  Pneumonia  Acute metabolic encephalopathy  C. difficile colitis  Dehydration  H[facility]     -Continue supplemental oxygen  -Continue  antibiotics, currently on doxycycline  -Continue nebulizers  -Continue steroids  -Continue Tamiflu  -Continue p.o. vancomycin  -Continue home meds  -Monitor INR     3/2: Patient developed a large hematoma in the left arm leading to compartment syndrome. She was taken for urgent fasciotomies with Orthopedic Surgery. She is also being followed by Vascular Surgery. Vitamin K ordered for supratherapeutic INR. Her INR was 2.2 yesterday with goal being 2.5-3.5 for her A-flutter s/p mitral valve replacement. Critical Care reconsulted. Hematology consulted, appreciate recs. Hold coumadin and lovenox.     3/3: INR reversed. Patient is on low intensity heparin drip. She was transfused 1 unit pRBCs. Monitor hemoglobin closely. Hematology has ordered a work up including DIC panel. Patient will need another debridement and irrigation in 24-48 hours per Orthopedics. She has also developed a retroperitoneal bleed.     3/4: INR is down to 1.0.  Leukocytosis improved from 15 down to 13.  Hemoglobin is down to 6.4.  Patient to be transfused another unit packed RBCs.  She will be going back to the OR today for further debridement, washout, possible closure. She will also have a CT angiogram to determine if there is any active bleeding causing her retroperitoneal bleed.  She remains on a heparin drip due to her valve.  Continue doxycycline and p.o. Vanco.  Patient fully evaluated on March 5.  Patient restarted on heparin.  Fasciotomy site for compartment syndrome appears clear.  Continue to monitor lab work.  Case discussed with  at bedside.  No active bleeding is noted at this time.  Recheck labs in AM.     Patient fully evaluated  03/07  for    Problem List Items Addressed This Visit                     Gastrointestinal and Abdominal     * (Principal) Nausea vomiting and diarrhea           Musculoskeletal and Injuries     Nontraumatic compartment syndrome of left upper extremity     Relevant Orders     Case Request Operating  Room: FASCIOTOMY, UPPER EXTREMITY (Completed)     Case Request Operating Room: DEBRIDEMENT, WOUND, UPPER EXTREMITY (Completed)           Pulmonary and Pneumonias     Influenza A with pneumonia - Primary      Other Visit Diagnoses         Acute kidney injury (CMS-HCC)         Dehydration         Influenza A         Shock (Multi)         Relevant Orders     Transthoracic Echo (TTE) Complete (Completed)             Patient seen resting in bed with head of bed elevated, no s/s or c/o acute difficulties at this time.  Vital signs for last 24 hours Temp:  [35.9 °C (96.6 °F)-36.6 °C (97.9 °F)] 36.3 °C (97.3 °F)  Heart Rate:  [70-93] 85  Resp:  [16-18] 18  BP: (120-169)/(56-92) 164/80  FiO2 (%):  [28 %-32 %] 28 %    I/O this shift:  In: -   Out: 1 [Stool:1]  Patient still requiring frequent cardiac and SPO2 monitoring. Continue aggressive pulmonary hygiene and oral hygiene. Off loading as tolerated for skin integrity. Medications and labs reviewed-             Results for orders placed or performed during the hospital encounter of 02/25/25 (from the past 24 hours)   Protime-INR   Result Value Ref Range     Protime 19.4 (H) 9.8 - 12.4 seconds     INR 1.8 (H) 0.9 - 1.1   POCT GLUCOSE   Result Value Ref Range     POCT Glucose 136 (H) 74 - 99 mg/dL   POCT GLUCOSE   Result Value Ref Range     POCT Glucose 144 (H) 74 - 99 mg/dL   CBC and Auto Differential   Result Value Ref Range     WBC 7.4 4.4 - 11.3 x10*3/uL     nRBC 0.0 0.0 - 0.0 /100 WBCs     RBC 3.13 (L) 4.00 - 5.20 x10*6/uL     Hemoglobin 8.7 (L) 12.0 - 16.0 g/dL     Hematocrit 28.3 (L) 36.0 - 46.0 %     MCV 90 80 - 100 fL     MCH 27.8 26.0 - 34.0 pg     MCHC 30.7 (L) 32.0 - 36.0 g/dL     RDW 19.9 (H) 11.5 - 14.5 %     Platelets 247 150 - 450 x10*3/uL     Neutrophils % 77.0 40.0 - 80.0 %     Immature Granulocytes %, Automated 0.5 0.0 - 0.9 %     Lymphocytes % 12.9 13.0 - 44.0 %     Monocytes % 9.4 2.0 - 10.0 %     Eosinophils % 0.1 0.0 - 6.0 %     Basophils % 0.1 0.0 - 2.0  %     Neutrophils Absolute 5.66 1.20 - 7.70 x10*3/uL     Immature Granulocytes Absolute, Automated 0.04 0.00 - 0.70 x10*3/uL     Lymphocytes Absolute 0.95 (L) 1.20 - 4.80 x10*3/uL     Monocytes Absolute 0.69 0.10 - 1.00 x10*3/uL     Eosinophils Absolute 0.01 0.00 - 0.70 x10*3/uL     Basophils Absolute 0.01 0.00 - 0.10 x10*3/uL   Comprehensive Metabolic Panel   Result Value Ref Range     Glucose 95 74 - 99 mg/dL     Sodium 138 136 - 145 mmol/L     Potassium 3.1 (L) 3.5 - 5.3 mmol/L     Chloride 100 98 - 107 mmol/L     Bicarbonate 30 21 - 32 mmol/L     Anion Gap 11 10 - 20 mmol/L     Urea Nitrogen 8 6 - 23 mg/dL     Creatinine 0.32 (L) 0.50 - 1.05 mg/dL     eGFR >90 >60 mL/min/1.73m*2     Calcium 8.6 8.6 - 10.3 mg/dL     Albumin 2.8 (L) 3.4 - 5.0 g/dL     Alkaline Phosphatase 45 33 - 110 U/L     Total Protein 5.6 (L) 6.4 - 8.2 g/dL     AST 13 9 - 39 U/L     Bilirubin, Total 1.9 (H) 0.0 - 1.2 mg/dL     ALT 14 7 - 45 U/L   Protime-INR   Result Value Ref Range     Protime 23.6 (H) 9.8 - 12.4 seconds     INR 2.1 (H) 0.9 - 1.1   POCT GLUCOSE   Result Value Ref Range     POCT Glucose 93 74 - 99 mg/dL   POCT GLUCOSE   Result Value Ref Range     POCT Glucose 132 (H) 74 - 99 mg/dL   Heparin Assay   Result Value Ref Range     Heparin Unfractionated 0.1 See Comment Below for Therapeutic Ranges IU/mL      *Note: Due to a large number of results and/or encounters for the requested time period, some results have not been displayed. A complete set of results can be found in Results Review.      Patient recently received an antibiotic (last 12 hours)         Date/Time Action Medication Dose     03/07/25 1252 Given    vancomycin (Firvanq) solution 250 mg 250 mg     03/07/25 0602 Given    vancomycin (Firvanq) solution 250 mg 250 mg             Plan discussed with interdisciplinary team, seen by orthopedics today - dressing changes as needed,   suture removal on March 18, recommended shoulder elbow and hand range of motion with therapy  ordered. Will continue current and repeat labs in the AM. CXR shows improved infiltrate at the left lower lung, strict intake and output, trend hemoglobin.     Discharge planning discussed with patient and care team. Therapy evaluations ordered. [facility] -11 anticipate HHC/SNF at discharge. Patient aware and agreeable to current plan, continue plan as above.      I spent a total of 50 minutes on the date of the service which included preparing to see the patient, face-to-face patient care, completing clinical documentation, obtaining and/or reviewing separately obtained history, performing a medically appropriate examination, counseling and educating the patient/family/caregiver, ordering medications, tests, or procedures, communicating with other HCPs (not separately reported), independently interpreting results (not separately reported), communicating results to the patient/family/caregiver, and care coordination (not separately reported).      Patient fully evaluated  03/08  for    Problem List Items Addressed This Visit                     Gastrointestinal and Abdominal     * (Principal) Nausea vomiting and diarrhea           Musculoskeletal and Injuries     Nontraumatic compartment syndrome of left upper extremity     Relevant Orders     Case Request Operating Room: FASCIOTOMY, UPPER EXTREMITY (Completed)     Case Request Operating Room: DEBRIDEMENT, WOUND, UPPER EXTREMITY (Completed)           Pulmonary and Pneumonias     Influenza A with pneumonia - Primary      Other Visit Diagnoses         Acute kidney injury (CMS-HCC)         Dehydration         Influenza A         Shock (Multi)         Relevant Orders     Transthoracic Echo (TTE) Complete (Completed)             Patient seen resting in bed with head of bed elevated, no s/s or c/o acute difficulties at this time.  Vital signs for last 24 hours Temp:  [35.9 °C (96.6 °F)-36.6 °C (97.9 °F)] 36.3 °C (97.3 °F)  Heart Rate:  [70-93] 85  Resp:  [16-18] 18  BP:  (120-169)/(56-92) 164/80  FiO2 (%):  [28 %-32 %] 28 %    I/O this shift:  In: -   Out: 1 [Stool:1]  Patient still requiring frequent cardiac and SPO2 monitoring. Continue aggressive pulmonary hygiene and oral hygiene. Off loading as tolerated for skin integrity. Medications and labs reviewed-             Results for orders placed or performed during the hospital encounter of 02/25/25 (from the past 24 hours)   Protime-INR   Result Value Ref Range     Protime 19.4 (H) 9.8 - 12.4 seconds     INR 1.8 (H) 0.9 - 1.1   POCT GLUCOSE   Result Value Ref Range     POCT Glucose 136 (H) 74 - 99 mg/dL   POCT GLUCOSE   Result Value Ref Range     POCT Glucose 144 (H) 74 - 99 mg/dL   CBC and Auto Differential   Result Value Ref Range     WBC 7.4 4.4 - 11.3 x10*3/uL     nRBC 0.0 0.0 - 0.0 /100 WBCs     RBC 3.13 (L) 4.00 - 5.20 x10*6/uL     Hemoglobin 8.7 (L) 12.0 - 16.0 g/dL     Hematocrit 28.3 (L) 36.0 - 46.0 %     MCV 90 80 - 100 fL     MCH 27.8 26.0 - 34.0 pg     MCHC 30.7 (L) 32.0 - 36.0 g/dL     RDW 19.9 (H) 11.5 - 14.5 %     Platelets 247 150 - 450 x10*3/uL     Neutrophils % 77.0 40.0 - 80.0 %     Immature Granulocytes %, Automated 0.5 0.0 - 0.9 %     Lymphocytes % 12.9 13.0 - 44.0 %     Monocytes % 9.4 2.0 - 10.0 %     Eosinophils % 0.1 0.0 - 6.0 %     Basophils % 0.1 0.0 - 2.0 %     Neutrophils Absolute 5.66 1.20 - 7.70 x10*3/uL     Immature Granulocytes Absolute, Automated 0.04 0.00 - 0.70 x10*3/uL     Lymphocytes Absolute 0.95 (L) 1.20 - 4.80 x10*3/uL     Monocytes Absolute 0.69 0.10 - 1.00 x10*3/uL     Eosinophils Absolute 0.01 0.00 - 0.70 x10*3/uL     Basophils Absolute 0.01 0.00 - 0.10 x10*3/uL   Comprehensive Metabolic Panel   Result Value Ref Range     Glucose 95 74 - 99 mg/dL     Sodium 138 136 - 145 mmol/L     Potassium 3.1 (L) 3.5 - 5.3 mmol/L     Chloride 100 98 - 107 mmol/L     Bicarbonate 30 21 - 32 mmol/L     Anion Gap 11 10 - 20 mmol/L     Urea Nitrogen 8 6 - 23 mg/dL     Creatinine 0.32 (L) 0.50 - 1.05 mg/dL      eGFR >90 >60 mL/min/1.73m*2     Calcium 8.6 8.6 - 10.3 mg/dL     Albumin 2.8 (L) 3.4 - 5.0 g/dL     Alkaline Phosphatase 45 33 - 110 U/L     Total Protein 5.6 (L) 6.4 - 8.2 g/dL     AST 13 9 - 39 U/L     Bilirubin, Total 1.9 (H) 0.0 - 1.2 mg/dL     ALT 14 7 - 45 U/L   Protime-INR   Result Value Ref Range     Protime 23.6 (H) 9.8 - 12.4 seconds     INR 2.1 (H) 0.9 - 1.1   POCT GLUCOSE   Result Value Ref Range     POCT Glucose 93 74 - 99 mg/dL   POCT GLUCOSE   Result Value Ref Range     POCT Glucose 132 (H) 74 - 99 mg/dL   Heparin Assay   Result Value Ref Range     Heparin Unfractionated 0.1 See Comment Below for Therapeutic Ranges IU/mL      Patient recently received an antibiotic (last 12 hours)         Date/Time Action Medication Dose     03/08/25 1220 Given    vancomycin (Vancocin) capsule 250 mg 250 mg             Plan discussed with interdisciplinary team, seen by cardiology today, will bridge patient to coumadin, ptt/inr in am, appreciate input and agree with plan. JESSICA dressing is clean, dry and intact, swelling is noted, warm and well-perfused.  She is able to flex and extend the fingers and thumb. Will continue current and repeat labs in the AM.     Patient fully evaluated on March 9 and clinically improved.  Recheck labs in AM.  INR is slowly improving.  Discontinue heparin when INR is therapeutic for mechanical valve.  Appreciate orthopedic and cardiology consultations.  Examination of area from compartment syndrome is clear without neurovascular compromise.  Case discussed with orthopedics at bedside     Discharge planning discussed with patient and care team. Patient aware and agreeable to current plan, continue plan as above.      I spent a total of 50 minutes on the date of the service which included preparing to see the patient, face-to-face patient care, completing clinical documentation, obtaining and/or reviewing separately obtained history, performing a medically appropriate examination,  counseling and educating the patient/family/caregiver, ordering medications, tests, or procedures, communicating with other HCPs (not separately reported), independently interpreting results (not separately reported), communicating results to the patient/family/caregiver, and care coordination (not separately reported).      Patient fully evaluated    for    Problem List Items Addressed This Visit                     Gastrointestinal and Abdominal     * (Principal) Nausea vomiting and diarrhea           Musculoskeletal and Injuries     Nontraumatic compartment syndrome of left upper extremity     Relevant Orders     Case Request Operating Room: FASCIOTOMY, UPPER EXTREMITY (Completed)     Case Request Operating Room: DEBRIDEMENT, WOUND, UPPER EXTREMITY (Completed)           Pulmonary and Pneumonias     Influenza A with pneumonia - Primary      Other Visit Diagnoses         Acute kidney injury (CMS-HCC)         Dehydration         Influenza A         Shock (Multi)         Relevant Orders     Transthoracic Echo (TTE) Complete (Completed)             Patient seen resting in bed with head of bed elevated, no s/s or c/o acute difficulties at this time.  Vital signs for last 24 hours Temp:  [35.9 °C (96.6 °F)-36.6 °C (97.9 °F)] 36.3 °C (97.3 °F)  Heart Rate:  [70-93] 85  Resp:  [16-18] 18  BP: (120-169)/(56-92) 164/80  FiO2 (%):  [28 %-32 %] 28 %    I/O this shift:  In: -   Out: 1 [Stool:1]  Patient still requiring frequent cardiac and SPO2 monitoring. Continue aggressive pulmonary hygiene and oral hygiene. Off loading as tolerated for skin integrity. Medications and labs reviewed-             Results for orders placed or performed during the hospital encounter of 02/25/25 (from the past 24 hours)   Protime-INR   Result Value Ref Range     Protime 19.4 (H) 9.8 - 12.4 seconds     INR 1.8 (H) 0.9 - 1.1   POCT GLUCOSE   Result Value Ref Range     POCT Glucose 136 (H) 74 - 99 mg/dL   POCT GLUCOSE   Result Value Ref Range      POCT Glucose 144 (H) 74 - 99 mg/dL   CBC and Auto Differential   Result Value Ref Range     WBC 7.4 4.4 - 11.3 x10*3/uL     nRBC 0.0 0.0 - 0.0 /100 WBCs     RBC 3.13 (L) 4.00 - 5.20 x10*6/uL     Hemoglobin 8.7 (L) 12.0 - 16.0 g/dL     Hematocrit 28.3 (L) 36.0 - 46.0 %     MCV 90 80 - 100 fL     MCH 27.8 26.0 - 34.0 pg     MCHC 30.7 (L) 32.0 - 36.0 g/dL     RDW 19.9 (H) 11.5 - 14.5 %     Platelets 247 150 - 450 x10*3/uL     Neutrophils % 77.0 40.0 - 80.0 %     Immature Granulocytes %, Automated 0.5 0.0 - 0.9 %     Lymphocytes % 12.9 13.0 - 44.0 %     Monocytes % 9.4 2.0 - 10.0 %     Eosinophils % 0.1 0.0 - 6.0 %     Basophils % 0.1 0.0 - 2.0 %     Neutrophils Absolute 5.66 1.20 - 7.70 x10*3/uL     Immature Granulocytes Absolute, Automated 0.04 0.00 - 0.70 x10*3/uL     Lymphocytes Absolute 0.95 (L) 1.20 - 4.80 x10*3/uL     Monocytes Absolute 0.69 0.10 - 1.00 x10*3/uL     Eosinophils Absolute 0.01 0.00 - 0.70 x10*3/uL     Basophils Absolute 0.01 0.00 - 0.10 x10*3/uL   Comprehensive Metabolic Panel   Result Value Ref Range     Glucose 95 74 - 99 mg/dL     Sodium 138 136 - 145 mmol/L     Potassium 3.1 (L) 3.5 - 5.3 mmol/L     Chloride 100 98 - 107 mmol/L     Bicarbonate 30 21 - 32 mmol/L     Anion Gap 11 10 - 20 mmol/L     Urea Nitrogen 8 6 - 23 mg/dL     Creatinine 0.32 (L) 0.50 - 1.05 mg/dL     eGFR >90 >60 mL/min/1.73m*2     Calcium 8.6 8.6 - 10.3 mg/dL     Albumin 2.8 (L) 3.4 - 5.0 g/dL     Alkaline Phosphatase 45 33 - 110 U/L     Total Protein 5.6 (L) 6.4 - 8.2 g/dL     AST 13 9 - 39 U/L     Bilirubin, Total 1.9 (H) 0.0 - 1.2 mg/dL     ALT 14 7 - 45 U/L   Protime-INR   Result Value Ref Range     Protime 23.6 (H) 9.8 - 12.4 seconds     INR 2.1 (H) 0.9 - 1.1   POCT GLUCOSE   Result Value Ref Range     POCT Glucose 93 74 - 99 mg/dL   POCT GLUCOSE   Result Value Ref Range     POCT Glucose 132 (H) 74 - 99 mg/dL   Heparin Assay   Result Value Ref Range     Heparin Unfractionated 0.1 See Comment Below for Therapeutic  Ranges IU/mL      Patient recently received an antibiotic (last 12 hours)         Date/Time Action Medication Dose     03/11/25 0617 Given    vancomycin (Vancocin) capsule 250 mg 250 mg     03/10/25 2140 Given    vancomycin (Vancocin) capsule 250 mg 250 mg             Pt fully evaluated 3/11. PT recommended high intensity level of clinical care. Potassium is 3.3 and gave Kcl and INR is 5.5 out of therapeutic range and protime is 61.3. Pt on 2L NC today. Plan discussed with interdisciplinary team, continue current and repeat labs in the AM.      Discharge planning discussed with patient and care team. Therapy evaluations ordered. [facility]-  , anticipate HHC/SNF at discharge. Patient aware and agreeable to current plan, continue plan as above.      I spent a total of 50 minutes on the date of the service which included preparing to see the patient, face-to-face patient care, completing clinical documentation, obtaining and/or reviewing separately obtained history, performing a medically appropriate examination, counseling and educating the patient/family/caregiver, ordering medications, tests, or procedures, communicating with other HCPs (not separately reported), independently interpreting results (not separately reported), communicating results to the patient/family/caregiver, and care coordination (not separately reported).      Patient fully evaluated    for    Problem List Items Addressed This Visit                     Gastrointestinal and Abdominal     * (Principal) Nausea vomiting and diarrhea           Musculoskeletal and Injuries     Nontraumatic compartment syndrome of left upper extremity     Relevant Orders     Case Request Operating Room: FASCIOTOMY, UPPER EXTREMITY (Completed)     Case Request Operating Room: DEBRIDEMENT, WOUND, UPPER EXTREMITY (Completed)           Pulmonary and Pneumonias     Influenza A with pneumonia - Primary      Other Visit Diagnoses         Acute kidney injury (CMS-[facility])          Dehydration         Influenza A         Shock (Multi)         Relevant Orders     Transthoracic Echo (TTE) Complete (Completed)             Patient seen resting in bed with head of bed elevated, no s/s or c/o acute difficulties at this time.  Vital signs for last 24 hours Temp:  [35.9 °C (96.6 °F)-36.6 °C (97.9 °F)] 36.3 °C (97.3 °F)  Heart Rate:  [70-93] 85  Resp:  [16-18] 18  BP: (120-169)/(56-92) 164/80  FiO2 (%):  [28 %-32 %] 28 %    I/O this shift:  In: -   Out: 1 [Stool:1]  Patient still requiring frequent cardiac and SPO2 monitoring. Continue aggressive pulmonary hygiene and oral hygiene. Off loading as tolerated for skin integrity. Medications and labs reviewed-             Results for orders placed or performed during the hospital encounter of 02/25/25 (from the past 24 hours)   Protime-INR   Result Value Ref Range     Protime 19.4 (H) 9.8 - 12.4 seconds     INR 1.8 (H) 0.9 - 1.1   POCT GLUCOSE   Result Value Ref Range     POCT Glucose 136 (H) 74 - 99 mg/dL   POCT GLUCOSE   Result Value Ref Range     POCT Glucose 144 (H) 74 - 99 mg/dL   CBC and Auto Differential   Result Value Ref Range     WBC 7.4 4.4 - 11.3 x10*3/uL     nRBC 0.0 0.0 - 0.0 /100 WBCs     RBC 3.13 (L) 4.00 - 5.20 x10*6/uL     Hemoglobin 8.7 (L) 12.0 - 16.0 g/dL     Hematocrit 28.3 (L) 36.0 - 46.0 %     MCV 90 80 - 100 fL     MCH 27.8 26.0 - 34.0 pg     MCHC 30.7 (L) 32.0 - 36.0 g/dL     RDW 19.9 (H) 11.5 - 14.5 %     Platelets 247 150 - 450 x10*3/uL     Neutrophils % 77.0 40.0 - 80.0 %     Immature Granulocytes %, Automated 0.5 0.0 - 0.9 %     Lymphocytes % 12.9 13.0 - 44.0 %     Monocytes % 9.4 2.0 - 10.0 %     Eosinophils % 0.1 0.0 - 6.0 %     Basophils % 0.1 0.0 - 2.0 %     Neutrophils Absolute 5.66 1.20 - 7.70 x10*3/uL     Immature Granulocytes Absolute, Automated 0.04 0.00 - 0.70 x10*3/uL     Lymphocytes Absolute 0.95 (L) 1.20 - 4.80 x10*3/uL     Monocytes Absolute 0.69 0.10 - 1.00 x10*3/uL     Eosinophils Absolute 0.01 0.00 - 0.70  x10*3/uL     Basophils Absolute 0.01 0.00 - 0.10 x10*3/uL   Comprehensive Metabolic Panel   Result Value Ref Range     Glucose 95 74 - 99 mg/dL     Sodium 138 136 - 145 mmol/L     Potassium 3.1 (L) 3.5 - 5.3 mmol/L     Chloride 100 98 - 107 mmol/L     Bicarbonate 30 21 - 32 mmol/L     Anion Gap 11 10 - 20 mmol/L     Urea Nitrogen 8 6 - 23 mg/dL     Creatinine 0.32 (L) 0.50 - 1.05 mg/dL     eGFR >90 >60 mL/min/1.73m*2     Calcium 8.6 8.6 - 10.3 mg/dL     Albumin 2.8 (L) 3.4 - 5.0 g/dL     Alkaline Phosphatase 45 33 - 110 U/L     Total Protein 5.6 (L) 6.4 - 8.2 g/dL     AST 13 9 - 39 U/L     Bilirubin, Total 1.9 (H) 0.0 - 1.2 mg/dL     ALT 14 7 - 45 U/L   Protime-INR   Result Value Ref Range     Protime 23.6 (H) 9.8 - 12.4 seconds     INR 2.1 (H) 0.9 - 1.1   POCT GLUCOSE   Result Value Ref Range     POCT Glucose 93 74 - 99 mg/dL   POCT GLUCOSE   Result Value Ref Range     POCT Glucose 132 (H) 74 - 99 mg/dL   Heparin Assay   Result Value Ref Range     Heparin Unfractionated 0.1 See Comment Below for Therapeutic Ranges IU/mL      Patient recently received an antibiotic (last 12 hours)         Date/Time Action Medication Dose     03/12/25 1252 Given    vancomycin (Vancocin) capsule 250 mg 250 mg     03/12/25 0614 Given    vancomycin (Vancocin) capsule 250 mg 250 mg             Pt fully evaluated 3/12. Pt on 3L NC today increased from yesterday. With start of Coumadin will continue to monitor INR.  Plan discussed with interdisciplinary team, continue current and repeat labs in the AM.      Discharge planning discussed with patient and care team. Therapy evaluations ordered. [facility]- 11 , anticipate HHC/SNF at discharge. Patient aware and agreeable to current plan, continue plan as above.      I spent a total of 50 minutes on the date of the service which included preparing to see the patient, face-to-face patient care, completing clinical documentation, obtaining and/or reviewing separately obtained history, performing a  medically appropriate examination, counseling and educating the patient/family/caregiver, ordering medications, tests, or procedures, communicating with other HCPs (not separately reported), independently interpreting results (not separately reported), communicating results to the patient/family/caregiver, and care coordination (not separately reported).         Patient fully evaluated  3/13  for    Problem List Items Addressed This Visit                     Gastrointestinal and Abdominal     * (Principal) Nausea vomiting and diarrhea           Musculoskeletal and Injuries     Nontraumatic compartment syndrome of left upper extremity     Relevant Orders     Case Request Operating Room: FASCIOTOMY, UPPER EXTREMITY (Completed)     Case Request Operating Room: DEBRIDEMENT, WOUND, UPPER EXTREMITY (Completed)           Pulmonary and Pneumonias     Influenza A with pneumonia - Primary      Other Visit Diagnoses         Acute kidney injury (CMS-HCC)         Dehydration         Influenza A         Shock (Multi)         Relevant Orders     Transthoracic Echo (TTE) Complete (Completed)             Patient seen resting in bed with head of bed elevated, no s/s or c/o acute difficulties at this time.  Vital signs for last 24 hours Temp:  [35.9 °C (96.6 °F)-36.6 °C (97.9 °F)] 36.3 °C (97.3 °F)  Heart Rate:  [70-93] 85  Resp:  [16-18] 18  BP: (120-169)/(56-92) 164/80  FiO2 (%):  [28 %-32 %] 28 %    I/O this shift:  In: -   Out: 1 [Stool:1]  Patient still requiring frequent cardiac and SPO2 monitoring. Continue aggressive pulmonary hygiene and oral hygiene. Off loading as tolerated for skin integrity. Medications and labs reviewed-             Results for orders placed or performed during the hospital encounter of 02/25/25 (from the past 24 hours)   Protime-INR   Result Value Ref Range     Protime 19.4 (H) 9.8 - 12.4 seconds     INR 1.8 (H) 0.9 - 1.1   POCT GLUCOSE   Result Value Ref Range     POCT Glucose 136 (H) 74 - 99 mg/dL    POCT GLUCOSE   Result Value Ref Range     POCT Glucose 144 (H) 74 - 99 mg/dL   CBC and Auto Differential   Result Value Ref Range     WBC 7.4 4.4 - 11.3 x10*3/uL     nRBC 0.0 0.0 - 0.0 /100 WBCs     RBC 3.13 (L) 4.00 - 5.20 x10*6/uL     Hemoglobin 8.7 (L) 12.0 - 16.0 g/dL     Hematocrit 28.3 (L) 36.0 - 46.0 %     MCV 90 80 - 100 fL     MCH 27.8 26.0 - 34.0 pg     MCHC 30.7 (L) 32.0 - 36.0 g/dL     RDW 19.9 (H) 11.5 - 14.5 %     Platelets 247 150 - 450 x10*3/uL     Neutrophils % 77.0 40.0 - 80.0 %     Immature Granulocytes %, Automated 0.5 0.0 - 0.9 %     Lymphocytes % 12.9 13.0 - 44.0 %     Monocytes % 9.4 2.0 - 10.0 %     Eosinophils % 0.1 0.0 - 6.0 %     Basophils % 0.1 0.0 - 2.0 %     Neutrophils Absolute 5.66 1.20 - 7.70 x10*3/uL     Immature Granulocytes Absolute, Automated 0.04 0.00 - 0.70 x10*3/uL     Lymphocytes Absolute 0.95 (L) 1.20 - 4.80 x10*3/uL     Monocytes Absolute 0.69 0.10 - 1.00 x10*3/uL     Eosinophils Absolute 0.01 0.00 - 0.70 x10*3/uL     Basophils Absolute 0.01 0.00 - 0.10 x10*3/uL   Comprehensive Metabolic Panel   Result Value Ref Range     Glucose 95 74 - 99 mg/dL     Sodium 138 136 - 145 mmol/L     Potassium 3.1 (L) 3.5 - 5.3 mmol/L     Chloride 100 98 - 107 mmol/L     Bicarbonate 30 21 - 32 mmol/L     Anion Gap 11 10 - 20 mmol/L     Urea Nitrogen 8 6 - 23 mg/dL     Creatinine 0.32 (L) 0.50 - 1.05 mg/dL     eGFR >90 >60 mL/min/1.73m*2     Calcium 8.6 8.6 - 10.3 mg/dL     Albumin 2.8 (L) 3.4 - 5.0 g/dL     Alkaline Phosphatase 45 33 - 110 U/L     Total Protein 5.6 (L) 6.4 - 8.2 g/dL     AST 13 9 - 39 U/L     Bilirubin, Total 1.9 (H) 0.0 - 1.2 mg/dL     ALT 14 7 - 45 U/L   Protime-INR   Result Value Ref Range     Protime 23.6 (H) 9.8 - 12.4 seconds     INR 2.1 (H) 0.9 - 1.1   POCT GLUCOSE   Result Value Ref Range     POCT Glucose 93 74 - 99 mg/dL   POCT GLUCOSE   Result Value Ref Range     POCT Glucose 132 (H) 74 - 99 mg/dL   Heparin Assay   Result Value Ref Range     Heparin Unfractionated  0.1 See Comment Below for Therapeutic Ranges IU/mL      Patient recently received an antibiotic (last 12 hours)         Date/Time Action Medication Dose     03/13/25 1256 Given    vancomycin (Vancocin) capsule 250 mg 250 mg     03/13/25 0804 Given    vancomycin (Vancocin) capsule 250 mg 250 mg             Pt fully evaluated 3/13. Pt still on 3L NC. INR is 2.1 today and heparin was started, will discontinue when INR is 2.5 then Pt can discharge so one more day. [place] was 3.1. Plan discussed with interdisciplinary team, continue current and repeat labs in the AM.      Discharge planning discussed with patient and care team. Therapy evaluations ordered. [facility]-  , anticipate HHC/SNF at discharge. Patient aware and agreeable to current plan, continue plan as above.      I spent a total of 50 minutes on the date of the service which included preparing to see the patient, face-to-face patient care, completing clinical documentation, obtaining and/or reviewing separately obtained history, performing a medically appropriate examination, counseling and educating the patient/family/caregiver, ordering medications, tests, or procedures, communicating with other HCPs (not separately reported), independently interpreting results (not separately reported), communicating results to the patient/family/caregiver, and care coordination (not separately reported).   SADA CHAN                     Revision History          Pertinent Physical Exam At Time of Discharge  Physical Exam  no acute events overnight, patient denies chest pain, worsening SOB at this time.  Outpatient Follow-Up              Future Appointments   Date Time Provider Department Center   3/27/2025  1:30 PM HONORIO [id number] PHARM [facility] PHARMACIST [id number] [place]   4/8/2025  1:15 PM Brittney Lomeli MD [id number] [place]                              Patient fully evaluated 03/18   for   Assessment & Plan  Nausea vomiting and diarrhea    Influenza A with  pneumonia      Septic shock, dehydration, hypercapnic respiratory failure, and dehydration.  Nontraumatic compartment syndrome of left upper extremity      Patient with significant clinical improvement noted, patient medically cleared for discharge today. Patient seen resting in bed with head of bed elevated, no s/s or c/o acute difficulties at this time. Vital signs for last 24 hours:  Temp:  [35.8 °C (96.4 °F)-36.3 °C (97.3 °F)] 36.1 °C (97 °F)  Heart Rate:  [76-98] 76  Resp:  [18] 18  BP: ()/(40-91) 72/40  FiO2 (%):  [21 %-24 %] 21 % Medications and labs reviewed-   Results for orders placed or performed during the hospital encounter of 02/25/25 (from the past 24 hours)   POCT GLUCOSE   Result Value Ref Range    POCT Glucose 94 74 - 99 mg/dL   POCT GLUCOSE   Result Value Ref Range    POCT Glucose 106 (H) 74 - 99 mg/dL   CBC and Auto Differential   Result Value Ref Range    WBC 6.2 4.4 - 11.3 x10*3/uL    nRBC 0.0 0.0 - 0.0 /100 WBCs    RBC 3.35 (L) 4.00 - 5.20 x10*6/uL    Hemoglobin 9.5 (L) 12.0 - 16.0 g/dL    Hematocrit 31.1 (L) 36.0 - 46.0 %    MCV 93 80 - 100 fL    MCH 28.4 26.0 - 34.0 pg    MCHC 30.5 (L) 32.0 - 36.0 g/dL    RDW 21.6 (H) 11.5 - 14.5 %    Platelets 263 150 - 450 x10*3/uL    Neutrophils % 67.0 40.0 - 80.0 %    Immature Granulocytes %, Automated 0.5 0.0 - 0.9 %    Lymphocytes % 20.6 13.0 - 44.0 %    Monocytes % 11.5 2.0 - 10.0 %    Eosinophils % 0.2 0.0 - 6.0 %    Basophils % 0.2 0.0 - 2.0 %    Neutrophils Absolute 4.14 1.20 - 7.70 x10*3/uL    Immature Granulocytes Absolute, Automated 0.03 0.00 - 0.70 x10*3/uL    Lymphocytes Absolute 1.27 1.20 - 4.80 x10*3/uL    Monocytes Absolute 0.71 0.10 - 1.00 x10*3/uL    Eosinophils Absolute 0.01 0.00 - 0.70 x10*3/uL    Basophils Absolute 0.01 0.00 - 0.10 x10*3/uL   Comprehensive Metabolic Panel   Result Value Ref Range    Glucose 89 74 - 99 mg/dL    Sodium 137 136 - 145 mmol/L    Potassium 3.4 (L) 3.5 - 5.3 mmol/L    Chloride 102 98 - 107 mmol/L     Bicarbonate 28 21 - 32 mmol/L    Anion Gap 10 10 - 20 mmol/L    Urea Nitrogen 11 6 - 23 mg/dL    Creatinine 0.49 (L) 0.50 - 1.05 mg/dL    eGFR >90 >60 mL/min/1.73m*2    Calcium 9.1 8.6 - 10.3 mg/dL    Albumin 3.1 (L) 3.4 - 5.0 g/dL    Alkaline Phosphatase 63 33 - 110 U/L    Total Protein 6.1 (L) 6.4 - 8.2 g/dL    AST 15 9 - 39 U/L    Bilirubin, Total 1.5 (H) 0.0 - 1.2 mg/dL    ALT 19 7 - 45 U/L   SST TOP   Result Value Ref Range    Extra Tube Hold for add-ons.    Morphology   Result Value Ref Range    RBC Morphology See Below     Polychromasia Mild     Ovalocytes Few     Koki Cells Few    POCT GLUCOSE   Result Value Ref Range    POCT Glucose 103 (H) 74 - 99 mg/dL   POCT GLUCOSE   Result Value Ref Range    POCT Glucose 168 (H) 74 - 99 mg/dL   Protime-INR   Result Value Ref Range    Protime 18.8 (H) 9.8 - 12.4 seconds    INR 1.7 (H) 0.9 - 1.1   Lavender Top   Result Value Ref Range    Extra Tube Hold for add-ons.    SST TOP   Result Value Ref Range    Extra Tube Hold for add-ons.       Patient recently received an antibiotic (last 12 hours)       Date/Time Action Medication Dose    03/18/25 1214 Given    vancomycin (Vancocin) capsule 250 mg 250 mg    03/18/25 0811 Given    vancomycin (Vancocin) capsule 250 mg 250 mg           No results found for the last 90 days.       Repeat INR 1.7, will increase coumadin to 3mg x 1 dose, resume 2 mg dose 3/19, repeat PT/INR in AM. Patient awake, alert, and appropriate. Medically cleared for discharge to  Acute Rehab - 4th floor today. Continue aggressive pulmonary hygiene and oral hygiene. Off loading as tolerated for skin integrity. Plan discussed with interdisciplinary team, no acute events overnight, patient denies chest pain, worsening SOB at this time - ok to discharge, will continue current and repeat labs in the AM if patient still hospitalized. Patient aware and agreeable to current plan, continue plan as above.     I spent 30 minutes on the date of the service which  included preparing to see the patient, face-to-face patient care, completing clinical documentation, obtaining and/or reviewing separately obtained history, performing a medically appropriate examination, counseling and educating the patient/family/caregiver, ordering medications, tests, or procedures, communicating with other HCPs (not separately reported), independently interpreting results (not separately reported), communicating results to the patient/family/caregiver, and care coordination (not separately reported  Pertinent Physical Exam At Time of Discharge  Physical Exam  no acute events overnight, patient denies chest pain, worsening SOB at this time.  Outpatient Follow-Up  Future Appointments   Date Time Provider Department Center   3/27/2025  1:30 PM HONORIO [id number] PHARM [facility] PHARMACIST [id number] [place]   4/8/2025  1:15 PM Brittney Lomeli MD [id number] [place]         Ginny Willson

## 2025-03-19 LAB
ANION GAP SERPL CALC-SCNC: 10 MMOL/L (ref 10–20)
BUN SERPL-MCNC: 11 MG/DL (ref 6–23)
CALCIUM SERPL-MCNC: 9.1 MG/DL (ref 8.6–10.3)
CHLORIDE SERPL-SCNC: 103 MMOL/L (ref 98–107)
CO2 SERPL-SCNC: 27 MMOL/L (ref 21–32)
CREAT SERPL-MCNC: 0.47 MG/DL (ref 0.5–1.05)
EGFRCR SERPLBLD CKD-EPI 2021: >90 ML/MIN/1.73M*2
ERYTHROCYTE [DISTWIDTH] IN BLOOD BY AUTOMATED COUNT: 22.2 % (ref 11.5–14.5)
GLUCOSE BLD MANUAL STRIP-MCNC: 82 MG/DL (ref 74–99)
GLUCOSE SERPL-MCNC: 85 MG/DL (ref 74–99)
HCT VFR BLD AUTO: 34.2 % (ref 36–46)
HGB BLD-MCNC: 10.4 G/DL (ref 12–16)
MCH RBC QN AUTO: 28.9 PG (ref 26–34)
MCHC RBC AUTO-ENTMCNC: 30.4 G/DL (ref 32–36)
MCV RBC AUTO: 95 FL (ref 80–100)
NRBC BLD-RTO: 0 /100 WBCS (ref 0–0)
PLATELET # BLD AUTO: 259 X10*3/UL (ref 150–450)
POTASSIUM SERPL-SCNC: 4.1 MMOL/L (ref 3.5–5.3)
RBC # BLD AUTO: 3.6 X10*6/UL (ref 4–5.2)
SODIUM SERPL-SCNC: 136 MMOL/L (ref 136–145)
WBC # BLD AUTO: 6.2 X10*3/UL (ref 4.4–11.3)

## 2025-03-19 PROCEDURE — 2500000005 HC RX 250 GENERAL PHARMACY W/O HCPCS: Performed by: PHYSICAL MEDICINE & REHABILITATION

## 2025-03-19 PROCEDURE — 85027 COMPLETE CBC AUTOMATED: CPT | Performed by: PHYSICAL MEDICINE & REHABILITATION

## 2025-03-19 PROCEDURE — 2500000001 HC RX 250 WO HCPCS SELF ADMINISTERED DRUGS (ALT 637 FOR MEDICARE OP): Performed by: PHYSICAL MEDICINE & REHABILITATION

## 2025-03-19 PROCEDURE — 97110 THERAPEUTIC EXERCISES: CPT | Mod: GP

## 2025-03-19 PROCEDURE — 97535 SELF CARE MNGMENT TRAINING: CPT | Mod: GO,CO

## 2025-03-19 PROCEDURE — 80048 BASIC METABOLIC PNL TOTAL CA: CPT | Performed by: PHYSICAL MEDICINE & REHABILITATION

## 2025-03-19 PROCEDURE — 82947 ASSAY GLUCOSE BLOOD QUANT: CPT

## 2025-03-19 PROCEDURE — 97162 PT EVAL MOD COMPLEX 30 MIN: CPT | Mod: GP

## 2025-03-19 PROCEDURE — 2500000002 HC RX 250 W HCPCS SELF ADMINISTERED DRUGS (ALT 637 FOR MEDICARE OP, ALT 636 FOR OP/ED): Performed by: PHYSICAL MEDICINE & REHABILITATION

## 2025-03-19 PROCEDURE — 97110 THERAPEUTIC EXERCISES: CPT | Mod: GO,CO

## 2025-03-19 PROCEDURE — 1180000001 HC REHAB PRIVATE ROOM DAILY

## 2025-03-19 PROCEDURE — 97530 THERAPEUTIC ACTIVITIES: CPT | Mod: GP

## 2025-03-19 PROCEDURE — 97116 GAIT TRAINING THERAPY: CPT | Mod: GP

## 2025-03-19 PROCEDURE — 36415 COLL VENOUS BLD VENIPUNCTURE: CPT | Performed by: PHYSICAL MEDICINE & REHABILITATION

## 2025-03-19 PROCEDURE — 97166 OT EVAL MOD COMPLEX 45 MIN: CPT | Mod: GO

## 2025-03-19 PROCEDURE — 94640 AIRWAY INHALATION TREATMENT: CPT

## 2025-03-19 RX ADMIN — SENNOSIDES 8.6 MG: 8.6 TABLET, FILM COATED ORAL at 21:11

## 2025-03-19 RX ADMIN — IPRATROPIUM BROMIDE AND ALBUTEROL SULFATE 3 ML: .5; 3 SOLUTION RESPIRATORY (INHALATION) at 19:23

## 2025-03-19 RX ADMIN — PANTOPRAZOLE SODIUM 40 MG: 40 TABLET, DELAYED RELEASE ORAL at 09:15

## 2025-03-19 RX ADMIN — VANCOMYCIN HYDROCHLORIDE 250 MG: 250 CAPSULE ORAL at 06:06

## 2025-03-19 RX ADMIN — ESCITALOPRAM OXALATE 20 MG: 10 TABLET ORAL at 09:14

## 2025-03-19 RX ADMIN — BUPROPION HYDROCHLORIDE 150 MG: 150 TABLET, EXTENDED RELEASE ORAL at 09:14

## 2025-03-19 RX ADMIN — ATORVASTATIN CALCIUM 80 MG: 80 TABLET, FILM COATED ORAL at 09:15

## 2025-03-19 RX ADMIN — BUDESONIDE 0.5 MG: 0.5 INHALANT ORAL at 06:38

## 2025-03-19 RX ADMIN — VANCOMYCIN HYDROCHLORIDE 250 MG: 250 CAPSULE ORAL at 21:11

## 2025-03-19 RX ADMIN — CARVEDILOL 25 MG: 25 TABLET, FILM COATED ORAL at 21:10

## 2025-03-19 RX ADMIN — VANCOMYCIN HYDROCHLORIDE 250 MG: 250 CAPSULE ORAL at 17:17

## 2025-03-19 RX ADMIN — VANCOMYCIN HYDROCHLORIDE 250 MG: 250 CAPSULE ORAL at 13:14

## 2025-03-19 RX ADMIN — WARFARIN SODIUM 3 MG: 2 TABLET ORAL at 17:17

## 2025-03-19 RX ADMIN — ASPIRIN 81 MG: 81 TABLET, COATED ORAL at 09:15

## 2025-03-19 RX ADMIN — IPRATROPIUM BROMIDE AND ALBUTEROL SULFATE 3 ML: .5; 3 SOLUTION RESPIRATORY (INHALATION) at 11:44

## 2025-03-19 RX ADMIN — Medication 4 L/MIN: at 21:09

## 2025-03-19 RX ADMIN — ACETAMINOPHEN 650 MG: 325 TABLET, FILM COATED ORAL at 21:10

## 2025-03-19 RX ADMIN — BUDESONIDE 0.5 MG: 0.5 INHALANT ORAL at 19:24

## 2025-03-19 RX ADMIN — CARVEDILOL 25 MG: 25 TABLET, FILM COATED ORAL at 09:14

## 2025-03-19 RX ADMIN — IPRATROPIUM BROMIDE AND ALBUTEROL SULFATE 3 ML: .5; 3 SOLUTION RESPIRATORY (INHALATION) at 06:38

## 2025-03-19 SDOH — SOCIAL STABILITY: SOCIAL INSECURITY: WITHIN THE LAST YEAR, HAVE YOU BEEN HUMILIATED OR EMOTIONALLY ABUSED IN OTHER WAYS BY YOUR PARTNER OR EX-PARTNER?: NO

## 2025-03-19 SDOH — ECONOMIC STABILITY: TRANSPORTATION INSECURITY: IN THE PAST 12 MONTHS, HAS LACK OF TRANSPORTATION KEPT YOU FROM MEDICAL APPOINTMENTS OR FROM GETTING MEDICATIONS?: NO

## 2025-03-19 SDOH — SOCIAL STABILITY: SOCIAL INSECURITY: ABUSE: ADULT

## 2025-03-19 SDOH — SOCIAL STABILITY: SOCIAL INSECURITY: HAS ANYONE EVER THREATENED TO HURT YOUR FAMILY OR YOUR PETS?: NO

## 2025-03-19 SDOH — SOCIAL STABILITY: SOCIAL INSECURITY: HAVE YOU HAD THOUGHTS OF HARMING ANYONE ELSE?: NO

## 2025-03-19 SDOH — SOCIAL STABILITY: SOCIAL INSECURITY: HAVE YOU HAD ANY THOUGHTS OF HARMING ANYONE ELSE?: NO

## 2025-03-19 SDOH — ECONOMIC STABILITY: FOOD INSECURITY: HOW HARD IS IT FOR YOU TO PAY FOR THE VERY BASICS LIKE FOOD, HOUSING, MEDICAL CARE, AND HEATING?: NOT VERY HARD

## 2025-03-19 SDOH — SOCIAL STABILITY: SOCIAL INSECURITY: WITHIN THE LAST YEAR, HAVE YOU BEEN AFRAID OF YOUR PARTNER OR EX-PARTNER?: NO

## 2025-03-19 SDOH — ECONOMIC STABILITY: FOOD INSECURITY: WITHIN THE PAST 12 MONTHS, THE FOOD YOU BOUGHT JUST DIDN'T LAST AND YOU DIDN'T HAVE MONEY TO GET MORE.: NEVER TRUE

## 2025-03-19 SDOH — ECONOMIC STABILITY: FOOD INSECURITY: WITHIN THE PAST 12 MONTHS, YOU WORRIED THAT YOUR FOOD WOULD RUN OUT BEFORE YOU GOT THE MONEY TO BUY MORE.: NEVER TRUE

## 2025-03-19 SDOH — ECONOMIC STABILITY: HOUSING INSECURITY: IN THE LAST 12 MONTHS, WAS THERE A TIME WHEN YOU WERE NOT ABLE TO PAY THE MORTGAGE OR RENT ON TIME?: NO

## 2025-03-19 SDOH — SOCIAL STABILITY: SOCIAL INSECURITY: DO YOU FEEL ANYONE HAS EXPLOITED OR TAKEN ADVANTAGE OF YOU FINANCIALLY OR OF YOUR PERSONAL PROPERTY?: NO

## 2025-03-19 SDOH — ECONOMIC STABILITY: HOUSING INSECURITY: IN THE PAST 12 MONTHS, HOW MANY TIMES HAVE YOU MOVED WHERE YOU WERE LIVING?: 0

## 2025-03-19 SDOH — ECONOMIC STABILITY: HOUSING INSECURITY: AT ANY TIME IN THE PAST 12 MONTHS, WERE YOU HOMELESS OR LIVING IN A SHELTER (INCLUDING NOW)?: NO

## 2025-03-19 SDOH — ECONOMIC STABILITY: INCOME INSECURITY: IN THE PAST 12 MONTHS HAS THE ELECTRIC, GAS, OIL, OR WATER COMPANY THREATENED TO SHUT OFF SERVICES IN YOUR HOME?: NO

## 2025-03-19 SDOH — SOCIAL STABILITY: SOCIAL INSECURITY: DOES ANYONE TRY TO KEEP YOU FROM HAVING/CONTACTING OTHER FRIENDS OR DOING THINGS OUTSIDE YOUR HOME?: NO

## 2025-03-19 SDOH — SOCIAL STABILITY: SOCIAL INSECURITY: ARE THERE ANY APPARENT SIGNS OF INJURIES/BEHAVIORS THAT COULD BE RELATED TO ABUSE/NEGLECT?: NO

## 2025-03-19 SDOH — SOCIAL STABILITY: SOCIAL INSECURITY: WERE YOU ABLE TO COMPLETE ALL THE BEHAVIORAL HEALTH SCREENINGS?: YES

## 2025-03-19 SDOH — SOCIAL STABILITY: SOCIAL INSECURITY: DO YOU FEEL UNSAFE GOING BACK TO THE PLACE WHERE YOU ARE LIVING?: NO

## 2025-03-19 SDOH — SOCIAL STABILITY: SOCIAL INSECURITY: ARE YOU OR HAVE YOU BEEN THREATENED OR ABUSED PHYSICALLY, EMOTIONALLY, OR SEXUALLY BY ANYONE?: NO

## 2025-03-19 ASSESSMENT — COLUMBIA-SUICIDE SEVERITY RATING SCALE - C-SSRS
2. HAVE YOU ACTUALLY HAD ANY THOUGHTS OF KILLING YOURSELF?: NO
6. HAVE YOU EVER DONE ANYTHING, STARTED TO DO ANYTHING, OR PREPARED TO DO ANYTHING TO END YOUR LIFE?: NO
1. IN THE PAST MONTH, HAVE YOU WISHED YOU WERE DEAD OR WISHED YOU COULD GO TO SLEEP AND NOT WAKE UP?: NO

## 2025-03-19 ASSESSMENT — PAIN SCALES - GENERAL
PAINLEVEL_OUTOF10: 0 - NO PAIN
PAINLEVEL_OUTOF10: 5 - MODERATE PAIN
PAINLEVEL_OUTOF10: 0 - NO PAIN
PAINLEVEL_OUTOF10: 0 - NO PAIN

## 2025-03-19 ASSESSMENT — PATIENT HEALTH QUESTIONNAIRE - PHQ9
2. FEELING DOWN, DEPRESSED OR HOPELESS: NOT AT ALL
1. LITTLE INTEREST OR PLEASURE IN DOING THINGS: NOT AT ALL
SUM OF ALL RESPONSES TO PHQ9 QUESTIONS 1 & 2: 0

## 2025-03-19 ASSESSMENT — LIFESTYLE VARIABLES
AUDIT-C TOTAL SCORE: 0
HOW OFTEN DO YOU HAVE 6 OR MORE DRINKS ON ONE OCCASION: NEVER
HOW MANY STANDARD DRINKS CONTAINING ALCOHOL DO YOU HAVE ON A TYPICAL DAY: PATIENT DOES NOT DRINK
AUDIT-C TOTAL SCORE: 0
SKIP TO QUESTIONS 9-10: 1
HOW OFTEN DO YOU HAVE A DRINK CONTAINING ALCOHOL: NEVER

## 2025-03-19 ASSESSMENT — PAIN DESCRIPTION - LOCATION: LOCATION: ARM

## 2025-03-19 ASSESSMENT — PAIN - FUNCTIONAL ASSESSMENT
PAIN_FUNCTIONAL_ASSESSMENT: 0-10

## 2025-03-19 ASSESSMENT — BRIEF INTERVIEW FOR MENTAL STATUS (BIMS)
ASKED TO RECALL SOCK: YES, NO CUE REQUIRED
WHAT YEAR IS IT: CORRECT
INITIAL REPETITION OF BED BLUE SOCK - FIRST ATTEMPT: 3
BIMS SUMMARY SCORE: 15
ASKED TO RECALL BLUE: YES, NO CUE REQUIRED
ASKED TO RECALL BED: YES, NO CUE REQUIRED
WHAT MONTH IS IT: ACCURATE WITHIN 5 DAYS
WHAT DAY OF THE WEEK IS IT: CORRECT

## 2025-03-19 ASSESSMENT — ACTIVITIES OF DAILY LIVING (ADL)
LACK_OF_TRANSPORTATION: NO
BATHING_ASSISTANCE: MODERATE
ASSISTIVE_DEVICE: WALKER;CANE
LACK_OF_TRANSPORTATION: NO
HOME_MANAGEMENT_TIME_ENTRY: 20

## 2025-03-19 ASSESSMENT — PAIN DESCRIPTION - ORIENTATION: ORIENTATION: LEFT

## 2025-03-19 NOTE — CARE PLAN
The patient's goals for the shift include pt. Will remain safe and free from falls.    The clinical goals for the shift include pt. Safety and free from falls.

## 2025-03-19 NOTE — H&P
Admission diagnosis:    History Of Present Illness  Kayley Lynch is a 54 y.o. female presenting with debility following influenza pneumonia, and compartment syndrome of her left upper extremity, and C. difficile.  She has a past history of COPD, coronary artery disease, coronary bypass grafting, and mitral valve replacement who on 2/26/2025 developed malaise and influenza with pneumonia.  She was found to be in septic shock and was admitted to the ICU.  And also had acute hypoxic respiratory failure.  She is now stabilized and is admitted to rehabilitation to begin strength training, transfer training, gait training with an assistive device,  Prior functional status:  Type of Home: House  Lives With: Spouse ( works)  Home Adaptive Equipment: None  Home Layout: One level, Laundry main level  Home Access:  (2 NNAMDI with rail in front, 2 NNAMDI from garage without rail)  Bathroom Shower/Tub: Walk-in shower  Bathroom Toilet: Standard  Bathroom Equipment: None     Prior Level of Function:  Prior Function Per Pt/Caregiver Report  Level of [place]: Independent with ADLs and functional transfers  ADL Assistance: Independent  Homemaking Assistance: Independent  Ambulatory Assistance: Independent  Prior Function Comments: (+) driving, (-) working  Cognition: ALERT AND ORIENTED X3  Devices: SEE ABOVE     Current Functional Status:  Eating: SET UP  Oral hygiene: MIN A  Toileting: MIN A  Bathing: MIN A  Upper body dressing: MIN A  Lower body dressing: MOD A  Bed Mobility: MIN A  Transfers: MIN A  Bladder and Bowel: CONTINENT  Cognition: ALERT AND ORIENTED X3     Rehabilitation Goals and Plan:  Expected level of improvement: MOD INDEP  Likelihood of reaching these goals: excellent.  Therapy treatments needed to achieve these goals: physical therapy, occupational therapy, , nursing, and aide.  Barriers to achieving these goals: Pain   Expected length of stay: 10 day(s)     Past Medical History  She has a past  medical history of Atrial flutter (Multi) (2023), Bilateral edema of lower extremity (2023), CAD (coronary artery disease) (2023), Cardiomyopathy (2023), Chest pressure (2024), Hypercholesterolemia (2023), Mitral regurgitation (2023), Other conditions influencing health status, Pulmonary hypertension (Multi) (2023), S/P CABG (coronary artery bypass graft) (2023), and S/P mitral valve replacement (2023).    Surgical History  She has a past surgical history that includes Other surgical history (2021) and Other surgical history (2020).  Coronary artery bypass surgery and mitral valve replacement     Social History  She reports that she has been smoking cigarettes. She has never used smokeless tobacco. She reports that she does not drink alcohol and does not use drugs.  She states she is done smoking cigarettes  She lives with her  and was independent prior to this event    Family history:  Mother and father are        Allergies  Sulfa (sulfonamide antibiotics)    Medications  Current Facility-Administered Medications   Medication Dose Route Frequency Provider Last Rate Last Admin    acetaminophen (Tylenol) tablet 650 mg  650 mg oral q4h PRN Taylor Alvarez MD        alum-mag hydroxide-simeth (Mylanta) 200-200-20 mg/5 mL oral suspension 30 mL  30 mL oral q6h PRN Taylor Alvarez MD        aspirin EC tablet 81 mg  81 mg oral Daily Taylor Alvarez MD   81 mg at 25 0915    atorvastatin (Lipitor) tablet 80 mg  80 mg oral Daily Taylor Alvarez MD   80 mg at 25 0915    bisacodyl (Dulcolax) EC tablet 10 mg  10 mg oral Daily PRN Taylor Alvarez MD        bisacodyl (Dulcolax) suppository 10 mg  10 mg rectal Daily PRN Taylor Alvarez MD        budesonide (Pulmicort) 0.5 mg/2 mL nebulizer solution 0.5 mg  0.5 mg nebulization BID Taylor BOWEN  MD Antonio   0.5 mg at 03/19/25 0638    buPROPion XL (Wellbutrin XL) 24 hr tablet 150 mg  150 mg oral q AM Taylor Alvarez MD   150 mg at 03/19/25 0914    carvedilol (Coreg) tablet 25 mg  25 mg oral BID Taylor Alvarez MD   25 mg at 03/19/25 0914    escitalopram (Lexapro) tablet 20 mg  20 mg oral Daily Taylorgail Alvarez MD   20 mg at 03/19/25 0914    ipratropium-albuteroL (Duo-Neb) 0.5-2.5 mg/3 mL nebulizer solution 3 mL  3 mL nebulization TID Taylor Alvarez MD   3 mL at 03/19/25 1144    melatonin tablet 3 mg  3 mg oral Nightly PRN Taylor Alvarez MD        oxygen (O2) therapy  4 L/min inhalation q12h Taylor Alvarez MD   Stopped at 03/19/25 0911    pantoprazole (ProtoNix) EC tablet 40 mg  40 mg oral Daily Taylor Alvarez MD   40 mg at 03/19/25 0915    sennosides (Senokot) tablet 8.6 mg  1 tablet oral Nightly Taylor Alvarez MD        vancomycin (Vancocin) capsule 250 mg  250 mg oral 4x daily Taylor Alvarez MD   250 mg at 03/19/25 0606    warfarin (Coumadin) tablet 3 mg  3 mg oral Daily Taylor Alvarez MD            Labs  Admission on 03/18/2025   Component Date Value Ref Range Status    WBC 03/19/2025 6.2  4.4 - 11.3 x10*3/uL Final    nRBC 03/19/2025 0.0  0.0 - 0.0 /100 WBCs Final    RBC 03/19/2025 3.60 (L)  4.00 - 5.20 x10*6/uL Final    Hemoglobin 03/19/2025 10.4 (L)  12.0 - 16.0 g/dL Final    Hematocrit 03/19/2025 34.2 (L)  36.0 - 46.0 % Final    MCV 03/19/2025 95  80 - 100 fL Final    MCH 03/19/2025 28.9  26.0 - 34.0 pg Final    MCHC 03/19/2025 30.4 (L)  32.0 - 36.0 g/dL Final    RDW 03/19/2025 22.2 (H)  11.5 - 14.5 % Final    Platelets 03/19/2025 259  150 - 450 x10*3/uL Final    Glucose 03/19/2025 85  74 - 99 mg/dL Final    Sodium 03/19/2025 136  136 - 145 mmol/L Final    Potassium 03/19/2025 4.1  3.5 - 5.3 mmol/L Final    MILD HEMOLYSIS DETECTED. The  result may be falsely elevated due to hemolysis or other interferents. Clinical correlation is recommended. Repeat testing may be considered.    Chloride 03/19/2025 103  98 - 107 mmol/L Final    Bicarbonate 03/19/2025 27  21 - 32 mmol/L Final    Anion Gap 03/19/2025 10  10 - 20 mmol/L Final    Urea Nitrogen 03/19/2025 11  6 - 23 mg/dL Final    Creatinine 03/19/2025 0.47 (L)  0.50 - 1.05 mg/dL Final    eGFR 03/19/2025 >90  >60 mL/min/1.73m*2 Final    Calculations of estimated GFR are performed using the 2021 CKD-EPI Study Refit equation without the race variable for the IDMS-Traceable creatinine methods.  https://jasn.asnjournals.org/content/early/2021/09/22/ASN.0534619491    Calcium 03/19/2025 9.1  8.6 - 10.3 mg/dL Final    POCT Glucose 03/19/2025 82  74 - 99 mg/dL Final   No results displayed because visit has over 200 results.           Last Recorded Vitals  Blood pressure 110/55, pulse (!) 111, temperature 36.4 °C (97.5 °F), temperature source Temporal, resp. rate 17, weight 54.6 kg (120 lb 4.8 oz), SpO2 93%.      Review of Systems   Constitutional:  Negative for chills, fatigue and fever.   HENT:  Negative for congestion, ear discharge, ear pain and hearing loss.    Eyes:  Negative for pain and discharge.   Respiratory:  Negative for chest tightness, shortness of breath and wheezing.    Cardiovascular:  Negative for chest pain, palpitations and leg swelling.   Gastrointestinal:  Negative for abdominal distention, blood in stool, constipation and diarrhea.   Endocrine: Negative for polydipsia and polyphagia.   Genitourinary:  Negative for difficulty urinating, dysuria, frequency and hematuria.   Musculoskeletal:  Negative for back pain, myalgias and neck stiffness.   Skin:  Negative for color change, rash and wound.   Neurological:  Negative for dizziness, seizures, numbness and headaches.   Hematological:  Negative for adenopathy. Does not bruise/bleed easily.   Psychiatric/Behavioral:  Negative for confusion  and hallucinations. The patient is not nervous/anxious.        Physical Exam  Constitutional:       General: she is not in acute distress.     Appearance: Normal appearance.   HENT:      Head: Normocephalic.      Nose: Nose normal.     Eyes:      Extraocular Movements: Extraocular movements intact.      Conjunctiva/sclera: Conjunctivae normal.   Cardiovascular:      Rate and Rhythm: Normal rate and regular rhythm.      Heart sounds: No murmur heard.     No gallop.   Pulmonary:      Breath sounds: Normal breath sounds. No wheezing, rhonchi or rales.   Abdominal:      General: Abdomen is flat. Bowel sounds are normal. There is no distension.      Palpations: Abdomen is soft.      Tenderness: There is no abdominal tenderness. There is no guarding.   Musculoskeletal:         General: No swelling, tenderness or deformity.      Cervical back: No rigidity or tenderness.   Left forearm has a dressing in place from her fasciotomy and compartment syndrome  Skin:     General: Skin is warm and dry.      Findings: No rash.   Neurological:      General: No focal deficit present.      Mental Status: she is alert and oriented to person, place, and time.      Cranial Nerves: No cranial nerve deficit.   Strength of the bilateral lower extremities is 4- out of 5, sit to stand transfers are min assist.  Upper extremity strength is 5- out of 5 bilaterally  Psychiatric:         Mood and Affect: Mood normal.      Assessment/Plan   Assessment & Plan  Physical debility      Debility following influenza pneumonia, compartment syndrome of the left upper extremity, and C. difficile colitis  Begin physical therapy for strengthening, transfer training, gait training, exercises to improve balance  Begin occupational therapy for ADL retraining, strengthening, ADL activities, and transfer training    2.  Coronary artery disease  Cardiac precautions  Aspirin 81 mg daily  Lipitor 80 mg daily   Coumadin 3 mg daily  Adjust Coumadin to INR as  needed    3.  Hypertension  Coreg 25 mg twice daily  Adjust for adequate blood pressure control    4.  Recent pneumonia  Budesonide nebulizer twice daily  DuoNeb nebulizer 3 times daily  Monitor for shortness of breath    5.  C. difficile colitis  Vancomycin to 50 mg 4 times daily    6.  Mood  Wellbutrin 150 mg every morning  Lexapro 20 mg daily    7.  Hyperlipidemia  Lipitor 80 mg daily    8. FENGI  Patient is on a bowel program to promote regular bowel movement , and vancomycin to treat her C. Difficile  She is a fall risk and fall precautions will be used       Physician Physical Assessment/ Post admission Evaluation (ERIN)     I have reviewed the preadmission rehabilitation screening. There have been no relevant changes since the preadmission screening;    Rehab Plan of Care   The Interdisciplinary Team will work collaboratively to address the patient problems and goals to be managed by the Individualized Interdisciplinary Plan of Care. See the IPOC note for a complete review of the plan of care.    Medical Necessity:  Kayley Lynch requires, and is capable of participating in, an intensive and coordinated interdisciplinary acute inpatient rehabilitation program. She requires close rehabilitation physician monitoring and management to monitor her complex medical conditions and coordinate rehabilitation care. The patient's rehabilitation goals and medical complexity cannot adequately be managed in a less intensive setting. Potential risks for clinical complications include: Fall risk, treatment of C. difficile colitis.    Medical Prognosis:  Excellent for continued progress and participation with therapy.    Time spent reviewing records, examining patient, and documentation is 60 minutes      Maria Eugenia Morales DO

## 2025-03-19 NOTE — CONSULTS
"Nutrition Initial Assessment:   Nutrition Assessment    Reason for Assessment: Admission nursing screening (acute rehab protocol; MST=1 for weight loss)    Patient is a 54 y.o. female presenting with physical debility     Pmhx: compartment syndrome, COPD, HTN, CAD, CABG, MVR, CDIFF, hx influenza, pneumonia    Nutrition History:  Energy Intake: Fair 50-75 %  Pain affecting nutrition status: N/A  Food and Nutrient History: Pt sitting in chair in room at visit.  Pt reports her appetite is getting better.  Was just seen by RD on acute side and was willing to try Magic cup.  Pt was agreeable to Magic cup again as well as Ajay BID to support wound healing. Denies chewing or swallowing problems. S/P fasciotomy L arm 2/2 compartment sydnrome 3/2 with debridement and closure on 3/4/25  Vitamin/Herbal Supplement Use: none noted       Anthropometrics:  Height: 160 cm (5' 2.99\")   Weight: 54.6 kg (120 lb 5.9 oz)   BMI (Calculated): 21.33  IBW/kg (Dietitian Calculated): 52.2 kg  Percent of IBW: 104 %       Weight History:     Weight Change %:  Weight History / % Weight Change: 3/7 66.5kg (17% loss x11 days??), 2/28 66.6kg, 9/27 60.8kg (10%  loss x6 mo) , 8/12 59kg, 5/23 63kg, 3/22/24 63.5kg   Pt reports weighing 130slbs prior to hospitalization and feels as though she has lost week.  Will request standing weight as current weight of 54.6kg was on bed  Significant Weight Loss: Yes  Interpretation of Weight Loss: 10% in 6 months    Nutrition Focused Physical Exam Findings:    Subcutaneous Fat Loss:   Defer Subcutaneous Fat Loss Assessment: Defer all  Defer All Reason: not a good time  Muscle Wasting:  Defer Muscle Wasting Assessment: Defer all  Defer All Reason: not a good time  Edema:  Edema: +2 mild, +1 trace  Edema Location: 2+ generalized; 1+ RUE; non pitting LUE, BLE  Physical Findings:  Skin: Positive (L arm incision)  Respiratory : Negative    Nutrition Significant Labs:  CBC Trend:   Results from last 7 days   Lab Units " "03/19/25  0715 03/18/25  0538 03/17/25  0555 03/16/25  0615   WBC AUTO x10*3/uL 6.2 6.2 6.8 7.3   RBC AUTO x10*6/uL 3.60* 3.35* 3.62* 3.24*   HEMOGLOBIN g/dL 10.4* 9.5* 10.3* 9.0*   HEMATOCRIT % 34.2* 31.1* 33.6* 29.8*   MCV fL 95 93 93 92   PLATELETS AUTO x10*3/uL 259 263 290 285    , BMP Trend:   Results from last 7 days   Lab Units 03/19/25  0715 03/18/25  0538 03/17/25  0555 03/16/25  0615   GLUCOSE mg/dL 85 89 94 84   CALCIUM mg/dL 9.1 9.1 9.3 8.8   SODIUM mmol/L 136 137 136 136   POTASSIUM mmol/L 4.1 3.4* 3.6 3.7   CO2 mmol/L 27 28 29 28   CHLORIDE mmol/L 103 102 100 100   BUN mg/dL 11 11 6 8   CREATININE mg/dL 0.47* 0.49* 0.42* 0.36*    , A1C:No results found for: \"HGBA1C\", BG POCT trend:   Results from last 7 days   Lab Units 03/19/25  0538 03/18/25  1928 03/18/25  1531 03/18/25  1140 03/18/25  0607   POCT GLUCOSE mg/dL 82 164* 94 168* 103*    , Liver Function Trend:   Results from last 7 days   Lab Units 03/18/25  0538 03/17/25  0555 03/16/25  0615 03/15/25  0618   ALK PHOS U/L 63 72 62 58   AST U/L 15 20 15 15   ALT U/L 19 21 17 16   BILIRUBIN TOTAL mg/dL 1.5* 1.7* 1.7* 1.7*        Nutrition Specific Medications:  Reviewed     I/O:   Last BM Date: 03/18/25; Stool Appearance: Loose (03/19/25 0915)    Dietary Orders (From admission, onward)       Start     Ordered    03/19/25 1058  May Participate in Room Service  ( ROOM SERVICE MAY PARTICIPATE)  Once        Question:  .  Answer:  Yes    03/19/25 1057    03/19/25 0954  Oral nutritional supplements  Until discontinued        Comments: chocolate   Question Answer Comment   Deliver with Dinner    Select supplement: Magic Cup        03/19/25 0954    03/19/25 0954  Oral nutritional supplements  Until discontinued        Comments: Fruit punch   Question Answer Comment   Deliver with Dinner    Deliver with Lunch    Select supplement: Ajay        03/19/25 0954    03/18/25 0450  Adult diet Regular  Diet effective now        Question:  Diet type  Answer:  Regular "    03/18/25 2209                     Estimated Needs:      Method for Estimating Needs: 1460-1670kcals (28-32kcals/kg IBW)     Method for Estimating 24 Hour Protein Needs: 62-73g (1.2-1.4g/kg IBW)     Method for Estimating 24 Hour Fluid Needs: 1 mL/kcal or as per MD  Patient on Order Fluid Restriction: No        Nutrition Diagnosis   Malnutrition Diagnosis  Patient has Malnutrition Diagnosis: Yes  Diagnosis Status: New  Malnutrition Diagnosis: Moderate malnutrition related to acute disease or injury  Related to: decreased appetite, physical debility  As Evidenced by: 10% weight loss x6 months; likely PO intakes meeting <75% of EEN for >=7 days    Nutrition Diagnosis  Patient has Nutrition Diagnosis: Yes  Additional Nutrition Diagnosis: Diagnosis 2  Diagnosis Status (2): Active  Nutrition Diagnosis 2: Increased nutrient needs  Related to (2): physiological causes  As Evidenced by (2): surgery wound healing needs       Nutrition Interventions/Recommendations   Nutrition prescription for oral nutrition    Nutrition Recommendations:  Individualized Nutrition Prescription Provided for : Regular diet with Magic cup once daily and Ajay BID    Nutrition Interventions/Goals:   Interventions: Meals and snacks, Medical food supplement  Meals and Snacks: General healthful diet  Goal: consume >50->75% of meals  Medical Food Supplement: Commercial beverage medical food supplement therapy, Commercial food medical food supplement therapy  Goal: consume >75% of Ajay BID (for an additional 90 kcals, 2.5 gm protein, supplement glutamine and arginine)  Additional Interventions: consume >75% of Magic cup once daily (for an additional 290 kcals, 9 gm protein each)      Education Documentation  No documentation found.     Pt had no questions at this time      Nutrition Monitoring and Evaluation   Food/Nutrient Related History Monitoring  Monitoring and Evaluation Plan: Estimated Energy Intake, Fluid intake, Intake / amount of  food  Estimated Energy Intake: Energy intake 50 -75% of estimated energy needs  Fluid Intake: Estimated fluid intake  Intake / Amount of food: Consumes at least 75% or more of meals/snacks/supplements, Meets > 75% estimated energy needs    Anthropometric Measurements  Monitoring and Evaluation Plan: Body weight  Body Weight: Body weight - Maintain stable weight (reweigh on standing scale)    Biochemical Data, Medical Tests and Procedures  Monitoring and Evaluation Plan: Electrolyte/renal panel, Glucose/endocrine profile  Electrolyte and Renal Panel: Electrolytes within normal limits  Glucose/Endocrine Profile: Glucose within normal limits ( mg/dL)    Physical Exam Findings  Monitoring and Evaluation Plan: Digestive System, Skin, Edema  Digestive System Finding:  (hx of C-diff)  Criteria: formed BM at least every 2-3 days  Skin Finding: Impaired wound healing - Improved wound healing, Impaired wound healing - Skin to heal  Edema Finding: +2 Pitting edema, +1 Pitting edema    Goal Status: New goal(s) identified    Time Spent (min): 45 minutes

## 2025-03-19 NOTE — PROGRESS NOTES
Occupational Therapy    Evaluation    Patient Name: Kayley yLnch  MRN: [id number]  Department: [facility] REHAB  Room: [address] [place] [address]  Today's Date: 3/19/2025  Time Calculation  Start Time: 0830  Stop Time: 0915  Time Calculation (min): 45 min        Assessment:  OT Assessment: Impaired ADLs, transfers, and mobility  Prognosis: Good  Evaluation/Treatment Tolerance: Patient limited by fatigue  End of Session Communication: Bedside nurse  End of Session Patient Position: Up in chair, Alarm on  OT Assessment Results: Decreased ADL status, Decreased endurance, Decreased functional mobility, Decreased IADLs  Prognosis: Good  Evaluation/Treatment Tolerance: Patient limited by fatigue  Strengths: Premorbid level of function, Housing layout  Barriers to Participation: Comorbidities    Plan:  Treatment Interventions: ADL retraining, UE strengthening/ROM, Functional transfer training, Endurance training, Patient/family training, Equipment evaluation/education  OT Frequency: 90 min/5 days per week (x 10 days)  OT Discharge Recommendations:  (anticipate patient mod I for most ADLs, transfers, and mobility; may require up to sba for higher level IADLs and shower transfers)  Equipment Recommended upon Discharge: Wheeled walker (3:1 commode, shower seat, reacher)  Treatment Interventions: ADL retraining, UE strengthening/ROM, Functional transfer training, Endurance training, Patient/family training, Equipment evaluation/education    Subjective     General:  General  Reason for Referral: OT eval and treat for debility following complicated hospital course following influenza A and pneumonia - septic shock, COPD exacerbation, C. Diff, large hematoma left arm/compartment syndrome s/p urgent fasciotomy on 3/2, closure on 3/4 (wound dihiscence noted on 3/17); spontaneous iliopsoas hematoma on left with retroperitoneal hemorrhage that extends down the left hemipelvis and surrounding the rectum    Referred By: Taylor Cunningham    [facility]  History Relevant to Rehab: PMH: COPD, HLD, CAD s/p CABG, pulm HTN, cardiomyopathy, a flutter s/p ablation, MVR    Prior to Session Communication: Bedside nurse  Patient Position Received: Alarm on, Bed, 2 rail up  General Comment: patient pleasant and cooperative; slightly anxious    Precautions:  Medical Precautions: Fall precautions (Contact plus precautions (+C Diff))  Precautions Comment: LUE fasciotomy with wound dehiscence.          Pain:  Pain Assessment  Pain Assessment: 0-10  0-10 (Numeric) Pain Score: 0 - No pain    Objective   Cognition:  Overall Cognitive Status: Within Functional Limits  Orientation Level: Oriented X4  Memory:  reports recent memory issues since hospital admit, reports memory is improving           Home Living:  Type of Home: House (Ranch style house; 2 entry steps with rails; laundry 1st floor; does not need to access basement)  Lives With: Spouse (spouse works full time days. Pt has 1 local son who works a flexible schedule, 1 son that lives in [place].)  Bathroom Shower/Tub: Walk-in shower  Bathroom Toilet: Standard  Bathroom Equipment: None  Home Living Comments: patient normally sleeps on the couch    Prior Function:  Level of [place]: Independent with ADLs and functional transfers, Independent with homemaking with ambulation (no device used; has wheeled walker and cane from spouse; patient and spouse both drive; shared homemaking)  Hand Dominance: Right    ADL:  Eating Assistance: Independent  Grooming Assistance: Stand by  Bathing Assistance: Moderate  UE Dressing Assistance: Stand by  LE Dressing Assistance: Moderate  Toileting Assistance with Device: Moderate    Activity Tolerance:  Endurance: Decreased tolerance for upright activites (generalized weakness and overall decreased endurance; fatigues fairly quickly)    Bed Mobility/Transfers: Bed Mobility  Bed Mobility:  (cga supine to sit)    Transfers  Transfer:  (min assist sit to stand transfers)      Functional  Mobility:  Functional Mobility  Functional Mobility Performed:  (min handheld assist x2 for short distance mobility; narrow AKIKO, small step length; fatigues quickly secondary to generalized weakness and decreased endurance)    Sitting Balance:  Static Sitting Balance  Static Sitting-Level of Assistance: Close supervision    Standing Balance:  Static Standing Balance  Static Standing-Level of Assistance: Minimum assistance, Moderate assistance      Vision:Vision - Basic Assessment  Current Vision: Wears glasses all the time    Sensation:  Light Touch: No apparent deficits        Hand Function:  Gross Grasp: Functional  Coordination: Functional    Extremities: RUE   RUE : Within Functional Limits      LUE   LUE:  (shoulder, wrist, hand wfl; elbow ROM limited secondary to bandaging d/t fasciotomies/incision (did not test strength))    Education Documentation  Handouts, taught by Nathaly Lee OT at 3/19/2025 11:11 AM.  Learner: Patient  Readiness: Acceptance  Method: Explanation  Response: Verbalizes Understanding, Needs Reinforcement    Home Exercise Program, taught by Nathaly Lee OT at 3/19/2025 11:11 AM.  Learner: Patient  Readiness: Acceptance  Method: Explanation  Response: Verbalizes Understanding, Needs Reinforcement    Precautions, taught by Nathaly Lee OT at 3/19/2025 11:11 AM.  Learner: Patient  Readiness: Acceptance  Method: Explanation  Response: Verbalizes Understanding, Needs Reinforcement    ADL Training, taught by Nathaly Lee OT at 3/19/2025 11:11 AM.  Learner: Patient  Readiness: Acceptance  Method: Explanation  Response: Verbalizes Understanding, Needs Reinforcement           EDUCATION:  Education  Individual(s) Educated: Patient  Education Provided: POC discussed and agreed upon, Fall precautons, Risk and benefits of OT discussed with patient or other  Plan of Care Discussed and Agreed Upon: yes  Patient Response to Education: Patient/Caregiver Verbalized Understanding of  Information  Education Comment: Patient did not request any information at this time on intimacy/sex in regards to diagnosis.    Goals:  Encounter Problems       Encounter Problems (Active)       OT Problem       LTG - Patient will complete grooming independently. (Progressing)       Start:  03/19/25    Expected End:  03/29/25            LTG - Patient will complete toileting with mod I. (Progressing)       Start:  03/19/25    Expected End:  03/29/25            LTG - Patient will complete bathing with sba. (Progressing)       Start:  03/19/25    Expected End:  03/29/25            LTG - Patient will complete UE dressing independently.  (Progressing)       Start:  03/19/25    Expected End:  03/29/25            LTG - Patient will complete LE dressing using adaptive equip as needed with mod I. (Progressing)       Start:  03/19/25    Expected End:  03/29/25            LTG - Patient will complete commode transfer via device as needed with mod I. (Progressing)       Start:  03/19/25    Expected End:  03/29/25            LTG - Patient will complete tub/shower transfer using DME as needed with sba. (Progressing)       Start:  03/19/25    Expected End:  03/29/25            LTG - Patient will complete simple functional mobility via device as needed with mod I. (Progressing)       Start:  03/19/25    Expected End:  03/29/25            LTG - Patient will complete challenging functional mobility or simulated homemaking task with sba via device as needed. (Progressing)       Start:  03/19/25    Expected End:  03/29/25            STG - Patient will complete toileting with cga. (Progressing)       Start:  03/19/25    Expected End:  03/26/25            STG - Patient will complete bathing with min assist. (Progressing)       Start:  03/19/25    Expected End:  03/26/25            STG - Patient will complete LE dressing using adaptive equip as needed with min assist. (Progressing)       Start:  03/19/25    Expected End:  03/26/25             STG - Patient will complete commode transfer via device as needed with cga. (Progressing)       Start:  03/19/25    Expected End:  03/26/25            STG - Patient will complete tub/shower transfer using DME as needed with min assist. (Progressing)       Start:  03/19/25    Expected End:  03/26/25            STG - Patient will complete simple functional mobility via device as needed with min assist. (Progressing)       Start:  03/19/25    Expected End:  03/26/25

## 2025-03-19 NOTE — CARE PLAN
The patient's goals for the shift include  safety and comfort    The clinical goals for the shift include  pain control

## 2025-03-19 NOTE — PROGRESS NOTES
3/19/25:  Spoke with patient bedside, introduced self and explained role. Patient [place] ranch with her - he works during the day. They have a son that lives nearby that can intermittently assist.  Prior to admission the patient was independent and driving.  She was on coumadin prior to admission and Dr. Lomeli was following at the [place] coumadin clinic.  Discussed ELOS depends on progress and goals, tentatively 10 days.  Will continue to update regarding DC planning.  -Anibal Joseph RN    3/20/25:  Spoke with patient bedside and discussed DC Tuesday 3/25.  She is agreeable, offered therapy teaching for her .  -Anibal Joseph RN

## 2025-03-19 NOTE — PROGRESS NOTES
Physical Therapy    Physical Therapy Treatment    Patient Name: Kayley Lynch  MRN: [id number]  Department: [facility] REHAB  Room: [address] [place], [place] [address]  Today's Date: 3/19/2025  Time Calculation  Start Time: 1045  Stop Time: 1130  Time Calculation (min): 45 min         Assessment/Plan   PT Assessment  PT Assessment Results: Decreased strength, Decreased endurance, Impaired balance, Decreased mobility, Decreased coordination, Decreased safety awareness  Rehab Prognosis: Good  Barriers to Discharge Home: Caregiver assistance  Caregiver Assistance:  (spouse works during the day)  Evaluation/Treatment Tolerance: Patient limited by fatigue  Medical Staff Made Aware: Yes  Strengths: Premorbid level of function, Living arrangement secure, Housing layout  Barriers to Participation: Comorbidities  End of Session Communication: Bedside nurse  End of Session Patient Position: Up in chair, Alarm on     PT Plan  Treatment/Interventions: Bed mobility, Transfer training, Gait training, Balance training, Neuromuscular re-education, Strengthening, Endurance training, Therapeutic exercise, Therapeutic activity, Home exercise program, Stair training  PT Plan: Ongoing PT  PT Frequency: 90 min/5 days per week (for 10 days)  PT Discharge Recommendations: Other (comment) (Anticipate discharge home in 10 days mod I at walker level for simple/household mobility, with recommendation for intermittent assist on stairs and for higher level IADLs.)  Equipment Recommended upon Discharge: Wheeled walker  PT Recommended Transfer Status: Assist x1      General Visit Information:   PT  Visit  PT Received On: 03/19/25  General  Reason for Referral: PT eval and treat for debility following complicated hospital course following influenza A and pneumonia; spetic shock, COPD exacerbation, large hematoma left arm/compartment syndrome s/p urgent fasciotomy on 3/2, closure on 3/4 (wound dihiscence noted on 3/17); spontaneous iliopsoas hematoma on left with retroperitoneal  hemorrhage that extends down the left hemipelvis and surrounding the rectum  Referred By: Taylor Cunningham  Past Medical History Relevant to Rehab: PMH: COPD, HLD, CAD s/p CABG, pulm HTN, cardiomyopathy, a flutter s/p ablation, MVR  Prior to Session Communication: Bedside nurse  Patient Position Received: Alarm on, Up in chair  General Comment: Pt pleasant and cooperative. Pt does desat on room air to 84% after amb, recovers quickly in less than 1 minute.    Subjective   Precautions:  Precautions  Medical Precautions: Fall precautions  Precautions Comment: Contact plus prec (c-diff),LUE fasciotomy with wound dehiscence.     Date/Time Vitals Session Patient Position Pulse Resp SpO2 BP MAP (mmHg)    03/19/25 1045 --  --  116  --  84 %  --  --                 Objective   Pain:  Pain Assessment  Pain Assessment: 0-10  0-10 (Numeric) Pain Score: 0 - No pain  Cognition:  Cognition  Overall Cognitive Status: Within Functional Limits  Orientation Level: Oriented X4  Memory:  (reports memory is improving)  Coordination:     Postural Control:  Static Sitting Balance  Static Sitting-Comment/Number of Minutes: CGA  Dynamic Sitting Balance  Dynamic Sitting-Comments: retro LOB up to min/mod A during LE MMT  Static Standing Balance  Static Standing-Comment/Number of Minutes: min A no device  Dynamic Standing Balance  Dynamic Standing-Comments: min A x 2 no device  Extremity/Trunk Assessments:    Activity Tolerance:  Activity Tolerance  Endurance: Decreased tolerance for upright activites  Treatments:  Therapeutic Exercise  Therapeutic Exercise Activity 1: Pt performs seated BLE therex x 10 reps: ankle pumps, LAQs, marching, in order to improve strength related to transfers, gait, and balance.    Ambulation/Gait Training  Ambulation/Gait Training Performed: Yes  Ambulation/Gait Training 1  Comments/Distance (ft) 1: Pt amb 50' x 2 with FWW and min A, wc follow initially, then able to discontinue. Gait is unsteady, though improved  with use of walker compared to no device. After amb on room air O2 dec to 88-84%, does recover above 92% within 1 minute. HR inc to 116, does not drop below 100 even after several minutes resting.  Transfer 1  Trials/Comments 1: sit/stand transfers with min A, cues for safe hand placement    Education Documentation  Precautions, taught by Yazmin Lang PT at 3/19/2025  9:24 AM.  Learner: Patient  Readiness: Acceptance  Method: Explanation  Response: Verbalizes Understanding    Mobility Training, taught by Yazmin Lang PT at 3/19/2025  9:24 AM.  Learner: Patient  Readiness: Acceptance  Method: Explanation  Response: Verbalizes Understanding    Education Comments  Pt educated on techniques for transfers and gait training with device in order to maximize safety and independence.  Pt educated on therapeutic exercises, including optimal techniques to maximize function.  Pt educated on energy conservation techniques as it relates to recovery, including use of assistive device, in order to maximize safety.       Encounter Problems       Encounter Problems (Active)       PT Problem       LTG - Pt will perform bed mobility with mod I  (Progressing)       Start:  03/19/25    Expected End:  03/29/25            LTG - Pt will perform transfers with mod I.  (Progressing)       Start:  03/19/25    Expected End:  03/29/25            LTG - Pt will amb 50 feet with FWW and mod I, and 150' with FWW and SBA. (Progressing)       Start:  03/19/25    Expected End:  03/29/25            LTG - Pt will up/down 2 stairs with B HR and CGA.  (Progressing)       Start:  03/19/25    Expected End:  03/29/25            STG - Pt will perform bed mobility with Sup.   (Progressing)       Start:  03/19/25    Expected End:  03/24/25            STG - Pt will perform transfers with CGA.   (Progressing)       Start:  03/19/25    Expected End:  03/24/25            STG - Pt will amb 50 feet with FWW and CGA.  (Progressing)       Start:  03/19/25     Expected End:  03/24/25            STG - Pt will up/down 1 stairs with B HR and min A.  (Progressing)       Start:  03/19/25    Expected End:  03/24/25

## 2025-03-19 NOTE — INDIVIDUALIZED OVERALL PLAN OF CARE NOTE
Individualized Plan of Care:     Primary rehabilitation diagnosis: Debility     [facility] code: 16     Estimated length of stay:   10 days     Medical functional prognosis is good for patient to reach a modified independent level       Patient/family anticipated outcome:  Home as independent as possible     Functional outcome/goal:  Bed mobility: Modified independent  Transfer sit to stand : Modified independent  Ambulation: With a walker and standby assist  Upper extremity dressing: Modified independent  Lower extremity dressing: Modified independent  Stairs: 2 stairs with bilateral handrails and contact-guard assist  communicate needs and wants as independent as able.       Anticipated interventions:  Therapeutic exercises  Gait Training  Transfer training  Exercises to improve balance and mobility  ADL retraining for grooming, bathing, ADL activities  Exercises to improve strength and endurance            Required therapy:  Physical therapy 90 minutes 5 days/week.  Occupational therapy 90 minutes 5 days/week.       Patient has good potential to be discharged overall at a modified independent level and contact-guard assist with stairs.  She has multiple medical comorbidities and will be seen by physician for medical management.  She is highly motivated to participate in therapy and return home with her     Individualized physician plan of care  Debility following influenza pneumonia, compartment syndrome of the left upper extremity, and C. difficile colitis  Begin physical therapy for strengthening, transfer training, gait training, exercises to improve balance  Begin occupational therapy for ADL retraining, strengthening, ADL activities, and transfer training     2.  Coronary artery disease  Cardiac precautions  Aspirin 81 mg daily  Lipitor 80 mg daily   Coumadin 3 mg daily  Adjust Coumadin to INR as needed     3.  Hypertension  Coreg 25 mg twice daily  Adjust for adequate blood pressure control     4.  Recent  pneumonia  Budesonide nebulizer twice daily  DuoNeb nebulizer 3 times daily  Monitor for shortness of breath     5.  C. difficile colitis  Vancomycin to 50 mg 4 times daily     6.  Mood  Wellbutrin 150 mg every morning  Lexapro 20 mg daily     7.  Hyperlipidemia  Lipitor 80 mg daily     8. LUÍSI  Patient is on a bowel program to promote regular bowel movement , and vancomycin to treat her C. Difficile  She is a fall risk and fall precautions will be used

## 2025-03-19 NOTE — CARE PLAN
The patient's goals for the shift include  safety and comfort    The clinical goals for the shift include  remain HD stable

## 2025-03-19 NOTE — PROGRESS NOTES
Physical Therapy    Physical Therapy Evaluation    Patient Name: Kayley Lynch  MRN: [id number]  Department: [facility] REHAB  Room: [address] [place], [place] [address]  Today's Date: 3/19/2025   Time Calculation  Start Time: 0830  Stop Time: 0915  Time Calculation (min): 45 min    Assessment/Plan   PT Assessment  PT Assessment Results: Decreased strength, Decreased endurance, Impaired balance, Decreased mobility, Decreased coordination, Decreased safety awareness  Rehab Prognosis: Good  Barriers to Discharge Home: Caregiver assistance  Caregiver Assistance:  (spouse works during the day)  Evaluation/Treatment Tolerance: Patient limited by fatigue  Medical Staff Made Aware: Yes  Strengths: Premorbid level of function, Living arrangement secure, Housing layout  Barriers to Participation: Comorbidities  End of Session Communication: Bedside nurse  End of Session Patient Position: Up in chair, Alarm on  IP OR SWING BED PT PLAN  Inpatient or Swing Bed: Inpatient  PT Plan  Treatment/Interventions: Bed mobility, Transfer training, Gait training, Balance training, Neuromuscular re-education, Strengthening, Endurance training, Therapeutic exercise, Therapeutic activity, Home exercise program, Stair training  PT Plan: Ongoing PT  PT Frequency: 90 min/5 days per week (for 10 days)  PT Discharge Recommendations: Other (comment) (Anticipate discharge home in 10 days mod I at walker level for simple/household mobility, with recommendation for intermittent assist on stairs and for higher level IADLs.)  Equipment Recommended upon Discharge: Wheeled walker  PT Recommended Transfer Status: Assist x1    Subjective   General Visit Information:  General  Reason for Referral: PT eval and treat for debility following complicated hospital course following influenza A and pneumonia; spetic shock, COPD exacerbation, large hematoma left arm/compartment syndrome s/p urgent fasciotomy on 3/2, closure on 3/4 (wound dihiscence noted on 3/17); spontaneous iliopsoas hematoma on  left with retroperitoneal hemorrhage that extends down the left hemipelvis and surrounding the rectum  Referred By: Taylor Cunningham  Past Medical History Relevant to Rehab: PMH: COPD, HLD, CAD s/p CABG, pulm HTN, cardiomyopathy, a flutter s/p ablation, MVR  Prior to Session Communication: Bedside nurse  Patient Position Received: Bed, 2 rail up, Alarm off, not on at start of session  General Comment: Pt pleasant and cooperative, though nervous.  Home Living:  Home Living  Type of Home: House  Lives With: Spouse (spouse works full time days. Pt has 1 local son who works a flexible schedule, 1 son that lives in [place].)  Home Adaptive Equipment:  (has FWW and cane from .)  Home Layout:  (bed/bath/laundry all on main floor.)  Home Access:  (front entry: 2 steps, B rails. Garage entry, 1 steps, no rails.)  Home Living Comments: sleep supine on couch at home  Prior Level of Function:  Prior Function Per Pt/Caregiver Report  Prior Function Comments: PTA pt was independent with mobility and transfers without device, driving.  Precautions:         Date/Time Vitals Session Patient Position Pulse Resp SpO2 BP MAP (mmHg)    03/19/25 08:31:04 --  --  111  17  93 %  110/55  78           Objective   Pain:  Pain Assessment  Pain Assessment: 0-10  0-10 (Numeric) Pain Score: 0 - No pain  Cognition:  Cognition  Overall Cognitive Status: Within Functional Limits  Orientation Level: Oriented X4  Memory:  (reports memory is improving)    General Assessments:  Activity Tolerance  Endurance: Decreased tolerance for upright activites    Sensation  Sensation Comment: denies numbness/tingling.    Strength  Strength Comments: BLE MMT: hip flex 3+, knee ext 4-, knee flex 4, ankle DF 4-, ankle PF 3+.  Strength  Strength Comments: BLE MMT: hip flex 3+, knee ext 4-, knee flex 4, ankle DF 4-, ankle PF 3+.    Static Sitting Balance  Static Sitting-Comment/Number of Minutes: CGA  Dynamic Sitting Balance  Dynamic Sitting-Comments: retro LOB  up to min/mod A during LE MMT    Static Standing Balance  Static Standing-Comment/Number of Minutes: min A no device  Dynamic Standing Balance  Dynamic Standing-Comments: min A x 2 no device  Functional Assessments:  Bed Mobility 1  Bed Mobility Comments 1: supine <> sit with CGA, bed flat, no rails to simulate couch setup at home.    Transfer 1  Trials/Comments 1: Sit/stand transfers with min A.    Ambulation/Gait Training  Ambulation/Gait Training Performed:  (Pt ambulates 10' without device, arm in arm  in order to fully assess dynamic balance during gait with min A x 2, very shaky, narrow AKIKO, small bilateral step length. Fatigues quickly.)  Extremity/Trunk Assessments:  RLE   RLE :  (AROM WFL)  LLE   LLE :  (AROM WFL)      Encounter Problems       Encounter Problems (Active)       PT Problem       LTG - Pt will perform bed mobility with mod I  (Progressing)       Start:  03/19/25    Expected End:  03/29/25            LTG - Pt will perform transfers with mod I.  (Progressing)       Start:  03/19/25    Expected End:  03/29/25            LTG - Pt will amb 50 feet with FWW and mod I, and 150' with FWW and SBA. (Progressing)       Start:  03/19/25    Expected End:  03/29/25            LTG - Pt will up/down 2 stairs with B HR and CGA.  (Progressing)       Start:  03/19/25    Expected End:  03/29/25            STG - Pt will perform bed mobility with Sup.   (Progressing)       Start:  03/19/25    Expected End:  03/24/25            STG - Pt will perform transfers with CGA.   (Progressing)       Start:  03/19/25    Expected End:  03/24/25            STG - Pt will amb 50 feet with FWW and CGA.  (Progressing)       Start:  03/19/25    Expected End:  03/24/25            STG - Pt will up/down 1 stairs with B HR and min A.  (Progressing)       Start:  03/19/25    Expected End:  03/24/25                   Education Documentation  Precautions, taught by Yazmin Lang, PT at 3/19/2025  9:24 AM.  Learner: Patient  Readiness:  Acceptance  Method: Explanation  Response: Verbalizes Understanding    Mobility Training, taught by Yazmin Lang PT at 3/19/2025  9:24 AM.  Learner: Patient  Readiness: Acceptance  Method: Explanation  Response: Verbalizes Understanding    Education Comments  Pt educated on rehab processes, treatment interventions, and goals.

## 2025-03-19 NOTE — PROGRESS NOTES
Occupational Therapy    OT Treatment    Patient Name: Kayley Lynch  MRN: [id number]  Department: [facility] REHAB  Room: [address] [place], [place] [address]  Today's Date: 3/19/2025  Time Calculation  Start Time: 1345  Stop Time: 1430  Time Calculation (min): 45 min        Assessment:  End of Session Patient Position: Bed, 2 rail up, Alarm on     Plan:  Treatment Interventions: ADL retraining, UE strengthening/ROM, Functional transfer training, Endurance training, Patient/family training, Equipment evaluation/education  OT Frequency: 90 min/5 days per week (x 10 days)  OT Discharge Recommendations:  (anticipate patient mod I for most ADLs, transfers, and mobility; may require up to sba for higher level IADLs and shower transfers)  Equipment Recommended upon Discharge: Wheeled walker (3:1 commode, shower seat, reacher)  Treatment Interventions: ADL retraining, UE strengthening/ROM, Functional transfer training, Endurance training, Patient/family training, Equipment evaluation/education    Subjective   Previous Visit Info:  OT Last Visit  OT Received On: 03/19/25  General:  General  Prior to Session Communication:  (OTR)  Patient Position Received: Alarm on, Up in chair  General Comment: Patient agreeabel to OT.  Precautions:  Medical Precautions: Fall precautions  Precautions Comment: Contact plus prec (c-diff),LUE fasciotomy with wound dehiscence.    Pain:  Pain Assessment  Pain Assessment: 0-10  0-10 (Numeric) Pain Score: 0 - No pain    Objective      Activities of Daily Living: Grooming  Grooming Where Assessed: Standing sinkside  Grooming Comments: Standing at ww approx 3 minutes, patient performs oral care with Min a for balance  however patient requires to sit after completion of task secondary to fatigue and shortness of breath.  Encouraged pursed lip breathing.    LE Dressing  Sock Level of Assistance:  (Doffs and dons socks using figure four tech with SBA/supervision)    Bed Mobility/Transfers: Transfer 1  Trials/Comments 1: Sit to stand  transfers with Min A, patient self-corrects errors with hand placement  Transfers 2  Trials/Comments 2: Stand pivot transfer via ww with Min A    Therapy/Activity: Therapeutic Exercise  Therapeutic Exercise Activity 1: bue ther ex using 0# in LUE and 1# in RUE completing 3 exercises x 3 sets x 10 reps to promote greater strength and endurance related to ADLs and transfers. Rest breaks given throughout.   EDUCATION:  Education  Individual(s) Educated: Patient  Education Provided: POC discussed and agreed upon, Fall precautons, Risk and benefits of OT discussed with patient or other, Diagnosis & Precautions, Joint protection and energy conservation  Education Comment: Education provided on safety during transfers.Education provided on modifications made for decreased endurance.    Goals:  Encounter Problems       Encounter Problems (Active)       OT Problem       LTG - Patient will complete grooming independently. (Progressing)       Start:  03/19/25    Expected End:  03/29/25            LTG - Patient will complete toileting with mod I. (Progressing)       Start:  03/19/25    Expected End:  03/29/25            LTG - Patient will complete bathing with sba. (Progressing)       Start:  03/19/25    Expected End:  03/29/25            LTG - Patient will complete UE dressing independently.  (Progressing)       Start:  03/19/25    Expected End:  03/29/25            LTG - Patient will complete LE dressing using adaptive equip as needed with mod I. (Progressing)       Start:  03/19/25    Expected End:  03/29/25            LTG - Patient will complete commode transfer via device as needed with mod I. (Progressing)       Start:  03/19/25    Expected End:  03/29/25            LTG - Patient will complete tub/shower transfer using DME as needed with sba. (Progressing)       Start:  03/19/25    Expected End:  03/29/25            LTG - Patient will complete simple functional mobility via device as needed with mod I. (Progressing)        Start:  03/19/25    Expected End:  03/29/25            LTG - Patient will complete challenging functional mobility or simulated homemaking task with sba via device as needed. (Progressing)       Start:  03/19/25    Expected End:  03/29/25            STG - Patient will complete toileting with cga. (Progressing)       Start:  03/19/25    Expected End:  03/26/25            STG - Patient will complete bathing with min assist. (Progressing)       Start:  03/19/25    Expected End:  03/26/25            STG - Patient will complete LE dressing using adaptive equip as needed with min assist. (Progressing)       Start:  03/19/25    Expected End:  03/26/25            STG - Patient will complete commode transfer via device as needed with cga. (Progressing)       Start:  03/19/25    Expected End:  03/26/25            STG - Patient will complete tub/shower transfer using DME as needed with min assist. (Progressing)       Start:  03/19/25    Expected End:  03/26/25            STG - Patient will complete simple functional mobility via device as needed with min assist. (Progressing)       Start:  03/19/25    Expected End:  03/26/25

## 2025-03-20 PROBLEM — M79.A19: Status: ACTIVE | Noted: 2025-03-20

## 2025-03-20 LAB
INR PPP: 2.1 (ref 0.9–1.1)
PROTHROMBIN TIME: 23.3 SECONDS (ref 9.8–12.4)

## 2025-03-20 PROCEDURE — 97110 THERAPEUTIC EXERCISES: CPT | Mod: GP

## 2025-03-20 PROCEDURE — 2500000002 HC RX 250 W HCPCS SELF ADMINISTERED DRUGS (ALT 637 FOR MEDICARE OP, ALT 636 FOR OP/ED): Performed by: PHYSICAL MEDICINE & REHABILITATION

## 2025-03-20 PROCEDURE — 94640 AIRWAY INHALATION TREATMENT: CPT

## 2025-03-20 PROCEDURE — 97530 THERAPEUTIC ACTIVITIES: CPT | Mod: GP,CQ

## 2025-03-20 PROCEDURE — 85610 PROTHROMBIN TIME: CPT | Performed by: PHYSICAL MEDICINE & REHABILITATION

## 2025-03-20 PROCEDURE — 97110 THERAPEUTIC EXERCISES: CPT | Mod: GO,CO

## 2025-03-20 PROCEDURE — 97116 GAIT TRAINING THERAPY: CPT | Mod: GP

## 2025-03-20 PROCEDURE — 97535 SELF CARE MNGMENT TRAINING: CPT | Mod: GO,CO

## 2025-03-20 PROCEDURE — 2500000005 HC RX 250 GENERAL PHARMACY W/O HCPCS: Performed by: PHYSICAL MEDICINE & REHABILITATION

## 2025-03-20 PROCEDURE — 99233 SBSQ HOSP IP/OBS HIGH 50: CPT | Performed by: PHYSICAL MEDICINE & REHABILITATION

## 2025-03-20 PROCEDURE — 36415 COLL VENOUS BLD VENIPUNCTURE: CPT | Performed by: PHYSICAL MEDICINE & REHABILITATION

## 2025-03-20 PROCEDURE — 2500000001 HC RX 250 WO HCPCS SELF ADMINISTERED DRUGS (ALT 637 FOR MEDICARE OP): Performed by: PHYSICAL MEDICINE & REHABILITATION

## 2025-03-20 PROCEDURE — 1180000001 HC REHAB PRIVATE ROOM DAILY

## 2025-03-20 PROCEDURE — 97530 THERAPEUTIC ACTIVITIES: CPT | Mod: GO

## 2025-03-20 RX ADMIN — IPRATROPIUM BROMIDE AND ALBUTEROL SULFATE 3 ML: .5; 3 SOLUTION RESPIRATORY (INHALATION) at 13:04

## 2025-03-20 RX ADMIN — BUPROPION HYDROCHLORIDE 150 MG: 150 TABLET, EXTENDED RELEASE ORAL at 09:32

## 2025-03-20 RX ADMIN — CARVEDILOL 25 MG: 25 TABLET, FILM COATED ORAL at 21:31

## 2025-03-20 RX ADMIN — ESCITALOPRAM OXALATE 20 MG: 10 TABLET ORAL at 09:32

## 2025-03-20 RX ADMIN — VANCOMYCIN HYDROCHLORIDE 250 MG: 250 CAPSULE ORAL at 21:31

## 2025-03-20 RX ADMIN — ATORVASTATIN CALCIUM 80 MG: 80 TABLET, FILM COATED ORAL at 13:35

## 2025-03-20 RX ADMIN — ASPIRIN 81 MG: 81 TABLET, COATED ORAL at 09:32

## 2025-03-20 RX ADMIN — WARFARIN SODIUM 3 MG: 2 TABLET ORAL at 17:10

## 2025-03-20 RX ADMIN — BUDESONIDE 0.5 MG: 0.5 INHALANT ORAL at 18:42

## 2025-03-20 RX ADMIN — VANCOMYCIN HYDROCHLORIDE 250 MG: 250 CAPSULE ORAL at 13:35

## 2025-03-20 RX ADMIN — BUDESONIDE 0.5 MG: 0.5 INHALANT ORAL at 06:37

## 2025-03-20 RX ADMIN — VANCOMYCIN HYDROCHLORIDE 250 MG: 250 CAPSULE ORAL at 17:10

## 2025-03-20 RX ADMIN — PANTOPRAZOLE SODIUM 40 MG: 40 TABLET, DELAYED RELEASE ORAL at 09:32

## 2025-03-20 RX ADMIN — IPRATROPIUM BROMIDE AND ALBUTEROL SULFATE 3 ML: .5; 3 SOLUTION RESPIRATORY (INHALATION) at 18:41

## 2025-03-20 RX ADMIN — VANCOMYCIN HYDROCHLORIDE 250 MG: 250 CAPSULE ORAL at 09:32

## 2025-03-20 RX ADMIN — IPRATROPIUM BROMIDE AND ALBUTEROL SULFATE 3 ML: .5; 3 SOLUTION RESPIRATORY (INHALATION) at 06:37

## 2025-03-20 RX ADMIN — CARVEDILOL 25 MG: 25 TABLET, FILM COATED ORAL at 09:32

## 2025-03-20 RX ADMIN — ACETAMINOPHEN 650 MG: 325 TABLET, FILM COATED ORAL at 21:31

## 2025-03-20 RX ADMIN — Medication 4 L/MIN: at 18:45

## 2025-03-20 ASSESSMENT — PAIN SCALES - GENERAL
PAINLEVEL_OUTOF10: 2
PAINLEVEL_OUTOF10: 0 - NO PAIN
PAINLEVEL_OUTOF10: 5 - MODERATE PAIN
PAINLEVEL_OUTOF10: 0 - NO PAIN
PAINLEVEL_OUTOF10: 0 - NO PAIN
PAINLEVEL_OUTOF10: 5 - MODERATE PAIN

## 2025-03-20 ASSESSMENT — PAIN - FUNCTIONAL ASSESSMENT
PAIN_FUNCTIONAL_ASSESSMENT: 0-10

## 2025-03-20 ASSESSMENT — ACTIVITIES OF DAILY LIVING (ADL): HOME_MANAGEMENT_TIME_ENTRY: 10

## 2025-03-20 NOTE — PROGRESS NOTES
Physical Therapy    Physical Therapy Treatment    Patient Name: Kayley Lynch  MRN: [id number]  Department: [facility] REHAB  Room: [address] [place], [place] [address]  Today's Date: 3/20/2025  Time Calculation  Start Time: 1430  Stop Time: 1515  Time Calculation (min): 45 min         Assessment/Plan   PT Assessment  End of Session Communication: Bedside nurse  End of Session Patient Position: Bed, 2 rail up, Alarm on (Supine with HOB slightly elevated ~30 degrees, call bell in reach.)     PT Plan  Treatment/Interventions: Bed mobility, Transfer training, Gait training, Balance training, Neuromuscular re-education, Strengthening, Endurance training, Therapeutic exercise, Therapeutic activity, Home exercise program, Stair training  PT Plan: Ongoing PT  PT Frequency: 90 min/5 days per week (for 10 days)  PT Discharge Recommendations: Other (comment) (Anticipate discharge home in 10 days mod I at walker level for simple/household mobility, with recommendation for intermittent assist on stairs and for higher level IADLs.)  Equipment Recommended upon Discharge: Wheeled walker  PT Recommended Transfer Status: Assist x1      General Visit Information:   PT  Visit  PT Received On: 03/20/25  General  Prior to Session Communication:  (Therapy tech.)  Patient Position Received: Up in chair, Alarm off, not on at start of session  General Comment:  (Pt pleasant and agreeable to participate in PT session.)    Subjective   Precautions:  Precautions  Medical Precautions: Fall precautions  Precautions Comment: Contact plus prec (c-diff),LUE fasciotomy with wound dehiscence.            Objective   Pain:  Pain Assessment  Pain Assessment: 0-10  0-10 (Numeric) Pain Score: 0 - No pain              Treatments:  Therapeutic Exercise  Therapeutic Exercise Performed: Yes  Therapeutic Exercise Activity 1:  (Pt performed seated BLE therex consisting of: heel/toe raises, marching, LAQ, HS, hip ADD isometrics with ball, ABD 2x10 reps each in order to improve strength and  "functional mobility.)    Bed Mobility  Bed Mobility: Yes (Pt performed sit>supine with HOB flat and CGA.)    Ambulation/Gait Training  Ambulation/Gait Training Performed: Yes (Pt able to amb 100'x1 with left turns and 100'x1 in figure-8 eight pattern all over tile with FWW and MinAx1 plus w/c follow.)  Ambulation/Gait Training 1  Surface 1:  (Pt able to amb 75'x2 over tile/threshold/carpet with FWW and MinAx1 plus w/c follow; pt demos slow, steady sven with all gait activity and partial/step-through gait pattern.  Denies dizziness and no LOB noted.)  Transfers  Transfer: Yes (Pt performed sit<>stand multiple trials with FWW and CGA; min v/c for proper hand placement safety.)    Stairs  Stairs: Yes (Pt performed BLE 4\" step-ups x5 and neg up/down 3-4\" steps x2 trials with non-reciprocal gait pattern, all with B HR and MinAx1.  Seated rest breaks b/t activity d/t fatigue.)    Education Documentation  No documentation found.  Education Comments  Patient educated in safe techniques for gait with a device in order to maximize safety and functional independence.  Patient educated in safe and proper transfer techniques including proper positioning and hand/foot placement to maximize safety and functional independence.  Patient educated in correct bed mobility with instruction for proper trunk positioning, upper and lower body placement and positioning and use of bed rail if appropriate.  Pt educated on therex, including optimal techniques to maximize function.          OP EDUCATION:       Encounter Problems       Encounter Problems (Active)       PT Problem       LTG - Pt will perform bed mobility with mod I  (Progressing)       Start:  03/19/25    Expected End:  03/29/25            LTG - Pt will perform transfers with mod I.  (Progressing)       Start:  03/19/25    Expected End:  03/29/25            LTG - Pt will amb 50 feet with FWW and mod I, and 150' with FWW and SBA. (Progressing)       Start:  03/19/25    Expected " End:  03/29/25            LTG - Pt will up/down 2 stairs with B HR and CGA.  (Progressing)       Start:  03/19/25    Expected End:  03/29/25            STG - Pt will perform bed mobility with Sup.   (Progressing)       Start:  03/19/25    Expected End:  03/24/25            STG - Pt will perform transfers with CGA.   (Progressing)       Start:  03/19/25    Expected End:  03/24/25            STG - Pt will amb 50 feet with FWW and CGA.  (Progressing)       Start:  03/19/25    Expected End:  03/24/25            STG - Pt will up/down 1 stairs with B HR and min A.  (Progressing)       Start:  03/19/25    Expected End:  03/24/25

## 2025-03-20 NOTE — PROGRESS NOTES
Occupational Therapy    OT Treatment    Patient Name: Kayley Lynch  MRN: [id number]  Department: [facility]  Room: [address] [place] [address]  Today's Date: 3/20/2025  Time Calculation  Start Time: 0830  Stop Time: 0915  Time Calculation (min): 45 min        Assessment:  End of Session Communication: Bedside nurse  End of Session Patient Position: Up in chair, Alarm off, not on at start of session     Plan:  Treatment Interventions: ADL retraining, UE strengthening/ROM, Functional transfer training, Endurance training, Patient/family training, Equipment evaluation/education  OT Frequency: 90 min/5 days per week (x 10 days)  OT Discharge Recommendations:  (anticipate patient mod I for most ADLs, transfers, and mobility; may require up to sba for higher level IADLs and shower transfers)  Equipment Recommended upon Discharge: Wheeled walker (3:1 commode, shower seat, reacher)  Treatment Interventions: ADL retraining, UE strengthening/ROM, Functional transfer training, Endurance training, Patient/family training, Equipment evaluation/education    Subjective   Previous Visit Info:  OT Last Visit  OT Received On: 03/20/25    General:  General  Prior to Session Communication: Bedside nurse  Patient Position Received: Up in chair, Alarm off, not on at start of session  General Comment: patient pleasant and motivated; limited by decreased endurance    Precautions:  Medical Precautions: Fall precautions  Precautions Comment: Contact plus prec (c-diff),LUE fasciotomy with wound dehiscence.      Vital Signs Comment: vitals taken immediately following transfers/mobility: O2 on room air initially 87-89% each time however quickly increases to 94%; HR  BPM     Pain:  Pain Assessment  Pain Assessment: 0-10  0-10 (Numeric) Pain Score: 0 - No pain    Objective      Functional Standing Tolerance:  Functional Standing Tolerance Comments: static standing with BUE support for 2 mins x 2 trials with cga; unable to stand for longer duration, reports  weakness; noted increased shakiness as time progressed    Bed Mobility/Transfers: Transfers  Transfer:  (cga sit to stand from wheelchair)    Toilet Transfers  Toilet Transfers Comments: min assist transfers to/from 3:1 commode via wheeled walker; discussed possible need for 3:1 commode at discharge, patient currently only has standard height toilet without rail or vanity to push from    Functional Mobility:  Functional Mobility  Functional Mobility Performed:  (min assist simple functional mobility, including approach steps to/from commode and item retrieval from floor using reacher; patient able to tolerate 2-3 mins of mobility tasks before requiring rest break secondary to weakness/decreased endurance. min cues for walker safety)     EDUCATION:  Education  Individual(s) Educated: Patient  Education Provided:  (safe transfers; walker safety; energy conservation techniques)  Patient Response to Education: Patient/Caregiver Verbalized Understanding of Information, Patient/Caregiver Performed Return Demonstration of Exercises/Activities (patient will benefit from further education)    Goals:  Encounter Problems       Encounter Problems (Active)       OT Problem       LTG - Patient will complete grooming independently. (Progressing)       Start:  03/19/25    Expected End:  03/29/25            LTG - Patient will complete toileting with mod I. (Progressing)       Start:  03/19/25    Expected End:  03/29/25            LTG - Patient will complete bathing with sba. (Progressing)       Start:  03/19/25    Expected End:  03/29/25            LTG - Patient will complete UE dressing independently.  (Progressing)       Start:  03/19/25    Expected End:  03/29/25            LTG - Patient will complete LE dressing using adaptive equip as needed with mod I. (Progressing)       Start:  03/19/25    Expected End:  03/29/25            LTG - Patient will complete commode transfer via device as needed with mod I. (Progressing)        Start:  03/19/25    Expected End:  03/29/25            LTG - Patient will complete tub/shower transfer using DME as needed with sba. (Progressing)       Start:  03/19/25    Expected End:  03/29/25            LTG - Patient will complete simple functional mobility via device as needed with mod I. (Progressing)       Start:  03/19/25    Expected End:  03/29/25            LTG - Patient will complete challenging functional mobility or simulated homemaking task with sba via device as needed. (Progressing)       Start:  03/19/25    Expected End:  03/29/25            STG - Patient will complete toileting with cga. (Progressing)       Start:  03/19/25    Expected End:  03/26/25            STG - Patient will complete bathing with min assist. (Progressing)       Start:  03/19/25    Expected End:  03/26/25            STG - Patient will complete LE dressing using adaptive equip as needed with min assist. (Progressing)       Start:  03/19/25    Expected End:  03/26/25            STG - Patient will complete commode transfer via device as needed with cga. (Progressing)       Start:  03/19/25    Expected End:  03/26/25            STG - Patient will complete tub/shower transfer using DME as needed with min assist. (Progressing)       Start:  03/19/25    Expected End:  03/26/25            STG - Patient will complete simple functional mobility via device as needed with min assist. (Progressing)       Start:  03/19/25    Expected End:  03/26/25

## 2025-03-20 NOTE — PROGRESS NOTES
Physical Therapy    Physical Therapy Treatment    Patient Name: Kayley Lynch  MRN: [id number]  Department: [facility]  Room: [address] [place], [place] [address]  Today's Date: 3/20/2025  Time Calculation  Start Time: 1045  Stop Time: 1130  Time Calculation (min): 45 min         Assessment/Plan   PT Assessment  End of Session Communication: Bedside nurse  End of Session Patient Position: Up in chair, Alarm on     PT Plan  Treatment/Interventions: Bed mobility, Transfer training, Gait training, Balance training, Neuromuscular re-education, Strengthening, Endurance training, Therapeutic exercise, Therapeutic activity, Home exercise program, Stair training  PT Plan: Ongoing PT  PT Frequency: 90 min/5 days per week (for 10 days)  PT Discharge Recommendations: Other (comment) (Anticipate discharge home in 10 days mod I at walker level for simple/household mobility, with recommendation for intermittent assist on stairs and for higher level IADLs.)  Equipment Recommended upon Discharge: Wheeled walker  PT Recommended Transfer Status: Assist x1      General Visit Information:   PT  Visit  PT Received On: 03/20/25  General  Prior to Session Communication: Bedside nurse  Patient Position Received: Up in chair, Alarm off, not on at start of session  General Comment: patient pleasant and motivated; limited by decreased endurance    Subjective   Precautions:  Precautions  Medical Precautions: Fall precautions  Precautions Comment: Contact plus prec (c-diff),LUE fasciotomy with wound dehiscence.     Date/Time Vitals Session Patient Position Pulse Resp SpO2 BP MAP (mmHg)    03/20/25 1045 --  --  109  --  86 %  --  --           Objective   Pain:  Pain Assessment  Pain Assessment: 0-10  0-10 (Numeric) Pain Score: 0 - No pain     Treatments:  Therapeutic Exercise  Therapeutic Exercise Activity 1: Pt performs seated BLE therex 10 x 2 reps: ankle pumps, LAQs, marching, hip add; in order to improve strength related to transfers, gait, and  balance.    Ambulation/Gait Training 1  Comments/Distance (ft) 1: Pt amb 50' x 3 with FWW and min A overall, dec endurance but steadiness is improving. After gait on room air O2 dec to 85-88%, recovers within 1 minute of seated rest break with pursed lip breathing.  Transfers  Transfer:  (Sit/stand transfers with CGA)    Education Documentation  Pt educated on techniques for transfers and gait training with device in order to maximize safety and independence.  Pt educated on therapeutic exercises, including optimal techniques to maximize function.       Encounter Problems       Encounter Problems (Active)       PT Problem       LTG - Pt will perform bed mobility with mod I  (Progressing)       Start:  03/19/25    Expected End:  03/29/25            LTG - Pt will perform transfers with mod I.  (Progressing)       Start:  03/19/25    Expected End:  03/29/25            LTG - Pt will amb 50 feet with FWW and mod I, and 150' with FWW and SBA. (Progressing)       Start:  03/19/25    Expected End:  03/29/25            LTG - Pt will up/down 2 stairs with B HR and CGA.  (Progressing)       Start:  03/19/25    Expected End:  03/29/25            STG - Pt will perform bed mobility with Sup.   (Progressing)       Start:  03/19/25    Expected End:  03/24/25            STG - Pt will perform transfers with CGA.   (Progressing)       Start:  03/19/25    Expected End:  03/24/25            STG - Pt will amb 50 feet with FWW and CGA.  (Progressing)       Start:  03/19/25    Expected End:  03/24/25            STG - Pt will up/down 1 stairs with B HR and min A.  (Progressing)       Start:  03/19/25    Expected End:  03/24/25

## 2025-03-20 NOTE — PROGRESS NOTES
"  Kayley Lynch is a 54 y.o. female on day 2 of admission presenting with Physical debility.    Subjective   She was seen in therapy. She is walking with CGA and is working on increasing her general strength. She is wondering about her dressing over the left arm.    Spoke with therapy. Doing well.        Objective       Physical Exam    Pleasant female in NAD  CTAB  S1S2  SNTND+BS  Left UE with dressing in place.  Function: CGA   Assessment/Plan        Last Recorded Vitals  Blood pressure 119/56, pulse 109, temperature 36.2 °C (97.2 °F), resp. rate 16, height 1.6 m (5' 2.99\"), weight 54.6 kg (120 lb 5.9 oz), SpO2 95%.    Therapy notes from last 24 hours reviewed.    Relevant Results                  Scheduled medications  aspirin, 81 mg, oral, Daily  atorvastatin, 80 mg, oral, Daily  budesonide, 0.5 mg, nebulization, BID  buPROPion XL, 150 mg, oral, q AM  carvedilol, 25 mg, oral, BID  escitalopram, 20 mg, oral, Daily  ipratropium-albuteroL, 3 mL, nebulization, TID  oxygen, 4 L/min, inhalation, q12h  pantoprazole, 40 mg, oral, Daily  sennosides, 1 tablet, oral, Nightly  vancomycin, 250 mg, oral, 4x daily  warfarin, 3 mg, oral, Daily      Continuous medications     PRN medications  PRN medications: acetaminophen, alum-mag hydroxide-simeth, bisacodyl, bisacodyl, melatonin     No results found for this or any previous visit (from the past 24 hours).             Assessment/Plan   Assessment & Plan  Physical debility  - 3 hours of therapy daily  - work on endurance training    Atrial flutter (Multi)  -coumadin, check INR    S/P mitral valve replacement  -ongoing coumadin dosing    Hypercholesterolemia  -statin  Nontraumatic compartment syndrome of arm  -dressing changes        3/20  -increase general mobility  -increase endurance  -follow INR  -recheck  -Vitals stable  -weaned off Oxygen    I was present and led the team conference. The plan of care was developed and agreed upon as discussed and documented during the team " meeting.  See separate Note.    Team conference today with the rehab team - PT/OT/ST, SW, RN, Dietary, Case Management. Additional separate note in the chart for today. Over 35 min spent with paint care, team meetings, and coordination of care.         I spent 35 minutes in the professional and overall care of this patient.      Taylor Alvarez MD

## 2025-03-20 NOTE — PROGRESS NOTES
Occupational Therapy    OT Treatment    Patient Name: Kayley Lynch  MRN: [id number]  Department: [facility]  Room: [address] [place], [place] [address]  Today's Date: 3/20/2025  Time Calculation  Start Time: 1000  Stop Time: 1045  Time Calculation (min): 45 min        Assessment:  End of Session Communication: Bedside nurse  End of Session Patient Position: Up in chair, Alarm off, not on at start of session     Plan:  Treatment Interventions: ADL retraining, UE strengthening/ROM, Functional transfer training, Endurance training, Patient/family training, Equipment evaluation/education  OT Frequency: 90 min/5 days per week (x 10 days)  OT Discharge Recommendations:  (anticipate patient mod I for most ADLs, transfers, and mobility; may require up to sba for higher level IADLs and shower transfers)  Equipment Recommended upon Discharge: Wheeled walker (3:1 commode, shower seat, reacher)  Treatment Interventions: ADL retraining, UE strengthening/ROM, Functional transfer training, Endurance training, Patient/family training, Equipment evaluation/education    Subjective   Previous Visit Info:     General:  General  Prior to Session Communication: Bedside nurse  Patient Position Received: Up in chair, Alarm off, not on at start of session  General Comment: patient pleasant and motivated; limited by decreased endurance  Precautions:  Medical Precautions: Fall precautions  Precautions Comment: Contact plus prec (c-diff),LUE fasciotomy with wound dehiscence.      Vital Signs Comment: vitals taken immediately following transfers/mobility: O2 on room air initially 87-89% each time however quickly increases to 94%; HR  BPM     Pain:  Pain Assessment  Pain Assessment: 0-10  0-10 (Numeric) Pain Score: 0 - No pain    Objective    Activities of Daily Living: LE Dressing  Sock Level of Assistance: Setup (using figure four method)  /Activity: Therapeutic Exercise  Therapeutic Exercise Performed: Yes  BUE exercises using one pound weight in RUE and none in  LUE completing 30 repetitions of 5 exercises to promote greater independence with ADL's and transfers.     EDUCATION:  Education  Individual(s) Educated: Patient  Education Provided: Fall precautions  Patient Response to Education: Patient/Caregiver Verbalized Understanding of Information    Goals:  Encounter Problems       Encounter Problems (Active)       OT Problem       LTG - Patient will complete grooming independently. (Progressing)       Start:  03/19/25    Expected End:  03/29/25            LTG - Patient will complete toileting with mod I. (Progressing)       Start:  03/19/25    Expected End:  03/29/25            LTG - Patient will complete bathing with sba. (Progressing)       Start:  03/19/25    Expected End:  03/29/25            LTG - Patient will complete UE dressing independently.  (Progressing)       Start:  03/19/25    Expected End:  03/29/25            LTG - Patient will complete LE dressing using adaptive equip as needed with mod I. (Progressing)       Start:  03/19/25    Expected End:  03/29/25            LTG - Patient will complete commode transfer via device as needed with mod I. (Progressing)       Start:  03/19/25    Expected End:  03/29/25            LTG - Patient will complete tub/shower transfer using DME as needed with sba. (Progressing)       Start:  03/19/25    Expected End:  03/29/25            LTG - Patient will complete simple functional mobility via device as needed with mod I. (Progressing)       Start:  03/19/25    Expected End:  03/29/25            LTG - Patient will complete challenging functional mobility or simulated homemaking task with sba via device as needed. (Progressing)       Start:  03/19/25    Expected End:  03/29/25            STG - Patient will complete toileting with cga. (Progressing)       Start:  03/19/25    Expected End:  03/26/25            STG - Patient will complete bathing with min assist. (Progressing)       Start:  03/19/25    Expected End:  03/26/25             STG - Patient will complete LE dressing using adaptive equip as needed with min assist. (Progressing)       Start:  03/19/25    Expected End:  03/26/25            STG - Patient will complete commode transfer via device as needed with cga. (Progressing)       Start:  03/19/25    Expected End:  03/26/25            STG - Patient will complete tub/shower transfer using DME as needed with min assist. (Progressing)       Start:  03/19/25    Expected End:  03/26/25            STG - Patient will complete simple functional mobility via device as needed with min assist. (Progressing)       Start:  03/19/25    Expected End:  03/26/25

## 2025-03-21 LAB
HOLD SPECIMEN: NORMAL
HOLD SPECIMEN: NORMAL
INR PPP: 2.5 (ref 0.9–1.1)
PROTHROMBIN TIME: 27.5 SECONDS (ref 9.8–12.4)

## 2025-03-21 PROCEDURE — 85610 PROTHROMBIN TIME: CPT | Performed by: PHYSICAL MEDICINE & REHABILITATION

## 2025-03-21 PROCEDURE — 2500000001 HC RX 250 WO HCPCS SELF ADMINISTERED DRUGS (ALT 637 FOR MEDICARE OP): Performed by: PHYSICAL MEDICINE & REHABILITATION

## 2025-03-21 PROCEDURE — 36415 COLL VENOUS BLD VENIPUNCTURE: CPT | Performed by: PHYSICAL MEDICINE & REHABILITATION

## 2025-03-21 PROCEDURE — 97116 GAIT TRAINING THERAPY: CPT | Mod: GP

## 2025-03-21 PROCEDURE — 94640 AIRWAY INHALATION TREATMENT: CPT

## 2025-03-21 PROCEDURE — 97110 THERAPEUTIC EXERCISES: CPT | Mod: GP,CQ

## 2025-03-21 PROCEDURE — 97535 SELF CARE MNGMENT TRAINING: CPT | Mod: GO,CO

## 2025-03-21 PROCEDURE — 1180000001 HC REHAB PRIVATE ROOM DAILY

## 2025-03-21 PROCEDURE — 97530 THERAPEUTIC ACTIVITIES: CPT | Mod: GO,CO

## 2025-03-21 PROCEDURE — 2500000002 HC RX 250 W HCPCS SELF ADMINISTERED DRUGS (ALT 637 FOR MEDICARE OP, ALT 636 FOR OP/ED): Performed by: PHYSICAL MEDICINE & REHABILITATION

## 2025-03-21 PROCEDURE — 2500000005 HC RX 250 GENERAL PHARMACY W/O HCPCS: Performed by: PHYSICAL MEDICINE & REHABILITATION

## 2025-03-21 PROCEDURE — 97530 THERAPEUTIC ACTIVITIES: CPT | Mod: GP

## 2025-03-21 RX ADMIN — WARFARIN SODIUM 3 MG: 2 TABLET ORAL at 17:28

## 2025-03-21 RX ADMIN — CARVEDILOL 25 MG: 25 TABLET, FILM COATED ORAL at 22:52

## 2025-03-21 RX ADMIN — IPRATROPIUM BROMIDE AND ALBUTEROL SULFATE 3 ML: .5; 3 SOLUTION RESPIRATORY (INHALATION) at 18:24

## 2025-03-21 RX ADMIN — Medication 3 MG: at 22:52

## 2025-03-21 RX ADMIN — ACETAMINOPHEN 650 MG: 325 TABLET, FILM COATED ORAL at 22:52

## 2025-03-21 RX ADMIN — BUDESONIDE 0.5 MG: 0.5 INHALANT ORAL at 18:24

## 2025-03-21 RX ADMIN — CARVEDILOL 25 MG: 25 TABLET, FILM COATED ORAL at 08:53

## 2025-03-21 RX ADMIN — VANCOMYCIN HYDROCHLORIDE 250 MG: 250 CAPSULE ORAL at 13:42

## 2025-03-21 RX ADMIN — VANCOMYCIN HYDROCHLORIDE 250 MG: 250 CAPSULE ORAL at 05:12

## 2025-03-21 RX ADMIN — BUPROPION HYDROCHLORIDE 150 MG: 150 TABLET, EXTENDED RELEASE ORAL at 08:53

## 2025-03-21 RX ADMIN — BUDESONIDE 0.5 MG: 0.5 INHALANT ORAL at 06:56

## 2025-03-21 RX ADMIN — PANTOPRAZOLE SODIUM 40 MG: 40 TABLET, DELAYED RELEASE ORAL at 08:54

## 2025-03-21 RX ADMIN — VANCOMYCIN HYDROCHLORIDE 250 MG: 250 CAPSULE ORAL at 22:52

## 2025-03-21 RX ADMIN — IPRATROPIUM BROMIDE AND ALBUTEROL SULFATE 3 ML: .5; 3 SOLUTION RESPIRATORY (INHALATION) at 13:02

## 2025-03-21 RX ADMIN — VANCOMYCIN HYDROCHLORIDE 250 MG: 250 CAPSULE ORAL at 17:28

## 2025-03-21 RX ADMIN — ATORVASTATIN CALCIUM 80 MG: 80 TABLET, FILM COATED ORAL at 08:54

## 2025-03-21 RX ADMIN — ESCITALOPRAM OXALATE 20 MG: 10 TABLET ORAL at 08:54

## 2025-03-21 RX ADMIN — ASPIRIN 81 MG: 81 TABLET, COATED ORAL at 08:54

## 2025-03-21 RX ADMIN — IPRATROPIUM BROMIDE AND ALBUTEROL SULFATE 3 ML: .5; 3 SOLUTION RESPIRATORY (INHALATION) at 06:56

## 2025-03-21 ASSESSMENT — PAIN SCALES - GENERAL
PAINLEVEL_OUTOF10: 0 - NO PAIN
PAINLEVEL_OUTOF10: 0 - NO PAIN
PAINLEVEL_OUTOF10: 2
PAINLEVEL_OUTOF10: 5 - MODERATE PAIN
PAINLEVEL_OUTOF10: 0 - NO PAIN

## 2025-03-21 ASSESSMENT — PAIN - FUNCTIONAL ASSESSMENT
PAIN_FUNCTIONAL_ASSESSMENT: 0-10

## 2025-03-21 ASSESSMENT — ACTIVITIES OF DAILY LIVING (ADL)
HOME_MANAGEMENT_TIME_ENTRY: 45
BATHING_WHERE_ASSESSED: STANDING SINKSIDE

## 2025-03-21 ASSESSMENT — PAIN DESCRIPTION - LOCATION: LOCATION: ARM

## 2025-03-21 ASSESSMENT — PAIN DESCRIPTION - ORIENTATION: ORIENTATION: LEFT;MID;LOWER

## 2025-03-21 NOTE — PROGRESS NOTES
Physical Therapy    Physical Therapy Treatment    Patient Name: Kayley Lynch  MRN: [id number]  Department: [facility] REH[facility]  Room: [address] [place], [place] [address]  Today's Date: 3/21/2025  Time Calculation  Start Time: 1430  Stop Time: 1515  Time Calculation (min): 45 min         Assessment/Plan   PT Assessment  End of Session Communication: Bedside nurse  End of Session Patient Position: Bed, 2 rail up, Alarm on (Supine with HOB slightly elevated, call bell in reach.)     PT Plan  Treatment/Interventions: Bed mobility, Transfer training, Gait training, Balance training, Neuromuscular re-education, Strengthening, Endurance training, Therapeutic exercise, Therapeutic activity, Home exercise program, Stair training  PT Plan: Ongoing PT  PT Frequency: 90 min/5 days per week (for 10 days)  PT Discharge Recommendations: Other (comment) (Anticipate discharge home in 10 days mod I at walker level for simple/household mobility, with recommendation for intermittent assist on stairs and for higher level IADLs.)  Equipment Recommended upon Discharge: Wheeled walker  PT Recommended Transfer Status: Assist x1      General Visit Information:   PT  Visit  PT Received On: 03/21/25  General  Patient Position Received: Up in chair, Alarm off, not on at start of session  General Comment:  (Pt pleasant and agreeable to participate in PT session.)    Subjective   Precautions:  Precautions  Medical Precautions: Fall precautions  Precautions Comment: Contact plus prec (c-diff),LUE fasciotomy with wound dehiscence.     Date/Time Vitals Session Patient Position Pulse Resp SpO2 BP MAP (mmHg)    03/21/25 1500 --  --  90  17  --  118/55  92                 Objective   Pain:  Pain Assessment  Pain Assessment: 0-10  0-10 (Numeric) Pain Score: 0 - No pain           Treatments:  Therapeutic Exercise  Therapeutic Exercise Performed: Yes  Therapeutic Exercise Activity 1:  (Pt performed seated BLE therex consisiting of: heel/toe raises, marching, LAQ, HS, GS, hip ADD isometrics  "with ball, ABD 2x10 reps each in order to improve strength and functional mobility.)    Bed Mobility  Bed Mobility: Yes (Pt performed sit>supine with SBA.)    Ambulation/Gait Training  Ambulation/Gait Training Performed: Yes (Pt able to amb 100'x1 with L turns and 100'x1 in figure-8 pattern with FWW and CGA/SBA; demos steady sven with partial step-through/step-through gait pattern. Denies dizziness and no LOB noted.)  Transfers  Transfer: Yes (Pt performed sit<>stand multiple trials with FWW and SBA)    Stairs  Stairs: Yes (Pt neg up/down 5 steps x2 trials with non-reciprocal gait pattern using BHR first trial and   single HR second trial (R HR on 4\" side up/down, LHR on 6\" side up/down) with MinAx1.)    Education Documentation  No documentation found.  Education Comments  Patient educated in safe techniques for gait with a device in order to maximize safety and functional independence.  Patient educated in safe and proper transfer techniques including proper positioning and hand/foot placement to maximize safety and functional independence.  Patient educated in stair training techniques including proper sequencing, hand advancement on rails and safe foot placement to maximize functional independence.   Patient educated in correct bed mobility with instruction for proper trunk positioning, upper and lower body placement and positioning and use of bed rail if appropriate.  Pt educated on therex, including optimal techniques to maximize function.          OP EDUCATION:       Encounter Problems       Encounter Problems (Active)       PT Problem       LTG - Pt will perform bed mobility with mod I  (Progressing)       Start:  03/19/25    Expected End:  03/29/25            LTG - Pt will perform transfers with mod I.  (Progressing)       Start:  03/19/25    Expected End:  03/29/25            LTG - Pt will amb 50 feet with FWW and mod I, and 150' with FWW and SBA. (Progressing)       Start:  03/19/25    Expected End:  " 03/29/25            LTG - Pt will up/down 2 stairs with B HR and CGA.  (Progressing)       Start:  03/19/25    Expected End:  03/29/25            STG - Pt will perform bed mobility with Sup.   (Progressing)       Start:  03/19/25    Expected End:  03/24/25            STG - Pt will perform transfers with CGA.   (Progressing)       Start:  03/19/25    Expected End:  03/24/25            STG - Pt will amb 50 feet with FWW and CGA.  (Progressing)       Start:  03/19/25    Expected End:  03/24/25            STG - Pt will up/down 1 stairs with B HR and min A.  (Progressing)       Start:  03/19/25    Expected End:  03/24/25

## 2025-03-21 NOTE — PROGRESS NOTES
"Kayley Lynch is a 54 y.o. female on day 3 of admission presenting with Physical debility.    Subjective   Patient is seen in her room.  She denies chest pain or shortness of breath.  She denies abdominal pain.  10 review of systems are negative       Objective     Physical Exam  Constitutional:       General: no acute distress.     Appearance: Normal appearance.   Cardiovascular:   S1 and S2 no gallops  Pulmonary:   Lungs are clear without rales rhonchi or wheezes  Abdominal:      General: Abdomen is flat. Bowel sounds are normal.   Musculoskeletal:         General: No swelling, tenderness or deformity.   Homans' sign is negative bilaterally  Skin:     General: Skin is warm and dry.      Findings: No rash.   Neurological:      General: No focal deficit present.      Mental Status:  alert and oriented to person, place, and time.      Cranial Nerves: No cranial nerve deficit.      Psychiatric:         Mood and Affect: Mood normal.      Last Recorded Vitals  Blood pressure 133/63, pulse 89, temperature 36.5 °C (97.7 °F), temperature source Temporal, resp. rate 16, height 1.6 m (5' 2.99\"), weight 54.6 kg (120 lb 5.9 oz), SpO2 (!) 86%.  Intake/Output last 3 Shifts:  I/O last 3 completed shifts:  In: 120 (2.2 mL/kg) [P.O.:120]  Out: - (0 mL/kg)   Weight: 54.6 kg     Relevant Results  Scheduled medications  aspirin, 81 mg, oral, Daily  atorvastatin, 80 mg, oral, Daily  budesonide, 0.5 mg, nebulization, BID  buPROPion XL, 150 mg, oral, q AM  carvedilol, 25 mg, oral, BID  escitalopram, 20 mg, oral, Daily  ipratropium-albuteroL, 3 mL, nebulization, TID  oxygen, 4 L/min, inhalation, q12h  pantoprazole, 40 mg, oral, Daily  sennosides, 1 tablet, oral, Nightly  vancomycin, 250 mg, oral, 4x daily  warfarin, 3 mg, oral, Daily      Continuous medications     PRN medications  PRN medications: acetaminophen, alum-mag hydroxide-simeth, bisacodyl, bisacodyl, melatonin     Results for orders placed or performed during the hospital " encounter of 03/18/25 (from the past 24 hours)   Protime-INR   Result Value Ref Range    Protime 27.5 (H) 9.8 - 12.4 seconds    INR 2.5 (H) 0.9 - 1.1   Lavender Top   Result Value Ref Range    Extra Tube Hold for add-ons.    SST TOP   Result Value Ref Range    Extra Tube Hold for add-ons.             Assessment/Plan   Assessment & Plan  Physical debility    Atrial flutter (Multi)    Hypercholesterolemia    S/P mitral valve replacement    Nontraumatic compartment syndrome of arm         Debility following influenza pneumonia, compartment syndrome of the left upper extremity, and C. difficile colitis  Begin physical therapy for strengthening, transfer training, gait training, exercises to improve balance  Begin occupational therapy for ADL retraining, strengthening, ADL activities, and transfer training     2.  Coronary artery disease  Cardiac precautions  Aspirin 81 mg daily  Lipitor 80 mg daily   Coumadin 3 mg daily  Adjust Coumadin to INR as needed     3.  Hypertension  Coreg 25 mg twice daily  Adjust for adequate blood pressure control     4.  Recent pneumonia  Budesonide nebulizer twice daily  DuoNeb nebulizer 3 times daily  Monitor for shortness of breath     5.  Soft tissue infection  Vancomycin to 50 mg 4 times daily     6.  Mood  Wellbutrin 150 mg every morning  Lexapro 20 mg daily     7.  Hyperlipidemia  Lipitor 80 mg daily     8. LUÍSI  Patient is on a bowel program to promote regular bowel movement , and vancomycin to treat her C. Difficile  She is a fall risk and fall precautions will be used     3/21  She is functioning in an overall min assist for ambulation with a walker  Mood is good continue Lexapro and Wellbutrin  INR today is 2.5  Pain is controlled  Continue vancomycin for soft tissue infection      I spent 28 minutes in the professional and overall care of this patient.      Maria Eugenia Morales DO

## 2025-03-21 NOTE — PROGRESS NOTES
Occupational Therapy    OT Treatment    Patient Name: Kayley Lynch  MRN: [id number]  Department: [facility]  Room: [address] [place], [place] [address]  Today's Date: 3/21/2025  Time Calculation  Start Time: 1000  Stop Time: 1045  Time Calculation (min): 45 min        Assessment:  End of Session Communication: Bedside nurse  End of Session Patient Position: Up in chair     Plan:  Treatment Interventions: ADL retraining, UE strengthening/ROM, Functional transfer training, Endurance training, Patient/family training, Equipment evaluation/education  OT Frequency: 90 min/5 days per week (x 10 days)  OT Discharge Recommendations:  (anticipate patient mod I for most ADLs, transfers, and mobility; may require up to sba for higher level IADLs and shower transfers)  Equipment Recommended upon Discharge: Wheeled walker (3:1 commode, shower seat, reacher)  Treatment Interventions: ADL retraining, UE strengthening/ROM, Functional transfer training, Endurance training, Patient/family training, Equipment evaluation/education    Subjective   Previous Visit Info:  OT Last Visit  OT Received On: 03/21/25  General:  General  Patient Position Received: Up in chair, Alarm off, not on at start of session  Precautions:  Medical Precautions: Fall precautions  Precautions Comment: Contact plus prec (c-diff),LUE fasciotomy with wound dehiscence.  Pain:  Pain Assessment  Pain Assessment: 0-10  0-10 (Numeric) Pain Score: 0 - No pain    Objective    Bed Mobility/Transfers: Transfer 1  Trials/Comments 1: sit to stand transfers at Supervision  Transfers 2  Trials/Comments 2: sit to stand  at supervision  Functional Mobility:  Functional Mobility 1  Comments 1: Via rolling walker completes simple functional mobility tasks around environmental barriers at SBA/CGA  Standing Balance:  Static Standing Balance  Static Standing-Balance Support: Bilateral upper extremity supported  Static Standing-Level of Assistance: Close supervision  Static Standing-Comment/Number of Minutes: 1  min times three trials, 3,0 and 3.5 mins to promote independence with ADL's and transfers  EDUCATION:  Education  Individual(s) Educated: Patient  Education Provided: Fall precautions  Patient Response to Education: Patient/Caregiver Verbalized Understanding of Information    Goals:  Encounter Problems       Encounter Problems (Active)       OT Problem       LTG - Patient will complete grooming independently. (Progressing)       Start:  03/19/25    Expected End:  03/29/25            LTG - Patient will complete toileting with mod I. (Progressing)       Start:  03/19/25    Expected End:  03/29/25            LTG - Patient will complete bathing with sba. (Progressing)       Start:  03/19/25    Expected End:  03/29/25            LTG - Patient will complete UE dressing independently.  (Progressing)       Start:  03/19/25    Expected End:  03/29/25            LTG - Patient will complete LE dressing using adaptive equip as needed with mod I. (Progressing)       Start:  03/19/25    Expected End:  03/29/25            LTG - Patient will complete commode transfer via device as needed with mod I. (Progressing)       Start:  03/19/25    Expected End:  03/29/25            LTG - Patient will complete tub/shower transfer using DME as needed with sba. (Progressing)       Start:  03/19/25    Expected End:  03/29/25            LTG - Patient will complete simple functional mobility via device as needed with mod I. (Progressing)       Start:  03/19/25    Expected End:  03/29/25            LTG - Patient will complete challenging functional mobility or simulated homemaking task with sba via device as needed. (Progressing)       Start:  03/19/25    Expected End:  03/29/25            STG - Patient will complete toileting with cga. (Progressing)       Start:  03/19/25    Expected End:  03/26/25            STG - Patient will complete bathing with min assist. (Progressing)       Start:  03/19/25    Expected End:  03/26/25            STG -  Patient will complete LE dressing using adaptive equip as needed with min assist. (Progressing)       Start:  03/19/25    Expected End:  03/26/25            STG - Patient will complete commode transfer via device as needed with cga. (Progressing)       Start:  03/19/25    Expected End:  03/26/25            STG - Patient will complete tub/shower transfer using DME as needed with min assist. (Progressing)       Start:  03/19/25    Expected End:  03/26/25            STG - Patient will complete simple functional mobility via device as needed with min assist. (Progressing)       Start:  03/19/25    Expected End:  03/26/25

## 2025-03-21 NOTE — PROGRESS NOTES
"Kayley Lynch is a 54 y.o. female on day 3 of admission presenting with Physical debility.    Subjective   The patient reports of minimal left upper extremity discomfort.       Objective     Physical Exam  There is a 1 x 3 the centimeter area of necrotic tissue and dehiscence along the proximal forearm incision just distal to the antecubital flexion crease.  The left upper extremity is neurovascular intact.  Last Recorded Vitals  Blood pressure 133/63, pulse 89, temperature 36.5 °C (97.7 °F), temperature source Temporal, resp. rate 16, height 1.6 m (5' 2.99\"), weight 54.6 kg (120 lb 5.9 oz), SpO2 96%.  Intake/Output last 3 Shifts:  I/O last 3 completed shifts:  In: 120 (2.2 mL/kg) [P.O.:120]  Out: - (0 mL/kg)   Weight: 54.6 kg     Relevant Results                               Assessment/Plan   Assessment & Plan  Physical debility    Atrial flutter (Multi)    Hypercholesterolemia    S/P mitral valve replacement    Nontraumatic compartment syndrome of arm    The left upper extremity wound is stable.  There is no signs of infection.  She does have some necrotic tissue along the proximal forearm which may need surgical debridement at a later date.  In the meantime, have recommended continuing with the wound packing with quarter inch sterile gauze once daily.             Elijah Licea MD      "

## 2025-03-21 NOTE — PROGRESS NOTES
Physical Therapy    Time in: 0715  Time out: 0723     Patient sitting EOB upon arrival. Patient performs sit/stand transfer from bed with CGA.  Cues for proper hand and foot placement for safety.  Patient ambulates 7' bed to bathroom with FWW and CGA. Patient sitting up in bathroom with call light in reach.

## 2025-03-21 NOTE — PROGRESS NOTES
Physical Therapy    Physical Therapy Treatment    Patient Name: Kayley Lynch  MRN: [id number]  Department: [facility]  Room: [address] [place], [place] [address]  Today's Date: 3/21/2025  Time Calculation  Start Time: 1045  Stop Time: 1130  Time Calculation (min): 45 min         Assessment/Plan   PT Assessment  End of Session Communication: Bedside nurse  End of Session Patient Position: Up in chair, Alarm on     PT Plan  Treatment/Interventions: Bed mobility, Transfer training, Gait training, Balance training, Neuromuscular re-education, Strengthening, Endurance training, Therapeutic exercise, Therapeutic activity, Home exercise program, Stair training  PT Plan: Ongoing PT  PT Frequency: 90 min/5 days per week (for 10 days)  PT Discharge Recommendations: Other (comment) (Anticipate discharge home in 10 days mod I at walker level for simple/household mobility, with recommendation for intermittent assist on stairs and for higher level IADLs.)  Equipment Recommended upon Discharge: Wheeled walker  PT Recommended Transfer Status: Assist x1      General Visit Information:   PT  Visit  PT Received On: 03/21/25  General  Patient Position Received: Up in chair, Alarm off, not on at start of session  General Comment: patient pleasant and motivated; limited by decreased endurance    Subjective   Precautions:  Precautions  Medical Precautions: Fall precautions  Precautions Comment: Contact plus prec (c-diff),LUE fasciotomy with wound dehiscence.     Date/Time Vitals Session Patient Position Pulse Resp SpO2 BP MAP (mmHg)    03/21/25 1045 --  --  --  --  86 %  --  --           Objective   Pain:  Pain Assessment  Pain Assessment: 0-10  0-10 (Numeric) Pain Score: 0 - No pain     Treatments:  Ambulation/Gait Training 1  Comments/Distance (ft) 1: Pt amb 100' x 3 with FWW and CGA to SBA, O2 dec to 86-88% on room air. Recovers within 1 minute of seated rest break with pursed lip breathing but does require rest breaks between each activity due to limited  "endurance.  Transfer 1  Trials/Comments 1: sit/stand transfers with SBA    Stairs  Stairs: Yes  Stairs  Comment/Number of Steps 1: Up/down 5 steps with B rails with min A on 6\" side and CGA on 4\" side, NR pattern. Repeated 2 trials. Also up/down 5 steps with 1 rail and HHA, min/mod A on 6\" side and min A on 4\" side, NR pattern. O2 dec to 89% after stairs with longer recovery to 94%. (Pt states she does not know if  will be able to make front entrance accessible. Urged pt to encourage  to make front entrance with B rails accessible since pt will require inc physical assistance on garage stairs with no rails.)    Education Documentation  Pt educated on techniques for transfers and gait training with device in order to maximize safety and independence.  Pt educated on stair training techniques including proper sequencing to maximize safety.       Encounter Problems       Encounter Problems (Active)       PT Problem       LTG - Pt will perform bed mobility with mod I  (Progressing)       Start:  03/19/25    Expected End:  03/29/25            LTG - Pt will perform transfers with mod I.  (Progressing)       Start:  03/19/25    Expected End:  03/29/25            LTG - Pt will amb 50 feet with FWW and mod I, and 150' with FWW and SBA. (Progressing)       Start:  03/19/25    Expected End:  03/29/25            LTG - Pt will up/down 2 stairs with B HR and CGA.  (Progressing)       Start:  03/19/25    Expected End:  03/29/25            STG - Pt will perform bed mobility with Sup.   (Progressing)       Start:  03/19/25    Expected End:  03/24/25            STG - Pt will perform transfers with CGA.   (Progressing)       Start:  03/19/25    Expected End:  03/24/25            STG - Pt will amb 50 feet with FWW and CGA.  (Progressing)       Start:  03/19/25    Expected End:  03/24/25            STG - Pt will up/down 1 stairs with B HR and min A.  (Progressing)       Start:  03/19/25    Expected End:  03/24/25          "

## 2025-03-21 NOTE — CARE PLAN
The patient's goals for the shift include      The clinical goals for the shift include patient will remain HDS      Problem: Skin  Goal: Decreased wound size/increased tissue granulation at next dressing change  Outcome: Progressing  Goal: Participates in plan/prevention/treatment measures  Outcome: Progressing  Goal: Prevent/manage excess moisture  Outcome: Progressing  Goal: Prevent/minimize sheer/friction injuries  Outcome: Progressing  Goal: Promote/optimize nutrition  Outcome: Progressing  Goal: Promote skin healing  Outcome: Progressing     Problem: Pain  Goal: Takes deep breaths with improved pain control throughout the shift  Outcome: Progressing  Goal: Turns in bed with improved pain control throughout the shift  Outcome: Progressing  Goal: Walks with improved pain control throughout the shift  Outcome: Progressing  Goal: Performs ADL's with improved pain control throughout shift  Outcome: Progressing  Goal: Participates in PT with improved pain control throughout the shift  Outcome: Progressing  Goal: Free from opioid side effects throughout the shift  Outcome: Progressing  Goal: Free from acute confusion related to pain meds throughout the shift  Outcome: Progressing     Problem: Fall/Injury  Goal: Not fall by end of shift  Outcome: Progressing  Goal: Be free from injury by end of the shift  Outcome: Progressing  Goal: Verbalize understanding of personal risk factors for fall in the hospital  Outcome: Progressing  Goal: Verbalize understanding of risk factor reduction measures to prevent injury from fall in the home  Outcome: Progressing  Goal: Use assistive devices by end of the shift  Outcome: Progressing  Goal: Pace activities to prevent fatigue by end of the shift  Outcome: Progressing

## 2025-03-21 NOTE — CARE PLAN
The patient's goals for the shift include      The clinical goals for the shift include Patient will remain hemodynamically stable.      Problem: Skin  Goal: Decreased wound size/increased tissue granulation at next dressing change  Outcome: Progressing  Flowsheets (Taken 3/20/2025 2355)  Decreased wound size/increased tissue granulation at next dressing change: Promote sleep for wound healing  Goal: Participates in plan/prevention/treatment measures  Outcome: Progressing  Flowsheets (Taken 3/20/2025 2355)  Participates in plan/prevention/treatment measures: Elevate heels  Goal: Prevent/manage excess moisture  Outcome: Progressing  Flowsheets (Taken 3/20/2025 2355)  Prevent/manage excess moisture: Moisturize dry skin  Goal: Prevent/minimize sheer/friction injuries  Outcome: Progressing  Flowsheets (Taken 3/20/2025 2355)  Prevent/minimize sheer/friction injuries:   Use pull sheet   HOB 30 degrees or less  Goal: Promote/optimize nutrition  Outcome: Progressing  Flowsheets (Taken 3/20/2025 2355)  Promote/optimize nutrition: Monitor/record intake including meals  Goal: Promote skin healing  Outcome: Progressing  Flowsheets (Taken 3/20/2025 2355)  Promote skin healing:   Protective dressings over bony prominences   Assess skin/pad under line(s)/device(s)     Problem: Pain  Goal: Takes deep breaths with improved pain control throughout the shift  Outcome: Progressing  Goal: Turns in bed with improved pain control throughout the shift  Outcome: Progressing  Goal: Walks with improved pain control throughout the shift  Outcome: Progressing  Goal: Performs ADL's with improved pain control throughout shift  Outcome: Progressing  Goal: Participates in PT with improved pain control throughout the shift  Outcome: Progressing  Goal: Free from opioid side effects throughout the shift  Outcome: Progressing  Goal: Free from acute confusion related to pain meds throughout the shift  Outcome: Progressing     Problem: Fall/Injury  Goal:  Not fall by end of shift  Outcome: Progressing  Goal: Be free from injury by end of the shift  Outcome: Progressing  Goal: Verbalize understanding of personal risk factors for fall in the hospital  Outcome: Progressing  Goal: Verbalize understanding of risk factor reduction measures to prevent injury from fall in the home  Outcome: Progressing  Goal: Use assistive devices by end of the shift  Outcome: Progressing  Goal: Pace activities to prevent fatigue by end of the shift  Outcome: Progressing

## 2025-03-21 NOTE — PROGRESS NOTES
Occupational Therapy    OT Treatment    Patient Name: Kayley Lynch  MRN: [id number]  Department: [facility]  Room: [address] [place], [place] [address]  Today's Date: 3/21/2025  Time Calculation  Start Time: 0745  Stop Time: 0830  Time Calculation (min): 45 min        Assessment:  End of Session Communication: Bedside nurse  End of Session Patient Position: Up in chair     Plan:  Treatment Interventions: ADL retraining, UE strengthening/ROM, Functional transfer training, Endurance training, Patient/family training, Equipment evaluation/education  OT Frequency: 90 min/5 days per week (x 10 days)  OT Discharge Recommendations:  (anticipate patient mod I for most ADLs, transfers, and mobility; may require up to sba for higher level IADLs and shower transfers)  Equipment Recommended upon Discharge: Wheeled walker (3:1 commode, shower seat, reacher)  Treatment Interventions: ADL retraining, UE strengthening/ROM, Functional transfer training, Endurance training, Patient/family training, Equipment evaluation/education    Subjective   Previous Visit Info:  OT Last Visit  OT Received On: 03/21/25  General:  General  Patient Position Received: Up in chair, Alarm off, not on at start of session  Precautions:         Pain:  Pain Assessment  Pain Assessment: 0-10  0-10 (Numeric) Pain Score: 0 - No pain    Objective    Activities of Daily Living: Grooming  Grooming Level of Assistance: Setup  Grooming Where Assessed: Standing sinkside    UE Bathing  UE Bathing Level of Assistance: Setup  UE Bathing Where Assessed: Standing sinkside    LE Bathing  LE Bathing Level of Assistance:  (SBA)  LE Bathing Where Assessed: Standing sinkside    UE Dressing  UE Dressing Level of Assistance: Setup  UE Dressing Where Assessed: Wheelchair    LE Dressing  LE Dressing: Yes  Pants Level of Assistance: Close supervision  Sock Level of Assistance: Setup  Shoe Level of Assistance: Setup  LE Dressing Where Assessed: Wheelchair   EDUCATION: .educated regarding fall precautions  with good understanding being expressed       Goals:  Encounter Problems       Encounter Problems (Active)       OT Problem       LTG - Patient will complete grooming independently. (Progressing)       Start:  03/19/25    Expected End:  03/29/25            LTG - Patient will complete toileting with mod I. (Progressing)       Start:  03/19/25    Expected End:  03/29/25            LTG - Patient will complete bathing with sba. (Progressing)       Start:  03/19/25    Expected End:  03/29/25            LTG - Patient will complete UE dressing independently.  (Progressing)       Start:  03/19/25    Expected End:  03/29/25            LTG - Patient will complete LE dressing using adaptive equip as needed with mod I. (Progressing)       Start:  03/19/25    Expected End:  03/29/25            LTG - Patient will complete commode transfer via device as needed with mod I. (Progressing)       Start:  03/19/25    Expected End:  03/29/25            LTG - Patient will complete tub/shower transfer using DME as needed with sba. (Progressing)       Start:  03/19/25    Expected End:  03/29/25            LTG - Patient will complete simple functional mobility via device as needed with mod I. (Progressing)       Start:  03/19/25    Expected End:  03/29/25            LTG - Patient will complete challenging functional mobility or simulated homemaking task with sba via device as needed. (Progressing)       Start:  03/19/25    Expected End:  03/29/25            STG - Patient will complete toileting with cga. (Progressing)       Start:  03/19/25    Expected End:  03/26/25            STG - Patient will complete bathing with min assist. (Progressing)       Start:  03/19/25    Expected End:  03/26/25            STG - Patient will complete LE dressing using adaptive equip as needed with min assist. (Progressing)       Start:  03/19/25    Expected End:  03/26/25            STG - Patient will complete commode transfer via device as needed with cga.  (Progressing)       Start:  03/19/25    Expected End:  03/26/25            STG - Patient will complete tub/shower transfer using DME as needed with min assist. (Progressing)       Start:  03/19/25    Expected End:  03/26/25            STG - Patient will complete simple functional mobility via device as needed with min assist. (Progressing)       Start:  03/19/25    Expected End:  03/26/25

## 2025-03-22 LAB
INR PPP: 2.6 (ref 0.9–1.1)
PROTHROMBIN TIME: 28.9 SECONDS (ref 9.8–12.4)

## 2025-03-22 PROCEDURE — 94640 AIRWAY INHALATION TREATMENT: CPT

## 2025-03-22 PROCEDURE — 97116 GAIT TRAINING THERAPY: CPT | Mod: GP

## 2025-03-22 PROCEDURE — 97530 THERAPEUTIC ACTIVITIES: CPT | Mod: GP

## 2025-03-22 PROCEDURE — 85610 PROTHROMBIN TIME: CPT | Performed by: PHYSICAL MEDICINE & REHABILITATION

## 2025-03-22 PROCEDURE — 97535 SELF CARE MNGMENT TRAINING: CPT | Mod: CO,GO

## 2025-03-22 PROCEDURE — 2500000002 HC RX 250 W HCPCS SELF ADMINISTERED DRUGS (ALT 637 FOR MEDICARE OP, ALT 636 FOR OP/ED): Performed by: PHYSICAL MEDICINE & REHABILITATION

## 2025-03-22 PROCEDURE — 97530 THERAPEUTIC ACTIVITIES: CPT | Mod: CO,GO

## 2025-03-22 PROCEDURE — 1180000001 HC REHAB PRIVATE ROOM DAILY

## 2025-03-22 PROCEDURE — 2500000001 HC RX 250 WO HCPCS SELF ADMINISTERED DRUGS (ALT 637 FOR MEDICARE OP): Performed by: PHYSICAL MEDICINE & REHABILITATION

## 2025-03-22 PROCEDURE — 97110 THERAPEUTIC EXERCISES: CPT | Mod: CO,GO

## 2025-03-22 PROCEDURE — 97110 THERAPEUTIC EXERCISES: CPT | Mod: GP

## 2025-03-22 PROCEDURE — 2500000005 HC RX 250 GENERAL PHARMACY W/O HCPCS: Performed by: PHYSICAL MEDICINE & REHABILITATION

## 2025-03-22 PROCEDURE — 36415 COLL VENOUS BLD VENIPUNCTURE: CPT | Performed by: PHYSICAL MEDICINE & REHABILITATION

## 2025-03-22 RX ADMIN — CARVEDILOL 25 MG: 25 TABLET, FILM COATED ORAL at 09:08

## 2025-03-22 RX ADMIN — VANCOMYCIN HYDROCHLORIDE 250 MG: 250 CAPSULE ORAL at 18:58

## 2025-03-22 RX ADMIN — BUDESONIDE 0.5 MG: 0.5 INHALANT ORAL at 20:55

## 2025-03-22 RX ADMIN — VANCOMYCIN HYDROCHLORIDE 250 MG: 250 CAPSULE ORAL at 12:06

## 2025-03-22 RX ADMIN — VANCOMYCIN HYDROCHLORIDE 250 MG: 250 CAPSULE ORAL at 06:28

## 2025-03-22 RX ADMIN — IPRATROPIUM BROMIDE AND ALBUTEROL SULFATE 3 ML: .5; 3 SOLUTION RESPIRATORY (INHALATION) at 06:17

## 2025-03-22 RX ADMIN — IPRATROPIUM BROMIDE AND ALBUTEROL SULFATE 3 ML: .5; 3 SOLUTION RESPIRATORY (INHALATION) at 20:55

## 2025-03-22 RX ADMIN — Medication 3 MG: at 21:29

## 2025-03-22 RX ADMIN — CARVEDILOL 25 MG: 25 TABLET, FILM COATED ORAL at 21:11

## 2025-03-22 RX ADMIN — VANCOMYCIN HYDROCHLORIDE 250 MG: 250 CAPSULE ORAL at 23:48

## 2025-03-22 RX ADMIN — Medication 4 L/MIN: at 09:09

## 2025-03-22 RX ADMIN — BUPROPION HYDROCHLORIDE 150 MG: 150 TABLET, EXTENDED RELEASE ORAL at 09:08

## 2025-03-22 RX ADMIN — WARFARIN SODIUM 3 MG: 2 TABLET ORAL at 18:58

## 2025-03-22 RX ADMIN — ATORVASTATIN CALCIUM 80 MG: 80 TABLET, FILM COATED ORAL at 09:08

## 2025-03-22 RX ADMIN — BUDESONIDE 0.5 MG: 0.5 INHALANT ORAL at 06:17

## 2025-03-22 RX ADMIN — ACETAMINOPHEN 650 MG: 325 TABLET, FILM COATED ORAL at 21:29

## 2025-03-22 RX ADMIN — ASPIRIN 81 MG: 81 TABLET, COATED ORAL at 09:08

## 2025-03-22 RX ADMIN — PANTOPRAZOLE SODIUM 40 MG: 40 TABLET, DELAYED RELEASE ORAL at 09:08

## 2025-03-22 RX ADMIN — ESCITALOPRAM OXALATE 20 MG: 10 TABLET ORAL at 09:08

## 2025-03-22 ASSESSMENT — ACTIVITIES OF DAILY LIVING (ADL)
BATHING_WHERE_ASSESSED: STANDING SINKSIDE
BATHING_LEVEL_OF_ASSISTANCE: CLOSE SUPERVISION
HOME_MANAGEMENT_TIME_ENTRY: 45
HOME_MANAGEMENT_TIME_ENTRY: 15

## 2025-03-22 ASSESSMENT — PAIN - FUNCTIONAL ASSESSMENT
PAIN_FUNCTIONAL_ASSESSMENT: 0-10

## 2025-03-22 ASSESSMENT — COGNITIVE AND FUNCTIONAL STATUS - GENERAL
DAILY ACTIVITIY SCORE: 20
MOBILITY SCORE: 19
DRESSING REGULAR UPPER BODY CLOTHING: A LITTLE
DRESSING REGULAR LOWER BODY CLOTHING: A LITTLE
STANDING UP FROM CHAIR USING ARMS: A LITTLE
WALKING IN HOSPITAL ROOM: A LITTLE
MOVING TO AND FROM BED TO CHAIR: A LITTLE
TOILETING: A LITTLE
HELP NEEDED FOR BATHING: A LITTLE
CLIMB 3 TO 5 STEPS WITH RAILING: A LOT

## 2025-03-22 ASSESSMENT — PAIN SCALES - GENERAL
PAINLEVEL_OUTOF10: 0 - NO PAIN
PAINLEVEL_OUTOF10: 5 - MODERATE PAIN
PAINLEVEL_OUTOF10: 0 - NO PAIN

## 2025-03-22 ASSESSMENT — PAIN DESCRIPTION - ORIENTATION: ORIENTATION: LEFT

## 2025-03-22 ASSESSMENT — PAIN DESCRIPTION - LOCATION: LOCATION: ARM

## 2025-03-22 ASSESSMENT — PAIN SCALES - WONG BAKER: WONGBAKER_NUMERICALRESPONSE: NO HURT

## 2025-03-22 NOTE — PROGRESS NOTES
Occupational Therapy    OT Treatment    Patient Name: Kayley Lynch  MRN: [id number]  Department: [facility] REHAB  Room: [address] [place], [place] [address]  Today's Date: 3/22/2025  Time Calculation  Start Time: 1045  Stop Time: 1130  Time Calculation (min): 45 min        Assessment:  End of Session Patient Position: Up in chair, Alarm off, not on at start of session     Plan:  Treatment Interventions: ADL retraining, UE strengthening/ROM, Functional transfer training, Endurance training, Patient/family training, Equipment evaluation/education  OT Frequency: 90 min/5 days per week (x 10 days)  OT Discharge Recommendations:  (anticipate patient mod I for most ADLs, transfers, and mobility; may require up to sba for higher level IADLs and shower transfers)  Equipment Recommended upon Discharge: Wheeled walker (3:1 commode, shower seat, reacher)  Treatment Interventions: ADL retraining, UE strengthening/ROM, Functional transfer training, Endurance training, Patient/family training, Equipment evaluation/education    Subjective   Previous Visit Info:  OT Last Visit  OT Received On: 03/22/25  General:  General  Prior to Session Communication: Bedside nurse  Patient Position Received: Up in chair, Alarm off, not on at start of session  General Comment: pleasant and cooperative, verbalizing fatigue towards end of session  Precautions:  Medical Precautions: Fall precautions  Precautions Comment: Contact plus prec (c-diff)            Pain:  Pain Assessment  0-10 (Numeric) Pain Score: 0 - No pain    Objective    Cognition:  Cognition  Orientation Level: Oriented X4       Activities of Daily Living: Grooming  Grooming Level of Assistance: Close supervision  Grooming Where Assessed: Standing sinkside  Grooming Comments: wash hands following toileting task    Toileting  Toileting Level of Assistance: Close supervision  Where Assessed: Toilet  Functional Standing Tolerance:  Time: 3:30 standing at FWW  Bed Mobility/Transfers: Transfers  Transfer: Yes  Transfer  1  Technique 1: Sit to stand, Stand to sit  Transfer Device 1: Walker  Transfer Level of Assistance 1: Close supervision    Toilet Transfers  Toilet Transfer From: Rolling walker  Toilet Transfer Type: To and from  Toilet Transfer to: Raised toilet seat with rails  Toilet Transfer Technique: Ambulating  Toilet Transfers:  (SBA)    Functional Mobility:  Functional Mobility  Functional Mobility Performed: Yes  Functional Mobility 1  Device 1: Rolling walker  Assistance 1:  (SBA to CGA)  Comments 1: pt ambulated in/out of bathroom and over to sink with min vc for increased safety with side stepping out of bathroom       Therapy/Activity: Therapeutic Exercise  Therapeutic Exercise Performed: Yes  Therapeutic Exercise Activity 1: pt completed seated UE ther ex. pt completed #2 dumbell on R UE and without resistance on L UE. pt completed 10 reps x 3 sets x 6 exercises on R UE and 4 exercises on LE UE not completing exercise involving elbow flexion d/t open area on LE. pt completed with fair activity tolerance with min rest breaks to improve I/ADL's.    Therapeutic Activity  Therapeutic Activity Performed: Yes  Therapeutic Activity 1: pt completed dynamic standing task involving functional reaching, crossing midline and min resistance for FM hand grasp. pt completed task at [facility] for increased safety and tolerated stand for 3:00 and 2:30 requiring extended seated rest break.      OP EDUCATION:  Education  Individual(s) Educated: Patient  Education Provided: Fall precautons  Home Program: Strengthening  Patient Response to Education: Patient/Caregiver Performed Return Demonstration of Exercises/Activities    Goals:  Encounter Problems       Encounter Problems (Active)       OT Problem       LTG - Patient will complete grooming independently. (Progressing)       Start:  03/19/25    Expected End:  03/29/25            LTG - Patient will complete toileting with mod I. (Progressing)       Start:  03/19/25    Expected End:   03/29/25            LTG - Patient will complete bathing with sba. (Progressing)       Start:  03/19/25    Expected End:  03/29/25            LTG - Patient will complete UE dressing independently.  (Progressing)       Start:  03/19/25    Expected End:  03/29/25            LTG - Patient will complete LE dressing using adaptive equip as needed with mod I. (Progressing)       Start:  03/19/25    Expected End:  03/29/25            LTG - Patient will complete commode transfer via device as needed with mod I. (Progressing)       Start:  03/19/25    Expected End:  03/29/25            LTG - Patient will complete tub/shower transfer using DME as needed with sba. (Progressing)       Start:  03/19/25    Expected End:  03/29/25            LTG - Patient will complete simple functional mobility via device as needed with mod I. (Progressing)       Start:  03/19/25    Expected End:  03/29/25            LTG - Patient will complete challenging functional mobility or simulated homemaking task with sba via device as needed. (Progressing)       Start:  03/19/25    Expected End:  03/29/25            STG - Patient will complete toileting with cga. (Progressing)       Start:  03/19/25    Expected End:  03/26/25            STG - Patient will complete bathing with min assist. (Progressing)       Start:  03/19/25    Expected End:  03/26/25            STG - Patient will complete LE dressing using adaptive equip as needed with min assist. (Progressing)       Start:  03/19/25    Expected End:  03/26/25            STG - Patient will complete commode transfer via device as needed with cga. (Progressing)       Start:  03/19/25    Expected End:  03/26/25            STG - Patient will complete tub/shower transfer using DME as needed with min assist. (Progressing)       Start:  03/19/25    Expected End:  03/26/25            STG - Patient will complete simple functional mobility via device as needed with min assist. (Progressing)       Start:  03/19/25     Expected End:  03/26/25

## 2025-03-22 NOTE — CARE PLAN
The patient's goals for the shift include      The clinical goals for the shift include Pt will remain free of falls and keep pain at tolerable level throughout shift

## 2025-03-22 NOTE — PROGRESS NOTES
Physical Therapy    Physical Therapy Treatment    Patient Name: Kayley Lynch  MRN: [id number]  Department: [facility] REH[facility]  Room: [address] [place], [place] [address]  Today's Date: 3/22/2025  Time Calculation  Start Time: 1300  Stop Time: 1345  Time Calculation (min): 45 min         Assessment/Plan   PT Assessment  End of Session Patient Position: Up in chair, Alarm off, not on at start of session     PT Plan  Treatment/Interventions: Bed mobility, Transfer training, Gait training, Balance training, Neuromuscular re-education, Strengthening, Endurance training, Therapeutic exercise, Therapeutic activity, Home exercise program, Stair training  PT Plan: Ongoing PT  PT Frequency: 90 min/5 days per week (for 10 days)  PT Discharge Recommendations: Other (comment) (Anticipate discharge home in 10 days mod I at walker level for simple/household mobility, with recommendation for intermittent assist on stairs and for higher level IADLs.)  Equipment Recommended upon Discharge: Wheeled walker  PT Recommended Transfer Status: Assist x1      General Visit Information:      General  Patient Position Received: Up in chair, Alarm off, not on at start of session  General Comment: Pt is pleasant and cooperative with therapy. Pt's supportive  present during session and pleased with her progress, stating that he has everything ready at home for her return (pt will clear front entrance with B HRs to house but also plans to build a single rail at other entry.)    Subjective   Precautions:  Precautions  Medical Precautions: Fall precautions  Precautions Comment: Contact plus prec (c-diff)        Objective   Pain:  Pain Assessment  Pain Assessment: 0-10  0-10 (Numeric) Pain Score: 0 - No pain  Cognition:  Cognition  Orientation Level: Oriented X4       Postural Control:  Static Standing Balance  Static Standing-Comment/Number of Minutes: With SBA while holding onto FWW, pt kicked a ball with R/L LE that  rolled to her on the floor. With SBA/CGA while not  holding onto walker, pt played catch with  throwing ball. No overt LOB noted throughout.      Activity Tolerance:  Activity Tolerance  Endurance: Endurance does not limit participation in activity  Treatments:    Therapeutic Activity  Therapeutic Activity 1: With FWW at SBA, pt ambulated around gym locating and retrieving cones at various locations and heights, at times using reacher if needed.         Ambulation/Gait Training  Ambulation/Gait Training Performed:  (Pt amb 100 ft on carpet and ~100 ft x 3 on tile using FWW at SBA, demonstrating steady/fluid step through gait. Pt taking seated rest breaks b/w trials.)  Transfers  Transfer:  (SBA for sit<>stand transfers from W/C to FWW/stair handrails while demonstrating proper hand placement.)    Stairs  Stairs:  (Pt negotiated up/down 5 steps with B HRs, and then only single HR w/o AD use , all with CGA while demonstrating steady non recip stair pattern. In cases where only one HR is available, edu pt and spouse on use of SPC or HHA on other side for added stability if necessary, or sideways approach with BUE support on rail.)  Stairs  Comment/Number of Steps 1: Up/down 6 inch curb step x 2 using lightly stabilized FWW with forward approach and CGA/Min A.    EDUCATION: Edu  and reviewed with pt safe transfer techniques, ambulation and stair negotiation to simulate home entry.       Encounter Problems       Encounter Problems (Active)       PT Problem       LTG - Pt will perform bed mobility with mod I  (Progressing)       Start:  03/19/25    Expected End:  03/29/25            LTG - Pt will perform transfers with mod I.  (Progressing)       Start:  03/19/25    Expected End:  03/29/25            LTG - Pt will amb 50 feet with FWW and mod I, and 150' with FWW and SBA. (Progressing)       Start:  03/19/25    Expected End:  03/29/25            LTG - Pt will up/down 2 stairs with B HR and CGA.  (Progressing)       Start:  03/19/25    Expected End:  03/29/25             STG - Pt will perform bed mobility with Sup.   (Progressing)       Start:  03/19/25    Expected End:  03/24/25            STG - Pt will perform transfers with CGA.   (Progressing)       Start:  03/19/25    Expected End:  03/24/25            STG - Pt will amb 50 feet with FWW and CGA.  (Progressing)       Start:  03/19/25    Expected End:  03/24/25            STG - Pt will up/down 1 stairs with B HR and min A.  (Progressing)       Start:  03/19/25    Expected End:  03/24/25

## 2025-03-22 NOTE — PROGRESS NOTES
"Physical Therapy    Physical Therapy Treatment    Patient Name: Kayley Lynch  MRN: [id number]  Department: [facility] REHAB  Room: [address] [place], [place] [address]  Today's Date: 3/22/2025  Time Calculation  Start Time: 0915  Stop Time: 1000  Time Calculation (min): 45 min         Assessment/Plan   PT Assessment  End of Session Patient Position: Up in chair, Alarm off, not on at start of session     PT Plan  Treatment/Interventions: Bed mobility, Transfer training, Gait training, Balance training, Neuromuscular re-education, Strengthening, Endurance training, Therapeutic exercise, Therapeutic activity, Home exercise program, Stair training  PT Plan: Ongoing PT  PT Frequency: 90 min/5 days per week (for 10 days)  PT Discharge Recommendations: Other (comment) (Anticipate discharge home in 10 days mod I at walker level for simple/household mobility, with recommendation for intermittent assist on stairs and for higher level IADLs.)  Equipment Recommended upon Discharge: Wheeled walker  PT Recommended Transfer Status: Assist x1      General Visit Information:      General  Patient Position Received: Up in chair, Alarm off, not on at start of session  General Comment: Pt is very pleasant and cooperative; eager to return home and resume her \"normal\" life.    Subjective   Precautions:  Precautions  Medical Precautions: Fall precautions  Precautions Comment: Contact plus prec (c-diff)            Objective   Pain:  Pain Assessment  Pain Assessment: 0-10  0-10 (Numeric) Pain Score: 0 - No pain  Cognition:  Cognition  Orientation Level: Oriented X4       Treatments:  Therapeutic Exercise  Therapeutic Exercise Performed:  (Seated BLE consisting of AP, LAQ and Marches with 2# ankle weights for 15 reps x 2 sets.)    Therapeutic Activity  Therapeutic Activity 1: Using FWW with SBA pt maneuvered around 3 consecutive upright bolsters on tile x 2 trials, demonstrating proper, tight turn technique while maintianing close proximity within walker.  Therapeutic " "Activity 2: With FWW at SBA, pt ambulated around gym locating and retrieving cones from various surfaces and heights, appropriately using reacher if needed.    Ambulation/Gait Training  Ambulation/Gait Training Performed:  (Pt amb 100 ft on carpet and tile using FWW at SBA, demonstrating steady/fluid step through gait. Pt taking seated rest breaks b/w trials.)  Transfers  Transfer:  (SBA for sit<>stand transfers from W/C to FWW/stair handrails while demonstrating proper hand placement.)    Stairs  Stairs:  (To simulate one of pt's home entrances w/o HRs, had pt negotiate 3 (4\") steps x 2 trials using HHA and SPC with Min/Mod A x 1. Intial cues to utilize non recip stair pattern for safety.)    EDUCATION: Edu pt on a safe technique to negotiate stairs w/o handrails.       Encounter Problems       Encounter Problems (Active)       PT Problem       LTG - Pt will perform bed mobility with mod I  (Progressing)       Start:  03/19/25    Expected End:  03/29/25            LTG - Pt will perform transfers with mod I.  (Progressing)       Start:  03/19/25    Expected End:  03/29/25            LTG - Pt will amb 50 feet with FWW and mod I, and 150' with FWW and SBA. (Progressing)       Start:  03/19/25    Expected End:  03/29/25            LTG - Pt will up/down 2 stairs with B HR and CGA.  (Progressing)       Start:  03/19/25    Expected End:  03/29/25            STG - Pt will perform bed mobility with Sup.   (Progressing)       Start:  03/19/25    Expected End:  03/24/25            STG - Pt will perform transfers with CGA.   (Progressing)       Start:  03/19/25    Expected End:  03/24/25            STG - Pt will amb 50 feet with FWW and CGA.  (Progressing)       Start:  03/19/25    Expected End:  03/24/25            STG - Pt will up/down 1 stairs with B HR and min A.  (Progressing)       Start:  03/19/25    Expected End:  03/24/25                   "

## 2025-03-22 NOTE — PROGRESS NOTES
Occupational Therapy    OT Treatment    Patient Name: Kayley Lynch  MRN: [id number]  Department: [facility] REHAB  Room: [address] [place] [address]  Today's Date: 3/22/2025  Time Calculation  Start Time: 0745  Stop Time: 0830  Time Calculation (min): 45 min        Assessment:  End of Session Patient Position: Up in chair, Alarm off, not on at start of session     Plan:  Treatment Interventions: ADL retraining, UE strengthening/ROM, Functional transfer training, Endurance training, Patient/family training, Equipment evaluation/education  OT Frequency: 90 min/5 days per week (x 10 days)  OT Discharge Recommendations:  (anticipate patient mod I for most ADLs, transfers, and mobility; may require up to sba for higher level IADLs and shower transfers)  Equipment Recommended upon Discharge: Wheeled walker (3:1 commode, shower seat, reacher)  Treatment Interventions: ADL retraining, UE strengthening/ROM, Functional transfer training, Endurance training, Patient/family training, Equipment evaluation/education    Subjective   Previous Visit Info:  OT Last Visit  OT Received On: 03/22/25  General:  General  Prior to Session Communication: Bedside nurse  Patient Position Received: Bed, 2 rail up, Alarm off, not on at start of session (pt sitting EOB just finishing breakfast)  General Comment: pleasant and cooperative  Precautions:  Medical Precautions: Fall precautions  Precautions Comment: Contact plus prec (c-diff)            Pain:  Pain Assessment  0-10 (Numeric) Pain Score: 0 - No pain    Objective    Cognition:  Cognition  Orientation Level: Oriented X4       Activities of Daily Living: Grooming  Grooming Level of Assistance: Distant supervision  Grooming Where Assessed: Standing sinkside (and sitting in w/c when rest break needed)    UE Bathing  UE Bathing Level of Assistance: Distant supervision  UE Bathing Where Assessed: Sitting sinkside    LE Bathing  LE Bathing Level of Assistance: Close supervision  LE Bathing Where Assessed: Standing  sinkside (except to sit and complete feet)    UE Dressing  UE Dressing Level of Assistance: Distant supervision  UE Dressing Where Assessed: Wheelchair    LE Dressing  LE Dressing: Yes  Pants Level of Assistance: Close supervision  Sock Level of Assistance: Distant supervision  Shoe Level of Assistance: Distant supervision  Adult Briefs Level of Assistance: Close supervision  LE Dressing Where Assessed: Wheelchair    Toileting  Toileting Level of Assistance: Close supervision  Where Assessed: Toilet  Functional Standing Tolerance:  Time: 3:00 standing at sink to complete grooming tasks  Bed Mobility/Transfers: Transfers  Transfer: Yes  Transfer 1  Technique 1: Sit to stand, Stand to sit  Transfer Device 1: Walker  Transfer Level of Assistance 1: Close supervision    Toilet Transfers  Toilet Transfer From: Rolling walker  Toilet Transfer Type: To and from  Toilet Transfer to: Raised toilet seat with rails  Toilet Transfer Technique: Ambulating  Toilet Transfers: Contact guard    Functional Mobility:  Functional Mobility  Functional Mobility Performed: Yes  Functional Mobility 1  Device 1: Rolling walker  Assistance 1: Contact guard  Comments 1: pt ambulated in/out of bathroom on first trial and on second trial ambulated out into hallway    OP EDUCATION: Pt educated on ECT as applied to self care task. Pt verbalized and demonstrated understanding. Pt with good self awareness and taking rest break when needed.       Goals:  Encounter Problems       Encounter Problems (Active)       OT Problem       LTG - Patient will complete grooming independently. (Progressing)       Start:  03/19/25    Expected End:  03/29/25            LTG - Patient will complete toileting with mod I. (Progressing)       Start:  03/19/25    Expected End:  03/29/25            LTG - Patient will complete bathing with sba. (Progressing)       Start:  03/19/25    Expected End:  03/29/25            LTG - Patient will complete UE dressing independently.   (Progressing)       Start:  03/19/25    Expected End:  03/29/25            LTG - Patient will complete LE dressing using adaptive equip as needed with mod I. (Progressing)       Start:  03/19/25    Expected End:  03/29/25            LTG - Patient will complete commode transfer via device as needed with mod I. (Progressing)       Start:  03/19/25    Expected End:  03/29/25            LTG - Patient will complete tub/shower transfer using DME as needed with sba. (Progressing)       Start:  03/19/25    Expected End:  03/29/25            LTG - Patient will complete simple functional mobility via device as needed with mod I. (Progressing)       Start:  03/19/25    Expected End:  03/29/25            LTG - Patient will complete challenging functional mobility or simulated homemaking task with sba via device as needed. (Progressing)       Start:  03/19/25    Expected End:  03/29/25            STG - Patient will complete toileting with cga. (Progressing)       Start:  03/19/25    Expected End:  03/26/25            STG - Patient will complete bathing with min assist. (Progressing)       Start:  03/19/25    Expected End:  03/26/25            STG - Patient will complete LE dressing using adaptive equip as needed with min assist. (Progressing)       Start:  03/19/25    Expected End:  03/26/25            STG - Patient will complete commode transfer via device as needed with cga. (Progressing)       Start:  03/19/25    Expected End:  03/26/25            STG - Patient will complete tub/shower transfer using DME as needed with min assist. (Progressing)       Start:  03/19/25    Expected End:  03/26/25            STG - Patient will complete simple functional mobility via device as needed with min assist. (Progressing)       Start:  03/19/25    Expected End:  03/26/25

## 2025-03-23 VITALS
HEART RATE: 82 BPM | WEIGHT: 120.37 LBS | TEMPERATURE: 98.2 F | OXYGEN SATURATION: 94 % | SYSTOLIC BLOOD PRESSURE: 125 MMHG | HEIGHT: 63 IN | DIASTOLIC BLOOD PRESSURE: 58 MMHG | RESPIRATION RATE: 15 BRPM | BODY MASS INDEX: 21.33 KG/M2

## 2025-03-23 LAB
HOLD SPECIMEN: NORMAL
HOLD SPECIMEN: NORMAL
INR PPP: 2.7 (ref 0.9–1.1)
PROTHROMBIN TIME: 29.5 SECONDS (ref 9.8–12.4)

## 2025-03-23 PROCEDURE — 2500000001 HC RX 250 WO HCPCS SELF ADMINISTERED DRUGS (ALT 637 FOR MEDICARE OP): Performed by: PHYSICAL MEDICINE & REHABILITATION

## 2025-03-23 PROCEDURE — 2500000005 HC RX 250 GENERAL PHARMACY W/O HCPCS: Performed by: PHYSICAL MEDICINE & REHABILITATION

## 2025-03-23 PROCEDURE — 85610 PROTHROMBIN TIME: CPT | Performed by: PHYSICAL MEDICINE & REHABILITATION

## 2025-03-23 PROCEDURE — 36415 COLL VENOUS BLD VENIPUNCTURE: CPT | Performed by: PHYSICAL MEDICINE & REHABILITATION

## 2025-03-23 PROCEDURE — 1180000001 HC REHAB PRIVATE ROOM DAILY

## 2025-03-23 PROCEDURE — 2500000002 HC RX 250 W HCPCS SELF ADMINISTERED DRUGS (ALT 637 FOR MEDICARE OP, ALT 636 FOR OP/ED): Performed by: PHYSICAL MEDICINE & REHABILITATION

## 2025-03-23 PROCEDURE — 94640 AIRWAY INHALATION TREATMENT: CPT

## 2025-03-23 RX ADMIN — PANTOPRAZOLE SODIUM 40 MG: 40 TABLET, DELAYED RELEASE ORAL at 09:31

## 2025-03-23 RX ADMIN — ESCITALOPRAM OXALATE 20 MG: 10 TABLET ORAL at 09:31

## 2025-03-23 RX ADMIN — BUDESONIDE 0.5 MG: 0.5 INHALANT ORAL at 20:00

## 2025-03-23 RX ADMIN — IPRATROPIUM BROMIDE AND ALBUTEROL SULFATE 3 ML: .5; 3 SOLUTION RESPIRATORY (INHALATION) at 13:52

## 2025-03-23 RX ADMIN — ASPIRIN 81 MG: 81 TABLET, COATED ORAL at 09:31

## 2025-03-23 RX ADMIN — Medication 3 MG: at 20:52

## 2025-03-23 RX ADMIN — ATORVASTATIN CALCIUM 80 MG: 80 TABLET, FILM COATED ORAL at 09:31

## 2025-03-23 RX ADMIN — CARVEDILOL 25 MG: 25 TABLET, FILM COATED ORAL at 09:31

## 2025-03-23 RX ADMIN — VANCOMYCIN HYDROCHLORIDE 250 MG: 250 CAPSULE ORAL at 17:27

## 2025-03-23 RX ADMIN — VANCOMYCIN HYDROCHLORIDE 250 MG: 250 CAPSULE ORAL at 13:10

## 2025-03-23 RX ADMIN — SENNOSIDES 8.6 MG: 8.6 TABLET, FILM COATED ORAL at 20:52

## 2025-03-23 RX ADMIN — IPRATROPIUM BROMIDE AND ALBUTEROL SULFATE 3 ML: .5; 3 SOLUTION RESPIRATORY (INHALATION) at 06:55

## 2025-03-23 RX ADMIN — VANCOMYCIN HYDROCHLORIDE 250 MG: 250 CAPSULE ORAL at 06:26

## 2025-03-23 RX ADMIN — ACETAMINOPHEN 650 MG: 325 TABLET, FILM COATED ORAL at 21:25

## 2025-03-23 RX ADMIN — BUPROPION HYDROCHLORIDE 150 MG: 150 TABLET, EXTENDED RELEASE ORAL at 09:31

## 2025-03-23 RX ADMIN — WARFARIN SODIUM 3 MG: 2 TABLET ORAL at 17:28

## 2025-03-23 RX ADMIN — IPRATROPIUM BROMIDE AND ALBUTEROL SULFATE 3 ML: .5; 3 SOLUTION RESPIRATORY (INHALATION) at 20:00

## 2025-03-23 RX ADMIN — BUDESONIDE 0.5 MG: 0.5 INHALANT ORAL at 06:59

## 2025-03-23 RX ADMIN — CARVEDILOL 25 MG: 25 TABLET, FILM COATED ORAL at 20:52

## 2025-03-23 RX ADMIN — VANCOMYCIN HYDROCHLORIDE 250 MG: 250 CAPSULE ORAL at 20:52

## 2025-03-23 ASSESSMENT — PAIN SCALES - GENERAL
PAINLEVEL_OUTOF10: 6
PAINLEVEL_OUTOF10: 0 - NO PAIN
PAINLEVEL_OUTOF10: 0 - NO PAIN

## 2025-03-23 ASSESSMENT — PAIN - FUNCTIONAL ASSESSMENT
PAIN_FUNCTIONAL_ASSESSMENT: 0-10

## 2025-03-23 ASSESSMENT — PAIN DESCRIPTION - DESCRIPTORS: DESCRIPTORS: ACHING

## 2025-03-23 ASSESSMENT — PAIN DESCRIPTION - LOCATION: LOCATION: ARM

## 2025-03-23 NOTE — PROGRESS NOTES
"Kayley Lynch is a 54 y.o. female on day 5 of admission presenting with Physical debility.    Subjective   The patient denies any significant left upper extremity pain.       Objective     Physical Exam  Inspection of the left upper extremity reveals no signs of infection.  She does have areas of necrotic tissue surrounding the incision, especially in the proximal forearm area.  The packing was removed.  There is no active drainage.  The left upper extremity is neurovascular intact.  Last Recorded Vitals  Blood pressure 141/69, pulse 72, temperature 35.6 °C (96.1 °F), resp. rate 16, height 1.6 m (5' 2.99\"), weight 54.6 kg (120 lb 5.9 oz), SpO2 100%.  Intake/Output last 3 Shifts:  No intake/output data recorded.    Relevant Results                           Results for orders placed or performed during the hospital encounter of 03/18/25 (from the past 24 hours)   Protime-INR   Result Value Ref Range    Protime 29.5 (H) 9.8 - 12.4 seconds    INR 2.7 (H) 0.9 - 1.1   Lavender Top   Result Value Ref Range    Extra Tube Hold for add-ons.    PST Top   Result Value Ref Range    Extra Tube Hold for add-ons.      *Note: Due to a large number of results and/or encounters for the requested time period, some results have not been displayed. A complete set of results can be found in Results Review.        Assessment/Plan   Assessment & Plan  Physical debility    Atrial flutter (Multi)    Hypercholesterolemia    S/P mitral valve replacement    Nontraumatic compartment syndrome of arm    The patient is now 19 days status post her left upper extremity wound closure status post fasciotomy.  There is some areas of necrosis along the borders of the incision and dehiscence in the volar forearm.  The wound was repacked with quarter inch sterile gauze.  The patient may benefit from debridement of the eschar and necrotic tissue.  I explained to the patient we may be able do a primary closure versus have to place a VAC dressing.  I will " reevaluate the arm tomorrow morning and schedule surgery possibly for March 25.             Elijah Licea MD

## 2025-03-23 NOTE — PROGRESS NOTES
"Kayley Lynch is a 54 y.o. female on day 5 of admission presenting with Physical debility.    Subjective   She is resting in her room.  She feels well.  She denies chest pain or shortness of breath.  10 review of systems are negative       Objective     Physical Exam  Constitutional:       General: no acute distress.     Appearance: Normal appearance.   Cardiovascular:   Heart demonstrates a regular rate and rhythm  Pulmonary:   Lungs are clear, without adventitious lung sounds  Abdominal:   Abdomen is soft bowel sounds are active  Musculoskeletal:         General: No swelling, tenderness or deformity.   Homans' sign is negative bilaterally  Skin:     General: Skin is warm and dry.      Findings: No rash.   Neurological:      General: No focal deficit present.      Mental Status:  alert and oriented to person, place, and time.      Cranial Nerves: No cranial nerve deficit.      Psychiatric:         Mood and Affect: Mood normal.    Patient negotiated 4 inch step with min to mod assist of 1 and cues    Last Recorded Vitals  Blood pressure 141/69, pulse 72, temperature 35.6 °C (96.1 °F), resp. rate 16, height 1.6 m (5' 2.99\"), weight 54.6 kg (120 lb 5.9 oz), SpO2 100%.  Intake/Output last 3 Shifts:  No intake/output data recorded.    Relevant Results  Scheduled medications  aspirin, 81 mg, oral, Daily  atorvastatin, 80 mg, oral, Daily  budesonide, 0.5 mg, nebulization, BID  buPROPion XL, 150 mg, oral, q AM  carvedilol, 25 mg, oral, BID  escitalopram, 20 mg, oral, Daily  ipratropium-albuteroL, 3 mL, nebulization, TID  pantoprazole, 40 mg, oral, Daily  sennosides, 1 tablet, oral, Nightly  vancomycin, 250 mg, oral, 4x daily  warfarin, 3 mg, oral, Daily      Continuous medications     PRN medications  PRN medications: acetaminophen, alum-mag hydroxide-simeth, bisacodyl, bisacodyl, melatonin     Results for orders placed or performed during the hospital encounter of 03/18/25 (from the past 24 hours)   Protime-INR   Result " Value Ref Range    Protime 29.5 (H) 9.8 - 12.4 seconds    INR 2.7 (H) 0.9 - 1.1   Lavender Top   Result Value Ref Range    Extra Tube Hold for add-ons.    PST Top   Result Value Ref Range    Extra Tube Hold for add-ons.      *Note: Due to a large number of results and/or encounters for the requested time period, some results have not been displayed. A complete set of results can be found in Results Review.            Assessment/Plan   Assessment & Plan  Physical debility    Atrial flutter (Multi)    Hypercholesterolemia    S/P mitral valve replacement    Nontraumatic compartment syndrome of arm         Debility following influenza pneumonia, compartment syndrome of the left upper extremity, and C. difficile colitis  Begin physical therapy for strengthening, transfer training, gait training, exercises to improve balance  Begin occupational therapy for ADL retraining, strengthening, ADL activities, and transfer training     2.  Coronary artery disease  Cardiac precautions  Aspirin 81 mg daily  Lipitor 80 mg daily   Coumadin 3 mg daily  Adjust Coumadin to INR as needed     3.  Hypertension  Coreg 25 mg twice daily  Adjust for adequate blood pressure control     4.  Recent pneumonia  Budesonide nebulizer twice daily  DuoNeb nebulizer 3 times daily  Monitor for shortness of breath     5.  Soft tissue infection  Vancomycin to 50 mg 4 times daily     6.  Mood  Wellbutrin 150 mg every morning  Lexapro 20 mg daily     7.  Hyperlipidemia  Lipitor 80 mg daily     8. LUÍSI  Patient is on a bowel program to promote regular bowel movement , and vancomycin to treat her C. Difficile  She is a fall risk and fall precautions will be used     3/23  Patient negotiated 4 inch step with min to mod assist and cues  Mood is good  She was seen by Dr. Licea who remove packing from her arm  She continues on vancomycin  BUN and creatinine on 319 were 11 and 0.7  She is improving      I spent 26 minutes in the professional and overall care of  this patient.      Maria Eugenia Morales, DO

## 2025-03-23 NOTE — CARE PLAN
The patient's goals for the shift include pt will be able to maintain normal sleeping habit.     The clinical goals for the shift include Pt will remain free of falls and keep pain at tolerable level throughout shift.

## 2025-03-23 NOTE — CARE PLAN
Problem: Skin  Goal: Decreased wound size/increased tissue granulation at next dressing change  3/23/2025 0339 by Emani Maier RN  Outcome: Progressing  3/23/2025 0330 by Emani Maier RN  Outcome: Progressing  Goal: Participates in plan/prevention/treatment measures  3/23/2025 0339 by Emani Maier RN  Outcome: Progressing  3/23/2025 0330 by Emani Maier RN  Outcome: Progressing  Goal: Prevent/manage excess moisture  3/23/2025 0339 by Emani Maier RN  Outcome: Progressing  3/23/2025 0330 by Emani Maier RN  Outcome: Progressing  Goal: Prevent/minimize sheer/friction injuries  3/23/2025 0339 by Emani Maier RN  Outcome: Progressing  3/23/2025 0330 by Emani Maier RN  Outcome: Progressing  Goal: Promote/optimize nutrition  3/23/2025 0339 by Emani Maier RN  Outcome: Progressing  3/23/2025 0330 by Emani Maier RN  Outcome: Progressing  Goal: Promote skin healing  3/23/2025 0339 by Emani Maier RN  Outcome: Progressing  3/23/2025 0330 by Emani Maier RN  Outcome: Progressing     Problem: Pain  Goal: Takes deep breaths with improved pain control throughout the shift  3/23/2025 0339 by Emani Maier RN  Outcome: Progressing  3/23/2025 0330 by Emani Maier RN  Outcome: Progressing  Goal: Turns in bed with improved pain control throughout the shift  3/23/2025 0339 by Emani Maier RN  Outcome: Progressing  3/23/2025 0330 by Emani Maier RN  Outcome: Progressing  Goal: Walks with improved pain control throughout the shift  3/23/2025 0339 by Emani Maier RN  Outcome: Progressing  3/23/2025 0330 by Emani Maier RN  Outcome: Progressing  Goal: Performs ADL's with improved pain control throughout shift  3/23/2025 0339 by Emani Maier RN  Outcome: Progressing  3/23/2025 0330 by Madonna A Darrion, RN  Outcome: Progressing  Goal: Participates in PT with improved pain control throughout the  shift  3/23/2025 0339 by Emani Maier RN  Outcome: Progressing  3/23/2025 0330 by Emani Maier RN  Outcome: Progressing  Goal: Free from opioid side effects throughout the shift  3/23/2025 0339 by Emani Maier, RN  Outcome: Progressing  3/23/2025 0330 by Emani Maier RN  Outcome: Progressing  Goal: Free from acute confusion related to pain meds throughout the shift  3/23/2025 0339 by Emani Maier, PAUL  Outcome: Progressing  3/23/2025 0330 by Emani Maier RN  Outcome: Progressing     Problem: Fall/Injury  Goal: Not fall by end of shift  3/23/2025 0339 by Emani Maier RN  Outcome: Progressing  3/23/2025 0330 by Emani Maier RN  Outcome: Progressing  Goal: Be free from injury by end of the shift  3/23/2025 0339 by Emani Maier RN  Outcome: Progressing  3/23/2025 0330 by Emani Maier RN  Outcome: Progressing  Goal: Verbalize understanding of personal risk factors for fall in the hospital  3/23/2025 0339 by Emani Maier RN  Outcome: Progressing  3/23/2025 0330 by Emani Maier RN  Outcome: Progressing  Goal: Verbalize understanding of risk factor reduction measures to prevent injury from fall in the home  3/23/2025 0339 by Emani Maier RN  Outcome: Progressing  3/23/2025 0330 by Emani Maier RN  Outcome: Progressing  Goal: Use assistive devices by end of the shift  3/23/2025 0339 by Emani Maier RN  Outcome: Progressing  3/23/2025 0330 by Emani Maier RN  Outcome: Progressing  Goal: Pace activities to prevent fatigue by end of the shift  3/23/2025 0339 by Emani Maier RN  Outcome: Progressing  3/23/2025 0330 by Emani Maier RN  Outcome: Progressing

## 2025-03-23 NOTE — CARE PLAN
Problem: Skin  Goal: Decreased wound size/increased tissue granulation at next dressing change  Outcome: Progressing  Goal: Participates in plan/prevention/treatment measures  Outcome: Progressing  Goal: Prevent/manage excess moisture  Outcome: Progressing  Goal: Prevent/minimize sheer/friction injuries  Outcome: Progressing  Goal: Promote/optimize nutrition  Outcome: Progressing  Goal: Promote skin healing  Outcome: Progressing     Problem: Pain  Goal: Takes deep breaths with improved pain control throughout the shift  Outcome: Progressing  Goal: Turns in bed with improved pain control throughout the shift  Outcome: Progressing  Goal: Walks with improved pain control throughout the shift  Outcome: Progressing  Goal: Performs ADL's with improved pain control throughout shift  Outcome: Progressing  Goal: Participates in PT with improved pain control throughout the shift  Outcome: Progressing  Goal: Free from opioid side effects throughout the shift  Outcome: Progressing  Goal: Free from acute confusion related to pain meds throughout the shift  Outcome: Progressing     Problem: Fall/Injury  Goal: Not fall by end of shift  Outcome: Progressing  Goal: Be free from injury by end of the shift  Outcome: Progressing  Goal: Verbalize understanding of personal risk factors for fall in the hospital  Outcome: Progressing  Goal: Verbalize understanding of risk factor reduction measures to prevent injury from fall in the home  Outcome: Progressing  Goal: Use assistive devices by end of the shift  Outcome: Progressing  Goal: Pace activities to prevent fatigue by end of the shift  Outcome: Progressing   The patient's goals for the shift include      The clinical goals for the shift include Pt will remain free of falls and keep pain at tolerable level throughout shift

## 2025-03-24 LAB
HOLD SPECIMEN: NORMAL
HOLD SPECIMEN: NORMAL
INR PPP: 3 (ref 0.9–1.1)
PROTHROMBIN TIME: 32.8 SECONDS (ref 9.8–12.4)

## 2025-03-24 PROCEDURE — 97110 THERAPEUTIC EXERCISES: CPT | Mod: GP,CQ

## 2025-03-24 PROCEDURE — 1180000001 HC REHAB PRIVATE ROOM DAILY

## 2025-03-24 PROCEDURE — 2500000002 HC RX 250 W HCPCS SELF ADMINISTERED DRUGS (ALT 637 FOR MEDICARE OP, ALT 636 FOR OP/ED): Performed by: PHYSICAL MEDICINE & REHABILITATION

## 2025-03-24 PROCEDURE — 2500000001 HC RX 250 WO HCPCS SELF ADMINISTERED DRUGS (ALT 637 FOR MEDICARE OP): Performed by: PHYSICAL MEDICINE & REHABILITATION

## 2025-03-24 PROCEDURE — 97530 THERAPEUTIC ACTIVITIES: CPT | Mod: GO

## 2025-03-24 PROCEDURE — 2500000005 HC RX 250 GENERAL PHARMACY W/O HCPCS: Performed by: PHYSICAL MEDICINE & REHABILITATION

## 2025-03-24 PROCEDURE — 97110 THERAPEUTIC EXERCISES: CPT | Mod: GO

## 2025-03-24 PROCEDURE — 36415 COLL VENOUS BLD VENIPUNCTURE: CPT | Performed by: PHYSICAL MEDICINE & REHABILITATION

## 2025-03-24 PROCEDURE — 97535 SELF CARE MNGMENT TRAINING: CPT | Mod: GO

## 2025-03-24 PROCEDURE — 85610 PROTHROMBIN TIME: CPT | Performed by: PHYSICAL MEDICINE & REHABILITATION

## 2025-03-24 PROCEDURE — 97112 NEUROMUSCULAR REEDUCATION: CPT | Mod: GP,CQ

## 2025-03-24 PROCEDURE — 94640 AIRWAY INHALATION TREATMENT: CPT

## 2025-03-24 PROCEDURE — 97530 THERAPEUTIC ACTIVITIES: CPT | Mod: GP,CQ

## 2025-03-24 PROCEDURE — 97116 GAIT TRAINING THERAPY: CPT | Mod: GP,CQ

## 2025-03-24 RX ORDER — PANTOPRAZOLE SODIUM 40 MG/1
40 TABLET, DELAYED RELEASE ORAL DAILY
Qty: 30 TABLET | Refills: 0 | Status: SHIPPED | OUTPATIENT
Start: 2025-03-24 | End: 2025-04-23

## 2025-03-24 RX ADMIN — BUPROPION HYDROCHLORIDE 150 MG: 150 TABLET, EXTENDED RELEASE ORAL at 08:47

## 2025-03-24 RX ADMIN — CARVEDILOL 25 MG: 25 TABLET, FILM COATED ORAL at 20:15

## 2025-03-24 RX ADMIN — BUDESONIDE 0.5 MG: 0.5 INHALANT ORAL at 06:28

## 2025-03-24 RX ADMIN — VANCOMYCIN HYDROCHLORIDE 250 MG: 250 CAPSULE ORAL at 12:00

## 2025-03-24 RX ADMIN — ATORVASTATIN CALCIUM 80 MG: 80 TABLET, FILM COATED ORAL at 08:47

## 2025-03-24 RX ADMIN — IPRATROPIUM BROMIDE AND ALBUTEROL SULFATE 3 ML: .5; 3 SOLUTION RESPIRATORY (INHALATION) at 14:28

## 2025-03-24 RX ADMIN — CARVEDILOL 25 MG: 25 TABLET, FILM COATED ORAL at 08:47

## 2025-03-24 RX ADMIN — SENNOSIDES 8.6 MG: 8.6 TABLET, FILM COATED ORAL at 20:15

## 2025-03-24 RX ADMIN — BUDESONIDE 0.5 MG: 0.5 INHALANT ORAL at 19:45

## 2025-03-24 RX ADMIN — Medication 3 MG: at 20:16

## 2025-03-24 RX ADMIN — ASPIRIN 81 MG: 81 TABLET, COATED ORAL at 08:47

## 2025-03-24 RX ADMIN — VANCOMYCIN HYDROCHLORIDE 250 MG: 250 CAPSULE ORAL at 20:15

## 2025-03-24 RX ADMIN — IPRATROPIUM BROMIDE AND ALBUTEROL SULFATE 3 ML: .5; 3 SOLUTION RESPIRATORY (INHALATION) at 19:45

## 2025-03-24 RX ADMIN — PANTOPRAZOLE SODIUM 40 MG: 40 TABLET, DELAYED RELEASE ORAL at 08:47

## 2025-03-24 RX ADMIN — VANCOMYCIN HYDROCHLORIDE 250 MG: 250 CAPSULE ORAL at 06:16

## 2025-03-24 RX ADMIN — IPRATROPIUM BROMIDE AND ALBUTEROL SULFATE 3 ML: .5; 3 SOLUTION RESPIRATORY (INHALATION) at 06:28

## 2025-03-24 RX ADMIN — ESCITALOPRAM OXALATE 20 MG: 10 TABLET ORAL at 08:47

## 2025-03-24 RX ADMIN — VANCOMYCIN HYDROCHLORIDE 250 MG: 250 CAPSULE ORAL at 16:07

## 2025-03-24 ASSESSMENT — ACTIVITIES OF DAILY LIVING (ADL)
HOME_MANAGEMENT_TIME_ENTRY: 35
BATHING_WHERE_ASSESSED: SHOWER
BATHING_LEVEL_OF_ASSISTANCE: MODIFIED INDEPENDENT

## 2025-03-24 ASSESSMENT — PAIN - FUNCTIONAL ASSESSMENT
PAIN_FUNCTIONAL_ASSESSMENT: 0-10

## 2025-03-24 ASSESSMENT — PAIN DESCRIPTION - DESCRIPTORS: DESCRIPTORS: ACHING

## 2025-03-24 NOTE — PROGRESS NOTES
Occupational Therapy    OT Treatment    Patient Name: Kayley Lynch  MRN: [id number]  Department: [facility]  Room: [address] [place], [place] [address]  Today's Date: 3/24/2025  Time Calculation  Start Time: 1300  Stop Time: 1345  Time Calculation (min): 45 min        Assessment:  End of Session Communication:  (to PT)  End of Session Patient Position: Up in chair, Alarm off, not on at start of session     Plan:  Treatment Interventions: ADL retraining, UE strengthening/ROM, Functional transfer training, Endurance training, Patient/family training, Equipment evaluation/education  OT Frequency: 90 min/5 days per week (x 10 days)  OT Discharge Recommendations:  (anticipate patient mod I for most ADLs, transfers, and mobility; may require up to sba for higher level IADLs and shower transfers)  Equipment Recommended upon Discharge: Wheeled walker (3:1 commode, shower seat, reacher)  Treatment Interventions: ADL retraining, UE strengthening/ROM, Functional transfer training, Endurance training, Patient/family training, Equipment evaluation/education    Subjective   Previous Visit Info:  OT Last Visit  OT Received On: 03/24/25  General:  General  Prior to Session Communication: Bedside nurse  Patient Position Received: Up in chair, Alarm off, not on at start of session  Precautions:  Medical Precautions: Fall precautions            Pain:       Objective         Activities of Daily Living: Grooming  Grooming Level of Assistance:  (brushing hair mod I)  Grooming Where Assessed: Wheelchair    UE Bathing  UE Bathing Adaptive Equipment: Removeable shower head  UE Bathing Level of Assistance: Distant supervision  UE Bathing Where Assessed: Shower (only needed assist for washing hair; blow drying hair mod I)  UE Bathing Comments: lue/bandages protected with plastic bag    LE Bathing  LE Bathing Level of Assistance: Modified independent  LE Bathing Where Assessed: Shower    UE Dressing  UE Dressing Level of Assistance: Modified independent  UE Dressing  Where Assessed: Wheelchair    LE Dressing  Pants Level of Assistance: Modified independent  Sock Level of Assistance: Modified independent  Shoe Level of Assistance: Modified independent       Bed Mobility/Transfers: Transfers  Transfer:  (mod I for sit to stand, pivot transfers)    Shower Transfers  Shower Transfer to: Shower seat with back (supervsion using wall grab bar)          EDUCATION:  Education  Individual(s) Educated: Patient  Education Provided:  (safety during bathing, shower transfer to dme)  Patient Response to Education: Patient/Caregiver Performed Return Demonstration of Exercises/Activities    Goals:  Encounter Problems       Encounter Problems (Active)       OT Problem       LTG - Patient will complete grooming independently. (Progressing)       Start:  03/19/25    Expected End:  03/29/25            LTG - Patient will complete toileting with mod I. (Progressing)       Start:  03/19/25    Expected End:  03/29/25            LTG - Patient will complete bathing with sba. (Progressing)       Start:  03/19/25    Expected End:  03/29/25            LTG - Patient will complete UE dressing independently.  (Progressing)       Start:  03/19/25    Expected End:  03/29/25            LTG - Patient will complete LE dressing using adaptive equip as needed with mod I. (Progressing)       Start:  03/19/25    Expected End:  03/29/25            LTG - Patient will complete commode transfer via device as needed with mod I. (Progressing)       Start:  03/19/25    Expected End:  03/29/25            LTG - Patient will complete tub/shower transfer using DME as needed with sba. (Progressing)       Start:  03/19/25    Expected End:  03/29/25            LTG - Patient will complete simple functional mobility via device as needed with mod I. (Progressing)       Start:  03/19/25    Expected End:  03/29/25            LTG - Patient will complete challenging functional mobility or simulated homemaking task with sba via device as  needed. (Progressing)       Start:  03/19/25    Expected End:  03/29/25            STG - Patient will complete toileting with cga. (Progressing)       Start:  03/19/25    Expected End:  03/26/25            STG - Patient will complete bathing with min assist. (Progressing)       Start:  03/19/25    Expected End:  03/26/25            STG - Patient will complete LE dressing using adaptive equip as needed with min assist. (Progressing)       Start:  03/19/25    Expected End:  03/26/25            STG - Patient will complete commode transfer via device as needed with cga. (Progressing)       Start:  03/19/25    Expected End:  03/26/25            STG - Patient will complete tub/shower transfer using DME as needed with min assist. (Progressing)       Start:  03/19/25    Expected End:  03/26/25            STG - Patient will complete simple functional mobility via device as needed with min assist. (Progressing)       Start:  03/19/25    Expected End:  03/26/25

## 2025-03-24 NOTE — PROGRESS NOTES
Occupational Therapy    OT Treatment    Patient Name: Kayley Lynch  MRN: [id number]  Department: [facility]  Room: [address] [place], [place] [address]  Today's Date: 3/24/2025  Time Calculation  Start Time: 1000  Stop Time: 1045  Time Calculation (min): 45 min        Assessment:  End of Session Communication:  (handed off to PTA)  End of Session Patient Position: Up in chair, Alarm off, not on at start of session     Plan:  Treatment Interventions: ADL retraining, UE strengthening/ROM, Functional transfer training, Endurance training, Patient/family training, Equipment evaluation/education  OT Frequency: 90 min/5 days per week (x 10 days)  OT Discharge Recommendations:  (anticipate patient mod I for most ADLs, transfers, and mobility; may require up to sba for higher level IADLs and shower transfers)  Equipment Recommended upon Discharge: Wheeled walker (3:1 commode, shower seat, reacher)  Treatment Interventions: ADL retraining, UE strengthening/ROM, Functional transfer training, Endurance training, Patient/family training, Equipment evaluation/education    Subjective   Previous Visit Info:  OT Last Visit  OT Received On: 03/24/25  General:  General  Patient Position Received:  (patient transported to gym by rehab tech in w/c)  General Comment: patient pleasant and cooperative to participate (declines ADLs this session; reporting she is scheduled to take a shower this afternoon in OT)  Precautions:  Medical Precautions: Fall precautions            Pain:  Pain Assessment  Pain Assessment:  (denies pain this session)    Objective    Cognition:  Cognition  Overall Cognitive Status: Within Functional Limits          Bed Mobility/Transfers: Transfers  Transfer:  (completed STS at MOD I-supervision)      Functional Mobility:  Functional Mobility  Functional Mobility Performed:  (engaged in simple mobility from OT side of gym to PT side with use of WW and close supervision for safety)       Therapy/Activity: Therapeutic Exercise  Therapeutic  Exercise Performed: Yes  Therapeutic Exercise Activity 1: patient completed RUE ther ex only due to LUE wrapped/painful. RUE ther ex, 2 lb free weight, 15 reps x 3 for all planes completed including bicep curls, shoulder flexion, chest punches, pronation/supination, wrist curls, and shoulder crossovers    Therapeutic Activity  Therapeutic Activity Performed: Yes  Therapeutic Activity 1: standing at tabletop level, using tabletop as support PRN, patient challenged to complete dynamic standing activity, reaching outside AKIKO and completing forward/lateral weight shifts for postural control. patient able to complete with supervision for safety and able to stand for 3 min 27 secs when completing without seated rest break. tolerated well overall.        Education Documentation  Body Mechanics, taught by Kia Ventura OT at 3/24/2025  1:08 PM.  Learner: Patient  Readiness: Acceptance  Method: Explanation  Response: Verbalizes Understanding, Needs Reinforcement    Education Comments  No comments found.        OP EDUCATION:       Goals:  Encounter Problems       Encounter Problems (Active)       OT Problem       LTG - Patient will complete grooming independently. (Progressing)       Start:  03/19/25    Expected End:  03/29/25            LTG - Patient will complete toileting with mod I. (Progressing)       Start:  03/19/25    Expected End:  03/29/25            LTG - Patient will complete bathing with sba. (Progressing)       Start:  03/19/25    Expected End:  03/29/25            LTG - Patient will complete UE dressing independently.  (Progressing)       Start:  03/19/25    Expected End:  03/29/25            LTG - Patient will complete LE dressing using adaptive equip as needed with mod I. (Progressing)       Start:  03/19/25    Expected End:  03/29/25            LTG - Patient will complete commode transfer via device as needed with mod I. (Progressing)       Start:  03/19/25    Expected End:  03/29/25            LTG -  Patient will complete tub/shower transfer using DME as needed with sba. (Progressing)       Start:  03/19/25    Expected End:  03/29/25            LTG - Patient will complete simple functional mobility via device as needed with mod I. (Progressing)       Start:  03/19/25    Expected End:  03/29/25            LTG - Patient will complete challenging functional mobility or simulated homemaking task with sba via device as needed. (Progressing)       Start:  03/19/25    Expected End:  03/29/25            STG - Patient will complete toileting with cga. (Progressing)       Start:  03/19/25    Expected End:  03/26/25            STG - Patient will complete bathing with min assist. (Progressing)       Start:  03/19/25    Expected End:  03/26/25            STG - Patient will complete LE dressing using adaptive equip as needed with min assist. (Progressing)       Start:  03/19/25    Expected End:  03/26/25            STG - Patient will complete commode transfer via device as needed with cga. (Progressing)       Start:  03/19/25    Expected End:  03/26/25            STG - Patient will complete tub/shower transfer using DME as needed with min assist. (Progressing)       Start:  03/19/25    Expected End:  03/26/25            STG - Patient will complete simple functional mobility via device as needed with min assist. (Progressing)       Start:  03/19/25    Expected End:  03/26/25

## 2025-03-24 NOTE — PROGRESS NOTES
"Kayley Lynch is a 54 y.o. female on day 6 of admission presenting with Physical debility.    Subjective   She is resting in her room.  She feels well.  She denies chest pain or shortness of breath.  She is not having any pain, she is ready for therapy       Objective     Physical Exam  Constitutional:       General: no acute distress.     Appearance: Normal appearance.   Cardiovascular: S1-S2  Pulmonary:   Lungs are clear, no rales or rhonchi  Abdominal:   Abdomen is soft bowel sounds are active no rigidity  Musculoskeletal:         General: No swelling, tenderness or deformity.   Homans' sign is negative bilaterally  Skin:     General: Skin is warm and dry.      Findings: No rash.   Neurological:      General: No focal deficit present.      Mental Status:  alert and oriented to person, place, and time.      Cranial Nerves: No cranial nerve deficit.      Psychiatric:         Mood and Affect: Mood normal.    Patient negotiated curb step with contact-guard to min assist    Last Recorded Vitals  Blood pressure 129/68, pulse 86, temperature 36.4 °C (97.5 °F), temperature source Temporal, resp. rate 19, height 1.6 m (5' 2.99\"), weight 54.6 kg (120 lb 5.9 oz), SpO2 93%.  Intake/Output last 3 Shifts:  No intake/output data recorded.    Relevant Results  Scheduled medications  aspirin, 81 mg, oral, Daily  atorvastatin, 80 mg, oral, Daily  budesonide, 0.5 mg, nebulization, BID  buPROPion XL, 150 mg, oral, q AM  carvedilol, 25 mg, oral, BID  escitalopram, 20 mg, oral, Daily  ipratropium-albuteroL, 3 mL, nebulization, TID  pantoprazole, 40 mg, oral, Daily  sennosides, 1 tablet, oral, Nightly  vancomycin, 250 mg, oral, 4x daily  warfarin, 3 mg, oral, Daily      Continuous medications     PRN medications  PRN medications: acetaminophen, alum-mag hydroxide-simeth, bisacodyl, bisacodyl, melatonin     Results for orders placed or performed during the hospital encounter of 03/18/25 (from the past 24 hours)   Protime-INR   Result " Value Ref Range    Protime 32.8 (H) 9.8 - 12.4 seconds    INR 3.0 (H) 0.9 - 1.1   Lavender Top   Result Value Ref Range    Extra Tube Hold for add-ons.    SST TOP   Result Value Ref Range    Extra Tube Hold for add-ons.      *Note: Due to a large number of results and/or encounters for the requested time period, some results have not been displayed. A complete set of results can be found in Results Review.            Assessment/Plan   Assessment & Plan  Physical debility    Atrial flutter (Multi)    Hypercholesterolemia    S/P mitral valve replacement    Nontraumatic compartment syndrome of arm         Debility following influenza pneumonia, compartment syndrome of the left upper extremity, and C. difficile colitis  Begin physical therapy for strengthening, transfer training, gait training, exercises to improve balance  Begin occupational therapy for ADL retraining, strengthening, ADL activities, and transfer training     2.  Coronary artery disease  Cardiac precautions  Aspirin 81 mg daily  Lipitor 80 mg daily   Coumadin 3 mg daily  Adjust Coumadin to INR as needed     3.  Hypertension  Coreg 25 mg twice daily  Adjust for adequate blood pressure control     4.  Recent pneumonia  Budesonide nebulizer twice daily  DuoNeb nebulizer 3 times daily  Monitor for shortness of breath     5.  Soft tissue infection  Vancomycin to 50 mg 4 times daily     6.  Mood  Wellbutrin 150 mg every morning  Lexapro 20 mg daily     7.  Hyperlipidemia  Lipitor 80 mg daily     8. PEDRO PABLO  Patient is on a bowel program to promote regular bowel movement , and vancomycin to treat her C. Difficile  She is a fall risk and fall precautions will be used     3/24  Patient negotiated curb step with contact-guard to min assist  She is followed by Dr. Licea for the compartment syndrome of the left arm he notes minimal drainage  INR today is 3  Continue care      I spent 28 minutes in the professional and overall care of this patient.      Maria Eugenia AGUILA  Carmen, DO

## 2025-03-24 NOTE — PROGRESS NOTES
Physical Therapy    Physical Therapy Treatment    Patient Name: Kayley Lynch  MRN: [id number]  Department: [facility] REHAB  Room: [address] [place], [place] [address]  Today's Date: 3/24/2025  Time Calculation  Start Time: 1045  Stop Time: 1130  Time Calculation (min): 45 min         Assessment/Plan   PT Assessment  End of Session Communication: Bedside nurse  End of Session Patient Position: Up in chair, Alarm off, not on at start of session (Seated in w/c with tray table in front of pt, call bell in reach.)     PT Plan  Treatment/Interventions: Bed mobility, Transfer training, Gait training, Balance training, Neuromuscular re-education, Strengthening, Endurance training, Therapeutic exercise, Therapeutic activity, Home exercise program, Stair training  PT Plan: Ongoing PT  PT Frequency: 90 min/5 days per week (for 10 days)  PT Discharge Recommendations: Other (comment) (Anticipate discharge home in 10 days mod I at walker level for simple/household mobility, with recommendation for intermittent assist on stairs and for higher level IADLs.)  Equipment Recommended upon Discharge: Wheeled walker  PT Recommended Transfer Status: Assist x1      General Visit Information:   PT  Visit  PT Received On: 03/24/25  General  Prior to Session Communication:  (Received from OT.)  Patient Position Received: Up in chair, Alarm off, not on at start of session  General Comment:  (Pt pleasant and agreeable to participate in PT session.)    Subjective   Precautions:  Precautions  Medical Precautions: Fall precautions            Objective   Pain:  Pain Assessment  Pain Assessment: 0-10  0-10 (Numeric) Pain Score: 0 - No pain              Treatments:  Therapeutic Exercise  Therapeutic Exercise Performed: Yes (Pt performed standing BLE therex at armando-bar with BUE support consisting of: heel raises, mini-squats, hip flexion, hip ABD, HS curls x15 each in order to improve strength and functional mobility.)    Ambulation/Gait Training  Ambulation/Gait Training Performed:  Yes (PT able to amb 150'x1 over tile/threshold/carpet with FWW and Mod I; demos slow, steady sven with reciprocal gait pattern. Denies dizziness and no LOB noted.)  Transfers  Transfer: Yes (Pt performed sit<>stand with FWW and Mod I.)    Stairs  Stairs: Yes (Pt neg up/down 5 steps x2 trials with non-reciprocal gait pattern using BHR first trial and L HR second trial (to simulate home set-up) and CGAx1.  Denies dizziness and no LOB noted.)     Object From Floor  Comments:  (Pt able to amb 50' with FWW pickiing up object off floor alternating R/L UE with reacher and SBA.)  Education Documentation  No documentation found.  Education Comments  Patient educated in safe techniques for gait with a device in order to maximize safety and functional independence.  Patient educated in safe and proper transfer techniques including proper positioning and hand/foot placement to maximize safety and functional independence.  Patient educated in stair training techniques including proper sequencing, hand advancement on rails and safe foot placement to maximize functional independence.   Pt educated on therex, including optimal techniques to maximize function.          OP EDUCATION:       Encounter Problems       Encounter Problems (Active)       PT Problem       LTG - Pt will perform bed mobility with mod I  (Progressing)       Start:  03/19/25    Expected End:  03/29/25            LTG - Pt will perform transfers with mod I.  (Progressing)       Start:  03/19/25    Expected End:  03/29/25            LTG - Pt will amb 50 feet with FWW and mod I, and 150' with FWW and SBA. (Progressing)       Start:  03/19/25    Expected End:  03/29/25            LTG - Pt will up/down 2 stairs with B HR and CGA.  (Progressing)       Start:  03/19/25    Expected End:  03/29/25            STG - Pt will perform bed mobility with Sup.   (Progressing)       Start:  03/19/25    Expected End:  03/24/25            STG - Pt will perform transfers  with CGA.   (Progressing)       Start:  03/19/25    Expected End:  03/24/25            STG - Pt will amb 50 feet with FWW and CGA.  (Progressing)       Start:  03/19/25    Expected End:  03/24/25            STG - Pt will up/down 1 stairs with B HR and min A.  (Progressing)       Start:  03/19/25    Expected End:  03/24/25

## 2025-03-24 NOTE — PROGRESS NOTES
"Kayley Lynch is a 54 y.o. female on day 6 of admission presenting with Physical debility.    Subjective   No new reports.       Objective     Physical Exam  Minimal drainage is noted on the dressing.  Packing was removed and changed.  There is still necrotic eschar surrounding the volar forearm wound.  Last Recorded Vitals  Blood pressure 129/68, pulse 86, temperature 36.4 °C (97.5 °F), temperature source Temporal, resp. rate 19, height 1.6 m (5' 2.99\"), weight 54.6 kg (120 lb 5.9 oz), SpO2 93%.  Intake/Output last 3 Shifts:  No intake/output data recorded.    Relevant Results                               Assessment/Plan   Assessment & Plan  Physical debility    Atrial flutter (Multi)    Hypercholesterolemia    S/P mitral valve replacement    Nontraumatic compartment syndrome of arm    The left upper extremity dressing and packing were changed.  I do recommend surgical debridement of the wound and possible primary closure versus VAC placement.  I will check to see if there is any OR time available tomorrow.  Please keep the patient n.p.o. after midnight.             Elijah Licea MD      "

## 2025-03-24 NOTE — CARE PLAN
The patient's goals for the shift include      The clinical goals for the shift include NO PAIN.      Problem: Skin  Goal: Decreased wound size/increased tissue granulation at next dressing change  Outcome: Progressing  Goal: Participates in plan/prevention/treatment measures  Outcome: Progressing  Goal: Prevent/manage excess moisture  Outcome: Progressing  Goal: Prevent/minimize sheer/friction injuries  Outcome: Progressing  Goal: Promote/optimize nutrition  Outcome: Progressing  Goal: Promote skin healing  Outcome: Progressing     Problem: Pain  Goal: Takes deep breaths with improved pain control throughout the shift  Outcome: Progressing  Goal: Turns in bed with improved pain control throughout the shift  Outcome: Progressing  Goal: Walks with improved pain control throughout the shift  Outcome: Progressing  Goal: Performs ADL's with improved pain control throughout shift  Outcome: Progressing  Goal: Participates in PT with improved pain control throughout the shift  Outcome: Progressing  Goal: Free from opioid side effects throughout the shift  Outcome: Progressing  Goal: Free from acute confusion related to pain meds throughout the shift  Outcome: Progressing     Problem: Fall/Injury  Goal: Not fall by end of shift  Outcome: Progressing  Goal: Be free from injury by end of the shift  Outcome: Progressing  Goal: Verbalize understanding of personal risk factors for fall in the hospital  Outcome: Progressing  Goal: Verbalize understanding of risk factor reduction measures to prevent injury from fall in the home  Outcome: Progressing  Goal: Use assistive devices by end of the shift  Outcome: Progressing  Goal: Pace activities to prevent fatigue by end of the shift  Outcome: Progressing

## 2025-03-24 NOTE — DISCHARGE SUMMARY
Rehabilitation Discharge Summary     BRIEF OVERVIEW  Admitting Provider: Taylor Alvarez MD  Discharge Provider: Taylor Fernandes*  Primary Care Physician at Discharge: Ambrose Carrasco [phone]     Admission Date: 3/18/2025     Discharge Date: 3/24/2025    Primary Discharge Diagnosis  Patient Active Problem List   Diagnosis    Atrial flutter (Multi)    Bilateral edema of lower extremity    CAD (coronary artery disease)    Cardiomyopathy    Hypercholesterolemia    Mitral regurgitation    Pleural effusion    Pulmonary hypertension (Multi)    S/P CABG (coronary artery bypass graft)    S/P mitral valve replacement    Dyspnea on exertion    Tachycardia    Current mild episode of major depressive disorder without prior episode (CMS-[facility])    Chest pressure    Nausea vomiting and diarrhea    Influenza A with pneumonia    Nontraumatic compartment syndrome of left upper extremity    Physical debility    Nontraumatic compartment syndrome of arm        Discharge Disposition  Inpatient Rehab Facility (IRF)  Code Status at Discharge: full    Discharge meds     Your medication list        CHANGE how you take these medications        Instructions Last Dose Given Next Dose Due   atorvastatin 80 mg tablet  Commonly known as: Lipitor  What changed: Another medication with the same name was removed. Continue taking this medication, and follow the directions you see here.      Take 1 tablet (80 mg) by mouth once daily at bedtime.              CONTINUE taking these medications        Instructions Last Dose Given Next Dose Due   aspirin 81 mg EC tablet           buPROPion  mg 24 hr tablet  Commonly known as: Wellbutrin XL      Take 1 tablet (150 mg) by mouth once daily in the morning. Do not crush, chew, or split.       carvedilol 25 mg tablet  Commonly known as: Coreg      Take 1 tablet (25 mg) by mouth 2 times a day.       escitalopram 20 mg tablet  Commonly known as: Lexapro      Take 1 tablet (20  mg) by mouth once daily.       pantoprazole 40 mg EC tablet  Commonly known as: ProtoNix      Take 1 tablet (40 mg) by mouth once daily. Do not crush, chew, or split.       warfarin 3 mg tablet  Commonly known as: Coumadin      Take as directed. If you are unsure how to take this medication, talk to your nurse or doctor.  Original instructions: Take as directed per After Visit Summary.              STOP taking these medications      acetaminophen 325 mg tablet  Commonly known as: Tylenol        budesonide 0.5 mg/2 mL nebulizer solution  Commonly known as: Pulmicort        insulin lispro 100 unit/mL injection        ipratropium-albuteroL 0.5-2.5 mg/3 mL nebulizer solution  Commonly known as: Duo-Neb        melatonin 3 mg tablet        oxygen gas therapy  Commonly known as: O2        vancomycin 250 mg capsule  Commonly known as: Vancocin                  Where to Get Your Medications        These medications were sent to [id number] [address] [facility]  [address] [facility] [address]      Phone: [phone]   pantoprazole 40 mg EC tablet              Outpatient Follow-Up  Future Appointments   Date Time Provider Department Center   3/27/2025  1:30 PM HONORIO [id number] PHARM [id number] [place]   4/8/2025  1:15 PM Brittney Lomeli MD [id number] [place]       [unfilled]  Test Results Pending at Discharge  Pending Labs       No current pending labs.            DETAILS OF HOSPITAL STAY    Presenting Problem/History of Present Illness  Physical debility [R53.81]  Admission Functional Status:  Pt was admitted following influenza and respiratory failure.  She also had a compartment syndrome and c diff.  At the time of admission she was an overall min to mod a. She made good progress and is being discharged at a SBA level and up to CGA on stairs    Review of Systems   She feels well.  10 review of systems is negative    Physical Exam at Discharge  Constitutional:        General: She is not in acute distress.     Appearance: Normal appearance.   HENT:      Head: Normocephalic.      Nose: Nose normal.   Cardiovascular:      Rate and Rhythm: Normal rate and regular rhythm.      Heart sounds: No murmur heard.     No gallop.   Pulmonary:      Breath sounds: Normal breath sounds. No wheezing, rhonchi or rales.   Abdominal:      General: Abdomen is flat. Bowel sounds are normal. There is no distension.    Musculoskeletal:         General: No swelling, tenderness or deformity.    Skin:     General: Skin is warm and dry.      Findings: No rash.   Neurological:      General: No focal deficit present.      Mental Status: She is alert and oriented to person, place, and time.      Cranial Nerves: No cranial nerve deficit.      Motor: No weakness.   Psychiatric:         Mood and Affect: Mood normal.      Time spent on discharge preparation is 36 minutes

## 2025-03-24 NOTE — CARE PLAN
The patient's goals for the shift include      The clinical goals for the shift include patient will remain free of falls and injury throughout shift    Over the shift, the patient did not make progress toward the following goals. Barriers to progression include pain. Recommendations to address these barriers include following poc.

## 2025-03-24 NOTE — PROGRESS NOTES
Physical Therapy    Physical Therapy Treatment    Patient Name: Kayley Lynch  MRN: [id number]  Department: [facility] REHAB  Room: [address] [place], [place] [address]  Today's Date: 3/24/2025  Time Calculation  Start Time: 1345  Stop Time: 1430  Time Calculation (min): 45 min         Assessment/Plan   PT Assessment  End of Session Communication: Bedside nurse  End of Session Patient Position: Up in chair, Alarm off, not on at start of session (Seated in w/c with tray table in front of pt, call bell in reach.)     PT Plan  Treatment/Interventions: Bed mobility, Transfer training, Gait training, Balance training, Neuromuscular re-education, Strengthening, Endurance training, Therapeutic exercise, Therapeutic activity, Home exercise program, Stair training  PT Plan: Ongoing PT  PT Frequency: 90 min/5 days per week (for 10 days)  PT Discharge Recommendations: Other (comment) (Anticipate discharge home in 10 days mod I at walker level for simple/household mobility, with recommendation for intermittent assist on stairs and for higher level IADLs.)  Equipment Recommended upon Discharge: Wheeled walker  PT Recommended Transfer Status: Assist x1      General Visit Information:   PT  Visit  PT Received On: 03/24/25  General  Prior to Session Communication:  (Received from OT)  Patient Position Received: Up in chair, Alarm off, not on at start of session  General Comment:  (Pt pleasant and agreeable to participate in PT session.)    Subjective   Precautions:  Precautions  Medical Precautions: Fall precautions            Objective   Pain:  Pain Assessment  Pain Assessment: 0-10  0-10 (Numeric) Pain Score: 0 - No pain  Cognition:     Coordination:           Treatments:  Therapeutic Activity  Therapeutic Activity Performed: Yes (Pt participated in functional activity toss/catch of beachball while standing at FWW with SBA in order to challenge trunk without UE support.)    Bed Mobility  Bed Mobility: Yes (Pt performed sit<>supine on gym mat, rolling R/L, bridging  "x5 reps, bridging with scooting R/L x2 each all with Mod I.)    Ambulation/Gait Training  Ambulation/Gait Training Performed: Yes (Pt able to amb 10'x2 with FWW and Mod I.)  Ambulation/Gait Training 1  Surface 1:  (Pt able to amb ~3' w/c>EOB with L HHA and CGAx1)  Transfers  Transfer: Yes (Pt performed sit<>stand multiple trials from w/c and EOM with FWW and Mod I.)    Stairs  Stairs: Yes (Pt able to neg up/down 6\" curb step, 8\" curb step and ramp continuously x2 trials with FWW and CGA.)      Education Documentation  No documentation found.  Education Comments  Patient educated in safe techniques for gait with a device in order to maximize safety and functional independence.  Patient educated in safe and proper transfer techniques including proper positioning and hand/foot placement to maximize safety and functional independence.  Patient educated in correct bed mobility with instruction for proper trunk positioning, upper and lower body placement and positioning and use of bed rail if appropriate.  Patient educated in stair training techniques including proper sequencing, hand advancement on rails and safe foot placement to maximize functional independence.           OP EDUCATION:       Encounter Problems       Encounter Problems (Active)       PT Problem       LTG - Pt will perform bed mobility with mod I  (Progressing)       Start:  03/19/25    Expected End:  03/29/25            LTG - Pt will perform transfers with mod I.  (Progressing)       Start:  03/19/25    Expected End:  03/29/25            LTG - Pt will amb 50 feet with FWW and mod I, and 150' with FWW and SBA. (Progressing)       Start:  03/19/25    Expected End:  03/29/25            LTG - Pt will up/down 2 stairs with B HR and CGA.  (Progressing)       Start:  03/19/25    Expected End:  03/29/25            STG - Pt will perform bed mobility with Sup.   (Progressing)       Start:  03/19/25    Expected End:  03/24/25            STG - Pt will perform " transfers with CGA.   (Progressing)       Start:  03/19/25    Expected End:  03/24/25            STG - Pt will amb 50 feet with FWW and CGA.  (Progressing)       Start:  03/19/25    Expected End:  03/24/25            STG - Pt will up/down 1 stairs with B HR and min A.  (Progressing)       Start:  03/19/25    Expected End:  03/24/25

## 2025-03-25 ENCOUNTER — HOSPITAL ENCOUNTER (INPATIENT)
Dept: CARDIOLOGY | Facility: HOSPITAL | Age: 55
Discharge: HOME | DRG: 948 | End: 2025-03-25
Payer: COMMERCIAL

## 2025-03-25 ENCOUNTER — HOSPITAL ENCOUNTER (OUTPATIENT)
Facility: HOSPITAL | Age: 55
Setting detail: OUTPATIENT SURGERY
Discharge: NURSING, REHAB OR LONG-TERM CARE | End: 2025-03-25
Attending: ORTHOPAEDIC SURGERY | Admitting: ORTHOPAEDIC SURGERY
Payer: COMMERCIAL

## 2025-03-25 ENCOUNTER — ANESTHESIA EVENT (OUTPATIENT)
Dept: OPERATING ROOM | Facility: HOSPITAL | Age: 55
DRG: 948 | End: 2025-03-25
Payer: COMMERCIAL

## 2025-03-25 ENCOUNTER — ANESTHESIA (OUTPATIENT)
Dept: OPERATING ROOM | Facility: HOSPITAL | Age: 55
DRG: 948 | End: 2025-03-25
Payer: COMMERCIAL

## 2025-03-25 ENCOUNTER — PREP FOR PROCEDURE (OUTPATIENT)
Dept: PREOP | Facility: HOSPITAL | Age: 55
End: 2025-03-25

## 2025-03-25 VITALS
SYSTOLIC BLOOD PRESSURE: 139 MMHG | HEART RATE: 89 BPM | RESPIRATION RATE: 14 BRPM | OXYGEN SATURATION: 99 % | DIASTOLIC BLOOD PRESSURE: 89 MMHG | TEMPERATURE: 97.3 F

## 2025-03-25 DIAGNOSIS — M79.A11 NON-TRAUMATIC COMPARTMENT SYNDROME OF RIGHT UPPER EXTREMITY: ICD-10-CM

## 2025-03-25 DIAGNOSIS — M79.A19: ICD-10-CM

## 2025-03-25 LAB
HOLD SPECIMEN: NORMAL
INR PPP: 1.8 (ref 0.9–1.1)
PROTHROMBIN TIME: 19.8 SECONDS (ref 9.8–12.4)

## 2025-03-25 PROCEDURE — 2500000004 HC RX 250 GENERAL PHARMACY W/ HCPCS (ALT 636 FOR OP/ED): Performed by: NURSE ANESTHETIST, CERTIFIED REGISTERED

## 2025-03-25 PROCEDURE — 3700000001 HC GENERAL ANESTHESIA TIME - INITIAL BASE CHARGE: Performed by: ORTHOPAEDIC SURGERY

## 2025-03-25 PROCEDURE — 7100000010 HC PHASE TWO TIME - EACH INCREMENTAL 1 MINUTE: Performed by: ORTHOPAEDIC SURGERY

## 2025-03-25 PROCEDURE — 2500000002 HC RX 250 W HCPCS SELF ADMINISTERED DRUGS (ALT 637 FOR MEDICARE OP, ALT 636 FOR OP/ED): Performed by: CLINICAL NURSE SPECIALIST

## 2025-03-25 PROCEDURE — 1180000001 HC REHAB PRIVATE ROOM DAILY

## 2025-03-25 PROCEDURE — 7100000001 HC RECOVERY ROOM TIME - INITIAL BASE CHARGE: Performed by: ORTHOPAEDIC SURGERY

## 2025-03-25 PROCEDURE — 36415 COLL VENOUS BLD VENIPUNCTURE: CPT | Performed by: CLINICAL NURSE SPECIALIST

## 2025-03-25 PROCEDURE — 93005 ELECTROCARDIOGRAM TRACING: CPT

## 2025-03-25 PROCEDURE — 93010 ELECTROCARDIOGRAM REPORT: CPT | Performed by: STUDENT IN AN ORGANIZED HEALTH CARE EDUCATION/TRAINING PROGRAM

## 2025-03-25 PROCEDURE — 3700000002 HC GENERAL ANESTHESIA TIME - EACH INCREMENTAL 1 MINUTE: Performed by: ORTHOPAEDIC SURGERY

## 2025-03-25 PROCEDURE — 2500000001 HC RX 250 WO HCPCS SELF ADMINISTERED DRUGS (ALT 637 FOR MEDICARE OP): Performed by: CLINICAL NURSE SPECIALIST

## 2025-03-25 PROCEDURE — 2720000007 HC OR 272 NO HCPCS: Performed by: ORTHOPAEDIC SURGERY

## 2025-03-25 PROCEDURE — 2500000005 HC RX 250 GENERAL PHARMACY W/O HCPCS: Performed by: CLINICAL NURSE SPECIALIST

## 2025-03-25 PROCEDURE — 85610 PROTHROMBIN TIME: CPT | Performed by: CLINICAL NURSE SPECIALIST

## 2025-03-25 PROCEDURE — 3600000007 HC OR TIME - EACH INCREMENTAL 1 MINUTE - PROCEDURE LEVEL TWO: Performed by: ORTHOPAEDIC SURGERY

## 2025-03-25 PROCEDURE — 2500000005 HC RX 250 GENERAL PHARMACY W/O HCPCS: Performed by: ORTHOPAEDIC SURGERY

## 2025-03-25 PROCEDURE — 7100000009 HC PHASE TWO TIME - INITIAL BASE CHARGE: Performed by: ORTHOPAEDIC SURGERY

## 2025-03-25 PROCEDURE — 7100000002 HC RECOVERY ROOM TIME - EACH INCREMENTAL 1 MINUTE: Performed by: ORTHOPAEDIC SURGERY

## 2025-03-25 PROCEDURE — 87075 CULTR BACTERIA EXCEPT BLOOD: CPT | Mod: PARLAB | Performed by: ORTHOPAEDIC SURGERY

## 2025-03-25 PROCEDURE — 3600000002 HC OR TIME - INITIAL BASE CHARGE - PROCEDURE LEVEL TWO: Performed by: ORTHOPAEDIC SURGERY

## 2025-03-25 PROCEDURE — 94640 AIRWAY INHALATION TREATMENT: CPT

## 2025-03-25 RX ORDER — ONDANSETRON HYDROCHLORIDE 2 MG/ML
4 INJECTION, SOLUTION INTRAVENOUS ONCE AS NEEDED
Status: DISCONTINUED | OUTPATIENT
Start: 2025-03-25 | End: 2025-03-25 | Stop reason: HOSPADM

## 2025-03-25 RX ORDER — LABETALOL HYDROCHLORIDE 5 MG/ML
5 INJECTION, SOLUTION INTRAVENOUS ONCE AS NEEDED
Status: DISCONTINUED | OUTPATIENT
Start: 2025-03-25 | End: 2025-03-25 | Stop reason: HOSPADM

## 2025-03-25 RX ORDER — CEFAZOLIN 1 G/1
INJECTION, POWDER, FOR SOLUTION INTRAVENOUS AS NEEDED
Status: DISCONTINUED | OUTPATIENT
Start: 2025-03-25 | End: 2025-03-25

## 2025-03-25 RX ORDER — ALBUTEROL SULFATE 0.83 MG/ML
2.5 SOLUTION RESPIRATORY (INHALATION) ONCE AS NEEDED
Status: DISCONTINUED | OUTPATIENT
Start: 2025-03-25 | End: 2025-03-25 | Stop reason: HOSPADM

## 2025-03-25 RX ORDER — GLYCOPYRROLATE 0.2 MG/ML
INJECTION INTRAMUSCULAR; INTRAVENOUS AS NEEDED
Status: DISCONTINUED | OUTPATIENT
Start: 2025-03-25 | End: 2025-03-25

## 2025-03-25 RX ORDER — SODIUM CHLORIDE, SODIUM LACTATE, POTASSIUM CHLORIDE, CALCIUM CHLORIDE 600; 310; 30; 20 MG/100ML; MG/100ML; MG/100ML; MG/100ML
INJECTION, SOLUTION INTRAVENOUS CONTINUOUS PRN
Status: DISCONTINUED | OUTPATIENT
Start: 2025-03-25 | End: 2025-03-25

## 2025-03-25 RX ORDER — OXYCODONE HYDROCHLORIDE 5 MG/1
5 TABLET ORAL EVERY 4 HOURS PRN
Status: DISCONTINUED | OUTPATIENT
Start: 2025-03-25 | End: 2025-03-25 | Stop reason: HOSPADM

## 2025-03-25 RX ORDER — HYDRALAZINE HYDROCHLORIDE 20 MG/ML
5 INJECTION INTRAMUSCULAR; INTRAVENOUS EVERY 30 MIN PRN
Status: DISCONTINUED | OUTPATIENT
Start: 2025-03-25 | End: 2025-03-25 | Stop reason: HOSPADM

## 2025-03-25 RX ORDER — LIDOCAINE HYDROCHLORIDE 20 MG/ML
INJECTION, SOLUTION EPIDURAL; INFILTRATION; INTRACAUDAL; PERINEURAL AS NEEDED
Status: DISCONTINUED | OUTPATIENT
Start: 2025-03-25 | End: 2025-03-25

## 2025-03-25 RX ORDER — SODIUM CHLORIDE, SODIUM LACTATE, POTASSIUM CHLORIDE, CALCIUM CHLORIDE 600; 310; 30; 20 MG/100ML; MG/100ML; MG/100ML; MG/100ML
100 INJECTION, SOLUTION INTRAVENOUS CONTINUOUS
Status: DISCONTINUED | OUTPATIENT
Start: 2025-03-25 | End: 2025-03-25 | Stop reason: HOSPADM

## 2025-03-25 RX ORDER — LIDOCAINE HYDROCHLORIDE 10 MG/ML
0.1 INJECTION, SOLUTION INFILTRATION; PERINEURAL ONCE
Status: DISCONTINUED | OUTPATIENT
Start: 2025-03-25 | End: 2025-03-25 | Stop reason: HOSPADM

## 2025-03-25 RX ORDER — FENTANYL CITRATE 50 UG/ML
INJECTION, SOLUTION INTRAMUSCULAR; INTRAVENOUS AS NEEDED
Status: DISCONTINUED | OUTPATIENT
Start: 2025-03-25 | End: 2025-03-25

## 2025-03-25 RX ORDER — SODIUM CHLORIDE 0.9 G/100ML
INJECTION, SOLUTION IRRIGATION AS NEEDED
Status: DISCONTINUED | OUTPATIENT
Start: 2025-03-25 | End: 2025-03-25 | Stop reason: HOSPADM

## 2025-03-25 RX ORDER — PROPOFOL 10 MG/ML
INJECTION, EMULSION INTRAVENOUS AS NEEDED
Status: DISCONTINUED | OUTPATIENT
Start: 2025-03-25 | End: 2025-03-25

## 2025-03-25 RX ORDER — MIDAZOLAM HYDROCHLORIDE 1 MG/ML
INJECTION, SOLUTION INTRAMUSCULAR; INTRAVENOUS AS NEEDED
Status: DISCONTINUED | OUTPATIENT
Start: 2025-03-25 | End: 2025-03-25

## 2025-03-25 RX ADMIN — VANCOMYCIN HYDROCHLORIDE 250 MG: 250 CAPSULE ORAL at 17:24

## 2025-03-25 RX ADMIN — ACETAMINOPHEN 650 MG: 325 TABLET, FILM COATED ORAL at 21:03

## 2025-03-25 RX ADMIN — PROPOFOL 50 MG: 10 INJECTION, EMULSION INTRAVENOUS at 07:55

## 2025-03-25 RX ADMIN — LIDOCAINE HYDROCHLORIDE 50 MG: 20 INJECTION, SOLUTION EPIDURAL; INFILTRATION; INTRACAUDAL; PERINEURAL at 07:42

## 2025-03-25 RX ADMIN — VANCOMYCIN HYDROCHLORIDE 250 MG: 250 CAPSULE ORAL at 21:04

## 2025-03-25 RX ADMIN — VANCOMYCIN HYDROCHLORIDE 250 MG: 250 CAPSULE ORAL at 12:37

## 2025-03-25 RX ADMIN — CEFAZOLIN 1 G: 1 INJECTION, POWDER, FOR SOLUTION INTRAMUSCULAR; INTRAVENOUS at 07:40

## 2025-03-25 RX ADMIN — Medication 3 MG: at 21:04

## 2025-03-25 RX ADMIN — IPRATROPIUM BROMIDE AND ALBUTEROL SULFATE 3 ML: .5; 3 SOLUTION RESPIRATORY (INHALATION) at 13:04

## 2025-03-25 RX ADMIN — IPRATROPIUM BROMIDE AND ALBUTEROL SULFATE 3 ML: .5; 3 SOLUTION RESPIRATORY (INHALATION) at 18:14

## 2025-03-25 RX ADMIN — GLYCOPYRROLATE 0.2 MG: 0.2 INJECTION, SOLUTION INTRAMUSCULAR; INTRAVENOUS at 07:42

## 2025-03-25 RX ADMIN — PROPOFOL 50 MG: 10 INJECTION, EMULSION INTRAVENOUS at 07:50

## 2025-03-25 RX ADMIN — FENTANYL CITRATE 50 MCG: 50 INJECTION, SOLUTION INTRAMUSCULAR; INTRAVENOUS at 08:03

## 2025-03-25 RX ADMIN — PROPOFOL 100 MG: 10 INJECTION, EMULSION INTRAVENOUS at 07:42

## 2025-03-25 RX ADMIN — SODIUM CHLORIDE, POTASSIUM CHLORIDE, SODIUM LACTATE AND CALCIUM CHLORIDE: 600; 310; 30; 20 INJECTION, SOLUTION INTRAVENOUS at 07:40

## 2025-03-25 RX ADMIN — CARVEDILOL 25 MG: 25 TABLET, FILM COATED ORAL at 21:04

## 2025-03-25 RX ADMIN — WARFARIN SODIUM 3 MG: 2 TABLET ORAL at 17:24

## 2025-03-25 RX ADMIN — BUDESONIDE 0.5 MG: 0.5 INHALANT ORAL at 18:13

## 2025-03-25 RX ADMIN — FENTANYL CITRATE 50 MCG: 50 INJECTION, SOLUTION INTRAMUSCULAR; INTRAVENOUS at 08:23

## 2025-03-25 RX ADMIN — MIDAZOLAM 2 MG: 1 INJECTION INTRAMUSCULAR; INTRAVENOUS at 07:35

## 2025-03-25 SDOH — HEALTH STABILITY: MENTAL HEALTH: CURRENT SMOKER: 0

## 2025-03-25 ASSESSMENT — PAIN SCALES - GENERAL
PAINLEVEL_OUTOF10: 0 - NO PAIN
PAINLEVEL_OUTOF10: 0 - NO PAIN
PAIN_LEVEL: 0
PAINLEVEL_OUTOF10: 0 - NO PAIN
PAINLEVEL_OUTOF10: 6
PAINLEVEL_OUTOF10: 0 - NO PAIN

## 2025-03-25 ASSESSMENT — PAIN - FUNCTIONAL ASSESSMENT
PAIN_FUNCTIONAL_ASSESSMENT: 0-10
PAIN_FUNCTIONAL_ASSESSMENT: VAS (VISUAL ANALOG SCALE)
PAIN_FUNCTIONAL_ASSESSMENT: 0-10

## 2025-03-25 ASSESSMENT — PAIN DESCRIPTION - ORIENTATION: ORIENTATION: LEFT

## 2025-03-25 ASSESSMENT — PAIN DESCRIPTION - LOCATION: LOCATION: ARM

## 2025-03-25 NOTE — ANESTHESIA POSTPROCEDURE EVALUATION
Patient: Kayley Lynch    Procedure Summary       Date: 03/25/25 Room / Location: [facility] OR 02 / [facility] OR    Anesthesia Start: 0727 Anesthesia Stop: 0838    Procedure: LEFT UPPER EXTREMITY WOUND DEBRIDEMENT (Left: Arm Upper) Diagnosis:       Non-traumatic compartment syndrome of right upper extremity      (Non-traumatic compartment syndrome of right upper extremity [M79.A11])    Surgeons: Elijah Licea MD Responsible Provider: Canelo Romero MD    Anesthesia Type: general ASA Status: 3            Anesthesia Type: general    Vitals Value Taken Time   /89 03/25/25 0930   Temp 36.3 °C (97.3 °F) 03/25/25 0832   Pulse 92 03/25/25 0941   Resp 14 03/25/25 0930   SpO2 97 % 03/25/25 0941   Vitals shown include unfiled device data.    Anesthesia Post Evaluation    Patient location during evaluation: PACU  Patient participation: complete - patient participated  Level of consciousness: awake and alert  Pain score: 0  Pain management: adequate  Airway patency: patent  Cardiovascular status: acceptable  Respiratory status: acceptable  Hydration status: acceptable  Postoperative Nausea and Vomiting: none    There were no known notable events for this encounter.

## 2025-03-25 NOTE — ANESTHESIA PREPROCEDURE EVALUATION
Patient: Kayley Lynch    Procedure Information       Date/Time: 03/25/25 0730    Procedure: RIGHT UPPER EXTREMITY WOUND DEBRIDEMENT (Right: Arm Upper)    Location: [facility] OR 02 / [facility]    Surgeons: Elijah Licea MD            Relevant Problems   Anesthesia (within normal limits)      Cardiac   (+) Atrial flutter (Multi)   (+) CAD (coronary artery disease)   (+) Hypercholesterolemia   (+) Mitral regurgitation      Pulmonary   (+) Dyspnea on exertion   (+) Pulmonary hypertension (Multi)      Neuro   (+) Current mild episode of major depressive disorder without prior episode (CMS-[facility])      ID   (+) Influenza A with pneumonia       Clinical information reviewed:    Allergies      OB Status           NPO Detail:  NPO/Void Status  Date of Last Liquid: 03/24/25  Time of Last Liquid: 2100  Date of Last Solid: 03/24/25  Time of Last Solid: 1900         Physical Exam    Airway  Mallampati: II  TM distance: >3 FB  Neck ROM: full     Cardiovascular    Dental   Comments: Multiple missing teeth.  Remaining teeth in poor condition   Pulmonary    Abdominal        Anesthesia Plan    History of general anesthesia?: yes  History of complications of general anesthesia?: no    ASA 3     general     The patient is not a current smoker.  Patient was not previously instructed to abstain from smoking on day of procedure.  Patient did not smoke on day of procedure.    intravenous induction   Anesthetic plan and risks discussed with patient.  Use of blood products discussed with patient who.    Plan discussed with CRNA.

## 2025-03-25 NOTE — DISCHARGE INSTR - APPOINTMENTS
Monday 4/7 @ 2:30pm  Follow up for wound  Dr. Elijah Licea  [address] [facility]  [place] [id number]  [phone]  **Keep wound dressing on until follow up appointment****

## 2025-03-25 NOTE — ANESTHESIA PROCEDURE NOTES
Airway  Date/Time: 3/25/2025 7:43 AM  Urgency: elective    Airway not difficult    Staffing  Performed: CRNA   Authorized by: Canelo Romero MD    Performed by: NADYA Schroeder-TRE, ARIANNA  Patient location during procedure: OR    Indications and Patient Condition  Indications for airway management: anesthesia  Spontaneous ventilation: present  Sedation level: deep  Preoxygenated: yes  Patient position: sniffing  MILS maintained throughout  Mask difficulty assessment: 0 - not attempted  No planned trial extubation    Final Airway Details  Final airway type: supraglottic airway      Successful airway: Supraglottic airway: I-Gel.  Size 3     Number of attempts at approach: 1  Ventilation between attempts: none  Number of other approaches attempted: 0

## 2025-03-25 NOTE — PROGRESS NOTES
Physical Therapy                 Therapy Communication Note    Patient Name: Kayley Lynch  MRN: [id number]  Department: [facility] REHAB  Room: [address] [place] [address]A  Today's Date: 3/25/2025     Discipline: Physical Therapy    PT Missed Visit: Yes     Missed Visit Reason: Missed Visit Reason: Patient in a medical procedure (Debridement of LUE wound - under general anesthesia. Unable to be seen during scheduled time.)    Missed Time: Cancel

## 2025-03-25 NOTE — OP NOTE
LEFT UPPER EXTREMITY WOUND DEBRIDEMENT (L) Operative Note     Date: 3/25/2025  OR Location: [facility]    Name: Kayley Lynch, : 1970, Age: 54 y.o., MRN: [id number], Sex: female    Diagnosis  Preop diagnosis:    Nontraumatic compartment syndrome of the left upper extremity Postop diagnosis: Nontraumatic compartment syndrome left upper extremity     Procedures  LEFT UPPER EXTREMITY WOUND DEBRIDEMENT  70755 - MO DEBRIDEMENT OPEN WOUND FIRST 20 SQ CM/<      Surgeons      * Elijah Licea - Primary    Resident/Fellow/Other Assistant:  Surgeons and Role:  * No surgeons found with a matching role *    Staff:   Circulator: Mela  Scrub Person: Beba  Surgical Assistant: Becca    Anesthesia Staff: Anesthesiologist: Canelo Romero MD  CRNA: ELIO Schroeder DNP    Procedure Summary  Anesthesia: General  ASA: III  Estimated Blood Loss: 5 mL  Intra-op Medications:   Administrations occurring from 0730 to 0900 on 25:   Medication Name Total Dose   sodium chloride 0.9 % irrigation solution 1,000 mL   ceFAZolin (Ancef) vial 1 g 1 g   fentaNYL (Sublimaze) injection 50 mcg/mL 100 mcg   glycopyrrolate (Robinul) injection 0.2 mg   LR infusion Cannot be calculated   lidocaine PF (Xylocaine-MPF)  2 % 50 mg   midazolam (Versed) injection 1 mg/mL 2 mg   propofol (Diprivan) injection 10 mg/mL 200 mg              Anesthesia Record               Intraprocedure I/O Totals          Intake    LR infusion 300.00 mL    Total Intake 300 mL          Specimen:   ID Type Source Tests Collected by Time   A : deep wound left upper arm Swab ABSCESS TISSUE/WOUND CULTURE/SMEAR Elijah Licea MD 3/25/2025 0754                 Drains and/or Catheters: * None in log *    Tourniquet Times:   * Missing tourniquet times found for documented tourniquets in lo *     Implants:     Findings: No signs of infection    Indications: Kayley Lynch is an 54 y.o. female who is having surgery for Non-traumatic compartment syndrome of  right upper extremity [M79.A11].  The patient underwent a fasciotomy of the left upper extremity for compartment syndrome.  The wound was closed 2 weeks postoperative.  Patient had some areas of wound dehiscence and eschar.  Decision was made to provide proceed with wound debridement and attempted closure.    The patient was seen in the preoperative area. The risks, benefits, complications, treatment options, non-operative alternatives, expected recovery and outcomes were discussed with the patient. The possibilities of reaction to medication, pulmonary aspiration, injury to surrounding structures, bleeding, recurrent infection, the need for additional procedures, failure to diagnose a condition, and creating a complication requiring transfusion or operation were discussed with the patient. The patient concurred with the proposed plan, giving informed consent.  The site of surgery was properly noted/marked if necessary per policy. The patient has been actively warmed in preoperative area. Preoperative antibiotics have been ordered and given within 1 hours of incision. Venous thrombosis prophylaxis have been ordered including bilateral sequential compression devices    Procedure Details: The patient was seen in the preanesthesia area with operative site was marked consent reviewed.  She was then taken back to the surgical suite and transferred onto the OR table.  A surgical huddle was performed reviewing patient's name, indicated procedure and correct operative site.  All bony prominences were well-padded.  The left upper extremities placed on hand table.  General anesthesia was induced via LMA.  The left upper extremities and prepped and draped sterile fashion.  Once draping is completed surgical pause was performed reviewing patient's name, indicate procedure and correct operative site.  The patient had 2 main areas of wound dehiscence there was a 3 cm section in the volar upper arm and a 10 cm area in the volar  proximal forearm.  Both areas were sharply debrided the eschar along the skin edges.  The edges were also debrided with a curette down to bleeding tissue.  Deep cultures were obtained.  The wounds were copiously irrigated.  The subcutaneous tissues were then closed with 3-0 Vicryl in inverted fashion.  The skin was closed with 3-0 nylon in vertical mattress fashion.  The extremity was then cleaned and dried.  Dress her dressing Alfred Adaptic bacitracin, 4 x 4's, Alfred, Webril and Ace bandage were applied.  The drapes removed.  The patient was explained taken to the PACU in stable condition.  The patient tolerated the procedure well.  There were no complications.  Counts at the end of case were correct.  Complications:  None; patient tolerated the procedure well.    Disposition: PACU - hemodynamically stable.  Condition: stable         Task Performed by RNFA or Surgical Assistant:  Prepping, draping and retraction          Additional Details:     Attending Attestation: I was present and scrubbed for the entire procedure.    Elijah Licea  Phone Number: [phone]

## 2025-03-25 NOTE — PROGRESS NOTES
Physical Therapy                 Therapy Communication Note    Patient Name: Kayley Lynch  MRN: [id number]  Department: [facility]  Room: [facility]  Today's Date: 3/25/2025     Discipline: Physical Therapy    PT Missed Visit: Yes     Missed Visit Reason: Missed Visit Reason:  (Pt back from medical procedure, however not fully awake/alert, per RN hold PT this afternoon.)    Missed Time: Cancel    Comment:

## 2025-03-25 NOTE — PROGRESS NOTES
Occupational Therapy                 Therapy Communication Note    Patient Name: Kayley Lynch  MRN: [id number]  Department: [facility]  Room: [address] [place] [address]  Today's Date: 3/25/2025     Discipline: Occupational Therapy          Missed Visit Reason:  surgical procedure    Missed Time: Attempt    Comment:

## 2025-03-26 VITALS
HEART RATE: 92 BPM | OXYGEN SATURATION: 98 % | BODY MASS INDEX: 21.33 KG/M2 | DIASTOLIC BLOOD PRESSURE: 55 MMHG | WEIGHT: 120.37 LBS | RESPIRATION RATE: 18 BRPM | TEMPERATURE: 97 F | SYSTOLIC BLOOD PRESSURE: 117 MMHG | HEIGHT: 63 IN

## 2025-03-26 LAB
INR PPP: 1.8 (ref 0.9–1.1)
PROTHROMBIN TIME: 19.7 SECONDS (ref 9.8–12.4)

## 2025-03-26 PROCEDURE — 2500000001 HC RX 250 WO HCPCS SELF ADMINISTERED DRUGS (ALT 637 FOR MEDICARE OP): Performed by: CLINICAL NURSE SPECIALIST

## 2025-03-26 PROCEDURE — 36415 COLL VENOUS BLD VENIPUNCTURE: CPT | Performed by: CLINICAL NURSE SPECIALIST

## 2025-03-26 PROCEDURE — 99222 1ST HOSP IP/OBS MODERATE 55: CPT | Performed by: INTERNAL MEDICINE

## 2025-03-26 PROCEDURE — 94640 AIRWAY INHALATION TREATMENT: CPT

## 2025-03-26 PROCEDURE — 85610 PROTHROMBIN TIME: CPT | Performed by: CLINICAL NURSE SPECIALIST

## 2025-03-26 PROCEDURE — 2500000002 HC RX 250 W HCPCS SELF ADMINISTERED DRUGS (ALT 637 FOR MEDICARE OP, ALT 636 FOR OP/ED): Performed by: CLINICAL NURSE SPECIALIST

## 2025-03-26 RX ORDER — WARFARIN 2 MG/1
TABLET ORAL
Qty: 30 TABLET | Refills: 0 | OUTPATIENT
Start: 2025-03-26 | End: 2026-03-26

## 2025-03-26 RX ORDER — WARFARIN 3 MG/1
3 TABLET ORAL NIGHTLY
Qty: 30 TABLET
Start: 2025-03-26 | End: 2025-04-25

## 2025-03-26 RX ADMIN — ACETAMINOPHEN 650 MG: 325 TABLET, FILM COATED ORAL at 08:27

## 2025-03-26 RX ADMIN — IPRATROPIUM BROMIDE AND ALBUTEROL SULFATE 3 ML: .5; 3 SOLUTION RESPIRATORY (INHALATION) at 12:33

## 2025-03-26 RX ADMIN — CARVEDILOL 25 MG: 25 TABLET, FILM COATED ORAL at 08:24

## 2025-03-26 RX ADMIN — IPRATROPIUM BROMIDE AND ALBUTEROL SULFATE 3 ML: .5; 3 SOLUTION RESPIRATORY (INHALATION) at 07:09

## 2025-03-26 RX ADMIN — ESCITALOPRAM OXALATE 20 MG: 10 TABLET ORAL at 08:23

## 2025-03-26 RX ADMIN — BUPROPION HYDROCHLORIDE 150 MG: 150 TABLET, EXTENDED RELEASE ORAL at 08:24

## 2025-03-26 RX ADMIN — PANTOPRAZOLE SODIUM 40 MG: 40 TABLET, DELAYED RELEASE ORAL at 08:24

## 2025-03-26 RX ADMIN — VANCOMYCIN HYDROCHLORIDE 250 MG: 250 CAPSULE ORAL at 13:19

## 2025-03-26 RX ADMIN — BUDESONIDE 0.5 MG: 0.5 INHALANT ORAL at 07:09

## 2025-03-26 RX ADMIN — VANCOMYCIN HYDROCHLORIDE 250 MG: 250 CAPSULE ORAL at 08:56

## 2025-03-26 RX ADMIN — ASPIRIN 81 MG: 81 TABLET, COATED ORAL at 08:18

## 2025-03-26 RX ADMIN — ATORVASTATIN CALCIUM 80 MG: 80 TABLET, FILM COATED ORAL at 08:24

## 2025-03-26 ASSESSMENT — BRIEF INTERVIEW FOR MENTAL STATUS (BIMS)
WHAT MONTH IS IT: ACCURATE WITHIN 5 DAYS
ASKED TO RECALL BED: YES, NO CUE REQUIRED
WHAT YEAR IS IT: CORRECT
ASKED TO RECALL BLUE: YES, NO CUE REQUIRED
BIMS SUMMARY SCORE: 15
INITIAL REPETITION OF BED BLUE SOCK - FIRST ATTEMPT: 3
WHAT DAY OF THE WEEK IS IT: CORRECT
ASKED TO RECALL SOCK: YES, NO CUE REQUIRED

## 2025-03-26 ASSESSMENT — PAIN SCALES - GENERAL
PAINLEVEL_OUTOF10: 6
PAINLEVEL_OUTOF10: 5 - MODERATE PAIN
PAINLEVEL_OUTOF10: 4

## 2025-03-26 ASSESSMENT — PAIN DESCRIPTION - ORIENTATION: ORIENTATION: LEFT

## 2025-03-26 ASSESSMENT — PAIN DESCRIPTION - LOCATION: LOCATION: ARM

## 2025-03-26 NOTE — CARE PLAN
The patient's goals for the shift include      The clinical goals for the shift include safety and comfort    Problem: Skin  Goal: Decreased wound size/increased tissue granulation at next dressing change  Outcome: Progressing  Flowsheets (Taken 3/26/2025 1205)  Decreased wound size/increased tissue granulation at next dressing change: Promote sleep for wound healing  Goal: Participates in plan/prevention/treatment measures  Outcome: Progressing  Flowsheets (Taken 3/26/2025 1205)  Participates in plan/prevention/treatment measures: Elevate heels  Goal: Prevent/manage excess moisture  Outcome: Progressing  Flowsheets (Taken 3/26/2025 1205)  Prevent/manage excess moisture: Monitor for/manage infection if present  Goal: Prevent/minimize sheer/friction injuries  Outcome: Progressing  Flowsheets (Taken 3/26/2025 1205)  Prevent/minimize sheer/friction injuries: Use pull sheet  Goal: Promote/optimize nutrition  Outcome: Progressing  Flowsheets (Taken 3/26/2025 1205)  Promote/optimize nutrition: Consume > 50% meals/supplements  Goal: Promote skin healing  Outcome: Progressing  Flowsheets (Taken 3/26/2025 1205)  Promote skin healing: Assess skin/pad under line(s)/device(s)     Problem: Pain  Goal: Takes deep breaths with improved pain control throughout the shift  Outcome: Progressing  Goal: Turns in bed with improved pain control throughout the shift  Outcome: Progressing  Goal: Walks with improved pain control throughout the shift  Outcome: Progressing  Goal: Performs ADL's with improved pain control throughout shift  Outcome: Progressing  Goal: Participates in PT with improved pain control throughout the shift  Outcome: Progressing  Goal: Free from opioid side effects throughout the shift  Outcome: Progressing  Goal: Free from acute confusion related to pain meds throughout the shift  Outcome: Progressing     Problem: Fall/Injury  Goal: Not fall by end of shift  Outcome: Progressing  Goal: Be free from injury by end of  the shift  Outcome: Progressing  Goal: Verbalize understanding of personal risk factors for fall in the hospital  Outcome: Progressing  Goal: Verbalize understanding of risk factor reduction measures to prevent injury from fall in the home  Outcome: Progressing  Goal: Use assistive devices by end of the shift  Outcome: Progressing  Goal: Pace activities to prevent fatigue by end of the shift  Outcome: Progressing

## 2025-03-26 NOTE — CARE PLAN
The patient's goals for the shift include  be able to sleep comfortably.    Problem: Skin  Goal: Decreased wound size/increased tissue granulation at next dressing change  Outcome: Progressing  Goal: Participates in plan/prevention/treatment measures  Outcome: Progressing  Goal: Prevent/manage excess moisture  Outcome: Progressing  Goal: Prevent/minimize sheer/friction injuries  Outcome: Progressing  Goal: Promote/optimize nutrition  Outcome: Progressing  Goal: Promote skin healing  Outcome: Progressing     Problem: Pain  Goal: Takes deep breaths with improved pain control throughout the shift  Outcome: Progressing  Goal: Turns in bed with improved pain control throughout the shift  Outcome: Progressing  Goal: Walks with improved pain control throughout the shift  Outcome: Progressing  Goal: Performs ADL's with improved pain control throughout shift  Outcome: Progressing  Goal: Participates in PT with improved pain control throughout the shift  Outcome: Progressing  Goal: Free from opioid side effects throughout the shift  Outcome: Progressing  Goal: Free from acute confusion related to pain meds throughout the shift  Outcome: Progressing     Problem: Fall/Injury  Goal: Not fall by end of shift  Outcome: Progressing  Goal: Be free from injury by end of the shift  Outcome: Progressing  Goal: Verbalize understanding of personal risk factors for fall in the hospital  Outcome: Progressing  Goal: Verbalize understanding of risk factor reduction measures to prevent injury from fall in the home  Outcome: Progressing  Goal: Use assistive devices by end of the shift  Outcome: Progressing  Goal: Pace activities to prevent fatigue by end of the shift  Outcome: Progressing       The clinical goals for the shift include comfort and safety

## 2025-03-26 NOTE — CONSULTS
Inpatient consult to Cardiology  Consult performed by: Yonatan Patel DO  Consult ordered by: Taylor Alvarez MD        History Of Present Illness:    Kayley Lynch is a 54 y.o. female with history of 10/2020 mechanical mitral valve replacement with a #25 Kevin  valve, 10/2020 coronary bypass grafting surgery x 3, paroxysmal A-fib/flutter in the postop setting, hypertension being seen for tachycardia.  Patient was noted to have a mild tachycardia status post wound debridement of her left arm yesterday.  She did not have any associated palpitations.  She denies any current chest pain or shortness of breath or lightheadedness or dizziness or edema.  She has had a recent long hospital stay-she was in the main hospital for 3 weeks with influenza/pneumonia and has been in rehab for 1 week.  She is scheduled to go home today     Last Recorded Vitals:  Vitals:    03/25/25 2048 03/26/25 0457 03/26/25 0709 03/26/25 0813   BP: 130/67 112/55  117/55   BP Location:    Right arm   Patient Position: Sitting Sitting  Lying   Pulse: 100 81  92   Resp: 18 18  18   Temp:  35.6 °C (96.1 °F)  36.1 °C (97 °F)   TempSrc:    Temporal   SpO2: 96% 92% 94% 95%   Weight:       Height:           Last Labs:  CBC - 3/19/2025:  7:15 AM  6.2 10.4 259    34.2      CMP - 3/19/2025:  7:15 AM  9.1 6.1 15 --- 1.5   3.7 3.1 19 63      PTT - 3/5/2025:  8:10 AM  1.8   19.7 25     Troponin I, High Sensitivity   Date/Time Value Ref Range Status   03/02/2025 05:36  (HH) 0 - 13 ng/L Final   02/26/2025 08:26 AM 22 (H) 0 - 13 ng/L Final     LDL Calculated   Date/Time Value Ref Range Status   05/22/2024 10:14 AM 66 <=99 mg/dL Final     Comment:                                 Near   Borderline      AGE      Desirable  Optimal    High     High     Very High     0-19 Y     0 - 109     ---    110-129   >/= 130     ----    20-24 Y     0 - 119     ---    120-159   >/= 160     ----      >24 Y     0 -  99   100-129  130-159   160-189      >/=190       VLDL   Date/Time Value Ref Range Status   05/22/2024 10:14 AM 13 0 - 40 mg/dL Final   09/20/2023 09:11 AM 25 0 - 40 mg/dL Final   08/22/2022 09:11 AM 17 0 - 40 mg/dL Final   08/17/2021 09:32 AM 19 0 - 40 mg/dL Final      Last I/O:  I/O last 3 completed shifts:  In: 300 (5.5 mL/kg) [I.V.:300 (5.5 mL/kg)]  Out: 5 (0.1 mL/kg) [Blood:5]  Weight: 54.6 kg     Past Cardiology Tests (Last 3 Years):  EKG:  Sinus tachycardia with no acute ST-T changes  Echo:  Transthoracic Echo (TTE) Complete 02/27/2025      Transthoracic Echo Complete 04/16/2024    Ejection Fractions:  EF   Date/Time Value Ref Range Status   02/27/2025 08:28 AM 58 %      Cath:  No results found for this or any previous visit from the past 1095 days.    Stress Test:  Nuclear Stress Test 04/16/2024    Cardiac Imaging:  No results found for this or any previous visit from the past 1095 days.      Past Medical History:  She has a past medical history of Atrial flutter (Multi) (03/08/2023), Bilateral edema of lower extremity (03/08/2023), CAD (coronary artery disease) (03/08/2023), Cardiomyopathy (03/08/2023), Chest pressure (03/22/2024), Hypercholesterolemia (03/08/2023), Mitral regurgitation (03/08/2023), Other conditions influencing health status, Pulmonary hypertension (Multi) (03/08/2023), S/P CABG (coronary artery bypass graft) (03/08/2023), and S/P mitral valve replacement (03/08/2023).    Past Surgical History:  She has a past surgical history that includes Other surgical history (08/17/2021) and Other surgical history (08/06/2020).      Social History:  She reports that she has been smoking cigarettes. She has never used smokeless tobacco. She reports that she does not drink alcohol and does not use drugs.    Family History:  Family History   Problem Relation Name Age of Onset    Atrial fibrillation Father      Heart attack Father          Allergies:  Sulfa (sulfonamide antibiotics)    Inpatient Medications:  Scheduled medications    Medication Dose Route Frequency    aspirin  81 mg oral Daily    atorvastatin  80 mg oral Daily    budesonide  0.5 mg nebulization BID    buPROPion XL  150 mg oral q AM    carvedilol  25 mg oral BID    escitalopram  20 mg oral Daily    ipratropium-albuteroL  3 mL nebulization TID    pantoprazole  40 mg oral Daily    sennosides  1 tablet oral Nightly    vancomycin  250 mg oral 4x daily    warfarin  3 mg oral Daily     PRN medications   Medication    acetaminophen    alum-mag hydroxide-simeth    bisacodyl    bisacodyl    melatonin     Continuous Medications   Medication Dose Last Rate     Outpatient Medications:  Current Outpatient Medications   Medication Instructions    acetaminophen (TYLENOL) 650 mg, oral, Every 4 hours PRN    aspirin 81 mg EC tablet 1 tablet, Daily    atorvastatin (LIPITOR) 80 mg, oral, Daily    atorvastatin (LIPITOR) 80 mg, oral, Nightly    budesonide (PULMICORT) 0.5 mg, nebulization, 2 times daily RT, Rinse mouth with water after use to reduce aftertaste and incidence of candidiasis. Do not swallow.    buPROPion XL (WELLBUTRIN XL) 150 mg, oral, Every morning, Do not crush, chew, or split.    carvedilol (COREG) 25 mg, oral, 2 times daily    escitalopram (LEXAPRO) 20 mg, oral, Daily    insulin lispro 0-5 Units, subcutaneous, 3 times daily before meals, Take as directed per insulin instructions.    ipratropium-albuteroL (Duo-Neb) 0.5-2.5 mg/3 mL nebulizer solution 3 mL, nebulization, 3 times daily RT    melatonin 3 mg, oral, Nightly PRN    oxygen (O2) 4 L/min, inhalation, Every 12 hours    pantoprazole (PROTONIX) 40 mg, oral, Daily, Do not crush, chew, or split.    vancomycin (VANCOCIN) 250 mg, oral, 4 times daily    warfarin (Coumadin) 3 mg tablet Take as directed per After Visit Summary.       Physical Exam:  PHYSICAL EXAM:    Constitutional/General:  Alert and oriented x3, well appearing, nontoxic, and in NAD  Neck:  No increased JVP,no carotid bruits.  Respiratory:  Lungs clear to auscultation  bilaterally, no wheezes, rales, or rhonchi, not in respiratory distress  Cardiovascular:  Regular rate, regular rhythm, no murmurs, gallops, or rubs,.  Mechanical S1 is noted.  PMI nondisplaced, 2+ distal pulses  Chest:  No chest wall tenderness  GI:  Abdomen soft, nontender, nondistended, + BS, no organomegaly, no palpable masses, no rebound, guarding, or rigidity  Musculoskeletal:  Moves all extremities x4  Extremities: Left arm is wrapped  Integument: Skin warm and dry, no rashes  Neurologic:  No focal deficits  Psychiatric:  Normal affect    Vital Signs, Nursing Notes, Allergies, and Medications reviewed by me.     Assessment/Plan   Kayley Lynch is a 54 y.o. female with history of 10/2020 mechanical mitral valve replacement with a #25 Kevin  valve, 10/2020 coronary bypass grafting surgery x 3, paroxysmal A-fib/flutter in the postop setting, hypertension being seen for tachycardia.  1.  Tachycardia-review of the EKG showed a mild sinus tachycardia after her procedure.  Suspect this was related to postop changes.  She is in normal sinus rhythm on exam today.  2.  Coronary disease-patient with remote coronary bypass grafting surgery.  No current angina.  Continue aspirin and statin and beta-blocker  3.  Mechanical mitral valve in place-INR is subtherapeutic at 1.8.  Patient did have warfarin held for her wound debridement.  Warfarin has been resumed at her standard dose.  Follow-up with the Coumadin clinic in 5 to 7 days for evaluation of INR.  Also continue daily low-dose aspirin.  Note her INR was therapeutic at 3 on 3/24/2025    Okay for discharge from a cardiac standpoint-will follow-up with Dr. Lomeli as scheduled       Code Status:  Full Code            Yonatan Patel DO

## 2025-03-26 NOTE — CARE PLAN
Patient met 9/9 LTG from initial eval.  Discharge home with family  Problem: OT Problem  Goal: LTG - Patient will complete grooming independently.  Outcome: Met  Goal: LTG - Patient will complete toileting with mod I.  Outcome: Met  Goal: LTG - Patient will complete bathing with sba.  Outcome: Met  Goal: LTG - Patient will complete UE dressing independently.   Outcome: Met  Goal: LTG - Patient will complete LE dressing using adaptive equip as needed with mod I.  Outcome: Met  Goal: LTG - Patient will complete commode transfer via device as needed with mod I.  Outcome: Met  Goal: LTG - Patient will complete tub/shower transfer using DME as needed with sba.  Outcome: Met  Goal: LTG - Patient will complete simple functional mobility via device as needed with mod I.  Outcome: Met  Goal: LTG - Patient will complete challenging functional mobility or simulated homemaking task with sba via device as needed.  Outcome: Met  Goal: STG - Patient will complete toileting with cga.  Outcome: Met  Goal: STG - Patient will complete bathing with min assist.  Outcome: Met  Goal: STG - Patient will complete LE dressing using adaptive equip as needed with min assist.  Outcome: Met  Goal: STG - Patient will complete commode transfer via device as needed with cga.  Outcome: Met  Goal: STG - Patient will complete tub/shower transfer using DME as needed with min assist.  Outcome: Met  Goal: STG - Patient will complete simple functional mobility via device as needed with min assist.  Outcome: Met

## 2025-03-27 ENCOUNTER — APPOINTMENT (OUTPATIENT)
Dept: PHARMACY | Facility: CLINIC | Age: 55
End: 2025-03-27
Payer: COMMERCIAL

## 2025-03-27 ENCOUNTER — TELEPHONE (OUTPATIENT)
Dept: CARDIOLOGY | Facility: CLINIC | Age: 55
End: 2025-03-27
Payer: COMMERCIAL

## 2025-03-27 LAB
ATRIAL RATE: 104 BPM
BACTERIA SPEC CULT: NORMAL
GRAM STN SPEC: NORMAL
GRAM STN SPEC: NORMAL
P AXIS: 66 DEGREES
P OFFSET: 150 MS
P ONSET: 102 MS
PR INTERVAL: 232 MS
Q ONSET: 218 MS
QRS COUNT: 17 BEATS
QRS DURATION: 86 MS
QT INTERVAL: 330 MS
QTC CALCULATION(BAZETT): 433 MS
QTC FREDERICIA: 396 MS
R AXIS: 72 DEGREES
T AXIS: 31 DEGREES
T OFFSET: 383 MS
VENTRICULAR RATE: 104 BPM

## 2025-03-27 PROCEDURE — 93005 ELECTROCARDIOGRAM TRACING: CPT

## 2025-03-27 NOTE — CARE PLAN
Pt met 4/4 LTGs established at initial eval. Discharged home at mod I walker level with recommendation for intermittent assistance with higher level IADLs. Follow up with [facility] PT.    Problem: PT Problem  Goal: LTG - Pt will perform bed mobility with mod I   Outcome: Met  Goal: LTG - Pt will perform transfers with mod I.   Outcome: Met  Goal: LTG - Pt will amb 50 feet with FWW and mod I, and 150' with FWW and SBA.  Outcome: Met  Goal: LTG - Pt will up/down 2 stairs with B HR and CGA.   Outcome: Met

## 2025-03-27 NOTE — TELEPHONE ENCOUNTER
Patient states she was recently dc'd from hospital and is unsure if she should continue Lisinopril- not on discharge summary.    Through chart review it appears patient admitted on 2/26 then had mult transfers back and forth from acute hospital to inpatient rehab.  On 3/18 noted:    lisinopril 20 mg tablet [[id number]]  DISCONTINUED    Order Details  Dose: 20 mg Route: oral Frequency: Daily   Dispense Quantity: 90 tablet Refills: 3          Sig: Take 1 tablet (20 mg) by mouth once daily.         Start Date: 11/25/24 End Date: 03/18/25   Discontinued by: Gabriel Garcia PA-C on 3/18/2025 20:48   Reason: Stop Taking at Discharge     Patient instructed on above- Lisinopril dc'd at discharge. Instructed to monitor bp's at home and to contact office if >150/90. Confirmed follow up appt on 4/8 with Dr. Lomeli.

## 2025-03-28 ENCOUNTER — PATIENT OUTREACH (OUTPATIENT)
Dept: PRIMARY CARE | Facility: CLINIC | Age: 55
End: 2025-03-28
Payer: COMMERCIAL

## 2025-03-28 NOTE — PROGRESS NOTES
Discharge Facility:[facility]  Discharge Diagnosis:  Atrial Flutter  S/P Mitral valve replacement  Physical Debility  Nontraumatic compartment syndrome LUE    Admission Date:3/18/2025  Discharge Date: 3/26/2025    PCP Appointment Date:[facility]  Specialist Appointment Date:   4/8/2025 Cardio/[facility]    Hospital Encounter and Summary Linked: Yes  Admission (Discharged) with Taylor Alvarez MD (03/18/2025)   See discharge assessment below for further details  *Two attempts were made to reach patient within two business days after discharge. Voicemail left with contact information for patient to call back with any non-emergent questions or concerns.

## 2025-03-31 ENCOUNTER — ANTICOAGULATION - WARFARIN VISIT (OUTPATIENT)
Dept: PHARMACY | Facility: CLINIC | Age: 55
End: 2025-03-31
Payer: COMMERCIAL

## 2025-03-31 DIAGNOSIS — Z95.2 S/P MITRAL VALVE REPLACEMENT: Primary | Chronic | ICD-10-CM

## 2025-03-31 LAB
POC INR: 2
POC PROTHROMBIN TIME: NORMAL

## 2025-03-31 PROCEDURE — 99212 OFFICE O/P EST SF 10 MIN: CPT | Performed by: PHARMACIST

## 2025-03-31 PROCEDURE — 85610 PROTHROMBIN TIME: CPT | Mod: QW | Performed by: PHARMACIST

## 2025-03-31 NOTE — PROGRESS NOTES
Pt has been hospitalized for about 30 days - pt had the flu/PNA, diarrhea,   Went over medlist.  Updated.  Inr = 2  Pt has been taking warfarin 3 mg every day since discharge (last wed).  Pt has a good appetite - eating well  Pt had a GI bleed in the hospital and then her iv extravasated and it needed to be incised and drained.  She now has 85 stitches and will have stitches removed soon.    Pt has stopped smoking about 36 days now.   has quit smoking as well  Pt has also lost weight also - maybe 15 lbs  Boost 6 mg today (Mon)  and continue the 3 mg all other days.  Pt denies unusual bleed and bruise.  Back in 1 week

## 2025-04-01 ENCOUNTER — APPOINTMENT (OUTPATIENT)
Dept: CARDIOLOGY | Facility: CLINIC | Age: 55
End: 2025-04-01
Payer: COMMERCIAL

## 2025-04-01 LAB
ATRIAL RATE: 104 BPM
P AXIS: 66 DEGREES
P OFFSET: 150 MS
P ONSET: 102 MS
PR INTERVAL: 232 MS
Q ONSET: 218 MS
QRS COUNT: 17 BEATS
QRS DURATION: 86 MS
QT INTERVAL: 330 MS
QTC CALCULATION(BAZETT): 433 MS
QTC FREDERICIA: 396 MS
R AXIS: 72 DEGREES
T AXIS: 31 DEGREES
T OFFSET: 383 MS
VENTRICULAR RATE: 104 BPM

## 2025-04-07 ENCOUNTER — ANTICOAGULATION - WARFARIN VISIT (OUTPATIENT)
Dept: PHARMACY | Facility: CLINIC | Age: 55
End: 2025-04-07
Payer: COMMERCIAL

## 2025-04-07 DIAGNOSIS — I27.20 PULMONARY HYPERTENSION (MULTI): Chronic | ICD-10-CM

## 2025-04-07 DIAGNOSIS — M79.A12 NONTRAUMATIC COMPARTMENT SYNDROME OF LEFT UPPER EXTREMITY: ICD-10-CM

## 2025-04-07 DIAGNOSIS — Z95.2 S/P MITRAL VALVE REPLACEMENT: Chronic | ICD-10-CM

## 2025-04-07 DIAGNOSIS — I48.4 ATYPICAL ATRIAL FLUTTER: Primary | Chronic | ICD-10-CM

## 2025-04-07 LAB
POC INR: 2.3
POC PROTHROMBIN TIME: NORMAL

## 2025-04-07 PROCEDURE — 85610 PROTHROMBIN TIME: CPT | Mod: QW | Performed by: PHARMACIST

## 2025-04-07 PROCEDURE — 99212 OFFICE O/P EST SF 10 MIN: CPT | Performed by: PHARMACIST

## 2025-04-07 RX ORDER — WARFARIN 3 MG/1
TABLET ORAL
Qty: 90 TABLET | Refills: 1 | Status: SHIPPED | OUTPATIENT
Start: 2025-04-07 | End: 2025-04-07

## 2025-04-07 RX ORDER — WARFARIN 3 MG/1
TABLET ORAL
Qty: 90 TABLET | Refills: 1 | Status: SHIPPED | OUTPATIENT
Start: 2025-04-07 | End: 2025-04-07 | Stop reason: SDUPTHER

## 2025-04-07 RX ORDER — WARFARIN 3 MG/1
TABLET ORAL
Qty: 90 TABLET | Refills: 1 | Status: SHIPPED | OUTPATIENT
Start: 2025-04-07

## 2025-04-07 NOTE — TELEPHONE ENCOUNTER
Requested Prescriptions     Pending Prescriptions Disp Refills    warfarin (Coumadin) 3 mg tablet 90 tablet 1     Sig: Take as directed per After Visit Summary.

## 2025-04-07 NOTE — PROGRESS NOTES
Coumadin Clinic Visit Note    Patient presents today for anticoagulation monitoring and adjustment.  Patient verified warfarin dosing schedule  Patient denies missing any doses  Patient denies unusual bruising or bleeding  Patient denies changes to medications, alcohol or tobacco use.  Consistent dietary green intake  There are no anticipated procedures at this time  INR sub therapeutic today at 2.3  Booster today 4mg  Pt is on 3mg daily post her hospital discharge.  Next appointment 3 weeks      Sheila Pandey, PharmD

## 2025-04-08 ENCOUNTER — OFFICE VISIT (OUTPATIENT)
Dept: CARDIOLOGY | Facility: CLINIC | Age: 55
End: 2025-04-08
Payer: COMMERCIAL

## 2025-04-08 VITALS — BODY MASS INDEX: 21.97 KG/M2 | OXYGEN SATURATION: 96 % | WEIGHT: 124 LBS | HEART RATE: 96 BPM

## 2025-04-08 DIAGNOSIS — I48.4 ATYPICAL ATRIAL FLUTTER: Chronic | ICD-10-CM

## 2025-04-08 DIAGNOSIS — Z95.2 S/P MITRAL VALVE REPLACEMENT: Chronic | ICD-10-CM

## 2025-04-08 DIAGNOSIS — T79.A12D COMPARTMENT SYNDROME OF LEFT UPPER EXTREMITY, SUBSEQUENT ENCOUNTER: ICD-10-CM

## 2025-04-08 DIAGNOSIS — S70.12XD HEMATOMA OF LEFT ILIOPSOAS MUSCLE, SUBSEQUENT ENCOUNTER: Chronic | ICD-10-CM

## 2025-04-08 DIAGNOSIS — Z09 HOSPITAL DISCHARGE FOLLOW-UP: Primary | ICD-10-CM

## 2025-04-08 PROBLEM — T79.A12A COMPARTMENT SYNDROME OF LEFT UPPER EXTREMITY: Chronic | Status: ACTIVE | Noted: 2025-03-20

## 2025-04-08 PROBLEM — T79.A12A COMPARTMENT SYNDROME OF LEFT UPPER EXTREMITY: Status: ACTIVE | Noted: 2025-03-20

## 2025-04-08 PROBLEM — S70.12XA HEMATOMA OF LEFT ILIOPSOAS MUSCLE: Chronic | Status: ACTIVE | Noted: 2025-04-08

## 2025-04-08 PROCEDURE — 99215 OFFICE O/P EST HI 40 MIN: CPT | Performed by: INTERNAL MEDICINE

## 2025-04-08 RX ORDER — CARVEDILOL 25 MG/1
25 TABLET ORAL 2 TIMES DAILY
Qty: 180 TABLET | Refills: 3 | Status: SHIPPED | OUTPATIENT
Start: 2025-04-08 | End: 2026-04-08

## 2025-04-08 RX ORDER — ACETAMINOPHEN AND CODEINE PHOSPHATE 300; 30 MG/1; MG/1
TABLET ORAL
COMMUNITY
Start: 2025-04-08

## 2025-04-08 NOTE — PROGRESS NOTES
Referred by Armani HERNADEZ    I am seeing Kayley in hospital follow-up.  Admitted March 2025 influenza and pneumonia.  Developed hypotension found to have a hematoma spontaneously of the left arm developing compartment syndrome.  She also developed a spontaneous iliopsoas hematoma.  Coumadin stopped.  Treated for complete Valentine syndrome.  Had episodes of atrial fibrillation.  Coumadin restarted. She now feels terrific.    Past Medical History:  Problem List Items Addressed This Visit    None     Past Medical History:   Diagnosis Date    Atrial flutter (Multi) 03/08/2023    s/p flutter ablation October 2020    Bilateral edema of lower extremity 03/08/2023    New July 2020 ... Better on Lasix    CAD (coronary artery disease) 03/08/2023    50% LAD, 60% diag, T.O. Cx, subtotal RCA, elevated RH pressure  CABG 10/2020: LIMA to LAD, FRA to OM, SVG to RCA      Cardiomyopathy 03/08/2023    EF 35-45%, now 50%    Chest pressure 03/22/2024    Hypercholesterolemia 03/08/2023    Dr. Lomeli follows    Mitral regurgitation 03/08/2023    MVT, mild MS, severe MR   s/p MVR = 25/33 [place] mechanical valve     Other conditions influencing health status     Patient denies significant medical history    Pulmonary hypertension (Multi) 03/08/2023    Mod to severe    S/P CABG (coronary artery bypass graft) 03/08/2023    10/2020: LIMA to LAD, FRA to OM, SVG to RCA      S/P mitral valve replacement 03/08/2023    MVT, Mild MS, severe MR ... s/p MVR 25/33 [employer] Mechanical valve      Past Surgical History:  She has a past surgical history that includes Other surgical history (08/17/2021) and Other surgical history (08/06/2020).      Social History:  She reports that she has been smoking cigarettes. She has never used smokeless tobacco. She reports that she does not drink alcohol and does not use drugs.    Family History:  Family History   Problem Relation Name Age of Onset    Atrial fibrillation Father      Heart attack Father       Allergies:  Sulfa  "(sulfonamide antibiotics)    Outpatient Medications:  Current Outpatient Medications   Medication Instructions    aspirin 81 mg EC tablet 1 tablet, Daily    atorvastatin (LIPITOR) 80 mg, oral, Nightly    buPROPion XL (WELLBUTRIN XL) 150 mg, oral, Every morning, Do not crush, chew, or split.    carvedilol (COREG) 25 mg, oral, 2 times daily    escitalopram (LEXAPRO) 20 mg, oral, Daily    pantoprazole (PROTONIX) 40 mg, oral, Daily, Do not crush, chew, or split.    warfarin (Coumadin) 3 mg tablet take AS DIRECTED per after visit summary     Last Recorded Vitals:  There were no vitals filed for this visit.    Physical Exam  Patient is alert and oriented x3.  HEENT is unremarkable mucous members are moist  Neck no JVP no bruits upstrokes are full no thyromegaly  Lungs are reduced.  Heart regular rhythm normal S1-S2 there is no S3 no murmurs are heard.  I do not hear crisp mitral valve click  Abdomen is soft bs are positive nontender nondistended no organomegaly no pulsatile masses  Extremities have no edema.  Distal pulses present palpable.  Neuro is grossly nonfocal  Skin has no rashes     Last Labs:  CBC -  Lab Results   Component Value Date    WBC 6.2 03/19/2025    HGB 10.4 (L) 03/19/2025    HCT 34.2 (L) 03/19/2025    MCV 95 03/19/2025     03/19/2025     CMP -  Lab Results   Component Value Date    CALCIUM 9.1 03/19/2025    PHOS 3.7 03/03/2025    PROT 6.1 (L) 03/18/2025    ALBUMIN 3.1 (L) 03/18/2025    AST 15 03/18/2025    ALT 19 03/18/2025    ALKPHOS 63 03/18/2025    BILITOT 1.5 (H) 03/18/2025     LIPID PANEL -   Lab Results   Component Value Date    CHOL 113 05/22/2024    HDL 33.8 05/22/2024    CHHDL 3.3 05/22/2024    VLDL 13 05/22/2024    TRIG 66 05/22/2024    NHDL 79 05/22/2024     RENAL FUNCTION PANEL -   Lab Results   Component Value Date    K 4.1 03/19/2025    PHOS 3.7 03/03/2025     No results found for: \"BNP\", \"HGBA1C\"       Procedure    Echo 2/27/2025 EF 55%, well-seated mechanical mitral valve mean " gradient 5 mmHg    EXT 4/16/24 below avg, chrono incomp, severe SOB, NL perfusion EF 51%    TTE 3/22/24 EF 50-55%, PWHK, Mech MVR with mean gradient 3mmHg    ECHO [09/21/2022]: Est EF 50%. Mild-mod hypok of posterolateral walls. Well seated but poorly vis'd mechanical MVR w/acceptable mean gradient of 4 mmHg.     ECHO [01/05/2021]: Est EF 40%. Well-seated mech MVR prosthesis w/mild transvalvular regurg and slightly elevated mean transmitral gradient of 10 mmHg. Global hypok.      ECHO [10/19/2020]: EF 40%. Poorly vis'd anatomical structures (subopt image quality). Abnormal septal motion c/w postop status. Pt appears to be in A-flutter w/some variation in block which may affect est of LVF and transvalvular flows. Global hypok.      CATH [09/17/2020, Dr. Brittney Lomeli]: Elevated right heart filling pressures moderately. Normal cardiac output. Severe two-vessel coronary disease with a totally occluded circumflex filling via left to left collaterals, and a totally occluded right coronary artery in the midportion. Moderate disease in the mid LAD which is a 40 to 50% stenosis. RECOMMENDATIONS: The patient is now a candidate for surgical correction of her mild to moderate mitral stenosis with severe mitral regurgitation. Revascularization of the circumflex will be required. Possible revascularization of the right coronary artery if it is deemed to be a suitable candidate intraoperatively.      EX NST [08/18/2020]: 4 min 38 sec (6.5 METs) = Abnormal â€“ ev/for ischemia involving the lateral wall, EF 37%     CT / SCORING [08/14/2020] = 150 (LM 0.6, LAD 32.2, LCx 97.8, RCA 19.5)     Assessment/Plan   1. Severe mitral regurgitation status post mitral valve replacement with a #25/33 On-X mechanical mitral valve October 2020.  Echo 2/27/2025 revealed normal LV function normal functioning mechanical mitral valve mean gradient 5 mmHg.  I personally reviewed this echo image on exam I still do not hear sharp click.  There is no evidence  of valve dysfunction.  Coumadin and aspirin held because of a spontaneous left arm hematoma resulting in compartment syndrome needing surgery and also a left iliopsoas hematoma.  This was with a supratherapeutic INR when she was admitted and treated with pneumonia and influenza.    2. CAD. She had three-vessel bypass in 2020 with a LIMA to the LAD free radial artery graft to the marginal and SVG to the RCA. Continue aspirin.  And carvedilol.  Chest pain have resolved. TST below avg cap, chrono incompetence, NL perfusion EF 51%.  I still suspect a lot of her symptoms are due to COPD.  She is still smoking but less.     3. Atrial flutter. She had an ablation in October 2020. On exam she is regular.  She had paroxysms of atrial fibrillation in the hospital March 2025.  Already on Coumadin for her valve     4. Cardiomyopathy. Postoperatively her ejection fraction was down to 40% but on her echo in September 2022 her ejection fraction was 50%.  Remains low NL on echo and TST March 2024     5. Hyperlipidemia. 5/22/24 LDL 66, HDL 34, Trig 66.  These results are excellent     6. Hypertension. Blood pressures are currently controlled.     7. Tobacco abuse.  She remains smoke free for 1.5 months    8.  Supratherapeutic INR when admitted to the hospital with pneumonia and influenza.  This resulted in a spontaneous left iliopsoas hematoma but also a left arm hematoma resulting in compartment syndrome requiring decompressive surgery.    I've reviewed hospital records and reviewed the echo images.   Brittney Lomeli MD     Instructions and follow up

## 2025-04-08 NOTE — PATIENT INSTRUCTIONS
1. Severe mitral regurgitation status post mitral valve replacement with a #25/33 On-X mechanical mitral valve October 2020.  Echo 2/27/2025 revealed normal LV function normal functioning mechanical mitral valve mean gradient 5 mmHg.  I personally reviewed this echo image on exam I still do not hear sharp click.  There is no evidence of valve dysfunction.  Coumadin and aspirin held because of a spontaneous left arm hematoma resulting in compartment syndrome needing surgery and also a left iliopsoas hematoma.  This was with a supratherapeutic INR when she was admitted and treated with pneumonia and influenza.    2. CAD. She had three-vessel bypass in 2020 with a LIMA to the LAD free radial artery graft to the marginal and SVG to the RCA. Continue aspirin.  And carvedilol.  Chest pain have resolved. TST below avg cap, chrono incompetence, NL perfusion EF 51%.  I still suspect a lot of her symptoms are due to COPD.  She is still smoking but less.     3. Atrial flutter. She had an ablation in October 2020. On exam she is regular.  She had paroxysms of atrial fibrillation in the hospital March 2025.  Already on Coumadin for her valve     4. Cardiomyopathy. Postoperatively her ejection fraction was down to 40% but on her echo in September 2022 her ejection fraction was 50%.  Remains low NL on echo and TST March 2024     5. Hyperlipidemia. 5/22/24 LDL 66, HDL 34, Trig 66.  These results are excellent     6. Hypertension. Blood pressures are currently controlled.     7. Tobacco abuse.  She remains smoke free for 1.5 months    8.  Supratherapeutic INR when admitted to the hospital with pneumonia and influenza.  This resulted in a spontaneous left iliopsoas hematoma but also a left arm hematoma resulting in compartment syndrome requiring decompressive surgery.    I've reviewed hospital records and reviewed the echo images.

## 2025-04-11 ENCOUNTER — OFFICE VISIT (OUTPATIENT)
Dept: PRIMARY CARE | Facility: CLINIC | Age: 55
End: 2025-04-11
Payer: COMMERCIAL

## 2025-04-11 ENCOUNTER — PATIENT OUTREACH (OUTPATIENT)
Dept: PRIMARY CARE | Facility: CLINIC | Age: 55
End: 2025-04-11

## 2025-04-11 VITALS
HEART RATE: 83 BPM | OXYGEN SATURATION: 94 % | SYSTOLIC BLOOD PRESSURE: 120 MMHG | BODY MASS INDEX: 22.63 KG/M2 | DIASTOLIC BLOOD PRESSURE: 72 MMHG | TEMPERATURE: 97.3 F | HEIGHT: 62 IN | WEIGHT: 123 LBS

## 2025-04-11 DIAGNOSIS — I25.5 ISCHEMIC CARDIOMYOPATHY: Chronic | ICD-10-CM

## 2025-04-11 DIAGNOSIS — Z09 HOSPITAL DISCHARGE FOLLOW-UP: Primary | ICD-10-CM

## 2025-04-11 DIAGNOSIS — F51.01 PRIMARY INSOMNIA: ICD-10-CM

## 2025-04-11 DIAGNOSIS — I25.10 CORONARY ARTERY DISEASE INVOLVING NATIVE HEART WITHOUT ANGINA PECTORIS, UNSPECIFIED VESSEL OR LESION TYPE: Chronic | ICD-10-CM

## 2025-04-11 DIAGNOSIS — T79.A12D COMPARTMENT SYNDROME OF LEFT UPPER EXTREMITY, SUBSEQUENT ENCOUNTER: Chronic | ICD-10-CM

## 2025-04-11 DIAGNOSIS — F32.0 CURRENT MILD EPISODE OF MAJOR DEPRESSIVE DISORDER WITHOUT PRIOR EPISODE (CMS-HCC): ICD-10-CM

## 2025-04-11 DIAGNOSIS — I48.4 ATYPICAL ATRIAL FLUTTER: Chronic | ICD-10-CM

## 2025-04-11 DIAGNOSIS — E78.00 HYPERCHOLESTEROLEMIA: Chronic | ICD-10-CM

## 2025-04-11 DIAGNOSIS — J09.X1 INFLUENZA A WITH PNEUMONIA: ICD-10-CM

## 2025-04-11 DIAGNOSIS — I34.0 MITRAL VALVE INSUFFICIENCY, UNSPECIFIED ETIOLOGY: Chronic | ICD-10-CM

## 2025-04-11 DIAGNOSIS — I27.20 PULMONARY HYPERTENSION (MULTI): Chronic | ICD-10-CM

## 2025-04-11 PROCEDURE — 99215 OFFICE O/P EST HI 40 MIN: CPT | Performed by: FAMILY MEDICINE

## 2025-04-11 PROCEDURE — 3008F BODY MASS INDEX DOCD: CPT | Performed by: FAMILY MEDICINE

## 2025-04-11 RX ORDER — ESCITALOPRAM OXALATE 20 MG/1
20 TABLET ORAL DAILY
Qty: 90 TABLET | Refills: 3 | Status: SHIPPED | OUTPATIENT
Start: 2025-04-11 | End: 2026-04-11

## 2025-04-11 RX ORDER — TRAZODONE HYDROCHLORIDE 50 MG/1
50 TABLET ORAL NIGHTLY PRN
Qty: 30 TABLET | Refills: 5 | Status: SHIPPED | OUTPATIENT
Start: 2025-04-11 | End: 2026-04-11

## 2025-04-11 RX ORDER — BUPROPION HYDROCHLORIDE 150 MG/1
150 TABLET ORAL EVERY MORNING
Qty: 90 TABLET | Refills: 3 | Status: SHIPPED | OUTPATIENT
Start: 2025-04-11 | End: 2026-04-11

## 2025-04-11 ASSESSMENT — PAIN SCALES - GENERAL: PAINLEVEL_OUTOF10: 0-NO PAIN

## 2025-04-11 ASSESSMENT — PATIENT HEALTH QUESTIONNAIRE - PHQ9
1. LITTLE INTEREST OR PLEASURE IN DOING THINGS: NOT AT ALL
SUM OF ALL RESPONSES TO PHQ9 QUESTIONS 1 AND 2: 0
2. FEELING DOWN, DEPRESSED OR HOPELESS: NOT AT ALL

## 2025-04-11 NOTE — PROGRESS NOTES
Subjective   Patient ID: Kayley Lynch is a 54 y.o. female who presents for Follow-up (Patient is here for hospital follow up. ).  HPI  54-year-old female for hospital follow-up visit for initial admission for influenza with subsequent pneumonia.  Also developed left upper extremity compartment syndrome due to IV infiltration and being on blood thinners.  She also had a lower abdominal hematoma.  Total time in the hospital was 30 days.  She has active with cardiology and also orthopedics.  She is feeling much better.  Had sutures out yesterday.  She has been having issues with insomnia since discharge from the hospital.  Review of Systems  Constitutional: no chills, no fever and no night sweats.   Eyes: no blurred vision and no eyesight problems.   ENT: no hearing loss, no nasal congestion, no nasal discharge, no hoarseness and no sore throat.   Cardiovascular: no chest pain, no intermittent leg claudication, no lower extremity edema, no palpitations and no syncope.   Respiratory: no cough, no shortness of breath during exertion, no shortness of breath at rest and no wheezing.   Gastrointestinal: no abdominal pain, no blood in stools, no constipation, no diarrhea, no melena, no nausea, no rectal pain and no vomiting.   Genitourinary: no dysuria, no change in urinary frequency, no urinary hesitancy and no feelings of urinary urgency.   Neurological: no difficulty walking, no headache, no limb weakness, no numbness and no tingling.   Psychiatric: no anxiety, no depression, no anhedonia and no substance use disorders.  Difficulty sleeping.  Endocrine: no recent weight gain and no recent weight loss.   Hematologic/Lymphatic: no tendency for easy bruising and no swollen glands.  Visit Vitals  /72 (BP Location: Left arm, Patient Position: Sitting, BP Cuff Size: Adult)   Pulse 83   Temp 36.3 °C (97.3 °F) (Temporal)        Objective   Physical Exam  Constitutional: Alert and in no acute distress. Well developed, well  nourished.   Eyes: Pupils were equal in size, round, reactive to light (PERRL) with normal accommodation and extraocular movements intact (EOMI). Ophthalmoscopic examination: Normal.   Ears, Nose, Mouth, and Throat: External inspection of ears and nose: Normal. Otoscopic examination: Normal. Lips, teeth, and gums: Normal. Oropharynx: Normal.   Neck: No neck mass was observed. Supple. Thyroid not enlarged and there were no palpable thyroid nodules.   Cardiovascular: Heart rate and rhythm were normal, normal S1 and S2, no gallops, no murmurs and no pericardial rub. Carotid pulses: Normal with no bruits. Abdominal aorta: Normal. Pedal pulses: Normal. No peripheral edema.   Pulmonary: No respiratory distress. Clear bilateral breath sounds.   Abdomen: Soft nontender; no abdominal mass palpated. Normal bowel sounds. No organomegaly.   Skin: Normal skin color and pigmentation, normal skin turgor, and no rash.   Psychiatric: Judgment and insight: Intact. Mood and affect: Normal.  Assessment/Plan   Problem List Items Addressed This Visit             ICD-10-CM    Atrial flutter (Multi) (Chronic) I48.92    CAD (coronary artery disease) (Chronic) I25.10    Cardiomyopathy (Chronic) I42.9    Hypercholesterolemia (Chronic) E78.00    Mitral regurgitation (Chronic) I34.0    Pulmonary hypertension (Multi) (Chronic) I27.20    Current mild episode of major depressive disorder without prior episode (CMS-HCC) F32.0    Relevant Medications    escitalopram (Lexapro) 20 mg tablet    buPROPion XL (Wellbutrin XL) 150 mg 24 hr tablet    Influenza A with pneumonia J09.X1    Compartment syndrome of left upper extremity (Chronic) T79.A12A    Hospital discharge follow-up - Primary Z09    Primary insomnia F51.01    Relevant Medications    traZODone (Desyrel) 50 mg tablet

## 2025-05-02 ENCOUNTER — ANTICOAGULATION - WARFARIN VISIT (OUTPATIENT)
Dept: PHARMACY | Facility: CLINIC | Age: 55
End: 2025-05-02
Payer: COMMERCIAL

## 2025-05-02 DIAGNOSIS — Z95.2 S/P MITRAL VALVE REPLACEMENT: Primary | Chronic | ICD-10-CM

## 2025-05-02 LAB
POC INR: 1.9 (ref 0.9–1.1)
POC PROTHROMBIN TIME: ABNORMAL (ref 9.3–12.5)

## 2025-05-02 PROCEDURE — 99212 OFFICE O/P EST SF 10 MIN: CPT | Performed by: PHARMACIST

## 2025-05-02 PROCEDURE — 85610 PROTHROMBIN TIME: CPT | Mod: QW | Performed by: PHARMACIST

## 2025-05-02 NOTE — PROGRESS NOTES
Verified current dose with pt.    No new meds or med changes since last visit.  Pt denies unusual bleed/bruise.  No upcoming procedures.  No missed doses  No dietary changes  Inr = 1.9 - pt may have had more greens than usual, but this was over a week ago  No black licorice/green tea/pistachios/liver  Increase weekly dose - starting today and check again in 2 weeks.

## 2025-05-16 ENCOUNTER — ANTICOAGULATION - WARFARIN VISIT (OUTPATIENT)
Dept: PHARMACY | Facility: CLINIC | Age: 55
End: 2025-05-16
Payer: COMMERCIAL

## 2025-05-16 DIAGNOSIS — Z95.2 S/P MITRAL VALVE REPLACEMENT: Primary | Chronic | ICD-10-CM

## 2025-05-16 LAB
POC INR: 1.7 (ref 0.9–1.1)
POC PROTHROMBIN TIME: ABNORMAL (ref 9.3–12.5)

## 2025-05-16 PROCEDURE — 99212 OFFICE O/P EST SF 10 MIN: CPT | Performed by: PHARMACIST

## 2025-05-16 PROCEDURE — 85610 PROTHROMBIN TIME: CPT | Mod: QW | Performed by: PHARMACIST

## 2025-05-16 NOTE — PROGRESS NOTES
Verified current dose with pt.    Pt is going to start amoxicillin - has dental abscess  No med changes since last visit.  Pt denies unusual bleed/bruise.  No upcoming procedures.  Inr = 1.7  Good appetite, no illness, no boost/ensure  Pt is consistent with her greens  No missed doses  Boost dose 6 mg today (Fri)  Increase weekly dose by about 12%  Unsure why inr won't come up  check again in 2 weeks.

## 2025-05-25 DIAGNOSIS — E78.00 HYPERCHOLESTEROLEMIA: Primary | Chronic | ICD-10-CM

## 2025-05-25 DIAGNOSIS — J09.X1 INFLUENZA A WITH PNEUMONIA: ICD-10-CM

## 2025-05-29 ENCOUNTER — TELEPHONE (OUTPATIENT)
Dept: CARDIOLOGY | Facility: CLINIC | Age: 55
End: 2025-05-29
Payer: COMMERCIAL

## 2025-05-29 RX ORDER — ATORVASTATIN CALCIUM 80 MG/1
TABLET, FILM COATED ORAL
Qty: 90 TABLET | Refills: 3 | Status: SHIPPED | OUTPATIENT
Start: 2025-05-29

## 2025-05-29 NOTE — TELEPHONE ENCOUNTER
Kayley is reaching out to get her Atorvastatin refilled. The pharmacy reached out but haven't gotten a response. She can be reached at:    [phone]    Her pharmacy is [facility]  [phone]  
Prescription request on 5/25 was not forwarded to Provider. Rx sent to Dr. Lomeli for signature.  
Pt w small endometrial polyp admitted for D&C, hysteroscopy and polypectomy.  Discussed risks of procedure including but not limited to risks of general anesthesia, bleeding, infection and uterine perforation.  All questions answered.  Consent signed.

## 2025-05-30 ENCOUNTER — ANTICOAGULATION - WARFARIN VISIT (OUTPATIENT)
Dept: PHARMACY | Facility: CLINIC | Age: 55
End: 2025-05-30
Payer: COMMERCIAL

## 2025-05-30 DIAGNOSIS — I48.4 ATYPICAL ATRIAL FLUTTER: Primary | Chronic | ICD-10-CM

## 2025-05-30 DIAGNOSIS — Z95.2 S/P MITRAL VALVE REPLACEMENT: Chronic | ICD-10-CM

## 2025-05-30 LAB
POC INR: 3.5 (ref 0.9–1.1)
POC PROTHROMBIN TIME: ABNORMAL (ref 9.3–12.5)

## 2025-05-30 PROCEDURE — 99212 OFFICE O/P EST SF 10 MIN: CPT

## 2025-05-30 PROCEDURE — 85610 PROTHROMBIN TIME: CPT | Mod: QW

## 2025-05-30 NOTE — PROGRESS NOTES
No bleeding or unusual bruising.  Medications and doses verified.  No scheduled procedures at this time.  INR=3.5   Plan: Weekly dose decreased by 5.6% (1/2 tablet) because dose was increased twice the last 2 visits and big jump from 1.7 to 3.5 in 2 weeks.  Follow up INR check in 2 weeks.

## 2025-06-13 ENCOUNTER — ANTICOAGULATION - WARFARIN VISIT (OUTPATIENT)
Dept: PHARMACY | Facility: CLINIC | Age: 55
End: 2025-06-13
Payer: COMMERCIAL

## 2025-06-13 DIAGNOSIS — Z95.2 S/P MITRAL VALVE REPLACEMENT: Chronic | ICD-10-CM

## 2025-06-13 DIAGNOSIS — I48.4 ATYPICAL ATRIAL FLUTTER: Primary | Chronic | ICD-10-CM

## 2025-06-13 LAB
POC INR: 2.7 (ref 0.9–1.1)
POC PROTHROMBIN TIME: ABNORMAL (ref 9.3–12.5)

## 2025-06-13 PROCEDURE — 99212 OFFICE O/P EST SF 10 MIN: CPT | Performed by: PHARMACIST

## 2025-06-13 PROCEDURE — 85610 PROTHROMBIN TIME: CPT | Mod: QW | Performed by: PHARMACIST

## 2025-06-13 NOTE — PROGRESS NOTES
Coumadin Clinic Visit Note    Patient presents today for anticoagulation monitoring and adjustment.  Patient verified warfarin dosing schedule  Patient denies missing any doses  Patient denies unusual bruising or bleeding  Patient denies changes to alcohol or tobacco use.  Pt will be on amoxicillin 500mg x 7 days. Did not start yet.  Consistent dietary green intake  There are no anticipated procedures at this time  INR Therapeutic today at 2.7  No changes to warfarin dose today    Next appointment 2 weeks      Sheila Pandey, PharmD

## 2025-06-30 ENCOUNTER — ANTICOAGULATION - WARFARIN VISIT (OUTPATIENT)
Dept: PHARMACY | Facility: CLINIC | Age: 55
End: 2025-06-30
Payer: COMMERCIAL

## 2025-06-30 DIAGNOSIS — Z95.2 S/P MITRAL VALVE REPLACEMENT: Primary | Chronic | ICD-10-CM

## 2025-06-30 LAB
POC INR: 3.6 (ref 0.9–1.1)
POC PROTHROMBIN TIME: ABNORMAL (ref 9.3–12.5)

## 2025-06-30 PROCEDURE — 85610 PROTHROMBIN TIME: CPT | Mod: QW | Performed by: PHARMACIST

## 2025-06-30 PROCEDURE — 99212 OFFICE O/P EST SF 10 MIN: CPT | Performed by: PHARMACIST

## 2025-06-30 NOTE — PROGRESS NOTES
Verified current dose with pt.    Pt is still on amoxicillin - has a couple pills left.  No med changes since last visit.  Pt denies unusual bleed/bruise.  No upcoming procedures.  No dietary changes - pt is eating a little more than usual  Inr = 3.6  Continue same dose and check again in 3 weeks.

## 2025-07-09 ENCOUNTER — OFFICE VISIT (OUTPATIENT)
Dept: CARDIOLOGY | Facility: CLINIC | Age: 55
End: 2025-07-09
Payer: COMMERCIAL

## 2025-07-09 VITALS
WEIGHT: 141 LBS | DIASTOLIC BLOOD PRESSURE: 82 MMHG | HEART RATE: 77 BPM | OXYGEN SATURATION: 96 % | SYSTOLIC BLOOD PRESSURE: 144 MMHG | BODY MASS INDEX: 25.79 KG/M2

## 2025-07-09 DIAGNOSIS — Z95.2 S/P MITRAL VALVE REPLACEMENT: Chronic | ICD-10-CM

## 2025-07-09 DIAGNOSIS — I25.10 CORONARY ARTERY DISEASE INVOLVING NATIVE HEART WITHOUT ANGINA PECTORIS, UNSPECIFIED VESSEL OR LESION TYPE: Chronic | ICD-10-CM

## 2025-07-09 DIAGNOSIS — I25.5 ISCHEMIC CARDIOMYOPATHY: Chronic | ICD-10-CM

## 2025-07-09 DIAGNOSIS — E78.00 HYPERCHOLESTEROLEMIA: Chronic | ICD-10-CM

## 2025-07-09 DIAGNOSIS — I48.4 ATYPICAL ATRIAL FLUTTER: Primary | Chronic | ICD-10-CM

## 2025-07-09 DIAGNOSIS — S70.12XD HEMATOMA OF LEFT ILIOPSOAS MUSCLE, SUBSEQUENT ENCOUNTER: Chronic | ICD-10-CM

## 2025-07-09 DIAGNOSIS — I27.20 PULMONARY HYPERTENSION (MULTI): Chronic | ICD-10-CM

## 2025-07-09 DIAGNOSIS — I34.0 NONRHEUMATIC MITRAL VALVE REGURGITATION: Chronic | ICD-10-CM

## 2025-07-09 PROBLEM — J09.X1 INFLUENZA A WITH PNEUMONIA: Status: RESOLVED | Noted: 2025-02-26 | Resolved: 2025-07-09

## 2025-07-09 PROBLEM — R53.81 PHYSICAL DEBILITY: Status: RESOLVED | Noted: 2025-03-18 | Resolved: 2025-07-09

## 2025-07-09 PROCEDURE — 99214 OFFICE O/P EST MOD 30 MIN: CPT | Performed by: INTERNAL MEDICINE

## 2025-07-09 PROCEDURE — 99212 OFFICE O/P EST SF 10 MIN: CPT | Performed by: INTERNAL MEDICINE

## 2025-07-09 RX ORDER — CARVEDILOL 25 MG/1
25 TABLET ORAL 2 TIMES DAILY
Qty: 180 TABLET | Refills: 3 | Status: SHIPPED | OUTPATIENT
Start: 2025-07-09 | End: 2026-07-09

## 2025-07-09 RX ORDER — ATORVASTATIN CALCIUM 80 MG/1
80 TABLET, FILM COATED ORAL DAILY
Qty: 90 TABLET | Refills: 3 | Status: SHIPPED | OUTPATIENT
Start: 2025-07-09 | End: 2026-07-09

## 2025-07-09 NOTE — PROGRESS NOTES
Referred by Armani Zaldivar for a 3-month follow-up.  She is feeling fine.  No issues.  No chest pain or pressure no shortness of breath.  Her left arm is healed quite well from the compartment syndrome that she had.  Her INRs have been reasonable.  In late May she was 3.5.  June 13 she was up to 2.7 which was perfect.  Her most recent 1 was up to 3.6.  She is back on amoxicillin for tooth infection.  She is stopping that today.  Past Medical History:  Problem List Items Addressed This Visit    None     Past Medical History:   Diagnosis Date    Atrial flutter (Multi) 03/08/2023    s/p flutter ablation October 2020    Bilateral edema of lower extremity 03/08/2023    New July 2020 ... Better on Lasix    CAD (coronary artery disease) 03/08/2023    50% LAD, 60% diag, T.O. Cx, subtotal RCA, elevated RH pressure  CABG 10/2020: LIMA to LAD, FRA to OM, SVG to RCA      Cardiomyopathy 03/08/2023    EF 35-45%, now 50%    Chest pressure 03/22/2024    Compartment syndrome of left upper extremity 03/20/2025    Hematoma of left iliopsoas muscle 04/08/2025    Spontaneous with an INR greater than 10      Hypercholesterolemia 03/08/2023    Dr. Lomeli follows    Mitral regurgitation 03/08/2023    MVT, mild MS, severe MR   s/p MVR = 25/33 [place] mechanical valve     Other conditions influencing health status     Patient denies significant medical history    Pulmonary hypertension (Multi) 03/08/2023    Mod to severe    S/P CABG (coronary artery bypass graft) 03/08/2023    10/2020: LIMA to LAD, FRA to OM, SVG to RCA      S/P mitral valve replacement 03/08/2023    MVT, Mild MS, severe MR ... s/p MVR 25/33 [place] Mechanical valve      Past Surgical History:  She has a past surgical history that includes Other surgical history (08/17/2021) and Other surgical history (08/06/2020).      Social History:  She reports that she has quit smoking. Her smoking use included cigarettes. She has quit using smokeless tobacco. She reports that she does not  drink alcohol and does not use drugs.    Family History:  Family History   Problem Relation Name Age of Onset    Atrial fibrillation Father      Heart attack Father       Allergies:  Sulfa (sulfonamide antibiotics)    Outpatient Medications:  Current Outpatient Medications   Medication Instructions    acetaminophen-codeine (Tylenol w/ Codeine #3) 300-30 mg tablet     aspirin 81 mg EC tablet 1 tablet, Daily    atorvastatin (Lipitor) 80 mg tablet TAKE 1 TABLET (80mg) BY MOUTH ONCE DAILY    buPROPion XL (WELLBUTRIN XL) 150 mg, oral, Every morning, Do not crush, chew, or split.    carvedilol (COREG) 25 mg, oral, 2 times daily    escitalopram (LEXAPRO) 20 mg, oral, Daily    pantoprazole (PROTONIX) 40 mg, oral, Daily, Do not crush, chew, or split.    traZODone (DESYREL) 50 mg, oral, Nightly PRN    warfarin (Coumadin) 3 mg tablet take AS DIRECTED per after visit summary     Last Recorded Vitals:  There were no vitals filed for this visit.    Physical Exam  Patient is alert and oriented x3.  HEENT is unremarkable mucous members are moist  Neck no JVP no bruits upstrokes are full no thyromegaly  Lungs are reduced.  Heart regular rhythm normal S1-S2 there is no S3 no murmurs are heard. crisp mitral valve click  Abdomen is soft bs are positive nontender nondistended no organomegaly no pulsatile masses  Extremities have no edema.  Distal pulses present palpable.  Neuro is grossly nonfocal  Skin has no rashes     Last Labs:  CBC -  Lab Results   Component Value Date    WBC 6.2 03/19/2025    HGB 10.4 (L) 03/19/2025    HCT 34.2 (L) 03/19/2025    MCV 95 03/19/2025     03/19/2025     CMP -  Lab Results   Component Value Date    CALCIUM 9.1 03/19/2025    PHOS 3.7 03/03/2025    PROT 6.1 (L) 03/18/2025    ALBUMIN 3.1 (L) 03/18/2025    AST 15 03/18/2025    ALT 19 03/18/2025    ALKPHOS 63 03/18/2025    BILITOT 1.5 (H) 03/18/2025     LIPID PANEL -   Lab Results   Component Value Date    CHOL 113 05/22/2024    HDL 33.8 05/22/2024     "CHHDL 3.3 05/22/2024    VLDL 13 05/22/2024    TRIG 66 05/22/2024    NHDL 79 05/22/2024     RENAL FUNCTION PANEL -   Lab Results   Component Value Date    K 4.1 03/19/2025    PHOS 3.7 03/03/2025     No results found for: \"BNP\", \"HGBA1C\"       Procedure    Echo 2/27/2025 EF 55%, well-seated mechanical mitral valve mean gradient 5 mmHg    EXT 4/16/24 below avg, chrono incomp, severe SOB, NL perfusion EF 51%    TTE 3/22/24 EF 50-55%, PWHK, Mech MVR with mean gradient 3mmHg    ECHO [09/21/2022]: Est EF 50%. Mild-mod hypok of posterolateral walls. Well seated but poorly vis'd mechanical MVR w/acceptable mean gradient of 4 mmHg.     ECHO [01/05/2021]: Est EF 40%. Well-seated mech MVR prosthesis w/mild transvalvular regurg and slightly elevated mean transmitral gradient of 10 mmHg. Global hypok.      ECHO [10/19/2020]: EF 40%. Poorly vis'd anatomical structures (subopt image quality). Abnormal septal motion c/w postop status. Pt appears to be in A-flutter w/some variation in block which may affect est of LVF and transvalvular flows. Global hypok.      CATH [09/17/2020, Dr. Brittney Lomeli]: Elevated right heart filling pressures moderately. Normal cardiac output. Severe two-vessel coronary disease with a totally occluded circumflex filling via left to left collaterals, and a totally occluded right coronary artery in the midportion. Moderate disease in the mid LAD which is a 40 to 50% stenosis. RECOMMENDATIONS: The patient is now a candidate for surgical correction of her mild to moderate mitral stenosis with severe mitral regurgitation. Revascularization of the circumflex will be required. Possible revascularization of the right coronary artery if it is deemed to be a suitable candidate intraoperatively.      EX NST [08/18/2020]: 4 min 38 sec (6.5 METs) = Abnormal â€“ ev/for ischemia involving the lateral wall, EF 37%     CT / SCORING [08/14/2020] = 150 (LM 0.6, LAD 32.2, LCx 97.8, RCA 19.5)     Assessment/Plan   1. Severe mitral " regurgitation status post mitral valve replacement with a #25/33 On-X mechanical mitral valve October 2020.  Echo 2/27/2025 revealed normal LV function normal functioning mechanical mitral valve mean gradient 5 mmHg.  He is back on Coumadin and aspirin.  She did have a STEMI as left arm hematoma resulting in compartment syndrome when she was admitted with pneumonia and was getting antibiotics and INR's were supratherapeutic.    2. CAD. She had three-vessel bypass in 2020 with a LIMA to the LAD free radial artery graft to the marginal and SVG to the RCA. Continue aspirin.  And carvedilol.. TST below avg cap, chrono incompetence, NL perfusion EF 51%.  No recurrent chest pain.  I suspect it was more COPD related.    3. Atrial flutter. She had an ablation in October 2020. On exam she is regular.  She had paroxysms of atrial fibrillation in the hospital March 2025.  Already on Coumadin for her valve     4. Cardiomyopathy. Postoperatively her ejection fraction was down to 40% but on her echo in September 2022 her ejection fraction was 50%.  Remains low NL on echo and TST March 2024     5. Hyperlipidemia. 5/22/24 LDL 66, HDL 34, Trig 66.  These results are excellent.  This will be repeated by her PCP     6. Hypertension. Blood pressures are currently controlled.     7. Tobacco abuse.  She was smoke-free for a while.  She started smoking and but is now smoke-free again.    8.  Supratherapeutic INR when admitted to the hospital with pneumonia and influenza.  This resulted in a spontaneous left iliopsoas hematoma but also a left arm hematoma resulting in compartment syndrome requiring decompressive surgery.  INRs are now better controlled.    I've reviewed hospital records and reviewed the echo images.   Brittney Lomeli MD     Instructions and follow up

## 2025-07-09 NOTE — PATIENT INSTRUCTIONS
1. Severe mitral regurgitation status post mitral valve replacement with a #25/33 On-X mechanical mitral valve October 2020.  Echo 2/27/2025 revealed normal LV function normal functioning mechanical mitral valve mean gradient 5 mmHg.  He is back on Coumadin and aspirin.  She did have a STEMI as left arm hematoma resulting in compartment syndrome when she was admitted with pneumonia and was getting antibiotics and INR's were supratherapeutic.    2. CAD. She had three-vessel bypass in 2020 with a LIMA to the LAD free radial artery graft to the marginal and SVG to the RCA. Continue aspirin.  And carvedilol.. TST below avg cap, chrono incompetence, NL perfusion EF 51%.  No recurrent chest pain.  I suspect it was more COPD related.    3. Atrial flutter. She had an ablation in October 2020. On exam she is regular.  She had paroxysms of atrial fibrillation in the hospital March 2025.  Already on Coumadin for her valve     4. Cardiomyopathy. Postoperatively her ejection fraction was down to 40% but on her echo in September 2022 her ejection fraction was 50%.  Remains low NL on echo and TST March 2024     5. Hyperlipidemia. 5/22/24 LDL 66, HDL 34, Trig 66.  These results are excellent.  This will be repeated by her PCP     6. Hypertension. Blood pressures are currently controlled.     7. Tobacco abuse.  She was smoke-free for a while.  She started smoking and but is now smoke-free again.    8.  Supratherapeutic INR when admitted to the hospital with pneumonia and influenza.  This resulted in a spontaneous left iliopsoas hematoma but also a left arm hematoma resulting in compartment syndrome requiring decompressive surgery.  INRs are now better controlled.    I've reviewed hospital records and reviewed the echo images.

## 2025-07-15 DIAGNOSIS — I48.4 ATYPICAL ATRIAL FLUTTER: ICD-10-CM

## 2025-07-15 DIAGNOSIS — Z95.2 S/P MITRAL VALVE REPLACEMENT: Chronic | ICD-10-CM

## 2025-07-21 ENCOUNTER — APPOINTMENT (OUTPATIENT)
Dept: PHARMACY | Facility: CLINIC | Age: 55
End: 2025-07-21
Payer: COMMERCIAL

## 2025-07-22 ENCOUNTER — ANTICOAGULATION - WARFARIN VISIT (OUTPATIENT)
Dept: PHARMACY | Facility: CLINIC | Age: 55
End: 2025-07-22
Payer: COMMERCIAL

## 2025-07-22 DIAGNOSIS — I48.4 ATYPICAL ATRIAL FLUTTER: Primary | Chronic | ICD-10-CM

## 2025-07-22 DIAGNOSIS — Z95.2 S/P MITRAL VALVE REPLACEMENT: Chronic | ICD-10-CM

## 2025-07-22 DIAGNOSIS — I48.4 ATYPICAL ATRIAL FLUTTER: Primary | ICD-10-CM

## 2025-07-22 LAB
POC INR: 2.4 (ref 0.9–1.1)
POC PROTHROMBIN TIME: ABNORMAL (ref 9.3–12.5)

## 2025-07-22 PROCEDURE — 85610 PROTHROMBIN TIME: CPT | Mod: QW

## 2025-07-22 PROCEDURE — 99212 OFFICE O/P EST SF 10 MIN: CPT

## 2025-07-22 RX ORDER — WARFARIN 3 MG/1
TABLET ORAL
Qty: 120 TABLET | Refills: 1 | Status: SHIPPED | OUTPATIENT
Start: 2025-07-22 | End: 2026-07-22

## 2025-07-22 NOTE — PROGRESS NOTES
No bleeding or unusual bruising.  Medications and doses verified.  No scheduled procedures at this time.  INR=2.4   Plan: Continue same weekly dose b/c within 10% of goal range.  Follow up INR check in 4 weeks.

## 2025-08-19 ENCOUNTER — ANTICOAGULATION - WARFARIN VISIT (OUTPATIENT)
Dept: PHARMACY | Facility: CLINIC | Age: 55
End: 2025-08-19
Payer: COMMERCIAL

## 2025-08-19 DIAGNOSIS — I48.4 ATYPICAL ATRIAL FLUTTER: Primary | Chronic | ICD-10-CM

## 2025-08-19 DIAGNOSIS — Z95.2 S/P MITRAL VALVE REPLACEMENT: Chronic | ICD-10-CM

## 2025-08-19 LAB
POC INR: 3.4 (ref 0.9–1.1)
POC PROTHROMBIN TIME: ABNORMAL (ref 9.3–12.5)

## 2025-08-19 PROCEDURE — 85610 PROTHROMBIN TIME: CPT | Mod: QW

## 2025-08-19 PROCEDURE — 99212 OFFICE O/P EST SF 10 MIN: CPT

## 2025-09-18 ENCOUNTER — APPOINTMENT (OUTPATIENT)
Dept: PHARMACY | Facility: CLINIC | Age: 55
End: 2025-09-18
Payer: COMMERCIAL

## (undated) DEVICE — BANDAGE, ELASTIC, PREMIUM, SELF-CLOSE, 4 IN X 5.5 YD, STERILE

## (undated) DEVICE — BANDAGE, GAUZE, CONFORMING, KERLIX, 6 PLY, 4.5 IN X 4.1 YD

## (undated) DEVICE — CORD, CAUTERY, BIOPOLAR FORCEP, 12FT

## (undated) DEVICE — IRRIGATION SET, CYSTOSCOPY, REGULATING CLAMP, STRAIGHT, 81 IN

## (undated) DEVICE — Device

## (undated) DEVICE — PITCHER, GRADUATE, 32 OZ (1200CC), STERILE

## (undated) DEVICE — BANDAGE, ELASTIC, PREMIUM, SELF-CLOSE, 2 IN X 5 YD, STERILE

## (undated) DEVICE — DRESSING, GAUZE, SPONGE, 12 PLY, CURITY, 4 X 4 IN, RIGID TRAY, STERILE

## (undated) DEVICE — BANDAGE, ELASTIC, PREMIUM, SELF-CLOSE, 3 IN X 5 YD, STERILE

## (undated) DEVICE — PADDING, UNDERCAST, WYTEX, 2 IN X 4 YD, STERILE

## (undated) DEVICE — DRAPE, INCISE, ANTIMICROBIAL, IOBAN 2, LARGE, 17 X 23 IN, DISPOSABLE, STERILE

## (undated) DEVICE — PADDING, UNDERCAST, WYTEX, 3 IN X 4 YD, STERILE

## (undated) DEVICE — SPONGE, LAP, XRAY DECT, 18IN X 18IN, W/LOOP, STERILE

## (undated) DEVICE — BANDAGE, STRETCH, CONFORM, 2 IN X 4.1 YD, STERILE

## (undated) DEVICE — PADDING, CAST, COTTON, 3 IN X 4 YD

## (undated) DEVICE — DRESSING, NON-ADHERENT, OIL EMULSION, CURITY, 3 X 8 IN, STERILE

## (undated) DEVICE — DRAPE, TIBURON, SPLIT SHEET, REINF ADH STRIP, 77X108

## (undated) DEVICE — FORCEP, BIOPOLAR, ADSON, NON INSUL, 5IN 1.0MM

## (undated) DEVICE — DRESSING, NON-ADHERENT, 3 X 3 IN, STERILE

## (undated) DEVICE — OINTMENT, TOPICAL, BACITRACIN

## (undated) DEVICE — DRESSING, ABDOMINAL, TENDERSORB, 8 X 7-1/2 IN, STERILE

## (undated) DEVICE — CUFF, TOURNIQUET, 18 X 4, DUAL PORT/SNGL BLADDER, DISP, LF

## (undated) DEVICE — BASIN, EMESIS, STANDARD, STERILE

## (undated) DEVICE — WOUND SYSTEM, DEBRIDEMENT & CLEANING, O.R DUOPAK